# Patient Record
Sex: MALE | Race: WHITE | NOT HISPANIC OR LATINO | Employment: OTHER | URBAN - NONMETROPOLITAN AREA
[De-identification: names, ages, dates, MRNs, and addresses within clinical notes are randomized per-mention and may not be internally consistent; named-entity substitution may affect disease eponyms.]

---

## 2017-01-10 ENCOUNTER — DOCUMENTATION (OUTPATIENT)
Dept: CARDIOLOGY | Facility: CLINIC | Age: 77
End: 2017-01-10

## 2017-02-06 ENCOUNTER — ANTICOAGULATION VISIT (OUTPATIENT)
Dept: CARDIAC SURGERY | Facility: CLINIC | Age: 77
End: 2017-02-06

## 2017-02-06 ENCOUNTER — OFFICE VISIT (OUTPATIENT)
Dept: CARDIOLOGY | Facility: CLINIC | Age: 77
End: 2017-02-06

## 2017-02-06 VITALS
SYSTOLIC BLOOD PRESSURE: 110 MMHG | BODY MASS INDEX: 29.12 KG/M2 | WEIGHT: 208 LBS | HEART RATE: 70 BPM | HEIGHT: 71 IN | DIASTOLIC BLOOD PRESSURE: 70 MMHG

## 2017-02-06 VITALS — HEART RATE: 62 BPM | SYSTOLIC BLOOD PRESSURE: 90 MMHG | DIASTOLIC BLOOD PRESSURE: 64 MMHG

## 2017-02-06 DIAGNOSIS — Z79.01 LONG-TERM (CURRENT) USE OF ANTICOAGULANTS: ICD-10-CM

## 2017-02-06 DIAGNOSIS — E78.00 HYPERCHOLESTEROLEMIA: ICD-10-CM

## 2017-02-06 DIAGNOSIS — I48.3 TYPICAL ATRIAL FLUTTER (HCC): ICD-10-CM

## 2017-02-06 DIAGNOSIS — I48.0 PAROXYSMAL ATRIAL FIBRILLATION (HCC): Primary | ICD-10-CM

## 2017-02-06 DIAGNOSIS — I74.9 EMBOLISM AND THROMBOSIS OF ARTERY (HCC): ICD-10-CM

## 2017-02-06 DIAGNOSIS — I73.9 PERIPHERAL VASCULAR DISEASE (HCC): ICD-10-CM

## 2017-02-06 DIAGNOSIS — I48.91 ATRIAL FIBRILLATION, UNSPECIFIED TYPE (HCC): ICD-10-CM

## 2017-02-06 PROBLEM — I34.0 NON-RHEUMATIC MITRAL REGURGITATION: Status: ACTIVE | Noted: 2017-02-06

## 2017-02-06 LAB — INR PPP: 3.6 (ref 0.9–1.1)

## 2017-02-06 PROCEDURE — 36416 COLLJ CAPILLARY BLOOD SPEC: CPT | Performed by: NURSE PRACTITIONER

## 2017-02-06 PROCEDURE — 85610 PROTHROMBIN TIME: CPT | Performed by: NURSE PRACTITIONER

## 2017-02-06 PROCEDURE — 99211 OFF/OP EST MAY X REQ PHY/QHP: CPT | Performed by: NURSE PRACTITIONER

## 2017-02-06 PROCEDURE — 93000 ELECTROCARDIOGRAM COMPLETE: CPT | Performed by: INTERNAL MEDICINE

## 2017-02-06 PROCEDURE — 99213 OFFICE O/P EST LOW 20 MIN: CPT | Performed by: INTERNAL MEDICINE

## 2017-02-06 RX ORDER — WARFARIN SODIUM 5 MG/1
TABLET ORAL
Qty: 30 TABLET | Refills: 0 | Status: SHIPPED | OUTPATIENT
Start: 2017-02-06 | End: 2017-04-17 | Stop reason: SDUPTHER

## 2017-02-06 NOTE — PROGRESS NOTES
Massimo Bentley  76 y.o. male    02/06/2017  1. Paroxysmal atrial fibrillation    2. Peripheral vascular disease    3. Long-term (current) use of anticoagulants    4. Typical atrial flutter    5. Hypercholesterolemia    6. Embolism and thrombosis of artery        History of Present Illness: Routine follow-up with history of atrial flutter/atrial fibrillation and peripheral vascular disease under care of Dr. Pennington    75 years old gentleman who walk with the help of cane with the past medical history results of hypertension hypertensive heart disease, history of for atrial fibrillation/atrial flutter underwent EP study and isthmus ablation.  The patient noted to have of the by the reduction block left-sided atrial tachycardia no ablation was performed.  The patient was started on Betapace doing remarkably well.  The patient have peripheral embolism and peripheral was can disease status post stent placement by Dr. Pennington.  Also had AAA under care of Dr. Pennington.  The patient chronic oral anti-coagulation patient denied any orthopnea, PND, chest pain, palpitation, fever, chills, intermittent claudication, syncope or near syncopal episode.            SUBJECTIVE    Allergies   Allergen Reactions   • Lipitor [Atorvastatin]    • Lortab [Hydrocodone-Acetaminophen]    • Morphine And Related          Past Medical History   Diagnosis Date   • Abdominal bloating    • Anxiety    • Atherosclerosis of native arteries of extremity with intermittent claudication    • Atrial flutter    • Backache    • Benign prostatic hyperplasia      BX 2009, also has renal cyst      • Chronic bronchitis    • Chronic renal impairment    • Depressive disorder    • Drug abuse, episodic use    • Dyslipidemia    • Fatigue    • GERD (gastroesophageal reflux disease)    • Hypercholesterolemia 03/15/2016   • Injury of tendon of rotator cuff 10/22/2015   • Insomnia 06/24/2016   • Intervertebral disc disorder 10/27/2011   • Major depression    •  Neck pain 01/09/2012   • Osteoarthritis of multiple joints 06/21/2013   • Pain from vascular prosthetic devices, implants and grafts, sequela 12/03/2015   • Pain in left foot 10/27/2015   • Peripheral vascular disease 12/03/2015      LEFT fem-tib bypass, embolectomy 6/2014    10/27/2015    • Senile debility 10/22/2015   • 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     weakness   • Shoulder pain    • Simple renal cyst 01/01/2011   • Stroke       Echo: L atrial appendage thrombus      • Suicide      at risk for   • Tobacco dependence syndrome 12/03/2015     2014 to E cigarette            Past Surgical History   Procedure Laterality Date   • Femoral thrombectomy/embolectomy  06/29/2014     Left lower extremity arteriogram. Left popliteal artery endarterectomy. Redo left femoral to [id number] trunk bypass. Negative pressure wound therapy with placement of Prevena wound V.A.C., left groin, left lower leg   • Injection of medication  03/15/2016     B12   • Cardiac catheterization  01/30/1984     Mixed dominant coronary arterial anatomy. No coronary atherosclerosis. Normal left ventricular function   • Cholecystectomy  08/25/2014     Cholecystitis with gangrenous gallbladder   • Endoscopy and colonoscopy  10/17/2013     Diverticulum in sigmoid colon & descending colon. Internal & external hemorrhoids found   • Injection of medication  10/23/2015     Drain/Inject Major Joint    • Transesophageal echocardiogram ([facility])  10/06/2015     With color flow-Mild mitral regurg.Left atrium mild dilated.Ef of 55-60%.RV systolic pressure elevated.Trace tricuspid regurg   • Transesophageal echocardiogram ([facility])  02/14/2012     Without color flow-[facility] w/ early diast dysfun. AV trileaflet & structurally NRL w/ AR NRL. No regional wall motion abn Est Ef approx 50-55%. M & TR valves NRL w/ mild M & TR regurg Trace -mild pulmonic regurg. Anterior echo free space w/o hemodynamic signif. NRL RV   • Esophagoscopy / egd  10/17/2013     With  tube-Esophagitis seen. Biopsy taken. Gastritis in stomach. Biopsy taken. Stenosis in 2nd portion of duodenum. Dilatation performed.   • Endoscopy  03/22/1990     Duodenal ulcer   • Injection of medication  01/13/2016     Kenalog   • Back surgery     • Lumbar laminectomy     • Abdominal aortic aneurysm repair  07/18/2011     Endovascular   • Transesophageal echocardiogram ([facility])  03/25/2011     Normal LV systolic function with Ef of 50-55%.Left atrium ildly dilated.Moderate mitral regurg.Mitral valve regurg.Intact interatrial septum.No evidence of any cardiac thrombus or pericardial effusion.AV trileaflet         Family History   Problem Relation Age of Onset   • Osteoarthritis Mother      Lived to be 102   • Hypertension Mother    • ADD / ADHD Father    • Lung cancer Father    • ADD / ADHD Brother    • Rectal cancer Other          Social History     Social History   • Marital status:      Spouse name: N/A   • Number of children: N/A   • Years of education: N/A     Occupational History   • Not on file.     Social History Main Topics   • Smoking status: Current Some Day Smoker     Years: 50.00     Types: Cigars   • Smokeless tobacco: Not on file   • Alcohol use No   • Drug use: No   • Sexual activity: Not on file     Other Topics Concern   • Not on file     Social History Narrative         Current Outpatient Prescriptions   Medication Sig Dispense Refill   • acetaminophen (TYLENOL) 325 MG tablet Take 650 mg by mouth Every 6 (Six) Hours.     • albuterol (PROVENTIL HFA;VENTOLIN HFA) 108 (90 BASE) MCG/ACT inhaler Inhale 2 puffs Every 4 (Four) Hours As Needed for wheezing. 1 inhaler 11   • aspirin 81 MG EC tablet Take 81 mg by mouth daily.     • cilostazol (PLETAL) 50 MG tablet Take 1 tablet by mouth 2 (Two) Times a Day. 60 tablet 2   • levothyroxine (SYNTHROID, LEVOTHROID) 25 MCG tablet Take 25 mcg by mouth daily.     • magnesium oxide (MAGOX) 400 (241.3 MG) MG tablet tablet Take 400 mg by mouth every night.     •  "pravastatin (PRAVACHOL) 10 MG tablet Take 10 mg by mouth every other day.     • QUEtiapine (SEROquel) 100 MG tablet TAKE 2 TABLETS BY MOUTH AT BEDTIME  0   • sotalol (BETAPACE) 80 MG tablet Take 80 mg by mouth 2 (two) times a day.     • warfarin (COUMADIN) 5 MG tablet Take 1 tablet nightly except on Tuesday and Friday take 1/2 tablet or as directed by [facility] 30 tablet 0     Current Facility-Administered Medications   Medication Dose Route Frequency Provider Last Rate Last Dose   • albuterol (PROVENTIL HFA;VENTOLIN HFA) inhaler 1 puff  1 puff Inhalation Q6H PRN Belkys Hoffman MD             OBJECTIVE    Visit Vitals   • /70   • Pulse 70   • Ht 71\" (180.3 cm)   • Wt 208 lb (94.3 kg)   • BMI 29.01 kg/m2           Review of Systems     Constitutional:  Denies recent weight loss, weight gain, fever or chills, no change in exercise tolerance     HENT:  Denies any hearing loss, epistaxis, hoarseness, or difficulty speaking.     Eyes: no blurred    Respiratory:  Denies dyspnea with exertion,no cough, wheezing, or hemoptysis.     Cardiovascular: SEE HPI  Gastrointestinal:  Denies change in bowel habits, dyspepsia, ulcer disease, hematochezia, or melena.     Endocrine: Negative for cold intolerance, heat intolerance, polydipsia, polyphagia and polyuria. Denies any history of weight change, heat/cold intolerance, polydipsia, polyuria     Genitourinary: Negative.      Musculoskeletal:  history of arthritic symptoms or back problems     Skin:  Denies any change in hair or nails, rashes, or skin lesions.     Allergic/Immunologic: Negative.  Negative for environmental allergies, food allergies and immunocompromised state.     Neurological:  Denies any history of recurrent headaches, strokes, TIA, or seizure disorder.     Hematological: Denies any food allergies, seasonal allergies, bleeding disorders, or lymphadenopathy.     Psychiatric/Behavioral: Denies any history of depression, substance abuse, or " change in cognitive function.         Physical Exam     Constitutional: Cooperative, alert and oriented, well-developed, well-nourished, in no acute distress.     HENT:   Head: Normocephalic, normal hair patterns, no masses or tenderness.  Ears, Nose, and Throat: No gross abnormalities. No pallor or cyanosis. Dentition good.   Eyes: EOMS intact, PERRL, conjunctivae and lids unremarkable. Fundoscopic exam and visual fields not performed.   Neck: No palpable masses or adenopathy, no thyromegaly, no JVD, carotid pulses are full and equal bilaterally and without  Bruits.     Cardiovascular: Regular rhythm, S1 and S2 normal, no S3 or S4. Apical impulse not displaced. No murmurs, gallops, or rubs detected.     Pulmonary/Chest: Chest: normal symmetry, no tenderness to palpation, normal respiratory excursion, no intercostal retraction, no use of accessory muscles.            Pulmonary: Normal breath sounds. No rales or ronchi.    Abdominal: Abdomen soft, bowel sounds normoactive, no masses, no hepatosplenomegaly, non-tender, no bruits.     Musculoskeletal: No deformities, clubbing, cyanosis, erythema, or edema observed. There are no spinal abnormalities noted. Normal muscle strength and tone. Pulses full and equal in all extremities, no bruits auscultated.     Neurological: No gross motor or sensory deficits noted, affect appropriate, oriented to time, person, place.     Skin: Warm and dry to the touch, no apparent skin lesions or masses noted.     Psychiatric: He has a normal mood and affect. His behavior is normal. Judgment and thought content normal.           ECG 12 Lead  Date/Time: 2/6/2017 11:39 AM  Performed by: LUBNA BAUTISTA  Authorized by: LUBNA BAUTISTA   Rhythm: sinus bradycardia  Comments: Sinus bradycardia at 54 bpm without any ST-T wave changes suggesting of ischemia              Lab Results   Component Value Date    WBC 7.3 01/04/2017    HGB 13.7 01/04/2017    HCT 39.1 01/04/2017    MCV 83.0 01/04/2017      01/04/2017     Lab Results   Component Value Date    GLUCOSE 110 (H) 07/01/2016    BUN 25 (H) 07/01/2016    CREATININE 1.7 (H) 07/01/2016    CO2 24 07/01/2016    CALCIUM 9.0 07/01/2016    ALBUMIN 3.7 07/01/2016    AST 20 07/01/2016    ALT 24 07/01/2016     No results found for: CHOL  Lab Results   Component Value Date    TRIG 237 (H) 01/04/2017    TRIG 202 (H) 07/01/2016    TRIG 152 02/23/2016     Lab Results   Component Value Date    HDL 30 (L) 01/04/2017    HDL 32 (L) 07/01/2016    HDL 40 (L) 02/23/2016     Lab Results   Component Value Date    LDLCALC 83 01/04/2017    LDLCALC 94 07/01/2016    LDLCALC 106 02/23/2016     No results found for: LDL  No results found for: HDLLDLRATIO  No components found for: CHOLHDL  Lab Results   Component Value Date    HGBA1C 5.4 06/13/2014     Lab Results   Component Value Date    TSH 4.26 01/04/2017           ASSESSMENT AND PLAN  #1 atrial flutter/atrial fibrillation status post EP study  #2 hypertension with hypertensive heart disease and normal left and a systolic function.  Mild mitral regurgitation previous echocardiographic study.  #3  embolization with PVD status post stenting to care of Dr. Pennington  Clinically there is no sign of any acute cardiac decompensation based on the clinical history physical finding.  EKG done and finding discussed with the patient in normal QTc interval.  The patient abnormal stress test and good left ventricle systolic function.  Had mild mitral regurgitation without any hemodynamic significant.  Patient will continue Betapace 80 mg twice a day to keep him in sinus rhythm warfarin to decrease risk of embolization along with continuation of aspirin.  Hypothyroidism being managed with to Synthroid and Hytrin for lipidemia with the Pravachol.  No change was made in the medical management as his cardiac status seemed to well compensated.  We will see him back in 6 month R basement patient clinical conditions    Diagnoses and all orders for  this visit:    Paroxysmal atrial fibrillation  -     ECG 12 Lead    Peripheral vascular disease    Long-term (current) use of anticoagulants    Typical atrial flutter    Hypercholesterolemia    Embolism and thrombosis of artery        Emily Thacker MD  2/6/2017  11:33 AM

## 2017-02-06 NOTE — PROGRESS NOTES
Pt here today for overdue INR. Pt denies med changes or bleeding problems. Dose adjusted today only and pt instructed to have a serving of green veggies today; pt verbalized an understanding. Patient instructed regarding medication; results given and questions answered. Nutritional counseling given.  Dietary factors affecting therapy addressed.  Patient instructed to monitor for excessive bruising or bleeding. Will recheck in 2 weeks and if therapeutic will place out 1 month.  Electronically signed by JUANI Sheets

## 2017-02-08 DIAGNOSIS — I73.9 PERIPHERAL VASCULAR DISEASE, UNSPECIFIED (HCC): Primary | ICD-10-CM

## 2017-02-09 ENCOUNTER — OFFICE VISIT (OUTPATIENT)
Dept: CARDIAC SURGERY | Facility: CLINIC | Age: 77
End: 2017-02-09

## 2017-02-09 VITALS
OXYGEN SATURATION: 95 % | HEIGHT: 71 IN | SYSTOLIC BLOOD PRESSURE: 120 MMHG | WEIGHT: 218 LBS | DIASTOLIC BLOOD PRESSURE: 85 MMHG | TEMPERATURE: 97.3 F | HEART RATE: 59 BPM | BODY MASS INDEX: 30.52 KG/M2

## 2017-02-09 DIAGNOSIS — F17.200 TOBACCO DEPENDENCE SYNDROME: Primary | ICD-10-CM

## 2017-02-09 DIAGNOSIS — I73.9 PVD (PERIPHERAL VASCULAR DISEASE) (HCC): ICD-10-CM

## 2017-02-09 DIAGNOSIS — L98.9 SORE ON TOE: ICD-10-CM

## 2017-02-09 PROCEDURE — 99406 BEHAV CHNG SMOKING 3-10 MIN: CPT | Performed by: NURSE PRACTITIONER

## 2017-02-09 PROCEDURE — 99213 OFFICE O/P EST LOW 20 MIN: CPT | Performed by: NURSE PRACTITIONER

## 2017-02-09 NOTE — PROGRESS NOTES
Subjective   Patient ID: Massimo Bentley is a 76 y.o. male is here today for follow-up for PVD.  CC:  Denies any claudication or color changes of his feet.  Does have some balance issues, now using walker  Has pressure sore on R last toe.     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  He underwent Redo LEFT fem->tib peroneal graft.   He denies foot pain, denies any sensory, motor, or neuro deficit other than long standing LEFT shin numbness.  He denies any bleeding or unexplained bruising.  He returns today in scheduled FU.  He has been to rehab and no longer consumes alcohol,  He does continue to smoke cigars and electronic cigarettes.    He has noticed some wheezing.       PVD/Anticoagulation HX:  Chronic at fib for which he is on long term anticoagulation therapy.  He has undergone cardioversion and ablation (9/19/11) but continues with at fib.  He recently underwent  Aortagram with thrombolysis for clot of his SFA and popliteal artery (R).  TEX done at that time (2/17/12) demonstrated considerable amoutn of thrombus around and in LA appendage.  5/2/111. CT ABD:  Infrarenal abdominal aortic aneurysm, occurring just proximal to  the bifurcation, measuring 5.5 centimeters in craniocaudal dimension  5.3 cm in AP dimension, and 6.8 cm in transverse dimension  Left femoral to tibia bypas  7/18/2011 Endovascular AAA Repair   6/29/14  REDO LEFT femoral to tibial peroneal trunk bypass 6mm PTFE  with LFA embolectomy and LPA endarterectomy  11/3/2014 [facility]:  RIGHT 1.1 tri/biphasic.  LEFT .90 biphasic.  patent LEFT fem-tib bypass (maxPSV 40cm/s, triphasic)  3/9/15: [facility]: RIGHT 1.1 Triphasic. LEFT 0.86 Biphasic.  7/20/15: [facility]: RIGHT 1.1 Triphasic/Bi (DP). LEFT 0.82 Tri/Bi-Mon (PT/DP). PATENT LEFT Fem-tib max PSV 117cm/s (inflow) Triphasic.  10/27/15: [facility]: RIGHT 1.1 Triphasic. LEFT NO DP/PT  FLOW. OCCLUDED LEFT fem-tib graft  10/27/15  LLE arteriogram, TPA thrombolysis with EKOS catherter to LEFT fem-pop graft  10/28/15  Recheck lysis, 2 V run off to foot.   12/03/15  [facility]:  RIGHT 0.87  POP/DP/PT Triphasic  LEFT 0.88 POP Triphasic  DP/PT Biphasic   3/16/16  [facility]:  RIGHT 0.98  Triphasic  LEFT 0.75 POP Triphasic  DP/PT Biphasic   9/21/16   [facility]:  RIGHT 1.01  Triphasic  LEFT 0.87 POP Triphasic  PT Biphasic  DP Monophasic   PPGS:  Right pulsatile waveforms  LEFT reduced PPG waveforms.   02/08/17  [facility]:  RIGHT 1.22   Triphasic  LEFT 0.95 POP Triphasic  PT Biphasic  DP Biphasic    PPGS:  Right pulsatile waveforms  LEFT reduced PPG waveforms.     Aneurysm:  No back pain, No embolization  5/2/111. CT ABD:  Infrarenal abdominal aortic aneurysm, occurring just proximal to  the bifurcation, measuring 5.5 centimeters in craniocaudal dimension  5.3 cm in AP dimension, and 6.8 cm in transverse dimension  7/18/2011 Endovascular AAA Repair   3/9/15: Abd Duplex: Prx Aorta 3.6cm. No endovascular leak      The following portions of the patient's history were reviewed and updated as appropriate: allergies, current medications, past family history, past medical history, past social history, past surgical history and problem list.    Current Outpatient Prescriptions:   •  albuterol (PROVENTIL HFA;VENTOLIN HFA) 108 (90 BASE) MCG/ACT inhaler, Inhale 2 puffs Every 4 (Four) Hours As Needed for wheezing., Disp: 1 inhaler, Rfl: 11  •  aspirin 81 MG EC tablet, Take 81 mg by mouth daily., Disp: , Rfl:   •  cilostazol (PLETAL) 50 MG tablet, Take 1 tablet by mouth 2 (Two) Times a Day., Disp: 60 tablet, Rfl: 2  •  levothyroxine (SYNTHROID, LEVOTHROID) 25 MCG tablet, Take 25 mcg by mouth daily., Disp: , Rfl:   •  magnesium oxide (MAGOX) 400 (241.3 MG) MG tablet tablet, Take 400 mg by mouth every night., Disp: , Rfl:   •  pravastatin (PRAVACHOL) 10 MG tablet, Take 10 mg by mouth every other day., Disp: , Rfl:   •  QUEtiapine (SEROquel) 100  MG tablet, TAKE 2 TABLETS BY MOUTH AT BEDTIME, Disp: , Rfl: 0  •  sotalol (BETAPACE) 80 MG tablet, Take 80 mg by mouth 2 (two) times a day., Disp: , Rfl:   •  warfarin (COUMADIN) 5 MG tablet, Take 1 tablet nightly except on Tuesday and Friday take 1/2 tablet or as directed by [facility], Disp: 30 tablet, Rfl: 0  No current facility-administered medications for this visit.     Review of Systems   Constitution: Positive for weakness (less strong ). Negative for chills, fever and malaise/fatigue.   HENT: Negative for headaches, hoarse voice and nosebleeds.    Eyes: Negative for visual disturbance.   Cardiovascular: Negative for chest pain, claudication, cyanosis and dyspnea on exertion.   Respiratory: Positive for cough and wheezing (daytime). Negative for hemoptysis and shortness of breath.    Hematologic/Lymphatic: Does not bruise/bleed easily.   Skin: Positive for dry skin and poor wound healing (Developed wound on Last toe inner aspect R foot ). Negative for color change and rash.   Musculoskeletal: Negative for falls, joint swelling and muscle cramps.   Gastrointestinal: Negative for anorexia, change in bowel habit, hematemesis, melena and nausea.   Genitourinary: Negative for hematuria.   Neurological: Positive for disturbances in coordination (Now uses walker ), loss of balance (walker) and numbness (Left shin). Negative for brief paralysis, difficulty with concentration, dizziness, focal weakness, light-headedness, paresthesias and sensory change.   Psychiatric/Behavioral: Negative for altered mental status.        Objective   Physical Exam   Constitutional: He is oriented to person, place, and time. He appears well-nourished.   HENT:   Head: Normocephalic.   Eyes: Conjunctivae and EOM are normal. Pupils are equal, round, and reactive to light.   Neck: Neck supple. No JVD present. No tracheal deviation present.   Cardiovascular: Normal rate, normal heart sounds and intact distal pulses.  An irregular  rhythm present.   Pulses:       Carotid pulses are 2+ on the right side, and 2+ on the left side.       Radial pulses are 2+ on the right side, and 2+ on the left side.        Popliteal pulses are 2+ on the right side, and 2+ on the left side.        Dorsalis pedis pulses are 2+ on the right side, and 1+ on the left side.        Posterior tibial pulses are 2+ on the right side, and 1+ on the left side.   Lower extremities with varcosities and spider veins    Pulmonary/Chest: Effort normal. He has wheezes. He has no rales.   Musculoskeletal: He exhibits no edema or tenderness.       Neurological Sensory Findings -  Unaltered sharp/dull right ankle/foot discrimination and unaltered sharp/dull left ankle/foot discrimination.    Vascular Status -  His exam exhibits right foot vasculature normal. His exam exhibits no right foot edema. His exam exhibits left foot vasculature abnormal (decrease pulses   Distal 3 toes with discoloration bluish no pallor ) and no left foot edema.   Skin Integrity  -  His right foot skin is not intact (Last toe inner aspect with 0.4 cm circular ulcer with soft white top ).    Massimo 's left foot skin is intact. .  Neurological: He is alert and oriented to person, place, and time. No cranial nerve deficit.   Skin: Skin is warm and dry. No rash noted. No erythema. No pallor.   LEFT Posterior toes dusky W/D   Cap refill < 3 sec    + sens & mobility   Nursing note and vitals reviewed.      Assessment/Plan   Independent Review of Radiographic Studies:   02/08/17  [facility]:  RIGHT 1.22   Triphasic  LEFT 0.95 POP Triphasic  PT Biphasic  DP Biphasic    PPGS:  Right pulsatile waveforms  LEFT reduced PPG waveforms.         1. PVD (peripheral vascular disease)  Massimo Bentley is to continue beta blocker, antiplatelet, and statin therapy.  Continue Pletal at reduced dosage as dizziness resolved.  Continue anticoagulation with coumadin, currently he is therapeutic.  Advise to purchase mirror to view  posterior of toes.  Advise smoking cessation, recommend NRP nicotine gum.   Especially with wheezing.  He agrees to avoid cigars for 1 week and see if daytime wheezing improves.  Smoking cessation assistance options offered including behavioral counseling (Smoking Cessation Classes), Nicotine replacement therapy (patches or gum) .  Discussion and question answer period 2-3 minutes.  Vascular FU 6 mths unless symptoms occur  If signs and symptoms of ischemia should occur including but not limited to pale/blue discoloration of limb, increasing pain with ambulation or at rest, or a non-healing wound. Patient is to notify [facility] center for immediate evaluation.  Wheezing:  Avoid cigars and see if improved.     - Ankle Brachial Index    2.  Sore on R toe  Wound care:   Sore on R little toe.  CLean 2 x a day with baby shampoo  Rinse pat  Separate toes at night Light antibacterial ointment.   Refer to Dr Menendez Podiatry

## 2017-02-09 NOTE — PATIENT INSTRUCTIONS
VASCULAR TEST:  Stable  Normal blood flow to right foot and toe   Diminished blood flow to Left foot and toe  Sore on R little toe.  CLean 2 x a day with baby shampoo  Rinse pat  Separate toes at night Light antibacterial ointment.   Refer to Dr Menendez Podiatry  Vascular FU 6 mths unless symptoms occur  If signs and symptoms of ischemia should occur including but not limited to pale/blue discoloration of limb, increasing pain with ambulation or at rest, or a non-healing wound. Patient is to notify [facility] V[facility] center for immediate evaluation.  Wheezing:  Avoid cigars and see if improved.

## 2017-02-11 LAB
BH CV LOWER ARTERIAL LEFT ABI RATIO: 0.95
BH CV LOWER ARTERIAL LEFT DORSALIS PEDIS SYS MAX: 127 MMHG
BH CV LOWER ARTERIAL LEFT POST TIBIAL SYS MAX: 124 MMHG
BH CV LOWER ARTERIAL RIGHT ABI RATIO: 1.22
BH CV LOWER ARTERIAL RIGHT DORSALIS PEDIS SYS MAX: 162 MMHG
BH CV LOWER ARTERIAL RIGHT POST TIBIAL SYS MAX: 161 MMHG
UPPER ARTERIAL LEFT ARM BRACHIAL SYS MAX: 113 MMHG
UPPER ARTERIAL RIGHT ARM BRACHIAL SYS MAX: 133 MMHG

## 2017-02-20 ENCOUNTER — OFFICE VISIT (OUTPATIENT)
Dept: FAMILY MEDICINE CLINIC | Facility: CLINIC | Age: 77
End: 2017-02-20

## 2017-02-20 VITALS
WEIGHT: 215 LBS | BODY MASS INDEX: 30.1 KG/M2 | DIASTOLIC BLOOD PRESSURE: 76 MMHG | SYSTOLIC BLOOD PRESSURE: 122 MMHG | HEIGHT: 71 IN

## 2017-02-20 DIAGNOSIS — F33.41 RECURRENT MAJOR DEPRESSIVE DISORDER, IN PARTIAL REMISSION (HCC): ICD-10-CM

## 2017-02-20 DIAGNOSIS — R06.02 SHORTNESS OF BREATH: Primary | ICD-10-CM

## 2017-02-20 DIAGNOSIS — Z79.899 HIGH RISK MEDICATION USE: ICD-10-CM

## 2017-02-20 DIAGNOSIS — IMO0002 ULCER: ICD-10-CM

## 2017-02-20 DIAGNOSIS — E78.5 HYPERLIPIDEMIA, UNSPECIFIED HYPERLIPIDEMIA TYPE: ICD-10-CM

## 2017-02-20 PROCEDURE — 99214 OFFICE O/P EST MOD 30 MIN: CPT | Performed by: FAMILY MEDICINE

## 2017-02-20 RX ORDER — HYDROXYZINE PAMOATE 25 MG/1
25 CAPSULE ORAL 3 TIMES DAILY PRN
Qty: 90 CAPSULE | Refills: 1 | Status: SHIPPED | OUTPATIENT
Start: 2017-02-20 | End: 2017-05-16

## 2017-02-20 RX ORDER — GUAIFENESIN 600 MG/1
1200 TABLET, EXTENDED RELEASE ORAL 2 TIMES DAILY
COMMUNITY
End: 2018-01-01

## 2017-02-20 RX ORDER — MONTELUKAST SODIUM 10 MG/1
10 TABLET ORAL NIGHTLY
Qty: 90 TABLET | Refills: 2 | Status: SHIPPED | OUTPATIENT
Start: 2017-02-20 | End: 2017-08-02

## 2017-02-20 NOTE — PROGRESS NOTES
Subjective   Massimo Bentley is a 76 y.o. male.     History of Present Illness   Mr. Bentley is a 75yo male that presents today for follow-up on depression and hyperlipidemia.  He says that his depression has been fine and medication is working well without side effects.    He has been taking his cholesterol medication.  Had stopped taking his fish oil.  Cholesterol was overall better.  Has a sore on his small toe that he has appointment with podiatry.  He has had two rounds of antibiotics and has been there for 6 months now.  Not draining a lot.    He has had some issues with wheezing and SOA with exertion.  He has albuterol that he uses and that helps as needed.  He is a smoker and has been smoking for years.  Has really tried to cut back.      Current Outpatient Prescriptions:   •  albuterol (PROVENTIL HFA;VENTOLIN HFA) 108 (90 BASE) MCG/ACT inhaler, Inhale 2 puffs Every 4 (Four) Hours As Needed for wheezing., Disp: 1 inhaler, Rfl: 11  •  aspirin 81 MG EC tablet, Take 81 mg by mouth daily., Disp: , Rfl:   •  cilostazol (PLETAL) 50 MG tablet, Take 1 tablet by mouth 2 (Two) Times a Day., Disp: 60 tablet, Rfl: 2  •  guaiFENesin (MUCINEX) 600 MG 12 hr tablet, Take 1,200 mg by mouth 2 (Two) Times a Day., Disp: , Rfl:   •  levothyroxine (SYNTHROID, LEVOTHROID) 25 MCG tablet, Take 25 mcg by mouth daily., Disp: , Rfl:   •  magnesium oxide (MAGOX) 400 (241.3 MG) MG tablet tablet, Take 400 mg by mouth every night., Disp: , Rfl:   •  pravastatin (PRAVACHOL) 10 MG tablet, Take 10 mg by mouth every other day., Disp: , Rfl:   •  QUEtiapine (SEROquel) 100 MG tablet, TAKE 2 TABLETS BY MOUTH AT BEDTIME, Disp: , Rfl: 0  •  sotalol (BETAPACE) 80 MG tablet, Take 80 mg by mouth 2 (two) times a day., Disp: , Rfl:   •  warfarin (COUMADIN) 5 MG tablet, Take 1 tablet nightly except on Tuesday and Friday take 1/2 tablet or as directed by [facility], Disp: 30 tablet, Rfl: 0    The following portions of the patient's history were  "reviewed and updated as appropriate: allergies, current medications, past family history, past medical history, past social history, past surgical history and problem list.    Review of Systems   Constitutional: Positive for fatigue. Negative for activity change and appetite change.   Respiratory: Positive for cough, chest tightness, shortness of breath and wheezing.    Cardiovascular: Negative for chest pain, palpitations and leg swelling.   Gastrointestinal: Negative for abdominal distention, abdominal pain, constipation, diarrhea, nausea and vomiting.   Musculoskeletal: Positive for arthralgias and back pain. Negative for gait problem and joint swelling.   Skin: Positive for wound. Negative for pallor and rash.   Psychiatric/Behavioral: Negative for dysphoric mood and sleep disturbance. The patient is not nervous/anxious.        Objective    Vitals:    02/20/17 1447   BP: 122/76   Weight: 215 lb (97.5 kg)   Height: 71\" (180.3 cm)     Physical Exam   Constitutional: He is oriented to person, place, and time. He appears well-developed and well-nourished. No distress.   Walks with walker   Cardiovascular: Normal rate, regular rhythm and normal heart sounds.    No LE edema.  Erythma and ulcerative lesion on small toe on the right foot.  Toes feel warm to touch.  No drainage from lesion   Pulmonary/Chest: Effort normal. No respiratory distress. He has wheezes.   Abdominal: Soft. Bowel sounds are normal. He exhibits no distension. There is no tenderness.   Neurological: He is alert and oriented to person, place, and time.   Psychiatric: He has a normal mood and affect. His behavior is normal. Judgment and thought content normal.   Vitals reviewed.      Assessment/Plan   Problems Addressed this Visit        Cardiovascular and Mediastinum    Hyperlipidemia       Other    Major depression    Relevant Medications    hydrOXYzine (VISTARIL) 25 MG capsule      Other Visit Diagnoses     Shortness of breath    -  Primary    " Relevant Orders    Spirometry With Bronchodilator    Ulcer        Relevant Orders    Ambulatory Referral to Wound Clinic    High risk medication use        Relevant Orders    Basic metabolic panel          1.) SOA-  He has been a long term smoker.  May be COPD.  Will get PFT.  Also sounds like this is triggered by allergies.  Will try singulair and see if that helps.  Albuterol PRN.  2.) Depression-  Doing well on current medications.   3.) Ulcer on toe-  Follow-up with Podiatry as scheduled.  Will refer to wound care as well since it has been there for 6 months and he has a history of PAD.  4.) Hyperlipidemia-  Recommended that he restart his fish oil.  Continue statin.  RTC in 2-3 months or sooner PRN

## 2017-02-21 PROBLEM — E78.5 HYPERLIPIDEMIA: Status: ACTIVE | Noted: 2017-02-21

## 2017-02-23 ENCOUNTER — APPOINTMENT (OUTPATIENT)
Dept: WOUND CARE | Facility: HOSPITAL | Age: 77
End: 2017-02-23
Attending: PLASTIC SURGERY

## 2017-02-28 ENCOUNTER — APPOINTMENT (OUTPATIENT)
Dept: WOUND CARE | Facility: HOSPITAL | Age: 77
End: 2017-02-28
Attending: PLASTIC SURGERY

## 2017-03-02 ENCOUNTER — APPOINTMENT (OUTPATIENT)
Dept: LAB | Facility: HOSPITAL | Age: 77
End: 2017-03-02

## 2017-03-02 LAB
ANION GAP SERPL CALCULATED.3IONS-SCNC: 8 MMOL/L (ref 5–15)
BUN BLD-MCNC: 24 MG/DL (ref 7–21)
BUN/CREAT SERPL: 13.4 (ref 7–25)
CALCIUM SPEC-SCNC: 8.7 MG/DL (ref 8.4–10.2)
CHLORIDE SERPL-SCNC: 105 MMOL/L (ref 95–110)
CO2 SERPL-SCNC: 25 MMOL/L (ref 22–31)
CREAT BLD-MCNC: 1.79 MG/DL (ref 0.7–1.3)
GFR SERPL CREATININE-BSD FRML MDRD: 37 ML/MIN/1.73 (ref 60–98)
GLUCOSE BLD-MCNC: 94 MG/DL (ref 60–100)
POTASSIUM BLD-SCNC: 5.1 MMOL/L (ref 3.5–5.1)
SODIUM BLD-SCNC: 138 MMOL/L (ref 137–145)

## 2017-03-02 PROCEDURE — 80048 BASIC METABOLIC PNL TOTAL CA: CPT | Performed by: FAMILY MEDICINE

## 2017-03-02 PROCEDURE — 36415 COLL VENOUS BLD VENIPUNCTURE: CPT | Performed by: FAMILY MEDICINE

## 2017-03-03 RX ORDER — SOTALOL HYDROCHLORIDE 80 MG/1
120 TABLET ORAL 2 TIMES DAILY
Qty: 270 TABLET | Refills: 2 | Status: SHIPPED | OUTPATIENT
Start: 2017-03-03 | End: 2017-03-06 | Stop reason: SDUPTHER

## 2017-03-03 NOTE — TELEPHONE ENCOUNTER
Refill Request came in by fax from   [facility]/pharmacy #[id number] - [place], [place] - [address] [place] [address] - [phone]  - [phone] [address] [address]  Phone: [phone] Fax: [phone]   Refill sent to pharmacy via e-scribe.

## 2017-03-06 RX ORDER — SOTALOL HYDROCHLORIDE 80 MG/1
120 TABLET ORAL 2 TIMES DAILY
Qty: 270 TABLET | Refills: 2 | Status: SHIPPED | OUTPATIENT
Start: 2017-03-06 | End: 2017-11-13 | Stop reason: SDUPTHER

## 2017-03-06 NOTE — TELEPHONE ENCOUNTER
Refill Request came in by fax from   [facility]/pharmacy #[id number] - [place], [place] - [address] [place] [address] - [phone]  - [phone] [address] [address]  Phone: [phone] Fax: [phone]   Refill sent to pharmacy via e-scribe.

## 2017-03-14 ENCOUNTER — ANTICOAGULATION VISIT (OUTPATIENT)
Dept: CARDIAC SURGERY | Facility: CLINIC | Age: 77
End: 2017-03-14

## 2017-03-14 VITALS — HEART RATE: 71 BPM | SYSTOLIC BLOOD PRESSURE: 141 MMHG | DIASTOLIC BLOOD PRESSURE: 81 MMHG

## 2017-03-14 DIAGNOSIS — I48.0 PAROXYSMAL ATRIAL FIBRILLATION (HCC): ICD-10-CM

## 2017-03-14 DIAGNOSIS — Z79.01 LONG-TERM (CURRENT) USE OF ANTICOAGULANTS: ICD-10-CM

## 2017-03-14 DIAGNOSIS — I74.9 EMBOLISM AND THROMBOSIS OF ARTERY (HCC): ICD-10-CM

## 2017-03-14 LAB — INR PPP: 2.2 (ref 0.9–1.1)

## 2017-03-14 PROCEDURE — 85610 PROTHROMBIN TIME: CPT | Performed by: NURSE PRACTITIONER

## 2017-03-14 PROCEDURE — 99211 OFF/OP EST MAY X REQ PHY/QHP: CPT | Performed by: NURSE PRACTITIONER

## 2017-03-14 NOTE — PROGRESS NOTES
Pt overdue for INR today.  Pt states medications have not changed, no bleeding issues.  Pt is unsure if he missed a coumadin dose or not.  Adjusted coumadin dose for today only and instructed pt to limit green intake for three days.  Pt does not wish to return to CC prior to one month.  Patient instructed regarding medication; results given and questions answered. Nutritional counseling given.  Dietary factors affecting therapy addressed.  Patient instructed to monitor for excessive bruising or bleeding.          This document has been electronically signed by JUANI Fernandez on March 15, 2017 9:11 AM

## 2017-03-28 ENCOUNTER — OFFICE VISIT (OUTPATIENT)
Dept: PODIATRY | Facility: CLINIC | Age: 77
End: 2017-03-28

## 2017-03-28 VITALS — BODY MASS INDEX: 29.4 KG/M2 | OXYGEN SATURATION: 97 % | HEIGHT: 71 IN | WEIGHT: 210 LBS | HEART RATE: 60 BPM

## 2017-03-28 DIAGNOSIS — L97.512 TOE ULCER, RIGHT, WITH FAT LAYER EXPOSED (HCC): Primary | ICD-10-CM

## 2017-03-28 DIAGNOSIS — M20.41 HAMMER TOE OF RIGHT FOOT: ICD-10-CM

## 2017-03-28 DIAGNOSIS — I73.9 PAD (PERIPHERAL ARTERY DISEASE) (HCC): ICD-10-CM

## 2017-03-28 DIAGNOSIS — M79.674 PAIN OF TOE OF RIGHT FOOT: ICD-10-CM

## 2017-03-28 PROCEDURE — 11042 DBRDMT SUBQ TIS 1ST 20SQCM/<: CPT | Performed by: PODIATRIST

## 2017-03-28 PROCEDURE — 99203 OFFICE O/P NEW LOW 30 MIN: CPT | Performed by: PODIATRIST

## 2017-04-11 ENCOUNTER — OFFICE VISIT (OUTPATIENT)
Dept: PODIATRY | Facility: CLINIC | Age: 77
End: 2017-04-11

## 2017-04-11 ENCOUNTER — ANTICOAGULATION VISIT (OUTPATIENT)
Dept: CARDIAC SURGERY | Facility: CLINIC | Age: 77
End: 2017-04-11

## 2017-04-11 VITALS — HEART RATE: 60 BPM | DIASTOLIC BLOOD PRESSURE: 60 MMHG | SYSTOLIC BLOOD PRESSURE: 122 MMHG

## 2017-04-11 VITALS — BODY MASS INDEX: 29.4 KG/M2 | HEIGHT: 71 IN | WEIGHT: 210 LBS

## 2017-04-11 DIAGNOSIS — M20.41 HAMMER TOE OF RIGHT FOOT: ICD-10-CM

## 2017-04-11 DIAGNOSIS — I73.9 PAD (PERIPHERAL ARTERY DISEASE) (HCC): ICD-10-CM

## 2017-04-11 DIAGNOSIS — L97.512 TOE ULCER, RIGHT, WITH FAT LAYER EXPOSED (HCC): Primary | ICD-10-CM

## 2017-04-11 DIAGNOSIS — Z79.01 LONG-TERM (CURRENT) USE OF ANTICOAGULANTS: ICD-10-CM

## 2017-04-11 DIAGNOSIS — M79.674 PAIN OF TOE OF RIGHT FOOT: ICD-10-CM

## 2017-04-11 DIAGNOSIS — I48.0 PAROXYSMAL ATRIAL FIBRILLATION (HCC): ICD-10-CM

## 2017-04-11 DIAGNOSIS — I74.9 EMBOLISM AND THROMBOSIS OF ARTERY (HCC): ICD-10-CM

## 2017-04-11 LAB — INR PPP: 3 (ref 0.9–1.1)

## 2017-04-11 PROCEDURE — 11042 DBRDMT SUBQ TIS 1ST 20SQCM/<: CPT | Performed by: PODIATRIST

## 2017-04-11 PROCEDURE — 85610 PROTHROMBIN TIME: CPT | Performed by: NURSE PRACTITIONER

## 2017-04-11 PROCEDURE — 99213 OFFICE O/P EST LOW 20 MIN: CPT | Performed by: PODIATRIST

## 2017-04-11 NOTE — PROGRESS NOTES
Patient states no med changes or bleeding problems or unexplained bruising. Patient instructed to continue current dosing schedule. Verbalizes understanding. Will recheck 1 month.   Patient instructed regarding medication; results given and questions answered. Nutritional counseling given.  Dietary factors affecting therapy addressed.  Patient instructed to monitor for excessive bruising or bleeding.          This document has been electronically signed by JUANI Fernandez on April 12, 2017 8:37 AM

## 2017-04-11 NOTE — PROGRESS NOTES
Massimo Bentley  1940  77 y.o. male   Patient presents for foot ulcer on the 5th right toe.   Patient was referred by JUANI You.    04/11/2017    Chief Complaint   Patient presents with   • Right Foot - Follow-up           History of Present Illness    Mr Bentley is a 76 y/o male with history of PAD who presents for f/u evaluation of right 5th toe wound. He has been performing home wound care without issue. Does relate some pain to the wound site.      Past Medical History:   Diagnosis Date   • Abdominal bloating    • Anxiety    • Atherosclerosis of native arteries of extremity with intermittent claudication    • Atrial flutter    • Backache    • Benign prostatic hyperplasia     BX 2009, also has renal cyst      • Chronic bronchitis    • Chronic renal impairment    • Depressive disorder    • Drug abuse, episodic use    • Dyslipidemia    • Fatigue    • GERD (gastroesophageal reflux disease)    • Hypercholesterolemia 03/15/2016   • Injury of tendon of rotator cuff 10/22/2015   • Insomnia 06/24/2016   • Intervertebral disc disorder 10/27/2011   • Major depression    • Neck pain 01/09/2012   • Osteoarthritis of multiple joints 06/21/2013   • Pain from vascular prosthetic devices, implants and grafts, sequela 12/03/2015   • Pain in left foot 10/27/2015   • Peripheral vascular disease 12/03/2015     LEFT fem-tib bypass, embolectomy 6/2014    10/27/2015    • Senile debility 10/22/2015   • 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 No coronary atherosclerosis. Normal left  ventricular function   • CHOLECYSTECTOMY  08/25/2014    Cholecystitis with gangrenous gallbladder   • ENDOSCOPY  03/22/1990    Duodenal ulcer   • ENDOSCOPY AND COLONOSCOPY  10/17/2013    Diverticulum in sigmoid colon & descending colon. Internal & external hemorrhoids found   • ESOPHAGOSCOPY / EGD  10/17/2013    With tube-Esophagitis seen. Biopsy taken. Gastritis in stomach. Biopsy taken. Stenosis in 2nd portion of duodenum. Dilatation performed.   • FEMORAL THROMBECTOMY/EMBOLECTOMY  06/29/2014    Left lower extremity arteriogram. Left popliteal artery endarterectomy. Redo left femoral to [id number] trunk bypass. Negative pressure wound therapy with placement of Prevena wound V.A.C., left groin, left lower leg   • INJECTION OF MEDICATION  03/15/2016    B12   • INJECTION OF MEDICATION  10/23/2015    Drain/Inject Major Joint    • INJECTION OF MEDICATION  01/13/2016    Kenalog   • LUMBAR LAMINECTOMY     • TRANSESOPHAGEAL ECHOCARDIOGRAM (TEX)  10/06/2015    With color flow-Mild mitral regurg.Left atrium mild dilated.Ef of 55-60%.RV systolic pressure elevated.Trace tricuspid regurg   • TRANSESOPHAGEAL ECHOCARDIOGRAM (TEX)  02/14/2012    Without color flow-[facility] w/ early diast dysfun. AV trileaflet & structurally NRL w/ AR NRL. No regional wall motion abn Est Ef approx 50-55%. M & TR valves NRL w/ mild M & TR regurg Trace -mild pulmonic regurg. Anterior echo free space w/o hemodynamic signif. NRL RV   • TRANSESOPHAGEAL ECHOCARDIOGRAM (TEX)  03/25/2011    Normal LV systolic function with Ef of 50-55%.Left atrium ildly dilated.Moderate mitral regurg.Mitral valve regurg.Intact interatrial septum.No evidence of any cardiac thrombus or pericardial effusion.AV trileaflet         Family History   Problem Relation Age of Onset   • Osteoarthritis Mother      Lived to be 102   • Hypertension Mother    • ADD / ADHD Father    • Lung cancer Father    • ADD / ADHD Brother    • Rectal cancer Other          Social History     Social  "History   • Marital status:      Spouse name: N/A   • Number of children: N/A   • Years of education: N/A     Occupational History   • Not on file.     Social History Main Topics   • Smoking status: Current Some Day Smoker     Years: 50.00     Types: Electronic Cigarette   • Smokeless tobacco: Not on file      Comment: reduced;   • Alcohol use No   • Drug use: No   • Sexual activity: Not on file     Other Topics Concern   • Not on file     Social History Narrative         Current Outpatient Prescriptions   Medication Sig Dispense Refill   • albuterol (PROVENTIL HFA;VENTOLIN HFA) 108 (90 BASE) MCG/ACT inhaler Inhale 2 puffs Every 4 (Four) Hours As Needed for wheezing. 1 inhaler 11   • aspirin 81 MG EC tablet Take 81 mg by mouth daily.     • cilostazol (PLETAL) 50 MG tablet Take 1 tablet by mouth 2 (Two) Times a Day. 60 tablet 2   • guaiFENesin (MUCINEX) 600 MG 12 hr tablet Take 1,200 mg by mouth 2 (Two) Times a Day.     • hydrOXYzine (VISTARIL) 25 MG capsule Take 1 capsule by mouth 3 (Three) Times a Day As Needed for anxiety. 90 capsule 1   • levothyroxine (SYNTHROID, LEVOTHROID) 25 MCG tablet Take 25 mcg by mouth daily.     • magnesium oxide (MAGOX) 400 (241.3 MG) MG tablet tablet Take 400 mg by mouth every night.     • montelukast (SINGULAIR) 10 MG tablet Take 1 tablet by mouth Every Night. 90 tablet 2   • pravastatin (PRAVACHOL) 10 MG tablet Take 10 mg by mouth every other day.     • QUEtiapine (SEROquel) 100 MG tablet TAKE 2 TABLETS BY MOUTH AT BEDTIME  0   • sotalol (BETAPACE) 80 MG tablet Take 1.5 tablets by mouth 2 (Two) Times a Day. 270 tablet 2   • warfarin (COUMADIN) 5 MG tablet Take 1 tablet nightly except on Tuesday and Friday take 1/2 tablet or as directed by [facility] 30 tablet 0     No current facility-administered medications for this visit.          OBJECTIVE    Ht 71\" (180.3 cm)  Wt 210 lb (95.3 kg)  BMI 29.29 kg/m2      Review of Systems   Constitutional:  Denies recent weight loss, " weight gain, fever or chills, no change in exercise tolerance  Musculoskeletal: Toe pain.   Skin:  Toe ulcer  Neurological:  Denies paresthesias  Psychiatric/Behavioral: Denies depression      Physical Exam   Constitutional: he appears well-developed and well-nourished.   HEENT: Normocephalic. Atraumatic  CV: No tenderness. RRR  Resp: Non-labored respiration. No wheezes.   Psychiatric: he has a normal mood and affect. his   behavior is normal.      Lower Extremity Exam:  Vascular: DP/PT pulses palpable 1+.   Negative hair growth.   No edema  CFT wnl  Neuro: Protective sensation diminished to lesser toes, b/l.  DTRs intact  Integument: Right medial 5th digit 0.4 x 0.3 cm ulceration with surrounding HPK. No PTB. No SOI  Atrophic skin noted b/l with chronic venous stasis dermatitis changes  Web spaces c/d/i  Musculoskeletal: LE muscle strength 5/5.   Gait assisted with walker  Semi-rigid hammertoe deformity toes 2-5 b/l.  Nails 1-5 b/l thickened  Ankle ROM decreased without pain    Right foot wound debridement:  Risks and benefits discussed.  Right 5th toe ulcer debrided of surrounding hyperkeratosis and devitalized tissue with 15 blade to level of subq  Pressure hemostasis obtained  Abx ointment and dsd applied.        ASSESSMENT AND PLAN    Massimo was seen today for follow-up.    Diagnoses and all orders for this visit:    Toe ulcer, right, with fat layer exposed    Hammer toe of right foot    Pain of toe of right foot    PAD (peripheral artery disease)    -Comprehensive foot exam performed. Pt educated on importance of daily foot checks and proper extra depth shoe gear. Pt education materials dispensed.  -Wound debridement as above  -Cont abx dressing changes every other day  -Cont toe spacers  -Follow up 2 weeks          This document has been electronically signed by Devin Menendez DPM on April 16, 2017 6:05 PM     Devin Menendez DPM  4/16/2017  6:05 PM

## 2017-04-17 RX ORDER — WARFARIN SODIUM 5 MG/1
TABLET ORAL
Qty: 30 TABLET | Refills: 0 | Status: SHIPPED | OUTPATIENT
Start: 2017-04-17 | End: 2017-05-15 | Stop reason: SDUPTHER

## 2017-04-24 ENCOUNTER — TELEPHONE (OUTPATIENT)
Dept: FAMILY MEDICINE CLINIC | Facility: CLINIC | Age: 77
End: 2017-04-24

## 2017-04-24 RX ORDER — ALBUTEROL SULFATE 90 UG/1
2 AEROSOL, METERED RESPIRATORY (INHALATION) EVERY 4 HOURS PRN
Qty: 1 INHALER | Refills: 5 | Status: SHIPPED | OUTPATIENT
Start: 2017-04-24 | End: 2017-08-30

## 2017-04-24 NOTE — TELEPHONE ENCOUNTER
Requested Refills Sent    ----- Message from Roxie Nieto sent at 4/24/2017 11:21 AM CDT -----  Regarding: MED REFILL  Contact: [phone]  Massimo needs a refill on his inhaler to [facility] Dr diaz patient

## 2017-05-01 DIAGNOSIS — I95.0 IDIOPATHIC HYPOTENSION: ICD-10-CM

## 2017-05-01 RX ORDER — CILOSTAZOL 50 MG/1
50 TABLET ORAL 2 TIMES DAILY
Qty: 60 TABLET | Refills: 5 | Status: SHIPPED | OUTPATIENT
Start: 2017-05-01 | End: 2017-12-01 | Stop reason: SDUPTHER

## 2017-05-15 RX ORDER — WARFARIN SODIUM 5 MG/1
TABLET ORAL
Qty: 30 TABLET | Refills: 0 | Status: SHIPPED | OUTPATIENT
Start: 2017-05-15 | End: 2017-06-15 | Stop reason: SDUPTHER

## 2017-05-16 ENCOUNTER — OFFICE VISIT (OUTPATIENT)
Dept: FAMILY MEDICINE CLINIC | Facility: CLINIC | Age: 77
End: 2017-05-16

## 2017-05-16 ENCOUNTER — OFFICE VISIT (OUTPATIENT)
Dept: PODIATRY | Facility: CLINIC | Age: 77
End: 2017-05-16

## 2017-05-16 ENCOUNTER — ANTICOAGULATION VISIT (OUTPATIENT)
Dept: CARDIAC SURGERY | Facility: CLINIC | Age: 77
End: 2017-05-16

## 2017-05-16 VITALS
HEIGHT: 71 IN | WEIGHT: 221 LBS | DIASTOLIC BLOOD PRESSURE: 66 MMHG | HEART RATE: 65 BPM | BODY MASS INDEX: 30.94 KG/M2 | SYSTOLIC BLOOD PRESSURE: 112 MMHG | OXYGEN SATURATION: 97 %

## 2017-05-16 VITALS — HEART RATE: 70 BPM | DIASTOLIC BLOOD PRESSURE: 72 MMHG | SYSTOLIC BLOOD PRESSURE: 118 MMHG

## 2017-05-16 VITALS — WEIGHT: 210 LBS | HEIGHT: 71 IN | BODY MASS INDEX: 29.4 KG/M2

## 2017-05-16 DIAGNOSIS — Z79.01 LONG-TERM (CURRENT) USE OF ANTICOAGULANTS: ICD-10-CM

## 2017-05-16 DIAGNOSIS — I74.9 EMBOLISM AND THROMBOSIS OF ARTERY (HCC): ICD-10-CM

## 2017-05-16 DIAGNOSIS — F17.200 TOBACCO DEPENDENCE SYNDROME: ICD-10-CM

## 2017-05-16 DIAGNOSIS — L97.512 TOE ULCER, RIGHT, WITH FAT LAYER EXPOSED (HCC): Primary | ICD-10-CM

## 2017-05-16 DIAGNOSIS — I48.0 PAROXYSMAL ATRIAL FIBRILLATION (HCC): ICD-10-CM

## 2017-05-16 DIAGNOSIS — J44.9 CHRONIC OBSTRUCTIVE PULMONARY DISEASE, UNSPECIFIED COPD TYPE (HCC): Primary | ICD-10-CM

## 2017-05-16 DIAGNOSIS — I73.9 PAD (PERIPHERAL ARTERY DISEASE) (HCC): ICD-10-CM

## 2017-05-16 DIAGNOSIS — M20.41 HAMMER TOE OF RIGHT FOOT: ICD-10-CM

## 2017-05-16 LAB — INR PPP: 3.7 (ref 0.9–1.1)

## 2017-05-16 PROCEDURE — 36416 COLLJ CAPILLARY BLOOD SPEC: CPT | Performed by: NURSE PRACTITIONER

## 2017-05-16 PROCEDURE — 99211 OFF/OP EST MAY X REQ PHY/QHP: CPT | Performed by: NURSE PRACTITIONER

## 2017-05-16 PROCEDURE — 11042 DBRDMT SUBQ TIS 1ST 20SQCM/<: CPT | Performed by: PODIATRIST

## 2017-05-16 PROCEDURE — 99213 OFFICE O/P EST LOW 20 MIN: CPT | Performed by: PODIATRIST

## 2017-05-16 PROCEDURE — 99213 OFFICE O/P EST LOW 20 MIN: CPT | Performed by: FAMILY MEDICINE

## 2017-05-16 PROCEDURE — 85610 PROTHROMBIN TIME: CPT | Performed by: NURSE PRACTITIONER

## 2017-05-16 RX ORDER — FLUTICASONE PROPIONATE 110 UG/1
1 AEROSOL, METERED RESPIRATORY (INHALATION)
Qty: 12 G | Refills: 11 | Status: SHIPPED | OUTPATIENT
Start: 2017-05-16 | End: 2017-06-06 | Stop reason: SDUPTHER

## 2017-05-26 ENCOUNTER — TELEPHONE (OUTPATIENT)
Dept: FAMILY MEDICINE CLINIC | Facility: CLINIC | Age: 77
End: 2017-05-26

## 2017-05-30 ENCOUNTER — ANTICOAGULATION VISIT (OUTPATIENT)
Dept: CARDIAC SURGERY | Facility: CLINIC | Age: 77
End: 2017-05-30

## 2017-05-30 VITALS — SYSTOLIC BLOOD PRESSURE: 118 MMHG | HEART RATE: 75 BPM | DIASTOLIC BLOOD PRESSURE: 92 MMHG

## 2017-05-30 DIAGNOSIS — I48.0 PAROXYSMAL ATRIAL FIBRILLATION (HCC): ICD-10-CM

## 2017-05-30 DIAGNOSIS — Z79.01 LONG-TERM (CURRENT) USE OF ANTICOAGULANTS: ICD-10-CM

## 2017-05-30 DIAGNOSIS — I74.9 EMBOLISM AND THROMBOSIS OF ARTERY (HCC): ICD-10-CM

## 2017-05-30 LAB — INR PPP: 2.1 (ref 0.9–1.1)

## 2017-05-30 PROCEDURE — 99211 OFF/OP EST MAY X REQ PHY/QHP: CPT | Performed by: NURSE PRACTITIONER

## 2017-05-30 PROCEDURE — 85610 PROTHROMBIN TIME: CPT | Performed by: NURSE PRACTITIONER

## 2017-06-06 ENCOUNTER — OFFICE VISIT (OUTPATIENT)
Dept: PODIATRY | Facility: CLINIC | Age: 77
End: 2017-06-06

## 2017-06-06 ENCOUNTER — TELEPHONE (OUTPATIENT)
Dept: FAMILY MEDICINE CLINIC | Facility: CLINIC | Age: 77
End: 2017-06-06

## 2017-06-06 ENCOUNTER — DOCUMENTATION (OUTPATIENT)
Dept: CARDIAC SURGERY | Facility: CLINIC | Age: 77
End: 2017-06-06

## 2017-06-06 VITALS — WEIGHT: 221 LBS | BODY MASS INDEX: 30.94 KG/M2 | HEIGHT: 71 IN

## 2017-06-06 DIAGNOSIS — J44.9 CHRONIC OBSTRUCTIVE PULMONARY DISEASE, UNSPECIFIED COPD TYPE (HCC): ICD-10-CM

## 2017-06-06 DIAGNOSIS — I73.9 PAD (PERIPHERAL ARTERY DISEASE) (HCC): ICD-10-CM

## 2017-06-06 DIAGNOSIS — L97.512 TOE ULCER, RIGHT, WITH FAT LAYER EXPOSED (HCC): ICD-10-CM

## 2017-06-06 DIAGNOSIS — M79.674 PAIN OF TOE OF RIGHT FOOT: ICD-10-CM

## 2017-06-06 DIAGNOSIS — M20.41 HAMMER TOE OF RIGHT FOOT: Primary | ICD-10-CM

## 2017-06-06 PROCEDURE — 99212 OFFICE O/P EST SF 10 MIN: CPT | Performed by: PODIATRIST

## 2017-06-06 PROCEDURE — 11042 DBRDMT SUBQ TIS 1ST 20SQCM/<: CPT | Performed by: PODIATRIST

## 2017-06-06 RX ORDER — FLUTICASONE PROPIONATE 110 UG/1
1 AEROSOL, METERED RESPIRATORY (INHALATION)
Qty: 12 G | Refills: 11 | Status: SHIPPED | OUTPATIENT
Start: 2017-06-06 | End: 2017-06-07

## 2017-06-06 NOTE — PROGRESS NOTES
Massimo Bentley  1940  77 y.o. male   Patient presents for follow-up of foot ulcer on the 5th right toe.   Patient was referred by JUANI You.    06/06/2017    Chief Complaint   Patient presents with   • Right Foot - Follow-up       History of Present Illness    Mr Bentley is a 76 y/o male with history of PAD who presents for f/u evaluation of right 5th toe wound. He has been performing home wound care without issue. States pain is significantly improved.      Past Medical History:   Diagnosis Date   • Abdominal bloating    • Anxiety    • Atherosclerosis of native arteries of extremity with intermittent claudication    • Atrial flutter    • Backache    • Benign prostatic hyperplasia     BX 2009, also has renal cyst      • Chronic bronchitis    • Chronic renal impairment    • Depressive disorder    • Drug abuse, episodic use    • Dyslipidemia    • Fatigue    • GERD (gastroesophageal reflux disease)    • Hypercholesterolemia 03/15/2016   • Injury of tendon of rotator cuff 10/22/2015   • Insomnia 06/24/2016   • Intervertebral disc disorder 10/27/2011   • Major depression    • Neck pain 01/09/2012   • Osteoarthritis of multiple joints 06/21/2013   • Pain from vascular prosthetic devices, implants and grafts, sequela 12/03/2015   • Pain in left foot 10/27/2015   • Peripheral vascular disease 12/03/2015     LEFT fem-tib bypass, embolectomy 6/2014    10/27/2015    • Senile debility 10/22/2015   • 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 No coronary atherosclerosis. Normal left  ventricular function   • CHOLECYSTECTOMY  08/25/2014    Cholecystitis with gangrenous gallbladder   • ENDOSCOPY  03/22/1990    Duodenal ulcer   • ENDOSCOPY AND COLONOSCOPY  10/17/2013    Diverticulum in sigmoid colon & descending colon. Internal & external hemorrhoids found   • ESOPHAGOSCOPY / EGD  10/17/2013    With tube-Esophagitis seen. Biopsy taken. Gastritis in stomach. Biopsy taken. Stenosis in 2nd portion of duodenum. Dilatation performed.   • FEMORAL THROMBECTOMY/EMBOLECTOMY  06/29/2014    Left lower extremity arteriogram. Left popliteal artery endarterectomy. Redo left femoral to [id number] trunk bypass. Negative pressure wound therapy with placement of Prevena wound V.A.C., left groin, left lower leg   • INJECTION OF MEDICATION  03/15/2016    B12   • INJECTION OF MEDICATION  10/23/2015    Drain/Inject Major Joint    • INJECTION OF MEDICATION  01/13/2016    Kenalog   • LUMBAR LAMINECTOMY     • TRANSESOPHAGEAL ECHOCARDIOGRAM (TEX)  10/06/2015    With color flow-Mild mitral regurg.Left atrium mild dilated.Ef of 55-60%.RV systolic pressure elevated.Trace tricuspid regurg   • TRANSESOPHAGEAL ECHOCARDIOGRAM (TEX)  02/14/2012    Without color flow-[facility] w/ early diast dysfun. AV trileaflet & structurally NRL w/ [place] NRL. No regional wall motion abn Est Ef approx 50-55%. M & TR valves NRL w/ mild M & TR regurg Trace -mild pulmonic regurg. Anterior echo free space w/o hemodynamic signif. NRL RV   • TRANSESOPHAGEAL ECHOCARDIOGRAM (TEX)  03/25/2011    Normal LV systolic function with Ef of 50-55%.Left atrium ildly dilated.Moderate mitral regurg.Mitral valve regurg.Intact interatrial septum.No evidence of any cardiac thrombus or pericardial effusion.AV trileaflet         Family History   Problem Relation Age of Onset   • Osteoarthritis Mother      Lived to be 102   • Hypertension Mother    • ADD / ADHD Father    • Lung cancer Father    • ADD / ADHD Brother    • Rectal cancer Other          Social History     Social  "History   • Marital status:      Spouse name: N/A   • Number of children: N/A   • Years of education: N/A     Occupational History   • Not on file.     Social History Main Topics   • Smoking status: Current Some Day Smoker     Years: 50.00     Types: Electronic Cigarette   • Smokeless tobacco: Not on file      Comment: reduced;   • Alcohol use No   • Drug use: No   • Sexual activity: Not on file     Other Topics Concern   • Not on file     Social History Narrative         Current Outpatient Prescriptions   Medication Sig Dispense Refill   • albuterol (PROVENTIL HFA;VENTOLIN HFA) 108 (90 BASE) MCG/ACT inhaler Inhale 2 puffs Every 4 (Four) Hours As Needed for Wheezing. 1 inhaler 5   • aspirin 81 MG EC tablet Take 81 mg by mouth daily.     • cilostazol (PLETAL) 50 MG tablet Take 1 tablet by mouth 2 (Two) Times a Day. 60 tablet 5   • guaiFENesin (MUCINEX) 600 MG 12 hr tablet Take 1,200 mg by mouth 2 (Two) Times a Day.     • levothyroxine (SYNTHROID, LEVOTHROID) 25 MCG tablet Take 25 mcg by mouth daily.     • magnesium oxide (MAGOX) 400 (241.3 MG) MG tablet tablet Take 400 mg by mouth every night.     • montelukast (SINGULAIR) 10 MG tablet Take 1 tablet by mouth Every Night. 90 tablet 2   • QUEtiapine (SEROquel) 100 MG tablet TAKE 2 TABLETS BY MOUTH AT BEDTIME  0   • sotalol (BETAPACE) 80 MG tablet Take 1.5 tablets by mouth 2 (Two) Times a Day. 270 tablet 2   • warfarin (COUMADIN) 5 MG tablet Take 1 tablet nightly except on Tuesday and Friday take 1/2 tablet or as directed by [facility]mad[facility] Clinic 30 tablet 0     No current facility-administered medications for this visit.          OBJECTIVE    Ht 71\" (180.3 cm)  Wt 221 lb (100 kg)  BMI 30.82 kg/m2      Review of Systems   Constitutional:  Denies recent weight loss, weight gain, fever or chills, no change in exercise tolerance  Musculoskeletal: Toe pain.   Skin:  Toe ulcer  Neurological:  Denies paresthesias  Psychiatric/Behavioral: Denies depression      Physical " Exam   Constitutional: he appears well-developed and well-nourished.   HEENT: Normocephalic. Atraumatic  CV: No tenderness. RRR  Resp: Non-labored respiration. No wheezes.   Psychiatric: he has a normal mood and affect. his   behavior is normal.      Lower Extremity Exam:  Vascular: DP/PT pulses palpable 1+.   Negative hair growth.   No edema  CFT wnl  Neuro: Protective sensation diminished to lesser toes, b/l.  DTRs intact  Integument: Right medial 5th digit 0.2 x 0.2 cm ulceration with surrounding HPK. No PTB. No SOI  Atrophic skin noted b/l with chronic venous stasis dermatitis changes  Web spaces c/d/i  Musculoskeletal: LE muscle strength 5/5.   Gait assisted with walker  Semi-rigid hammertoe deformity toes 2-5 b/l.  Nails 1-5 b/l thickened  Ankle ROM decreased without pain    Right foot wound debridement:  Risks and benefits discussed.  Right 5th toe ulcer debrided of surrounding hyperkeratosis and devitalized tissue with 15 blade to level of dermis  Pressure hemostasis obtained  Abx ointment and dsd applied.        ASSESSMENT AND PLAN    Massimo was seen today for follow-up.    Diagnoses and all orders for this visit:    Toe ulcer, right, with fat layer exposed    Hammer toe of right foot    PAD (peripheral artery disease)    Pain of toe of right foot    -Comprehensive foot exam performed. Pt educated on importance of daily foot checks and proper extra depth shoe gear. Pt education materials dispensed.  -Wound debridement as above  -Cont abx dressing changes every other day.   -Cont toe spacers for offloading of hammertoes  -Follow up 1 month          This document has been electronically signed by Devin Menendez DPM on June 9, 2017 1:00 PM     Devin Menendez DPM  6/9/2017  1:00 PM

## 2017-06-07 ENCOUNTER — ANTICOAGULATION VISIT (OUTPATIENT)
Dept: CARDIAC SURGERY | Facility: CLINIC | Age: 77
End: 2017-06-07

## 2017-06-07 ENCOUNTER — OFFICE VISIT (OUTPATIENT)
Dept: CARDIAC SURGERY | Facility: CLINIC | Age: 77
End: 2017-06-07

## 2017-06-07 VITALS
HEIGHT: 71 IN | BODY MASS INDEX: 31.6 KG/M2 | WEIGHT: 225.7 LBS | OXYGEN SATURATION: 95 % | TEMPERATURE: 97.9 F | HEART RATE: 67 BPM | SYSTOLIC BLOOD PRESSURE: 115 MMHG | DIASTOLIC BLOOD PRESSURE: 70 MMHG

## 2017-06-07 DIAGNOSIS — I48.0 PAROXYSMAL ATRIAL FIBRILLATION (HCC): ICD-10-CM

## 2017-06-07 DIAGNOSIS — R60.0 LOCALIZED EDEMA: Primary | ICD-10-CM

## 2017-06-07 DIAGNOSIS — I74.9 EMBOLISM AND THROMBOSIS OF ARTERY (HCC): ICD-10-CM

## 2017-06-07 DIAGNOSIS — Z79.01 LONG-TERM (CURRENT) USE OF ANTICOAGULANTS: ICD-10-CM

## 2017-06-07 LAB — INR PPP: 6 (ref 0.9–1.1)

## 2017-06-07 PROCEDURE — 99213 OFFICE O/P EST LOW 20 MIN: CPT | Performed by: NURSE PRACTITIONER

## 2017-06-07 PROCEDURE — 99211 OFF/OP EST MAY X REQ PHY/QHP: CPT | Performed by: NURSE PRACTITIONER

## 2017-06-07 PROCEDURE — 29580 STRAPPING UNNA BOOT: CPT | Performed by: NURSE PRACTITIONER

## 2017-06-07 PROCEDURE — 85610 PROTHROMBIN TIME: CPT | Performed by: NURSE PRACTITIONER

## 2017-06-07 NOTE — PROGRESS NOTES
Pt here today seeing JUANI You for swelling to his left lower leg.  Pt states due to the swelling he self-dosed his coumadin these last few days; pt was discouraged from this.  I instructed pt to hold today's coumadin dose and to increase Vit K intake today with a two cup serving of spinach.  Instructed pt to watch for s/s bleeding and report to ER for blunt trauma; pt denies bleeding issues at this time.  Recheck INR tomorrow.  Pt verbalizes instructions.  Electronically signed by JUANI Sheets

## 2017-06-08 ENCOUNTER — ANTICOAGULATION VISIT (OUTPATIENT)
Dept: CARDIAC SURGERY | Facility: CLINIC | Age: 77
End: 2017-06-08

## 2017-06-08 VITALS — SYSTOLIC BLOOD PRESSURE: 131 MMHG | DIASTOLIC BLOOD PRESSURE: 81 MMHG | HEART RATE: 59 BPM

## 2017-06-08 DIAGNOSIS — I48.0 PAROXYSMAL ATRIAL FIBRILLATION (HCC): ICD-10-CM

## 2017-06-08 DIAGNOSIS — Z79.01 LONG-TERM (CURRENT) USE OF ANTICOAGULANTS: ICD-10-CM

## 2017-06-08 DIAGNOSIS — I74.9 EMBOLISM AND THROMBOSIS OF ARTERY (HCC): ICD-10-CM

## 2017-06-08 LAB — INR PPP: 4.3 (ref 0.9–1.1)

## 2017-06-08 PROCEDURE — 85610 PROTHROMBIN TIME: CPT | Performed by: NURSE PRACTITIONER

## 2017-06-08 PROCEDURE — 99211 OFF/OP EST MAY X REQ PHY/QHP: CPT | Performed by: NURSE PRACTITIONER

## 2017-06-08 NOTE — PROGRESS NOTES
Pt states he held Coumadin last night and ate 1/2 can of spinach. PT states he vomited up most of the spinach. PT denies any bleeding issues. PT denies any further s/s of blood clot. PT instructed to take 2.5mg of Coumadin tonight due to drop in level and will be seen tomorrow when unaboot is changed per [place]. Patient instructed regarding medication; results given and questions answered. Nutritional counseling given.  Dietary factors affecting therapy addressed.  Patient instructed to monitor for excessive bruising or bleeding. PT verbalizes understanding.   Electronically signed by JUANI Sheets

## 2017-06-09 ENCOUNTER — ANTICOAGULATION VISIT (OUTPATIENT)
Dept: CARDIAC SURGERY | Facility: CLINIC | Age: 77
End: 2017-06-09

## 2017-06-09 ENCOUNTER — OFFICE VISIT (OUTPATIENT)
Dept: CARDIAC SURGERY | Facility: CLINIC | Age: 77
End: 2017-06-09

## 2017-06-09 VITALS
HEART RATE: 86 BPM | WEIGHT: 225 LBS | SYSTOLIC BLOOD PRESSURE: 120 MMHG | OXYGEN SATURATION: 98 % | TEMPERATURE: 96.7 F | DIASTOLIC BLOOD PRESSURE: 65 MMHG | BODY MASS INDEX: 31.5 KG/M2 | HEIGHT: 71 IN

## 2017-06-09 DIAGNOSIS — I48.0 PAROXYSMAL ATRIAL FIBRILLATION (HCC): ICD-10-CM

## 2017-06-09 DIAGNOSIS — Z79.01 LONG-TERM (CURRENT) USE OF ANTICOAGULANTS: ICD-10-CM

## 2017-06-09 DIAGNOSIS — I74.9 EMBOLISM AND THROMBOSIS OF ARTERY (HCC): ICD-10-CM

## 2017-06-09 DIAGNOSIS — I73.9 PVD (PERIPHERAL VASCULAR DISEASE) (HCC): Primary | ICD-10-CM

## 2017-06-09 LAB — INR PPP: 3.3 (ref 0.9–1.1)

## 2017-06-09 PROCEDURE — 99213 OFFICE O/P EST LOW 20 MIN: CPT | Performed by: NURSE PRACTITIONER

## 2017-06-09 PROCEDURE — 85610 PROTHROMBIN TIME: CPT | Performed by: NURSE PRACTITIONER

## 2017-06-09 NOTE — PROGRESS NOTES
Pt denies med changes or bleeding problems. Pt instructed to continue usual dose over weekend as noted on calendar above; pt verbalized. Patient instructed regarding medication; results given and questions answered. Nutritional counseling given.  Dietary factors affecting therapy addressed.  Patient instructed to monitor for excessive bruising or bleeding. Will recheck on Monday.  Electronically signed by JUANI Sheets

## 2017-06-13 ENCOUNTER — ANTICOAGULATION VISIT (OUTPATIENT)
Dept: CARDIAC SURGERY | Facility: CLINIC | Age: 77
End: 2017-06-13

## 2017-06-13 VITALS — HEART RATE: 80 BPM | DIASTOLIC BLOOD PRESSURE: 101 MMHG | SYSTOLIC BLOOD PRESSURE: 160 MMHG

## 2017-06-13 DIAGNOSIS — Z79.01 LONG-TERM (CURRENT) USE OF ANTICOAGULANTS: ICD-10-CM

## 2017-06-13 DIAGNOSIS — I48.0 PAROXYSMAL ATRIAL FIBRILLATION (HCC): ICD-10-CM

## 2017-06-13 DIAGNOSIS — I74.9 EMBOLISM AND THROMBOSIS OF ARTERY (HCC): ICD-10-CM

## 2017-06-13 LAB — INR PPP: 2.2 (ref 0.9–1.1)

## 2017-06-13 PROCEDURE — 85610 PROTHROMBIN TIME: CPT | Performed by: NURSE PRACTITIONER

## 2017-06-13 NOTE — PROGRESS NOTES
Pt denies med changes or bleeding problems. Pt placed back on usual dose and appt made for 2 weeks. Patient instructed regarding medication; results given and questions answered. Nutritional counseling given.  Dietary factors affecting therapy addressed.  Patient instructed to monitor for excessive bruising or bleeding.   Electronically signed by JUANI Sheets

## 2017-06-15 RX ORDER — WARFARIN SODIUM 5 MG/1
TABLET ORAL
Qty: 30 TABLET | Refills: 0 | Status: SHIPPED | OUTPATIENT
Start: 2017-06-15 | End: 2017-07-17 | Stop reason: SDUPTHER

## 2017-06-16 PROBLEM — R60.0 LOCALIZED EDEMA: Status: ACTIVE | Noted: 2017-06-16

## 2017-06-16 NOTE — PROGRESS NOTES
Subjective   Patient ID: Massimo Bentley is a 77 y.o. male is here today in follow up for LLE edema and discomfort. .  CC:  Denies any claudication or color changes of his feet.  Does have some balance issues, now using walker  Has slit opening on two toes from nail care. Edema and discomfort improved.     Leg Swelling   Associated symptoms include coughing, numbness (Left shin) and weakness (less strong ). Pertinent negatives include no anorexia, change in bowel habit, chest pain, chills, fever, headaches, joint swelling, nausea or rash.      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  He underwent Redo LEFT fem->tib peroneal graft.   He denies foot pain, denies any sensory, motor, or neuro deficit other than long standing LEFT shin numbness.  He denies any bleeding or unexplained bruising.  He returns today in scheduled FU.  He has been to rehab and no longer consumes alcohol,  He does continue to smoke cigars and electronic cigarettes.  Concerned about leg edema/discomfort, improved post ramírez boot applicatoin.       PVD/Anticoagulation HX:  Chronic at fib for which he is on long term anticoagulation therapy.  He has undergone cardioversion and ablation (9/19/11) but continues with at fib.  He recently underwent  Aortagram with thrombolysis for clot of his SFA and popliteal artery (R).  TEX done at that time (2/17/12) demonstrated considerable amoutn of thrombus around and in LA appendage.  5/2/111. CT ABD:  Infrarenal abdominal aortic aneurysm, occurring just proximal to  the bifurcation, measuring 5.5 centimeters in craniocaudal dimension  5.3 cm in AP dimension, and 6.8 cm in transverse dimension  Left femoral to tibia bypas  7/18/2011 Endovascular AAA Repair   6/29/14  REDO LEFT femoral to tibial peroneal trunk bypass 6mm PTFE  with LFA embolectomy and LPA endarterectomy  11/3/2014 [facility]:  RIGHT 1.1  tri/biphasic.  LEFT .90 biphasic.  patent LEFT fem-tib bypass (maxPSV 40cm/s, triphasic)  3/9/15: [facility]: RIGHT 1.1 Triphasic. LEFT 0.86 Biphasic.  7/20/15: [facility]: RIGHT 1.1 Triphasic/Bi (DP). LEFT 0.82 Tri/Bi-Mon (PT/DP). PATENT LEFT Fem-tib max PSV 117cm/s (inflow) Triphasic.  10/27/15: [facility]: RIGHT 1.1 Triphasic. LEFT NO DP/PT FLOW. OCCLUDED LEFT fem-tib graft  10/27/15  LLE arteriogram, TPA thrombolysis with EKOS catherter to LEFT fem-pop graft  10/28/15  Recheck lysis, 2 V run off to foot.   12/03/15  [facility]:  RIGHT 0.87  POP/DP/PT Triphasic  LEFT 0.88 POP Triphasic  DP/PT Biphasic   3/16/16  [facility]:  RIGHT 0.98  Triphasic  LEFT 0.75 POP Triphasic  DP/PT Biphasic   9/21/16   [facility]:  RIGHT 1.01  Triphasic  LEFT 0.87 POP Triphasic  PT Biphasic  DP Monophasic   PPGS:  Right pulsatile waveforms  LEFT reduced PPG waveforms.   02/08/17  [facility]:  RIGHT 1.22   Triphasic  LEFT 0.95 POP Triphasic  PT Biphasic  DP Biphasic    PPGS:  Right pulsatile waveforms  LEFT reduced PPG waveforms.     Aneurysm:  No back pain, No embolization  5/2/111. CT ABD:  Infrarenal abdominal aortic aneurysm, occurring just proximal to  the bifurcation, measuring 5.5 centimeters in craniocaudal dimension  5.3 cm in AP dimension, and 6.8 cm in transverse dimension  7/18/2011 Endovascular AAA Repair   3/9/15: Abd Duplex: Prx Aorta 3.6cm. No endovascular leak      The following portions of the patient's history were reviewed and updated as appropriate: allergies, current medications, past family history, past medical history, past social history, past surgical history and problem list.    Current Outpatient Prescriptions:   •  albuterol (PROVENTIL HFA;VENTOLIN HFA) 108 (90 BASE) MCG/ACT inhaler, Inhale 2 puffs Every 4 (Four) Hours As Needed for Wheezing., Disp: 1 inhaler, Rfl: 5  •  aspirin 81 MG EC tablet, Take 81 mg by mouth daily., Disp: , Rfl:   •  cilostazol (PLETAL) 50 MG tablet, Take 1 tablet by mouth 2 (Two) Times a Day., Disp: 60 tablet, Rfl: 5  •   guaiFENesin (MUCINEX) 600 MG 12 hr tablet, Take 1,200 mg by mouth 2 (Two) Times a Day., Disp: , Rfl:   •  levothyroxine (SYNTHROID, LEVOTHROID) 25 MCG tablet, Take 25 mcg by mouth daily., Disp: , Rfl:   •  magnesium oxide (MAGOX) 400 (241.3 MG) MG tablet tablet, Take 400 mg by mouth every night., Disp: , Rfl:   •  montelukast (SINGULAIR) 10 MG tablet, Take 1 tablet by mouth Every Night., Disp: 90 tablet, Rfl: 2  •  QUEtiapine (SEROquel) 100 MG tablet, TAKE 2 TABLETS BY MOUTH AT BEDTIME, Disp: , Rfl: 0  •  sotalol (BETAPACE) 80 MG tablet, Take 1.5 tablets by mouth 2 (Two) Times a Day., Disp: 270 tablet, Rfl: 2  •  warfarin (COUMADIN) 5 MG tablet, Take 1 tablet nightly except on Wednesday and Saturday take 1/2 tablet or as directed by Coumadin Clinic, Disp: 30 tablet, Rfl: 0    Review of Systems   Constitution: Positive for weakness (less strong ). Negative for chills, fever and malaise/fatigue.   HENT: Negative for headaches, hoarse voice and nosebleeds.    Eyes: Negative for visual disturbance.   Cardiovascular: Positive for leg swelling (LLE). Negative for chest pain, claudication, cyanosis and dyspnea on exertion.   Respiratory: Positive for cough and wheezing (daytime). Negative for hemoptysis and shortness of breath.    Hematologic/Lymphatic: Negative for bleeding problem. Does not bruise/bleed easily.   Skin: Positive for color change (LE), dry skin and poor wound healing (Pelican toes with nail trim issues.   no erythema no drainage. ). Negative for rash.   Musculoskeletal: Negative for falls, joint swelling and muscle cramps.   Gastrointestinal: Negative for anorexia, change in bowel habit, hematemesis, melena and nausea.   Genitourinary: Negative for hematuria.   Neurological: Positive for disturbances in coordination (Now uses walker ), loss of balance (walker) and numbness (Left shin). Negative for brief paralysis, difficulty with concentration, dizziness, focal weakness, light-headedness, paresthesias and  sensory change.   Psychiatric/Behavioral: Negative for altered mental status.        Objective   Physical Exam   Constitutional: He is oriented to person, place, and time. He appears well-nourished.   HENT:   Head: Normocephalic.   Mouth/Throat: Oropharynx is clear and moist.   Eyes: Conjunctivae and EOM are normal. Pupils are equal, round, and reactive to light.   Neck: Neck supple. No JVD present. Tracheal deviation present.   Cardiovascular: Normal rate, normal heart sounds and intact distal pulses.  An irregular rhythm present.   Pulses:       Carotid pulses are 2+ on the right side, and 2+ on the left side.       Radial pulses are 2+ on the right side, and 2+ on the left side.        Dorsalis pedis pulses are 2+ on the right side, and 1+ on the left side.        Posterior tibial pulses are 2+ on the right side, and 1+ on the left side.   Lower extremities with varcosities and spider veins    Pulmonary/Chest: Effort normal. No stridor. He has wheezes. He has no rales.   Abdominal: Soft. Bowel sounds are normal.   Musculoskeletal: He exhibits edema (< 1+ LLE ). He exhibits no tenderness (none of LLE with physical exam ).       Neurological Sensory Findings -  Unaltered sharp/dull right ankle/foot discrimination and unaltered sharp/dull left ankle/foot discrimination.    Vascular Status -  His exam exhibits right foot vasculature normal. His exam exhibits no right foot edema. His exam exhibits left foot edema. His exam exhibits left foot vasculature abnormal (decrease pulses   Distal 3 toes with discoloration bluish no pallor ).   Skin Integrity  -  His right foot skin is not intact (Last toe inner aspect with 0.4 cm circular ulcer with soft white top ).    Massimo 's left foot skin is intact (2 toes with sm slit open areas ). .  Neurological: He is alert and oriented to person, place, and time. No cranial nerve deficit.   Skin: Skin is warm and dry. No rash noted. No erythema. No pallor.   LEFT Posterior toes dusky  W/D   Cap refill < 3 sec    + sens & mobility   LE venous staining.    Psychiatric: Judgment normal.   Nursing note and vitals reviewed.    POC INR 6.0    Assessment/Plan   Independent Review of Radiographic Studies:   02/08/17  [facility]:  RIGHT 1.22   Triphasic  LEFT 0.95 POP Triphasic  PT Biphasic  DP Biphasic    PPGS:  Right pulsatile waveforms  LEFT reduced PPG waveforms.         1. PVD (peripheral vascular disease)  Massimo Bentley is to continue beta blocker, antiplatelet, and statin therapy.  Continue Pletal at reduced dosage as dizziness resolved.  Continue anticoagulation with coumadin, currently he is supra therapeutic, as he increased his dose.  See anticoagulation clinic note for dosing schedule change.  Eat spinach.  Avoid all etoh.   Lotion to dry skin.   Vascular FU 2 mths unless symptoms occur  As previously scheduled.   If signs and symptoms of ischemia should occur including but not limited to pale/blue discoloration of limb, increasing pain with ambulation or at rest, or a non-healing wound. Patient is to notify [facility] center for immediate evaluation.  Wheezing:  Avoid cigars and see if improved.     - Ankle Brachial Index    2.  Sore on toes   Wound care:   Sore on 2 toes.  CLean 2 x a day with baby shampoo  Rinse pat  Separate toes at night Light antibacterial ointment.      3.  Venous congestion with dependent edema.  Much improved post ALONSO boot application.    Elevate legs 4 x a day 10 minutes at a time Ankle bone higher than hip bone.  Will follow with anticoagulation FU.

## 2017-06-19 RX ORDER — WARFARIN SODIUM 5 MG/1
TABLET ORAL
Qty: 30 TABLET | Refills: 0 | OUTPATIENT
Start: 2017-06-19

## 2017-06-27 ENCOUNTER — ANTICOAGULATION VISIT (OUTPATIENT)
Dept: CARDIAC SURGERY | Facility: CLINIC | Age: 77
End: 2017-06-27

## 2017-06-27 VITALS — HEART RATE: 68 BPM | SYSTOLIC BLOOD PRESSURE: 138 MMHG | DIASTOLIC BLOOD PRESSURE: 64 MMHG

## 2017-06-27 DIAGNOSIS — I48.0 PAROXYSMAL ATRIAL FIBRILLATION (HCC): ICD-10-CM

## 2017-06-27 DIAGNOSIS — Z79.01 LONG-TERM (CURRENT) USE OF ANTICOAGULANTS: ICD-10-CM

## 2017-06-27 DIAGNOSIS — I74.9 EMBOLISM AND THROMBOSIS OF ARTERY (HCC): ICD-10-CM

## 2017-06-27 LAB — INR PPP: 1.8 (ref 0.9–1.1)

## 2017-06-27 PROCEDURE — 99211 OFF/OP EST MAY X REQ PHY/QHP: CPT | Performed by: NURSE PRACTITIONER

## 2017-06-27 PROCEDURE — 85610 PROTHROMBIN TIME: CPT | Performed by: NURSE PRACTITIONER

## 2017-06-27 NOTE — PROGRESS NOTES
Pt denies missed coumadin doses or consuming too much green.  Pt states meds are the same.  Adjusted coumadin dose and instructed pt to limit green intake for three days.  Recheck INR next wk.  Provided pt with med planner today for meds.  Pt verbalizes.  Patient instructed regarding medication; results given and questions answered. Nutritional counseling given.  Dietary factors affecting therapy addressed.  Patient instructed to monitor for excessive bruising or bleeding.          This document has been electronically signed by JUANI Fernandez on June 27, 2017 4:01 PM

## 2017-07-06 ENCOUNTER — OFFICE VISIT (OUTPATIENT)
Dept: PODIATRY | Facility: CLINIC | Age: 77
End: 2017-07-06

## 2017-07-06 ENCOUNTER — ANTICOAGULATION VISIT (OUTPATIENT)
Dept: CARDIAC SURGERY | Facility: CLINIC | Age: 77
End: 2017-07-06

## 2017-07-06 VITALS — WEIGHT: 225 LBS | HEIGHT: 71 IN | BODY MASS INDEX: 31.5 KG/M2

## 2017-07-06 DIAGNOSIS — M20.41 HAMMER TOE OF RIGHT FOOT: ICD-10-CM

## 2017-07-06 DIAGNOSIS — I73.9 PAD (PERIPHERAL ARTERY DISEASE) (HCC): ICD-10-CM

## 2017-07-06 DIAGNOSIS — I48.0 PAROXYSMAL ATRIAL FIBRILLATION (HCC): ICD-10-CM

## 2017-07-06 DIAGNOSIS — L97.512 TOE ULCER, RIGHT, WITH FAT LAYER EXPOSED (HCC): Primary | ICD-10-CM

## 2017-07-06 DIAGNOSIS — Z79.01 LONG-TERM (CURRENT) USE OF ANTICOAGULANTS: ICD-10-CM

## 2017-07-06 DIAGNOSIS — I74.9 EMBOLISM AND THROMBOSIS OF ARTERY (HCC): ICD-10-CM

## 2017-07-06 LAB — INR PPP: 2.4 (ref 0.9–1.1)

## 2017-07-06 PROCEDURE — 85610 PROTHROMBIN TIME: CPT | Performed by: NURSE PRACTITIONER

## 2017-07-06 PROCEDURE — 99212 OFFICE O/P EST SF 10 MIN: CPT | Performed by: PODIATRIST

## 2017-07-06 PROCEDURE — 99211 OFF/OP EST MAY X REQ PHY/QHP: CPT | Performed by: NURSE PRACTITIONER

## 2017-07-06 NOTE — PROGRESS NOTES
Massimo Bentley  1940  77 y.o. male     07/06/2017    Chief Complaint   Patient presents with   • Right Foot - Follow-up       History of Present Illness    Mr Bentley is a 76 y/o male with history of PAD who presents for f/u evaluation of right 5th toe wound. He has been performing home wound care without issue. States pain Has resolved.      Past Medical History:   Diagnosis Date   • Abdominal bloating    • Anxiety    • Atherosclerosis of native arteries of extremity with intermittent claudication    • Atrial flutter    • Backache    • Benign prostatic hyperplasia     BX 2009, also has renal cyst      • Chronic bronchitis    • Chronic renal impairment    • Depressive disorder    • Drug abuse, episodic use    • Dyslipidemia    • Fatigue    • GERD (gastroesophageal reflux disease)    • Hypercholesterolemia 03/15/2016   • Injury of tendon of rotator cuff 10/22/2015   • Insomnia 06/24/2016   • Intervertebral disc disorder 10/27/2011   • Major depression    • Neck pain 01/09/2012   • Osteoarthritis of multiple joints 06/21/2013   • Pain from vascular prosthetic devices, implants and grafts, sequela 12/03/2015   • Pain in left foot 10/27/2015   • Peripheral vascular disease 12/03/2015     LEFT fem-tib bypass, embolectomy 6/2014    10/27/2015    • Senile debility 10/22/2015   • 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 No coronary atherosclerosis. Normal left ventricular function   • CHOLECYSTECTOMY  08/25/2014    Cholecystitis with gangrenous gallbladder   • ENDOSCOPY  03/22/1990     Duodenal ulcer   • ENDOSCOPY AND COLONOSCOPY  10/17/2013    Diverticulum in sigmoid colon & descending colon. Internal & external hemorrhoids found   • ESOPHAGOSCOPY / EGD  10/17/2013    With tube-Esophagitis seen. Biopsy taken. Gastritis in stomach. Biopsy taken. Stenosis in 2nd portion of duodenum. Dilatation performed.   • FEMORAL THROMBECTOMY/EMBOLECTOMY  06/29/2014    Left lower extremity arteriogram. Left popliteal artery endarterectomy. Redo left femoral to [id number] trunk bypass. Negative pressure wound therapy with placement of Prevena wound V.A.C., left groin, left lower leg   • INJECTION OF MEDICATION  03/15/2016    B12   • INJECTION OF MEDICATION  10/23/2015    Drain/Inject Major Joint    • INJECTION OF MEDICATION  01/13/2016    Kenalog   • LUMBAR LAMINECTOMY     • TRANSESOPHAGEAL ECHOCARDIOGRAM (TEX)  10/06/2015    With color flow-Mild mitral regurg.Left atrium mild dilated.Ef of 55-60%.RV systolic pressure elevated.Trace tricuspid regurg   • TRANSESOPHAGEAL ECHOCARDIOGRAM (TEX)  02/14/2012    Without color flow-[facility] w/ early diast dysfun. AV trileaflet & structurally NRL w/ AR NRL. No regional wall motion abn Est Ef approx 50-55%. M & TR valves NRL w/ mild M & TR regurg Trace -mild pulmonic regurg. Anterior echo free space w/o hemodynamic signif. NRL RV   • TRANSESOPHAGEAL ECHOCARDIOGRAM (TEX)  03/25/2011    Normal LV systolic function with Ef of 50-55%.Left atrium ildly dilated.Moderate mitral regurg.Mitral valve regurg.Intact interatrial septum.No evidence of any cardiac thrombus or pericardial effusion.AV trileaflet         Family History   Problem Relation Age of Onset   • Osteoarthritis Mother      Lived to be 102   • Hypertension Mother    • ADD / ADHD Father    • Lung cancer Father    • ADD / ADHD Brother    • Rectal cancer Other          Social History     Social History   • Marital status:      Spouse name: N/A   • Number of children: N/A   • Years of education: N/A  "    Occupational History   • Not on file.     Social History Main Topics   • Smoking status: Current Some Day Smoker     Years: 50.00     Types: Electronic Cigarette   • Smokeless tobacco: Never Used      Comment: reduced;   • Alcohol use No      Comment: Former use    • Drug use: No   • Sexual activity: Not on file     Other Topics Concern   • Not on file     Social History Narrative         Current Outpatient Prescriptions   Medication Sig Dispense Refill   • albuterol (PROVENTIL HFA;VENTOLIN HFA) 108 (90 BASE) MCG/ACT inhaler Inhale 2 puffs Every 4 (Four) Hours As Needed for Wheezing. 1 inhaler 5   • aspirin 81 MG EC tablet Take 81 mg by mouth daily.     • cilostazol (PLETAL) 50 MG tablet Take 1 tablet by mouth 2 (Two) Times a Day. 60 tablet 5   • guaiFENesin (MUCINEX) 600 MG 12 hr tablet Take 1,200 mg by mouth 2 (Two) Times a Day.     • levothyroxine (SYNTHROID, LEVOTHROID) 25 MCG tablet Take 25 mcg by mouth daily.     • magnesium oxide (MAGOX) 400 (241.3 MG) MG tablet tablet Take 400 mg by mouth every night.     • montelukast (SINGULAIR) 10 MG tablet Take 1 tablet by mouth Every Night. 90 tablet 2   • QUEtiapine (SEROquel) 100 MG tablet TAKE 2 TABLETS BY MOUTH AT BEDTIME  0   • sotalol (BETAPACE) 80 MG tablet Take 1.5 tablets by mouth 2 (Two) Times a Day. 270 tablet 2   • warfarin (COUMADIN) 5 MG tablet Take 1 tablet nightly except on Wednesday and Saturday take 1/2 tablet or as directed by [facility]madin Clinic 30 tablet 0     No current facility-administered medications for this visit.          OBJECTIVE    Ht 71\" (180.3 cm)  Wt 225 lb (102 kg)  BMI 31.38 kg/m2      Review of Systems   Constitutional:  Denies recent weight loss, weight gain, fever or chills, no change in exercise tolerance  Musculoskeletal: Toe pain.   Skin:  Toe ulcer  Neurological:  Denies paresthesias  Psychiatric/Behavioral: Denies depression      Physical Exam   Constitutional: he appears well-developed and well-nourished.   HEENT: " Normocephalic. Atraumatic  CV: No tenderness. RRR  Resp: Non-labored respiration. No wheezes.   Psychiatric: he has a normal mood and affect. his   behavior is normal.      Lower Extremity Exam:  Vascular: DP/PT pulses palpable 1+.   Negative hair growth.   No edema  CFT wnl  Neuro: Protective sensation diminished to lesser toes, b/l.  DTRs intact  Integument: Right medial 5th digit ulceration now healed, mild hyperkeratosis noted.  Minimal tenderness on palpation.  Atrophic skin noted b/l with chronic venous stasis dermatitis changes  Web spaces c/d/i  Musculoskeletal: LE muscle strength 5/5.   Gait assisted with walker  Semi-rigid hammertoe deformity toes 2-5 b/l.  Nails 1-5 b/l thickened  Ankle ROM decreased without pain      ASSESSMENT AND PLAN    Massimo was seen today for follow-up.    Diagnoses and all orders for this visit:    Toe ulcer, right, with fat layer exposed    Hammer toe of right foot    PAD (peripheral artery disease)    -Comprehensive foot exam performed. Pt educated on importance of daily foot checks and proper extra depth shoe gear. Pt education materials dispensed.  -Digital callus debrided with 15 blade to epidermis.  -Cont toe spacers for offloading of hammertoes.  Additional toe sleeves dispensed.  -Follow up as needed          This document has been electronically signed by Devin Menendez DPM on July 12, 2017 1:29 PM     Devin Menendez DPM  7/12/2017  1:29 PM

## 2017-07-06 NOTE — PROGRESS NOTES
Pt denies med changes or bleeding problems. Dose adjusted today only and pt instructed to avoid green veggies for 2 days; pt verbalized. Patient instructed regarding medication; results given and questions answered. Nutritional counseling given.  Dietary factors affecting therapy addressed.  Patient instructed to monitor for excessive bruising or bleeding. Will recheck in 12 days.  Electronically signed by JUANI Sheets

## 2017-07-17 RX ORDER — WARFARIN SODIUM 5 MG/1
TABLET ORAL
Qty: 30 TABLET | Refills: 1 | Status: SHIPPED | OUTPATIENT
Start: 2017-07-17 | End: 2017-07-28 | Stop reason: SDUPTHER

## 2017-07-18 ENCOUNTER — DOCUMENTATION (OUTPATIENT)
Dept: CARDIAC SURGERY | Facility: CLINIC | Age: 77
End: 2017-07-18

## 2017-07-18 NOTE — PROGRESS NOTES
Pt called to say [facility] did not receive refill yesterday.  Called in coumadin 5mg, #30, no additional refills.

## 2017-07-24 ENCOUNTER — APPOINTMENT (OUTPATIENT)
Dept: LAB | Facility: HOSPITAL | Age: 77
End: 2017-07-24

## 2017-07-24 ENCOUNTER — ANTICOAGULATION VISIT (OUTPATIENT)
Dept: CARDIAC SURGERY | Facility: CLINIC | Age: 77
End: 2017-07-24

## 2017-07-24 VITALS — SYSTOLIC BLOOD PRESSURE: 135 MMHG | HEART RATE: 81 BPM | DIASTOLIC BLOOD PRESSURE: 91 MMHG

## 2017-07-24 DIAGNOSIS — I48.0 PAROXYSMAL ATRIAL FIBRILLATION (HCC): ICD-10-CM

## 2017-07-24 DIAGNOSIS — Z79.01 LONG-TERM (CURRENT) USE OF ANTICOAGULANTS: ICD-10-CM

## 2017-07-24 DIAGNOSIS — I74.9 EMBOLISM AND THROMBOSIS OF ARTERY (HCC): ICD-10-CM

## 2017-07-24 LAB
INR PPP: 5.91 (ref 0.8–1.2)
INR PPP: 5.91 (ref 0.9–1.1)
PROTHROMBIN TIME: 54.3 SECONDS (ref 11.1–15.3)

## 2017-07-24 PROCEDURE — 85610 PROTHROMBIN TIME: CPT | Performed by: NURSE PRACTITIONER

## 2017-07-24 PROCEDURE — 36415 COLL VENOUS BLD VENIPUNCTURE: CPT

## 2017-07-24 PROCEDURE — 99211 OFF/OP EST MAY X REQ PHY/QHP: CPT | Performed by: NURSE PRACTITIONER

## 2017-07-24 NOTE — PROGRESS NOTES
PT's INR per [facility] was >8. PT was sent to lab where it was determined he was 5.91. PT states it is possible he has been doubling up on meds. Denies any med changes, bleeding issues, alcohol, pain meds, AB/steroids. Instructed pt to hold Coumadin tonight and increase vit k with 1.5 cups of spinach. PT instructed to notify provider if you experience excessive bleeding from the nose, cuts, gums, rectum, urinary tract. Reddish or brown urine or stool. Vomiting of blood or hemorrhoidal bleeding. If major injury occurs present to the Emergency Department.   Pt will be seen tomorrow.           This document has been electronically signed by JUANI Fernandez on July 24, 2017 3:48 PM

## 2017-07-26 ENCOUNTER — ANTICOAGULATION VISIT (OUTPATIENT)
Dept: CARDIAC SURGERY | Facility: CLINIC | Age: 77
End: 2017-07-26

## 2017-07-26 VITALS — HEART RATE: 78 BPM | DIASTOLIC BLOOD PRESSURE: 83 MMHG | SYSTOLIC BLOOD PRESSURE: 129 MMHG

## 2017-07-26 DIAGNOSIS — I48.0 PAROXYSMAL ATRIAL FIBRILLATION (HCC): ICD-10-CM

## 2017-07-26 DIAGNOSIS — I74.9 EMBOLISM AND THROMBOSIS OF ARTERY (HCC): ICD-10-CM

## 2017-07-26 DIAGNOSIS — Z79.01 LONG-TERM (CURRENT) USE OF ANTICOAGULANTS: ICD-10-CM

## 2017-07-26 LAB — INR PPP: 4.9 (ref 0.9–1.1)

## 2017-07-26 PROCEDURE — 85610 PROTHROMBIN TIME: CPT | Performed by: NURSE PRACTITIONER

## 2017-07-26 PROCEDURE — 99211 OFF/OP EST MAY X REQ PHY/QHP: CPT | Performed by: NURSE PRACTITIONER

## 2017-07-26 NOTE — PROGRESS NOTES
Pt did not keep yesterday's CC appt.  Pt states he did not take coumadin last night either.  Pt does not wish to return to lab for INR verification.  Pt denies bleeding issues.  Instructed pt to hold today's coumadin dose and to increase Vit K intake today with a large mustard green serving.  Pt states he cannot return to [facility] until this Friday. Instructed pt to continue to watch for s/s bleeding and report to ER for any blunt trauma.  Pt verbalizes.  Patient instructed regarding medication; results given and questions answered. Nutritional counseling given.  Dietary factors affecting therapy addressed.  Patient instructed to monitor for excessive bruising or bleeding.        This document has been electronically signed by JUANI Fernandez on July 27, 2017 8:26 AM

## 2017-07-28 ENCOUNTER — ANTICOAGULATION VISIT (OUTPATIENT)
Dept: CARDIAC SURGERY | Facility: CLINIC | Age: 77
End: 2017-07-28

## 2017-07-28 DIAGNOSIS — I48.0 PAROXYSMAL ATRIAL FIBRILLATION (HCC): ICD-10-CM

## 2017-07-28 DIAGNOSIS — Z79.01 LONG-TERM (CURRENT) USE OF ANTICOAGULANTS: ICD-10-CM

## 2017-07-28 DIAGNOSIS — I74.9 EMBOLISM AND THROMBOSIS OF ARTERY (HCC): ICD-10-CM

## 2017-07-28 LAB — INR PPP: 1.6 (ref 0.9–1.1)

## 2017-07-28 PROCEDURE — 85610 PROTHROMBIN TIME: CPT | Performed by: NURSE PRACTITIONER

## 2017-07-28 RX ORDER — WARFARIN SODIUM 5 MG/1
TABLET ORAL
Qty: 30 TABLET | Refills: 0 | Status: SHIPPED | OUTPATIENT
Start: 2017-07-28 | End: 2017-09-07 | Stop reason: SDUPTHER

## 2017-08-01 NOTE — PROGRESS NOTES
This document has been electronically signed by JUANI Fernandez on August 1, 2017 1:58 PM

## 2017-08-02 ENCOUNTER — ANTICOAGULATION VISIT (OUTPATIENT)
Dept: CARDIAC SURGERY | Facility: CLINIC | Age: 77
End: 2017-08-02

## 2017-08-02 ENCOUNTER — OFFICE VISIT (OUTPATIENT)
Dept: FAMILY MEDICINE CLINIC | Facility: CLINIC | Age: 77
End: 2017-08-02

## 2017-08-02 VITALS
OXYGEN SATURATION: 98 % | DIASTOLIC BLOOD PRESSURE: 86 MMHG | WEIGHT: 223 LBS | BODY MASS INDEX: 31.22 KG/M2 | HEIGHT: 71 IN | SYSTOLIC BLOOD PRESSURE: 136 MMHG | HEART RATE: 64 BPM

## 2017-08-02 VITALS — SYSTOLIC BLOOD PRESSURE: 130 MMHG | HEART RATE: 64 BPM | DIASTOLIC BLOOD PRESSURE: 74 MMHG

## 2017-08-02 DIAGNOSIS — I74.9 EMBOLISM AND THROMBOSIS OF ARTERY (HCC): ICD-10-CM

## 2017-08-02 DIAGNOSIS — I48.0 PAROXYSMAL ATRIAL FIBRILLATION (HCC): ICD-10-CM

## 2017-08-02 DIAGNOSIS — Z00.00 INITIAL MEDICARE ANNUAL WELLNESS VISIT: Primary | ICD-10-CM

## 2017-08-02 DIAGNOSIS — J44.9 CHRONIC OBSTRUCTIVE PULMONARY DISEASE, UNSPECIFIED COPD TYPE (HCC): ICD-10-CM

## 2017-08-02 DIAGNOSIS — Z79.01 LONG-TERM (CURRENT) USE OF ANTICOAGULANTS: ICD-10-CM

## 2017-08-02 DIAGNOSIS — F33.41 RECURRENT MAJOR DEPRESSIVE DISORDER, IN PARTIAL REMISSION (HCC): ICD-10-CM

## 2017-08-02 DIAGNOSIS — L03.032 CELLULITIS OF TOE OF LEFT FOOT: ICD-10-CM

## 2017-08-02 LAB — INR PPP: 2.4 (ref 0.9–1.1)

## 2017-08-02 PROCEDURE — 99213 OFFICE O/P EST LOW 20 MIN: CPT | Performed by: FAMILY MEDICINE

## 2017-08-02 PROCEDURE — 85610 PROTHROMBIN TIME: CPT | Performed by: NURSE PRACTITIONER

## 2017-08-02 PROCEDURE — 99211 OFF/OP EST MAY X REQ PHY/QHP: CPT | Performed by: NURSE PRACTITIONER

## 2017-08-02 PROCEDURE — G0438 PPPS, INITIAL VISIT: HCPCS | Performed by: FAMILY MEDICINE

## 2017-08-02 RX ORDER — PRAMIPEXOLE DIHYDROCHLORIDE 0.25 MG/1
TABLET ORAL
Refills: 5 | COMMUNITY
Start: 2017-07-23 | End: 2018-01-17 | Stop reason: SDUPTHER

## 2017-08-02 RX ORDER — CETIRIZINE HYDROCHLORIDE 10 MG/1
10 TABLET ORAL DAILY
Qty: 30 TABLET | Refills: 2 | Status: SHIPPED | OUTPATIENT
Start: 2017-08-02 | End: 2017-08-30

## 2017-08-02 RX ORDER — CEPHALEXIN 500 MG/1
500 CAPSULE ORAL 2 TIMES DAILY
Qty: 14 CAPSULE | Refills: 0 | Status: SHIPPED | OUTPATIENT
Start: 2017-08-02 | End: 2017-08-09

## 2017-08-02 NOTE — PROGRESS NOTES
QUICK REFERENCE INFORMATION:  The ABCs of the Annual Wellness Visit    Initial Medicare Wellness Visit    HEALTH RISK ASSESSMENT    1940    Recent Hospitalizations:  No hospitalization(s) within the last year..      Current Medical Providers:  Patient Care Team:  Belkys Hoffman MD as PCP - General  Belkys Hoffman MD as PCP - Claims Attributed  Sr. Aditi- Vascular Surgeon  Dr. Thacker- Cardiology  Dr. Menendez-  Podiatry  Dr. Eugene- Psychiatry      Smoking Status:  History   Smoking Status   • Current Some Day Smoker   • Years: 50.00   • Types: Electronic Cigarette   Smokeless Tobacco   • Never Used     Comment: reduced;       Alcohol Consumption:  History   Alcohol Use No     Comment: Former use        Depression Screen:   PHQ-9 Depression Screening 8/2/2017   Little interest or pleasure in doing things 1   Feeling down, depressed, or hopeless 1   Trouble falling or staying asleep, or sleeping too much 3   Feeling tired or having little energy 2   Poor appetite or overeating 1   Feeling bad about yourself - or that you are a failure or have let yourself or your family down 0   Trouble concentrating on things, such as reading the newspaper or watching television 0   Moving or speaking so slowly that other people could have noticed. Or the opposite - being so fidgety or restless that you have been moving around a lot more than usual 2   Thoughts that you would be better off dead, or of hurting yourself in some way 0   PHQ-9 Total Score 10   If you checked off any problems, how difficult have these problems made it for you to do your work, take care of things at home, or get along with other people? Somewhat difficult       Health Habits and Functional and Cognitive Screening:  Functional & Cognitive Status 8/2/2017   Do you have difficulty preparing food and eating? No   Do you have difficulty bathing yourself? No   Do you have difficulty getting dressed? No   Do you have difficulty using the  toilet? No   Do you have difficulty moving around from place to place? No   In the past year have you fallen or experienced a near fall? Yes   Do you need help using the phone?  No   Are you deaf or do you have serious difficulty hearing?  No   Do you need help with transportation? No   Do you need help shopping? No   Do you need help preparing meals?  No   Do you need help with housework?  Yes   Do you need help with laundry? Yes   Do you need help taking your medications? No   Do you need help managing money? No   Do you have difficulty concentrating, remembering or making decisions? No       Health Habits  Current Diet: Well Balanced Diet  Dental Exam: Up to date (05-)  Eye Exam: Not up to date (10 years ago)  Exercise (times per week): 3 times per week  Current Exercise Activities Include: Stationary Bicycling/Spin Class          Does the patient have evidence of cognitive impairment? No    Asiprin use counseling: Taking ASA appropriately as indicated      Recent Lab Results:    Visual Acuity:  No exam data present    Age-appropriate Screening Schedule:  Refer to the list below for future screening recommendations based on patient's age, sex and/or medical conditions. Orders for these recommended tests are listed in the plan section. The patient has been provided with a written plan.    Health Maintenance   Topic Date Due   • TDAP/TD VACCINES (1 - Tdap) 03/17/1960   • ZOSTER VACCINE  11/28/2016   • INFLUENZA VACCINE  09/01/2017   • LIPID PANEL  01/04/2018   • COLONOSCOPY  10/17/2023   • PNEUMOCOCCAL VACCINES (65+ LOW/MEDIUM RISK)  Addressed        Subjective   History of Present Illness    Massimo Bentley is a 77 y.o. male who presents for an Annual Wellness Visit.    The following portions of the patient's history were reviewed and updated as appropriate: allergies, current medications, past family history, past medical history, past social history, past surgical history and problem  list.    Outpatient Medications Prior to Visit   Medication Sig Dispense Refill   • albuterol (PROVENTIL HFA;VENTOLIN HFA) 108 (90 BASE) MCG/ACT inhaler Inhale 2 puffs Every 4 (Four) Hours As Needed for Wheezing. 1 inhaler 5   • aspirin 81 MG EC tablet Take 81 mg by mouth daily.     • cilostazol (PLETAL) 50 MG tablet Take 1 tablet by mouth 2 (Two) Times a Day. 60 tablet 5   • guaiFENesin (MUCINEX) 600 MG 12 hr tablet Take 1,200 mg by mouth 2 (Two) Times a Day.     • levothyroxine (SYNTHROID, LEVOTHROID) 25 MCG tablet Take 25 mcg by mouth daily.     • magnesium oxide (MAGOX) 400 (241.3 MG) MG tablet tablet Take 400 mg by mouth every night.     • montelukast (SINGULAIR) 10 MG tablet Take 1 tablet by mouth Every Night. 90 tablet 2   • QUEtiapine (SEROquel) 100 MG tablet TAKE 2 TABLETS BY MOUTH AT BEDTIME  0   • sotalol (BETAPACE) 80 MG tablet Take 1.5 tablets by mouth 2 (Two) Times a Day. 270 tablet 2   • warfarin (COUMADIN) 5 MG tablet Take 1 tablet nightly or as directed by [facility] 30 tablet 0     No facility-administered medications prior to visit.        Patient Active Problem List   Diagnosis   • Long-term (current) use of anticoagulants   • Atrial fibrillation [I48.91]   • Embolism and thrombosis of artery   • PVD (peripheral vascular disease)   • Tobacco dependence syndrome   • Stroke   • Suicide   • Peripheral vascular disease   • Osteoarthritis of multiple joints   • Hypercholesterolemia   • GERD (gastroesophageal reflux disease)   • Major depression   • Benign prostatic hyperplasia   • Typical atrial flutter   • Non-rheumatic mitral regurgitation   • Hyperlipidemia   • Localized edema       Advance Care Planning:  has an advance directive - a copy has been provided and is in file    Identification of Risk Factors:  Risk factors include: weight , unhealthy diet, cardiovascular risk, tobacco use, increased fall risk, caretaker stress, depression and polypharmacy.    Review of Systems   Constitutional:  "Positive for fatigue. Negative for activity change, appetite change, chills, fever and unexpected weight change.   HENT: Negative for congestion, postnasal drip and rhinorrhea.    Eyes: Negative for visual disturbance.   Respiratory: Positive for cough, shortness of breath and wheezing.    Cardiovascular: Positive for leg swelling. Negative for chest pain and palpitations.   Gastrointestinal: Negative for abdominal distention, abdominal pain, constipation, diarrhea, nausea and vomiting.   Musculoskeletal: Negative for arthralgias, back pain, gait problem and joint swelling.   Skin: Positive for color change, rash and wound. Negative for pallor.   Neurological: Negative for headaches.   Psychiatric/Behavioral: Negative for dysphoric mood and sleep disturbance. The patient is not nervous/anxious.        Compared to one year ago, the patient feels his physical health is the same.  Compared to one year ago, the patient feels his mental health is better.    Objective     Physical Exam   Constitutional: He is oriented to person, place, and time. He appears well-developed and well-nourished. No distress.   Cardiovascular: Normal rate, regular rhythm and normal heart sounds.    No murmur heard.  +1 LE edema.  LLE erythematous.  Big toe on the left foot erythematous, swollen, and warm to touch.  3cm ulcerative, dry lesion on the inferior aspect of big toe.     Pulmonary/Chest: Effort normal. No respiratory distress. He has wheezes.   Abdominal: Soft. Bowel sounds are normal. He exhibits no distension. There is no tenderness.   Neurological: He is alert and oriented to person, place, and time.   Psychiatric: He has a normal mood and affect. His behavior is normal. Judgment and thought content normal.   Nursing note and vitals reviewed.      Vitals:    08/02/17 1347   BP: 136/86   Pulse: 64   SpO2: 98%   Weight: 223 lb (101 kg)   Height: 71\" (180.3 cm)       Body mass index is 31.1 kg/(m^2).  Discussed the patient's BMI with " him. The BMI is above average; BMI management plan is completed.    Assessment/Plan   Patient Self-Management and Personalized Health Advice  The patient has been provided with information about: diet, exercise, weight management, prevention of cardiac or vascular disease, fall prevention and designing advance directives and preventive services including:   · Advance directive, Exercise counseling provided, Fall Risk assessment done, Fall Risk plan of care done.    Visit Diagnoses:    ICD-10-CM ICD-9-CM   1. Initial Medicare annual wellness visit Z00.00 V70.0   2. Cellulitis of toe of left foot L03.032 681.10   3. Recurrent major depressive disorder, in partial remission F33.41 296.35   4. Chronic obstructive pulmonary disease, unspecified COPD type J44.9 496       Orders Placed This Encounter   Procedures   • Ambulatory Referral to Wound Clinic     Referral Priority:   Routine     Referral Type:   Consultation     Referral Reason:   Specialty Services Required     Requested Specialty:   Wound Care     Number of Visits Requested:   1       Outpatient Encounter Prescriptions as of 8/2/2017   Medication Sig Dispense Refill   • albuterol (PROVENTIL HFA;VENTOLIN HFA) 108 (90 BASE) MCG/ACT inhaler Inhale 2 puffs Every 4 (Four) Hours As Needed for Wheezing. 1 inhaler 5   • aspirin 81 MG EC tablet Take 81 mg by mouth daily.     • cilostazol (PLETAL) 50 MG tablet Take 1 tablet by mouth 2 (Two) Times a Day. 60 tablet 5   • guaiFENesin (MUCINEX) 600 MG 12 hr tablet Take 1,200 mg by mouth 2 (Two) Times a Day.     • levothyroxine (SYNTHROID, LEVOTHROID) 25 MCG tablet Take 25 mcg by mouth daily.     • magnesium oxide (MAGOX) 400 (241.3 MG) MG tablet tablet Take 400 mg by mouth every night.     • montelukast (SINGULAIR) 10 MG tablet Take 1 tablet by mouth Every Night. 90 tablet 2   • pramipexole (MIRAPEX) 0.25 MG tablet TAKE 2 TABLETS BY MOUTH EVERY MORNING AND 1 TABLET AT BEDTIME  5   • QUEtiapine (SEROquel) 100 MG tablet TAKE 2  TABLETS BY MOUTH AT BEDTIME  0   • sotalol (BETAPACE) 80 MG tablet Take 1.5 tablets by mouth 2 (Two) Times a Day. 270 tablet 2   • warfarin (COUMADIN) 5 MG tablet Take 1 tablet nightly or as directed by [facility] 30 tablet 0     No facility-administered encounter medications on file as of 8/2/2017.        Reviewed use of high risk medication in the elderly: yes  Reviewed for potential of harmful drug interactions in the elderly: yes    Follow Up:  Return in about 3 months (around 11/2/2017) for Recheck.     An After Visit Summary and PPPS with all of these plans were given to the patient.

## 2017-08-02 NOTE — PROGRESS NOTES
Subjective   Massimo Bentley is a 77 y.o. male.     History of Present Illness   Mr. Bentley is a 76yo male that presents today for follow-up on depression and also for issues with left toes and swelling in his lower legs that is getting worse.    He has PAD and has had increase in swelling in his legs that has been getting worse. Says that he has been off his feet more often, but only elevating on the ottoman.  Has redness, pain and lesion on big toe and tops of his 4th and 5th toes.  He has been soaking in epson salt.  Hasn't been draining and lot but isn't healing.  He says that he hit the top of his toes on his walker and that ripped them open. Have been slow to heal.  Says that overall his depression has been well controlled. Has had a lot of issues with taking care of his wife.  He also sees psychiatry and he thinks that he has been doing well.  He has continued to have issues with SOA and coughing. Albuterol does help when he uses it. He has been cutting back on smoking a lot.  Has helped with that a little.  Still persistant. He has been taking medication for allergies and no congestion or postnasal drip seems to be bothering him anymore.        Current Outpatient Prescriptions:   •  albuterol (PROVENTIL HFA;VENTOLIN HFA) 108 (90 BASE) MCG/ACT inhaler, Inhale 2 puffs Every 4 (Four) Hours As Needed for Wheezing., Disp: 1 inhaler, Rfl: 5  •  aspirin 81 MG EC tablet, Take 81 mg by mouth daily., Disp: , Rfl:   •  cilostazol (PLETAL) 50 MG tablet, Take 1 tablet by mouth 2 (Two) Times a Day., Disp: 60 tablet, Rfl: 5  •  guaiFENesin (MUCINEX) 600 MG 12 hr tablet, Take 1,200 mg by mouth 2 (Two) Times a Day., Disp: , Rfl:   •  levothyroxine (SYNTHROID, LEVOTHROID) 25 MCG tablet, Take 25 mcg by mouth daily., Disp: , Rfl:   •  magnesium oxide (MAGOX) 400 (241.3 MG) MG tablet tablet, Take 400 mg by mouth every night., Disp: , Rfl:   •  montelukast (SINGULAIR) 10 MG tablet, Take 1 tablet by mouth Every Night., Disp:  "90 tablet, Rfl: 2  •  pramipexole (MIRAPEX) 0.25 MG tablet, TAKE 2 TABLETS BY MOUTH EVERY MORNING AND 1 TABLET AT BEDTIME, Disp: , Rfl: 5  •  QUEtiapine (SEROquel) 100 MG tablet, TAKE 2 TABLETS BY MOUTH AT BEDTIME, Disp: , Rfl: 0  •  sotalol (BETAPACE) 80 MG tablet, Take 1.5 tablets by mouth 2 (Two) Times a Day., Disp: 270 tablet, Rfl: 2  •  warfarin (COUMADIN) 5 MG tablet, Take 1 tablet nightly or as directed by [facility], Disp: 30 tablet, Rfl: 0  •  cephalexin (KEFLEX) 500 MG capsule, Take 1 capsule by mouth 2 (Two) Times a Day for 7 days., Disp: 14 capsule, Rfl: 0    The following portions of the patient's history were reviewed and updated as appropriate: allergies, current medications, past family history, past medical history, past social history, past surgical history and problem list.    Review of Systems   Constitutional: Positive for fatigue. Negative for activity change, appetite change and unexpected weight change.   Eyes: Negative for visual disturbance.   Respiratory: Positive for cough, shortness of breath and wheezing.    Cardiovascular: Positive for leg swelling. Negative for chest pain and palpitations.   Gastrointestinal: Negative for abdominal distention, abdominal pain, constipation, diarrhea, nausea and vomiting.   Musculoskeletal: Negative for arthralgias, back pain, gait problem and joint swelling.   Skin: Positive for color change and wound. Negative for pallor and rash.   Neurological: Negative for headaches.   Psychiatric/Behavioral: Negative for dysphoric mood and sleep disturbance. The patient is not nervous/anxious.        Objective    Vitals:    08/02/17 1347   BP: 136/86   Pulse: 64   SpO2: 98%   Weight: 223 lb (101 kg)   Height: 71\" (180.3 cm)       Physical Exam  Constitutional: He is oriented to person, place, and time. He appears well-developed and well-nourished. No distress.   Cardiovascular: Normal rate, regular rhythm and normal heart sounds.    No murmur heard.  +1 LE " edema.  LLE erythematous.  Big toe on the left foot erythematous, swollen, and warm to touch.  3cm ulcerative, dry lesion on the inferior aspect of big toe.     Pulmonary/Chest: Effort normal. No respiratory distress. He has wheezes.   Abdominal: Soft. Bowel sounds are normal. He exhibits no distension. There is no tenderness.   Neurological: He is alert and oriented to person, place, and time.   Psychiatric: He has a normal mood and affect. His behavior is normal. Judgment and thought content normal.   Nursing note and vitals reviewed.    Assessment/Plan   Problems Addressed this Visit        Other    Major depression      Other Visit Diagnoses     Initial Medicare annual wellness visit    -  Primary    Cellulitis of toe of left foot        Relevant Medications    cephalexin (KEFLEX) 500 MG capsule    Other Relevant Orders    Ambulatory Referral to Wound Clinic    Chronic obstructive pulmonary disease, unspecified COPD type        Relevant Medications    cetirizine (zyrTEC) 10 MG tablet    Fluticasone Furoate-Vilanterol (BREO ELLIPTA) 100-25 MCG/INH aerosol powder         1.) Medicare Wellness-  See additional note.    2.) Cellulitis-  Will treat with keflex.  Needs Tdap.  Referred to wound care. Recommended that he elevate his feet as much as he can to help with the swelling.  3.) Depression-  Seems to be dealing with everything well.  Continue current medications. Continue counseling also.    4.) COPD-  Will try Breo daily and see if that helps.  Continue albuterol PRN also. Explained to him that one is a controller and other is rescue inhaler.  Will continue to cut back on smoking. He has been using nicorette and that has helped a lot.  RTC in 3 months or sooner PRN

## 2017-08-02 NOTE — PROGRESS NOTES
Pt states he is starting keflex today for an infected toe on his left foot; pt states he has an appt with [facility] next Wednesday for this as well.  Will recheck INR next wk.  Pt cannot return to [facility] prior to next Wed due to issues with his wife who has a sitter for limited times and days of the week.  Instructed pt to have a half cup serving of green vegetables every third day beginning Friday.  Pt verbalizes.  Patient instructed regarding medication; results given and questions answered. Nutritional counseling given.  Dietary factors affecting therapy addressed.  Patient instructed to monitor for excessive bruising or bleeding.  Electronically signed by JUANI Sheets

## 2017-08-07 ENCOUNTER — APPOINTMENT (OUTPATIENT)
Dept: WOUND CARE | Facility: HOSPITAL | Age: 77
End: 2017-08-07
Attending: PLASTIC SURGERY

## 2017-08-09 ENCOUNTER — ANTICOAGULATION VISIT (OUTPATIENT)
Dept: CARDIAC SURGERY | Facility: CLINIC | Age: 77
End: 2017-08-09

## 2017-08-09 ENCOUNTER — APPOINTMENT (OUTPATIENT)
Dept: WOUND CARE | Facility: HOSPITAL | Age: 77
End: 2017-08-09
Attending: PLASTIC SURGERY

## 2017-08-09 VITALS — DIASTOLIC BLOOD PRESSURE: 81 MMHG | HEART RATE: 78 BPM | SYSTOLIC BLOOD PRESSURE: 155 MMHG

## 2017-08-09 DIAGNOSIS — Z79.01 LONG-TERM (CURRENT) USE OF ANTICOAGULANTS: ICD-10-CM

## 2017-08-09 DIAGNOSIS — I48.0 PAROXYSMAL ATRIAL FIBRILLATION (HCC): ICD-10-CM

## 2017-08-09 DIAGNOSIS — I74.9 EMBOLISM AND THROMBOSIS OF ARTERY (HCC): ICD-10-CM

## 2017-08-09 LAB — INR PPP: 2.2 (ref 0.9–1.1)

## 2017-08-09 PROCEDURE — 85610 PROTHROMBIN TIME: CPT | Performed by: NURSE PRACTITIONER

## 2017-08-09 PROCEDURE — 99211 OFF/OP EST MAY X REQ PHY/QHP: CPT | Performed by: NURSE PRACTITIONER

## 2017-08-09 NOTE — PROGRESS NOTES
Pt states he is finishing Keflex today. Pt denies bleeding problems. Dose adjusted and pt instructed to hold green veggies for 3 days; pt verbalized. Patient instructed regarding medication; results given and questions answered. Nutritional counseling given.  Dietary factors affecting therapy addressed.  Patient instructed to monitor for excessive bruising or bleeding. Will recheck next week.        This document has been electronically signed by JUANI Fernandez on August 10, 2017 4:18 PM

## 2017-08-16 ENCOUNTER — ANTICOAGULATION VISIT (OUTPATIENT)
Dept: CARDIAC SURGERY | Facility: CLINIC | Age: 77
End: 2017-08-16

## 2017-08-16 VITALS — HEART RATE: 88 BPM | DIASTOLIC BLOOD PRESSURE: 70 MMHG | SYSTOLIC BLOOD PRESSURE: 128 MMHG

## 2017-08-16 DIAGNOSIS — I74.9 EMBOLISM AND THROMBOSIS OF ARTERY (HCC): ICD-10-CM

## 2017-08-16 DIAGNOSIS — Z79.01 LONG-TERM (CURRENT) USE OF ANTICOAGULANTS: ICD-10-CM

## 2017-08-16 DIAGNOSIS — I48.0 PAROXYSMAL ATRIAL FIBRILLATION (HCC): ICD-10-CM

## 2017-08-16 LAB — INR PPP: 2.3 (ref 0.9–1.1)

## 2017-08-16 PROCEDURE — 85610 PROTHROMBIN TIME: CPT | Performed by: NURSE PRACTITIONER

## 2017-08-16 PROCEDURE — 99211 OFF/OP EST MAY X REQ PHY/QHP: CPT | Performed by: NURSE PRACTITIONER

## 2017-08-16 NOTE — PROGRESS NOTES
"Pt denies missed coumadin doses.  Pt completed keflex but is going to start clindamycin for \"infected toe\".  Pt states he was supposed to go to the wound center for his toe but he cancelled it.  I instructed pt to call the wound center and make another appt.  Pt states Dr. Hoffman prescribed the clindamycin.  I did instruct pt he really should reschedule and keep the wound center appt.  Adjusted coumadin dose and instructed pt to limit green intake for three days.  Recheck INR next wk.  Pt verbalizes.  Patient instructed regarding medication; results given and questions answered. Nutritional counseling given.  Dietary factors affecting therapy addressed.  Patient instructed to monitor for excessive bruising or bleeding.  Electronically signed by JUANI Sheets      "

## 2017-08-29 ENCOUNTER — LAB REQUISITION (OUTPATIENT)
Dept: LAB | Facility: HOSPITAL | Age: 77
End: 2017-08-29

## 2017-08-29 ENCOUNTER — ANTICOAGULATION VISIT (OUTPATIENT)
Dept: CARDIAC SURGERY | Facility: CLINIC | Age: 77
End: 2017-08-29

## 2017-08-29 ENCOUNTER — APPOINTMENT (OUTPATIENT)
Dept: WOUND CARE | Facility: HOSPITAL | Age: 77
End: 2017-08-29
Attending: PLASTIC SURGERY

## 2017-08-29 ENCOUNTER — HOSPITAL ENCOUNTER (OUTPATIENT)
Dept: GENERAL RADIOLOGY | Facility: HOSPITAL | Age: 77
Discharge: HOME | End: 2017-08-29
Admitting: PLASTIC SURGERY

## 2017-08-29 VITALS — DIASTOLIC BLOOD PRESSURE: 93 MMHG | HEART RATE: 68 BPM | SYSTOLIC BLOOD PRESSURE: 164 MMHG

## 2017-08-29 DIAGNOSIS — I70.202 ATHEROSCLEROSIS OF ARTERY OF LEFT LOWER EXTREMITY (HCC): ICD-10-CM

## 2017-08-29 DIAGNOSIS — S91.105A: ICD-10-CM

## 2017-08-29 DIAGNOSIS — S93.105A: ICD-10-CM

## 2017-08-29 DIAGNOSIS — I48.0 PAROXYSMAL ATRIAL FIBRILLATION (HCC): ICD-10-CM

## 2017-08-29 DIAGNOSIS — S91.102A: ICD-10-CM

## 2017-08-29 DIAGNOSIS — I74.9 EMBOLISM AND THROMBOSIS OF ARTERY (HCC): ICD-10-CM

## 2017-08-29 DIAGNOSIS — Z79.01 LONG-TERM (CURRENT) USE OF ANTICOAGULANTS: ICD-10-CM

## 2017-08-29 LAB
ALBUMIN SERPL-MCNC: 3.8 G/DL (ref 3.4–4.8)
ALBUMIN/GLOB SERPL: 1.1 G/DL (ref 1.1–1.8)
ALP SERPL-CCNC: 80 U/L (ref 38–126)
ALT SERPL W P-5'-P-CCNC: 28 U/L (ref 21–72)
ANION GAP SERPL CALCULATED.3IONS-SCNC: 9 MMOL/L (ref 5–15)
AST SERPL-CCNC: 17 U/L (ref 17–59)
BASOPHILS # BLD AUTO: 0.02 10*3/MM3 (ref 0–0.2)
BASOPHILS NFR BLD AUTO: 0.2 % (ref 0–2)
BILIRUB SERPL-MCNC: 0.5 MG/DL (ref 0.2–1.3)
BUN BLD-MCNC: 31 MG/DL (ref 7–21)
BUN/CREAT SERPL: 17 (ref 7–25)
CALCIUM SPEC-SCNC: 9 MG/DL (ref 8.4–10.2)
CHLORIDE SERPL-SCNC: 103 MMOL/L (ref 95–110)
CO2 SERPL-SCNC: 23 MMOL/L (ref 22–31)
CREAT BLD-MCNC: 1.82 MG/DL (ref 0.7–1.3)
DEPRECATED RDW RBC AUTO: 46.4 FL (ref 35.1–43.9)
EOSINOPHIL # BLD AUTO: 0.41 10*3/MM3 (ref 0–0.7)
EOSINOPHIL NFR BLD AUTO: 5 % (ref 0–7)
ERYTHROCYTE [DISTWIDTH] IN BLOOD BY AUTOMATED COUNT: 14.9 % (ref 11.5–14.5)
ERYTHROCYTE [SEDIMENTATION RATE] IN BLOOD: 21 MM/HR (ref 0–15)
GFR SERPL CREATININE-BSD FRML MDRD: 36 ML/MIN/1.73 (ref 42–98)
GLOBULIN UR ELPH-MCNC: 3.4 GM/DL (ref 2.3–3.5)
GLUCOSE BLD-MCNC: 101 MG/DL (ref 60–100)
HBA1C MFR BLD: 6 % (ref 4–5.6)
HCT VFR BLD AUTO: 38.8 % (ref 39–49)
HGB BLD-MCNC: 13.6 G/DL (ref 13.7–17.3)
IMM GRANULOCYTES # BLD: 0.05 10*3/MM3 (ref 0–0.02)
IMM GRANULOCYTES NFR BLD: 0.6 % (ref 0–0.5)
INR PPP: 3.6 (ref 0.9–1.1)
LYMPHOCYTES # BLD AUTO: 1.08 10*3/MM3 (ref 0.6–4.2)
LYMPHOCYTES NFR BLD AUTO: 13.3 % (ref 10–50)
MCH RBC QN AUTO: 29.8 PG (ref 26.5–34)
MCHC RBC AUTO-ENTMCNC: 35.1 G/DL (ref 31.5–36.3)
MCV RBC AUTO: 85.1 FL (ref 80–98)
MONOCYTES # BLD AUTO: 0.54 10*3/MM3 (ref 0–0.9)
MONOCYTES NFR BLD AUTO: 6.6 % (ref 0–12)
NEUTROPHILS # BLD AUTO: 6.04 10*3/MM3 (ref 2–8.6)
NEUTROPHILS NFR BLD AUTO: 74.3 % (ref 37–80)
PLATELET # BLD AUTO: 211 10*3/MM3 (ref 150–450)
PMV BLD AUTO: 9.8 FL (ref 8–12)
POTASSIUM BLD-SCNC: 5 MMOL/L (ref 3.5–5.1)
PROT SERPL-MCNC: 7.2 G/DL (ref 6.3–8.6)
RBC # BLD AUTO: 4.56 10*6/MM3 (ref 4.37–5.74)
SODIUM BLD-SCNC: 135 MMOL/L (ref 137–145)
WBC NRBC COR # BLD: 8.14 10*3/MM3 (ref 3.2–9.8)

## 2017-08-29 PROCEDURE — 80053 COMPREHEN METABOLIC PANEL: CPT | Performed by: PLASTIC SURGERY

## 2017-08-29 PROCEDURE — 85610 PROTHROMBIN TIME: CPT | Performed by: NURSE PRACTITIONER

## 2017-08-29 PROCEDURE — 99211 OFF/OP EST MAY X REQ PHY/QHP: CPT | Performed by: NURSE PRACTITIONER

## 2017-08-29 PROCEDURE — G0463 HOSPITAL OUTPT CLINIC VISIT: HCPCS

## 2017-08-29 PROCEDURE — 85651 RBC SED RATE NONAUTOMATED: CPT | Performed by: PLASTIC SURGERY

## 2017-08-29 PROCEDURE — 83036 HEMOGLOBIN GLYCOSYLATED A1C: CPT | Performed by: PLASTIC SURGERY

## 2017-08-29 PROCEDURE — 85025 COMPLETE CBC W/AUTO DIFF WBC: CPT | Performed by: PLASTIC SURGERY

## 2017-08-29 PROCEDURE — 73630 X-RAY EXAM OF FOOT: CPT

## 2017-08-29 NOTE — PROGRESS NOTES
Pt states no changes to meds or diet.  No bleeding issues.  Adjusted coumadin dose slightly and will recheck INR next wk.  Pt states he has wound center today for his right toe today.  Instructions verbalized.  Patient instructed regarding medication; results given and questions answered. Nutritional counseling given.  Dietary factors affecting therapy addressed.  Patient instructed to monitor for excessive bruising or bleeding.          This document has been electronically signed by JUANI Fernandez on August 29, 2017 3:47 PM

## 2017-08-30 ENCOUNTER — OFFICE VISIT (OUTPATIENT)
Dept: CARDIOLOGY | Facility: CLINIC | Age: 77
End: 2017-08-30

## 2017-08-30 VITALS
BODY MASS INDEX: 30.94 KG/M2 | HEART RATE: 66 BPM | WEIGHT: 221 LBS | SYSTOLIC BLOOD PRESSURE: 120 MMHG | HEIGHT: 71 IN | DIASTOLIC BLOOD PRESSURE: 62 MMHG

## 2017-08-30 DIAGNOSIS — Z79.01 LONG-TERM (CURRENT) USE OF ANTICOAGULANTS: ICD-10-CM

## 2017-08-30 DIAGNOSIS — E78.5 HYPERLIPIDEMIA, UNSPECIFIED HYPERLIPIDEMIA TYPE: ICD-10-CM

## 2017-08-30 DIAGNOSIS — I48.3 TYPICAL ATRIAL FLUTTER (HCC): ICD-10-CM

## 2017-08-30 DIAGNOSIS — I48.0 PAROXYSMAL ATRIAL FIBRILLATION (HCC): Primary | ICD-10-CM

## 2017-08-30 DIAGNOSIS — I34.0 NON-RHEUMATIC MITRAL REGURGITATION: ICD-10-CM

## 2017-08-30 DIAGNOSIS — I73.9 PVD (PERIPHERAL VASCULAR DISEASE) (HCC): ICD-10-CM

## 2017-08-30 PROCEDURE — 93000 ELECTROCARDIOGRAM COMPLETE: CPT | Performed by: INTERNAL MEDICINE

## 2017-08-30 PROCEDURE — 99213 OFFICE O/P EST LOW 20 MIN: CPT | Performed by: INTERNAL MEDICINE

## 2017-08-30 RX ORDER — CLINDAMYCIN HYDROCHLORIDE 300 MG/1
CAPSULE ORAL
Refills: 1 | COMMUNITY
Start: 2017-08-24 | End: 2017-09-08

## 2017-08-30 NOTE — PROGRESS NOTES
Massimo Bentley  77 y.o. male    08/30/2017  1. Paroxysmal atrial fibrillation    2. Long-term (current) use of anticoagulants    3. Hyperlipidemia, unspecified hyperlipidemia type    4. Non-rheumatic mitral regurgitation    5. Typical atrial flutter    6. PVD (peripheral vascular disease)        History of Present Illness:Routine follow-up    77 years old gentleman who walk with the help of cane with the past medical history results of hypertension hypertensive heart disease, history of for atrial fibrillation/atrial flutter underwent EP study and isthmus ablation.  The patient noted to have  left-sided atrial tachycardia no ablation performed in left atrum.  The patient  started on Betapace doing remarkably well.  The patient have peripheral embolism and peripheral  Vascular disease status post stent placement and vascular surgery by Dr. Pennington. AAA under care of Dr. Pennington.  Tchronic oral anti-coagulation patient denied any orthopnea, PND, chest pain, palpitation, fever, chills, intermittent claudication, syncope or near syncopal episode.  EKG done and finding discussed the patient.  Sinus rhythm with heart rate cyst 66 bpm.        SUBJECTIVE    Allergies   Allergen Reactions   • Lipitor [Atorvastatin]    • Lortab [Hydrocodone-Acetaminophen]    • Morphine And Related          Past Medical History:   Diagnosis Date   • Abdominal bloating    • Anxiety    • Atherosclerosis of native arteries of extremity with intermittent claudication    • Atrial flutter    • Backache    • Benign prostatic hyperplasia     BX 2009, also has renal cyst      • Chronic bronchitis    • Chronic renal impairment    • Depressive disorder    • Drug abuse, episodic use    • Dyslipidemia    • Fatigue    • GERD (gastroesophageal reflux disease)    • Hypercholesterolemia 03/15/2016   • Injury of tendon of rotator cuff 10/22/2015   • Insomnia 06/24/2016   • Intervertebral disc disorder 10/27/2011   • Major depression    • Neck pain  01/09/2012   • Osteoarthritis of multiple joints 06/21/2013   • Pain from vascular prosthetic devices, implants and grafts, sequela 12/03/2015   • Pain in left foot 10/27/2015   • Peripheral vascular disease 12/03/2015     LEFT fem-tib bypass, embolectomy 6/2014    10/27/2015    • Senile debility 10/22/2015   • 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 No coronary atherosclerosis. Normal left ventricular function   • CHOLECYSTECTOMY  08/25/2014    Cholecystitis with gangrenous gallbladder   • ENDOSCOPY  03/22/1990    Duodenal ulcer   • ENDOSCOPY AND COLONOSCOPY  10/17/2013    Diverticulum in sigmoid colon & descending colon. Internal & external hemorrhoids found   • ESOPHAGOSCOPY / EGD  10/17/2013    With tube-Esophagitis seen. Biopsy taken. Gastritis in stomach. Biopsy taken. Stenosis in 2nd portion of duodenum. Dilatation performed.   • FEMORAL THROMBECTOMY/EMBOLECTOMY  06/29/2014    Left lower extremity arteriogram. Left popliteal artery endarterectomy. Redo left femoral to [id number] trunk bypass. Negative pressure wound therapy with placement of Prevena wound V.A.C., left groin, left lower leg   • INJECTION OF MEDICATION  03/15/2016    B12   • INJECTION OF MEDICATION  10/23/2015    Drain/Inject Major Joint    • INJECTION OF MEDICATION  01/13/2016    Kenalog   • LUMBAR LAMINECTOMY     • TRANSESOPHAGEAL ECHOCARDIOGRAM (TEX)  10/06/2015    With color flow-Mild mitral regurg.Left atrium mild dilated.Ef of 55-60%.RV systolic pressure elevated.Trace tricuspid regurg   • TRANSESOPHAGEAL  ECHOCARDIOGRAM (TEX)  02/14/2012    Without color flow-[facility] w/ early diast dysfun. AV trileaflet & structurally NRL w/ AR NRL. No regional wall motion abn Est Ef approx 50-55%. M & TR valves NRL w/ mild M & TR regurg Trace -mild pulmonic regurg. Anterior echo free space w/o hemodynamic signif. NRL RV   • TRANSESOPHAGEAL ECHOCARDIOGRAM (TEX)  03/25/2011    Normal LV systolic function with Ef of 50-55%.Left atrium ildly dilated.Moderate mitral regurg.Mitral valve regurg.Intact interatrial septum.No evidence of any cardiac thrombus or pericardial effusion.AV trileaflet         Family History   Problem Relation Age of Onset   • Osteoarthritis Mother      Lived to be 89   • Hypertension Mother    • ADD / ADHD Father    • Lung cancer Father    • ADD / ADHD Brother    • Rectal cancer Other          Social History     Social History   • Marital status:      Spouse name: N/A   • Number of children: N/A   • Years of education: N/A     Occupational History   • Not on file.     Social History Main Topics   • Smoking status: Current Some Day Smoker     Years: 50.00     Types: Electronic Cigarette   • Smokeless tobacco: Never Used      Comment: reduced;   • Alcohol use No      Comment: Former use    • Drug use: No   • Sexual activity: Defer     Other Topics Concern   • Not on file     Social History Narrative         Current Outpatient Prescriptions   Medication Sig Dispense Refill   • aspirin 81 MG EC tablet Take 81 mg by mouth daily.     • cilostazol (PLETAL) 50 MG tablet Take 1 tablet by mouth 2 (Two) Times a Day. 60 tablet 5   • clindamycin (CLEOCIN) 300 MG capsule TAKE ONE CAPSULE BY MOUTH 3 TIMES A DAY FOR 10 DAYS  1   • guaiFENesin (MUCINEX) 600 MG 12 hr tablet Take 1,200 mg by mouth 2 (Two) Times a Day.     • levothyroxine (SYNTHROID, LEVOTHROID) 25 MCG tablet Take 25 mcg by mouth daily.     • magnesium oxide (MAGOX) 400 (241.3 MG) MG tablet tablet Take 400 mg by mouth every night.     • pramipexole (MIRAPEX) 0.25  "MG tablet TAKE 2 TABLETS BY MOUTH EVERY MORNING AND 1 TABLET AT BEDTIME  5   • QUEtiapine (SEROquel) 100 MG tablet TAKE 2 TABLETS BY MOUTH AT BEDTIME  0   • sotalol (BETAPACE) 80 MG tablet Take 1.5 tablets by mouth 2 (Two) Times a Day. 270 tablet 2   • warfarin (COUMADIN) 5 MG tablet Take 1 tablet nightly or as directed by [facility] 30 tablet 0     No current facility-administered medications for this visit.          OBJECTIVE    /62  Pulse 66  Ht 71\" (180.3 cm)  Wt 221 lb (100 kg)  BMI 30.82 kg/m2        Review of Systems     Constitutional:  Denies recent weight loss, weight gain, fever or chills, no change in exercise tolerance     HENT:  Denies any hearing loss, epistaxis, hoarseness, or difficulty speaking.     Eyes: Wears eyeglasses or contact lenses     Respiratory:  Denies dyspnea with exertion,no cough, wheezing, or hemoptysis.     Cardiovascular: See H&P    Gastrointestinal:  Denies change in bowel habits, dyspepsia, ulcer disease, hematochezia, or melena.     Endocrine: Negative for cold intolerance, heat intolerance, polydipsia, polyphagia and polyuria. Denies any history of weight change, heat/cold intolerance, polydipsia, polyuria     Genitourinary: Negative.      Musculoskeletal: History of arthritis    Skin:  Denies any change in hair or nails, rashes, or skin lesions.     Allergic/Immunologic: Negative.  Negative for environmental allergies, food allergies and immunocompromised state.     Neurological:  Denies any history of recurrent headaches, strokes, TIA, or seizure disorder.     Hematological: Denies any food allergies, seasonal allergies, bleeding disorders, or lymphadenopathy.     Psychiatric/Behavioral: Denies any history of depression, substance abuse, or change in cognitive function.         Physical Exam     Constitutional: Cooperative, alert and oriented, well-developed, well-nourished, in no acute distress.     HENT:   Head: Normocephalic, normal hair patterns, no masses " or tenderness.  Ears, Nose, and Throat: No gross abnormalities. No pallor or cyanosis. Dentition good.   Eyes: EOMS intact, PERRL, conjunctivae and lids unremarkable. Fundoscopic exam and visual fields not performed.   Neck: No palpable masses or adenopathy, no thyromegaly, no JVD, carotid pulses are full and equal bilaterally and without  Bruits.     Cardiovascular: Regular rhythm, S1 and S2 normal, no S3 or S4. Apical impulse not displaced. No murmurs, gallops, or rubs detected.     Pulmonary/Chest: Chest: normal symmetry, no tenderness to palpation, normal respiratory excursion, no intercostal retraction, no use of accessory muscles.            Pulmonary: Normal breath sounds. No rales or ronchi.    Abdominal: Abdomen soft, bowel sounds normoactive, no masses, no hepatosplenomegaly, non-tender, no bruits.     Musculoskeletal: No deformities, clubbing, cyanosis, erythema, or edema observed. There are no spinal abnormalities noted. Normal muscle strength and tone. Pulses full and equal in all extremities, no bruits auscultated.     Neurological: No gross motor or sensory deficits noted, affect appropriate, oriented to time, person, place.     Skin: Warm and dry to the touch, no apparent skin lesions or masses noted.     Psychiatric: He has a normal mood and affect. His behavior is normal. Judgment and thought content normal.           ECG 12 Lead  Date/Time: 8/30/2017 3:49 PM  Performed by: LUBNA BAUTISTA  Authorized by: LUBNA BAUTISTA   Comparison: not compared with previous ECG   Rhythm: sinus rhythm              Lab Results   Component Value Date    WBC 8.14 08/29/2017    HGB 13.6 (L) 08/29/2017    HCT 38.8 (L) 08/29/2017    MCV 85.1 08/29/2017     08/29/2017     Lab Results   Component Value Date    GLUCOSE 101 (H) 08/29/2017    BUN 31 (H) 08/29/2017    CREATININE 1.82 (H) 08/29/2017    EGFRIFNONA 36 (L) 08/29/2017    BCR 17.0 08/29/2017    CO2 23.0 08/29/2017    CALCIUM 9.0 08/29/2017    ALBUMIN 3.80  08/29/2017    LABIL2 1.1 08/29/2017    AST 17 08/29/2017    ALT 28 08/29/2017     No results found for: CHOL  Lab Results   Component Value Date    TRIG 237 (H) 01/04/2017    TRIG 202 (H) 07/01/2016    TRIG 152 02/23/2016     Lab Results   Component Value Date    HDL 30 (L) 01/04/2017    HDL 32 (L) 07/01/2016    HDL 40 (L) 02/23/2016     Lab Results   Component Value Date    LDLCALC 83 01/04/2017    LDLCALC 94 07/01/2016    LDLCALC 106 02/23/2016     No results found for: LDL  No results found for: HDLLDLRATIO  No components found for: CHOLHDL  Lab Results   Component Value Date    HGBA1C 6.0 (H) 08/29/2017     Lab Results   Component Value Date    TSH 4.26 01/04/2017           ASSESSMENT AND PLAN  1 atrial flutter/atrial fibrillation status post EP study  #2 hypertension with hypertensive heart disease and normal left and a systolic function.  Mild mitral regurgitation previous echocardiographic study.  #3  embolization with PVD status post stenting to care of Dr. Pennington  Clinically there is no sign of any acute cardiac decompensation based on the clinical history physical finding.  EKG done and finding discussed with the patient in normal QTc interval.  normal stress test and good left ventricle systolic function.  Had mild mitral regurgitation without any hemodynamic significant.  Patient will continue Betapace 80 mg twice a day to keep him in sinus rhythm warfarin to decrease risk of embolization along with continuation of aspirin.  Hypothyroidism being managed with to Synthroid and Hytrin for lipidemia with the Pravachol.  No change was made in the medical management as his cardiac status seemed to well compensated.  We will see him back in 6 month     Diagnoses and all orders for this visit:    Paroxysmal atrial fibrillation    Long-term (current) use of anticoagulants    Hyperlipidemia, unspecified hyperlipidemia type    Non-rheumatic mitral regurgitation    Typical atrial flutter    PVD (peripheral  vascular disease)        Emily Thacker MD  8/30/2017  3:44 PM

## 2017-09-06 ENCOUNTER — TRANSCRIBE ORDERS (OUTPATIENT)
Dept: ADMINISTRATIVE | Facility: HOSPITAL | Age: 77
End: 2017-09-06

## 2017-09-06 ENCOUNTER — ANTICOAGULATION VISIT (OUTPATIENT)
Dept: CARDIAC SURGERY | Facility: CLINIC | Age: 77
End: 2017-09-06

## 2017-09-06 ENCOUNTER — LAB REQUISITION (OUTPATIENT)
Dept: LAB | Facility: HOSPITAL | Age: 77
End: 2017-09-06

## 2017-09-06 ENCOUNTER — APPOINTMENT (OUTPATIENT)
Dept: WOUND CARE | Facility: HOSPITAL | Age: 77
End: 2017-09-06
Attending: PLASTIC SURGERY

## 2017-09-06 VITALS — SYSTOLIC BLOOD PRESSURE: 127 MMHG | DIASTOLIC BLOOD PRESSURE: 97 MMHG | HEART RATE: 103 BPM

## 2017-09-06 DIAGNOSIS — Z79.01 LONG-TERM (CURRENT) USE OF ANTICOAGULANTS: ICD-10-CM

## 2017-09-06 DIAGNOSIS — I48.0 PAROXYSMAL ATRIAL FIBRILLATION (HCC): ICD-10-CM

## 2017-09-06 DIAGNOSIS — I74.9 EMBOLISM AND THROMBOSIS OF ARTERY (HCC): ICD-10-CM

## 2017-09-06 DIAGNOSIS — S91.102A: ICD-10-CM

## 2017-09-06 DIAGNOSIS — I79.8 OTHER DISORDERS OF ARTERIES, ARTERIOLES AND CAPILLARIES IN DISEASES CLASSIFIED ELSEWHERE (HCC): Primary | ICD-10-CM

## 2017-09-06 LAB — INR PPP: 2.7 (ref 0.9–1.1)

## 2017-09-06 PROCEDURE — 87205 SMEAR GRAM STAIN: CPT | Performed by: PLASTIC SURGERY

## 2017-09-06 PROCEDURE — 85610 PROTHROMBIN TIME: CPT | Performed by: NURSE PRACTITIONER

## 2017-09-06 PROCEDURE — G0463 HOSPITAL OUTPT CLINIC VISIT: HCPCS

## 2017-09-06 PROCEDURE — 87070 CULTURE OTHR SPECIMN AEROBIC: CPT | Performed by: PLASTIC SURGERY

## 2017-09-06 NOTE — PROGRESS NOTES
Pt denies med changes or bleeding problems. Patient instructed regarding medication; results given and questions answered. Nutritional counseling given.  Dietary factors affecting therapy addressed.  Patient instructed to monitor for excessive bruising or bleeding. Will recheck in 2 weeks on same dose.

## 2017-09-07 RX ORDER — WARFARIN SODIUM 5 MG/1
TABLET ORAL
Qty: 30 TABLET | Refills: 0 | Status: SHIPPED | OUTPATIENT
Start: 2017-09-07 | End: 2017-10-04 | Stop reason: SDUPTHER

## 2017-09-08 ENCOUNTER — OFFICE VISIT (OUTPATIENT)
Dept: CARDIAC SURGERY | Facility: CLINIC | Age: 77
End: 2017-09-08

## 2017-09-08 VITALS
HEIGHT: 70 IN | DIASTOLIC BLOOD PRESSURE: 65 MMHG | BODY MASS INDEX: 31.21 KG/M2 | OXYGEN SATURATION: 98 % | WEIGHT: 218 LBS | SYSTOLIC BLOOD PRESSURE: 98 MMHG | TEMPERATURE: 97.7 F | HEART RATE: 65 BPM

## 2017-09-08 DIAGNOSIS — Z79.01 LONG-TERM (CURRENT) USE OF ANTICOAGULANTS: ICD-10-CM

## 2017-09-08 DIAGNOSIS — I70.244 ATHEROSCLEROSIS OF NATIVE ARTERY OF LEFT LOWER EXTREMITY WITH ULCERATION OF HEEL (HCC): Primary | ICD-10-CM

## 2017-09-08 DIAGNOSIS — F17.200 TOBACCO DEPENDENCE SYNDROME: ICD-10-CM

## 2017-09-08 PROCEDURE — 99406 BEHAV CHNG SMOKING 3-10 MIN: CPT | Performed by: NURSE PRACTITIONER

## 2017-09-08 PROCEDURE — 99213 OFFICE O/P EST LOW 20 MIN: CPT | Performed by: NURSE PRACTITIONER

## 2017-09-10 LAB
BACTERIA SPEC AEROBE CULT: NORMAL
GRAM STN SPEC: NORMAL
GRAM STN SPEC: NORMAL

## 2017-09-11 DIAGNOSIS — I70.245 ATHEROSCLEROSIS OF NATIVE ARTERY OF LEFT LOWER EXTREMITY WITH ULCERATION OF OTHER PART OF FOOT (HCC): Primary | ICD-10-CM

## 2017-09-11 PROBLEM — I70.244 ATHEROSCLEROSIS OF NATIVE ARTERY OF LEFT LOWER EXTREMITY WITH ULCERATION OF HEEL (HCC): Status: ACTIVE | Noted: 2017-09-11

## 2017-09-11 RX ORDER — SODIUM CHLORIDE 0.9 % (FLUSH) 0.9 %
1-10 SYRINGE (ML) INJECTION AS NEEDED
Status: CANCELLED | OUTPATIENT
Start: 2017-09-19

## 2017-09-11 RX ORDER — SODIUM CHLORIDE 9 MG/ML
125 INJECTION, SOLUTION INTRAVENOUS CONTINUOUS
Status: CANCELLED | OUTPATIENT
Start: 2017-09-19

## 2017-09-11 RX ORDER — ACETYLCYSTEINE
600 POWDER (GRAM) MISCELLANEOUS 2 TIMES DAILY
Qty: 6 BOTTLE | Refills: 0 | Status: SHIPPED | OUTPATIENT
Start: 2017-09-11 | End: 2017-09-14

## 2017-09-11 NOTE — PROGRESS NOTES
Subjective   Patient ID: Massimo Bentley is a 77 y.o. male is here today for follow-up PVD.    History of Present Illness  The following portions of the patient's history were reviewed and updated as appropriate: allergies, current medications, past family history, past medical history, past social history, past surgical history and problem list.    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  He underwent Redo LEFT fem->tib peroneal graft.   He denies foot pain, denies any sensory, motor, or neuro deficit other than long standing LEFT shin numbness.  He denies any bleeding or unexplained bruising.  He returns today in scheduled FU.  He has been to rehab and no longer consumes alcohol,  He does continue to smoke cigars and electronic cigarettes.  Concerned about leg edema/discomfort, improved post ramírez boot applicatoin. Sent from Wound center for nonhealing wounds left foot.    PVD/Anticoagulation HX:  Chronic at fib for which he is on long term anticoagulation therapy.  He has undergone cardioversion and ablation (9/19/11) but continues with at fib.  He recently underwent  Aortagram with thrombolysis for clot of his SFA and popliteal artery (R).  TEX done at that time (2/17/12) demonstrated considerable amoutn of thrombus around and in LA appendage.  5/2/111. CT ABD:  Infrarenal abdominal aortic aneurysm, occurring just proximal to  the bifurcation, measuring 5.5 centimeters in craniocaudal dimension  5.3 cm in AP dimension, and 6.8 cm in transverse dimension  Left femoral to tibia bypas  7/18/2011 Endovascular AAA Repair   6/29/14  REDO LEFT femoral to tibial peroneal trunk bypass 6mm PTFE  with LFA embolectomy and LPA endarterectomy  11/3/2014 [facility]:  RIGHT 1.1 tri/biphasic.  LEFT .90 biphasic.  patent LEFT fem-tib bypass (maxPSV 40cm/s, triphasic)  3/9/15: [facility]: RIGHT 1.1 Triphasic. LEFT 0.86 Biphasic.  7/20/15:  [facility]: RIGHT 1.1 Triphasic/Bi (DP). LEFT 0.82 Tri/Bi-Mon (PT/DP). PATENT LEFT Fem-tib max PSV 117cm/s (inflow) Triphasic.  10/27/15: [facility]: RIGHT 1.1 Triphasic. LEFT NO DP/PT FLOW. OCCLUDED LEFT fem-tib graft  10/27/15  LLE arteriogram, TPA thrombolysis with EKOS catherter to LEFT fem-pop graft  10/28/15  Recheck lysis, 2 V run off to foot.   12/03/15  [facility]:  RIGHT 0.87  POP/DP/PT Triphasic  LEFT 0.88 POP Triphasic  DP/PT Biphasic   3/16/16  [facility]:  RIGHT 0.98  Triphasic  LEFT 0.75 POP Triphasic  DP/PT Biphasic   9/21/16   [facility]:  RIGHT 1.01  Triphasic  LEFT 0.87 POP Triphasic  PT Biphasic  DP Monophasic   PPGS:  Right pulsatile waveforms  LEFT reduced PPG waveforms.   02/08/17  [facility]:  RIGHT 1.22   Triphasic  LEFT 0.95 POP Triphasic  PT Biphasic  DP Biphasic    PPGS:  Right pulsatile waveforms  LEFT reduced PPG waveforms.   9/8/17: [facility]: RIGHT 1.0 Triphasic. LEFT 0.39 Monophasic (CFA-Peroneal)    Aneurysm:  No back pain, No embolization  5/2/111. CT ABD:  Infrarenal abdominal aortic aneurysm, occurring just proximal to  the bifurcation, measuring 5.5 centimeters in craniocaudal dimension  5.3 cm in AP dimension, and 6.8 cm in transverse dimension  7/18/2011 Endovascular AAA Repair   3/9/15: Abd Duplex: Prx Aorta 3.6cm. No endovascular leak      Past Medical History:   Diagnosis Date   • Abdominal bloating    • Anxiety    • Atherosclerosis of native arteries of extremity with intermittent claudication    • Atrial flutter    • Backache    • Benign prostatic hyperplasia     BX 2009, also has renal cyst      • Chronic bronchitis    • Chronic renal impairment    • Depressive disorder    • Drug abuse, episodic use    • Dyslipidemia    • Fatigue    • GERD (gastroesophageal reflux disease)    • Hypercholesterolemia 03/15/2016   • Injury of tendon of rotator cuff 10/22/2015   • Insomnia 06/24/2016   • Intervertebral disc disorder 10/27/2011   • Major depression    • Neck pain 01/09/2012   • Osteoarthritis of multiple joints 06/21/2013    • Pain from vascular prosthetic devices, implants and grafts, sequela 12/03/2015   • Pain in left foot 10/27/2015   • Peripheral vascular disease 12/03/2015     LEFT fem-tib bypass, embolectomy 6/2014    10/27/2015    • Senile debility 10/22/2015   • 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 No coronary atherosclerosis. Normal left ventricular function   • CHOLECYSTECTOMY  08/25/2014    Cholecystitis with gangrenous gallbladder   • ENDOSCOPY  03/22/1990    Duodenal ulcer   • ENDOSCOPY AND COLONOSCOPY  10/17/2013    Diverticulum in sigmoid colon & descending colon. Internal & external hemorrhoids found   • ESOPHAGOSCOPY / EGD  10/17/2013    With tube-Esophagitis seen. Biopsy taken. Gastritis in stomach. Biopsy taken. Stenosis in 2nd portion of duodenum. Dilatation performed.   • FEMORAL THROMBECTOMY/EMBOLECTOMY  06/29/2014    Left lower extremity arteriogram. Left popliteal artery endarterectomy. Redo left femoral to [id number] trunk bypass. Negative pressure wound therapy with placement of Prevena wound V.A.C., left groin, left lower leg   • INJECTION OF MEDICATION  03/15/2016    B12   • INJECTION OF MEDICATION  10/23/2015    Drain/Inject Major Joint    • INJECTION OF MEDICATION  01/13/2016    Kenalog   • LUMBAR LAMINECTOMY     • TRANSESOPHAGEAL ECHOCARDIOGRAM (TEX)  10/06/2015    With color flow-Mild mitral regurg.Left atrium mild dilated.Ef of 55-60%.RV systolic pressure elevated.Trace tricuspid regurg   • TRANSESOPHAGEAL ECHOCARDIOGRAM (TEX)  02/14/2012    Without color flow-[facility] w/  early diast dysfun. AV trileaflet & structurally NRL w/ AR NRL. No regional wall motion abn Est Ef approx 50-55%. M & TR valves NRL w/ mild M & TR regurg Trace -mild pulmonic regurg. Anterior echo free space w/o hemodynamic signif. NRL RV   • TRANSESOPHAGEAL ECHOCARDIOGRAM (TEX)  03/25/2011    Normal LV systolic function with Ef of 50-55%.Left atrium ildly dilated.Moderate mitral regurg.Mitral valve regurg.Intact interatrial septum.No evidence of any cardiac thrombus or pericardial effusion.AV trileaflet         Current Outpatient Prescriptions:   •  aspirin 81 MG EC tablet, Take 81 mg by mouth daily., Disp: , Rfl:   •  cilostazol (PLETAL) 50 MG tablet, Take 1 tablet by mouth 2 (Two) Times a Day., Disp: 60 tablet, Rfl: 5  •  guaiFENesin (MUCINEX) 600 MG 12 hr tablet, Take 1,200 mg by mouth 2 (Two) Times a Day., Disp: , Rfl:   •  levothyroxine (SYNTHROID, LEVOTHROID) 25 MCG tablet, Take 25 mcg by mouth daily., Disp: , Rfl:   •  magnesium oxide (MAGOX) 400 (241.3 MG) MG tablet tablet, Take 400 mg by mouth every night., Disp: , Rfl:   •  pramipexole (MIRAPEX) 0.25 MG tablet, TAKE 2 TABLETS BY MOUTH EVERY MORNING AND 1 TABLET AT BEDTIME, Disp: , Rfl: 5  •  QUEtiapine (SEROquel) 100 MG tablet, TAKE 2 TABLETS BY MOUTH AT BEDTIME, Disp: , Rfl: 0  •  sotalol (BETAPACE) 80 MG tablet, Take 1.5 tablets by mouth 2 (Two) Times a Day., Disp: 270 tablet, Rfl: 2  •  warfarin (COUMADIN) 5 MG tablet, Take 1 tablet nightly except Tuesday and Saturday take 1/2 tablet or as directed, Disp: 30 tablet, Rfl: 0  •  Acetylcysteine powder, Take 600 mg by mouth 2 (Two) Times a Day for 6 doses. Begin 2 days prior to scheduled procedure, Disp: 6 bottle, Rfl: 0    Review of Systems   Constitution: Negative for weakness.   Eyes: Negative for visual disturbance.   Cardiovascular: Positive for leg swelling. Negative for claudication and cyanosis.   Respiratory: Negative for shortness of breath.    Skin: Positive for color change and poor wound  healing (left foot heel-toes). Negative for nail changes.   Musculoskeletal: Negative for muscle weakness.   Gastrointestinal: Negative for dysphagia.   Neurological: Positive for loss of balance (using walker). Negative for focal weakness, light-headedness, numbness and paresthesias.   Psychiatric/Behavioral: Negative for altered mental status.        Objective      Physical Exam   Constitutional: He is oriented to person, place, and time. He appears well-developed.   Neck: Neck supple.   Cardiovascular: Normal rate.    Pulmonary/Chest: Effort normal and breath sounds normal.   Abdominal: Soft.   Musculoskeletal: He exhibits edema (LLE 1+).   Neurological: He is alert and oriented to person, place, and time.   Skin: Skin is warm.   LEFT Foot eugenio appearance. Laceration on heel-nonhealing-dry. Eschar on tips of toes.   Psychiatric: He has a normal mood and affect. Thought content normal.   Vitals reviewed.    Lab Results   Component Value Date    GLUCOSE 101 (H) 08/29/2017    BUN 31 (H) 08/29/2017    CREATININE 1.82 (H) 08/29/2017    EGFRIFNONA 36 (L) 08/29/2017    BCR 17.0 08/29/2017    K 5.0 08/29/2017    CO2 23.0 08/29/2017    CALCIUM 9.0 08/29/2017    ALBUMIN 3.80 08/29/2017    LABIL2 1.1 08/29/2017    AST 17 08/29/2017    ALT 28 08/29/2017       Lab Results   Component Value Date    WBC 8.14 08/29/2017    HGB 13.6 (L) 08/29/2017    HCT 38.8 (L) 08/29/2017    MCV 85.1 08/29/2017     08/29/2017             Assessment/Plan   Independent Review of Radiographic Studies:    Detailed discussion regarding risks, benefits, and treatment plan.  Patient understands, agrees, and wishes to proceed with plan.     1. Atherosclerosis of native artery of left lower extremity with ulceration of heel  Probable clotted LEFT Fem-Tib graft. Significant decrease in arterial flow from previous.   Reports no leg pain. Nonhealing wounds LEFT foot.  Proceed with CTA Bilateral lower extremity runoff. Dr. Pennington to view. Will  call patient with further plan for revascularization if applicable.  Medical Management: ASA,PLETAL,COUMADIN    2. Tobacco dependence syndrome  Smoking cessation assistance options offered including behavioral counseling (Smoking Cessation Classes), Nicotine replacement therapy (patches or gum), pharmacologic therapy (Chantix, Wellbutrin). Understands tobacco increases risk of expanding AAA, MI, CVA, PAD, carcinoma. Discussion and question answer period 5-7 minutes.     3. Long-term (current) use of anticoagulants  Has had episodic subtherapeutic INR's below 2.0 since May 2017. INR today 3.3.   Maintain INR 2-3  Notify provider if you experience excessive bleeding from the nose, cuts, gums, rectum, urinary tract, or vagina. Reddish or brown urine or stool. Vomiting of blood or hemorrhoidal bleeding. If major injury occurs present to the Emergency Department.             This document has been electronically signed by JUANI Fernandez on September 11, 2017 10:08 AM

## 2017-09-19 ENCOUNTER — HOSPITAL ENCOUNTER (OUTPATIENT)
Dept: CT IMAGING | Facility: HOSPITAL | Age: 77
Discharge: HOME | End: 2017-09-19
Admitting: NURSE PRACTITIONER

## 2017-09-19 VITALS
WEIGHT: 215 LBS | HEART RATE: 56 BPM | BODY MASS INDEX: 30.1 KG/M2 | TEMPERATURE: 97 F | OXYGEN SATURATION: 95 % | RESPIRATION RATE: 18 BRPM | DIASTOLIC BLOOD PRESSURE: 72 MMHG | SYSTOLIC BLOOD PRESSURE: 123 MMHG | HEIGHT: 71 IN

## 2017-09-19 DIAGNOSIS — I70.245 ATHEROSCLEROSIS OF NATIVE ARTERY OF LEFT LOWER EXTREMITY WITH ULCERATION OF OTHER PART OF FOOT (HCC): ICD-10-CM

## 2017-09-19 PROCEDURE — 0 IOPAMIDOL PER 1 ML: Performed by: NURSE PRACTITIONER

## 2017-09-19 PROCEDURE — 75635 CT ANGIO ABDOMINAL ARTERIES: CPT

## 2017-09-19 RX ORDER — SODIUM CHLORIDE 0.9 % (FLUSH) 0.9 %
1-10 SYRINGE (ML) INJECTION AS NEEDED
Status: DISCONTINUED | OUTPATIENT
Start: 2017-09-19 | End: 2017-09-20 | Stop reason: HOSPADM

## 2017-09-19 RX ORDER — SODIUM CHLORIDE 9 MG/ML
125 INJECTION, SOLUTION INTRAVENOUS CONTINUOUS
Status: DISCONTINUED | OUTPATIENT
Start: 2017-09-19 | End: 2017-09-20 | Stop reason: HOSPADM

## 2017-09-19 RX ADMIN — IOPAMIDOL 95 ML: 755 INJECTION, SOLUTION INTRAVENOUS at 14:00

## 2017-09-19 RX ADMIN — SODIUM CHLORIDE 125 ML/HR: 9 INJECTION, SOLUTION INTRAVENOUS at 12:18

## 2017-09-19 NOTE — PLAN OF CARE
Problem: Patient Care Overview (Adult)  Goal: Plan of Care Review  Outcome: Outcome(s) achieved Date Met:  09/19/17  Goal: Adult Individualization and Mutuality  Outcome: Outcome(s) achieved Date Met:  09/19/17  Goal: Discharge Needs Assessment  Outcome: Outcome(s) achieved Date Met:  09/19/17

## 2017-09-20 ENCOUNTER — ANTICOAGULATION VISIT (OUTPATIENT)
Dept: CARDIAC SURGERY | Facility: CLINIC | Age: 77
End: 2017-09-20

## 2017-09-20 VITALS — DIASTOLIC BLOOD PRESSURE: 73 MMHG | SYSTOLIC BLOOD PRESSURE: 138 MMHG | HEART RATE: 63 BPM

## 2017-09-20 DIAGNOSIS — I74.9 EMBOLISM AND THROMBOSIS OF ARTERY (HCC): ICD-10-CM

## 2017-09-20 DIAGNOSIS — Z79.01 LONG-TERM (CURRENT) USE OF ANTICOAGULANTS: ICD-10-CM

## 2017-09-20 DIAGNOSIS — I48.0 PAROXYSMAL ATRIAL FIBRILLATION (HCC): ICD-10-CM

## 2017-09-20 LAB — INR PPP: 3.6 (ref 0.9–1.1)

## 2017-09-20 PROCEDURE — 99211 OFF/OP EST MAY X REQ PHY/QHP: CPT | Performed by: NURSE PRACTITIONER

## 2017-09-20 PROCEDURE — 85610 PROTHROMBIN TIME: CPT | Performed by: NURSE PRACTITIONER

## 2017-09-20 NOTE — PROGRESS NOTES
Pt denies med changes or bleeding problems. Pt states he has not eaten any green veggies lately. Dose adjusted today only and pt instructed to have a serving of broccoli today and then 1/2 cup serving of green veggies twice weekly; pt verbalized. Patient instructed regarding medication; results given and questions answered. Nutritional counseling given.  Dietary factors affecting therapy addressed.  Patient instructed to monitor for excessive bruising or bleeding.   Electronically signed by JUANI Sheets

## 2017-09-29 ENCOUNTER — APPOINTMENT (OUTPATIENT)
Dept: WOUND CARE | Facility: HOSPITAL | Age: 77
End: 2017-09-29
Attending: THORACIC SURGERY (CARDIOTHORACIC VASCULAR SURGERY)

## 2017-09-29 PROCEDURE — G0463 HOSPITAL OUTPT CLINIC VISIT: HCPCS

## 2017-10-02 ENCOUNTER — ANTICOAGULATION VISIT (OUTPATIENT)
Dept: CARDIAC SURGERY | Facility: CLINIC | Age: 77
End: 2017-10-02

## 2017-10-02 ENCOUNTER — OFFICE VISIT (OUTPATIENT)
Dept: FAMILY MEDICINE CLINIC | Facility: CLINIC | Age: 77
End: 2017-10-02

## 2017-10-02 VITALS — SYSTOLIC BLOOD PRESSURE: 139 MMHG | DIASTOLIC BLOOD PRESSURE: 91 MMHG | HEART RATE: 62 BPM

## 2017-10-02 VITALS
WEIGHT: 215 LBS | BODY MASS INDEX: 30.1 KG/M2 | DIASTOLIC BLOOD PRESSURE: 82 MMHG | SYSTOLIC BLOOD PRESSURE: 124 MMHG | HEIGHT: 71 IN

## 2017-10-02 DIAGNOSIS — Z79.01 LONG-TERM (CURRENT) USE OF ANTICOAGULANTS: ICD-10-CM

## 2017-10-02 DIAGNOSIS — J18.9 PNEUMONIA OF LOWER LOBE DUE TO INFECTIOUS ORGANISM, UNSPECIFIED LATERALITY: Primary | ICD-10-CM

## 2017-10-02 DIAGNOSIS — I74.9 EMBOLISM AND THROMBOSIS OF ARTERY (HCC): ICD-10-CM

## 2017-10-02 DIAGNOSIS — I48.0 PAROXYSMAL ATRIAL FIBRILLATION (HCC): ICD-10-CM

## 2017-10-02 DIAGNOSIS — I73.9 PAD (PERIPHERAL ARTERY DISEASE) (HCC): ICD-10-CM

## 2017-10-02 LAB — INR PPP: 5.5 (ref 0.9–1.1)

## 2017-10-02 PROCEDURE — 90471 IMMUNIZATION ADMIN: CPT | Performed by: FAMILY MEDICINE

## 2017-10-02 PROCEDURE — 99213 OFFICE O/P EST LOW 20 MIN: CPT | Performed by: FAMILY MEDICINE

## 2017-10-02 PROCEDURE — 90715 TDAP VACCINE 7 YRS/> IM: CPT | Performed by: FAMILY MEDICINE

## 2017-10-02 PROCEDURE — 85610 PROTHROMBIN TIME: CPT | Performed by: NURSE PRACTITIONER

## 2017-10-02 PROCEDURE — 99211 OFF/OP EST MAY X REQ PHY/QHP: CPT | Performed by: NURSE PRACTITIONER

## 2017-10-02 RX ORDER — DEXAMETHASONE 4 MG/1
TABLET ORAL
Refills: 11 | COMMUNITY
Start: 2017-09-08 | End: 2017-10-02

## 2017-10-02 RX ORDER — BUDESONIDE AND FORMOTEROL FUMARATE DIHYDRATE 160; 4.5 UG/1; UG/1
2 AEROSOL RESPIRATORY (INHALATION)
Qty: 1 INHALER | Refills: 12 | Status: SHIPPED | OUTPATIENT
Start: 2017-10-02 | End: 2017-11-20

## 2017-10-02 RX ORDER — CETIRIZINE HYDROCHLORIDE 10 MG/1
TABLET ORAL
Refills: 2 | COMMUNITY
Start: 2017-09-21 | End: 2017-11-07 | Stop reason: SDUPTHER

## 2017-10-02 RX ORDER — ALBUTEROL SULFATE 90 UG/1
2 AEROSOL, METERED RESPIRATORY (INHALATION) EVERY 4 HOURS PRN
Qty: 1 INHALER | Refills: 6 | Status: SHIPPED | OUTPATIENT
Start: 2017-10-02 | End: 2018-02-05

## 2017-10-02 RX ORDER — AMOXICILLIN 500 MG/1
1000 CAPSULE ORAL 2 TIMES DAILY
Qty: 20 CAPSULE | Refills: 0 | Status: SHIPPED | OUTPATIENT
Start: 2017-10-02 | End: 2017-10-07

## 2017-10-02 NOTE — PROGRESS NOTES
Subjective   Massimo Bentley is a 77 y.o. male.     Shortness of Breath   This is a recurrent problem. The current episode started 1 to 4 weeks ago. The problem occurs daily. Associated symptoms include leg swelling, sputum production and wheezing. Pertinent negatives include no abdominal pain, chest pain, claudication, coryza, ear pain, fever, headaches, hemoptysis, leg pain, neck pain, orthopnea, PND, rash, rhinorrhea, sore throat, swollen glands, syncope or vomiting. The symptoms are aggravated by exercise. He has tried beta agonist inhalers for the symptoms. The treatment provided mild relief. His past medical history is significant for COPD.   Lower Extremity Issue   This is a chronic problem. The current episode started more than 1 year ago. The problem occurs daily. The problem has been gradually worsening. Associated symptoms include arthralgias, chills, coughing, numbness and weakness. Pertinent negatives include no abdominal pain, anorexia, change in bowel habit, chest pain, congestion, diaphoresis, fatigue, fever, headaches, joint swelling, myalgias, nausea, neck pain, rash, sore throat, swollen glands, urinary symptoms, vertigo, visual change or vomiting. The symptoms are aggravated by walking and standing. Treatments tried: He has PAD.He has been seeing the wound care clinic.  He has had stents placed in her aorta and lower extremeties in the past. The treatment provided no relief.          Current Outpatient Prescriptions:   •  aspirin 81 MG EC tablet, Take 81 mg by mouth daily., Disp: , Rfl:   •  cetirizine (zyrTEC) 10 MG tablet, TAKE 1 TABLET BY MOUTH DAILY., Disp: , Rfl: 2  •  cilostazol (PLETAL) 50 MG tablet, Take 1 tablet by mouth 2 (Two) Times a Day., Disp: 60 tablet, Rfl: 5  •  FLOVENT  MCG/ACT inhaler, INHALE 1 PUFF 2 TIMES A DAY., Disp: , Rfl: 11  •  guaiFENesin (MUCINEX) 600 MG 12 hr tablet, Take 1,200 mg by mouth 2 (Two) Times a Day., Disp: , Rfl:   •  levothyroxine (SYNTHROID,  LEVOTHROID) 25 MCG tablet, Take 25 mcg by mouth daily., Disp: , Rfl:   •  magnesium oxide (MAGOX) 400 (241.3 MG) MG tablet tablet, Take 400 mg by mouth every night., Disp: , Rfl:   •  pramipexole (MIRAPEX) 0.25 MG tablet, TAKE 2 TABLETS BY MOUTH EVERY MORNING AND 1 TABLET AT BEDTIME, Disp: , Rfl: 5  •  QUEtiapine (SEROquel) 100 MG tablet, take 3 tablets at bedtime, Disp: , Rfl: 0  •  sotalol (BETAPACE) 80 MG tablet, Take 1.5 tablets by mouth 2 (Two) Times a Day., Disp: 270 tablet, Rfl: 2  •  warfarin (COUMADIN) 5 MG tablet, Take 1 tablet nightly except Tuesday and Saturday take 1/2 tablet or as directed, Disp: 30 tablet, Rfl: 0    The following portions of the patient's history were reviewed and updated as appropriate: allergies, current medications, past family history, past medical history, past social history, past surgical history and problem list.    Review of Systems   Constitutional: Positive for activity change, appetite change and chills. Negative for diaphoresis, fatigue, fever and unexpected weight change.   HENT: Negative for congestion, ear pain, rhinorrhea and sore throat.    Respiratory: Positive for cough, sputum production, chest tightness, shortness of breath and wheezing. Negative for hemoptysis.    Cardiovascular: Positive for leg swelling. Negative for chest pain, palpitations, orthopnea, claudication, syncope and PND.   Gastrointestinal: Negative for abdominal distention, abdominal pain, anorexia, change in bowel habit, constipation, diarrhea, nausea and vomiting.   Musculoskeletal: Positive for arthralgias and gait problem. Negative for joint swelling, myalgias and neck pain.   Skin: Negative for rash.   Neurological: Positive for weakness and numbness. Negative for vertigo and headaches.   Psychiatric/Behavioral: Negative for dysphoric mood and sleep disturbance. The patient is not nervous/anxious.        Objective    Vitals:    10/02/17 1304   BP: 124/82   Weight: 215 lb (97.5 kg)   Height:  "71\" (180.3 cm)       Physical Exam   Constitutional: He appears well-developed and well-nourished. No distress.   In wheelchair today   Cardiovascular: Normal rate, regular rhythm and normal heart sounds.    +1 LE edema.  Has several small ulcerative lesions on his toes.   Pulmonary/Chest: Effort normal. He has wheezes.   Course breath sounds on the LLL   Abdominal: Soft. Bowel sounds are normal. He exhibits no distension. There is no tenderness.   Psychiatric: He has a normal mood and affect. His behavior is normal. Judgment and thought content normal.   Nursing note and vitals reviewed.      Assessment/Plan   Problems Addressed this Visit     None      Visit Diagnoses     Pneumonia of lower lobe due to infectious organism, unspecified laterality    -  Primary    Relevant Medications    cetirizine (zyrTEC) 10 MG tablet    budesonide-formoterol (SYMBICORT) 160-4.5 MCG/ACT inhaler    albuterol (PROVENTIL HFA;VENTOLIN HFA) 108 (90 Base) MCG/ACT inhaler    amoxicillin (AMOXIL) 500 MG capsule    PAD (peripheral artery disease)        Relevant Orders    Ambulatory Referral to Vascular Surgery      1.) PNA-  Will treat based on recent CT and exam. Also will start on controller inhaler. After issues with his legs have gotten resolved will get him in for PFT to see if he has COPD.  Albuterol PRN.  2.) PAD- Reviewed scan with him today in the office.  He has seen a vascular surgeon in [facility] and that is whom he would like to see.  Referral given today.  3.) Tdap given today. Was ordered before when he injured his foot on his walker.   Will give flu vaccine when he comes back in next month for follow-up.           "

## 2017-10-02 NOTE — PROGRESS NOTES
"Pt states he is starting amoxicillin today for 5 days for \"possible  pneumonia\".  Pt states he has been taking Excedrin and Aleve lately. Pt hs a  with him today and does not wish to have INR verified by lab.  Instructed pt to hold today's coumadin dose and to increase Vit K intake today with a serving of spinach.  Pt states he will have two cups of turnip greens.  Pt states he cannot return to [facility] prior to this Thursday. Instructed pt to have another cup serving of green on Wednesday.  Instructed pt to watch for s/s bleeding and report to ER for blunt trauma; pt denies bleeding issues at this time.  Pt states he has a surgical consult for Dr. Sousa in [place] for poor circulation in his left lower leg and will notify CC with any new treatment instructions.  Patient instructed regarding medication; results given and questions answered. Nutritional counseling given.  Dietary factors affecting therapy addressed.  Patient instructed to monitor for excessive bruising or bleeding.  Electronically signed by JUANI Sheets      "

## 2017-10-04 RX ORDER — WARFARIN SODIUM 5 MG/1
TABLET ORAL
Qty: 30 TABLET | Refills: 0 | Status: SHIPPED | OUTPATIENT
Start: 2017-10-04 | End: 2018-01-16 | Stop reason: SDUPTHER

## 2017-10-05 ENCOUNTER — ANTICOAGULATION VISIT (OUTPATIENT)
Dept: CARDIAC SURGERY | Facility: CLINIC | Age: 77
End: 2017-10-05

## 2017-10-05 VITALS — DIASTOLIC BLOOD PRESSURE: 79 MMHG | HEART RATE: 101 BPM | SYSTOLIC BLOOD PRESSURE: 121 MMHG

## 2017-10-05 DIAGNOSIS — I74.9 EMBOLISM AND THROMBOSIS OF ARTERY (HCC): ICD-10-CM

## 2017-10-05 DIAGNOSIS — I48.0 PAROXYSMAL ATRIAL FIBRILLATION (HCC): ICD-10-CM

## 2017-10-05 DIAGNOSIS — Z79.01 LONG-TERM (CURRENT) USE OF ANTICOAGULANTS: ICD-10-CM

## 2017-10-05 LAB — INR PPP: 3.1 (ref 0.9–1.1)

## 2017-10-05 PROCEDURE — 99211 OFF/OP EST MAY X REQ PHY/QHP: CPT | Performed by: NURSE PRACTITIONER

## 2017-10-05 PROCEDURE — 85610 PROTHROMBIN TIME: CPT | Performed by: NURSE PRACTITIONER

## 2017-10-05 RX ORDER — WARFARIN SODIUM 5 MG/1
TABLET ORAL
Qty: 30 TABLET | Refills: 0 | OUTPATIENT
Start: 2017-10-05

## 2017-10-05 NOTE — PROGRESS NOTES
PT has 2 days of Amoxicillin left. PT denies any other medications or bleeding issues. PT denies any s/s of blood clot. PT is seeing Dr Sousa on Monday and can not return to [facility] until Wednesday due to wife's care. Adjusted pt's dose and instructed on diet. PT will be seen on Wednesday. Patient instructed regarding medication; results given and questions answered. Nutritional counseling given.  Dietary factors affecting therapy addressed.  Patient instructed to monitor for excessive bruising or bleeding. PT verbalizes understanding.   Electronically signed by JUANI Sheets

## 2017-10-09 ENCOUNTER — OFFICE VISIT (OUTPATIENT)
Dept: VASCULAR SURGERY | Age: 77
End: 2017-10-09
Payer: MEDICARE

## 2017-10-09 VITALS
SYSTOLIC BLOOD PRESSURE: 148 MMHG | TEMPERATURE: 97.4 F | RESPIRATION RATE: 18 BRPM | HEART RATE: 55 BPM | DIASTOLIC BLOOD PRESSURE: 85 MMHG

## 2017-10-09 DIAGNOSIS — I70.25 ATHEROSCLEROSIS OF NATIVE ARTERIES OF THE EXTREMITIES WITH ULCERATION (HCC): Primary | ICD-10-CM

## 2017-10-09 PROCEDURE — 99203 OFFICE O/P NEW LOW 30 MIN: CPT | Performed by: PHYSICIAN ASSISTANT

## 2017-10-09 RX ORDER — SOTALOL HYDROCHLORIDE 80 MG/1
160 TABLET ORAL 2 TIMES DAILY
COMMUNITY
Start: 2017-03-06

## 2017-10-09 RX ORDER — PRAMIPEXOLE DIHYDROCHLORIDE 0.25 MG/1
0.25 TABLET ORAL 2 TIMES DAILY
COMMUNITY

## 2017-10-09 RX ORDER — CETIRIZINE HYDROCHLORIDE 10 MG/1
10 TABLET ORAL DAILY
COMMUNITY
Start: 2017-09-21

## 2017-10-09 RX ORDER — QUETIAPINE FUMARATE 100 MG/1
100 TABLET, FILM COATED ORAL NIGHTLY
COMMUNITY
Start: 2016-11-15

## 2017-10-09 RX ORDER — WARFARIN SODIUM 5 MG/1
5 TABLET ORAL DAILY
COMMUNITY
Start: 2017-10-04 | End: 2018-04-26 | Stop reason: SDUPTHER

## 2017-10-09 RX ORDER — CILOSTAZOL 50 MG/1
100 TABLET ORAL 2 TIMES DAILY
COMMUNITY
Start: 2017-05-01

## 2017-10-09 RX ORDER — ALBUTEROL SULFATE 90 UG/1
2 AEROSOL, METERED RESPIRATORY (INHALATION) EVERY 4 HOURS PRN
COMMUNITY
Start: 2017-10-02 | End: 2017-11-01

## 2017-10-09 RX ORDER — MONTELUKAST SODIUM 10 MG/1
10 TABLET ORAL NIGHTLY
COMMUNITY

## 2017-10-09 NOTE — PROGRESS NOTES
odor noted. No cellulitis noted. Left 3rd toe and he has a fissure left heel. He has a small skin tear left pretibial area. No cyanosis, clubbing, or significant edema. No signs atheroembolic event. Pulmonary - effort appears normal.  No respiratory distress. Lungs - Breath sounds normal. No wheezes or rales. GI - Abdomen - soft, non tender, bowel sounds X 4 quadrants. No guarding or rebound tenderness. No distension or palpable mass. Genitourinary - deferred. Musculoskeletal - ROM appears normal.  No significant edema. Neurologic - alert and oriented X 3. Physiologic. Skin - warm, dry, and intact. No rash, erythema, or pallor. Psychiatric - mood, affect, and behavior appear normal.  Judgment and thought processes appear normal.    Risk factors for atherosclerosis of all vascular beds have been reviewed with the patient including:  Family history, tobacco abuse in all forms, elevated cholesterol, hyperlipidemia, and diabetes. CTA aorta with runoff -  9/19/17 (revieswed by Dr. Magda Cristina)   Endovascular stent graft is seen involving the abdominal  aorta and bilateral common iliac arteries. The stent graft  appears widely patent. There is very mild kinking of the mid  stent which does not appear to be hemodynamically significant. No  evidence of endovascular leak or extravasation of contrast.  2.  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 There is proximal reconstitution of the inferior  mesenteric artery from SMA collateral vascularity. 3.  Right proximal internal iliac artery soft atherosclerotic  plaque which is causing 80-90% diameter stenosis. Proximal to mid  right internal iliac artery complete occlusion secondary to  thrombus and/or plaque. More distally there appears to be some  reconstitution. 4.  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  This Assessment    1. Atherosclerosis of native arteries of the extremities with ulceration ([address]          Plan    Recommend continue Coumadin  Recommend he continue Pletal - risks factors associated with drug discussed including black box warning for possible sudden cardiac death  Strongly encourage initiation of statin therapy  Good skin care/checks daily  Wash wound daily with soap and water and dry thoroughly, apply betadine daily. Do not soak feet. I will have Dr. Jonny Kat (who is covering for Dr. Earl Montenegro while he is OOT) review the images of the CTA aorta and call the patient with further recommendation    Addendum:  Dr. Earl Montenegro reviewed the CTA with runoff and ARYA's. He recommends  we proceed with a diagnostic A-gram to plan for distal revascularization. We will obtain bilateral LE vein mapping. I spoke with the patient via telephone regarding Dr. Earl Montenegro recommendation including risks and benefits. Patient is agreeable to proceed.     Proceed with diagnostic aortogram with runoff

## 2017-10-11 ENCOUNTER — ANTICOAGULATION VISIT (OUTPATIENT)
Dept: CARDIAC SURGERY | Facility: CLINIC | Age: 77
End: 2017-10-11

## 2017-10-11 VITALS — HEART RATE: 54 BPM | SYSTOLIC BLOOD PRESSURE: 151 MMHG | DIASTOLIC BLOOD PRESSURE: 84 MMHG

## 2017-10-11 DIAGNOSIS — I74.9 EMBOLISM AND THROMBOSIS OF ARTERY (HCC): ICD-10-CM

## 2017-10-11 DIAGNOSIS — I48.0 PAROXYSMAL ATRIAL FIBRILLATION (HCC): ICD-10-CM

## 2017-10-11 DIAGNOSIS — Z79.01 LONG-TERM (CURRENT) USE OF ANTICOAGULANTS: ICD-10-CM

## 2017-10-11 LAB — INR PPP: 1.9 (ref 0.9–1.1)

## 2017-10-11 PROCEDURE — 85610 PROTHROMBIN TIME: CPT | Performed by: NURSE PRACTITIONER

## 2017-10-11 PROCEDURE — 99211 OFF/OP EST MAY X REQ PHY/QHP: CPT | Performed by: NURSE PRACTITIONER

## 2017-10-11 NOTE — PROGRESS NOTES
Pt denies med changes or bleeding problems. Dose adjusted and pt instructed to hold green veggies for 3 days; pt verbalized. Patient instructed regarding medication; results given and questions answered. Nutritional counseling given.  Dietary factors affecting therapy addressed.  Patient instructed to monitor for excessive bruising or bleeding. Will recheck next week.  Electronically signed by JUANI Sheets

## 2017-10-12 ENCOUNTER — TELEPHONE (OUTPATIENT)
Dept: VASCULAR SURGERY | Age: 77
End: 2017-10-12

## 2017-10-12 NOTE — TELEPHONE ENCOUNTER
Patient notified that Dr. Price Rodriguez reviewed the CT and ARYA's. Since the patient has no IRP or wounds at this time, he feels that can wait until Dr. Meghna Osullivan is back in town and can review the images and make further recommendations. Patient verbalized understanding and is agreeable with the plan. I will call the patient after I have discussed with Dr. Meghna Osullivan.

## 2017-10-18 ENCOUNTER — ANTICOAGULATION VISIT (OUTPATIENT)
Dept: CARDIAC SURGERY | Facility: CLINIC | Age: 77
End: 2017-10-18

## 2017-10-18 VITALS — HEART RATE: 66 BPM | SYSTOLIC BLOOD PRESSURE: 187 MMHG | DIASTOLIC BLOOD PRESSURE: 64 MMHG

## 2017-10-18 DIAGNOSIS — I48.0 PAROXYSMAL ATRIAL FIBRILLATION (HCC): ICD-10-CM

## 2017-10-18 DIAGNOSIS — Z79.01 LONG-TERM (CURRENT) USE OF ANTICOAGULANTS: ICD-10-CM

## 2017-10-18 DIAGNOSIS — I74.9 EMBOLISM AND THROMBOSIS OF ARTERY (HCC): ICD-10-CM

## 2017-10-18 LAB — INR PPP: 5.2 (ref 0.9–1.1)

## 2017-10-18 PROCEDURE — 99211 OFF/OP EST MAY X REQ PHY/QHP: CPT | Performed by: NURSE PRACTITIONER

## 2017-10-18 PROCEDURE — 85610 PROTHROMBIN TIME: CPT | Performed by: NURSE PRACTITIONER

## 2017-10-18 RX ORDER — CETIRIZINE HYDROCHLORIDE 10 MG/1
TABLET ORAL
Qty: 30 TABLET | Refills: 2 | Status: SHIPPED | OUTPATIENT
Start: 2017-10-18 | End: 2017-11-07 | Stop reason: SDUPTHER

## 2017-10-18 NOTE — PROGRESS NOTES
"Pt states he may have \"messed up\" and taken a \"double\" dose of coumadin last night.  Pt has been counseled multiple times over the importance of coumadin compliance and adhering to coumadin clinic specific instructions he is given with each [facility] visit.  Pt does not with to have INR verified by venipuncture.  Instructed pt to hold today's coumadin dose and to increase Vit K intake today with two cups of spinach.  Instructed pt to watch for s/s bleeding and report to ER for any blunt trauma; pt denies bleeding at this time.  Recheck INR tomorrow.  Pt verbalizes.   Electronically signed by JUANI Sheets      "

## 2017-10-19 ENCOUNTER — ANTICOAGULATION VISIT (OUTPATIENT)
Dept: CARDIAC SURGERY | Facility: CLINIC | Age: 77
End: 2017-10-19

## 2017-10-19 VITALS — DIASTOLIC BLOOD PRESSURE: 82 MMHG | SYSTOLIC BLOOD PRESSURE: 126 MMHG | HEART RATE: 55 BPM

## 2017-10-19 DIAGNOSIS — Z79.01 LONG-TERM (CURRENT) USE OF ANTICOAGULANTS: ICD-10-CM

## 2017-10-19 DIAGNOSIS — I74.9 EMBOLISM AND THROMBOSIS OF ARTERY (HCC): ICD-10-CM

## 2017-10-19 DIAGNOSIS — I48.0 PAROXYSMAL ATRIAL FIBRILLATION (HCC): ICD-10-CM

## 2017-10-19 LAB — INR PPP: 5 (ref 0.9–1.1)

## 2017-10-19 PROCEDURE — 99211 OFF/OP EST MAY X REQ PHY/QHP: CPT | Performed by: NURSE PRACTITIONER

## 2017-10-19 PROCEDURE — 85610 PROTHROMBIN TIME: CPT | Performed by: NURSE PRACTITIONER

## 2017-10-19 NOTE — PROGRESS NOTES
Pt denies med changes or bleeding problems. Pt states he held coumadin and ate a can of spinach last night. Pt was instructed to hold coumadin tonight and eat a can of mixed greens; pt verbalized. Pt states he cannot return tomorrow. Appt made for Monday when pt can return. Notify provider if you experience excessive bleeding from the nose, cuts, gums, rectum, or urinary tract. Reddish or brown urine or stool. Vomiting of blood or hemorrhoidal bleeding. If major injury occurs present to the Emergency Department. Patient instructed regarding medication; results given and questions answered. Nutritional counseling given.  Dietary factors affecting therapy addressed.  Patient instructed to monitor for excessive bruising or bleeding.   Electronically signed by JUANI Sheets

## 2017-10-20 ENCOUNTER — TELEPHONE (OUTPATIENT)
Dept: VASCULAR SURGERY | Age: 77
End: 2017-10-20

## 2017-10-20 NOTE — TELEPHONE ENCOUNTER
Spoke with patient, details/instructions and medications were addressed (see letter) for angiogram at [facility] on 11/1/17 with Dr. Kristal Munson. He will need to register at the [address] [place] [address] at 8:00am for bilateral lower extremity vein mapping at 8:30am and angiogram at 12:30pm. He will need a  to take him home. He will need to take Mucomyst 600mg twice a day (one tablet in the morning and one tablet in the evening) starting the day before procedure, the day of procedure, and the day after procedure. The Mucomyst has been called to the [address] [place] [address] in [facility] (Pharmacist Regine fischer at [place] states that they can compound the Mucomyst 600mg). Informed patient that the Mucomyst will be available at the pharmacy on [address]. 10/26/17). Patient verbalizes understanding of details and instructions. Written instructions were mailed to the patient.

## 2017-10-23 ENCOUNTER — ANTICOAGULATION VISIT (OUTPATIENT)
Dept: CARDIAC SURGERY | Facility: CLINIC | Age: 77
End: 2017-10-23

## 2017-10-23 VITALS — SYSTOLIC BLOOD PRESSURE: 140 MMHG | DIASTOLIC BLOOD PRESSURE: 87 MMHG | HEART RATE: 62 BPM

## 2017-10-23 DIAGNOSIS — Z79.01 LONG-TERM (CURRENT) USE OF ANTICOAGULANTS: ICD-10-CM

## 2017-10-23 DIAGNOSIS — I74.9 EMBOLISM AND THROMBOSIS OF ARTERY (HCC): ICD-10-CM

## 2017-10-23 DIAGNOSIS — I48.0 PAROXYSMAL ATRIAL FIBRILLATION (HCC): ICD-10-CM

## 2017-10-23 LAB — INR PPP: 1.3 (ref 0.9–1.1)

## 2017-10-23 PROCEDURE — 99211 OFF/OP EST MAY X REQ PHY/QHP: CPT | Performed by: NURSE PRACTITIONER

## 2017-10-23 PROCEDURE — 85610 PROTHROMBIN TIME: CPT | Performed by: NURSE PRACTITIONER

## 2017-10-23 NOTE — PROGRESS NOTES
Pt here today for recheck of elevated INR.  Pt continues to be noncompliant with tx. Pt states he missed Saturday's coumadin dose as well.  Pt states he is having an arteriogram on Monday the 30th per Dr. Sousa in [place] and will begin to hold coumadin on Friday.  Since INR is subtherapeutic today, will bridge pt with lovenox today.  Consulted JUANI Santana for bridging dosing; CMP from August indicates renal dosing, lovenox 40mg twice daily.  Pt will take coumadin this week due to clotting hx and return to [facility] this Friday for final instructions to begin holding coumadin this Friday and to continue lovenox through the weekend for Monday's procedure.  Lovenox teaching provided today with detailed written instructions.  Lovenox 40mg, #20, no refills called to [facility].  Instructed pt to take one injection tonight then twice daily, 12 hours apart beginning in the a.m. Recheck INR this Friday for further detailed instructions prior to procedure.  Pt verbalizes.          This document has been electronically signed by JUANI Fernandez on October 23, 2017 4:07 PM

## 2017-10-24 ENCOUNTER — DOCUMENTATION (OUTPATIENT)
Dept: CARDIAC SURGERY | Facility: CLINIC | Age: 77
End: 2017-10-24

## 2017-10-24 NOTE — PROGRESS NOTES
Spoke to JUANI You this am regarding pt's latest CMP results and pt needing lovenox coverage.  Natividad states pt should take lovenox 40mg daily, not twice daily.  I did call pt and instructed him to have am injections only and reminded pt to return to CC this Friday for further instructions prior to arteriorgram on Monday of next wk per Dr. Sousa.

## 2017-10-25 ENCOUNTER — PREP FOR PROCEDURE (OUTPATIENT)
Dept: VASCULAR SURGERY | Age: 77
End: 2017-10-25

## 2017-10-25 DIAGNOSIS — Z01.818 PRE-OP TESTING: ICD-10-CM

## 2017-10-25 DIAGNOSIS — I73.9 PVD (PERIPHERAL VASCULAR DISEASE) (HCC): Primary | ICD-10-CM

## 2017-10-25 RX ORDER — SODIUM CHLORIDE 0.9 % (FLUSH) 0.9 %
10 SYRINGE (ML) INJECTION PRN
Status: CANCELLED | OUTPATIENT
Start: 2017-10-25

## 2017-10-25 RX ORDER — MONTELUKAST SODIUM 10 MG/1
TABLET ORAL
Qty: 90 TABLET | Refills: 2 | Status: SHIPPED | OUTPATIENT
Start: 2017-10-25 | End: 2018-01-17 | Stop reason: SDUPTHER

## 2017-10-25 RX ORDER — SODIUM CHLORIDE 9 MG/ML
INJECTION, SOLUTION INTRAVENOUS CONTINUOUS
Status: CANCELLED | OUTPATIENT
Start: 2017-10-25

## 2017-10-25 NOTE — H&P
No care team member to display      History and Physical    Mr. Anna Bocanegra is a 68 yo male with a has prior history of peripheral vascular disease of the lower extremities. He underwent a EVAR in 7/2011 by Dr. Lizbet Hughes. He is currently on Pletal 100 mg BID and Coumadin daily. He reports his last INR was 3. He does not walk much due to weakness in his legs. He reports pain in his left heel when walking but denies any IRP. He reports non healing wounds for the last 5-6 months. He has been soaking his feet in epsom salt and using betadine on the wound. He has been on antibiotics intermittently for questionable pneumonia. He is currently not on any antibiotics. He denies any fever/chills. Raimundo Zavala is a 69 y.o. male with the following history reviewed and recorded in [facility]:  Patient Active Problem List    Diagnosis Date Noted    PVD (peripheral vascular disease) ([facility] 08/16/2012    COPD (chronic obstructive pulmonary disease) ([facility]     Hypertension     AAA (abdominal aortic aneurysm) without rupture (HCC)      Current Outpatient Prescriptions   Medication Sig Dispense Refill    cilostazol (PLETAL) 50 MG tablet Take 50 mg by mouth 2 times daily      sotalol (BETAPACE) 80 MG tablet Take 160 mg by mouth 2 times daily      cetirizine (ZYRTEC) 10 MG tablet Take 10 mg by mouth daily      warfarin (COUMADIN) 5 MG tablet Take 5 mg by mouth daily      QUEtiapine (SEROQUEL) 100 MG tablet Take 100 mg by mouth daily      albuterol sulfate  (90 Base) MCG/ACT inhaler Inhale 2 puffs into the lungs every 4 hours as needed      montelukast (SINGULAIR) 10 MG tablet Take 10 mg by mouth nightly      pramipexole (MIRAPEX) 0.25 MG tablet Take 0.25 mg by mouth 3 times daily      TESTOSTERONE by Does not apply route.  sotalol (BETAPACE) 80 MG tablet Take 80 mg by mouth 2 times daily.  omeprazole (PRILOSEC) 40 MG capsule Take 40 mg by mouth as needed.         cilostazol (PLETAL) 100 MG tablet Take or epistaxis. No tinnitus or significant hearing loss. Eyes - no sudden vision change or amaurosis. Respiratory - no significant shortness of breath, wheezing, or stridor. No apnea, cough, or chest tightness associated with shortness of breath. Cardiovascular - no chest pain, syncope, or significant dizziness. No palpitations or significant leg swelling. Patient reports  claudication. Gastrointestinal - no abdominal swelling or pain. No blood in stool. No severe constipation, diarrhea, nausea, or vomiting. Genitourinary - No difficulty urinating, dysuria, frequency, or urgency. No flank pain or hematuria. Musculoskeletal - no back pain, gait disturbance, or myalgia. Skin - no color change, rash, pallor. Non healing wounds feet. Neurologic - no dizziness, facial asymmetry, or light headedness. No seizures. No speech difficulty or lateralizing weakness. Hematologic - no easy bruising or excessive bleeding. Psychiatric - no severe anxiety or nervousness. No confusion. All other review of systems are negative. Physical Exam    BP (!) 148/85 (Site: Right Arm, Position: Sitting, Cuff Size: Medium Adult)   Pulse 55   Temp 97.4 °F (36.3 °C) (Temporal)   Resp 18     Constitutional - well developed, well nourished. No diaphoresis or acute distress. HENT - head normocephalic. Right external ear canal appears normal.  Left external ear canal appears normal.  Septum appears midline. Eyes - conjunctiva normal.  EOMS normal.  No exudate. No icterus. Neck- ROM appears normal, no tracheal deviation. Cardiovascular - Regular rate and rhythm. Heart sounds are normal.  No murmur, rub, or gallop. Carotid pulses are 2+ to palpation bilaterally without bruit. Extremities - Radial and brachial pulses are 2+ to palpation bilaterally. DP and PT pulses are not palpable. Dependant rubor noted bilateral feet, left > than the right. He has wounds noted on the distal end of great toes.  No drainage or foul

## 2017-10-27 ENCOUNTER — ANTICOAGULATION VISIT (OUTPATIENT)
Dept: CARDIAC SURGERY | Facility: CLINIC | Age: 77
End: 2017-10-27

## 2017-10-27 VITALS — SYSTOLIC BLOOD PRESSURE: 128 MMHG | HEART RATE: 64 BPM | DIASTOLIC BLOOD PRESSURE: 76 MMHG

## 2017-10-27 DIAGNOSIS — I48.0 PAROXYSMAL ATRIAL FIBRILLATION (HCC): ICD-10-CM

## 2017-10-27 DIAGNOSIS — Z79.01 LONG-TERM (CURRENT) USE OF ANTICOAGULANTS: ICD-10-CM

## 2017-10-27 DIAGNOSIS — I74.9 EMBOLISM AND THROMBOSIS OF ARTERY (HCC): ICD-10-CM

## 2017-10-27 LAB — INR PPP: 1.2 (ref 0.9–1.1)

## 2017-10-27 PROCEDURE — 85610 PROTHROMBIN TIME: CPT | Performed by: NURSE PRACTITIONER

## 2017-10-27 PROCEDURE — 99211 OFF/OP EST MAY X REQ PHY/QHP: CPT | Performed by: NURSE PRACTITIONER

## 2017-10-27 NOTE — PROGRESS NOTES
Pt denies med changes or bleeding problems. Pt states he is suppose to hold Coumadin starting Sunday for arteriogram with Dr Sousa in [place] on Wednesday. Pt is currently taking Lovenox 40mg daily (renal dosing) for recent subtherapeutic INR. I discussed the pt situation with JUANI Santana who advised pt begin holding coumadin tonight and continue Lovenox daily. As per previous instructions due to kidney function pt will hold Lovenox after Monday nights injection and resume Lovenox and Coumadin Wednesday night. Pt was given instructions as noted above and verbalized an understanding. Patient instructed regarding medication; results given and questions answered. Nutritional counseling given.  Dietary factors affecting therapy addressed.  Patient instructed to monitor for excessive bruising or bleeding. Will recheck next Friday.           This document has been electronically signed by JUANI Fernandez on October 28, 2017 9:37 AM

## 2017-11-01 ENCOUNTER — HOSPITAL ENCOUNTER (OUTPATIENT)
Dept: INTERVENTIONAL RADIOLOGY/VASCULAR | Age: 77
Discharge: HOME | End: 2017-11-01
Payer: MEDICARE

## 2017-11-01 ENCOUNTER — HOSPITAL ENCOUNTER (OUTPATIENT)
Dept: VASCULAR LAB | Age: 77
Discharge: HOME | End: 2017-11-01
Payer: MEDICARE

## 2017-11-01 VITALS
HEART RATE: 63 BPM | TEMPERATURE: 97.3 F | DIASTOLIC BLOOD PRESSURE: 80 MMHG | SYSTOLIC BLOOD PRESSURE: 143 MMHG | RESPIRATION RATE: 17 BRPM | OXYGEN SATURATION: 90 %

## 2017-11-01 DIAGNOSIS — I73.9 PVD (PERIPHERAL VASCULAR DISEASE) (HCC): ICD-10-CM

## 2017-11-01 DIAGNOSIS — Z01.818 PRE-OP TESTING: ICD-10-CM

## 2017-11-01 DIAGNOSIS — N18.30 CKD (CHRONIC KIDNEY DISEASE) STAGE 3, GFR 30-59 ML/MIN (HCC): ICD-10-CM

## 2017-11-01 DIAGNOSIS — I70.244 ATHEROSCLEROSIS OF NATIVE ARTERIES OF LEFT LEG WITH ULCERATION OF HEEL AND MIDFOOT (HCC): ICD-10-CM

## 2017-11-01 LAB
ANION GAP SERPL CALCULATED.3IONS-SCNC: 14 MMOL/L (ref 7–19)
BUN BLDV-MCNC: 26 MG/DL (ref 8–23)
CALCIUM SERPL-MCNC: 8.8 MG/DL (ref 8.8–10.2)
CHLORIDE BLD-SCNC: 102 MMOL/L (ref 98–111)
CO2: 21 MMOL/L (ref 22–29)
CREAT SERPL-MCNC: 1.4 MG/DL (ref 0.5–1.2)
GFR NON-AFRICAN AMERICAN: 49
GLUCOSE BLD-MCNC: 113 MG/DL (ref 74–109)
HCT VFR BLD CALC: 38.2 % (ref 42–52)
HEMOGLOBIN: 13 G/DL (ref 14–18)
INR BLD: 1.23 (ref 0.88–1.18)
MCH RBC QN AUTO: 30.3 PG (ref 27–31)
MCHC RBC AUTO-ENTMCNC: 34 G/DL (ref 33–37)
MCV RBC AUTO: 89 FL (ref 80–94)
PDW BLD-RTO: 15.9 % (ref 11.5–14.5)
PLATELET # BLD: 175 K/UL (ref 130–400)
PMV BLD AUTO: 9.7 FL (ref 9.4–12.4)
POTASSIUM SERPL-SCNC: 4.8 MMOL/L (ref 3.5–5)
PROTHROMBIN TIME: 15.5 SEC (ref 12–14.6)
RBC # BLD: 4.29 M/UL (ref 4.7–6.1)
SODIUM BLD-SCNC: 137 MMOL/L (ref 136–145)
WBC # BLD: 9.6 K/UL (ref 4.8–10.8)

## 2017-11-01 PROCEDURE — 85027 COMPLETE CBC AUTOMATED: CPT

## 2017-11-01 PROCEDURE — 2580000003 HC RX 258: Performed by: SURGERY

## 2017-11-01 PROCEDURE — 75716 ARTERY X-RAYS ARMS/LEGS: CPT | Performed by: SURGERY

## 2017-11-01 PROCEDURE — 75625 CONTRAST EXAM ABDOMINL AORTA: CPT | Performed by: SURGERY

## 2017-11-01 PROCEDURE — 2580000003 HC RX 258: Performed by: PHYSICIAN ASSISTANT

## 2017-11-01 PROCEDURE — 85610 PROTHROMBIN TIME: CPT

## 2017-11-01 PROCEDURE — 93970 EXTREMITY STUDY: CPT

## 2017-11-01 PROCEDURE — 6360000002 HC RX W HCPCS: Performed by: SURGERY

## 2017-11-01 PROCEDURE — C1769 GUIDE WIRE: HCPCS

## 2017-11-01 PROCEDURE — G0269 OCCLUSIVE DEVICE IN VEIN ART: HCPCS | Performed by: SURGERY

## 2017-11-01 PROCEDURE — 36415 COLL VENOUS BLD VENIPUNCTURE: CPT

## 2017-11-01 PROCEDURE — 6360000004 HC RX CONTRAST MEDICATION: Performed by: SURGERY

## 2017-11-01 PROCEDURE — 80048 BASIC METABOLIC PNL TOTAL CA: CPT

## 2017-11-01 PROCEDURE — 2500000003 HC RX 250 WO HCPCS: Performed by: SURGERY

## 2017-11-01 PROCEDURE — 36200 PLACE CATHETER IN AORTA: CPT | Performed by: SURGERY

## 2017-11-01 RX ORDER — IODIXANOL 320 MG/ML
INJECTION, SOLUTION INTRAVASCULAR
Status: COMPLETED | OUTPATIENT
Start: 2017-11-01 | End: 2017-11-01

## 2017-11-01 RX ORDER — MIDAZOLAM HYDROCHLORIDE 1 MG/ML
INJECTION INTRAMUSCULAR; INTRAVENOUS
Status: COMPLETED | OUTPATIENT
Start: 2017-11-01 | End: 2017-11-01

## 2017-11-01 RX ORDER — TRAMADOL HYDROCHLORIDE 50 MG/1
100 TABLET ORAL EVERY 6 HOURS PRN
Status: DISCONTINUED | OUTPATIENT
Start: 2017-11-01 | End: 2017-11-01

## 2017-11-01 RX ORDER — SODIUM CHLORIDE 0.9 % (FLUSH) 0.9 %
10 SYRINGE (ML) INJECTION PRN
Status: DISCONTINUED | OUTPATIENT
Start: 2017-11-01 | End: 2017-11-03 | Stop reason: HOSPADM

## 2017-11-01 RX ORDER — ACETAMINOPHEN 325 MG/1
650 TABLET ORAL EVERY 4 HOURS PRN
Status: DISCONTINUED | OUTPATIENT
Start: 2017-11-01 | End: 2017-11-03 | Stop reason: HOSPADM

## 2017-11-01 RX ORDER — LIDOCAINE HYDROCHLORIDE 20 MG/ML
INJECTION, SOLUTION INFILTRATION; PERINEURAL
Status: COMPLETED | OUTPATIENT
Start: 2017-11-01 | End: 2017-11-01

## 2017-11-01 RX ORDER — TRAMADOL HYDROCHLORIDE 50 MG/1
50 TABLET ORAL EVERY 6 HOURS PRN
Status: DISCONTINUED | OUTPATIENT
Start: 2017-11-01 | End: 2017-11-01

## 2017-11-01 RX ORDER — ONDANSETRON 2 MG/ML
4 INJECTION INTRAMUSCULAR; INTRAVENOUS EVERY 8 HOURS PRN
Status: DISCONTINUED | OUTPATIENT
Start: 2017-11-01 | End: 2017-11-03 | Stop reason: HOSPADM

## 2017-11-01 RX ORDER — HEPARIN SODIUM 5000 [USP'U]/ML
INJECTION, SOLUTION INTRAVENOUS; SUBCUTANEOUS
Status: COMPLETED | OUTPATIENT
Start: 2017-11-01 | End: 2017-11-01

## 2017-11-01 RX ORDER — FENTANYL CITRATE 50 UG/ML
INJECTION, SOLUTION INTRAMUSCULAR; INTRAVENOUS
Status: COMPLETED | OUTPATIENT
Start: 2017-11-01 | End: 2017-11-01

## 2017-11-01 RX ORDER — SODIUM CHLORIDE 9 MG/ML
INJECTION, SOLUTION INTRAVENOUS CONTINUOUS
Status: DISCONTINUED | OUTPATIENT
Start: 2017-11-01 | End: 2017-11-03 | Stop reason: HOSPADM

## 2017-11-01 RX ORDER — MAGNESIUM OXIDE 400 MG/1
400 TABLET ORAL NIGHTLY
COMMUNITY

## 2017-11-01 RX ORDER — LEVOTHYROXINE SODIUM 0.03 MG/1
25 TABLET ORAL DAILY
COMMUNITY
End: 2018-04-26 | Stop reason: ALTCHOICE

## 2017-11-01 RX ADMIN — HEPARIN SODIUM 5000 UNITS: 5000 INJECTION, SOLUTION INTRAVENOUS; SUBCUTANEOUS at 10:04

## 2017-11-01 RX ADMIN — LIDOCAINE HYDROCHLORIDE 10 ML: 20 INJECTION, SOLUTION INFILTRATION; PERINEURAL at 10:07

## 2017-11-01 RX ADMIN — SODIUM CHLORIDE: 9 INJECTION, SOLUTION INTRAVENOUS at 09:00

## 2017-11-01 RX ADMIN — CEFAZOLIN SODIUM 1 G: 1 INJECTION, SOLUTION INTRAVENOUS at 09:44

## 2017-11-01 RX ADMIN — SODIUM BICARBONATE: 84 INJECTION INTRAVENOUS at 11:42

## 2017-11-01 RX ADMIN — IODIXANOL 135 ML: 320 INJECTION, SOLUTION INTRAVASCULAR at 10:24

## 2017-11-01 RX ADMIN — FENTANYL CITRATE 25 MCG: 50 INJECTION, SOLUTION INTRAMUSCULAR; INTRAVENOUS at 10:05

## 2017-11-01 RX ADMIN — MIDAZOLAM HYDROCHLORIDE 1 MG: 1 INJECTION, SOLUTION INTRAMUSCULAR; INTRAVENOUS at 10:04

## 2017-11-01 NOTE — H&P (VIEW-ONLY)
100 mg by mouth 2 times daily.  warfarin (COUMADIN) 5 MG tablet Take 5 mg by mouth Daily. Regulated by [facility]       hydrochlorothiazide (HYDRODIURIL) 25 MG tablet Take 25 mg by mouth daily.  quetiapine (SEROQUEL) 50 MG tablet Take 50 mg by mouth 2 times daily.  tramadol (ULTRAM) 50 MG tablet Take 50 mg by mouth every 6 hours as needed.  aspirin 650 MG tablet Take 650 mg by mouth 2 times daily.  venlafaxine (EFFEXOR XR) 150 MG XR capsule Take 150 mg by mouth daily. No current facility-administered medications for this visit. Allergies: Hydrocodone and Morphine  Past Medical History:   Diagnosis Date    AAA (abdominal aortic aneurysm) without rupture (HCC)     Chronic back pain     COPD (chronic obstructive pulmonary disease) [facility]     Hypertension     Obesity      Past Surgical History:   Procedure Laterality Date    ABDOMINAL AORTIC ANEURYSM REPAIR  7/18/11 [facility]    R IIA embolization with 10mm Amplatzer plug. Endoluminal graft exclusion of infrarenal abdominal aortic anerysm with Endologix Powerfit 25mm x 100mm main body. Placement of Endologix 34mm x 120mm aortic extension (suprarenal fixation device/uncovered portion.) Coda balloon a'plasty of the aorta and Carlos iliac arteries. Palmaz stent placement in the infrarenal abd aortic neck with Palmaz [id number] stent.  LUMBAR FUSION      TOTAL HIP ARTHROPLASTY      left    TUMOR REMOVAL      on left side of face     Family History   Problem Relation Age of Onset    High Blood Pressure Mother     Cancer Father      lung     Social History   Substance Use Topics    Smoking status: Current Every Day Smoker     Packs/day: 0.25     Years: 55.00    Smokeless tobacco: Never Used    Alcohol use No         Review of Systems    Constitutional  no significant activity change, appetite change, or unexpected weight change. No fever or chills. No diaphoresis or significant fatigue.   HENT  no significant rhinorrhea Assessment    1. Atherosclerosis of native arteries of the extremities with ulceration ([address]          Plan    Recommend continue Coumadin  Recommend he continue Pletal - risks factors associated with drug discussed including black box warning for possible sudden cardiac death  Strongly encourage initiation of statin therapy  Good skin care/checks daily  Wash wound daily with soap and water and dry thoroughly, apply betadine daily. Do not soak feet. I will have Dr. Aida Pollock (who is covering for Dr. Randa Louise while he is OOT) review the images of the CTA aorta and call the patient with further recommendation    Addendum:  Dr. Randa Louise reviewed the CTA with runoff and ARYA's. He recommends  we proceed with a diagnostic A-gram to plan for distal revascularization. We will obtain bilateral LE vein mapping. I spoke with the patient via telephone regarding Dr. Randa Louise recommendation including risks and benefits. Patient is agreeable to proceed.     Proceed with diagnostic aortogram with runoff

## 2017-11-03 ENCOUNTER — ANTICOAGULATION VISIT (OUTPATIENT)
Dept: CARDIAC SURGERY | Facility: CLINIC | Age: 77
End: 2017-11-03

## 2017-11-03 VITALS — HEART RATE: 60 BPM | SYSTOLIC BLOOD PRESSURE: 114 MMHG | DIASTOLIC BLOOD PRESSURE: 98 MMHG

## 2017-11-03 DIAGNOSIS — I48.0 PAROXYSMAL ATRIAL FIBRILLATION (HCC): ICD-10-CM

## 2017-11-03 DIAGNOSIS — Z79.01 LONG-TERM (CURRENT) USE OF ANTICOAGULANTS: ICD-10-CM

## 2017-11-03 DIAGNOSIS — I74.9 EMBOLISM AND THROMBOSIS OF ARTERY (HCC): ICD-10-CM

## 2017-11-03 LAB — INR PPP: 1.2 (ref 0.9–1.1)

## 2017-11-03 PROCEDURE — 85610 PROTHROMBIN TIME: CPT | Performed by: NURSE PRACTITIONER

## 2017-11-03 PROCEDURE — 99211 OFF/OP EST MAY X REQ PHY/QHP: CPT | Performed by: NURSE PRACTITIONER

## 2017-11-03 NOTE — PROGRESS NOTES
Pt denies med changes or bleeding problems. Pt states he restarted coumadin Wednesday night but was instructed by Dr Sousa to hold Lovenox until seen by us. Pt was instructed to resume Lovenox daily starting today and avoid green veggies; pt verbalized. Patient instructed regarding medication; results given and questions answered. Nutritional counseling given.  Dietary factors affecting therapy addressed.  Patient instructed to monitor for excessive bruising or bleeding. Will recheck in 3 days.  Electronically signed by JUANI Sheets

## 2017-11-06 ENCOUNTER — TELEPHONE (OUTPATIENT)
Dept: VASCULAR SURGERY | Age: 77
End: 2017-11-06

## 2017-11-06 ENCOUNTER — OFFICE VISIT (OUTPATIENT)
Dept: VASCULAR SURGERY | Age: 77
End: 2017-11-06
Payer: MEDICARE

## 2017-11-06 ENCOUNTER — ANTICOAGULATION VISIT (OUTPATIENT)
Dept: CARDIAC SURGERY | Facility: CLINIC | Age: 77
End: 2017-11-06

## 2017-11-06 VITALS
TEMPERATURE: 96.6 F | HEART RATE: 57 BPM | SYSTOLIC BLOOD PRESSURE: 110 MMHG | RESPIRATION RATE: 18 BRPM | DIASTOLIC BLOOD PRESSURE: 64 MMHG

## 2017-11-06 VITALS — DIASTOLIC BLOOD PRESSURE: 78 MMHG | SYSTOLIC BLOOD PRESSURE: 118 MMHG | HEART RATE: 56 BPM

## 2017-11-06 DIAGNOSIS — Z79.01 LONG-TERM (CURRENT) USE OF ANTICOAGULANTS: ICD-10-CM

## 2017-11-06 DIAGNOSIS — I70.244 ATHEROSCLEROSIS OF NATIVE ARTERIES OF LEFT LEG WITH ULCERATION OF HEEL AND MIDFOOT (HCC): Primary | ICD-10-CM

## 2017-11-06 DIAGNOSIS — Z01.818 PRE-OP TESTING: Primary | ICD-10-CM

## 2017-11-06 DIAGNOSIS — Z01.818 PRE-OPERATIVE CLEARANCE: Primary | ICD-10-CM

## 2017-11-06 DIAGNOSIS — I48.0 PAROXYSMAL ATRIAL FIBRILLATION (HCC): ICD-10-CM

## 2017-11-06 DIAGNOSIS — I74.9 EMBOLISM AND THROMBOSIS OF ARTERY (HCC): ICD-10-CM

## 2017-11-06 LAB — INR PPP: 1.8 (ref 0.9–1.1)

## 2017-11-06 PROCEDURE — 85610 PROTHROMBIN TIME: CPT | Performed by: NURSE PRACTITIONER

## 2017-11-06 PROCEDURE — 99211 OFF/OP EST MAY X REQ PHY/QHP: CPT | Performed by: NURSE PRACTITIONER

## 2017-11-06 PROCEDURE — 99213 OFFICE O/P EST LOW 20 MIN: CPT | Performed by: PHYSICIAN ASSISTANT

## 2017-11-06 NOTE — PROGRESS NOTES
High Blood Pressure Mother     Cancer Father      lung     Social History   Substance Use Topics    Smoking status: Current Every Day Smoker     Packs/day: 0.25     Years: 55.00    Smokeless tobacco: Never Used    Alcohol use No         Review of Systems    Constitutional  no significant activity change, appetite change, or unexpected weight change. No fever or chills. No diaphoresis or significant fatigue. HENT  no significant rhinorrhea or epistaxis. No tinnitus or significant hearing loss. Eyes  no sudden vision change or amaurosis. Respiratory  no significant shortness of breath, wheezing, or stridor. No apnea, cough, or chest tightness associated with shortness of breath. Cardiovascular  no chest pain, syncope, or significant dizziness. No palpitations or significant leg swelling. Patient reports  claudication. Gastrointestinal  no abdominal swelling or pain. No blood in stool. No severe constipation, diarrhea, nausea, or vomiting. Genitourinary  No difficulty urinating, dysuria, frequency, or urgency. No flank pain or hematuria. Musculoskeletal  no back pain, gait disturbance, or myalgia. Skin  no color change, rash, pallor. Non healing wounds feet. Neurologic  no dizziness, facial asymmetry, or light headedness. No seizures. No speech difficulty or lateralizing weakness. Hematologic  no easy bruising or excessive bleeding. Psychiatric  no severe anxiety or nervousness. No confusion. All other review of systems are negative. Physical Exam    /64 (Site: Right Arm, Position: Sitting, Cuff Size: Large Adult) Comment: right  Pulse 57   Temp 96.6 °F (35.9 °C)   Resp 18     Constitutional  well developed, well nourished. No diaphoresis or acute distress. HENT  head normocephalic. Right external ear canal appears normal.  Left external ear canal appears normal.  Septum appears midline. Eyes  conjunctiva normal.  EOMS normal.  No exudate.   No soft atherosclerotic  plaque which is causing 80-90% diameter stenosis. Proximal to mid  right internal iliac artery complete occlusion secondary to  thrombus and/or plaque. More distally there appears to be some  reconstitution. 4.  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 This region of aneurysm  measures 3.5 x 3.3 cm. 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 Absence of contrast below the pseudoaneurysm suggesting occlusion  above the level of the trifurcation arterial vessels with none of  the right calf trifurcation vessels demonstrating contrast  enhancement. 5.  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 Native proximal left femoral  artery complete occlusion is seen along its entire length. The  left popliteal artery is completely occluded. At the level of the  left calf trifurcation arterial vessels, very limited  reconstitution from the deep profunda collateral vascularity is  seen supplying minimal blood flow to the left anterior tibial  artery. Distal collateral blood flow from the deep profunda  system on the left is supplying the left peroneal artery. Complete occlusion of the left posterior tibial artery. 6.  Groundglass opacities involving bilateral upper lung fields  may represent changes from edema and/or pneumonia. Recommend  clinical correlation. 7.  The gallbladder is not identified and may be contracted  surgically or congenitally absent.   8.  Bilateral kidney simple renal cortical cyst.      Doppler ARYA index - 9/8/17  RIGHT DORSALIS PEDIS SYS  mmHg    RIGHT POST TIBIAL SYS  mmHg    LEFT DORSALIS PEDIS SYS MAX 54 mmHg    LEFT POST TIBIAL SYS MAX 0 mmHg    LEFT PERONEAL SYS MAX 54

## 2017-11-06 NOTE — PROGRESS NOTES
Pt states no changes to medications.  Pt denies bleeding issues.  Adjusted coumadin dose and instructed pt to continue Lovenox 40mg every evening and to limit green intake until INR rechecked this Thursday.  Pt verbalizes. Patient instructed regarding medication; results given and questions answered. Nutritional counseling given.  Dietary factors affecting therapy addressed.  Patient instructed to monitor for excessive bruising or bleeding.   I spoke to [facility] with Dr. Sousa office at ([phone]). Per [facility], pt is having  fem-pop bypass for left leg on Dec 8th.  Pt will hold coumadin 3 days prior and will be bridged per our office.    Electronically signed by JUANI Sheets

## 2017-11-06 NOTE — TELEPHONE ENCOUNTER
Spoke with Jamison [place], RN with the coumadin clinic ([phone]), informed her that we have patient scheduled for surgery (Left femoral-peroneal bypass with vein) at [facility] with Dr. Mikey Crowell on 12/8/17 and we need to hold his coumadin 3 days before surgery and he will need to be bridged with Lovenox. Jamison American states that they will bridge him with Lovenox and that he has an appointment with them today and they will make arrangements for the Lovenox. I spoke with patient, informed him that I had contacted his coumadin clinic. He will need to hold his coumadin 3 days prior to surgery and that they will bridge him with Lovenox while off of coumadin. They will make arrangements for the Lovenox. Patient verbalizes understanding.

## 2017-11-06 NOTE — LETTER
Chayo Maradiaga   1940      Surgery Directions    1. Report to the [facility] (go in the front door and to the left) on Fri. 17, at 7:00am for left leg vein marking at 7:30am. They will send you to Same Day Surgery after your vein marking. 2. Nothing to eat or drink after midnight the night before the procedure. 3. Please take all heart and blood pressure medicines with a sip of water. 4. Do not take Lasix, insulin, or any diabetic medicine the morning of the procedure. If you take insulin, you may only take 1/2 of any scheduled nightly dose, and none the morning of the procedure. You may take all regularly scheduled heart, cholesterol, and blood pressure medicines with a sip of water. 5. If you take Glucophage, Metformin, Actos Plus Met, or Glucovance,please tell our nurse. it may interfere with some of the medications given during surgery. 6. We will contact your coumadin clinic in regards to holding your coumadin and bridging with Lovenox prior to surgery. You will receive a call from either our office or your coumadin clinic in regards to holding coumadin and bridging with Lovenox. 7. If you have sleep apnea and require C-PAP, please bring this with you to the hospital.  8. Bring a list of all of your allergies and medications with you to the hospital.  9. Please let our nurse know if you have had an allergy to iodine, shellfish, or x-ray dye. 10. Let the nurse know if you take any of the followin. Over the counter herbal supplements  2. Diclofenec, indomethacin, ketoprofen, Caridopa/levadopa, naproxen, sulindac, piroxicam, glucosamine, Chondrotin, cocchine, or methotrexate. 11. Plan to stay at the hospital for 5 to 7 days. You will need a  to take you home on hospital discharge. 12. Medications instructed to hold: See above.   13. Please stop at your local [facility] or pharmacy and buy a bottle of Hibiclens. Wash thoroughly with this the night before and the morning of the procedure, paying special attention to the area that will be operated on. Make sure you rinse very well. The Hibiclens should only be used prior to surgery. 14. Take your pramipexole (Mirapex) as normally scheduled to take, do not stop this medications unless otherwise instructed by your physician. 15. You will need to register at the [facility] outpatient registration on Wed. 11/29/17 at 1:30pm for pre-op. 16. We are contacting your Cardiologist Dr. Yael Morelos regarding cardiac clearance prior to surgery. 17. Other Directions: If you have any questions please call the office at [phone]. PLEASE NOTE:  If the patient is unable to sign his/her own paperwork, the appointed caregiver (POA, child, sibling, etc) must be present at the time of registration for all testing and procedures. Transportation to and from all testing and procedure appointments is the sole responsibility of the patient, caregiver, and/or nursing facility in which they reside. Please remember you will not be able to drive after you are discharged. Please call the office at [phone] with any questions or concerns. Please allow 48-72 hours notice for cancellations or rescheduling. We will attempt to accommodate your needs to the best of our capabilities, however, strict policies with procedure room availability does not allow much flexibility.

## 2017-11-07 RX ORDER — CETIRIZINE HYDROCHLORIDE 10 MG/1
10 TABLET ORAL DAILY
Qty: 90 TABLET | Refills: 2 | Status: SHIPPED | OUTPATIENT
Start: 2017-11-07 | End: 2018-03-07

## 2017-11-09 ENCOUNTER — ANTICOAGULATION VISIT (OUTPATIENT)
Dept: CARDIAC SURGERY | Facility: CLINIC | Age: 77
End: 2017-11-09

## 2017-11-09 VITALS — SYSTOLIC BLOOD PRESSURE: 122 MMHG | OXYGEN SATURATION: 98 % | DIASTOLIC BLOOD PRESSURE: 84 MMHG | HEART RATE: 60 BPM

## 2017-11-09 DIAGNOSIS — I74.9 EMBOLISM AND THROMBOSIS OF ARTERY (HCC): ICD-10-CM

## 2017-11-09 DIAGNOSIS — I48.0 PAROXYSMAL ATRIAL FIBRILLATION (HCC): ICD-10-CM

## 2017-11-09 DIAGNOSIS — Z79.01 LONG-TERM (CURRENT) USE OF ANTICOAGULANTS: ICD-10-CM

## 2017-11-09 LAB — INR PPP: 3.8 (ref 0.9–1.1)

## 2017-11-09 PROCEDURE — 99211 OFF/OP EST MAY X REQ PHY/QHP: CPT | Performed by: NURSE PRACTITIONER

## 2017-11-09 PROCEDURE — 85610 PROTHROMBIN TIME: CPT | Performed by: NURSE PRACTITIONER

## 2017-11-09 NOTE — PROGRESS NOTES
PT states he took Coumadin and Lovenox as directed. PT denies any med changes, bleeding issues or other s/s of blood clot. Adjusted pt's dose, instructed to stop Lovenox, and increase vit k today. PT states he can not return until Wed. Patient instructed regarding medication; results given and questions answered. Nutritional counseling given.  Dietary factors affecting therapy addressed.  Patient instructed to monitor for excessive bruising or bleeding. PT verbalizes understanding.   Electronically signed by JUANI Sheets

## 2017-11-13 RX ORDER — SOTALOL HYDROCHLORIDE 80 MG/1
120 TABLET ORAL 2 TIMES DAILY
Qty: 270 TABLET | Refills: 2 | Status: SHIPPED | OUTPATIENT
Start: 2017-11-13 | End: 2018-07-13 | Stop reason: HOSPADM

## 2017-11-15 ENCOUNTER — ANTICOAGULATION VISIT (OUTPATIENT)
Dept: CARDIAC SURGERY | Facility: CLINIC | Age: 77
End: 2017-11-15

## 2017-11-15 VITALS — SYSTOLIC BLOOD PRESSURE: 139 MMHG | DIASTOLIC BLOOD PRESSURE: 89 MMHG | HEART RATE: 58 BPM

## 2017-11-15 DIAGNOSIS — I74.9 EMBOLISM AND THROMBOSIS OF ARTERY (HCC): ICD-10-CM

## 2017-11-15 DIAGNOSIS — I48.0 PAROXYSMAL ATRIAL FIBRILLATION (HCC): ICD-10-CM

## 2017-11-15 DIAGNOSIS — Z79.01 LONG-TERM (CURRENT) USE OF ANTICOAGULANTS: ICD-10-CM

## 2017-11-15 LAB — INR PPP: 1.9 (ref 0.9–1.1)

## 2017-11-15 PROCEDURE — 85610 PROTHROMBIN TIME: CPT | Performed by: NURSE PRACTITIONER

## 2017-11-15 PROCEDURE — 99211 OFF/OP EST MAY X REQ PHY/QHP: CPT | Performed by: NURSE PRACTITIONER

## 2017-11-15 NOTE — PROGRESS NOTES
Pt denies missed coumadin doses or consuming too much green.  Pt states meds are the same.  Adjusted coumadin dose and instructed pt to limit green intake for three days.  Recheck INR next Monday.  Pt verbalizes.  Pt states he is having suergery per Dr. Sousa in [place] on Dec the 8th; pt will need bridging.  Will go over this when time is closer to procedure. Patient instructed regarding medication; results given and questions answered. Nutritional counseling given.  Dietary factors affecting therapy addressed.  Patient instructed to monitor for excessive bruising or bleeding.          This document has been electronically signed by JUANI Fernandez on November 15, 2017 1:09 PM

## 2017-11-20 ENCOUNTER — OFFICE VISIT (OUTPATIENT)
Dept: FAMILY MEDICINE CLINIC | Facility: CLINIC | Age: 77
End: 2017-11-20

## 2017-11-20 VITALS
HEART RATE: 59 BPM | WEIGHT: 215 LBS | HEIGHT: 71 IN | BODY MASS INDEX: 30.1 KG/M2 | DIASTOLIC BLOOD PRESSURE: 82 MMHG | SYSTOLIC BLOOD PRESSURE: 128 MMHG | OXYGEN SATURATION: 96 %

## 2017-11-20 DIAGNOSIS — J44.9 CHRONIC OBSTRUCTIVE PULMONARY DISEASE, UNSPECIFIED COPD TYPE (HCC): Primary | ICD-10-CM

## 2017-11-20 DIAGNOSIS — R10.9 ACUTE RIGHT FLANK PAIN: ICD-10-CM

## 2017-11-20 DIAGNOSIS — I73.9 PVD (PERIPHERAL VASCULAR DISEASE) (HCC): ICD-10-CM

## 2017-11-20 DIAGNOSIS — Z23 NEED FOR IMMUNIZATION AGAINST INFLUENZA: ICD-10-CM

## 2017-11-20 PROBLEM — I71.40 AAA (ABDOMINAL AORTIC ANEURYSM) WITHOUT RUPTURE (HCC): Status: ACTIVE | Noted: 2017-11-20

## 2017-11-20 PROBLEM — N18.30 CKD (CHRONIC KIDNEY DISEASE) STAGE 3, GFR 30-59 ML/MIN (HCC): Status: ACTIVE | Noted: 2017-11-01

## 2017-11-20 PROBLEM — R73.03 PREDIABETES: Status: ACTIVE | Noted: 2017-11-20

## 2017-11-20 PROBLEM — I10 HYPERTENSION: Status: ACTIVE | Noted: 2017-11-20

## 2017-11-20 LAB
BILIRUB BLD-MCNC: ABNORMAL MG/DL
CLARITY, POC: CLEAR
COLOR UR: YELLOW
GLUCOSE UR STRIP-MCNC: NEGATIVE MG/DL
KETONES UR QL: NEGATIVE
LEUKOCYTE EST, POC: NEGATIVE
NITRITE UR-MCNC: NEGATIVE MG/ML
PH UR: 5 [PH] (ref 5–8)
PROT UR STRIP-MCNC: ABNORMAL MG/DL
RBC # UR STRIP: NEGATIVE /UL
SP GR UR: 1.01 (ref 1–1.03)
UROBILINOGEN UR QL: NORMAL

## 2017-11-20 PROCEDURE — 81002 URINALYSIS NONAUTO W/O SCOPE: CPT | Performed by: FAMILY MEDICINE

## 2017-11-20 PROCEDURE — 90662 IIV NO PRSV INCREASED AG IM: CPT | Performed by: FAMILY MEDICINE

## 2017-11-20 PROCEDURE — 99213 OFFICE O/P EST LOW 20 MIN: CPT | Performed by: FAMILY MEDICINE

## 2017-11-20 PROCEDURE — G0008 ADMIN INFLUENZA VIRUS VAC: HCPCS | Performed by: FAMILY MEDICINE

## 2017-11-20 RX ORDER — BUDESONIDE AND FORMOTEROL FUMARATE DIHYDRATE 160; 4.5 UG/1; UG/1
2 AEROSOL RESPIRATORY (INHALATION)
Qty: 1 INHALER | Refills: 12 | Status: SHIPPED | OUTPATIENT
Start: 2017-11-20 | End: 2018-01-01

## 2017-11-20 NOTE — PROGRESS NOTES
Subjective   Massimo Bentley is a 77 y.o. male.     History of Present Illness   Mr. Bentley is a 78yo male that presents today for follow-up on COPD and PAD. He has seen his vascular surgeon and has surgery on December 8th.    He is going to have a vein bypass on the left side.  He has had surgery in the past.  He hasn't had issues with anesthesia in the past.  He is seeing Dr. Thacker, his cardiologist, for surgical clearance.   He has atrial fibrillation and is on long-term AC. He will need to have it held before that procedure.  He says that he hasn't had any issues with rate.  No chest pain.  NO issues with his breathing.  He hasn't had any issues with his breathing this season like he typically does.  He hasn't had to use his inhaler that much.  He hasn't been using Symbicort because it wasn't helping.  He has been having flank pain on the right side. He says that he had a renal cyst there in the past. Had seen Dr. Gan, but hadn't bothered him in years.  He hasn't had any issues with urination. No hematuria.  No fevers.  No change in urination or difficulty with stream flow.      Current Outpatient Prescriptions:   •  albuterol (PROVENTIL HFA;VENTOLIN HFA) 108 (90 Base) MCG/ACT inhaler, Inhale 2 puffs Every 4 (Four) Hours As Needed for Wheezing., Disp: 1 inhaler, Rfl: 6  •  aspirin 81 MG EC tablet, Take 81 mg by mouth daily., Disp: , Rfl:   •  budesonide-formoterol (SYMBICORT) 160-4.5 MCG/ACT inhaler, Inhale 2 puffs 2 (Two) Times a Day., Disp: 1 inhaler, Rfl: 12  •  cetirizine (zyrTEC) 10 MG tablet, Take 1 tablet by mouth Daily., Disp: 90 tablet, Rfl: 2  •  cilostazol (PLETAL) 50 MG tablet, Take 1 tablet by mouth 2 (Two) Times a Day., Disp: 60 tablet, Rfl: 5  •  guaiFENesin (MUCINEX) 600 MG 12 hr tablet, Take 1,200 mg by mouth 2 (Two) Times a Day., Disp: , Rfl:   •  levothyroxine (SYNTHROID, LEVOTHROID) 25 MCG tablet, Take 25 mcg by mouth daily., Disp: , Rfl:   •  magnesium oxide (MAGOX) 400 (241.3 MG)  "MG tablet tablet, Take 400 mg by mouth every night., Disp: , Rfl:   •  montelukast (SINGULAIR) 10 MG tablet, TAKE 1 TABLET BY MOUTH EVERY NIGHT., Disp: 90 tablet, Rfl: 2  •  pramipexole (MIRAPEX) 0.25 MG tablet, TAKE 2 TABLETS BY MOUTH EVERY MORNING AND 1 TABLET AT BEDTIME, Disp: , Rfl: 5  •  QUEtiapine (SEROquel) 100 MG tablet, take 3 tablets at bedtime, Disp: , Rfl: 0  •  sotalol (BETAPACE) 80 MG tablet, Take 1.5 tablets by mouth 2 (Two) Times a Day., Disp: 270 tablet, Rfl: 2  •  warfarin (COUMADIN) 5 MG tablet, Take 1 tablet nightly except Tuesday and Saturday take 1/2 tablet or as directed, Disp: 30 tablet, Rfl: 0    The following portions of the patient's history were reviewed and updated as appropriate: allergies, current medications, past family history, past medical history, past social history, past surgical history and problem list.    Review of Systems   Constitutional: Negative for activity change, appetite change, fatigue and unexpected weight change.   Eyes: Negative for visual disturbance.   Respiratory: Positive for chest tightness. Negative for cough, shortness of breath and wheezing.    Cardiovascular: Negative for chest pain, palpitations and leg swelling.   Gastrointestinal: Negative for abdominal distention, abdominal pain, constipation, diarrhea, nausea and vomiting.   Genitourinary: Positive for flank pain. Negative for difficulty urinating, dysuria and hematuria.   Musculoskeletal: Negative for arthralgias, back pain, gait problem and joint swelling.   Skin: Positive for color change and wound. Negative for pallor and rash.   Neurological: Negative for headaches.   Psychiatric/Behavioral: Negative for dysphoric mood and sleep disturbance. The patient is not nervous/anxious.        Objective    Vitals:    11/20/17 1251   BP: 128/82   Pulse: 59   SpO2: 96%   Weight: 215 lb (97.5 kg)   Height: 71\" (180.3 cm)       Physical Exam   Constitutional: He is oriented to person, place, and time. He " appears well-developed and well-nourished. No distress.   Cardiovascular: Normal rate and regular rhythm.    Murmur heard.  Pulmonary/Chest: Effort normal. No respiratory distress. He has wheezes.   Abdominal: Soft. Bowel sounds are normal. He exhibits no distension. There is no tenderness.   Neurological: He is alert and oriented to person, place, and time.   Psychiatric: He has a normal mood and affect. His behavior is normal. Judgment and thought content normal.       Assessment/Plan   Problems Addressed this Visit        Cardiovascular and Mediastinum    PVD (peripheral vascular disease)       Respiratory    COPD (chronic obstructive pulmonary disease) - Primary    Relevant Medications    budesonide-formoterol (SYMBICORT) 160-4.5 MCG/ACT inhaler      Other Visit Diagnoses     Acute right flank pain        Relevant Orders    US Renal Bilateral    Ambulatory Referral to Urology    POCT urinalysis dipstick, manual (Completed)    Need for immunization against influenza        Relevant Orders    Flu Vaccine High Dose PF 65YR+ (Completed)        1.) COPD- He hasn't been smoking much anymore. Smokes an occasional time or two. He hasn't been using controller inhaler. Explained that it will keep airway open and help with chest tightness. Continue albuterol PRN. He didn't like the breo and so will call in Symbicort for him to use.  2.) PVD-  Follow-up with cardiology for clearance.  Surgery likely only way that leg will heal.  3.) Flank Pain- UA negative today. Will get renal US and refer back to Dr. Gan.  4.) Flu vaccine given today in the office.  RTC in 2-3 months or sooner PRN

## 2017-11-22 ENCOUNTER — ANTICOAGULATION VISIT (OUTPATIENT)
Dept: CARDIAC SURGERY | Facility: CLINIC | Age: 77
End: 2017-11-22

## 2017-11-22 ENCOUNTER — OFFICE VISIT (OUTPATIENT)
Dept: CARDIOLOGY | Facility: CLINIC | Age: 77
End: 2017-11-22

## 2017-11-22 VITALS — SYSTOLIC BLOOD PRESSURE: 152 MMHG | DIASTOLIC BLOOD PRESSURE: 99 MMHG | HEART RATE: 74 BPM

## 2017-11-22 VITALS
SYSTOLIC BLOOD PRESSURE: 124 MMHG | DIASTOLIC BLOOD PRESSURE: 80 MMHG | HEIGHT: 71 IN | BODY MASS INDEX: 30.1 KG/M2 | HEART RATE: 59 BPM | WEIGHT: 215 LBS

## 2017-11-22 DIAGNOSIS — R73.03 PREDIABETES: ICD-10-CM

## 2017-11-22 DIAGNOSIS — I73.9 PVD (PERIPHERAL VASCULAR DISEASE) (HCC): ICD-10-CM

## 2017-11-22 DIAGNOSIS — I48.0 PAROXYSMAL ATRIAL FIBRILLATION (HCC): ICD-10-CM

## 2017-11-22 DIAGNOSIS — E78.5 HYPERLIPIDEMIA, UNSPECIFIED HYPERLIPIDEMIA TYPE: ICD-10-CM

## 2017-11-22 DIAGNOSIS — I48.3 TYPICAL ATRIAL FLUTTER (HCC): ICD-10-CM

## 2017-11-22 DIAGNOSIS — Z79.01 LONG-TERM (CURRENT) USE OF ANTICOAGULANTS: ICD-10-CM

## 2017-11-22 DIAGNOSIS — I74.9 EMBOLISM AND THROMBOSIS OF ARTERY (HCC): ICD-10-CM

## 2017-11-22 DIAGNOSIS — E78.00 HYPERCHOLESTEROLEMIA: ICD-10-CM

## 2017-11-22 DIAGNOSIS — I10 ESSENTIAL HYPERTENSION: ICD-10-CM

## 2017-11-22 DIAGNOSIS — I34.0 NON-RHEUMATIC MITRAL REGURGITATION: ICD-10-CM

## 2017-11-22 DIAGNOSIS — Z79.01 LONG-TERM (CURRENT) USE OF ANTICOAGULANTS: Primary | ICD-10-CM

## 2017-11-22 LAB — INR PPP: 4.3 (ref 0.9–1.1)

## 2017-11-22 PROCEDURE — 99211 OFF/OP EST MAY X REQ PHY/QHP: CPT | Performed by: NURSE PRACTITIONER

## 2017-11-22 PROCEDURE — 99214 OFFICE O/P EST MOD 30 MIN: CPT | Performed by: INTERNAL MEDICINE

## 2017-11-22 PROCEDURE — 85610 PROTHROMBIN TIME: CPT | Performed by: NURSE PRACTITIONER

## 2017-11-22 PROCEDURE — 93000 ELECTROCARDIOGRAM COMPLETE: CPT | Performed by: INTERNAL MEDICINE

## 2017-11-22 NOTE — PROGRESS NOTES
PT was instructed to take 5mg on Monday and Tuesday when he couldn't keep his Monday appt. PT states he accidentally took double his dose (10mg) last night. Denies any bleeding issues. PT denies any s/s of blood clot. Due to pt doubling dose last night and INR already elevated, dose was held today and pt instructed to increase vit k. PT instructed to watch for any s/s of bleeding and report bleeding or blunt trauma to ER. Pt will be seen on Monday due to holiday. Patient instructed regarding medication; results given and questions answered. Nutritional counseling given.  Dietary factors affecting therapy addressed.  Patient instructed to monitor for excessive bruising or bleeding. PT verbalizes understanding.

## 2017-11-22 NOTE — PROGRESS NOTES
Massimo Bentley  77 y.o. male    11/22/2017  1. Long-term (current) use of anticoagulants    2. Paroxysmal atrial fibrillation    3. Hypercholesterolemia    4. PVD (peripheral vascular disease)    5. Typical atrial flutter    6. Essential hypertension    7. Prediabetes    8. Non-rheumatic mitral regurgitation    9. Hyperlipidemia, unspecified hyperlipidemia type    10   Pre-op evaluation waiting for lower extremity Vascular surgery    History of Present Illness    77 years old gentleman who walk with the help of cane with the past medical history  of hypertension hypertensive heart disease, history of for atrial fibrillation/atrial flutter underwent EP study and isthmus ablation.  The patient noted to have  left-sided atrial tachycardia no ablation performed in left atrum.  The patient  started on Betapace doing remarkably well.  The patient have peripheral embolism and peripheral  Vascular disease status post stent placement by Dr. Pennington. AAA under care of Dr. Pennington.  on oral anti-coagulation, patient denied any orthopnea, PND, chest pain, palpitation, fever, chills, intermittent claudication, syncope or near syncopal episode.  EKG done and finding discussed the patient.  Sinus rhythm with heart rate 60 bpm. is waiting for lower extremity vascular surgery per Dr. Pennington.    AMADEO 9/8/17  · Left Conclusion: The left AMADEO is critically reduced. Waveforms are consistent with aorto-iliac disease. Severe digital ischemia.  · Right Conclusion: The right AMADEO is normal. Normal digital pressures    SUBJECTIVE    Allergies   Allergen Reactions   • Lipitor [Atorvastatin]    • Lortab [Hydrocodone-Acetaminophen]    • Morphine And Related          Past Medical History:   Diagnosis Date   • Abdominal bloating    • Anxiety    • Atherosclerosis of native arteries of extremity with intermittent claudication    • Atrial flutter    • Backache    • Benign prostatic hyperplasia     BX 2009, also has renal cyst      • Chronic  bronchitis    • Chronic renal impairment    • Depressive disorder    • Drug abuse, episodic use    • Dyslipidemia    • Fatigue    • GERD (gastroesophageal reflux disease)    • Hypercholesterolemia 03/15/2016   • Injury of tendon of rotator cuff 10/22/2015   • Insomnia 06/24/2016   • Intervertebral disc disorder 10/27/2011   • Major depression    • Neck pain 01/09/2012   • Osteoarthritis of multiple joints 06/21/2013   • Pain from vascular prosthetic devices, implants and grafts, sequela 12/03/2015   • Pain in left foot 10/27/2015   • Peripheral vascular disease 12/03/2015     LEFT fem-tib bypass, embolectomy 6/2014    10/27/2015    • Senile debility 10/22/2015   • 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 No coronary atherosclerosis. Normal left ventricular function   • CHOLECYSTECTOMY  08/25/2014    Cholecystitis with gangrenous gallbladder   • ENDOSCOPY  03/22/1990    Duodenal ulcer   • ENDOSCOPY AND COLONOSCOPY  10/17/2013    Diverticulum in sigmoid colon & descending colon. Internal & external hemorrhoids found   • ESOPHAGOSCOPY / EGD  10/17/2013    With tube-Esophagitis seen. Biopsy taken. Gastritis in stomach. Biopsy taken. Stenosis in 2nd portion of duodenum. Dilatation performed.   • FEMORAL THROMBECTOMY/EMBOLECTOMY  06/29/2014    Left lower extremity arteriogram. Left popliteal artery endarterectomy. Redo left femoral to [id number] trunk bypass. Negative pressure wound therapy with placement of Prevena wound V.A.C., left groin, left lower leg   • INJECTION OF MEDICATION   03/15/2016    B12   • INJECTION OF MEDICATION  10/23/2015    Drain/Inject Major Joint    • INJECTION OF MEDICATION  01/13/2016    Kenalog   • LUMBAR LAMINECTOMY     • TRANSESOPHAGEAL ECHOCARDIOGRAM (TEX)  10/06/2015    With color flow-Mild mitral regurg.Left atrium mild dilated.Ef of 55-60%.RV systolic pressure elevated.Trace tricuspid regurg   • TRANSESOPHAGEAL ECHOCARDIOGRAM (TEX)  02/14/2012    Without color flow-[facility] w/ early diast dysfun. AV trileaflet & structurally NRL w/ AR NRL. No regional wall motion abn Est Ef approx 50-55%. M & TR valves NRL w/ mild M & TR regurg Trace -mild pulmonic regurg. Anterior echo free space w/o hemodynamic signif. NRL RV   • TRANSESOPHAGEAL ECHOCARDIOGRAM (TEX)  03/25/2011    Normal LV systolic function with Ef of 50-55%.Left atrium ildly dilated.Moderate mitral regurg.Mitral valve regurg.Intact interatrial septum.No evidence of any cardiac thrombus or pericardial effusion.AV trileaflet         Family History   Problem Relation Age of Onset   • Osteoarthritis Mother      Lived to be 89   • Hypertension Mother    • ADD / ADHD Father    • Lung cancer Father    • ADD / ADHD Brother    • Rectal cancer Other          Social History     Social History   • Marital status:      Spouse name: N/A   • Number of children: N/A   • Years of education: N/A     Occupational History   • Not on file.     Social History Main Topics   • Smoking status: Current Some Day Smoker     Years: 50.00     Types: Electronic Cigarette   • Smokeless tobacco: Never Used      Comment: reduced;   • Alcohol use No      Comment: Former use    • Drug use: No   • Sexual activity: Defer     Other Topics Concern   • Not on file     Social History Narrative         Current Outpatient Prescriptions   Medication Sig Dispense Refill   • albuterol (PROVENTIL HFA;VENTOLIN HFA) 108 (90 Base) MCG/ACT inhaler Inhale 2 puffs Every 4 (Four) Hours As Needed for Wheezing. 1 inhaler 6   • aspirin 81 MG EC tablet Take 81 mg  "by mouth daily.     • budesonide-formoterol (SYMBICORT) 160-4.5 MCG/ACT inhaler Inhale 2 puffs 2 (Two) Times a Day. 1 inhaler 12   • cetirizine (zyrTEC) 10 MG tablet Take 1 tablet by mouth Daily. 90 tablet 2   • cilostazol (PLETAL) 50 MG tablet Take 1 tablet by mouth 2 (Two) Times a Day. 60 tablet 5   • guaiFENesin (MUCINEX) 600 MG 12 hr tablet Take 1,200 mg by mouth 2 (Two) Times a Day.     • levothyroxine (SYNTHROID, LEVOTHROID) 25 MCG tablet Take 25 mcg by mouth daily.     • magnesium oxide (MAGOX) 400 (241.3 MG) MG tablet tablet Take 400 mg by mouth every night.     • montelukast (SINGULAIR) 10 MG tablet TAKE 1 TABLET BY MOUTH EVERY NIGHT. 90 tablet 2   • pramipexole (MIRAPEX) 0.25 MG tablet TAKE 2 TABLETS BY MOUTH EVERY MORNING AND 1 TABLET AT BEDTIME  5   • QUEtiapine (SEROquel) 100 MG tablet take 3 tablets at bedtime  0   • sotalol (BETAPACE) 80 MG tablet Take 1.5 tablets by mouth 2 (Two) Times a Day. 270 tablet 2   • warfarin (COUMADIN) 5 MG tablet Take 1 tablet nightly except Tuesday and Saturday take 1/2 tablet or as directed 30 tablet 0     No current facility-administered medications for this visit.          OBJECTIVE    /80  Pulse 59  Ht 71\" (180.3 cm)  Wt 215 lb (97.5 kg)  BMI 29.99 kg/m2        Review of Systems     Constitutional:  Denies recent weight loss, weight gain, fever or chills, no change in exercise tolerance     HENT:  Denies any hearing loss, epistaxis, hoarseness, or difficulty speaking.     Eyes: Wears eyeglasses or contact lenses     Respiratory:  Denies dyspnea with exertion,no cough, wheezing, or hemoptysis.     Cardiovascular: Negative for palpations, chest pain, orthopnea, PND, peripheral edema, syncope, or claudication.     Gastrointestinal:  Denies change in bowel habits, dyspepsia, ulcer disease, hematochezia, or melena.     Endocrine: Negative for cold intolerance, heat intolerance, polydipsia, polyphagia and polyuria. Denies any history of weight change, heat/cold " intolerance, polydipsia, polyuria     Genitourinary: Negative.      Musculoskeletal: Denies any history of arthritic symptoms or back problems     Skin:  Denies any change in hair or nails, rashes, or skin lesions.     Allergic/Immunologic: Negative.  Negative for environmental allergies, food allergies and immunocompromised state.     Neurological:  Denies any history of recurrent headaches, strokes, TIA, or seizure disorder.     Hematological: Denies any food allergies, seasonal allergies, bleeding disorders, or lymphadenopathy.     Psychiatric/Behavioral: Denies any history of depression, substance abuse, or change in cognitive function.         Physical Exam     Constitutional: Cooperative, alert and oriented, well-developed, well-nourished, in no acute distress.     HENT:   Head: Normocephalic, normal hair patterns, no masses or tenderness.  Ears, Nose, and Throat: No gross abnormalities. No pallor or cyanosis. Dentition good.   Eyes: EOMS intact, PERRL, conjunctivae and lids unremarkable. Fundoscopic exam and visual fields not performed.   Neck: No palpable masses or adenopathy, no thyromegaly, no JVD, carotid pulses are full and equal bilaterally and without  Bruits.     Cardiovascular: Regular rhythm, S1 and S2 normal, no S3 or S4. Apical impulse not displaced. No murmurs, gallops, or rubs detected.     Pulmonary/Chest: Chest: normal symmetry, no tenderness to palpation, normal respiratory excursion, no intercostal retraction, no use of accessory muscles.            Pulmonary: Normal breath sounds. No rales or ronchi.    Abdominal: Abdomen soft, bowel sounds normoactive, no masses, no hepatosplenomegaly, non-tender, no bruits.     Musculoskeletal: No deformities, clubbing, cyanosis, erythema, or edema observed. There are no spinal abnormalities noted. Normal muscle strength and tone. Pulses full and equal in all extremities, no bruits auscultated.     Neurological: No gross motor or sensory deficits noted,  affect appropriate, oriented to time, person, place.     Skin: Warm and dry to the touch, no apparent skin lesions or masses noted.     Psychiatric: He has a normal mood and affect. His behavior is normal. Judgment and thought content normal.           ECG 12 Lead  Date/Time: 11/22/2017 10:58 AM  Performed by: LUBNA BAUTISTA  Authorized by: LUBNA BAUTISTA   Comparison: not compared with previous ECG   Rhythm: sinus rhythm              Lab Results   Component Value Date    WBC 8.14 08/29/2017    HGB 13.6 (L) 08/29/2017    HCT 38.8 (L) 08/29/2017    MCV 85.1 08/29/2017     08/29/2017     Lab Results   Component Value Date    GLUCOSE 101 (H) 08/29/2017    BUN 31 (H) 08/29/2017    CREATININE 1.82 (H) 08/29/2017    EGFRIFNONA 36 (L) 08/29/2017    BCR 17.0 08/29/2017    CO2 23.0 08/29/2017    CALCIUM 9.0 08/29/2017    ALBUMIN 3.80 08/29/2017    LABIL2 1.1 08/29/2017    AST 17 08/29/2017    ALT 28 08/29/2017     No results found for: CHOL  Lab Results   Component Value Date    TRIG 237 (H) 01/04/2017    TRIG 202 (H) 07/01/2016    TRIG 152 02/23/2016     Lab Results   Component Value Date    HDL 30 (L) 01/04/2017    HDL 32 (L) 07/01/2016    HDL 40 (L) 02/23/2016     Lab Results   Component Value Date    LDLCALC 83 01/04/2017    LDLCALC 94 07/01/2016    LDLCALC 106 02/23/2016     No results found for: LDL  No results found for: HDLLDLRATIO  No components found for: CHOLHDL  Lab Results   Component Value Date    HGBA1C 6.0 (H) 08/29/2017     Lab Results   Component Value Date    TSH 4.26 01/04/2017           ASSESSMENT AND PLAN  1 atrial flutter/atrial fibrillation status post EP study  #2 hypertension with hypertensive heart disease and normal left and a systolic function.  Mild mitral regurgitation previous echocardiographic study.  #3  embolization with PVD status post stenting to care of Dr. Pennington #4 preop evaluations  Clinically there is no sign of any acute cardiac decompensation based on the clinical  history physical finding.  EKG done and finding discussed with the patient in normal QTc interval.  normal stress test and good left ventricle systolic function.  Had mild mitral regurgitation without any hemodynamic significant.  Patient will continue Betapace 80 mg twice a day to keep him in sinus rhythm warfarin to decrease risk of embolization along with continuation of aspirin.  Hypothyroidism being managed with to Synthroid and Hytrin for lipidemia with the Pravachol.  No change was made in the medical management as his cardiac status  well compensated.  Had normal stress test 2015 with normal left ventricle systolic function and no significant valvular abnormality.  Given the asymptomatic clinical condition no further workup needed prior to the lower extremity vascular surgery.  Patient is moderate risk from cardiac point of view.  We will see him back in 6 month     Diagnoses and all orders for this visit:    Long-term (current) use of anticoagulants    Paroxysmal atrial fibrillation    Hypercholesterolemia    PVD (peripheral vascular disease)    Typical atrial flutter    Essential hypertension    Prediabetes    Non-rheumatic mitral regurgitation    Hyperlipidemia, unspecified hyperlipidemia type        Emily Thacker MD  11/22/2017  10:45 AM

## 2017-11-27 ENCOUNTER — HOSPITAL ENCOUNTER (OUTPATIENT)
Dept: ULTRASOUND IMAGING | Facility: HOSPITAL | Age: 77
Discharge: HOME | End: 2017-11-27
Admitting: FAMILY MEDICINE

## 2017-11-27 ENCOUNTER — ANTICOAGULATION VISIT (OUTPATIENT)
Dept: CARDIAC SURGERY | Facility: CLINIC | Age: 77
End: 2017-11-27

## 2017-11-27 VITALS — HEART RATE: 66 BPM | DIASTOLIC BLOOD PRESSURE: 71 MMHG | SYSTOLIC BLOOD PRESSURE: 118 MMHG | OXYGEN SATURATION: 97 %

## 2017-11-27 DIAGNOSIS — I48.0 PAROXYSMAL ATRIAL FIBRILLATION (HCC): ICD-10-CM

## 2017-11-27 DIAGNOSIS — Z79.01 LONG-TERM (CURRENT) USE OF ANTICOAGULANTS: ICD-10-CM

## 2017-11-27 DIAGNOSIS — I74.9 EMBOLISM AND THROMBOSIS OF ARTERY (HCC): ICD-10-CM

## 2017-11-27 DIAGNOSIS — R10.9 ACUTE RIGHT FLANK PAIN: ICD-10-CM

## 2017-11-27 LAB — INR PPP: 2.1 (ref 0.9–1.1)

## 2017-11-27 PROCEDURE — 85610 PROTHROMBIN TIME: CPT | Performed by: NURSE PRACTITIONER

## 2017-11-27 PROCEDURE — 76775 US EXAM ABDO BACK WALL LIM: CPT

## 2017-11-27 PROCEDURE — 99211 OFF/OP EST MAY X REQ PHY/QHP: CPT | Performed by: NURSE PRACTITIONER

## 2017-11-27 NOTE — PROGRESS NOTES
"PT states he was on Amoxicillin last week but did not tell RN. PT states he took Coumadin as directed. Denies any bleeding issues or s/s of blood clot. PT states he \"take Aleve occasionally\" but was advised against due to interaction. Adjusted pt's dose and instructed to hold green x2 days. PT will be seen on Monday. PT is having procedure done with Dr Sousa on 12/8. PT will inform CC after preop appt on Wed. Patient instructed regarding medication; results given and questions answered. Nutritional counseling given.  Dietary factors affecting therapy addressed.  Patient instructed to monitor for excessive bruising or bleeding. PT verbalizes.           This document has been electronically signed by JUANI Fernandez on November 27, 2017 4:05 PM      "

## 2017-11-27 NOTE — PROGRESS NOTES
This document has been electronically signed by JUANI Fernandez on November 26, 2017 6:51 PM

## 2017-11-28 ENCOUNTER — TELEPHONE (OUTPATIENT)
Dept: FAMILY MEDICINE CLINIC | Facility: CLINIC | Age: 77
End: 2017-11-28

## 2017-11-28 NOTE — TELEPHONE ENCOUNTER
Pr Dr. Hoffman, Mr. Bentley  has been called with his recent Renal US results & recommendations.  Continue his current medications and follow-up as planned or sooner if any problems.      ----- Message from Belkys Hoffman MD sent at 11/27/2017  4:48 PM CST -----  Please let him know that his US showed bilateral cysts.  Larger one on the right side.  He should have an appointment in the works with Dr. Gan for follow-up

## 2017-11-29 ENCOUNTER — HOSPITAL ENCOUNTER (OUTPATIENT)
Dept: PREADMISSION TESTING | Age: 77
Discharge: HOME | End: 2017-11-29
Payer: MEDICARE

## 2017-11-29 ENCOUNTER — HOSPITAL ENCOUNTER (OUTPATIENT)
Dept: GENERAL RADIOLOGY | Age: 77
Discharge: HOME | End: 2017-11-29
Payer: MEDICARE

## 2017-11-29 VITALS — WEIGHT: 224 LBS | BODY MASS INDEX: 31.36 KG/M2 | HEIGHT: 71 IN

## 2017-11-29 LAB
ANION GAP SERPL CALCULATED.3IONS-SCNC: 13 MMOL/L (ref 7–19)
APTT: 41.3 SEC (ref 26–36.2)
BASOPHILS ABSOLUTE: 0 K/UL (ref 0–0.2)
BASOPHILS RELATIVE PERCENT: 0.3 % (ref 0–1)
BUN BLDV-MCNC: 33 MG/DL (ref 8–23)
CALCIUM SERPL-MCNC: 9.2 MG/DL (ref 8.8–10.2)
CHLORIDE BLD-SCNC: 103 MMOL/L (ref 98–111)
CO2: 21 MMOL/L (ref 22–29)
CREAT SERPL-MCNC: 1.4 MG/DL (ref 0.5–1.2)
EOSINOPHILS ABSOLUTE: 0.4 K/UL (ref 0–0.6)
EOSINOPHILS RELATIVE PERCENT: 4.7 % (ref 0–5)
GFR NON-AFRICAN AMERICAN: 49
GLUCOSE BLD-MCNC: 75 MG/DL (ref 74–109)
HCT VFR BLD CALC: 41.7 % (ref 42–52)
HEMOGLOBIN: 13.6 G/DL (ref 14–18)
INR BLD: 2.01 (ref 0.88–1.18)
LYMPHOCYTES ABSOLUTE: 1.1 K/UL (ref 1.1–4.5)
LYMPHOCYTES RELATIVE PERCENT: 12.1 % (ref 20–40)
MCH RBC QN AUTO: 29.7 PG (ref 27–31)
MCHC RBC AUTO-ENTMCNC: 32.6 G/DL (ref 33–37)
MCV RBC AUTO: 91 FL (ref 80–94)
MONOCYTES ABSOLUTE: 0.8 K/UL (ref 0–0.9)
MONOCYTES RELATIVE PERCENT: 8.4 % (ref 0–10)
NEUTROPHILS ABSOLUTE: 6.8 K/UL (ref 1.5–7.5)
NEUTROPHILS RELATIVE PERCENT: 73.7 % (ref 50–65)
PDW BLD-RTO: 15.4 % (ref 11.5–14.5)
PLATELET # BLD: 234 K/UL (ref 130–400)
PMV BLD AUTO: 10 FL (ref 9.4–12.4)
POTASSIUM SERPL-SCNC: 5.4 MMOL/L (ref 3.5–5)
PROTHROMBIN TIME: 22.9 SEC (ref 12–14.6)
RBC # BLD: 4.58 M/UL (ref 4.7–6.1)
SODIUM BLD-SCNC: 137 MMOL/L (ref 136–145)
WBC # BLD: 9.3 K/UL (ref 4.8–10.8)

## 2017-11-29 PROCEDURE — 85610 PROTHROMBIN TIME: CPT

## 2017-11-29 PROCEDURE — 87070 CULTURE OTHR SPECIMN AEROBIC: CPT

## 2017-11-29 PROCEDURE — 85730 THROMBOPLASTIN TIME PARTIAL: CPT

## 2017-11-29 PROCEDURE — 93005 ELECTROCARDIOGRAM TRACING: CPT

## 2017-11-29 PROCEDURE — 85025 COMPLETE CBC W/AUTO DIFF WBC: CPT

## 2017-11-29 PROCEDURE — 71020 XR CHEST STANDARD TWO VW: CPT

## 2017-11-29 PROCEDURE — 80048 BASIC METABOLIC PNL TOTAL CA: CPT

## 2017-11-30 LAB
EKG P AXIS: 58 DEGREES
EKG P-R INTERVAL: 178 MS
EKG Q-T INTERVAL: 466 MS
EKG QRS DURATION: 94 MS
EKG QTC CALCULATION (BAZETT): 461 MS
EKG T AXIS: 5 DEGREES

## 2017-12-01 DIAGNOSIS — I95.0 IDIOPATHIC HYPOTENSION: ICD-10-CM

## 2017-12-01 LAB
MRSA CULTURE ONLY: ABNORMAL
MRSA CULTURE ONLY: ABNORMAL
ORGANISM: ABNORMAL

## 2017-12-01 RX ORDER — CILOSTAZOL 50 MG/1
50 TABLET ORAL 2 TIMES DAILY
Qty: 60 TABLET | Refills: 5 | Status: SHIPPED | OUTPATIENT
Start: 2017-12-01 | End: 2017-12-04 | Stop reason: SDUPTHER

## 2017-12-04 ENCOUNTER — TELEPHONE (OUTPATIENT)
Dept: VASCULAR SURGERY | Age: 77
End: 2017-12-04

## 2017-12-04 ENCOUNTER — ANTICOAGULATION VISIT (OUTPATIENT)
Dept: CARDIAC SURGERY | Facility: CLINIC | Age: 77
End: 2017-12-04

## 2017-12-04 VITALS — HEART RATE: 60 BPM | DIASTOLIC BLOOD PRESSURE: 86 MMHG | SYSTOLIC BLOOD PRESSURE: 136 MMHG

## 2017-12-04 DIAGNOSIS — I74.9 EMBOLISM AND THROMBOSIS OF ARTERY (HCC): ICD-10-CM

## 2017-12-04 DIAGNOSIS — I95.0 IDIOPATHIC HYPOTENSION: ICD-10-CM

## 2017-12-04 DIAGNOSIS — I48.0 PAROXYSMAL ATRIAL FIBRILLATION (HCC): ICD-10-CM

## 2017-12-04 DIAGNOSIS — Z79.01 LONG-TERM (CURRENT) USE OF ANTICOAGULANTS: ICD-10-CM

## 2017-12-04 LAB — INR PPP: 3.3 (ref 0.9–1.1)

## 2017-12-04 PROCEDURE — 85610 PROTHROMBIN TIME: CPT | Performed by: NURSE PRACTITIONER

## 2017-12-04 RX ORDER — ACETYLCYSTEINE 100 MG/ML
SOLUTION ORAL; RESPIRATORY (INHALATION)
Qty: 1 ML | Refills: 0 | OUTPATIENT
Start: 2017-12-04 | End: 2018-02-06

## 2017-12-04 RX ORDER — CILOSTAZOL 50 MG/1
50 TABLET ORAL 2 TIMES DAILY
Qty: 60 TABLET | Refills: 5 | Status: SHIPPED | OUTPATIENT
Start: 2017-12-04 | End: 2017-12-06 | Stop reason: SDUPTHER

## 2017-12-04 NOTE — TELEPHONE ENCOUNTER
Spoke with patient to inform him that his MRSA culture was positive and that we need to treat him with Bactroban ointment, apply to each nare twice a day x 5 days. He request that we send the Bactroban to [address] [place] [address] in [place], [place]. Also informed patient that due to a decrease in his kidney function we need to give him Mucomyst 600mg twice a day starting the day before procedure, the day of procedure, and the day after procedure to help protect his kidney function. Patient states that he had the Mucomyst prior to his angiogram and wants us to call this to [facility] which will compound this medication.

## 2017-12-06 ENCOUNTER — PREP FOR PROCEDURE (OUTPATIENT)
Dept: VASCULAR SURGERY | Age: 77
End: 2017-12-06

## 2017-12-06 ENCOUNTER — ANTICOAGULATION VISIT (OUTPATIENT)
Dept: CARDIAC SURGERY | Facility: CLINIC | Age: 77
End: 2017-12-06

## 2017-12-06 DIAGNOSIS — I48.0 PAROXYSMAL ATRIAL FIBRILLATION (HCC): ICD-10-CM

## 2017-12-06 DIAGNOSIS — Z79.01 LONG-TERM (CURRENT) USE OF ANTICOAGULANTS: ICD-10-CM

## 2017-12-06 DIAGNOSIS — I74.9 EMBOLISM AND THROMBOSIS OF ARTERY (HCC): ICD-10-CM

## 2017-12-06 DIAGNOSIS — I95.0 IDIOPATHIC HYPOTENSION: ICD-10-CM

## 2017-12-06 LAB — INR PPP: 3.5 (ref 0.9–1.1)

## 2017-12-06 PROCEDURE — 85610 PROTHROMBIN TIME: CPT | Performed by: NURSE PRACTITIONER

## 2017-12-06 PROCEDURE — 99211 OFF/OP EST MAY X REQ PHY/QHP: CPT | Performed by: NURSE PRACTITIONER

## 2017-12-06 RX ORDER — SODIUM CHLORIDE 0.9 % (FLUSH) 0.9 %
10 SYRINGE (ML) INJECTION PRN
Status: CANCELLED | OUTPATIENT
Start: 2017-12-06

## 2017-12-06 RX ORDER — SODIUM CHLORIDE 0.9 % (FLUSH) 0.9 %
10 SYRINGE (ML) INJECTION EVERY 12 HOURS SCHEDULED
Status: CANCELLED | OUTPATIENT
Start: 2017-12-06

## 2017-12-06 RX ORDER — CILOSTAZOL 50 MG/1
50 TABLET ORAL 2 TIMES DAILY
Qty: 60 TABLET | Refills: 5 | Status: SHIPPED | OUTPATIENT
Start: 2017-12-06

## 2017-12-06 RX ORDER — ASPIRIN 81 MG/1
81 TABLET ORAL ONCE
Status: CANCELLED | OUTPATIENT
Start: 2017-12-06 | End: 2017-12-06

## 2017-12-06 NOTE — PROGRESS NOTES
Pt here today for Bridging with Lovenox for Fem-pop bypass left leg this Friday per Dr. Sousa in [place].  Pt did not follow instructions and took his coumadin last night.  I instructed pt to hold his coumadin today and tomorrow and to increase Vit K intake today and tomorrow with broccoli servings.  I spoke to Trudy, Dr. Sousa assistant, while pt was here in office to notify that pt did take coumadin last night.  Trudy states pt's INR will be rechecked prior to procedure on Friday. Trudy also states pt will most likely be in the hospital through the weekend.  I instructed pt to call CC on Monday for f/u appt. I instructed pt if he were discharged home on Sunday, to take coumadin 2.5mg and one 40mg lovenox injection that evening and will see pt on Monday.  Per Trudy, pt's BUN on the Nov 29th was 33, Creat was 1.4 and GFR was 49.  Will provide renal dosing as with previous procedure.  Pt has 10, 40mg lovenox injections at home. Lovenox teaching highlighted with pt who has done the injections multiple times in the past.  Pt verbalizes instructions. Patient instructed regarding medication; results given and questions answered. Nutritional counseling given.  Dietary factors affecting therapy addressed.  Patient instructed to monitor for excessive bruising or bleeding.  Electronically signed by JUANI Sheets

## 2017-12-07 ENCOUNTER — ANESTHESIA EVENT (OUTPATIENT)
Dept: OPERATING ROOM | Age: 77
DRG: 253 | End: 2017-12-07
Payer: MEDICARE

## 2017-12-08 ENCOUNTER — ANESTHESIA (OUTPATIENT)
Dept: OPERATING ROOM | Age: 77
DRG: 253 | End: 2017-12-08
Payer: MEDICARE

## 2017-12-08 ENCOUNTER — HOSPITAL ENCOUNTER (INPATIENT)
Age: 77
LOS: 3 days | Discharge: HOME | DRG: 253 | End: 2017-12-11
Attending: SURGERY | Admitting: SURGERY
Payer: MEDICARE

## 2017-12-08 ENCOUNTER — APPOINTMENT (OUTPATIENT)
Dept: INTERVENTIONAL RADIOLOGY/VASCULAR | Age: 77
DRG: 253 | End: 2017-12-08
Attending: SURGERY
Payer: MEDICARE

## 2017-12-08 ENCOUNTER — HOSPITAL ENCOUNTER (OUTPATIENT)
Dept: VASCULAR LAB | Age: 77
Discharge: HOME | DRG: 253 | End: 2017-12-08
Payer: MEDICARE

## 2017-12-08 VITALS
OXYGEN SATURATION: 100 % | TEMPERATURE: 98.3 F | RESPIRATION RATE: 1 BRPM | SYSTOLIC BLOOD PRESSURE: 102 MMHG | DIASTOLIC BLOOD PRESSURE: 63 MMHG

## 2017-12-08 PROBLEM — I70.269 ATHEROSCLEROSIS OF ARTERY OF EXTREMITY WITH GANGRENE (HCC): Status: ACTIVE | Noted: 2017-12-08

## 2017-12-08 LAB
ABO/RH: NORMAL
ANTIBODY SCREEN: NORMAL
APTT: 35.3 SEC (ref 26–36.2)
GLUCOSE BLD-MCNC: 113 MG/DL (ref 70–99)
INR BLD: 1.61 (ref 0.88–1.18)
PERFORMED ON: ABNORMAL
POC ACT LR: 257 SEC
POC ACT LR: 259 SEC
POC ACT LR: 284 SEC
POTASSIUM SERPL-SCNC: 4.8 MMOL/L (ref 3.5–4.9)
PROTHROMBIN TIME: 19.2 SEC (ref 12–14.6)

## 2017-12-08 PROCEDURE — 82948 REAGENT STRIP/BLOOD GLUCOSE: CPT

## 2017-12-08 PROCEDURE — 94664 DEMO&/EVAL PT USE INHALER: CPT

## 2017-12-08 PROCEDURE — 3600000005 HC SURGERY LEVEL 5 BASE: Performed by: SURGERY

## 2017-12-08 PROCEDURE — 04PY0JZ REMOVAL OF SYNTHETIC SUBSTITUTE FROM LOWER ARTERY, OPEN APPROACH: ICD-10-PCS | Performed by: SURGERY

## 2017-12-08 PROCEDURE — 6360000002 HC RX W HCPCS: Performed by: SURGERY

## 2017-12-08 PROCEDURE — 2700000000 HC OXYGEN THERAPY PER DAY

## 2017-12-08 PROCEDURE — 6360000002 HC RX W HCPCS: Performed by: ANESTHESIOLOGY

## 2017-12-08 PROCEDURE — 7100000000 HC PACU RECOVERY - FIRST 15 MIN: Performed by: SURGERY

## 2017-12-08 PROCEDURE — 36415 COLL VENOUS BLD VENIPUNCTURE: CPT

## 2017-12-08 PROCEDURE — 2580000003 HC RX 258: Performed by: PHYSICIAN ASSISTANT

## 2017-12-08 PROCEDURE — 7100000001 HC PACU RECOVERY - ADDTL 15 MIN: Performed by: SURGERY

## 2017-12-08 PROCEDURE — 2580000003 HC RX 258: Performed by: SURGERY

## 2017-12-08 PROCEDURE — 2720000001 HC MISC SURG SUPPLY STERILE $51-500: Performed by: SURGERY

## 2017-12-08 PROCEDURE — 86901 BLOOD TYPING SEROLOGIC RH(D): CPT

## 2017-12-08 PROCEDURE — 2720000010 HC SURG SUPPLY STERILE: Performed by: SURGERY

## 2017-12-08 PROCEDURE — 041U0ZS BYPASS LEFT PERONEAL ARTERY TO LOWER EXTREMITY VEIN, OPEN APPROACH: ICD-10-PCS | Performed by: SURGERY

## 2017-12-08 PROCEDURE — 6370000000 HC RX 637 (ALT 250 FOR IP): Performed by: SURGERY

## 2017-12-08 PROCEDURE — 86900 BLOOD TYPING SEROLOGIC ABO: CPT

## 2017-12-08 PROCEDURE — 6370000000 HC RX 637 (ALT 250 FOR IP): Performed by: PHYSICIAN ASSISTANT

## 2017-12-08 PROCEDURE — 3700000000 HC ANESTHESIA ATTENDED CARE: Performed by: SURGERY

## 2017-12-08 PROCEDURE — 85730 THROMBOPLASTIN TIME PARTIAL: CPT

## 2017-12-08 PROCEDURE — C1769 GUIDE WIRE: HCPCS | Performed by: SURGERY

## 2017-12-08 PROCEDURE — 6370000000 HC RX 637 (ALT 250 FOR IP)

## 2017-12-08 PROCEDURE — 94640 AIRWAY INHALATION TREATMENT: CPT

## 2017-12-08 PROCEDURE — 2500000003 HC RX 250 WO HCPCS: Performed by: NURSE ANESTHETIST, CERTIFIED REGISTERED

## 2017-12-08 PROCEDURE — B41G1ZZ FLUOROSCOPY OF LEFT LOWER EXTREMITY ARTERIES USING LOW OSMOLAR CONTRAST: ICD-10-PCS | Performed by: SURGERY

## 2017-12-08 PROCEDURE — 2720000000 HC MISC SURG SUPPLY STERILE $0-50: Performed by: SURGERY

## 2017-12-08 PROCEDURE — 85347 COAGULATION TIME ACTIVATED: CPT

## 2017-12-08 PROCEDURE — 6360000002 HC RX W HCPCS: Performed by: NURSE ANESTHETIST, CERTIFIED REGISTERED

## 2017-12-08 PROCEDURE — C1894 INTRO/SHEATH, NON-LASER: HCPCS | Performed by: SURGERY

## 2017-12-08 PROCEDURE — 35585 VEIN BYP FEM-TIBIAL PERONEAL: CPT | Performed by: SURGERY

## 2017-12-08 PROCEDURE — 2580000003 HC RX 258: Performed by: NURSE ANESTHETIST, CERTIFIED REGISTERED

## 2017-12-08 PROCEDURE — 6360000002 HC RX W HCPCS: Performed by: PHYSICIAN ASSISTANT

## 2017-12-08 PROCEDURE — 1210000000 HC MED SURG R&B

## 2017-12-08 PROCEDURE — C1887 CATHETER, GUIDING: HCPCS | Performed by: SURGERY

## 2017-12-08 PROCEDURE — C1757 CATH, THROMBECTOMY/EMBOLECT: HCPCS | Performed by: SURGERY

## 2017-12-08 PROCEDURE — 041L0ZS BYPASS LEFT FEMORAL ARTERY TO LOWER EXTREMITY VEIN, OPEN APPROACH: ICD-10-PCS | Performed by: SURGERY

## 2017-12-08 PROCEDURE — 84132 ASSAY OF SERUM POTASSIUM: CPT

## 2017-12-08 PROCEDURE — 85610 PROTHROMBIN TIME: CPT

## 2017-12-08 PROCEDURE — 3700000001 HC ADD 15 MINUTES (ANESTHESIA): Performed by: SURGERY

## 2017-12-08 PROCEDURE — 86850 RBC ANTIBODY SCREEN: CPT

## 2017-12-08 PROCEDURE — 3600000015 HC SURGERY LEVEL 5 ADDTL 15MIN: Performed by: SURGERY

## 2017-12-08 RX ORDER — SODIUM CHLORIDE 0.9 % (FLUSH) 0.9 %
10 SYRINGE (ML) INJECTION PRN
Status: DISCONTINUED | OUTPATIENT
Start: 2017-12-08 | End: 2017-12-08 | Stop reason: HOSPADM

## 2017-12-08 RX ORDER — CILOSTAZOL 100 MG/1
100 TABLET ORAL 2 TIMES DAILY
Status: DISCONTINUED | OUTPATIENT
Start: 2017-12-08 | End: 2017-12-11 | Stop reason: HOSPADM

## 2017-12-08 RX ORDER — FENTANYL CITRATE 50 UG/ML
50 INJECTION, SOLUTION INTRAMUSCULAR; INTRAVENOUS
Status: DISCONTINUED | OUTPATIENT
Start: 2017-12-08 | End: 2017-12-11 | Stop reason: HOSPADM

## 2017-12-08 RX ORDER — DEXAMETHASONE SODIUM PHOSPHATE 10 MG/ML
INJECTION INTRAMUSCULAR; INTRAVENOUS PRN
Status: DISCONTINUED | OUTPATIENT
Start: 2017-12-08 | End: 2017-12-08 | Stop reason: SDUPTHER

## 2017-12-08 RX ORDER — ONDANSETRON 2 MG/ML
4 INJECTION INTRAMUSCULAR; INTRAVENOUS EVERY 6 HOURS PRN
Status: DISCONTINUED | OUTPATIENT
Start: 2017-12-08 | End: 2017-12-11 | Stop reason: HOSPADM

## 2017-12-08 RX ORDER — IPRATROPIUM BROMIDE AND ALBUTEROL SULFATE 2.5; .5 MG/3ML; MG/3ML
SOLUTION RESPIRATORY (INHALATION)
Status: COMPLETED
Start: 2017-12-08 | End: 2017-12-08

## 2017-12-08 RX ORDER — ASPIRIN 81 MG/1
81 TABLET ORAL ONCE
Status: COMPLETED | OUTPATIENT
Start: 2017-12-08 | End: 2017-12-08

## 2017-12-08 RX ORDER — LEVOTHYROXINE SODIUM 0.05 MG/1
25 TABLET ORAL DAILY
Status: DISCONTINUED | OUTPATIENT
Start: 2017-12-09 | End: 2017-12-11 | Stop reason: HOSPADM

## 2017-12-08 RX ORDER — HEPARIN SODIUM 1000 [USP'U]/ML
INJECTION, SOLUTION INTRAVENOUS; SUBCUTANEOUS PRN
Status: DISCONTINUED | OUTPATIENT
Start: 2017-12-08 | End: 2017-12-08 | Stop reason: SDUPTHER

## 2017-12-08 RX ORDER — CETIRIZINE HYDROCHLORIDE 10 MG/1
10 TABLET ORAL DAILY
Status: DISCONTINUED | OUTPATIENT
Start: 2017-12-08 | End: 2017-12-11 | Stop reason: HOSPADM

## 2017-12-08 RX ORDER — QUETIAPINE FUMARATE 100 MG/1
100 TABLET, FILM COATED ORAL NIGHTLY
Status: DISCONTINUED | OUTPATIENT
Start: 2017-12-08 | End: 2017-12-11 | Stop reason: HOSPADM

## 2017-12-08 RX ORDER — FENTANYL CITRATE 50 UG/ML
25 INJECTION, SOLUTION INTRAMUSCULAR; INTRAVENOUS
Status: DISCONTINUED | OUTPATIENT
Start: 2017-12-08 | End: 2017-12-08 | Stop reason: HOSPADM

## 2017-12-08 RX ORDER — WARFARIN SODIUM 5 MG/1
5 TABLET ORAL DAILY
Status: DISCONTINUED | OUTPATIENT
Start: 2017-12-08 | End: 2017-12-11 | Stop reason: HOSPADM

## 2017-12-08 RX ORDER — IPRATROPIUM BROMIDE AND ALBUTEROL SULFATE 2.5; .5 MG/3ML; MG/3ML
1 SOLUTION RESPIRATORY (INHALATION) ONCE
Status: COMPLETED | OUTPATIENT
Start: 2017-12-08 | End: 2017-12-08

## 2017-12-08 RX ORDER — DIPHENHYDRAMINE HYDROCHLORIDE 50 MG/ML
12.5 INJECTION INTRAMUSCULAR; INTRAVENOUS
Status: DISCONTINUED | OUTPATIENT
Start: 2017-12-08 | End: 2017-12-08 | Stop reason: HOSPADM

## 2017-12-08 RX ORDER — SODIUM CHLORIDE, SODIUM LACTATE, POTASSIUM CHLORIDE, CALCIUM CHLORIDE 600; 310; 30; 20 MG/100ML; MG/100ML; MG/100ML; MG/100ML
INJECTION, SOLUTION INTRAVENOUS CONTINUOUS
Status: DISCONTINUED | OUTPATIENT
Start: 2017-12-08 | End: 2017-12-08

## 2017-12-08 RX ORDER — LIDOCAINE HYDROCHLORIDE 10 MG/ML
1 INJECTION, SOLUTION EPIDURAL; INFILTRATION; INTRACAUDAL; PERINEURAL
Status: DISCONTINUED | OUTPATIENT
Start: 2017-12-08 | End: 2017-12-08 | Stop reason: HOSPADM

## 2017-12-08 RX ORDER — MIDAZOLAM HYDROCHLORIDE 1 MG/ML
2 INJECTION INTRAMUSCULAR; INTRAVENOUS
Status: COMPLETED | OUTPATIENT
Start: 2017-12-08 | End: 2017-12-08

## 2017-12-08 RX ORDER — ROCURONIUM BROMIDE 10 MG/ML
INJECTION, SOLUTION INTRAVENOUS PRN
Status: DISCONTINUED | OUTPATIENT
Start: 2017-12-08 | End: 2017-12-08 | Stop reason: SDUPTHER

## 2017-12-08 RX ORDER — MEPERIDINE HYDROCHLORIDE 50 MG/ML
12.5 INJECTION INTRAMUSCULAR; INTRAVENOUS; SUBCUTANEOUS EVERY 5 MIN PRN
Status: DISCONTINUED | OUTPATIENT
Start: 2017-12-08 | End: 2017-12-08 | Stop reason: HOSPADM

## 2017-12-08 RX ORDER — SODIUM CHLORIDE 0.9 % (FLUSH) 0.9 %
10 SYRINGE (ML) INJECTION EVERY 12 HOURS SCHEDULED
Status: DISCONTINUED | OUTPATIENT
Start: 2017-12-08 | End: 2017-12-08 | Stop reason: HOSPADM

## 2017-12-08 RX ORDER — FENTANYL CITRATE 50 UG/ML
50 INJECTION, SOLUTION INTRAMUSCULAR; INTRAVENOUS
Status: DISCONTINUED | OUTPATIENT
Start: 2017-12-08 | End: 2017-12-08 | Stop reason: HOSPADM

## 2017-12-08 RX ORDER — TRAMADOL HYDROCHLORIDE 50 MG/1
100 TABLET ORAL EVERY 6 HOURS PRN
Status: DISCONTINUED | OUTPATIENT
Start: 2017-12-08 | End: 2017-12-11 | Stop reason: HOSPADM

## 2017-12-08 RX ORDER — FENTANYL CITRATE 50 UG/ML
INJECTION, SOLUTION INTRAMUSCULAR; INTRAVENOUS PRN
Status: DISCONTINUED | OUTPATIENT
Start: 2017-12-08 | End: 2017-12-08 | Stop reason: SDUPTHER

## 2017-12-08 RX ORDER — ACETAMINOPHEN 325 MG/1
650 TABLET ORAL EVERY 4 HOURS PRN
Status: DISCONTINUED | OUTPATIENT
Start: 2017-12-08 | End: 2017-12-11 | Stop reason: HOSPADM

## 2017-12-08 RX ORDER — SOTALOL HYDROCHLORIDE 80 MG/1
160 TABLET ORAL 2 TIMES DAILY
Status: DISCONTINUED | OUTPATIENT
Start: 2017-12-08 | End: 2017-12-11 | Stop reason: HOSPADM

## 2017-12-08 RX ORDER — ENALAPRILAT 2.5 MG/2ML
1.25 INJECTION INTRAVENOUS
Status: DISCONTINUED | OUTPATIENT
Start: 2017-12-08 | End: 2017-12-08 | Stop reason: HOSPADM

## 2017-12-08 RX ORDER — EPHEDRINE SULFATE 50 MG/ML
INJECTION, SOLUTION INTRAVENOUS PRN
Status: DISCONTINUED | OUTPATIENT
Start: 2017-12-08 | End: 2017-12-08 | Stop reason: SDUPTHER

## 2017-12-08 RX ORDER — CLONIDINE HYDROCHLORIDE 0.1 MG/1
0.1 TABLET ORAL
Status: DISCONTINUED | OUTPATIENT
Start: 2017-12-08 | End: 2017-12-11 | Stop reason: HOSPADM

## 2017-12-08 RX ORDER — TRAMADOL HYDROCHLORIDE 50 MG/1
50 TABLET ORAL EVERY 6 HOURS PRN
Status: DISCONTINUED | OUTPATIENT
Start: 2017-12-08 | End: 2017-12-11 | Stop reason: HOSPADM

## 2017-12-08 RX ORDER — MONTELUKAST SODIUM 10 MG/1
10 TABLET ORAL NIGHTLY
Status: DISCONTINUED | OUTPATIENT
Start: 2017-12-08 | End: 2017-12-11 | Stop reason: HOSPADM

## 2017-12-08 RX ORDER — FENTANYL CITRATE 50 UG/ML
25 INJECTION, SOLUTION INTRAMUSCULAR; INTRAVENOUS
Status: DISCONTINUED | OUTPATIENT
Start: 2017-12-08 | End: 2017-12-11 | Stop reason: HOSPADM

## 2017-12-08 RX ORDER — ONDANSETRON 2 MG/ML
INJECTION INTRAMUSCULAR; INTRAVENOUS PRN
Status: DISCONTINUED | OUTPATIENT
Start: 2017-12-08 | End: 2017-12-08 | Stop reason: SDUPTHER

## 2017-12-08 RX ORDER — SODIUM CHLORIDE 0.9 % (FLUSH) 0.9 %
10 SYRINGE (ML) INJECTION PRN
Status: DISCONTINUED | OUTPATIENT
Start: 2017-12-08 | End: 2017-12-11 | Stop reason: HOSPADM

## 2017-12-08 RX ORDER — PROPOFOL 10 MG/ML
INJECTION, EMULSION INTRAVENOUS PRN
Status: DISCONTINUED | OUTPATIENT
Start: 2017-12-08 | End: 2017-12-08 | Stop reason: SDUPTHER

## 2017-12-08 RX ORDER — ACETYLCYSTEINE 200 MG/ML
600 SOLUTION ORAL; RESPIRATORY (INHALATION) 2 TIMES DAILY
Status: DISPENSED | OUTPATIENT
Start: 2017-12-08 | End: 2017-12-10

## 2017-12-08 RX ORDER — LABETALOL HYDROCHLORIDE 5 MG/ML
5 INJECTION, SOLUTION INTRAVENOUS EVERY 10 MIN PRN
Status: DISCONTINUED | OUTPATIENT
Start: 2017-12-08 | End: 2017-12-08 | Stop reason: HOSPADM

## 2017-12-08 RX ORDER — SODIUM CHLORIDE 0.9 % (FLUSH) 0.9 %
10 SYRINGE (ML) INJECTION EVERY 12 HOURS SCHEDULED
Status: DISCONTINUED | OUTPATIENT
Start: 2017-12-08 | End: 2017-12-11 | Stop reason: HOSPADM

## 2017-12-08 RX ORDER — ASPIRIN 81 MG/1
81 TABLET ORAL DAILY
Status: DISCONTINUED | OUTPATIENT
Start: 2017-12-08 | End: 2017-12-11 | Stop reason: HOSPADM

## 2017-12-08 RX ORDER — METOCLOPRAMIDE HYDROCHLORIDE 5 MG/ML
10 INJECTION INTRAMUSCULAR; INTRAVENOUS
Status: DISCONTINUED | OUTPATIENT
Start: 2017-12-08 | End: 2017-12-08 | Stop reason: HOSPADM

## 2017-12-08 RX ORDER — SODIUM CHLORIDE 9 MG/ML
INJECTION, SOLUTION INTRAVENOUS CONTINUOUS
Status: DISCONTINUED | OUTPATIENT
Start: 2017-12-08 | End: 2017-12-11 | Stop reason: ALTCHOICE

## 2017-12-08 RX ORDER — HYDRALAZINE HYDROCHLORIDE 20 MG/ML
5 INJECTION INTRAMUSCULAR; INTRAVENOUS EVERY 10 MIN PRN
Status: DISCONTINUED | OUTPATIENT
Start: 2017-12-08 | End: 2017-12-08 | Stop reason: HOSPADM

## 2017-12-08 RX ORDER — PROMETHAZINE HYDROCHLORIDE 25 MG/ML
6.25 INJECTION, SOLUTION INTRAMUSCULAR; INTRAVENOUS
Status: DISCONTINUED | OUTPATIENT
Start: 2017-12-08 | End: 2017-12-08 | Stop reason: HOSPADM

## 2017-12-08 RX ORDER — PRAMIPEXOLE DIHYDROCHLORIDE 0.25 MG/1
0.25 TABLET ORAL 2 TIMES DAILY
Status: DISCONTINUED | OUTPATIENT
Start: 2017-12-08 | End: 2017-12-11 | Stop reason: HOSPADM

## 2017-12-08 RX ORDER — LIDOCAINE HYDROCHLORIDE 10 MG/ML
INJECTION, SOLUTION INFILTRATION; PERINEURAL PRN
Status: DISCONTINUED | OUTPATIENT
Start: 2017-12-08 | End: 2017-12-08 | Stop reason: SDUPTHER

## 2017-12-08 RX ADMIN — FENTANYL CITRATE 50 MCG: 50 INJECTION, SOLUTION INTRAMUSCULAR; INTRAVENOUS at 10:42

## 2017-12-08 RX ADMIN — PHENYLEPHRINE HYDROCHLORIDE 25 MCG/MIN: 10 INJECTION INTRAVENOUS at 10:09

## 2017-12-08 RX ADMIN — FENTANYL CITRATE 25 MCG: 50 INJECTION, SOLUTION INTRAMUSCULAR; INTRAVENOUS at 15:30

## 2017-12-08 RX ADMIN — FENTANYL CITRATE 25 MCG: 50 INJECTION, SOLUTION INTRAMUSCULAR; INTRAVENOUS at 15:02

## 2017-12-08 RX ADMIN — ROCURONIUM BROMIDE 20 MG: 10 INJECTION INTRAVENOUS at 11:00

## 2017-12-08 RX ADMIN — CETIRIZINE HYDROCHLORIDE 10 MG: 10 TABLET, FILM COATED ORAL at 22:44

## 2017-12-08 RX ADMIN — IPRATROPIUM BROMIDE AND ALBUTEROL SULFATE 3 ML: 2.5; .5 SOLUTION RESPIRATORY (INHALATION) at 16:52

## 2017-12-08 RX ADMIN — ASPIRIN 81 MG: 81 TABLET, COATED ORAL at 22:44

## 2017-12-08 RX ADMIN — SODIUM CHLORIDE, SODIUM LACTATE, POTASSIUM CHLORIDE, AND CALCIUM CHLORIDE: 600; 310; 30; 20 INJECTION, SOLUTION INTRAVENOUS at 15:24

## 2017-12-08 RX ADMIN — PRAMIPEXOLE DIHYDROCHLORIDE 0.25 MG: 0.25 TABLET ORAL at 21:14

## 2017-12-08 RX ADMIN — HEPARIN SODIUM 3000 UNITS: 1000 INJECTION, SOLUTION INTRAVENOUS; SUBCUTANEOUS at 11:22

## 2017-12-08 RX ADMIN — SOTALOL HYDROCHLORIDE 160 MG: 80 TABLET ORAL at 21:14

## 2017-12-08 RX ADMIN — SUGAMMADEX 200 MG: 100 INJECTION, SOLUTION INTRAVENOUS at 15:55

## 2017-12-08 RX ADMIN — Medication 10 ML: at 21:16

## 2017-12-08 RX ADMIN — PROPOFOL 200 MG: 10 INJECTION, EMULSION INTRAVENOUS at 09:51

## 2017-12-08 RX ADMIN — SODIUM CHLORIDE, SODIUM LACTATE, POTASSIUM CHLORIDE, AND CALCIUM CHLORIDE: 600; 310; 30; 20 INJECTION, SOLUTION INTRAVENOUS at 08:56

## 2017-12-08 RX ADMIN — FENTANYL CITRATE 25 MCG: 50 INJECTION, SOLUTION INTRAMUSCULAR; INTRAVENOUS at 14:05

## 2017-12-08 RX ADMIN — FENTANYL CITRATE 25 MCG: 50 INJECTION, SOLUTION INTRAMUSCULAR; INTRAVENOUS at 11:40

## 2017-12-08 RX ADMIN — MIDAZOLAM 1 MG: 1 INJECTION INTRAMUSCULAR; INTRAVENOUS at 09:20

## 2017-12-08 RX ADMIN — ROCURONIUM BROMIDE 10 MG: 10 INJECTION INTRAVENOUS at 13:31

## 2017-12-08 RX ADMIN — SODIUM CHLORIDE: 9 INJECTION, SOLUTION INTRAVENOUS at 18:23

## 2017-12-08 RX ADMIN — ROCURONIUM BROMIDE 50 MG: 10 INJECTION INTRAVENOUS at 09:51

## 2017-12-08 RX ADMIN — IPRATROPIUM BROMIDE AND ALBUTEROL SULFATE 3 ML: .5; 3 SOLUTION RESPIRATORY (INHALATION) at 16:52

## 2017-12-08 RX ADMIN — SODIUM CHLORIDE, SODIUM LACTATE, POTASSIUM CHLORIDE, AND CALCIUM CHLORIDE: 600; 310; 30; 20 INJECTION, SOLUTION INTRAVENOUS at 12:18

## 2017-12-08 RX ADMIN — WARFARIN SODIUM 5 MG: 5 TABLET ORAL at 21:14

## 2017-12-08 RX ADMIN — VANCOMYCIN HYDROCHLORIDE 1500 MG: 1 INJECTION, POWDER, LYOPHILIZED, FOR SOLUTION INTRAVENOUS at 09:01

## 2017-12-08 RX ADMIN — FENTANYL CITRATE 25 MCG: 50 INJECTION, SOLUTION INTRAMUSCULAR; INTRAVENOUS at 12:18

## 2017-12-08 RX ADMIN — ROCURONIUM BROMIDE 10 MG: 10 INJECTION INTRAVENOUS at 12:18

## 2017-12-08 RX ADMIN — EPHEDRINE SULFATE 5 MG: 50 INJECTION, SOLUTION INTRAMUSCULAR; INTRAVENOUS; SUBCUTANEOUS at 14:43

## 2017-12-08 RX ADMIN — FENTANYL CITRATE 50 MCG: 50 INJECTION, SOLUTION INTRAMUSCULAR; INTRAVENOUS at 09:51

## 2017-12-08 RX ADMIN — ONDANSETRON HYDROCHLORIDE 4 MG: 2 SOLUTION INTRAMUSCULAR; INTRAVENOUS at 14:58

## 2017-12-08 RX ADMIN — HEPARIN SODIUM 2000 UNITS: 1000 INJECTION, SOLUTION INTRAVENOUS; SUBCUTANEOUS at 13:39

## 2017-12-08 RX ADMIN — DEXAMETHASONE SODIUM PHOSPHATE 10 MG: 10 INJECTION INTRAMUSCULAR; INTRAVENOUS at 09:59

## 2017-12-08 RX ADMIN — HEPARIN SODIUM 3000 UNITS: 1000 INJECTION, SOLUTION INTRAVENOUS; SUBCUTANEOUS at 12:35

## 2017-12-08 RX ADMIN — VANCOMYCIN HYDROCHLORIDE 1500 MG: 1 INJECTION, POWDER, LYOPHILIZED, FOR SOLUTION INTRAVENOUS at 21:17

## 2017-12-08 RX ADMIN — ASPIRIN 81 MG: 81 TABLET, COATED ORAL at 09:01

## 2017-12-08 RX ADMIN — FENTANYL CITRATE 50 MCG: 50 INJECTION, SOLUTION INTRAMUSCULAR; INTRAVENOUS at 21:16

## 2017-12-08 RX ADMIN — LIDOCAINE HYDROCHLORIDE 5 ML: 10 INJECTION, SOLUTION INFILTRATION; PERINEURAL at 09:51

## 2017-12-08 RX ADMIN — EPHEDRINE SULFATE 5 MG: 50 INJECTION, SOLUTION INTRAMUSCULAR; INTRAVENOUS; SUBCUTANEOUS at 15:08

## 2017-12-08 RX ADMIN — MONTELUKAST SODIUM 10 MG: 10 TABLET, FILM COATED ORAL at 21:14

## 2017-12-08 RX ADMIN — HEPARIN SODIUM 5000 UNITS: 1000 INJECTION, SOLUTION INTRAVENOUS; SUBCUTANEOUS at 11:53

## 2017-12-08 RX ADMIN — FENTANYL CITRATE 25 MCG: 50 INJECTION, SOLUTION INTRAMUSCULAR; INTRAVENOUS at 12:42

## 2017-12-08 RX ADMIN — Medication 400 MG: at 21:15

## 2017-12-08 RX ADMIN — CILOSTAZOL 100 MG: 100 TABLET ORAL at 21:14

## 2017-12-08 RX ADMIN — QUETIAPINE FUMARATE 100 MG: 100 TABLET ORAL at 21:15

## 2017-12-08 ASSESSMENT — LIFESTYLE VARIABLES: SMOKING_STATUS: 1

## 2017-12-08 ASSESSMENT — PAIN SCALES - GENERAL: PAINLEVEL_OUTOF10: 8

## 2017-12-08 NOTE — ANESTHESIA POSTPROCEDURE EVALUATION
Department of Anesthesiology  Postprocedure Note    Patient: Mily Otero  MRN: [id number]  YOB: 1940  Date of evaluation: 12/8/2017  Time:  4:13 PM     Procedure Summary     Date:  12/08/17 Room / Location:  [facility] OR  / [facility] OR    Anesthesia Start:  0946 Anesthesia Stop:      Procedure:  LEFT COMMON FEMORAL ARTERY TO LEFT POSTERIOR TIBIAL ARTERY BYPASS WITH INSITU GREATER SAPHENOUS VEIN GRAFT WITH INTRAOPERATIVE AND COMPLETION ANGIOGRAPHY (Left ) Diagnosis:  (G12.785)    Surgeon:  Yifan Chamorro MD Responsible Provider:  Klaus Barrera CRNA    Anesthesia Type:  general ASA Status:  3          Anesthesia Type: general    Sanjuanita Phase I:      Sanjuanita Phase II:      Last vitals: Reviewed and per EMR flowsheets. Anesthesia Post Evaluation    Patient location during evaluation: PACU  Patient participation: waiting for patient participation  Level of consciousness: lethargic and responsive to verbal stimuli  Pain score: 0  Airway patency: patent  Nausea & Vomiting: no nausea and no vomiting  Complications: no  Cardiovascular status: blood pressure returned to baseline  Respiratory status: oral airway  Hydration status: euvolemic  Comments: Pt transported with oxygen.   Report to RN

## 2017-12-08 NOTE — ANESTHESIA PRE PROCEDURE
5 mg by mouth Daily. Regulated by Krista Oseguera MD       Current medications:    Current Facility-Administered Medications   Medication Dose Route Frequency Provider Last Rate Last Dose    sodium chloride flush 0.9 % injection 10 mL  10 mL Intravenous 2 [facility] day [facility]        sodium chloride flush 0.9 % injection 10 mL  10 mL Intravenous PRN Babs Dutta PA-C        vancomycin (VANCOCIN) 1,500 mg in dextrose 5 % 500 mL IVPB  1,500 mg Intravenous On Call to OR [facility]        aspirin EC tablet 81 mg  81 mg Oral Once Thompson GODFREY Arriaga        lactated ringers infusion   Intravenous Continuous Georgette Cruz MD           Allergies: Allergies   Allergen Reactions    Hydrocodone     Morphine        Problem List:    Patient Active Problem List   Diagnosis Code    COPD (chronic obstructive pulmonary disease) ([facility] J44.9    Hypertension I10    AAA (abdominal aortic aneurysm) without rupture (HCC) I71.4    PVD (peripheral vascular disease) ([facility]) I73.9    Atherosclerosis of native arteries of left leg with ulceration of heel and midfoot ([facility]) I70.244    CKD (chronic kidney disease) stage 3, GFR 30-59 ml/min N18.3       Past Medical History:        Diagnosis Date    AAA (abdominal aortic aneurysm) without rupture (HCC)     Chronic back pain     Chronic bronchitis (HCC)     COPD (chronic obstructive pulmonary disease) ([facility]     Hypertension     Obesity        Past Surgical History:        Procedure Laterality Date    ABDOMINAL AORTIC ANEURYSM REPAIR  7/18/11 [facility]    R IIA embolization with 10mm Amplatzer plug. Endoluminal graft exclusion of infrarenal abdominal aortic anerysm with Endologix Powerfit 25mm x 100mm main body. Placement of Endologix 34mm x 120mm aortic extension (suprarenal fixation device/uncovered portion.) Coda balloon a'plasty of the aorta and Carlos iliac arteries.  Palmaz stent placement in the infrarenal abd aortic neck 11/29/2017     02/15/2012    K 5.4 11/29/2017    K 3.8 02/15/2012     11/29/2017     02/15/2012    CO2 21 11/29/2017    BUN 33 11/29/2017    CREATININE 1.4 11/29/2017    CREATININE 1.2 02/15/2012    LABGLOM 49 11/29/2017    GLUCOSE 75 11/29/2017    PROT 6.0 02/15/2012    CALCIUM 9.2 11/29/2017    ALKPHOS 55 02/15/2012    AST 39 02/15/2012    ALT 15 02/15/2012       POC Tests: No results for input(s): POCGLU, POCNA, POCK, POCCL, POCBUN, POCHEMO, POCHCT in the last 72 hours. Coags:   Lab Results   Component Value Date    PROTIME 22.9 11/29/2017    PROTIME 20.04 02/20/2012    INR 2.01 11/29/2017    APTT 41.3 11/29/2017       HCG (If Applicable): No results found for: PREGTESTUR, PREGSERUM, HCG, HCGQUANT     ABGs: No results found for: PHART, PO2ART, ANB2EIW, HPT6SPC, BEART, Z5ANJLSH     Type & Screen (If Applicable):  No results found for: [facility]    Anesthesia Evaluation  Patient summary reviewed and Nursing notes reviewed no history of anesthetic complications:   Airway: Mallampati: II  TM distance: >3 FB   Neck ROM: full   Dental:          Pulmonary:   (+) current smoker          Patient smoked on day of surgery. Cardiovascular:    (+) hypertension:, dysrhythmias:,     (-) pacemaker, past MI and CAD    ECG reviewed               Beta Blocker:  Dose within 24 Hrs         Neuro/Psych:   Negative Neuro/Psych ROS              GI/Hepatic/Renal:             Endo/Other:    (+) blood dyscrasia: bridge therapy:., .    (-) hypothyroidism, hyperthyroidism, no Type II DM, no Type I DM               Abdominal:           Vascular:                                        Anesthesia Plan      general     ASA 3     (Decadron/Zofran Intraop)  Induction: intravenous. arterial line  MIPS: Postoperative opioids intended and Prophylactic antiemetics administered. Anesthetic plan and risks discussed with patient.                       Charito Locke MD   12/8/2017

## 2017-12-08 NOTE — H&P (VIEW-ONLY)
Progress Notes  Encounter Date: 11/6/2017  Carlos Dutta PA-C   Vascular Surgery   Expand All Collapse All    []Hide copied text  Patient Care Team:  Flores Cuello MD as PCP - General (Family Medicine)        History and Physical     Mr. Anna Bocanegra is a 68 yo male with a has prior history of peripheral vascular disease of the lower extremities. He underwent a EVAR in 7/2011 by Dr. Lizbet Hughes. He is currently on Pletal 100 mg BID and Coumadin daily. He does not walk much due to weakness in his legs. He reports pain in his left heel when walking but denies any IRP. He reports non healing wounds for the last 5-6 months. He has been soaking his feet in epsom salt and using betadine on the wound. He denies any fever/chills. He reports soreness in his left heel with walking. He underwent a A-gram and comes in today to discuss results and possible options with Dr. Lizbet Hughes. He reports a of Bronchitis for which he uses a inhaler for. He has a history of A-fib and has had an ablation on the past. He reports that he saw his Cardiologist about 5-6 weeks ago.  He denies any chest pain or severe SOB.      Raimundo Zaavla is a 69 y.o. 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 ([facility] 11/01/2017    CKD (chronic kidney disease) stage 3, GFR 30-59 ml/min 11/01/2017    PVD (peripheral vascular disease) ([facility] 08/16/2012    COPD (chronic obstructive pulmonary disease) (HCC)      Hypertension      AAA (abdominal aortic aneurysm) without rupture (HCC)        Current Facility-Administered Medications          Current Outpatient Prescriptions   Medication Sig Dispense Refill    magnesium oxide (MAG-OX) 400 MG tablet Take 400 mg by mouth nightly        levothyroxine (SYNTHROID) 25 MCG tablet Take 25 mcg by mouth Daily        Acetylcysteine 600 MG CAPS Take 600 mg by mouth 2 times daily Take twice a day for three days        enoxaparin (LOVENOX) 40 MG/0.4ML injection Inject 40 mg into the skin daily States took Lovenox once Coumadin was stopped        cilostazol (PLETAL) 50 MG tablet Take 50 mg by mouth 2 times daily        sotalol (BETAPACE) 80 MG tablet Take 160 mg by mouth 2 times daily        cetirizine (ZYRTEC) 10 MG tablet Take 10 mg by mouth daily        warfarin (COUMADIN) 5 MG tablet Take 5 mg by mouth daily        QUEtiapine (SEROQUEL) 100 MG tablet Take 100 mg by mouth nightly Takes three tablets at bedtime        montelukast (SINGULAIR) 10 MG tablet Take 10 mg by mouth nightly        pramipexole (MIRAPEX) 0.25 MG tablet Take 0.25 mg by mouth 2 times daily Takes two tablets every morning and one tablet at bedtime        cilostazol (PLETAL) 100 MG tablet Take 100 mg by mouth 2 times daily.          warfarin (COUMADIN) 5 MG tablet Take 5 mg by mouth Daily. Regulated by [facility]         tramadol (ULTRAM) 50 MG tablet Take 50 mg by mouth every 6 hours as needed.            No current facility-administered medications for this visit.          Allergies: Hydrocodone and Morphine  Past Medical History        Past Medical History:   Diagnosis Date    AAA (abdominal aortic aneurysm) without rupture (HCC)      Chronic back pain      COPD (chronic obstructive pulmonary disease) (HCC)      Hypertension      Obesity           Past Surgical History         Past Surgical History:   Procedure Laterality Date    ABDOMINAL AORTIC ANEURYSM REPAIR   7/18/11 [facility]     R IIA embolization with 10mm Amplatzer plug. Endoluminal graft exclusion of infrarenal abdominal aortic anerysm with Endologix Powerfit 25mm x 100mm main body. Placement of Endologix 34mm x 120mm aortic extension (suprarenal fixation device/uncovered portion.) Coda balloon a'plasty of the aorta and Carlos iliac arteries.  Palmaz stent placement in the infrarenal abd aortic neck with Palmaz [id number] stent.    LUMBAR FUSION        TOTAL HIP ARTHROPLASTY         left    TUMOR REMOVAL         on left side of face    VASCULAR SURGERY   11/01/2017     [facility]. Right CFA 5f sheath, Aortagram with bilateral lower extremity arteriograms, Mynx right CFA         Family History          Family History   Problem Relation Age of Onset    High Blood Pressure Mother      Cancer Father         lung               Social History   Substance Use Topics    Smoking status: Current Every Day Smoker       Packs/day: 0.25       Years: 55.00    Smokeless tobacco: Never Used    Alcohol use No            Review of Systems     Constitutional  no significant activity change, appetite change, or unexpected weight change. No fever or chills. No diaphoresis or significant fatigue. HENT  no significant rhinorrhea or epistaxis. No tinnitus or significant hearing loss. Eyes  no sudden vision change or amaurosis. Respiratory  no significant shortness of breath, wheezing, or stridor. No apnea, cough, or chest tightness associated with shortness of breath. Cardiovascular  no chest pain, syncope, or significant dizziness. No palpitations or significant leg swelling. Patient reports  claudication. Gastrointestinal  no abdominal swelling or pain. No blood in stool. No severe constipation, diarrhea, nausea, or vomiting. Genitourinary  No difficulty urinating, dysuria, frequency, or urgency. No flank pain or hematuria. Musculoskeletal  no back pain, gait disturbance, or myalgia. Skin  no color change, rash, pallor. Non healing wounds feet. Neurologic  no dizziness, facial asymmetry, or light headedness. No seizures. No speech difficulty or lateralizing weakness. Hematologic  no easy bruising or excessive bleeding. Psychiatric  no severe anxiety or nervousness. No confusion.   All other review of systems are negative.        Physical Exam     /64 (Site: Right Arm, Position: Sitting, Cuff Size: Large Adult) Comment: right  Pulse 57   Temp 96.6 °F (35.9 °C)   Resp 18      Constitutional  well developed, well nourished. No diaphoresis or acute distress. HENT  head normocephalic. Right external ear canal appears normal.  Left external ear canal appears normal.  Septum appears midline. Eyes  conjunctiva normal.  EOMS normal.  No exudate. No icterus. Neck- ROM appears normal, no tracheal deviation. Cardiovascular  Regular rate and rhythm. Heart sounds are normal.  No murmur, rub, or gallop. Carotid pulses are 2+ to palpation bilaterally without bruit. Extremities - Radial and brachial pulses are 2+ to palpation bilaterally. DP and PT pulses are not palpable. Dependant rubor noted bilateral feet, left > than the right. He has wounds noted on the distal end of great toes, dorsal aspect of the left 2nd, 4th and 5th toes. No drainage or foul odor noted. No cyanosis, clubbing, or significant edema. No signs atheroembolic event. Pulmonary  effort appears normal.  No respiratory distress. Lungs - No dyspnea. Expiratory wheezes on today's exam.   GI - Abdomen  soft, non tender, bowel sounds X 4 quadrants. No guarding or rebound tenderness. No distension or palpable mass. Genitourinary  deferred. Musculoskeletal  ROM appears normal.  No significant edema. Neurologic  alert and oriented X 3. Physiologic. Skin  warm, dry, and intact. No rash, erythema, or pallor. Psychiatric  mood, affect, and behavior appear normal.  Judgment and thought processes appear normal.     Risk factors for atherosclerosis of all vascular beds have been reviewed with the patient including:  Family history, tobacco abuse in all forms, elevated cholesterol, hyperlipidemia, and diabetes.     CTA aorta with runoff -  9/19/17 (revieswed by Dr. Pretty Stanton)   Endovascular stent graft is seen involving the abdominal  aorta and bilateral common iliac arteries. The stent graft  appears widely patent.  There is very mild kinking of gallbladder is not identified and may be contracted  surgically or congenitally absent. 8.  Bilateral kidney simple renal cortical cyst.        Doppler ARYA index - 9/8/17  RIGHT DORSALIS PEDIS SYS  mmHg    RIGHT POST TIBIAL SYS  mmHg    LEFT DORSALIS PEDIS SYS MAX 54 mmHg    LEFT POST TIBIAL SYS MAX 0 mmHg    LEFT PERONEAL SYS MAX 54 mmHg    Upper arterial right arm brachial sys max 138 mmHg    Upper arterial left arm brachial sys max 127 mmHg    RIGHT ARYA RATIO 1.04     LEFT ARYA RATIO 0.39     Result Narrative   · Left Conclusion: The left ARYA is critically reduced. Waveforms are   consistent with aorto-iliac disease. Severe digital ischemia. · Right Conclusion: The right ARYA is normal. Normal digital pressures.         A-gram - 11/1/17  Findings: Left SFA, Pop, BARBARA, PTA occlusions        Options have been discussed with the patient including continued medical management vs. proceeding with a left femoral to peroneal bypass with vein. Patient has opted to proceed with left femoral to peroneal bypass with vein. .  Risks have been discussed with the patient including but not limited to MI, death, CVA, bleed, nerve injury, and infection.          Assessment        1. 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 Do not soak feet.    We will ask his Coumadin clinic to bridge him with Lovenox for his upcoming surgery  We will notify Dr. Rocio Yanez office for Cardiac clearance prior to surgery  Proceed with a left femoral - peroneal bypass with vein after he has been cleared from a cardiac standpoint     .                   Office Visit on 11/6/2017      Detailed Report           Note shared with patient   Progress Notes Info     Author Note Status Last Update User   Abraham Dutta PA-C Signed Abraham Dutta PA-C   Last Update Date/Time: 11/6/2017  1:05 PM   Chart Review Routing History     Routing history could not be found for this note. This is because the note has never been routed or because communication record creation was suppressed.

## 2017-12-09 LAB
ANION GAP SERPL CALCULATED.3IONS-SCNC: 14 MMOL/L (ref 7–19)
BUN BLDV-MCNC: 27 MG/DL (ref 8–23)
CALCIUM SERPL-MCNC: 8.1 MG/DL (ref 8.8–10.2)
CHLORIDE BLD-SCNC: 102 MMOL/L (ref 98–111)
CO2: 21 MMOL/L (ref 22–29)
CREAT SERPL-MCNC: 1.4 MG/DL (ref 0.5–1.2)
GFR NON-AFRICAN AMERICAN: 49
GLUCOSE BLD-MCNC: 108 MG/DL (ref 74–109)
GLUCOSE BLD-MCNC: 114 MG/DL (ref 70–99)
GLUCOSE BLD-MCNC: 115 MG/DL (ref 70–99)
GLUCOSE BLD-MCNC: 120 MG/DL (ref 70–99)
GLUCOSE BLD-MCNC: 140 MG/DL (ref 70–99)
HCT VFR BLD CALC: 32.5 % (ref 42–52)
HEMOGLOBIN: 10.8 G/DL (ref 14–18)
INR BLD: 1.29 (ref 0.88–1.18)
MCH RBC QN AUTO: 30.2 PG (ref 27–31)
MCHC RBC AUTO-ENTMCNC: 33.2 G/DL (ref 33–37)
MCV RBC AUTO: 90.8 FL (ref 80–94)
PDW BLD-RTO: 15 % (ref 11.5–14.5)
PERFORMED ON: ABNORMAL
PLATELET # BLD: 215 K/UL (ref 130–400)
PMV BLD AUTO: 10 FL (ref 9.4–12.4)
POTASSIUM SERPL-SCNC: 5.3 MMOL/L (ref 3.5–5)
PROTHROMBIN TIME: 16.1 SEC (ref 12–14.6)
RBC # BLD: 3.58 M/UL (ref 4.7–6.1)
SODIUM BLD-SCNC: 137 MMOL/L (ref 136–145)
WBC # BLD: 12.2 K/UL (ref 4.8–10.8)

## 2017-12-09 PROCEDURE — 99024 POSTOP FOLLOW-UP VISIT: CPT | Performed by: SURGERY

## 2017-12-09 PROCEDURE — 6370000000 HC RX 637 (ALT 250 FOR IP): Performed by: SURGERY

## 2017-12-09 PROCEDURE — 80048 BASIC METABOLIC PNL TOTAL CA: CPT

## 2017-12-09 PROCEDURE — 82948 REAGENT STRIP/BLOOD GLUCOSE: CPT

## 2017-12-09 PROCEDURE — 1210000000 HC MED SURG R&B

## 2017-12-09 PROCEDURE — 36415 COLL VENOUS BLD VENIPUNCTURE: CPT

## 2017-12-09 PROCEDURE — 85027 COMPLETE CBC AUTOMATED: CPT

## 2017-12-09 PROCEDURE — 6360000002 HC RX W HCPCS: Performed by: SURGERY

## 2017-12-09 PROCEDURE — 2580000003 HC RX 258: Performed by: SURGERY

## 2017-12-09 PROCEDURE — 2700000000 HC OXYGEN THERAPY PER DAY

## 2017-12-09 PROCEDURE — 85610 PROTHROMBIN TIME: CPT

## 2017-12-09 RX ORDER — BUDESONIDE AND FORMOTEROL FUMARATE DIHYDRATE 160; 4.5 UG/1; UG/1
2 AEROSOL RESPIRATORY (INHALATION) 2 TIMES DAILY
Status: DISCONTINUED | OUTPATIENT
Start: 2017-12-09 | End: 2017-12-09 | Stop reason: CLARIF

## 2017-12-09 RX ADMIN — QUETIAPINE FUMARATE 100 MG: 100 TABLET ORAL at 20:58

## 2017-12-09 RX ADMIN — CILOSTAZOL 100 MG: 100 TABLET ORAL at 20:57

## 2017-12-09 RX ADMIN — ENOXAPARIN SODIUM 40 MG: 40 INJECTION SUBCUTANEOUS at 00:19

## 2017-12-09 RX ADMIN — Medication 400 MG: at 20:57

## 2017-12-09 RX ADMIN — FENTANYL CITRATE 50 MCG: 50 INJECTION, SOLUTION INTRAMUSCULAR; INTRAVENOUS at 04:52

## 2017-12-09 RX ADMIN — ENOXAPARIN SODIUM 40 MG: 40 INJECTION SUBCUTANEOUS at 08:30

## 2017-12-09 RX ADMIN — LEVOTHYROXINE SODIUM 25 MCG: 50 TABLET ORAL at 06:17

## 2017-12-09 RX ADMIN — WARFARIN SODIUM 5 MG: 5 TABLET ORAL at 17:42

## 2017-12-09 RX ADMIN — PRAMIPEXOLE DIHYDROCHLORIDE 0.25 MG: 0.25 TABLET ORAL at 20:57

## 2017-12-09 RX ADMIN — CILOSTAZOL 100 MG: 100 TABLET ORAL at 08:17

## 2017-12-09 RX ADMIN — TRAMADOL HYDROCHLORIDE 100 MG: 50 TABLET, COATED ORAL at 23:17

## 2017-12-09 RX ADMIN — FENTANYL CITRATE 50 MCG: 50 INJECTION, SOLUTION INTRAMUSCULAR; INTRAVENOUS at 00:30

## 2017-12-09 RX ADMIN — FENTANYL CITRATE 50 MCG: 50 INJECTION, SOLUTION INTRAMUSCULAR; INTRAVENOUS at 20:59

## 2017-12-09 RX ADMIN — MOMETASONE FUROATE AND FORMOTEROL FUMARATE DIHYDRATE 2 PUFF: 200; 5 AEROSOL RESPIRATORY (INHALATION) at 20:58

## 2017-12-09 RX ADMIN — SOTALOL HYDROCHLORIDE 160 MG: 80 TABLET ORAL at 20:58

## 2017-12-09 RX ADMIN — Medication 10 ML: at 08:18

## 2017-12-09 RX ADMIN — SOTALOL HYDROCHLORIDE 160 MG: 80 TABLET ORAL at 08:17

## 2017-12-09 RX ADMIN — Medication 10 ML: at 20:58

## 2017-12-09 RX ADMIN — ACETYLCYSTEINE 600 MG: 200 SOLUTION ORAL; RESPIRATORY (INHALATION) at 08:16

## 2017-12-09 RX ADMIN — ASPIRIN 81 MG: 81 TABLET, COATED ORAL at 08:17

## 2017-12-09 RX ADMIN — TRAMADOL HYDROCHLORIDE 100 MG: 50 TABLET, COATED ORAL at 16:00

## 2017-12-09 RX ADMIN — CETIRIZINE HYDROCHLORIDE 10 MG: 10 TABLET, FILM COATED ORAL at 08:17

## 2017-12-09 RX ADMIN — MONTELUKAST SODIUM 10 MG: 10 TABLET, FILM COATED ORAL at 20:58

## 2017-12-09 RX ADMIN — PRAMIPEXOLE DIHYDROCHLORIDE 0.25 MG: 0.25 TABLET ORAL at 08:17

## 2017-12-09 RX ADMIN — ACETYLCYSTEINE 600 MG: 200 SOLUTION ORAL; RESPIRATORY (INHALATION) at 20:59

## 2017-12-09 ASSESSMENT — PAIN SCALES - GENERAL
PAINLEVEL_OUTOF10: 7
PAINLEVEL_OUTOF10: 8
PAINLEVEL_OUTOF10: 8
PAINLEVEL_OUTOF10: 7
PAINLEVEL_OUTOF10: 7

## 2017-12-09 NOTE — PROGRESS NOTES
Linda Salinas M.D. Daily Progress Note    Pt Name: [address] Record Number: [id number]  Date of Birth 1940   Today's Date: 12/9/2017    Chief Complaint:  No chief complaint on file. SUBJECTIVE:     Patient was seen and examined.   Pain is  controlled  has not had nausea/vomiting  No complaints     OBJECTIVE:     Patient Vitals for the past 24 hrs:   BP Temp Temp src Pulse Resp SpO2 Height Weight   12/09/17 0752 113/67 96.8 °F (36 °C) Temporal 77 20 99 % - -   12/09/17 0454 115/63 96.9 °F (36.1 °C) Temporal 67 14 93 % - -   12/09/17 0243 112/68 97.9 °F (36.6 °C) Temporal 93 14 97 % - -   12/09/17 0048 113/66 98.1 °F (36.7 °C) Temporal 74 14 94 % - -   12/08/17 2327 133/82 98.3 °F (36.8 °C) Temporal 77 16 96 % - -   12/08/17 2245 - - - 73 - - - -   12/08/17 2231 105/60 97.8 °F (36.6 °C) Temporal 73 18 94 % - -   12/08/17 2130 126/79 97.8 °F (36.6 °C) Temporal 81 18 92 % - -   12/08/17 1951 121/72 97.8 °F (36.6 °C) Temporal 75 18 98 % - -   12/08/17 1831 126/78 97.2 °F (36.2 °C) Temporal 69 24 96 % - -   12/08/17 1800 - - - 79 - - - -   12/08/17 1740 - - - 80 19 94 % - -   12/08/17 1735 110/61 - - 67 17 98 % - -   12/08/17 1730 - - - 67 16 100 % - -   12/08/17 1725 (!) 108/58 - - 71 19 98 % - -   12/08/17 1720 - - - 68 17 95 % - -   12/08/17 1715 110/63 - - 69 17 93 % - -   12/08/17 1710 109/61 - - 70 13 95 % - -   12/08/17 1705 108/64 - - 71 20 93 % - -   12/08/17 1700 112/68 - - 69 17 98 % - -   12/08/17 1655 125/70 - - 68 19 100 % - -   12/08/17 1650 103/67 - - 69 18 100 % - -   12/08/17 1645 117/74 - - 70 20 96 % - -   12/08/17 1640 112/62 - - 70 18 94 % - -   12/08/17 1625 (!) 107/56 - - 67 15 96 % - -   12/08/17 1620 125/64 - - 69 16 96 % - -   12/08/17 1615 130/65 98.6 °F (37 °C) Temporal 73 17 93 % - -   12/08/17 1614 - 98.6 °F (37 °C) - - - - - -   12/08/17 0840 127/77 98 °F (36.7 °C) Temporal 55 18 96 % 5' 11\" (1.803 m) 224 lb (101.6 kg)         Intake/Output Summary (Last 24 hours) at 12/09/17 0827  Last data filed at 12/09/17 0454   Gross per 24 hour   Intake             2500 ml   Output             2650 ml   Net             -150 ml       In: 1500 [P.O.:300; I.V.:1200]  Out: 1620 [Urine:1520]    I/O last 3 completed shifts: In: 2500 [P.O.:300; I.V.:2200]  Out: 2650 [Urine:2300; Blood:350]       Date 12/09/17 0000 - 12/09/17 2359   Shift 0000-0759 1501-7569 2718-5777 24 Hour Total   I  N  T  A  K  E   P.O.  (mL/kg/hr) 300  (0.4)   300    Shift Total  (mL/kg) 300  (3)   300  (3)   O  U  T  P  U  T   Urine  (mL/kg/hr) 1500  (1.8)   1500    Shift Total  (mL/kg) 1500  (14.8)   1500  (14.8)   Weight (kg) 101.6 101.6 101.6 101.6       Wt Readings from Last 3 Encounters:   12/08/17 224 lb (101.6 kg)   11/29/17 224 lb (101.6 kg)   08/25/11 198 lb (89.8 kg)        Body mass index is 31.24 kg/m². Diet:          MEDS:     Scheduled Meds:   warfarin (COUMADIN) daily dosing (placeholder)   Other RX Placeholder    acetylcysteine  600 mg Oral BID    cetirizine  10 mg Oral Daily    cilostazol  100 mg Oral BID    enoxaparin  40 mg Subcutaneous Daily    levothyroxine  25 mcg Oral Daily    magnesium oxide  400 mg Oral Nightly    montelukast  10 mg Oral Nightly    pramipexole  0.25 mg Oral BID    QUEtiapine  100 mg Oral Nightly    sotalol  160 mg Oral BID    warfarin  5 mg Oral Daily    sodium chloride flush  10 mL Intravenous 2 times per day    aspirin  81 mg Oral Daily     Continuous Infusions:   sodium chloride 75 mL/hr at 12/08/17 1823     PRN Meds:  sodium chloride flush 10 mL PRN   acetaminophen 650 mg Q4H PRN   ondansetron 4 mg Q6H PRN   metoprolol tartrate 25 mg Q12H PRN   cloNIDine 0.1 mg Q2H PRN   traMADol 50 mg Q6H PRN   Or     traMADol 100 mg Q6H PRN   fentanNYL 25 mcg Q3H PRN   Or     fentanNYL 50 mcg Q3H PRN         PHYSICAL EXAM:     CONSTITUTIONAL: Alert and oriented times 3, no acute distress and cooperative to examination.   WOUND/INCISION:  Some ooze from calf wound, no hematomas  EXTREMITY: no cyanosis, clubbing or edema and foot warm, good doppler in DPA and over bypass    LABS:       CBC: Recent Labs      12/09/17 0425   WBC  12.2*   RBC  3.58*   HGB  10.8*   HCT  32.5*   MCV  90.8   MCH  30.2   MCHC  33.2   RDW  15.0*   PLT  215   MPV  10.0      Last 3 CMP:   Recent Labs      12/08/17 0834  12/09/17 0425   NA   --   137   K  4.8  5.3*   CL   --   102   CO2   --   21*   BUN   --   27*   CREATININE   --   1.4*   GLUCOSE   --   108   CALCIUM   --   8.1*      Troponin: No results for input(s): TROPONINI in the last 72 hours. Calcium:   Lab Results   Component Value Date    CALCIUM 8.1 12/09/2017    CALCIUM 9.2 11/29/2017    CALCIUM 8.8 11/01/2017      Ionized Calcium: No results found for: IONCA   Lipids: No results for input(s): CHOL, HDL in the last 72 hours. Invalid input(s): LDLCALCU  INR:   Recent Labs      12/08/17 0834   INR  1.61*     Lactic Acid: No results found for: LACTA                 DVT prophylaxis: [] Lovenox                                 [] SCDs                                 [] SQ Heparin                                 [] Encourage ambulation, low risk for DVT, no chemical or                                      mechanical prophylaxis necessary              [] Already on Anticoagulation      RADIOLOGY:                ASSESSMENT:     1. POD # 1  2. 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 Hyperkalemia, question hemolyzed. 4. CKD with Cr 1.4            PLAN:     1. Continue IVF (ns)  2. Check bmp in am   3. Increase activity  4.  D/c

## 2017-12-10 LAB
ANION GAP SERPL CALCULATED.3IONS-SCNC: 9 MMOL/L (ref 7–19)
BUN BLDV-MCNC: 28 MG/DL (ref 8–23)
CALCIUM SERPL-MCNC: 7.9 MG/DL (ref 8.8–10.2)
CHLORIDE BLD-SCNC: 105 MMOL/L (ref 98–111)
CO2: 24 MMOL/L (ref 22–29)
CREAT SERPL-MCNC: 1.6 MG/DL (ref 0.5–1.2)
GFR NON-AFRICAN AMERICAN: 42
GLUCOSE BLD-MCNC: 101 MG/DL (ref 74–109)
GLUCOSE BLD-MCNC: 120 MG/DL (ref 70–99)
GLUCOSE BLD-MCNC: 124 MG/DL (ref 70–99)
GLUCOSE BLD-MCNC: 132 MG/DL (ref 70–99)
GLUCOSE BLD-MCNC: 96 MG/DL (ref 70–99)
INR BLD: 1.42 (ref 0.88–1.18)
PERFORMED ON: ABNORMAL
PERFORMED ON: NORMAL
POTASSIUM SERPL-SCNC: 4.6 MMOL/L (ref 3.5–5)
PROTHROMBIN TIME: 17.3 SEC (ref 12–14.6)
SODIUM BLD-SCNC: 138 MMOL/L (ref 136–145)

## 2017-12-10 PROCEDURE — 0HDNXZZ EXTRACTION OF LEFT FOOT SKIN, EXTERNAL APPROACH: ICD-10-PCS | Performed by: SURGERY

## 2017-12-10 PROCEDURE — 80048 BASIC METABOLIC PNL TOTAL CA: CPT

## 2017-12-10 PROCEDURE — 2580000003 HC RX 258: Performed by: SURGERY

## 2017-12-10 PROCEDURE — 99024 POSTOP FOLLOW-UP VISIT: CPT | Performed by: SURGERY

## 2017-12-10 PROCEDURE — 36415 COLL VENOUS BLD VENIPUNCTURE: CPT

## 2017-12-10 PROCEDURE — 6360000002 HC RX W HCPCS: Performed by: SURGERY

## 2017-12-10 PROCEDURE — 1210000000 HC MED SURG R&B

## 2017-12-10 PROCEDURE — 2700000000 HC OXYGEN THERAPY PER DAY

## 2017-12-10 PROCEDURE — 85610 PROTHROMBIN TIME: CPT

## 2017-12-10 PROCEDURE — 82948 REAGENT STRIP/BLOOD GLUCOSE: CPT

## 2017-12-10 PROCEDURE — 6370000000 HC RX 637 (ALT 250 FOR IP): Performed by: SURGERY

## 2017-12-10 RX ADMIN — ACETYLCYSTEINE 600 MG: 200 SOLUTION ORAL; RESPIRATORY (INHALATION) at 08:12

## 2017-12-10 RX ADMIN — CETIRIZINE HYDROCHLORIDE 10 MG: 10 TABLET, FILM COATED ORAL at 08:12

## 2017-12-10 RX ADMIN — TRAMADOL HYDROCHLORIDE 100 MG: 50 TABLET, COATED ORAL at 15:18

## 2017-12-10 RX ADMIN — MOMETASONE FUROATE AND FORMOTEROL FUMARATE DIHYDRATE 2 PUFF: 200; 5 AEROSOL RESPIRATORY (INHALATION) at 20:52

## 2017-12-10 RX ADMIN — Medication 10 ML: at 20:54

## 2017-12-10 RX ADMIN — FENTANYL CITRATE 50 MCG: 50 INJECTION, SOLUTION INTRAMUSCULAR; INTRAVENOUS at 23:46

## 2017-12-10 RX ADMIN — SOTALOL HYDROCHLORIDE 160 MG: 80 TABLET ORAL at 20:52

## 2017-12-10 RX ADMIN — Medication 10 ML: at 08:14

## 2017-12-10 RX ADMIN — WARFARIN SODIUM 5 MG: 5 TABLET ORAL at 17:30

## 2017-12-10 RX ADMIN — Medication 400 MG: at 20:53

## 2017-12-10 RX ADMIN — ENOXAPARIN SODIUM 40 MG: 40 INJECTION SUBCUTANEOUS at 08:30

## 2017-12-10 RX ADMIN — CILOSTAZOL 100 MG: 100 TABLET ORAL at 08:12

## 2017-12-10 RX ADMIN — MOMETASONE FUROATE AND FORMOTEROL FUMARATE DIHYDRATE 2 PUFF: 200; 5 AEROSOL RESPIRATORY (INHALATION) at 08:12

## 2017-12-10 RX ADMIN — LEVOTHYROXINE SODIUM 25 MCG: 50 TABLET ORAL at 06:35

## 2017-12-10 RX ADMIN — Medication 2 EACH: at 10:17

## 2017-12-10 RX ADMIN — PRAMIPEXOLE DIHYDROCHLORIDE 0.25 MG: 0.25 TABLET ORAL at 08:12

## 2017-12-10 RX ADMIN — QUETIAPINE FUMARATE 100 MG: 100 TABLET ORAL at 20:53

## 2017-12-10 RX ADMIN — ASPIRIN 81 MG: 81 TABLET, COATED ORAL at 08:13

## 2017-12-10 RX ADMIN — TRAMADOL HYDROCHLORIDE 100 MG: 50 TABLET, COATED ORAL at 09:19

## 2017-12-10 RX ADMIN — CILOSTAZOL 100 MG: 100 TABLET ORAL at 20:52

## 2017-12-10 RX ADMIN — PRAMIPEXOLE DIHYDROCHLORIDE 0.25 MG: 0.25 TABLET ORAL at 20:52

## 2017-12-10 RX ADMIN — ACETAMINOPHEN 650 MG: 325 TABLET, FILM COATED ORAL at 20:56

## 2017-12-10 RX ADMIN — FENTANYL CITRATE 50 MCG: 50 INJECTION, SOLUTION INTRAMUSCULAR; INTRAVENOUS at 02:42

## 2017-12-10 RX ADMIN — MONTELUKAST SODIUM 10 MG: 10 TABLET, FILM COATED ORAL at 20:53

## 2017-12-10 RX ADMIN — SOTALOL HYDROCHLORIDE 160 MG: 80 TABLET ORAL at 08:12

## 2017-12-10 ASSESSMENT — PAIN SCALES - GENERAL
PAINLEVEL_OUTOF10: 8
PAINLEVEL_OUTOF10: 7
PAINLEVEL_OUTOF10: 7
PAINLEVEL_OUTOF10: 5
PAINLEVEL_OUTOF10: 7

## 2017-12-10 NOTE — PROGRESS NOTES
Pop Chandra M.D. Daily Progress Note    Pt Name: [address] Record Number: [id number]  Date of Birth 1940   Today's Date: 12/10/2017    Chief Complaint:  No chief complaint on file. SUBJECTIVE:     Patient was seen and examined. Pain is  controlled  No complaints     OBJECTIVE:     Patient Vitals for the past 24 hrs:   BP Temp Temp src Pulse Resp SpO2   12/10/17 1153 (!) 90/59 97.2 °F (36.2 °C) Temporal 60 20 96 %   12/10/17 0701 (!) 88/54 97.4 °F (36.3 °C) Temporal 65 20 94 %   12/10/17 0047 110/64 96.5 °F (35.8 °C) Temporal 61 16 97 %   12/09/17 1850 110/64 97.2 °F (36.2 °C) Temporal 76 16 97 %   12/09/17 1318 (!) 98/50 97.4 °F (36.3 °C) Temporal 69 18 94 %         Intake/Output Summary (Last 24 hours) at 12/10/17 1251  Last data filed at 12/10/17 1154   Gross per 24 hour   Intake             1603 ml   Output              700 ml   Net              903 ml       In: 600 [P.O.:600]  Out: 500 [Urine:500]    I/O last 3 completed shifts: In: 1243 [P.O.:840; I.V.:403]  Out: 400 [Urine:400]       Date 12/10/17 0000 - 12/10/17 2359   Shift 6756-0149 6986-3843 0256-5825 24 Hour Total   I  N  T  A  K  E   P.O.  (mL/kg/hr) 240  (0.3) 360  600    Shift Total  (mL/kg) 240  (2.4) 360  (3.5)  600  (5.9)   O  U  T  P  U  T   Urine  (mL/kg/hr) 200  (0.2) 300  500    Shift Total  (mL/kg) 200  (2) 300  (3)  500  (4.9)   Weight (kg) 101.6 101.6 101.6 101.6       Wt Readings from Last 3 Encounters:   12/08/17 224 lb (101.6 kg)   11/29/17 224 lb (101.6 kg)   08/25/11 198 lb (89.8 kg)        Body mass index is 31.24 kg/m².      Diet: DIET CARB CONTROL;        MEDS:     Scheduled Meds:   warfarin (COUMADIN) daily dosing (placeholder)   Other RX Placeholder    mometasone-formoterol  2 puff Inhalation BID    acetylcysteine  600 mg Oral BID    cetirizine  10 mg Oral Daily    cilostazol  100 mg Oral BID    enoxaparin  40 mg Subcutaneous Daily    levothyroxine  25 mcg Oral Daily    results found for: IONCA   Lipids: No results for input(s): CHOL, HDL in the last 72 hours. Invalid input(s): LDLCALCU  INR:   Recent Labs      12/08/17   0834  12/09/17   1657  12/10/17   0337   INR  1.61*  1.29*  1.42*     Lactic Acid: No results found for: LACTA                 DVT prophylaxis: [] Lovenox                                 [] SCDs                                 [] SQ Heparin                                 [] Encourage ambulation, low risk for DVT, no chemical or                                      mechanical prophylaxis necessary              [] Already on Anticoagulation      RADIOLOGY:                ASSESSMENT:     1. POD # 2  2. 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 CKD stage III. Increased CR from 1.4 to 1.6 today. PLAN:     1. Stop IVF because he is getting some shortness of breath  2. Check BMP tomorrow. If Cr increases, will get nephrology to see patient in consult  3. If Cr stable or decreases, he can go home tomorrow. 4.         Radha Coffman M.D.    Electronically signed 12/10/2017 at 12:51 PM

## 2017-12-11 VITALS
HEART RATE: 61 BPM | SYSTOLIC BLOOD PRESSURE: 100 MMHG | TEMPERATURE: 97.6 F | DIASTOLIC BLOOD PRESSURE: 56 MMHG | HEIGHT: 71 IN | RESPIRATION RATE: 16 BRPM | OXYGEN SATURATION: 97 % | WEIGHT: 224 LBS | BODY MASS INDEX: 31.36 KG/M2

## 2017-12-11 LAB
ANION GAP SERPL CALCULATED.3IONS-SCNC: 11 MMOL/L (ref 7–19)
BUN BLDV-MCNC: 24 MG/DL (ref 8–23)
CALCIUM SERPL-MCNC: 8.1 MG/DL (ref 8.8–10.2)
CHLORIDE BLD-SCNC: 102 MMOL/L (ref 98–111)
CO2: 24 MMOL/L (ref 22–29)
CREAT SERPL-MCNC: 1.2 MG/DL (ref 0.5–1.2)
GFR NON-AFRICAN AMERICAN: 59
GLUCOSE BLD-MCNC: 106 MG/DL (ref 70–99)
GLUCOSE BLD-MCNC: 92 MG/DL (ref 70–99)
GLUCOSE BLD-MCNC: 92 MG/DL (ref 74–109)
INR BLD: 1.48 (ref 0.88–1.18)
PERFORMED ON: ABNORMAL
PERFORMED ON: NORMAL
POTASSIUM SERPL-SCNC: 4.5 MMOL/L (ref 3.5–5)
PROTHROMBIN TIME: 17.9 SEC (ref 12–14.6)
SODIUM BLD-SCNC: 137 MMOL/L (ref 136–145)

## 2017-12-11 PROCEDURE — 82948 REAGENT STRIP/BLOOD GLUCOSE: CPT

## 2017-12-11 PROCEDURE — 2700000000 HC OXYGEN THERAPY PER DAY

## 2017-12-11 PROCEDURE — 85610 PROTHROMBIN TIME: CPT

## 2017-12-11 PROCEDURE — 2580000003 HC RX 258: Performed by: SURGERY

## 2017-12-11 PROCEDURE — 36415 COLL VENOUS BLD VENIPUNCTURE: CPT

## 2017-12-11 PROCEDURE — 6370000000 HC RX 637 (ALT 250 FOR IP): Performed by: SURGERY

## 2017-12-11 PROCEDURE — 6360000002 HC RX W HCPCS: Performed by: SURGERY

## 2017-12-11 PROCEDURE — 80048 BASIC METABOLIC PNL TOTAL CA: CPT

## 2017-12-11 PROCEDURE — 99024 POSTOP FOLLOW-UP VISIT: CPT | Performed by: SURGERY

## 2017-12-11 RX ADMIN — MOMETASONE FUROATE AND FORMOTEROL FUMARATE DIHYDRATE 2 PUFF: 200; 5 AEROSOL RESPIRATORY (INHALATION) at 08:34

## 2017-12-11 RX ADMIN — SOTALOL HYDROCHLORIDE 160 MG: 80 TABLET ORAL at 08:35

## 2017-12-11 RX ADMIN — CILOSTAZOL 100 MG: 100 TABLET ORAL at 08:34

## 2017-12-11 RX ADMIN — ACETAMINOPHEN 650 MG: 325 TABLET, FILM COATED ORAL at 11:52

## 2017-12-11 RX ADMIN — ENOXAPARIN SODIUM 40 MG: 40 INJECTION SUBCUTANEOUS at 08:34

## 2017-12-11 RX ADMIN — ASPIRIN 81 MG: 81 TABLET, COATED ORAL at 08:34

## 2017-12-11 RX ADMIN — CETIRIZINE HYDROCHLORIDE 10 MG: 10 TABLET, FILM COATED ORAL at 08:34

## 2017-12-11 RX ADMIN — TRAMADOL HYDROCHLORIDE 100 MG: 50 TABLET, COATED ORAL at 06:06

## 2017-12-11 RX ADMIN — PRAMIPEXOLE DIHYDROCHLORIDE 0.25 MG: 0.25 TABLET ORAL at 08:35

## 2017-12-11 RX ADMIN — LEVOTHYROXINE SODIUM 25 MCG: 50 TABLET ORAL at 06:07

## 2017-12-11 RX ADMIN — Medication 10 ML: at 08:35

## 2017-12-11 ASSESSMENT — PAIN SCALES - GENERAL
PAINLEVEL_OUTOF10: 8
PAINLEVEL_OUTOF10: 7

## 2017-12-11 NOTE — PROGRESS NOTES
CM on floor to set up [address] with patient. Pt/INR needed this coming Wednesday with results sent to patient's primary for coumadin dosing.

## 2017-12-11 NOTE — PROGRESS NOTES
Patient has been friendly and cooperative with care. Peripheral pulses positive. Scant drainage noted to dressing to LLE. Patient has been voiding without diff.

## 2017-12-11 NOTE — PROGRESS NOTES
Dressing change completed to left leg. Incision cleaned with dial soap/water with mepilex replaced to upper portion of incision. Staples clean/dry/intack. Patient denies any discomfort at present with incision. Does have a headache 8/10. Tylenol given.

## 2017-12-11 NOTE — PROGRESS NOTES
Vascular Surgery Fiona Lopez M.D. Daily Progress Note    Pt Name: [address] Record Number: [id number]  Date of Birth 1940   Today's Date: 12/11/2017    Chief Complaint:  No chief complaint on file. SUBJECTIVE:     Patient was seen and examined. Pain is  controlled  No complaints     OBJECTIVE:     Patient Vitals for the past 24 hrs:   BP Temp Temp src Pulse Resp SpO2   12/11/17 0653 95/63 96.6 °F (35.9 °C) Temporal 58 16 99 %   12/11/17 0029 119/65 96.7 °F (35.9 °C) Temporal 61 16 94 %   12/10/17 1832 126/77 97.1 °F (36.2 °C) Temporal 64 18 97 %   12/10/17 1153 (!) 90/59 97.2 °F (36.2 °C) Temporal 60 20 96 %         Intake/Output Summary (Last 24 hours) at 12/11/17 1047  Last data filed at 12/11/17 0839   Gross per 24 hour   Intake              940 ml   Output             1175 ml   Net             -235 ml       In: 760 [P.O.:760]  Out: 1025 [Urine:1025]    I/O last 3 completed shifts: In: 1300 [P.O.:1300]  Out: 1200 [Urine:1200]       Date 12/11/17 0000 - 12/11/17 2359   Shift 3614-6171 5891-3995 4081-0222 24 Hour Total   I  N  T  A  K  E   P.O.  (mL/kg/hr) 520  (0.6)   520    Shift Total  (mL/kg) 520  (5.1)   520  (5.1)   O  U  T  P  U  T   Urine  (mL/kg/hr) 600  (0.7) 125  725    Shift Total  (mL/kg) 600  (5.9) 125  (1.2)  725  (7.1)   Weight (kg) 101.6 101.6 101.6 101.6       Wt Readings from Last 3 Encounters:   12/08/17 224 lb (101.6 kg)   11/29/17 224 lb (101.6 kg)   08/25/11 198 lb (89.8 kg)        Body mass index is 31.24 kg/m².      Diet: DIET CARB CONTROL;        MEDS:     Scheduled Meds:   collagenase   Topical Daily    warfarin (COUMADIN) daily dosing (placeholder)   Other RX Placeholder    mometasone-formoterol  2 puff Inhalation BID    cetirizine  10 mg Oral Daily    cilostazol  100 mg Oral BID    enoxaparin  40 mg Subcutaneous Daily    levothyroxine  25 mcg Oral Daily    magnesium oxide  400 mg Oral Nightly    montelukast  10 mg Oral Nightly    pramipexole

## 2017-12-13 ENCOUNTER — ANTICOAGULATION VISIT (OUTPATIENT)
Dept: CARDIAC SURGERY | Facility: CLINIC | Age: 77
End: 2017-12-13

## 2017-12-13 ENCOUNTER — TELEPHONE (OUTPATIENT)
Dept: VASCULAR SURGERY | Age: 77
End: 2017-12-13

## 2017-12-13 DIAGNOSIS — I74.9 EMBOLISM AND THROMBOSIS OF ARTERY (HCC): ICD-10-CM

## 2017-12-13 DIAGNOSIS — Z79.01 LONG-TERM (CURRENT) USE OF ANTICOAGULANTS: ICD-10-CM

## 2017-12-13 DIAGNOSIS — I48.0 PAROXYSMAL ATRIAL FIBRILLATION (HCC): ICD-10-CM

## 2017-12-13 LAB — INR PPP: 2.1

## 2017-12-13 NOTE — PROGRESS NOTES
Spoke to Dorita RN with [facility]  who was still in the home with pt.  Pt was discharged home from [facility] in [place] post fem-pop per Dr. Sousa left leg.  Per Dorita, pt was not sent home on lovenox coverage.  However, Dorita did speak to Maribel from Dr. Sousa' office today who stated for pt to follow [facility] instructions.  I instructed Dorita on pt's coumadin dosing schedule and for pt to resume Lovenox 40mg daily (renal dosing), no green intake for now.   Pt has an appt in town this Friday the 15th with Dr. Gan; appt made for pt in the [facility] for Friday.  Will call [facility]  on Friday with instructions for next INR.  Instructions verbalized.

## 2017-12-13 NOTE — TELEPHONE ENCOUNTER
Jabari Regan, nurse with [facility], [facility], called stating that the patient was being non compliant with his wound care and dressing changes. She states that the patient is unable to afford the santyl, so he has been using betadine on his left toes. She states that she came back to see the patient today and he had the dressing off and stated that he did not want it back on. I spoke with Rebecca Jarrell about this. Rebecca Jarrell stated that if the patient is non compliant that the nurse will just need to document the issues. She needs to inform the patient our recommendation is that he use the Santyl and keep the dressing on his left foot. If the patient does not want to follow these instructions, then he can keep using the betadine and follow up in our office on 12/21/17. The nurse, Jabari Regan, voiced understanding.

## 2017-12-15 ENCOUNTER — LAB REQUISITION (OUTPATIENT)
Dept: LAB | Facility: HOSPITAL | Age: 77
End: 2017-12-15

## 2017-12-15 ENCOUNTER — ANTICOAGULATION VISIT (OUTPATIENT)
Dept: CARDIAC SURGERY | Facility: CLINIC | Age: 77
End: 2017-12-15

## 2017-12-15 ENCOUNTER — TELEPHONE (OUTPATIENT)
Dept: VASCULAR SURGERY | Age: 77
End: 2017-12-15

## 2017-12-15 DIAGNOSIS — Z79.01 LONG-TERM (CURRENT) USE OF ANTICOAGULANTS: ICD-10-CM

## 2017-12-15 DIAGNOSIS — I48.0 PAROXYSMAL ATRIAL FIBRILLATION (HCC): ICD-10-CM

## 2017-12-15 DIAGNOSIS — Z48.812 ENCOUNTER FOR SURGICAL AFTERCARE FOLLOWING SURGERY OF CIRCULATORY SYSTEM: ICD-10-CM

## 2017-12-15 DIAGNOSIS — I74.9 EMBOLISM AND THROMBOSIS OF ARTERY (HCC): ICD-10-CM

## 2017-12-15 LAB — INR PPP: 3.2

## 2017-12-15 PROCEDURE — 87070 CULTURE OTHR SPECIMN AEROBIC: CPT | Performed by: SURGERY

## 2017-12-15 PROCEDURE — 87147 CULTURE TYPE IMMUNOLOGIC: CPT | Performed by: SURGERY

## 2017-12-15 PROCEDURE — 85610 PROTHROMBIN TIME: CPT | Performed by: THORACIC SURGERY (CARDIOTHORACIC VASCULAR SURGERY)

## 2017-12-15 PROCEDURE — 87205 SMEAR GRAM STAIN: CPT | Performed by: SURGERY

## 2017-12-15 RX ORDER — CEPHALEXIN 500 MG/1
500 CAPSULE ORAL 3 TIMES DAILY
Qty: 21 CAPSULE | Refills: 0 | Status: SHIPPED | OUTPATIENT
Start: 2017-12-15 | End: 2017-12-22

## 2017-12-15 NOTE — PROGRESS NOTES
I spoke to Hanh RN with [facility]  by phone after she had left the patient's home. Hanh was given instructions for Coumadin dosing, to have pt stop Lovenox, and to have pt eat a serving of green veggies today; she repeated back correctly. Pt has an appt with PCP on Monday in [facility], therefore appt was made to come to  and we will call results to ; Hanh repeated back correctly. Hanh stated she would call pt with all instructions.

## 2017-12-15 NOTE — TELEPHONE ENCOUNTER
The [facility] nurse, Glory # [phone], called with concerns about the pt's incision site having redness and drainage. We asked that she do a culture of the incision site and send a picture of the incision to the provider to evaluate. The [facility] nurse sent the picture through a secure number to [facility] issued phone. Lanette Brandon instructed me to send in Keflex 500 TID X7 days until we can get the pt's culture on the incision back. The patient is also scheduled to follow up in our office next week. I relayed all this information to the patient as well as the [address], [address] [place] [address], . They both voiced understanding.

## 2017-12-18 ENCOUNTER — ANTICOAGULATION VISIT (OUTPATIENT)
Dept: CARDIAC SURGERY | Facility: CLINIC | Age: 77
End: 2017-12-18

## 2017-12-18 DIAGNOSIS — I48.0 PAROXYSMAL ATRIAL FIBRILLATION (HCC): ICD-10-CM

## 2017-12-18 DIAGNOSIS — I74.9 EMBOLISM AND THROMBOSIS OF ARTERY (HCC): ICD-10-CM

## 2017-12-18 DIAGNOSIS — Z79.01 LONG-TERM (CURRENT) USE OF ANTICOAGULANTS: ICD-10-CM

## 2017-12-18 LAB
BACTERIA SPEC AEROBE CULT: NORMAL
GRAM STN SPEC: NORMAL
GRAM STN SPEC: NORMAL
INR PPP: 2.6

## 2017-12-18 PROCEDURE — 85610 PROTHROMBIN TIME: CPT | Performed by: THORACIC SURGERY (CARDIOTHORACIC VASCULAR SURGERY)

## 2017-12-18 NOTE — PROGRESS NOTES
Received INR from DANITA Kevin who had already left pt's home.  I called pt at home and gave instructions for coumadin dosing schedule.  Pt repeated dose back correctly.  Instructed pt, [facility] will recheck his INR on Tuesday, Dec 26th.  Pt verbalizes.  I then spoke to DANITA Ricks with [facility] and gave same instructions.

## 2017-12-19 ENCOUNTER — TELEPHONE (OUTPATIENT)
Dept: FAMILY MEDICINE CLINIC | Facility: CLINIC | Age: 77
End: 2017-12-19

## 2017-12-19 ENCOUNTER — DOCUMENTATION (OUTPATIENT)
Dept: CARDIAC SURGERY | Facility: CLINIC | Age: 77
End: 2017-12-19

## 2017-12-19 NOTE — PROGRESS NOTES
Dorita RN with [employer]  called to say pt started Keflex this past Saturday for 10 days.  I instructed for pt to increase green intake tomorrow and Dorita with recheck INR this Friday.  Left coumadin dose the same since pt is due 2.5mg today and Thursday.  Instructions verbalized.

## 2017-12-20 ENCOUNTER — DOCUMENTATION (OUTPATIENT)
Dept: CARDIAC SURGERY | Facility: CLINIC | Age: 77
End: 2017-12-20

## 2017-12-21 ENCOUNTER — OFFICE VISIT (OUTPATIENT)
Dept: VASCULAR SURGERY | Age: 77
End: 2017-12-21

## 2017-12-21 VITALS
RESPIRATION RATE: 18 BRPM | SYSTOLIC BLOOD PRESSURE: 120 MMHG | TEMPERATURE: 97.7 F | HEART RATE: 60 BPM | OXYGEN SATURATION: 91 % | DIASTOLIC BLOOD PRESSURE: 74 MMHG

## 2017-12-21 DIAGNOSIS — Z09 FOLLOW UP: Primary | ICD-10-CM

## 2017-12-21 PROCEDURE — 99024 POSTOP FOLLOW-UP VISIT: CPT | Performed by: PHYSICIAN ASSISTANT

## 2017-12-21 RX ORDER — BUDESONIDE AND FORMOTEROL FUMARATE DIHYDRATE 160; 4.5 UG/1; UG/1
2 AEROSOL RESPIRATORY (INHALATION) 2 TIMES DAILY
Refills: 12 | COMMUNITY
Start: 2017-11-20

## 2017-12-21 RX ORDER — TRAMADOL HYDROCHLORIDE 50 MG/1
2 TABLET ORAL EVERY 6 HOURS PRN
Refills: 0 | COMMUNITY
Start: 2017-12-11 | End: 2017-12-23 | Stop reason: SDUPTHER

## 2017-12-21 NOTE — PROGRESS NOTES
Patient Care Team:  Efe Tanner MD as PCP - General (Family Medicine)    Mr. Sumeet Pena is a 69 y.o. male who presents for post op evaluation. Patient had a left fem - distal bypass with insitu on 12/8/17. This was performed by Dr. Cristina Anaya. Post op symptoms reported  increasing swelling. Post op pain is minimal.  He denies any fever/chills. On evaluation today, incision is clean, dry, intact. Today we removed every other  staples. PT 1+, DP, peroneal and graft pulses are 2+ with doppler signals . Today we have recommended he wash incision daily with soap and water and cover with dry gauze until healed. We have recommended continued warfarin, Pletal 100 mg BID daily. We will follow up with him in 1 week for remaining staple removal or sooner if He develops fever/chills or wound deterioration. This should bring you up to date on [facility]  As always we want to thank you for allowing us to participate in the care of your patients.     Sincerely,    Penny Osullivan PA-C

## 2017-12-21 NOTE — OP NOTE
SATNA[facility] OF [facility] BRENDA [address] 79, [address]                                 OPERATIVE REPORT    PATIENT NAME: Bong Sheikh                   :        1940  MED REC NO:   [id number]                              ROOM:       [facility]  ACCOUNT NO:   [de-identified]                           ADMIT DATE: 2017  PROVIDER:     Wen Kan MD          DATE OF SURGERY:  2017    PREOPERATIVE DIAGNOSES:  1. Atherosclerosis of natives arteries, bilateral lower extremities, with  gangrenous left toes. 2.  Occluded left femoral to below-knee popliteal artery bypass graft with  PTFE (performed at an outside institution). 3.  History of endovascular repair of abdominal aortic aneurysm with aortic  endograft. POSTOPERATIVE DIAGNOSES:  1. Atherosclerosis of natives arteries, bilateral lower extremities, with  gangrenous left toes. 2.  Occluded left femoral to below-knee popliteal artery bypass graft with  PTFE (performed at an outside institution). 3.  History of endovascular repair of abdominal aortic aneurysm with aortic  endograft. OPERATIVE PROCEDURE:  1.  Redo exposure left groin and left below-knee incisions to expose the  femoral arteries and tibial arteries. 2.  Left lower extremity arteriograms. 3.  Left common femoral to peroneal artery bypass graft with in situ left  greater saphenous vein sewed into a peroneal artery vein patch. SURGEON:  Wen Kan MD    FIRST ASSISTANT:  Chaparro Blanca MD    ANESTHESIA:  General endotracheal.    ESTIMATED BLOOD LOSS:  350 mL. FINDINGS:  1. The patient has a good distal target with minimal plaque in the  peroneal artery. 2.  The vein is of good caliber around 3 to 4 mm in diameter. 3.  The left common femoral artery is very large secondary to the PTFE  givens. This was completely excised.   4.  Left lower extremity arteriograms after bypass reveal a widely patent  bypass graft and

## 2017-12-22 ENCOUNTER — ANTICOAGULATION VISIT (OUTPATIENT)
Dept: CARDIAC SURGERY | Facility: CLINIC | Age: 77
End: 2017-12-22

## 2017-12-22 DIAGNOSIS — I74.9 EMBOLISM AND THROMBOSIS OF ARTERY (HCC): ICD-10-CM

## 2017-12-22 DIAGNOSIS — Z79.01 LONG-TERM (CURRENT) USE OF ANTICOAGULANTS: ICD-10-CM

## 2017-12-22 DIAGNOSIS — I48.0 PAROXYSMAL ATRIAL FIBRILLATION (HCC): ICD-10-CM

## 2017-12-22 LAB — INR PPP: 2.1

## 2017-12-22 NOTE — PROGRESS NOTES
I received INR from DANITA Schafer with [facility] by phone while she was still in the patient's home. Pt states he finished Keflex. Pt denies other med changes or bleeding problems. Hanh was given instructions for Coumadin dosing, for pt to hold green veggies for 3 days, and to recheck INR on December 27; she repeated back correctly.

## 2017-12-26 ENCOUNTER — ANTICOAGULATION VISIT (OUTPATIENT)
Dept: CARDIAC SURGERY | Facility: CLINIC | Age: 77
End: 2017-12-26

## 2017-12-26 DIAGNOSIS — I48.0 PAROXYSMAL ATRIAL FIBRILLATION (HCC): ICD-10-CM

## 2017-12-26 DIAGNOSIS — Z79.01 LONG-TERM (CURRENT) USE OF ANTICOAGULANTS: ICD-10-CM

## 2017-12-26 DIAGNOSIS — I74.9 EMBOLISM AND THROMBOSIS OF ARTERY (HCC): ICD-10-CM

## 2017-12-26 LAB — INR PPP: 2.2

## 2017-12-26 NOTE — PROGRESS NOTES
I received INR from DANITA Shelton with [facility]  by phone while she was still in the patient's home. Pt states he started Keflex again on Sunday for 7 days. Dose slightly adjusted due to pt being on Keflex last week and still being subtherapeutic. Pt states he has an appt in [place] and cannot be checked on Friday. Cat was given dosing instructions and follow up appt date for January 2; she repeated back correctly.

## 2017-12-29 ENCOUNTER — OFFICE VISIT (OUTPATIENT)
Dept: VASCULAR SURGERY | Age: 77
End: 2017-12-29

## 2017-12-29 ENCOUNTER — TELEPHONE (OUTPATIENT)
Dept: VASCULAR SURGERY | Age: 77
End: 2017-12-29

## 2017-12-29 VITALS
TEMPERATURE: 97.2 F | HEART RATE: 59 BPM | DIASTOLIC BLOOD PRESSURE: 80 MMHG | OXYGEN SATURATION: 93 % | SYSTOLIC BLOOD PRESSURE: 114 MMHG | RESPIRATION RATE: 18 BRPM

## 2017-12-29 DIAGNOSIS — I70.269 ATHEROSCLEROSIS OF ARTERY OF EXTREMITY WITH GANGRENE (HCC): Primary | ICD-10-CM

## 2017-12-29 DIAGNOSIS — I73.9 PVD (PERIPHERAL VASCULAR DISEASE) (HCC): ICD-10-CM

## 2017-12-29 PROCEDURE — 99024 POSTOP FOLLOW-UP VISIT: CPT | Performed by: PHYSICIAN ASSISTANT

## 2017-12-29 RX ORDER — CEPHALEXIN 500 MG/1
500 CAPSULE ORAL 3 TIMES DAILY
Qty: 21 CAPSULE | Refills: 0 | Status: SHIPPED | OUTPATIENT
Start: 2017-12-29 | End: 2018-01-02 | Stop reason: SDUPTHER

## 2017-12-29 RX ORDER — CEPHALEXIN 500 MG/1
500 CAPSULE ORAL 3 TIMES DAILY
Refills: 0 | COMMUNITY
Start: 2017-12-24 | End: 2017-12-29 | Stop reason: SDUPTHER

## 2017-12-29 NOTE — TELEPHONE ENCOUNTER
Ramonita Rey asked that I call [facility] and give wound care order. I spoke with the nurse, Sedrick Torres. I gave her the orders to wash, dry, and pat the incision dry. Cover with dry gauze, Kerlex, and ACE bandage. Sedrick Torres voiced understanding.

## 2018-01-01 ENCOUNTER — APPOINTMENT (OUTPATIENT)
Dept: GENERAL RADIOLOGY | Facility: HOSPITAL | Age: 78
End: 2018-01-01

## 2018-01-01 ENCOUNTER — TELEPHONE (OUTPATIENT)
Dept: FAMILY MEDICINE CLINIC | Facility: CLINIC | Age: 78
End: 2018-01-01

## 2018-01-01 ENCOUNTER — ANTICOAGULATION VISIT (OUTPATIENT)
Dept: CARDIAC SURGERY | Facility: CLINIC | Age: 78
End: 2018-01-01

## 2018-01-01 ENCOUNTER — TELEPHONE (OUTPATIENT)
Dept: VASCULAR SURGERY | Age: 78
End: 2018-01-01

## 2018-01-01 ENCOUNTER — HOSPITAL ENCOUNTER (OUTPATIENT)
Dept: CT IMAGING | Facility: HOSPITAL | Age: 78
Discharge: HOME | End: 2018-09-10

## 2018-01-01 ENCOUNTER — OFFICE VISIT (OUTPATIENT)
Dept: VASCULAR SURGERY | Age: 78
End: 2018-01-01
Payer: MEDICARE

## 2018-01-01 ENCOUNTER — APPOINTMENT (OUTPATIENT)
Dept: CT IMAGING | Facility: HOSPITAL | Age: 78
End: 2018-01-01

## 2018-01-01 ENCOUNTER — OFFICE VISIT (OUTPATIENT)
Dept: FAMILY MEDICINE CLINIC | Facility: CLINIC | Age: 78
End: 2018-01-01

## 2018-01-01 ENCOUNTER — OUTSIDE FACILITY SERVICE (OUTPATIENT)
Dept: FAMILY MEDICINE CLINIC | Facility: CLINIC | Age: 78
End: 2018-01-01

## 2018-01-01 ENCOUNTER — DOCUMENTATION (OUTPATIENT)
Dept: CARDIOLOGY | Facility: CLINIC | Age: 78
End: 2018-01-01

## 2018-01-01 ENCOUNTER — APPOINTMENT (OUTPATIENT)
Dept: NUCLEAR MEDICINE | Facility: HOSPITAL | Age: 78
End: 2018-01-01

## 2018-01-01 ENCOUNTER — LAB REQUISITION (OUTPATIENT)
Dept: LAB | Facility: HOSPITAL | Age: 78
End: 2018-01-01

## 2018-01-01 ENCOUNTER — DOCUMENTATION (OUTPATIENT)
Dept: CARDIAC SURGERY | Facility: CLINIC | Age: 78
End: 2018-01-01

## 2018-01-01 ENCOUNTER — HOSPITAL ENCOUNTER (OUTPATIENT)
Dept: VASCULAR LAB | Age: 78
Discharge: HOME | End: 2018-09-10
Payer: MEDICARE

## 2018-01-01 ENCOUNTER — APPOINTMENT (OUTPATIENT)
Dept: PHYSICAL THERAPY | Facility: HOSPITAL | Age: 78
End: 2018-01-01

## 2018-01-01 ENCOUNTER — HOSPITAL ENCOUNTER (OUTPATIENT)
Facility: HOSPITAL | Age: 78
Setting detail: OBSERVATION
Discharge: HOME HEALTH | End: 2018-07-31
Attending: EMERGENCY MEDICINE | Admitting: EMERGENCY MEDICINE

## 2018-01-01 ENCOUNTER — APPOINTMENT (OUTPATIENT)
Dept: LAB | Facility: HOSPITAL | Age: 78
End: 2018-01-01

## 2018-01-01 ENCOUNTER — OFFICE VISIT (OUTPATIENT)
Dept: CARDIOLOGY | Facility: CLINIC | Age: 78
End: 2018-01-01

## 2018-01-01 ENCOUNTER — APPOINTMENT (OUTPATIENT)
Dept: CARDIOLOGY | Facility: HOSPITAL | Age: 78
End: 2018-01-01

## 2018-01-01 ENCOUNTER — TELEPHONE (OUTPATIENT)
Dept: PULMONOLOGY | Facility: HOSPITAL | Age: 78
End: 2018-01-01

## 2018-01-01 ENCOUNTER — TRANSCRIBE ORDERS (OUTPATIENT)
Dept: PHYSICAL THERAPY | Facility: HOSPITAL | Age: 78
End: 2018-01-01

## 2018-01-01 ENCOUNTER — OFFICE VISIT (OUTPATIENT)
Dept: PULMONOLOGY | Facility: CLINIC | Age: 78
End: 2018-01-01

## 2018-01-01 ENCOUNTER — PRIOR AUTHORIZATION (OUTPATIENT)
Dept: FAMILY MEDICINE CLINIC | Facility: CLINIC | Age: 78
End: 2018-01-01

## 2018-01-01 ENCOUNTER — HOSPITAL ENCOUNTER (OUTPATIENT)
Dept: CT IMAGING | Facility: HOSPITAL | Age: 78
Discharge: HOME | End: 2018-09-13
Admitting: INTERNAL MEDICINE

## 2018-01-01 ENCOUNTER — LAB (OUTPATIENT)
Dept: LAB | Facility: HOSPITAL | Age: 78
End: 2018-01-01

## 2018-01-01 ENCOUNTER — HOSPITAL ENCOUNTER (EMERGENCY)
Facility: HOSPITAL | Age: 78
Discharge: NURSING, REHAB OR LONG-TERM CARE | End: 2018-10-07
Attending: EMERGENCY MEDICINE | Admitting: EMERGENCY MEDICINE

## 2018-01-01 VITALS
HEIGHT: 71 IN | OXYGEN SATURATION: 98 % | WEIGHT: 220 LBS | SYSTOLIC BLOOD PRESSURE: 124 MMHG | BODY MASS INDEX: 30.8 KG/M2 | DIASTOLIC BLOOD PRESSURE: 74 MMHG | HEART RATE: 65 BPM

## 2018-01-01 VITALS
SYSTOLIC BLOOD PRESSURE: 98 MMHG | OXYGEN SATURATION: 98 % | HEIGHT: 71 IN | DIASTOLIC BLOOD PRESSURE: 60 MMHG | WEIGHT: 221.3 LBS | BODY MASS INDEX: 30.98 KG/M2 | HEART RATE: 78 BPM

## 2018-01-01 VITALS
RESPIRATION RATE: 18 BRPM | HEART RATE: 69 BPM | OXYGEN SATURATION: 84 % | SYSTOLIC BLOOD PRESSURE: 108 MMHG | DIASTOLIC BLOOD PRESSURE: 70 MMHG

## 2018-01-01 VITALS
WEIGHT: 220.02 LBS | SYSTOLIC BLOOD PRESSURE: 152 MMHG | OXYGEN SATURATION: 96 % | HEIGHT: 72 IN | TEMPERATURE: 97.9 F | HEART RATE: 78 BPM | DIASTOLIC BLOOD PRESSURE: 78 MMHG | RESPIRATION RATE: 16 BRPM | BODY MASS INDEX: 29.8 KG/M2

## 2018-01-01 VITALS
SYSTOLIC BLOOD PRESSURE: 132 MMHG | BODY MASS INDEX: 29.8 KG/M2 | HEART RATE: 70 BPM | DIASTOLIC BLOOD PRESSURE: 80 MMHG | OXYGEN SATURATION: 91 % | HEIGHT: 72 IN | WEIGHT: 220 LBS

## 2018-01-01 VITALS
HEIGHT: 71 IN | DIASTOLIC BLOOD PRESSURE: 71 MMHG | SYSTOLIC BLOOD PRESSURE: 133 MMHG | WEIGHT: 214.4 LBS | RESPIRATION RATE: 22 BRPM | BODY MASS INDEX: 30.02 KG/M2 | HEART RATE: 84 BPM | TEMPERATURE: 97 F | OXYGEN SATURATION: 93 %

## 2018-01-01 VITALS
HEIGHT: 71 IN | BODY MASS INDEX: 30.24 KG/M2 | DIASTOLIC BLOOD PRESSURE: 80 MMHG | SYSTOLIC BLOOD PRESSURE: 120 MMHG | WEIGHT: 216 LBS

## 2018-01-01 VITALS
OXYGEN SATURATION: 90 % | DIASTOLIC BLOOD PRESSURE: 60 MMHG | SYSTOLIC BLOOD PRESSURE: 102 MMHG | TEMPERATURE: 97.6 F | HEART RATE: 54 BPM

## 2018-01-01 VITALS
OXYGEN SATURATION: 87 % | DIASTOLIC BLOOD PRESSURE: 72 MMHG | HEART RATE: 64 BPM | SYSTOLIC BLOOD PRESSURE: 136 MMHG | BODY MASS INDEX: 31.21 KG/M2 | HEIGHT: 71 IN | WEIGHT: 222.9 LBS

## 2018-01-01 VITALS — SYSTOLIC BLOOD PRESSURE: 112 MMHG | DIASTOLIC BLOOD PRESSURE: 72 MMHG | HEART RATE: 72 BPM | OXYGEN SATURATION: 93 %

## 2018-01-01 VITALS
WEIGHT: 220 LBS | DIASTOLIC BLOOD PRESSURE: 59 MMHG | BODY MASS INDEX: 30.8 KG/M2 | SYSTOLIC BLOOD PRESSURE: 100 MMHG | HEART RATE: 60 BPM | HEIGHT: 71 IN

## 2018-01-01 VITALS
WEIGHT: 220 LBS | DIASTOLIC BLOOD PRESSURE: 54 MMHG | HEIGHT: 72 IN | SYSTOLIC BLOOD PRESSURE: 94 MMHG | BODY MASS INDEX: 29.8 KG/M2

## 2018-01-01 VITALS — HEART RATE: 60 BPM | SYSTOLIC BLOOD PRESSURE: 94 MMHG | DIASTOLIC BLOOD PRESSURE: 58 MMHG

## 2018-01-01 VITALS — DIASTOLIC BLOOD PRESSURE: 74 MMHG | OXYGEN SATURATION: 95 % | SYSTOLIC BLOOD PRESSURE: 116 MMHG | HEART RATE: 67 BPM

## 2018-01-01 VITALS — HEART RATE: 61 BPM | SYSTOLIC BLOOD PRESSURE: 94 MMHG | DIASTOLIC BLOOD PRESSURE: 62 MMHG

## 2018-01-01 DIAGNOSIS — L81.9 DISCOLORATION OF SKIN OF TOE: ICD-10-CM

## 2018-01-01 DIAGNOSIS — I51.7 LVH (LEFT VENTRICULAR HYPERTROPHY): Primary | Chronic | ICD-10-CM

## 2018-01-01 DIAGNOSIS — R06.09 DYSPNEA ON EXERTION: Primary | ICD-10-CM

## 2018-01-01 DIAGNOSIS — Z79.01 LONG-TERM (CURRENT) USE OF ANTICOAGULANTS: ICD-10-CM

## 2018-01-01 DIAGNOSIS — I48.0 PAROXYSMAL ATRIAL FIBRILLATION (HCC): ICD-10-CM

## 2018-01-01 DIAGNOSIS — I50.9 HEART FAILURE (HCC): ICD-10-CM

## 2018-01-01 DIAGNOSIS — I70.269 ATHEROSCLEROSIS OF ARTERY OF EXTREMITY WITH GANGRENE (HCC): ICD-10-CM

## 2018-01-01 DIAGNOSIS — D64.9 ANEMIA: ICD-10-CM

## 2018-01-01 DIAGNOSIS — M79.89 LEG SWELLING: ICD-10-CM

## 2018-01-01 DIAGNOSIS — I51.89 DIASTOLIC DYSFUNCTION: Chronic | ICD-10-CM

## 2018-01-01 DIAGNOSIS — I10 ESSENTIAL HYPERTENSION: Chronic | ICD-10-CM

## 2018-01-01 DIAGNOSIS — N18.9 CHRONIC KIDNEY DISEASE: ICD-10-CM

## 2018-01-01 DIAGNOSIS — I48.0 PAROXYSMAL ATRIAL FIBRILLATION (HCC): Primary | Chronic | ICD-10-CM

## 2018-01-01 DIAGNOSIS — Z74.09 IMPAIRED FUNCTIONAL MOBILITY, BALANCE, GAIT, AND ENDURANCE: ICD-10-CM

## 2018-01-01 DIAGNOSIS — I74.9 EMBOLISM AND THROMBOSIS OF ARTERY (HCC): ICD-10-CM

## 2018-01-01 DIAGNOSIS — J44.1 COPD WITH EXACERBATION (HCC): ICD-10-CM

## 2018-01-01 DIAGNOSIS — M54.41 CHRONIC MIDLINE LOW BACK PAIN WITH RIGHT-SIDED SCIATICA: Primary | ICD-10-CM

## 2018-01-01 DIAGNOSIS — I48.0 PAROXYSMAL ATRIAL FIBRILLATION (HCC): Chronic | ICD-10-CM

## 2018-01-01 DIAGNOSIS — I70.244 ATHEROSCLEROSIS OF NATIVE ARTERIES OF LEFT LEG WITH ULCERATION OF HEEL AND MIDFOOT (HCC): Primary | ICD-10-CM

## 2018-01-01 DIAGNOSIS — J96.01 ACUTE RESPIRATORY FAILURE WITH HYPOXIA (HCC): ICD-10-CM

## 2018-01-01 DIAGNOSIS — J84.9 ILD (INTERSTITIAL LUNG DISEASE) (HCC): ICD-10-CM

## 2018-01-01 DIAGNOSIS — F19.10 DRUG ABUSE, EPISODIC USE (HCC): ICD-10-CM

## 2018-01-01 DIAGNOSIS — I73.9 PVD (PERIPHERAL VASCULAR DISEASE) (HCC): Primary | ICD-10-CM

## 2018-01-01 DIAGNOSIS — J44.1 CHRONIC OBSTRUCTIVE PULMONARY DISEASE WITH ACUTE EXACERBATION (HCC): Primary | ICD-10-CM

## 2018-01-01 DIAGNOSIS — E03.9 HYPOTHYROIDISM: ICD-10-CM

## 2018-01-01 DIAGNOSIS — F33.41 RECURRENT MAJOR DEPRESSIVE DISORDER, IN PARTIAL REMISSION (HCC): ICD-10-CM

## 2018-01-01 DIAGNOSIS — I73.9 PVD (PERIPHERAL VASCULAR DISEASE) (HCC): Chronic | ICD-10-CM

## 2018-01-01 DIAGNOSIS — I34.0 NON-RHEUMATIC MITRAL REGURGITATION: ICD-10-CM

## 2018-01-01 DIAGNOSIS — T50.905A MEDICATION REACTION, INITIAL ENCOUNTER: Primary | ICD-10-CM

## 2018-01-01 DIAGNOSIS — I50.20 SYSTOLIC CONGESTIVE HEART FAILURE, UNSPECIFIED CONGESTIVE HEART FAILURE CHRONICITY: Primary | ICD-10-CM

## 2018-01-01 DIAGNOSIS — F17.210 CIGARETTE NICOTINE DEPENDENCE, UNCOMPLICATED: ICD-10-CM

## 2018-01-01 DIAGNOSIS — I10 ESSENTIAL HYPERTENSION: ICD-10-CM

## 2018-01-01 DIAGNOSIS — Z79.899 ON LONG TERM DRUG THERAPY: Primary | ICD-10-CM

## 2018-01-01 DIAGNOSIS — I48.3 TYPICAL ATRIAL FLUTTER (HCC): ICD-10-CM

## 2018-01-01 DIAGNOSIS — Z78.9 IMPAIRED MOBILITY AND ADLS: ICD-10-CM

## 2018-01-01 DIAGNOSIS — J44.9 CHRONIC OBSTRUCTIVE PULMONARY DISEASE, UNSPECIFIED COPD TYPE (HCC): ICD-10-CM

## 2018-01-01 DIAGNOSIS — J96.11 CHRONIC RESPIRATORY FAILURE WITH HYPOXIA (HCC): ICD-10-CM

## 2018-01-01 DIAGNOSIS — R26.9 ABNORMALITY OF GAIT: Primary | ICD-10-CM

## 2018-01-01 DIAGNOSIS — Z79.899 ON LONG TERM DRUG THERAPY: ICD-10-CM

## 2018-01-01 DIAGNOSIS — J44.1 COPD WITH EXACERBATION (HCC): Chronic | ICD-10-CM

## 2018-01-01 DIAGNOSIS — R53.81 PHYSICAL DECONDITIONING: ICD-10-CM

## 2018-01-01 DIAGNOSIS — Z79.01 LONG TERM CURRENT USE OF ANTICOAGULANT: ICD-10-CM

## 2018-01-01 DIAGNOSIS — Z00.00 MEDICARE ANNUAL WELLNESS VISIT, SUBSEQUENT: ICD-10-CM

## 2018-01-01 DIAGNOSIS — N18.30 CKD (CHRONIC KIDNEY DISEASE) STAGE 3, GFR 30-59 ML/MIN (HCC): ICD-10-CM

## 2018-01-01 DIAGNOSIS — Z74.09 IMPAIRED MOBILITY AND ADLS: ICD-10-CM

## 2018-01-01 DIAGNOSIS — I73.9 PVD (PERIPHERAL VASCULAR DISEASE) (HCC): ICD-10-CM

## 2018-01-01 DIAGNOSIS — R07.9 CHEST PAIN, UNSPECIFIED TYPE: Primary | ICD-10-CM

## 2018-01-01 DIAGNOSIS — G89.29 CHRONIC MIDLINE LOW BACK PAIN WITH RIGHT-SIDED SCIATICA: Primary | ICD-10-CM

## 2018-01-01 DIAGNOSIS — N18.30 CKD (CHRONIC KIDNEY DISEASE) STAGE 3, GFR 30-59 ML/MIN (HCC): Chronic | ICD-10-CM

## 2018-01-01 DIAGNOSIS — E78.5 HYPERLIPIDEMIA, UNSPECIFIED HYPERLIPIDEMIA TYPE: ICD-10-CM

## 2018-01-01 DIAGNOSIS — E78.00 HYPERCHOLESTEROLEMIA: ICD-10-CM

## 2018-01-01 DIAGNOSIS — R73.03 PREDIABETES: ICD-10-CM

## 2018-01-01 DIAGNOSIS — E55.9 VITAMIN D DEFICIENCY: ICD-10-CM

## 2018-01-01 DIAGNOSIS — Z79.01 LONG TERM CURRENT USE OF ANTICOAGULANT THERAPY: ICD-10-CM

## 2018-01-01 DIAGNOSIS — I50.20 SYSTOLIC CONGESTIVE HEART FAILURE, UNSPECIFIED CONGESTIVE HEART FAILURE CHRONICITY: ICD-10-CM

## 2018-01-01 DIAGNOSIS — I71.40 AAA (ABDOMINAL AORTIC ANEURYSM) WITHOUT RUPTURE (HCC): ICD-10-CM

## 2018-01-01 LAB
25(OH)D3 SERPL-MCNC: 35.7 NG/ML (ref 30–100)
ALBUMIN SERPL-MCNC: 3.4 G/DL (ref 3.4–4.8)
ALBUMIN SERPL-MCNC: 3.5 G/DL (ref 3.4–4.8)
ALBUMIN SERPL-MCNC: 3.5 G/DL (ref 3.4–4.8)
ALBUMIN SERPL-MCNC: 3.6 G/DL (ref 3.4–4.8)
ALBUMIN SERPL-MCNC: 3.7 G/DL (ref 3.4–4.8)
ALBUMIN SERPL-MCNC: 3.9 G/DL (ref 3.4–4.8)
ALBUMIN/GLOB SERPL: 0.8 G/DL (ref 1.1–1.8)
ALBUMIN/GLOB SERPL: 0.8 G/DL (ref 1.1–1.8)
ALBUMIN/GLOB SERPL: 0.9 G/DL (ref 1.1–1.8)
ALBUMIN/GLOB SERPL: 1 G/DL (ref 1.1–1.8)
ALP SERPL-CCNC: 111 U/L (ref 38–126)
ALP SERPL-CCNC: 63 U/L (ref 38–126)
ALP SERPL-CCNC: 74 U/L (ref 38–126)
ALP SERPL-CCNC: 98 U/L (ref 38–126)
ALT SERPL W P-5'-P-CCNC: 17 U/L (ref 21–72)
ALT SERPL W P-5'-P-CCNC: 22 U/L (ref 21–72)
ALT SERPL W P-5'-P-CCNC: 40 U/L (ref 21–72)
ALT SERPL W P-5'-P-CCNC: 51 U/L (ref 21–72)
AMPHET+METHAMPHET UR QL: NEGATIVE
ANION GAP SERPL CALCULATED.3IONS-SCNC: 10 MMOL/L (ref 5–15)
ANION GAP SERPL CALCULATED.3IONS-SCNC: 10 MMOL/L (ref 5–15)
ANION GAP SERPL CALCULATED.3IONS-SCNC: 11 MMOL/L (ref 5–15)
ANION GAP SERPL CALCULATED.3IONS-SCNC: 5 MMOL/L (ref 5–15)
ANION GAP SERPL CALCULATED.3IONS-SCNC: 8 MMOL/L (ref 5–15)
ANION GAP SERPL CALCULATED.3IONS-SCNC: 9 MMOL/L (ref 5–15)
APAP SERPL-MCNC: <10 MCG/ML (ref 10–30)
ARTERIAL PATENCY WRIST A: ABNORMAL
ARTICHOKE IGE QN: 43 MG/DL (ref 1–129)
AST SERPL-CCNC: 18 U/L (ref 17–59)
AST SERPL-CCNC: 18 U/L (ref 17–59)
AST SERPL-CCNC: 32 U/L (ref 17–59)
AST SERPL-CCNC: 39 U/L (ref 17–59)
ATMOSPHERIC PRESS: 750 MMHG
BACTERIA UR QL AUTO: ABNORMAL /HPF
BARBITURATES UR QL SCN: NEGATIVE
BASE EXCESS BLDA CALC-SCNC: -0.4 MMOL/L (ref 0–2)
BASOPHILS # BLD AUTO: 0.01 10*3/MM3 (ref 0–0.2)
BASOPHILS # BLD AUTO: 0.01 10*3/MM3 (ref 0–0.2)
BASOPHILS # BLD AUTO: 0.02 10*3/MM3 (ref 0–0.2)
BASOPHILS # BLD AUTO: 0.02 10*3/MM3 (ref 0–0.2)
BASOPHILS # BLD AUTO: 0.03 10*3/MM3 (ref 0–0.2)
BASOPHILS NFR BLD AUTO: 0.1 % (ref 0–2)
BASOPHILS NFR BLD AUTO: 0.1 % (ref 0–2)
BASOPHILS NFR BLD AUTO: 0.2 % (ref 0–2)
BASOPHILS NFR BLD AUTO: 0.3 % (ref 0–2)
BASOPHILS NFR BLD AUTO: 0.4 % (ref 0–2)
BDY SITE: ABNORMAL
BENZODIAZ UR QL SCN: NEGATIVE
BH CV NUCLEAR PRIOR STUDY: 3
BH CV STRESS COMMENTS STAGE 1: NORMAL
BH CV STRESS DOSE REGADENOSON STAGE 1: 0.4
BH CV STRESS DURATION MIN STAGE 1: 0
BH CV STRESS DURATION SEC STAGE 1: 10
BH CV STRESS PROTOCOL 1: NORMAL
BH CV STRESS RECOVERY BP: NORMAL MMHG
BH CV STRESS RECOVERY HR: 110 BPM
BH CV STRESS RECOVERY O2: 96 %
BH CV STRESS STAGE 1: 1
BILIRUB SERPL-MCNC: 0.2 MG/DL (ref 0.2–1.3)
BILIRUB SERPL-MCNC: 0.2 MG/DL (ref 0.2–1.3)
BILIRUB SERPL-MCNC: 0.4 MG/DL (ref 0.2–1.3)
BILIRUB SERPL-MCNC: 0.5 MG/DL (ref 0.2–1.3)
BILIRUB UR QL STRIP: NEGATIVE
BUN BLD-MCNC: 26 MG/DL (ref 7–21)
BUN BLD-MCNC: 27 MG/DL (ref 7–21)
BUN BLD-MCNC: 34 MG/DL (ref 7–21)
BUN BLD-MCNC: 34 MG/DL (ref 7–21)
BUN BLD-MCNC: 40 MG/DL (ref 7–21)
BUN BLD-MCNC: 47 MG/DL (ref 7–21)
BUN BLD-MCNC: 48 MG/DL (ref 7–21)
BUN BLD-MCNC: 53 MG/DL (ref 7–21)
BUN/CREAT SERPL: 16.8 (ref 7–25)
BUN/CREAT SERPL: 17.5 (ref 7–25)
BUN/CREAT SERPL: 17.8 (ref 7–25)
BUN/CREAT SERPL: 18.2 (ref 7–25)
BUN/CREAT SERPL: 18.8 (ref 7–25)
BUN/CREAT SERPL: 19.7 (ref 7–25)
BUN/CREAT SERPL: 24 (ref 7–25)
BUN/CREAT SERPL: 24.7 (ref 7–25)
CALCIUM SPEC-SCNC: 8.3 MG/DL (ref 8.4–10.2)
CALCIUM SPEC-SCNC: 8.6 MG/DL (ref 8.4–10.2)
CALCIUM SPEC-SCNC: 8.7 MG/DL (ref 8.4–10.2)
CALCIUM SPEC-SCNC: 8.8 MG/DL (ref 8.4–10.2)
CANNABINOIDS SERPL QL: NEGATIVE
CHLORIDE SERPL-SCNC: 101 MMOL/L (ref 95–110)
CHLORIDE SERPL-SCNC: 104 MMOL/L (ref 95–110)
CHLORIDE SERPL-SCNC: 104 MMOL/L (ref 95–110)
CHLORIDE SERPL-SCNC: 106 MMOL/L (ref 95–110)
CHLORIDE SERPL-SCNC: 108 MMOL/L (ref 95–110)
CHLORIDE SERPL-SCNC: 108 MMOL/L (ref 95–110)
CHLORIDE SERPL-SCNC: 110 MMOL/L (ref 95–110)
CHLORIDE SERPL-SCNC: 110 MMOL/L (ref 95–110)
CHOLEST SERPL-MCNC: 112 MG/DL (ref 0–199)
CK MB SERPL-CCNC: 2.1 NG/ML (ref 0–5)
CK MB SERPL-CCNC: 2.19 NG/ML (ref 0–5)
CK SERPL-CCNC: 46 U/L (ref 55–170)
CK SERPL-CCNC: 58 U/L (ref 55–170)
CK SERPL-CCNC: 64 U/L (ref 55–170)
CLARITY UR: CLEAR
CO2 SERPL-SCNC: 23 MMOL/L (ref 22–31)
CO2 SERPL-SCNC: 24 MMOL/L (ref 22–31)
CO2 SERPL-SCNC: 24 MMOL/L (ref 22–31)
CO2 SERPL-SCNC: 25 MMOL/L (ref 22–31)
CO2 SERPL-SCNC: 25 MMOL/L (ref 22–31)
CO2 SERPL-SCNC: 26 MMOL/L (ref 22–31)
CO2 SERPL-SCNC: 26 MMOL/L (ref 22–31)
CO2 SERPL-SCNC: 28 MMOL/L (ref 22–31)
COCAINE UR QL: NEGATIVE
COLOR UR: YELLOW
CREAT BLD-MCNC: 1.48 MG/DL (ref 0.7–1.3)
CREAT BLD-MCNC: 1.55 MG/DL (ref 0.7–1.3)
CREAT BLD-MCNC: 1.73 MG/DL (ref 0.7–1.3)
CREAT BLD-MCNC: 1.91 MG/DL (ref 0.7–1.3)
CREAT BLD-MCNC: 1.94 MG/DL (ref 0.7–1.3)
CREAT BLD-MCNC: 2.21 MG/DL (ref 0.7–1.3)
CREAT BLD-MCNC: 2.29 MG/DL (ref 0.7–1.3)
CREAT BLD-MCNC: 2.5 MG/DL (ref 0.7–1.3)
D-LACTATE SERPL-SCNC: 1.2 MMOL/L (ref 0.5–2)
DEPRECATED RDW RBC AUTO: 54.7 FL (ref 35.1–43.9)
DEPRECATED RDW RBC AUTO: 57.2 FL (ref 35.1–43.9)
DEPRECATED RDW RBC AUTO: 60.2 FL (ref 35.1–43.9)
DEPRECATED RDW RBC AUTO: 60.2 FL (ref 35.1–43.9)
DEPRECATED RDW RBC AUTO: 61 FL (ref 35.1–43.9)
EOSINOPHIL # BLD AUTO: 0.27 10*3/MM3 (ref 0–0.7)
EOSINOPHIL # BLD AUTO: 0.28 10*3/MM3 (ref 0–0.7)
EOSINOPHIL # BLD AUTO: 0.32 10*3/MM3 (ref 0–0.7)
EOSINOPHIL # BLD AUTO: 0.36 10*3/MM3 (ref 0–0.7)
EOSINOPHIL # BLD AUTO: 0.38 10*3/MM3 (ref 0–0.7)
EOSINOPHIL NFR BLD AUTO: 2.6 % (ref 0–7)
EOSINOPHIL NFR BLD AUTO: 3.5 % (ref 0–7)
EOSINOPHIL NFR BLD AUTO: 4.1 % (ref 0–7)
EOSINOPHIL NFR BLD AUTO: 4.5 % (ref 0–7)
EOSINOPHIL NFR BLD AUTO: 4.6 % (ref 0–7)
ERYTHROCYTE [DISTWIDTH] IN BLOOD BY AUTOMATED COUNT: 17.3 % (ref 11.5–14.5)
ERYTHROCYTE [DISTWIDTH] IN BLOOD BY AUTOMATED COUNT: 17.7 % (ref 11.5–14.5)
ERYTHROCYTE [DISTWIDTH] IN BLOOD BY AUTOMATED COUNT: 19.3 % (ref 11.5–14.5)
ERYTHROCYTE [DISTWIDTH] IN BLOOD BY AUTOMATED COUNT: 19.5 % (ref 11.5–14.5)
ERYTHROCYTE [DISTWIDTH] IN BLOOD BY AUTOMATED COUNT: 19.5 % (ref 11.5–14.5)
ETHANOL BLD-MCNC: <10 MG/DL (ref 0–10)
ETHANOL UR QL: <0.01 %
FINE GRAN CASTS URNS QL MICRO: ABNORMAL /LPF
GAS FLOW AIRWAY: 3 LPM
GFR SERPL CREATININE-BSD FRML MDRD: 25 ML/MIN/1.73 (ref 42–98)
GFR SERPL CREATININE-BSD FRML MDRD: 28 ML/MIN/1.73 (ref 42–98)
GFR SERPL CREATININE-BSD FRML MDRD: 29 ML/MIN/1.73 (ref 42–98)
GFR SERPL CREATININE-BSD FRML MDRD: 34 ML/MIN/1.73 (ref 42–98)
GFR SERPL CREATININE-BSD FRML MDRD: 34 ML/MIN/1.73 (ref 42–98)
GFR SERPL CREATININE-BSD FRML MDRD: 38 ML/MIN/1.73 (ref 42–98)
GFR SERPL CREATININE-BSD FRML MDRD: 44 ML/MIN/1.73 (ref 42–98)
GFR SERPL CREATININE-BSD FRML MDRD: 46 ML/MIN/1.73 (ref 42–98)
GLOBULIN UR ELPH-MCNC: 3.8 GM/DL (ref 2.3–3.5)
GLOBULIN UR ELPH-MCNC: 4 GM/DL (ref 2.3–3.5)
GLOBULIN UR ELPH-MCNC: 4.4 GM/DL (ref 2.3–3.5)
GLOBULIN UR ELPH-MCNC: 4.4 GM/DL (ref 2.3–3.5)
GLUCOSE BLD-MCNC: 106 MG/DL (ref 60–100)
GLUCOSE BLD-MCNC: 110 MG/DL (ref 60–100)
GLUCOSE BLD-MCNC: 112 MG/DL (ref 60–100)
GLUCOSE BLD-MCNC: 117 MG/DL (ref 60–100)
GLUCOSE BLD-MCNC: 121 MG/DL (ref 60–100)
GLUCOSE BLD-MCNC: 95 MG/DL (ref 60–100)
GLUCOSE BLD-MCNC: 96 MG/DL (ref 60–100)
GLUCOSE BLD-MCNC: 97 MG/DL (ref 60–100)
GLUCOSE UR STRIP-MCNC: NEGATIVE MG/DL
HCO3 BLDA-SCNC: 24 MMOL/L (ref 20–26)
HCT VFR BLD AUTO: 32.4 % (ref 39–49)
HCT VFR BLD AUTO: 33.5 % (ref 39–49)
HCT VFR BLD AUTO: 34.3 % (ref 39–49)
HCT VFR BLD AUTO: 40.4 % (ref 39–49)
HCT VFR BLD AUTO: 41.7 % (ref 39–49)
HDLC SERPL-MCNC: 25 MG/DL (ref 60–200)
HGB BLD-MCNC: 10.6 G/DL (ref 13.7–17.3)
HGB BLD-MCNC: 11 G/DL (ref 13.7–17.3)
HGB BLD-MCNC: 11.3 G/DL (ref 13.7–17.3)
HGB BLD-MCNC: 13.2 G/DL (ref 13.7–17.3)
HGB BLD-MCNC: 13.9 G/DL (ref 13.7–17.3)
HGB UR QL STRIP.AUTO: ABNORMAL
HOLD SPECIMEN: NORMAL
HYALINE CASTS UR QL AUTO: ABNORMAL /LPF
IMM GRANULOCYTES # BLD: 0.02 10*3/MM3 (ref 0–0.02)
IMM GRANULOCYTES # BLD: 0.05 10*3/MM3 (ref 0–0.02)
IMM GRANULOCYTES # BLD: 0.07 10*3/MM3 (ref 0–0.02)
IMM GRANULOCYTES NFR BLD: 0.2 % (ref 0–0.5)
IMM GRANULOCYTES NFR BLD: 0.6 % (ref 0–0.5)
IMM GRANULOCYTES NFR BLD: 0.7 % (ref 0–0.5)
INR PPP: 1.4
INR PPP: 1.47 (ref 0.8–1.2)
INR PPP: 1.58 (ref 0.8–1.2)
INR PPP: 1.67 (ref 0.8–1.2)
INR PPP: 1.75 (ref 0.8–1.2)
INR PPP: 1.99 (ref 0.8–1.2)
INR PPP: 2
INR PPP: 2 (ref 0.9–1.1)
INR PPP: 2.06 (ref 0.8–1.2)
INR PPP: 2.2 (ref 0.8–1.2)
INR PPP: 2.2 (ref 0.9–1.1)
INR PPP: 2.4
INR PPP: 2.6 (ref 0.9–1.1)
INR PPP: 2.7
INR PPP: 2.8
INR PPP: 3
INR PPP: 3.2
INR PPP: 3.2 (ref 0.8–1.2)
INR PPP: 3.4
INR PPP: 3.4 (ref 0.8–1.2)
INR PPP: 3.7 (ref 0.9–1.1)
INR PPP: 4.1
INR PPP: 4.5
INR PPP: 6 (ref 0.9–1.1)
KETONES UR QL STRIP: NEGATIVE
LDLC/HDLC SERPL: 2.38 {RATIO} (ref 0–3.55)
LEUKOCYTE ESTERASE UR QL STRIP.AUTO: NEGATIVE
LV EF NUC BP: 74 %
LYMPHOCYTES # BLD AUTO: 1.04 10*3/MM3 (ref 0.6–4.2)
LYMPHOCYTES # BLD AUTO: 1.09 10*3/MM3 (ref 0.6–4.2)
LYMPHOCYTES # BLD AUTO: 1.12 10*3/MM3 (ref 0.6–4.2)
LYMPHOCYTES # BLD AUTO: 1.13 10*3/MM3 (ref 0.6–4.2)
LYMPHOCYTES # BLD AUTO: 1.32 10*3/MM3 (ref 0.6–4.2)
LYMPHOCYTES NFR BLD AUTO: 10.7 % (ref 10–50)
LYMPHOCYTES NFR BLD AUTO: 13 % (ref 10–50)
LYMPHOCYTES NFR BLD AUTO: 13.3 % (ref 10–50)
LYMPHOCYTES NFR BLD AUTO: 14.2 % (ref 10–50)
LYMPHOCYTES NFR BLD AUTO: 16.8 % (ref 10–50)
Lab: ABNORMAL
MAGNESIUM SERPL-MCNC: 2.1 MG/DL (ref 1.6–2.3)
MAXIMAL PREDICTED HEART RATE: 143 BPM
MCH RBC QN AUTO: 28.2 PG (ref 26.5–34)
MCH RBC QN AUTO: 28.3 PG (ref 26.5–34)
MCH RBC QN AUTO: 28.3 PG (ref 26.5–34)
MCH RBC QN AUTO: 28.4 PG (ref 26.5–34)
MCH RBC QN AUTO: 28.7 PG (ref 26.5–34)
MCHC RBC AUTO-ENTMCNC: 32.7 G/DL (ref 31.5–36.3)
MCHC RBC AUTO-ENTMCNC: 32.7 G/DL (ref 31.5–36.3)
MCHC RBC AUTO-ENTMCNC: 32.8 G/DL (ref 31.5–36.3)
MCHC RBC AUTO-ENTMCNC: 32.9 G/DL (ref 31.5–36.3)
MCHC RBC AUTO-ENTMCNC: 33.3 G/DL (ref 31.5–36.3)
MCV RBC AUTO: 86 FL (ref 80–98)
MCV RBC AUTO: 86.1 FL (ref 80–98)
MCV RBC AUTO: 86.2 FL (ref 80–98)
MCV RBC AUTO: 86.2 FL (ref 80–98)
MCV RBC AUTO: 87.1 FL (ref 80–98)
METHADONE UR QL SCN: NEGATIVE
MODALITY: ABNORMAL
MONOCYTES # BLD AUTO: 0.49 10*3/MM3 (ref 0–0.9)
MONOCYTES # BLD AUTO: 0.53 10*3/MM3 (ref 0–0.9)
MONOCYTES # BLD AUTO: 0.54 10*3/MM3 (ref 0–0.9)
MONOCYTES # BLD AUTO: 0.55 10*3/MM3 (ref 0–0.9)
MONOCYTES # BLD AUTO: 0.66 10*3/MM3 (ref 0–0.9)
MONOCYTES NFR BLD AUTO: 6 % (ref 0–12)
MONOCYTES NFR BLD AUTO: 6.3 % (ref 0–12)
MONOCYTES NFR BLD AUTO: 6.7 % (ref 0–12)
MONOCYTES NFR BLD AUTO: 6.9 % (ref 0–12)
MONOCYTES NFR BLD AUTO: 6.9 % (ref 0–12)
MUCOUS THREADS URNS QL MICRO: ABNORMAL /HPF
NEUTROPHILS # BLD AUTO: 5.62 10*3/MM3 (ref 2–8.6)
NEUTROPHILS # BLD AUTO: 5.85 10*3/MM3 (ref 2–8.6)
NEUTROPHILS # BLD AUTO: 6.12 10*3/MM3 (ref 2–8.6)
NEUTROPHILS # BLD AUTO: 6.15 10*3/MM3 (ref 2–8.6)
NEUTROPHILS # BLD AUTO: 8.37 10*3/MM3 (ref 2–8.6)
NEUTROPHILS NFR BLD AUTO: 71.2 % (ref 37–80)
NEUTROPHILS NFR BLD AUTO: 73.7 % (ref 37–80)
NEUTROPHILS NFR BLD AUTO: 75.3 % (ref 37–80)
NEUTROPHILS NFR BLD AUTO: 76.3 % (ref 37–80)
NEUTROPHILS NFR BLD AUTO: 79.6 % (ref 37–80)
NITRITE UR QL STRIP: NEGATIVE
NT-PROBNP SERPL-MCNC: 1780 PG/ML (ref 0–1800)
NT-PROBNP SERPL-MCNC: 1930 PG/ML (ref 0–1800)
NT-PROBNP SERPL-MCNC: 2020 PG/ML (ref 0–1800)
NT-PROBNP SERPL-MCNC: 2490 PG/ML (ref 0–1800)
OPIATES UR QL: NEGATIVE
OXYCODONE UR QL SCN: NEGATIVE
PCO2 BLDA: 38 MM HG (ref 35–45)
PERCENT MAX PREDICTED HR: 86.01 %
PH BLDA: 7.41 PH UNITS (ref 7.35–7.45)
PH UR STRIP.AUTO: 5.5 [PH] (ref 5–9)
PHOSPHATE SERPL-MCNC: 3.6 MG/DL (ref 2.4–4.4)
PHOSPHATE SERPL-MCNC: 4.5 MG/DL (ref 2.4–4.4)
PLATELET # BLD AUTO: 233 10*3/MM3 (ref 150–450)
PLATELET # BLD AUTO: 235 10*3/MM3 (ref 150–450)
PLATELET # BLD AUTO: 238 10*3/MM3 (ref 150–450)
PLATELET # BLD AUTO: 239 10*3/MM3 (ref 150–450)
PLATELET # BLD AUTO: 257 10*3/MM3 (ref 150–450)
PMV BLD AUTO: 9.1 FL (ref 8–12)
PMV BLD AUTO: 9.3 FL (ref 8–12)
PMV BLD AUTO: 9.3 FL (ref 8–12)
PMV BLD AUTO: 9.4 FL (ref 8–12)
PMV BLD AUTO: 9.9 FL (ref 8–12)
PO2 BLDA: 65.3 MM HG (ref 83–108)
POTASSIUM BLD-SCNC: 3.7 MMOL/L (ref 3.5–5.1)
POTASSIUM BLD-SCNC: 3.9 MMOL/L (ref 3.5–5.1)
POTASSIUM BLD-SCNC: 4.1 MMOL/L (ref 3.5–5.1)
POTASSIUM BLD-SCNC: 4.2 MMOL/L (ref 3.5–5.1)
POTASSIUM BLD-SCNC: 4.3 MMOL/L (ref 3.5–5.1)
POTASSIUM BLD-SCNC: 4.3 MMOL/L (ref 3.5–5.1)
POTASSIUM BLD-SCNC: 4.6 MMOL/L (ref 3.5–5.1)
POTASSIUM BLD-SCNC: 4.7 MMOL/L (ref 3.5–5.1)
PROT SERPL-MCNC: 7.3 G/DL (ref 6.3–8.6)
PROT SERPL-MCNC: 7.8 G/DL (ref 6.3–8.6)
PROT SERPL-MCNC: 7.9 G/DL (ref 6.3–8.6)
PROT SERPL-MCNC: 8.1 G/DL (ref 6.3–8.6)
PROT UR QL STRIP: NEGATIVE
PROTHROMBIN TIME: 17.4 SECONDS (ref 11.1–15.3)
PROTHROMBIN TIME: 18.3 SECONDS (ref 11.1–15.3)
PROTHROMBIN TIME: 19.1 SECONDS (ref 11.1–15.3)
PROTHROMBIN TIME: 19.8 SECONDS (ref 11.1–15.3)
PROTHROMBIN TIME: 21.8 SECONDS (ref 11.1–15.3)
PROTHROMBIN TIME: 22.4 SECONDS (ref 11.1–15.3)
PROTHROMBIN TIME: 23.5 SECONDS (ref 11.1–15.3)
PROTHROMBIN TIME: 38.7 SECONDS (ref 11–15)
PROTHROMBIN TIME: 40.6 SECONDS (ref 11–15)
RBC # BLD AUTO: 3.76 10*6/MM3 (ref 4.37–5.74)
RBC # BLD AUTO: 3.89 10*6/MM3 (ref 4.37–5.74)
RBC # BLD AUTO: 3.99 10*6/MM3 (ref 4.37–5.74)
RBC # BLD AUTO: 4.64 10*6/MM3 (ref 4.37–5.74)
RBC # BLD AUTO: 4.84 10*6/MM3 (ref 4.37–5.74)
RBC # UR: ABNORMAL /HPF
REF LAB TEST METHOD: ABNORMAL
SALICYLATES SERPL-MCNC: <1 MG/DL (ref 10–20)
SAO2 % BLDCOA: 93.3 % (ref 94–99)
SODIUM BLD-SCNC: 137 MMOL/L (ref 137–145)
SODIUM BLD-SCNC: 138 MMOL/L (ref 137–145)
SODIUM BLD-SCNC: 140 MMOL/L (ref 137–145)
SODIUM BLD-SCNC: 142 MMOL/L (ref 137–145)
SP GR UR STRIP: 1.01 (ref 1–1.03)
SQUAMOUS #/AREA URNS HPF: ABNORMAL /HPF
STRESS BASELINE BP: NORMAL MMHG
STRESS BASELINE HR: 110 BPM
STRESS O2 SAT REST: 96 %
STRESS PERCENT HR: 101 %
STRESS POST O2 SAT PEAK: 97 %
STRESS POST PEAK BP: NORMAL MMHG
STRESS POST PEAK HR: 123 BPM
STRESS TARGET HR: 122 BPM
T4 FREE SERPL-MCNC: 0.99 NG/DL (ref 0.78–2.19)
TRIGL SERPL-MCNC: 137 MG/DL (ref 20–199)
TROPONIN I SERPL-MCNC: <0.012 NG/ML
TSH SERPL DL<=0.05 MIU/L-ACNC: 3.19 MIU/ML (ref 0.46–4.68)
TSH SERPL DL<=0.05 MIU/L-ACNC: 4.31 MIU/ML (ref 0.46–4.68)
UROBILINOGEN UR QL STRIP: ABNORMAL
VENTILATOR MODE: ABNORMAL
WBC NRBC COR # BLD: 10.5 10*3/MM3 (ref 3.2–9.8)
WBC NRBC COR # BLD: 7.88 10*3/MM3 (ref 3.2–9.8)
WBC NRBC COR # BLD: 7.94 10*3/MM3 (ref 3.2–9.8)
WBC NRBC COR # BLD: 8.02 10*3/MM3 (ref 3.2–9.8)
WBC NRBC COR # BLD: 8.18 10*3/MM3 (ref 3.2–9.8)
WBC UR QL AUTO: ABNORMAL /HPF
WHOLE BLOOD HOLD SPECIMEN: NORMAL

## 2018-01-01 PROCEDURE — 80053 COMPREHEN METABOLIC PANEL: CPT | Performed by: EMERGENCY MEDICINE

## 2018-01-01 PROCEDURE — 96360 HYDRATION IV INFUSION INIT: CPT

## 2018-01-01 PROCEDURE — 85610 PROTHROMBIN TIME: CPT | Performed by: NURSE PRACTITIONER

## 2018-01-01 PROCEDURE — 78452 HT MUSCLE IMAGE SPECT MULT: CPT

## 2018-01-01 PROCEDURE — 97116 GAIT TRAINING THERAPY: CPT

## 2018-01-01 PROCEDURE — 93010 ELECTROCARDIOGRAM REPORT: CPT | Performed by: INTERNAL MEDICINE

## 2018-01-01 PROCEDURE — 84443 ASSAY THYROID STIM HORMONE: CPT | Performed by: FAMILY MEDICINE

## 2018-01-01 PROCEDURE — G0378 HOSPITAL OBSERVATION PER HR: HCPCS

## 2018-01-01 PROCEDURE — 99213 OFFICE O/P EST LOW 20 MIN: CPT | Performed by: PHYSICIAN ASSISTANT

## 2018-01-01 PROCEDURE — 96372 THER/PROPH/DIAG INJ SC/IM: CPT | Performed by: FAMILY MEDICINE

## 2018-01-01 PROCEDURE — 93005 ELECTROCARDIOGRAM TRACING: CPT | Performed by: EMERGENCY MEDICINE

## 2018-01-01 PROCEDURE — 82306 VITAMIN D 25 HYDROXY: CPT | Performed by: FAMILY MEDICINE

## 2018-01-01 PROCEDURE — 99213 OFFICE O/P EST LOW 20 MIN: CPT | Performed by: FAMILY MEDICINE

## 2018-01-01 PROCEDURE — 99214 OFFICE O/P EST MOD 30 MIN: CPT | Performed by: NURSE PRACTITIONER

## 2018-01-01 PROCEDURE — 99211 OFF/OP EST MAY X REQ PHY/QHP: CPT | Performed by: NURSE PRACTITIONER

## 2018-01-01 PROCEDURE — 94799 UNLISTED PULMONARY SVC/PX: CPT

## 2018-01-01 PROCEDURE — 99214 OFFICE O/P EST MOD 30 MIN: CPT | Performed by: INTERNAL MEDICINE

## 2018-01-01 PROCEDURE — G0180 MD CERTIFICATION HHA PATIENT: HCPCS | Performed by: FAMILY MEDICINE

## 2018-01-01 PROCEDURE — 85025 COMPLETE CBC W/AUTO DIFF WBC: CPT | Performed by: EMERGENCY MEDICINE

## 2018-01-01 PROCEDURE — 82803 BLOOD GASES ANY COMBINATION: CPT

## 2018-01-01 PROCEDURE — 99285 EMERGENCY DEPT VISIT HI MDM: CPT

## 2018-01-01 PROCEDURE — 71250 CT THORAX DX C-: CPT

## 2018-01-01 PROCEDURE — 25010000002 REGADENOSON 0.4 MG/5ML SOLUTION: Performed by: INTERNAL MEDICINE

## 2018-01-01 PROCEDURE — 80307 DRUG TEST PRSMV CHEM ANLYZR: CPT | Performed by: EMERGENCY MEDICINE

## 2018-01-01 PROCEDURE — 36415 COLL VENOUS BLD VENIPUNCTURE: CPT | Performed by: FAMILY MEDICINE

## 2018-01-01 PROCEDURE — 80048 BASIC METABOLIC PNL TOTAL CA: CPT | Performed by: PHYSICIAN ASSISTANT

## 2018-01-01 PROCEDURE — 93016 CV STRESS TEST SUPVJ ONLY: CPT | Performed by: INTERNAL MEDICINE

## 2018-01-01 PROCEDURE — 83880 ASSAY OF NATRIURETIC PEPTIDE: CPT | Performed by: FAMILY MEDICINE

## 2018-01-01 PROCEDURE — 78452 HT MUSCLE IMAGE SPECT MULT: CPT | Performed by: INTERNAL MEDICINE

## 2018-01-01 PROCEDURE — 85610 PROTHROMBIN TIME: CPT | Performed by: PHYSICIAN ASSISTANT

## 2018-01-01 PROCEDURE — 94760 N-INVAS EAR/PLS OXIMETRY 1: CPT

## 2018-01-01 PROCEDURE — G0439 PPPS, SUBSEQ VISIT: HCPCS | Performed by: FAMILY MEDICINE

## 2018-01-01 PROCEDURE — G8978 MOBILITY CURRENT STATUS: HCPCS

## 2018-01-01 PROCEDURE — 93000 ELECTROCARDIOGRAM COMPLETE: CPT | Performed by: INTERNAL MEDICINE

## 2018-01-01 PROCEDURE — 85025 COMPLETE CBC W/AUTO DIFF WBC: CPT | Performed by: FAMILY MEDICINE

## 2018-01-01 PROCEDURE — 93925 LOWER EXTREMITY STUDY: CPT

## 2018-01-01 PROCEDURE — 71045 X-RAY EXAM CHEST 1 VIEW: CPT

## 2018-01-01 PROCEDURE — 93005 ELECTROCARDIOGRAM TRACING: CPT

## 2018-01-01 PROCEDURE — 80053 COMPREHEN METABOLIC PANEL: CPT | Performed by: FAMILY MEDICINE

## 2018-01-01 PROCEDURE — G8987 SELF CARE CURRENT STATUS: HCPCS

## 2018-01-01 PROCEDURE — 99406 BEHAV CHNG SMOKING 3-10 MIN: CPT | Performed by: INTERNAL MEDICINE

## 2018-01-01 PROCEDURE — 84484 ASSAY OF TROPONIN QUANT: CPT | Performed by: EMERGENCY MEDICINE

## 2018-01-01 PROCEDURE — 97162 PT EVAL MOD COMPLEX 30 MIN: CPT

## 2018-01-01 PROCEDURE — 82550 ASSAY OF CK (CPK): CPT | Performed by: EMERGENCY MEDICINE

## 2018-01-01 PROCEDURE — 82553 CREATINE MB FRACTION: CPT | Performed by: EMERGENCY MEDICINE

## 2018-01-01 PROCEDURE — 99204 OFFICE O/P NEW MOD 45 MIN: CPT | Performed by: INTERNAL MEDICINE

## 2018-01-01 PROCEDURE — 93017 CV STRESS TEST TRACING ONLY: CPT

## 2018-01-01 PROCEDURE — 96374 THER/PROPH/DIAG INJ IV PUSH: CPT

## 2018-01-01 PROCEDURE — 83735 ASSAY OF MAGNESIUM: CPT | Performed by: EMERGENCY MEDICINE

## 2018-01-01 PROCEDURE — 82550 ASSAY OF CK (CPK): CPT | Performed by: PHYSICIAN ASSISTANT

## 2018-01-01 PROCEDURE — 99214 OFFICE O/P EST MOD 30 MIN: CPT | Performed by: FAMILY MEDICINE

## 2018-01-01 PROCEDURE — 85025 COMPLETE CBC W/AUTO DIFF WBC: CPT | Performed by: PHYSICIAN ASSISTANT

## 2018-01-01 PROCEDURE — 80053 COMPREHEN METABOLIC PANEL: CPT | Performed by: PHYSICIAN ASSISTANT

## 2018-01-01 PROCEDURE — 80069 RENAL FUNCTION PANEL: CPT | Performed by: FAMILY MEDICINE

## 2018-01-01 PROCEDURE — 80061 LIPID PANEL: CPT | Performed by: PHYSICIAN ASSISTANT

## 2018-01-01 PROCEDURE — 84439 ASSAY OF FREE THYROXINE: CPT | Performed by: FAMILY MEDICINE

## 2018-01-01 PROCEDURE — 83880 ASSAY OF NATRIURETIC PEPTIDE: CPT | Performed by: EMERGENCY MEDICINE

## 2018-01-01 PROCEDURE — 97166 OT EVAL MOD COMPLEX 45 MIN: CPT

## 2018-01-01 PROCEDURE — 71046 X-RAY EXAM CHEST 2 VIEWS: CPT

## 2018-01-01 PROCEDURE — 97530 THERAPEUTIC ACTIVITIES: CPT

## 2018-01-01 PROCEDURE — 85610 PROTHROMBIN TIME: CPT | Performed by: EMERGENCY MEDICINE

## 2018-01-01 PROCEDURE — 36415 COLL VENOUS BLD VENIPUNCTURE: CPT

## 2018-01-01 PROCEDURE — 94640 AIRWAY INHALATION TREATMENT: CPT

## 2018-01-01 PROCEDURE — 85610 PROTHROMBIN TIME: CPT | Performed by: THORACIC SURGERY (CARDIOTHORACIC VASCULAR SURGERY)

## 2018-01-01 PROCEDURE — 84484 ASSAY OF TROPONIN QUANT: CPT | Performed by: PHYSICIAN ASSISTANT

## 2018-01-01 PROCEDURE — 0 TECHNETIUM SESTAMIBI: Performed by: INTERNAL MEDICINE

## 2018-01-01 PROCEDURE — 93005 ELECTROCARDIOGRAM TRACING: CPT | Performed by: PHYSICIAN ASSISTANT

## 2018-01-01 PROCEDURE — 93922 UPR/L XTREMITY ART 2 LEVELS: CPT

## 2018-01-01 PROCEDURE — 36600 WITHDRAWAL OF ARTERIAL BLOOD: CPT

## 2018-01-01 PROCEDURE — 84443 ASSAY THYROID STIM HORMONE: CPT | Performed by: PHYSICIAN ASSISTANT

## 2018-01-01 PROCEDURE — 25010000002 FUROSEMIDE PER 20 MG: Performed by: PHYSICIAN ASSISTANT

## 2018-01-01 PROCEDURE — G8979 MOBILITY GOAL STATUS: HCPCS

## 2018-01-01 PROCEDURE — 70450 CT HEAD/BRAIN W/O DYE: CPT

## 2018-01-01 PROCEDURE — A9500 TC99M SESTAMIBI: HCPCS | Performed by: INTERNAL MEDICINE

## 2018-01-01 PROCEDURE — 83880 ASSAY OF NATRIURETIC PEPTIDE: CPT | Performed by: PHYSICIAN ASSISTANT

## 2018-01-01 PROCEDURE — 93018 CV STRESS TEST I&R ONLY: CPT | Performed by: INTERNAL MEDICINE

## 2018-01-01 PROCEDURE — 80048 BASIC METABOLIC PNL TOTAL CA: CPT

## 2018-01-01 PROCEDURE — G8988 SELF CARE GOAL STATUS: HCPCS

## 2018-01-01 PROCEDURE — 81001 URINALYSIS AUTO W/SCOPE: CPT | Performed by: EMERGENCY MEDICINE

## 2018-01-01 PROCEDURE — 83605 ASSAY OF LACTIC ACID: CPT | Performed by: EMERGENCY MEDICINE

## 2018-01-01 PROCEDURE — 82553 CREATINE MB FRACTION: CPT | Performed by: PHYSICIAN ASSISTANT

## 2018-01-01 RX ORDER — WARFARIN SODIUM 5 MG/1
5 TABLET ORAL
Status: DISCONTINUED | OUTPATIENT
Start: 2018-01-01 | End: 2018-01-01 | Stop reason: DRUGHIGH

## 2018-01-01 RX ORDER — AMMONIA INHALANTS 0.04 G/.3ML
1 INHALANT RESPIRATORY (INHALATION) ONCE
Status: DISCONTINUED | OUTPATIENT
Start: 2018-01-01 | End: 2018-01-01 | Stop reason: HOSPADM

## 2018-01-01 RX ORDER — QUETIAPINE FUMARATE 300 MG/1
300 TABLET, FILM COATED ORAL NIGHTLY
Status: DISCONTINUED | OUTPATIENT
Start: 2018-01-01 | End: 2018-01-01 | Stop reason: HOSPADM

## 2018-01-01 RX ORDER — (SALINE) 19; 7 G/133ML; G/133ML
ENEMA RECTAL
COMMUNITY

## 2018-01-01 RX ORDER — SODIUM CHLORIDE 0.9 % (FLUSH) 0.9 %
10 SYRINGE (ML) INJECTION AS NEEDED
Status: DISCONTINUED | OUTPATIENT
Start: 2018-01-01 | End: 2018-01-01 | Stop reason: HOSPADM

## 2018-01-01 RX ORDER — ALBUTEROL SULFATE 1.25 MG/3ML
1 SOLUTION RESPIRATORY (INHALATION) EVERY 6 HOURS PRN
COMMUNITY

## 2018-01-01 RX ORDER — WARFARIN SODIUM 5 MG/1
5 TABLET ORAL
Status: DISCONTINUED | OUTPATIENT
Start: 2018-01-01 | End: 2018-01-01 | Stop reason: HOSPADM

## 2018-01-01 RX ORDER — CILOSTAZOL 100 MG/1
100 TABLET ORAL
Status: DISCONTINUED | OUTPATIENT
Start: 2018-01-01 | End: 2018-01-01 | Stop reason: HOSPADM

## 2018-01-01 RX ORDER — WARFARIN SODIUM 5 MG/1
TABLET ORAL
Qty: 30 TABLET | Refills: 0 | Status: SHIPPED | OUTPATIENT
Start: 2018-01-01 | End: 2018-01-01

## 2018-01-01 RX ORDER — DILTIAZEM HYDROCHLORIDE 120 MG/1
120 CAPSULE, EXTENDED RELEASE ORAL DAILY
COMMUNITY

## 2018-01-01 RX ORDER — TRIAMCINOLONE ACETONIDE 40 MG/ML
80 INJECTION, SUSPENSION INTRA-ARTICULAR; INTRAMUSCULAR ONCE
Status: COMPLETED | OUTPATIENT
Start: 2018-01-01 | End: 2018-01-01

## 2018-01-01 RX ORDER — WARFARIN SODIUM 5 MG/1
TABLET ORAL
Qty: 30 TABLET | Refills: 0 | OUTPATIENT
Start: 2018-01-01

## 2018-01-01 RX ORDER — MONTELUKAST SODIUM 10 MG/1
10 TABLET ORAL NIGHTLY
Status: DISCONTINUED | OUTPATIENT
Start: 2018-01-01 | End: 2018-01-01 | Stop reason: HOSPADM

## 2018-01-01 RX ORDER — TRAMADOL HYDROCHLORIDE 50 MG/1
50 TABLET ORAL EVERY 6 HOURS PRN
COMMUNITY

## 2018-01-01 RX ORDER — PRAMIPEXOLE DIHYDROCHLORIDE 0.5 MG/1
0.5 TABLET ORAL DAILY
Status: DISCONTINUED | OUTPATIENT
Start: 2018-01-01 | End: 2018-01-01 | Stop reason: HOSPADM

## 2018-01-01 RX ORDER — BISACODYL 10 MG
10 SUPPOSITORY, RECTAL RECTAL DAILY PRN
COMMUNITY

## 2018-01-01 RX ORDER — BISACODYL 10 MG
10 SUPPOSITORY, RECTAL RECTAL DAILY PRN
Status: DISCONTINUED | OUTPATIENT
Start: 2018-01-01 | End: 2018-01-01 | Stop reason: HOSPADM

## 2018-01-01 RX ORDER — FUROSEMIDE 10 MG/ML
40 INJECTION INTRAMUSCULAR; INTRAVENOUS ONCE
Status: COMPLETED | OUTPATIENT
Start: 2018-01-01 | End: 2018-01-01

## 2018-01-01 RX ORDER — FUROSEMIDE 40 MG/1
40 TABLET ORAL DAILY
Status: DISCONTINUED | OUTPATIENT
Start: 2018-01-01 | End: 2018-01-01 | Stop reason: HOSPADM

## 2018-01-01 RX ORDER — WARFARIN SODIUM 5 MG/1
5 TABLET ORAL
Status: COMPLETED | OUTPATIENT
Start: 2018-01-01 | End: 2018-01-01

## 2018-01-01 RX ORDER — IPRATROPIUM BROMIDE AND ALBUTEROL SULFATE 2.5; .5 MG/3ML; MG/3ML
3 SOLUTION RESPIRATORY (INHALATION) EVERY 4 HOURS PRN
Status: DISCONTINUED | OUTPATIENT
Start: 2018-01-01 | End: 2018-01-01 | Stop reason: HOSPADM

## 2018-01-01 RX ORDER — FUROSEMIDE 40 MG/1
40 TABLET ORAL EVERY OTHER DAY
Status: DISCONTINUED | OUTPATIENT
Start: 2018-01-01 | End: 2018-01-01

## 2018-01-01 RX ORDER — ASPIRIN 81 MG/1
81 TABLET ORAL DAILY
Status: DISCONTINUED | OUTPATIENT
Start: 2018-01-01 | End: 2018-01-01 | Stop reason: HOSPADM

## 2018-01-01 RX ORDER — IPRATROPIUM BROMIDE AND ALBUTEROL SULFATE 2.5; .5 MG/3ML; MG/3ML
3 SOLUTION RESPIRATORY (INHALATION) EVERY 4 HOURS PRN
Qty: 360 ML | Refills: 12 | Status: SHIPPED | OUTPATIENT
Start: 2018-01-01 | End: 2018-01-01

## 2018-01-01 RX ORDER — ACETAMINOPHEN 325 MG/1
650 TABLET ORAL EVERY 4 HOURS PRN
COMMUNITY

## 2018-01-01 RX ORDER — PRAMIPEXOLE DIHYDROCHLORIDE 0.25 MG/1
0.25 TABLET ORAL 2 TIMES DAILY
COMMUNITY

## 2018-01-01 RX ORDER — SOTALOL HYDROCHLORIDE 80 MG/1
80 TABLET ORAL EVERY 12 HOURS SCHEDULED
Status: DISCONTINUED | OUTPATIENT
Start: 2018-01-01 | End: 2018-01-01

## 2018-01-01 RX ORDER — QUETIAPINE FUMARATE 100 MG/1
200 TABLET, FILM COATED ORAL NIGHTLY
COMMUNITY

## 2018-01-01 RX ORDER — BETAMETHASONE SODIUM PHOSPHATE AND BETAMETHASONE ACETATE 3; 3 MG/ML; MG/ML
12 INJECTION, SUSPENSION INTRA-ARTICULAR; INTRALESIONAL; INTRAMUSCULAR; SOFT TISSUE ONCE
Status: COMPLETED | OUTPATIENT
Start: 2018-01-01 | End: 2018-01-01

## 2018-01-01 RX ORDER — FUROSEMIDE 40 MG/1
80 TABLET ORAL DAILY
Qty: 60 TABLET | Refills: 6 | Status: SHIPPED | OUTPATIENT
Start: 2018-01-01

## 2018-01-01 RX ORDER — SODIUM CHLORIDE 0.9 % (FLUSH) 0.9 %
1-10 SYRINGE (ML) INJECTION AS NEEDED
Status: DISCONTINUED | OUTPATIENT
Start: 2018-01-01 | End: 2018-01-01 | Stop reason: HOSPADM

## 2018-01-01 RX ORDER — SULFAMETHOXAZOLE AND TRIMETHOPRIM 800; 160 MG/1; MG/1
1 TABLET ORAL 2 TIMES DAILY
Qty: 14 TABLET | Refills: 0 | Status: SHIPPED | OUTPATIENT
Start: 2018-01-01 | End: 2018-01-01

## 2018-01-01 RX ORDER — WARFARIN SODIUM 4 MG/1
4 TABLET ORAL
Status: DISCONTINUED | OUTPATIENT
Start: 2018-01-01 | End: 2018-01-01 | Stop reason: DRUGHIGH

## 2018-01-01 RX ORDER — POLYETHYLENE GLYCOL 3350 17 G/17G
17 POWDER, FOR SOLUTION ORAL DAILY PRN
COMMUNITY

## 2018-01-01 RX ORDER — AMIODARONE HYDROCHLORIDE 200 MG/1
200 TABLET ORAL DAILY
Qty: 30 TABLET | Refills: 5 | Status: SHIPPED | OUTPATIENT
Start: 2018-01-01 | End: 2018-01-01 | Stop reason: ALTCHOICE

## 2018-01-01 RX ORDER — 0.9 % SODIUM CHLORIDE 0.9 %
10 VIAL (ML) INJECTION AS NEEDED
Status: DISCONTINUED | OUTPATIENT
Start: 2018-01-01 | End: 2018-01-01 | Stop reason: HOSPADM

## 2018-01-01 RX ORDER — LEVALBUTEROL TARTRATE 45 UG/1
2 AEROSOL, METERED ORAL 4 TIMES DAILY PRN
Qty: 15 G | Refills: 11 | Status: SHIPPED | OUTPATIENT
Start: 2018-01-01

## 2018-01-01 RX ORDER — ACETAMINOPHEN 325 MG/1
650 TABLET ORAL EVERY 4 HOURS PRN
Status: DISCONTINUED | OUTPATIENT
Start: 2018-01-01 | End: 2018-01-01 | Stop reason: HOSPADM

## 2018-01-01 RX ORDER — BUDESONIDE AND FORMOTEROL FUMARATE DIHYDRATE 160; 4.5 UG/1; UG/1
2 AEROSOL RESPIRATORY (INHALATION)
Status: DISCONTINUED | OUTPATIENT
Start: 2018-01-01 | End: 2018-01-01 | Stop reason: HOSPADM

## 2018-01-01 RX ADMIN — CILOSTAZOL 100 MG: 100 TABLET ORAL at 08:47

## 2018-01-01 RX ADMIN — COLLAGENASE SANTYL: 250 OINTMENT TOPICAL at 08:47

## 2018-01-01 RX ADMIN — TRIAMCINOLONE ACETONIDE 80 MG: 40 INJECTION, SUSPENSION INTRA-ARTICULAR; INTRAMUSCULAR at 15:38

## 2018-01-01 RX ADMIN — WARFARIN SODIUM 5 MG: 5 TABLET ORAL at 17:49

## 2018-01-01 RX ADMIN — BUDESONIDE AND FORMOTEROL FUMARATE DIHYDRATE 2 PUFF: 160; 4.5 AEROSOL RESPIRATORY (INHALATION) at 20:08

## 2018-01-01 RX ADMIN — WARFARIN SODIUM 4 MG: 4 TABLET ORAL at 18:03

## 2018-01-01 RX ADMIN — ASPIRIN 81 MG: 81 TABLET, COATED ORAL at 08:47

## 2018-01-01 RX ADMIN — COLLAGENASE SANTYL: 250 OINTMENT TOPICAL at 12:42

## 2018-01-01 RX ADMIN — DILTIAZEM HYDROCHLORIDE 30 MG: 30 TABLET, FILM COATED ORAL at 17:49

## 2018-01-01 RX ADMIN — REGADENOSON 0.4 MG: 0.08 INJECTION, SOLUTION INTRAVENOUS at 09:17

## 2018-01-01 RX ADMIN — BUDESONIDE AND FORMOTEROL FUMARATE DIHYDRATE 2 PUFF: 160; 4.5 AEROSOL RESPIRATORY (INHALATION) at 20:12

## 2018-01-01 RX ADMIN — PRAMIPEXOLE DIHYDROCHLORIDE 0.5 MG: 0.5 TABLET ORAL at 08:47

## 2018-01-01 RX ADMIN — ASPIRIN 81 MG: 81 TABLET, COATED ORAL at 09:08

## 2018-01-01 RX ADMIN — ACETAMINOPHEN 650 MG: 325 TABLET ORAL at 08:08

## 2018-01-01 RX ADMIN — QUETIAPINE FUMARATE 300 MG: 300 TABLET ORAL at 21:07

## 2018-01-01 RX ADMIN — QUETIAPINE FUMARATE 300 MG: 300 TABLET ORAL at 20:39

## 2018-01-01 RX ADMIN — CILOSTAZOL 100 MG: 100 TABLET ORAL at 17:10

## 2018-01-01 RX ADMIN — SODIUM CHLORIDE, PRESERVATIVE FREE 10 ML: 5 INJECTION INTRAVENOUS at 09:17

## 2018-01-01 RX ADMIN — CILOSTAZOL 100 MG: 100 TABLET ORAL at 08:02

## 2018-01-01 RX ADMIN — PRAMIPEXOLE DIHYDROCHLORIDE 0.5 MG: 0.5 TABLET ORAL at 09:08

## 2018-01-01 RX ADMIN — ACETAMINOPHEN 650 MG: 325 TABLET ORAL at 10:20

## 2018-01-01 RX ADMIN — BUDESONIDE AND FORMOTEROL FUMARATE DIHYDRATE 2 PUFF: 160; 4.5 AEROSOL RESPIRATORY (INHALATION) at 08:53

## 2018-01-01 RX ADMIN — DILTIAZEM HYDROCHLORIDE 30 MG: 30 TABLET, FILM COATED ORAL at 11:15

## 2018-01-01 RX ADMIN — FUROSEMIDE 40 MG: 40 TABLET ORAL at 08:47

## 2018-01-01 RX ADMIN — DILTIAZEM HYDROCHLORIDE 30 MG: 30 TABLET, FILM COATED ORAL at 00:39

## 2018-01-01 RX ADMIN — ACETAMINOPHEN 650 MG: 325 TABLET ORAL at 06:45

## 2018-01-01 RX ADMIN — MONTELUKAST SODIUM 10 MG: 10 TABLET, FILM COATED ORAL at 20:39

## 2018-01-01 RX ADMIN — ACETAMINOPHEN 650 MG: 325 TABLET ORAL at 02:53

## 2018-01-01 RX ADMIN — TRIAMCINOLONE ACETONIDE 80 MG: 40 INJECTION, SUSPENSION INTRA-ARTICULAR; INTRAMUSCULAR at 14:03

## 2018-01-01 RX ADMIN — BUDESONIDE AND FORMOTEROL FUMARATE DIHYDRATE 2 PUFF: 160; 4.5 AEROSOL RESPIRATORY (INHALATION) at 08:07

## 2018-01-01 RX ADMIN — DILTIAZEM HYDROCHLORIDE 30 MG: 30 TABLET, FILM COATED ORAL at 17:10

## 2018-01-01 RX ADMIN — BUDESONIDE AND FORMOTEROL FUMARATE DIHYDRATE 2 PUFF: 160; 4.5 AEROSOL RESPIRATORY (INHALATION) at 19:50

## 2018-01-01 RX ADMIN — MONTELUKAST SODIUM 10 MG: 10 TABLET, FILM COATED ORAL at 20:26

## 2018-01-01 RX ADMIN — QUETIAPINE FUMARATE 300 MG: 300 TABLET ORAL at 20:26

## 2018-01-01 RX ADMIN — ACETAMINOPHEN 650 MG: 325 TABLET ORAL at 06:16

## 2018-01-01 RX ADMIN — MONTELUKAST SODIUM 10 MG: 10 TABLET, FILM COATED ORAL at 21:07

## 2018-01-01 RX ADMIN — FUROSEMIDE 40 MG: 10 INJECTION, SOLUTION INTRAMUSCULAR; INTRAVENOUS at 10:59

## 2018-01-01 RX ADMIN — QUETIAPINE FUMARATE 300 MG: 300 TABLET ORAL at 20:53

## 2018-01-01 RX ADMIN — COLLAGENASE SANTYL: 250 OINTMENT TOPICAL at 08:08

## 2018-01-01 RX ADMIN — PRAMIPEXOLE DIHYDROCHLORIDE 0.5 MG: 0.5 TABLET ORAL at 10:10

## 2018-01-01 RX ADMIN — CILOSTAZOL 100 MG: 100 TABLET ORAL at 17:15

## 2018-01-01 RX ADMIN — IPRATROPIUM BROMIDE AND ALBUTEROL SULFATE 3 ML: 2.5; .5 SOLUTION RESPIRATORY (INHALATION) at 11:33

## 2018-01-01 RX ADMIN — WARFARIN SODIUM 5 MG: 5 TABLET ORAL at 17:19

## 2018-01-01 RX ADMIN — FUROSEMIDE 40 MG: 40 TABLET ORAL at 13:41

## 2018-01-01 RX ADMIN — TECHNETIUM TC 99M SESTAMIBI 1 DOSE: 1 INJECTION INTRAVENOUS at 09:17

## 2018-01-01 RX ADMIN — CILOSTAZOL 100 MG: 100 TABLET ORAL at 17:49

## 2018-01-01 RX ADMIN — CILOSTAZOL 100 MG: 100 TABLET ORAL at 10:10

## 2018-01-01 RX ADMIN — DILTIAZEM HYDROCHLORIDE 30 MG: 30 TABLET, FILM COATED ORAL at 05:50

## 2018-01-01 RX ADMIN — BUDESONIDE AND FORMOTEROL FUMARATE DIHYDRATE 2 PUFF: 160; 4.5 AEROSOL RESPIRATORY (INHALATION) at 11:40

## 2018-01-01 RX ADMIN — Medication 3.75 MG: at 21:07

## 2018-01-01 RX ADMIN — DILTIAZEM HYDROCHLORIDE 30 MG: 30 TABLET, FILM COATED ORAL at 06:12

## 2018-01-01 RX ADMIN — BETAMETHASONE SODIUM PHOSPHATE AND BETAMETHASONE ACETATE 12 MG: 3; 3 INJECTION, SUSPENSION INTRA-ARTICULAR; INTRALESIONAL; INTRAMUSCULAR; SOFT TISSUE at 15:01

## 2018-01-01 RX ADMIN — SODIUM CHLORIDE 1000 ML: 9 INJECTION, SOLUTION INTRAVENOUS at 19:39

## 2018-01-01 RX ADMIN — ASPIRIN 81 MG: 81 TABLET, COATED ORAL at 10:13

## 2018-01-01 RX ADMIN — BUDESONIDE AND FORMOTEROL FUMARATE DIHYDRATE 2 PUFF: 160; 4.5 AEROSOL RESPIRATORY (INHALATION) at 06:40

## 2018-01-01 RX ADMIN — COLLAGENASE SANTYL: 250 OINTMENT TOPICAL at 18:12

## 2018-01-01 RX ADMIN — COLLAGENASE SANTYL: 250 OINTMENT TOPICAL at 10:10

## 2018-01-01 RX ADMIN — MONTELUKAST SODIUM 10 MG: 10 TABLET, FILM COATED ORAL at 20:53

## 2018-01-01 RX ADMIN — DILTIAZEM HYDROCHLORIDE 30 MG: 30 TABLET, FILM COATED ORAL at 12:16

## 2018-01-01 RX ADMIN — CILOSTAZOL 100 MG: 100 TABLET ORAL at 18:12

## 2018-01-01 RX ADMIN — ASPIRIN 81 MG: 81 TABLET, COATED ORAL at 08:02

## 2018-01-01 RX ADMIN — CILOSTAZOL 100 MG: 100 TABLET ORAL at 09:08

## 2018-01-01 RX ADMIN — DILTIAZEM HYDROCHLORIDE 30 MG: 30 TABLET, FILM COATED ORAL at 05:59

## 2018-01-01 RX ADMIN — DILTIAZEM HYDROCHLORIDE 30 MG: 30 TABLET, FILM COATED ORAL at 00:04

## 2018-01-01 RX ADMIN — DILTIAZEM HYDROCHLORIDE 30 MG: 30 TABLET, FILM COATED ORAL at 17:15

## 2018-01-01 RX ADMIN — PRAMIPEXOLE DIHYDROCHLORIDE 0.5 MG: 0.5 TABLET ORAL at 08:02

## 2018-01-01 RX ADMIN — TECHNETIUM TC 99M SESTAMIBI 1 DOSE: 1 INJECTION INTRAVENOUS at 08:04

## 2018-01-02 ENCOUNTER — ANTICOAGULATION VISIT (OUTPATIENT)
Dept: CARDIAC SURGERY | Facility: CLINIC | Age: 78
End: 2018-01-02

## 2018-01-02 ENCOUNTER — OFFICE VISIT (OUTPATIENT)
Dept: VASCULAR SURGERY | Age: 78
End: 2018-01-02

## 2018-01-02 ENCOUNTER — TELEPHONE (OUTPATIENT)
Dept: VASCULAR SURGERY | Age: 78
End: 2018-01-02

## 2018-01-02 VITALS
HEART RATE: 78 BPM | OXYGEN SATURATION: 89 % | DIASTOLIC BLOOD PRESSURE: 62 MMHG | SYSTOLIC BLOOD PRESSURE: 118 MMHG | TEMPERATURE: 98.2 F

## 2018-01-02 DIAGNOSIS — T81.30XA WOUND DEHISCENCE: ICD-10-CM

## 2018-01-02 DIAGNOSIS — I74.9 EMBOLISM AND THROMBOSIS OF ARTERY (HCC): ICD-10-CM

## 2018-01-02 DIAGNOSIS — Z79.01 LONG-TERM (CURRENT) USE OF ANTICOAGULANTS: ICD-10-CM

## 2018-01-02 DIAGNOSIS — T81.30XA WOUND DEHISCENCE: Primary | ICD-10-CM

## 2018-01-02 DIAGNOSIS — I48.0 PAROXYSMAL ATRIAL FIBRILLATION (HCC): ICD-10-CM

## 2018-01-02 DIAGNOSIS — I73.9 PVD (PERIPHERAL VASCULAR DISEASE) (HCC): Primary | ICD-10-CM

## 2018-01-02 LAB
INR PPP: 2
INR PPP: 3

## 2018-01-02 PROCEDURE — 85610 PROTHROMBIN TIME: CPT | Performed by: THORACIC SURGERY (CARDIOTHORACIC VASCULAR SURGERY)

## 2018-01-02 PROCEDURE — 99024 POSTOP FOLLOW-UP VISIT: CPT | Performed by: NURSE PRACTITIONER

## 2018-01-02 RX ORDER — CEPHALEXIN 500 MG/1
500 CAPSULE ORAL 3 TIMES DAILY
Qty: 21 CAPSULE | Refills: 0 | Status: SHIPPED | OUTPATIENT
Start: 2018-01-02 | End: 2018-01-02 | Stop reason: CLARIF

## 2018-01-02 RX ORDER — CLINDAMYCIN HYDROCHLORIDE 300 MG/1
300 CAPSULE ORAL 3 TIMES DAILY
Qty: 30 CAPSULE | Refills: 0 | Status: SHIPPED | OUTPATIENT
Start: 2018-01-02 | End: 2018-01-04 | Stop reason: CLARIF

## 2018-01-02 NOTE — PROGRESS NOTES
Sonja Crane is a 69 y.o. male who presents for post op evaluation. Patient had a left fem distal bypass 2 weeks ago. This was performed by Dr. Jesenia Cartwright. Post op symptoms reported wound drainage. Post op pain is minimal.      On evaluation today, incision has 2 pinholes in the thigh and cultured the wounds. Today we wicked those wounds with nugauze. femoral pulses are present 2+ . Today we have recommended he wick the wounds twice daily with saline moistened nugauze and switch clindamycin. We have recommended continued warfarin daily. We will follow up with him in 1 weeks. This should bring you up to date on Sonja Crane  As always we want to thank you for allowing us to participate in the care of your patients.     Sincerely,    HAILEY Schulz

## 2018-01-02 NOTE — PROGRESS NOTES
I received INR from DANITA Schafer with [facility]  after she had left the pt home. Pt started another round of Keflex that he will finish Friday. Pt denied bleeding problems. Hanh was given instructions to have pt continue current dose, eat a serving of green veggies every 3-4 days, and to recheck INR on January 9; she repeated back correctly and stated she would notify pt.

## 2018-01-04 ENCOUNTER — LAB REQUISITION (OUTPATIENT)
Dept: LAB | Facility: HOSPITAL | Age: 78
End: 2018-01-04

## 2018-01-04 ENCOUNTER — TELEPHONE (OUTPATIENT)
Dept: VASCULAR SURGERY | Age: 78
End: 2018-01-04

## 2018-01-04 DIAGNOSIS — Z79.01 LONG TERM CURRENT USE OF ANTICOAGULANT: ICD-10-CM

## 2018-01-04 LAB
GRAM STAIN RESULT: ABNORMAL
INR PPP: 2.6 (ref 0.8–1.2)
INR PPP: 3 (ref 0.8–1.2)
INR PPP: 3.2 (ref 0.8–1.2)
ORGANISM: ABNORMAL
ORGANISM: ABNORMAL
PROTHROMBIN TIME: 31.8 SECONDS (ref 11–15)
PROTHROMBIN TIME: 36 SECONDS (ref 11–15)
PROTHROMBIN TIME: 37.4 SECONDS (ref 11–15)
WOUND/ABSCESS: ABNORMAL

## 2018-01-04 RX ORDER — LEVOFLOXACIN 500 MG/1
500 TABLET, FILM COATED ORAL DAILY
Qty: 10 TABLET | Refills: 0 | Status: SHIPPED | OUTPATIENT
Start: 2018-01-04 | End: 2018-01-14

## 2018-01-04 RX ORDER — AMOXICILLIN AND CLAVULANATE POTASSIUM 875; 125 MG/1; MG/1
1 TABLET, FILM COATED ORAL 2 TIMES DAILY
Qty: 20 TABLET | Refills: 0 | Status: SHIPPED | OUTPATIENT
Start: 2018-01-04 | End: 2018-02-06 | Stop reason: SDUPTHER

## 2018-01-05 ENCOUNTER — DOCUMENTATION (OUTPATIENT)
Dept: CARDIAC SURGERY | Facility: CLINIC | Age: 78
End: 2018-01-05

## 2018-01-05 NOTE — PROGRESS NOTES
Hanh RN with [facility] called and stated pt started Amoxicillin and Levaquin last night due to wound culture findings and will be on both for 10 days. Hanh was instructed to have pt decrease Saturday nights dose of coumadin from 5mg to 2.5mg and keep his scheduled HH visit for INR on January 9; she repeated back correctly.

## 2018-01-08 ENCOUNTER — OFFICE VISIT (OUTPATIENT)
Dept: VASCULAR SURGERY | Age: 78
End: 2018-01-08

## 2018-01-08 ENCOUNTER — TELEPHONE (OUTPATIENT)
Dept: VASCULAR SURGERY | Age: 78
End: 2018-01-08

## 2018-01-08 VITALS
TEMPERATURE: 96 F | SYSTOLIC BLOOD PRESSURE: 129 MMHG | DIASTOLIC BLOOD PRESSURE: 87 MMHG | HEART RATE: 64 BPM | RESPIRATION RATE: 18 BRPM

## 2018-01-08 DIAGNOSIS — Z09 FOLLOW UP: Primary | ICD-10-CM

## 2018-01-08 PROCEDURE — 99024 POSTOP FOLLOW-UP VISIT: CPT | Performed by: PHYSICIAN ASSISTANT

## 2018-01-09 ENCOUNTER — ANTICOAGULATION VISIT (OUTPATIENT)
Dept: CARDIAC SURGERY | Facility: CLINIC | Age: 78
End: 2018-01-09

## 2018-01-09 DIAGNOSIS — Z79.01 LONG-TERM (CURRENT) USE OF ANTICOAGULANTS: ICD-10-CM

## 2018-01-09 DIAGNOSIS — I48.0 PAROXYSMAL ATRIAL FIBRILLATION (HCC): ICD-10-CM

## 2018-01-09 DIAGNOSIS — I74.9 EMBOLISM AND THROMBOSIS OF ARTERY (HCC): ICD-10-CM

## 2018-01-09 NOTE — PROGRESS NOTES
Spoke to Dorita RN with [facility]  who was still in the home with pt.  Pt denies missed coumadin doses or consuming too much green.  Pt continues Amoxicillin and Levaquin.  Adjusted coumadin dose and instructed pt to limit green intake for two days.  Dorita states she will be back out to see pt this Thursday the 11th; INR will be rechecked then. Instructions verbalized.

## 2018-01-11 ENCOUNTER — ANTICOAGULATION VISIT (OUTPATIENT)
Dept: CARDIAC SURGERY | Facility: CLINIC | Age: 78
End: 2018-01-11

## 2018-01-11 DIAGNOSIS — I74.9 EMBOLISM AND THROMBOSIS OF ARTERY (HCC): ICD-10-CM

## 2018-01-11 DIAGNOSIS — Z79.01 LONG-TERM (CURRENT) USE OF ANTICOAGULANTS: ICD-10-CM

## 2018-01-11 DIAGNOSIS — I48.0 PAROXYSMAL ATRIAL FIBRILLATION (HCC): ICD-10-CM

## 2018-01-11 LAB — INR PPP: 3.7

## 2018-01-11 NOTE — PROGRESS NOTES
Spoke to Cat RN with [employer]  who was still in the home with pt.  Pt states he has completed both ABs.  Pt denies bleeding issues at this time.  Adjusted coumadin dose and instructed for pt to increase Vit K intake today with a cup serving of broccoli or salad.  Instructed Cat to recheck INR on Monday, Jan 15th.  Instructions verbalized.

## 2018-01-15 ENCOUNTER — DOCUMENTATION (OUTPATIENT)
Dept: CARDIAC SURGERY | Facility: CLINIC | Age: 78
End: 2018-01-15

## 2018-01-15 NOTE — PROGRESS NOTES
Spoke to DNAITA Kevin with [facility] .  Pt was due for INR today but due to poor road conditions, pt cannot be reached.  I instructed for pt to take the 5mg dose he is due tonight and attempt to recheck INR tomorrow, 01/16.  Instructions verbalized.  I then spoke to pt over the phone who verbalized instructions.

## 2018-01-17 ENCOUNTER — ANTICOAGULATION VISIT (OUTPATIENT)
Dept: CARDIAC SURGERY | Facility: CLINIC | Age: 78
End: 2018-01-17

## 2018-01-17 ENCOUNTER — TELEPHONE (OUTPATIENT)
Dept: FAMILY MEDICINE CLINIC | Facility: CLINIC | Age: 78
End: 2018-01-17

## 2018-01-17 DIAGNOSIS — I48.0 PAROXYSMAL ATRIAL FIBRILLATION (HCC): ICD-10-CM

## 2018-01-17 DIAGNOSIS — Z79.01 LONG-TERM (CURRENT) USE OF ANTICOAGULANTS: ICD-10-CM

## 2018-01-17 DIAGNOSIS — I74.9 EMBOLISM AND THROMBOSIS OF ARTERY (HCC): ICD-10-CM

## 2018-01-17 LAB — INR PPP: 3.8

## 2018-01-17 RX ORDER — MONTELUKAST SODIUM 10 MG/1
10 TABLET ORAL NIGHTLY
Qty: 90 TABLET | Refills: 2 | Status: SHIPPED | OUTPATIENT
Start: 2018-01-17

## 2018-01-17 RX ORDER — PRAMIPEXOLE DIHYDROCHLORIDE 0.25 MG/1
0.25 TABLET ORAL 3 TIMES DAILY
Qty: 90 TABLET | Refills: 5 | Status: SHIPPED | OUTPATIENT
Start: 2018-01-17 | End: 2018-01-01

## 2018-01-17 RX ORDER — WARFARIN SODIUM 5 MG/1
TABLET ORAL
Qty: 30 TABLET | Refills: 0 | Status: SHIPPED | OUTPATIENT
Start: 2018-01-17 | End: 2018-02-16 | Stop reason: SDUPTHER

## 2018-01-17 NOTE — PROGRESS NOTES
Received INR from DANITA Kevin with [facility] who had already left pt's home.  I spoke to pt over the phone who states he finished his ABs yesterday.  Pt states he took 5mg of coumadin last night.  Adjusted coumadin dose and instructed pt to increase Vit K intake today with a small salad.  [facility]  to recheck INR on Tuesday, Jan 23rd. ( Pt has a f/u appt with Dr. Sousa on Monday)  Pt verbalizes.  I spoke to DANITA Ricks with [facility] and gave same instructions.

## 2018-01-17 NOTE — TELEPHONE ENCOUNTER
PATIENT NEEDS REFILL ON HER pramipexole (MIRAPEX) 0.25 MG tablet. [facility]

## 2018-01-22 ENCOUNTER — OFFICE VISIT (OUTPATIENT)
Dept: VASCULAR SURGERY | Age: 78
End: 2018-01-22

## 2018-01-22 ENCOUNTER — TELEPHONE (OUTPATIENT)
Dept: VASCULAR SURGERY | Age: 78
End: 2018-01-22

## 2018-01-22 VITALS
SYSTOLIC BLOOD PRESSURE: 120 MMHG | RESPIRATION RATE: 18 BRPM | DIASTOLIC BLOOD PRESSURE: 82 MMHG | HEART RATE: 68 BPM | TEMPERATURE: 97.6 F | OXYGEN SATURATION: 91 %

## 2018-01-22 DIAGNOSIS — Z09 FOLLOW UP: Primary | ICD-10-CM

## 2018-01-22 PROCEDURE — 99024 POSTOP FOLLOW-UP VISIT: CPT | Performed by: PHYSICIAN ASSISTANT

## 2018-01-22 NOTE — PROGRESS NOTES
Ernestina Fang is a 69 y.o. male who presents for post op evaluation. Patient had a left fem distal bypass approximately 5  weeks ago. This was performed by Dr. Leann Burnham. HH is still packing the wounds daily. He reports that the swelling in his left leg is improved. He denies any fever/chills. On evaluation today, incision with a wound in the thigh with slough noted there is slight tunneling noted proximally approx 1 cm. No vessels noted. The left leg is still swollen but improved. 2+ doppler signals over graft, DP and PT. Wounds on the distal great and 4th and 5th toes with dry eschar. (the wounds appear improved). Foot warm. Today we have recommended he pack  the wounds daily with saline wet to dry dressing. We will have him use Silvadene on his toe wounds. He is to continue leg elevation. We have recommended continued warfarin daily. We will follow up with him in 2 weeks. This should bring you up to date on Ernestina Fang  As always we want to thank you for allowing us to participate in the care of your patients.     Sincerely,    Mariely Payan PA-C

## 2018-01-23 ENCOUNTER — ANTICOAGULATION VISIT (OUTPATIENT)
Dept: CARDIAC SURGERY | Facility: CLINIC | Age: 78
End: 2018-01-23

## 2018-01-23 DIAGNOSIS — I48.0 PAROXYSMAL ATRIAL FIBRILLATION (HCC): ICD-10-CM

## 2018-01-23 DIAGNOSIS — Z79.01 LONG-TERM (CURRENT) USE OF ANTICOAGULANTS: ICD-10-CM

## 2018-01-23 DIAGNOSIS — I74.9 EMBOLISM AND THROMBOSIS OF ARTERY (HCC): ICD-10-CM

## 2018-01-23 LAB — INR PPP: 1.4

## 2018-01-23 NOTE — PROGRESS NOTES
Received INR from DANITA Marte with [facility].  Spoke to pt over the phone who states he may have missed a coumadin dose.  Adjusted coumadin dose and instructed pt to limit green intake until INR rechecked this Friday, Jan 26th.  Spoke to DANITA Ricks with [facility] and gave same instructions.  Instructions verbalized.  Pt will take today's coumadin dose now instead of waiting until this evening.

## 2018-01-26 ENCOUNTER — ANTICOAGULATION VISIT (OUTPATIENT)
Dept: CARDIAC SURGERY | Facility: CLINIC | Age: 78
End: 2018-01-26

## 2018-01-26 ENCOUNTER — TELEPHONE (OUTPATIENT)
Dept: FAMILY MEDICINE CLINIC | Facility: CLINIC | Age: 78
End: 2018-01-26

## 2018-01-26 DIAGNOSIS — I48.0 PAROXYSMAL ATRIAL FIBRILLATION (HCC): ICD-10-CM

## 2018-01-26 DIAGNOSIS — Z79.01 LONG-TERM (CURRENT) USE OF ANTICOAGULANTS: ICD-10-CM

## 2018-01-26 DIAGNOSIS — I74.9 EMBOLISM AND THROMBOSIS OF ARTERY (HCC): ICD-10-CM

## 2018-01-26 LAB — INR PPP: 2.2

## 2018-01-26 NOTE — PROGRESS NOTES
I received INR from DANITA Escoto with [employer]  by phone while she was still in the patient's home. Pt is now taking guanfacine which does not interfere. Pt denies bleeding problems. Due to rapid rise in INR dose was not adjusted. [facility] was given instructions for dosing and next INR on Monday January 29 due to fluctuating INR; she repeated back correctly. Pt states he is going to discuss switching to Eliquis with PCP.

## 2018-01-26 NOTE — DISCHARGE SUMMARY
and there is collateral flow into the foot from the peroneal  artery.     PROCEDURE IN DETAIL:  After the patient was consented and given intravenous  antibiotics, he was brought to the operating room, placed on the operating  room table in supine position. General endotracheal anesthesia was  achieved. Harkins catheter was placed by nursing, and the patient's left  groin, leg and foot were prepped and draped in usual sterile procedure.     Dr. Jean Diego performed the left groin exposure and exposed the common  femoral, profunda femoris and superficial femoral artery as well as the  occluded bypass graft. He also exposed the greater saphenous vein proximally including the saphenfemoral junction. He encircled all these with vessel loops for future  vascular control.       While he was performing this, I was exposing the tibial  arteries through a calf incision. The tibioperoneal trunk, posterior  tibial and peroneal arteries were all exposed. The posterior tibial artery  is very hard and the lumen appears to be completely obliterated. The  peroneal artery is fairly soft and there is a Doppler signal here. The greater saphenous vein was then dissected in the calf wound. All branches were  transected between hemostat and ligated with 4-0 Vicryl sutures and/or  clips.     A 4-Maldivian sheath was placed in the left common femoral artery and left  lower extremity arteriograms were performed to verify that our distal  target is the peroneal artery. It is indeed. The posterior tibial artery  is completely occluded all the way down to the ankle. The 4-Maldivian sheath  was removed and pressured applied for hemostasis.     After the patient was given heparin and the ACT was greater than  250, the femoral vessels were controlled and the PTFE graft was completely  excised from the common femoral artery as well as for several centimeters  distal to this to get it out of the groin.   The saphenofemoral junction was  then clamped left  lower extremity arteriograms were performed with the above findings. The  4-[place] sheath was removed and puncture site closed with 6-0 Prolene  suture.     The groin wound was closed in layers with 2-0 Vicryl figure-of eight deep  sutures, 2-0 Vicryl figure-of eight subcutaneous sutures, 3-0 Vicryl  running suture and skin staples. The below-knee incision was closed with  3-0 Vicryl subcutaneous sutures and skin staples. The small incisions to  remove branches were closed with 3-0 Vicryl subcutaneous sutures and skin  staples. The patient tolerated the procedure well. He was extubated,  brought to recovery room in good condition. Other Procedures:    none    History and Physical:    See History and Physical on admission without additions nor deletions. Hospital Course:    He was admitted after the above stated procedure (please see operative note for details of procedure). On POD#1, His diet and activity were increased. On the date of discharge, His vitals are noted on Epic. His abdomen is soft, non-tender, nondistended, and leg wounds are C/D/I without hematomas. His feet are warm without any signs of distal embolization. Unchanged toes with dry gangrenous ulcers. Discharge Instructions:    Discharge instructions were discussed with the patient prior to discharge. The patient was also given written discharge instructions. Discharge Medications:    See Epic for discharge medication list, including any new prescriptions. Follow-Up:    He will follow-up in the office/wound care center in 1-2 weeks. He can call with any questions or concerns.      Signed:  Shirleen Blizzard  1/25/2018  8:39 PM

## 2018-01-29 ENCOUNTER — ANTICOAGULATION VISIT (OUTPATIENT)
Dept: CARDIAC SURGERY | Facility: CLINIC | Age: 78
End: 2018-01-29

## 2018-01-29 DIAGNOSIS — I48.0 PAROXYSMAL ATRIAL FIBRILLATION (HCC): ICD-10-CM

## 2018-01-29 DIAGNOSIS — Z79.01 LONG-TERM (CURRENT) USE OF ANTICOAGULANTS: ICD-10-CM

## 2018-01-29 DIAGNOSIS — I74.9 EMBOLISM AND THROMBOSIS OF ARTERY (HCC): ICD-10-CM

## 2018-01-29 LAB — INR PPP: 2.1

## 2018-01-29 PROCEDURE — 85610 PROTHROMBIN TIME: CPT | Performed by: THORACIC SURGERY (CARDIOTHORACIC VASCULAR SURGERY)

## 2018-02-02 ENCOUNTER — TELEPHONE (OUTPATIENT)
Dept: FAMILY MEDICINE CLINIC | Facility: CLINIC | Age: 78
End: 2018-02-02

## 2018-02-02 ENCOUNTER — ANTICOAGULATION VISIT (OUTPATIENT)
Dept: CARDIAC SURGERY | Facility: CLINIC | Age: 78
End: 2018-02-02

## 2018-02-02 DIAGNOSIS — I48.0 PAROXYSMAL ATRIAL FIBRILLATION (HCC): ICD-10-CM

## 2018-02-02 DIAGNOSIS — I74.9 EMBOLISM AND THROMBOSIS OF ARTERY (HCC): ICD-10-CM

## 2018-02-02 DIAGNOSIS — Z79.01 LONG-TERM (CURRENT) USE OF ANTICOAGULANTS: ICD-10-CM

## 2018-02-02 LAB — INR PPP: 1.8

## 2018-02-02 PROCEDURE — 85610 PROTHROMBIN TIME: CPT | Performed by: THORACIC SURGERY (CARDIOTHORACIC VASCULAR SURGERY)

## 2018-02-02 NOTE — PROGRESS NOTES
I received INR from DANITA Escoto with [facility]  by phone while she was still in the patients home. Pt denied med changes, bleeding problems, missed doses, or excessive vit K intake. [facility] was given instructions for dosing, to have pt eat a serving of green veggies Tuesday or Wednesday, and recheck INR on February 8 due to other doctor appointments; she repeated back correctly.

## 2018-02-05 ENCOUNTER — APPOINTMENT (OUTPATIENT)
Dept: LAB | Facility: HOSPITAL | Age: 78
End: 2018-02-05

## 2018-02-05 ENCOUNTER — OFFICE VISIT (OUTPATIENT)
Dept: FAMILY MEDICINE CLINIC | Facility: CLINIC | Age: 78
End: 2018-02-05

## 2018-02-05 VITALS
DIASTOLIC BLOOD PRESSURE: 88 MMHG | SYSTOLIC BLOOD PRESSURE: 126 MMHG | HEIGHT: 71 IN | OXYGEN SATURATION: 97 % | WEIGHT: 219 LBS | BODY MASS INDEX: 30.66 KG/M2 | HEART RATE: 63 BPM

## 2018-02-05 DIAGNOSIS — E78.2 MIXED HYPERLIPIDEMIA: Primary | ICD-10-CM

## 2018-02-05 DIAGNOSIS — R73.03 PREDIABETES: ICD-10-CM

## 2018-02-05 DIAGNOSIS — Z79.899 HIGH RISK MEDICATION USE: ICD-10-CM

## 2018-02-05 DIAGNOSIS — J44.9 CHRONIC OBSTRUCTIVE PULMONARY DISEASE, UNSPECIFIED COPD TYPE (HCC): ICD-10-CM

## 2018-02-05 PROBLEM — I70.269 ATHEROSCLEROSIS OF ARTERY OF EXTREMITY WITH GANGRENE (HCC): Status: ACTIVE | Noted: 2017-09-11

## 2018-02-05 LAB
ALBUMIN SERPL-MCNC: 3.9 G/DL (ref 3.4–4.8)
ALBUMIN/GLOB SERPL: 1 G/DL (ref 1.1–1.8)
ALP SERPL-CCNC: 82 U/L (ref 38–126)
ALT SERPL W P-5'-P-CCNC: 23 U/L (ref 21–72)
ANION GAP SERPL CALCULATED.3IONS-SCNC: 11 MMOL/L (ref 5–15)
ARTICHOKE IGE QN: 68 MG/DL (ref 1–129)
AST SERPL-CCNC: 24 U/L (ref 17–59)
BILIRUB SERPL-MCNC: 0.2 MG/DL (ref 0.2–1.3)
BUN BLD-MCNC: 27 MG/DL (ref 7–21)
BUN/CREAT SERPL: 17.2 (ref 7–25)
CALCIUM SPEC-SCNC: 9.3 MG/DL (ref 8.4–10.2)
CHLORIDE SERPL-SCNC: 104 MMOL/L (ref 95–110)
CHOLEST SERPL-MCNC: 134 MG/DL (ref 0–199)
CO2 SERPL-SCNC: 26 MMOL/L (ref 22–31)
CREAT BLD-MCNC: 1.57 MG/DL (ref 0.7–1.3)
GFR SERPL CREATININE-BSD FRML MDRD: 43 ML/MIN/1.73 (ref 60–98)
GLOBULIN UR ELPH-MCNC: 4 GM/DL (ref 2.3–3.5)
GLUCOSE BLD-MCNC: 96 MG/DL (ref 60–100)
HBA1C MFR BLD: 5.6 % (ref 4–5.6)
HDLC SERPL-MCNC: 28 MG/DL (ref 60–200)
LDLC/HDLC SERPL: 2.61 {RATIO} (ref 0–3.55)
POTASSIUM BLD-SCNC: 5.2 MMOL/L (ref 3.5–5.1)
PROT SERPL-MCNC: 7.9 G/DL (ref 6.3–8.6)
SODIUM BLD-SCNC: 141 MMOL/L (ref 137–145)
TRIGL SERPL-MCNC: 165 MG/DL (ref 20–199)
TSH SERPL DL<=0.05 MIU/L-ACNC: 0.96 MIU/ML (ref 0.46–4.68)

## 2018-02-05 PROCEDURE — 99214 OFFICE O/P EST MOD 30 MIN: CPT | Performed by: FAMILY MEDICINE

## 2018-02-05 PROCEDURE — 80053 COMPREHEN METABOLIC PANEL: CPT | Performed by: FAMILY MEDICINE

## 2018-02-05 PROCEDURE — 84443 ASSAY THYROID STIM HORMONE: CPT | Performed by: FAMILY MEDICINE

## 2018-02-05 PROCEDURE — 80061 LIPID PANEL: CPT | Performed by: FAMILY MEDICINE

## 2018-02-05 PROCEDURE — 83036 HEMOGLOBIN GLYCOSYLATED A1C: CPT | Performed by: FAMILY MEDICINE

## 2018-02-05 PROCEDURE — 36415 COLL VENOUS BLD VENIPUNCTURE: CPT | Performed by: FAMILY MEDICINE

## 2018-02-05 RX ORDER — ALBUTEROL SULFATE 90 UG/1
2 AEROSOL, METERED RESPIRATORY (INHALATION) EVERY 4 HOURS PRN
Qty: 1 INHALER | Refills: 6 | Status: SHIPPED | OUTPATIENT
Start: 2018-02-05 | End: 2018-01-01

## 2018-02-05 NOTE — PROGRESS NOTES
Subjective   Massimo Bentley is a 77 y.o. male.     COPD   This is a chronic problem. The current episode started more than 1 year ago. The problem occurs intermittently. The problem has been gradually improving. Associated symptoms include coughing. Pertinent negatives include no abdominal pain, anorexia, arthralgias, change in bowel habit, chest pain, chills, congestion, diaphoresis, fatigue, fever, headaches, joint swelling, myalgias, nausea, neck pain, numbness, rash, sore throat, swollen glands, urinary symptoms, vertigo, visual change, vomiting or weakness. Nothing aggravates the symptoms. Treatments tried: He has been doing well on Symbicort. The treatment provided moderate relief.   Hyperlipidemia   This is a chronic problem. The current episode started more than 1 year ago. The problem is uncontrolled. Recent lipid tests were reviewed and are high. Exacerbating diseases include obesity. Pertinent negatives include no chest pain, myalgias or shortness of breath. Current antihyperlipidemic treatment includes statins. The current treatment provides mild improvement of lipids. Compliance problems include medication side effects (Didn't tolerate statins in the past. ).    He has been found to be prediabetic. He had been trying to watch his diet. Has had limited exercise because of his recent surgery on his legs. Hasn't been monitoring sugars and hasn't been on medications for sugars in the past.         Current Outpatient Prescriptions:   •  aspirin 81 MG EC tablet, Take 81 mg by mouth daily., Disp: , Rfl:   •  budesonide-formoterol (SYMBICORT) 160-4.5 MCG/ACT inhaler, Inhale 2 puffs 2 (Two) Times a Day., Disp: 1 inhaler, Rfl: 12  •  cetirizine (zyrTEC) 10 MG tablet, Take 1 tablet by mouth Daily., Disp: 90 tablet, Rfl: 2  •  cilostazol (PLETAL) 50 MG tablet, Take 1 tablet by mouth 2 (Two) Times a Day., Disp: 60 tablet, Rfl: 5  •  guaiFENesin (MUCINEX) 600 MG 12 hr tablet, Take 1,200 mg by mouth 2 (Two) Times  a Day., Disp: , Rfl:   •  levothyroxine (SYNTHROID, LEVOTHROID) 25 MCG tablet, Take 25 mcg by mouth daily., Disp: , Rfl:   •  magnesium oxide (MAGOX) 400 (241.3 MG) MG tablet tablet, Take 800 mg by mouth Every Night., Disp: , Rfl:   •  montelukast (SINGULAIR) 10 MG tablet, Take 1 tablet by mouth Every Night., Disp: 90 tablet, Rfl: 2  •  pramipexole (MIRAPEX) 0.25 MG tablet, Take 1 tablet by mouth 3 (Three) Times a Day., Disp: 90 tablet, Rfl: 5  •  QUEtiapine (SEROquel) 100 MG tablet, take 3 tablets at bedtime, Disp: , Rfl: 0  •  sotalol (BETAPACE) 80 MG tablet, Take 1.5 tablets by mouth 2 (Two) Times a Day., Disp: 270 tablet, Rfl: 2  •  warfarin (COUMADIN) 5 MG tablet, TAKE 1 TABLET BY MOUTH EVERY NIGHT OR AS DIRECTED BY COUMADIN CLINIC, Disp: 30 tablet, Rfl: 0    The following portions of the patient's history were reviewed and updated as appropriate: allergies, current medications, past family history, past medical history, past social history, past surgical history and problem list.    Review of Systems   Constitutional: Positive for activity change and unexpected weight change. Negative for appetite change, chills, diaphoresis, fatigue and fever.   HENT: Negative for congestion and sore throat.    Respiratory: Positive for cough and wheezing. Negative for shortness of breath.    Cardiovascular: Positive for leg swelling. Negative for chest pain and palpitations.   Gastrointestinal: Negative for abdominal distention, abdominal pain, anorexia, change in bowel habit, constipation, diarrhea, nausea and vomiting.   Musculoskeletal: Negative for arthralgias, joint swelling, myalgias and neck pain.   Skin: Positive for color change and wound. Negative for pallor and rash.   Neurological: Negative for vertigo, weakness, numbness and headaches.   Psychiatric/Behavioral: Negative for dysphoric mood and sleep disturbance. The patient is not nervous/anxious.        Objective    Vitals:    02/05/18 1245   BP: 126/88   Pulse:  "63   SpO2: 97%   Weight: 99.3 kg (219 lb)   Height: 180.3 cm (71\")       Physical Exam   Constitutional: He is oriented to person, place, and time. He appears well-developed and well-nourished. No distress.   In wheelchair   Cardiovascular: Normal rate and regular rhythm.    Murmur heard.  +1 swelling in both legs. Wearing compression dressing on his left lower ext.   Pulmonary/Chest: Effort normal. No respiratory distress. He has wheezes.   Abdominal: Soft. Bowel sounds are normal. He exhibits no distension. There is no tenderness.   Neurological: He is alert and oriented to person, place, and time.   Psychiatric: He has a normal mood and affect. His behavior is normal. Judgment and thought content normal.       Assessment/Plan   Problems Addressed this Visit        Cardiovascular and Mediastinum    Hyperlipidemia - Primary    Relevant Orders    Lipid Panel (Completed)    Comprehensive Metabolic Panel (Completed)       Respiratory    COPD (chronic obstructive pulmonary disease)    Relevant Medications    albuterol (PROVENTIL HFA;VENTOLIN HFA) 108 (90 Base) MCG/ACT inhaler       Other    Prediabetes    Relevant Orders    Hemoglobin A1c      Other Visit Diagnoses     High risk medication use        Relevant Orders    TSH        1.) Hyperlipidemia- He has been intolerant to statins in the past. He has had significant PAD.  Would benefit from cholesterol medication.  Will recheck lipids and if HCL elevated will try to get Praluant covered for him.    2.) Prediabetes- Will recheck A1C and start on medication if needed. MOnitor every 6 months if stable.  3.) COPD- Has been doing better with the Symbicort. He hadn't been using albuterol PRN, and has recently gotten over bronchitis. Didn't have any steroids with that flair. Will try the albuterol PRN and if it gets worse or SOA worsening I will call in prednisone burst for him.  RTC in 3 months or sooner PRN           "

## 2018-02-06 ENCOUNTER — OFFICE VISIT (OUTPATIENT)
Dept: VASCULAR SURGERY | Age: 78
End: 2018-02-06

## 2018-02-06 ENCOUNTER — TELEPHONE (OUTPATIENT)
Dept: VASCULAR SURGERY | Age: 78
End: 2018-02-06

## 2018-02-06 ENCOUNTER — LAB REQUISITION (OUTPATIENT)
Dept: LAB | Facility: HOSPITAL | Age: 78
End: 2018-02-06

## 2018-02-06 VITALS
SYSTOLIC BLOOD PRESSURE: 135 MMHG | HEART RATE: 63 BPM | RESPIRATION RATE: 18 BRPM | DIASTOLIC BLOOD PRESSURE: 87 MMHG | TEMPERATURE: 96 F

## 2018-02-06 DIAGNOSIS — T81.30XA WOUND DEHISCENCE: ICD-10-CM

## 2018-02-06 DIAGNOSIS — I70.244 ATHEROSCLEROSIS OF NATIVE ARTERIES OF LEFT LEG WITH ULCERATION OF HEEL AND MIDFOOT (HCC): Primary | ICD-10-CM

## 2018-02-06 DIAGNOSIS — T81.30XA WOUND DEHISCENCE: Primary | ICD-10-CM

## 2018-02-06 DIAGNOSIS — Z79.01 LONG TERM CURRENT USE OF ANTICOAGULANT: ICD-10-CM

## 2018-02-06 LAB — INR PPP: 2.1 (ref 0.8–1.2)

## 2018-02-06 PROCEDURE — 99024 POSTOP FOLLOW-UP VISIT: CPT | Performed by: NURSE PRACTITIONER

## 2018-02-06 RX ORDER — SULFAMETHOXAZOLE AND TRIMETHOPRIM 800; 160 MG/1; MG/1
1 TABLET ORAL 2 TIMES DAILY
Qty: 10 TABLET | Refills: 0 | Status: SHIPPED | OUTPATIENT
Start: 2018-02-06 | End: 2018-02-16

## 2018-02-06 RX ORDER — AMOXICILLIN AND CLAVULANATE POTASSIUM 875; 125 MG/1; MG/1
1 TABLET, FILM COATED ORAL 2 TIMES DAILY
Qty: 20 TABLET | Refills: 0 | Status: SHIPPED | OUTPATIENT
Start: 2018-02-06 | End: 2018-02-16

## 2018-02-06 NOTE — PROGRESS NOTES
Orly Fishman is a 69 y.o. male who presents for post op evaluation. Patient had a left fem distal bypass approximately 5  weeks ago. This was performed by Dr. Anne-Marie Nieves.  is still packing the wounds daily. He reports that the swelling in his left leg is improved. He denies any fever/chills. On evaluation today, incision at femoral site has 3 cm wound he did not know about with pus and odor. this is mostly superficial.  Thigh wound is about 3 mm by 2 mm depth. No signs of infection here. Dry ulcerations of toes that are unchanged with dry eschar. (the wounds appear improved). Foot warm. Today we have recommended he pack  the wounds daily with saline wet to dry dressing. We will have him use Silvadene on his toe wounds. He is to continue leg elevation. We have recommended continued warfarin daily. We will follow up with him in 2 weeks. This should bring you up to date on Orly Fishman  As always we want to thank you for allowing us to participate in the care of your patients.     Sincerely,    HAILEY Ruiz

## 2018-02-07 ENCOUNTER — LAB REQUISITION (OUTPATIENT)
Dept: LAB | Facility: HOSPITAL | Age: 78
End: 2018-02-07

## 2018-02-07 DIAGNOSIS — Z79.01 LONG TERM CURRENT USE OF ANTICOAGULANT: ICD-10-CM

## 2018-02-07 LAB
INR PPP: 1.8 (ref 0.8–1.2)
PROTHROMBIN TIME: 22 SECONDS (ref 11–15)

## 2018-02-08 ENCOUNTER — TELEPHONE (OUTPATIENT)
Dept: FAMILY MEDICINE CLINIC | Facility: CLINIC | Age: 78
End: 2018-02-08

## 2018-02-08 ENCOUNTER — ANTICOAGULATION VISIT (OUTPATIENT)
Dept: CARDIAC SURGERY | Facility: CLINIC | Age: 78
End: 2018-02-08

## 2018-02-08 DIAGNOSIS — Z79.01 LONG-TERM (CURRENT) USE OF ANTICOAGULANTS: ICD-10-CM

## 2018-02-08 DIAGNOSIS — I74.9 EMBOLISM AND THROMBOSIS OF ARTERY (HCC): ICD-10-CM

## 2018-02-08 DIAGNOSIS — I48.0 PAROXYSMAL ATRIAL FIBRILLATION (HCC): ICD-10-CM

## 2018-02-08 LAB — INR PPP: 2.1

## 2018-02-08 PROCEDURE — 85610 PROTHROMBIN TIME: CPT | Performed by: THORACIC SURGERY (CARDIOTHORACIC VASCULAR SURGERY)

## 2018-02-08 NOTE — PROGRESS NOTES
Received above INR from Hanh SAMAYOA [facility] over the phone who is still in pt's home. Per Hanh, pt started Bactrim and Amoxicillin on Tuesday for 10 days. Denies any bleeding issues. Due to rise in INR and two antibiotics, dose was left the same. Recheck INR with next visit on 2/13. Instructed for pt to consume 1/2 cup green on Sunday. Patient instructed regarding medication; results given and questions answered. Nutritional counseling given.  Dietary factors affecting therapy addressed.  Patient instructed to monitor for excessive bruising or bleeding. Hanh repeated back correctly and will inform pt.

## 2018-02-09 ENCOUNTER — LAB REQUISITION (OUTPATIENT)
Dept: LAB | Facility: HOSPITAL | Age: 78
End: 2018-02-09

## 2018-02-09 ENCOUNTER — TELEPHONE (OUTPATIENT)
Dept: VASCULAR SURGERY | Age: 78
End: 2018-02-09

## 2018-02-09 DIAGNOSIS — N28.9 RENAL IMPAIRMENT: Primary | ICD-10-CM

## 2018-02-09 DIAGNOSIS — Z48.812 ENCOUNTER FOR SURGICAL AFTERCARE FOLLOWING SURGERY OF CIRCULATORY SYSTEM: ICD-10-CM

## 2018-02-09 LAB
ANION GAP SERPL CALCULATED.3IONS-SCNC: 13 MMOL/L (ref 5–15)
BUN BLD-MCNC: 26 MG/DL (ref 7–21)
BUN/CREAT SERPL: 15.5 (ref 7–25)
CALCIUM SPEC-SCNC: 9.4 MG/DL (ref 8.4–10.2)
CHLORIDE SERPL-SCNC: 106 MMOL/L (ref 95–110)
CO2 SERPL-SCNC: 21 MMOL/L (ref 22–31)
CREAT BLD-MCNC: 1.68 MG/DL (ref 0.7–1.3)
GFR SERPL CREATININE-BSD FRML MDRD: 40 ML/MIN/1.73 (ref 42–98)
GLUCOSE BLD-MCNC: 82 MG/DL (ref 60–100)
POTASSIUM BLD-SCNC: 5.6 MMOL/L (ref 3.5–5.1)
SODIUM BLD-SCNC: 140 MMOL/L (ref 137–145)

## 2018-02-09 PROCEDURE — 80048 BASIC METABOLIC PNL TOTAL CA: CPT | Performed by: SURGERY

## 2018-02-09 NOTE — TELEPHONE ENCOUNTER
[facility] called to report the pt's INR at 2. 1. This report was also given to the patient's Coumadin clinic that regulates his INR. The patient's BMP in scanned in to media. Note forwarded to [facility] for review.

## 2018-02-11 LAB
ANAEROBIC CULTURE: ABNORMAL
GRAM STAIN RESULT: ABNORMAL
ORGANISM: ABNORMAL
WOUND/ABSCESS: ABNORMAL

## 2018-02-12 ENCOUNTER — TELEPHONE (OUTPATIENT)
Dept: VASCULAR SURGERY | Age: 78
End: 2018-02-12

## 2018-02-12 ENCOUNTER — ANTICOAGULATION VISIT (OUTPATIENT)
Dept: CARDIAC SURGERY | Facility: CLINIC | Age: 78
End: 2018-02-12

## 2018-02-12 ENCOUNTER — LAB REQUISITION (OUTPATIENT)
Dept: LAB | Facility: HOSPITAL | Age: 78
End: 2018-02-12

## 2018-02-12 DIAGNOSIS — I48.0 PAROXYSMAL ATRIAL FIBRILLATION (HCC): ICD-10-CM

## 2018-02-12 DIAGNOSIS — Z79.01 LONG-TERM (CURRENT) USE OF ANTICOAGULANTS: ICD-10-CM

## 2018-02-12 DIAGNOSIS — I12.9 HYPERTENSIVE CHRONIC KIDNEY DISEASE WITH STAGE 1 THROUGH STAGE 4 CHRONIC KIDNEY DISEASE, OR UNSPECIFIED CHRONIC KIDNEY DISEASE: ICD-10-CM

## 2018-02-12 DIAGNOSIS — I74.9 EMBOLISM AND THROMBOSIS OF ARTERY (HCC): ICD-10-CM

## 2018-02-12 LAB
ANION GAP SERPL CALCULATED.3IONS-SCNC: 11 MMOL/L (ref 5–15)
BUN BLD-MCNC: 28 MG/DL (ref 7–21)
BUN/CREAT SERPL: 15.1 (ref 7–25)
CALCIUM SPEC-SCNC: 9 MG/DL (ref 8.4–10.2)
CHLORIDE SERPL-SCNC: 105 MMOL/L (ref 95–110)
CO2 SERPL-SCNC: 19 MMOL/L (ref 22–31)
CREAT BLD-MCNC: 1.86 MG/DL (ref 0.7–1.3)
GFR SERPL CREATININE-BSD FRML MDRD: 35 ML/MIN/1.73 (ref 42–98)
GLUCOSE BLD-MCNC: 85 MG/DL (ref 60–100)
INR PPP: 3.2
POTASSIUM BLD-SCNC: 5.4 MMOL/L (ref 3.5–5.1)
SODIUM BLD-SCNC: 135 MMOL/L (ref 137–145)

## 2018-02-12 PROCEDURE — 80048 BASIC METABOLIC PNL TOTAL CA: CPT | Performed by: SURGERY

## 2018-02-12 NOTE — TELEPHONE ENCOUNTER
Spoke with  [facility], gave verbal order to have a BMP drawn today and results called to our office. [facility] voiced understanding.  OB

## 2018-02-12 NOTE — PROGRESS NOTES
INR was obtained today per Laura [facility] because she was in the home obtaining BMP for another MD. Per Laura, pt continues Bactrim and Amoxicillin. PT denies any other med changes or bleeding issues. Due to sudden rise in INR and AB, dose was cut back slightly and pt will be rechecked on 2/15. Laura repeated back correctly and will inform pt/family. Patient instructed regarding medication; results given and questions answered. Nutritional counseling given.  Dietary factors affecting therapy addressed.  Patient instructed to monitor for excessive bruising or bleeding.

## 2018-02-15 ENCOUNTER — ANTICOAGULATION VISIT (OUTPATIENT)
Dept: CARDIAC SURGERY | Facility: CLINIC | Age: 78
End: 2018-02-15

## 2018-02-15 ENCOUNTER — LAB REQUISITION (OUTPATIENT)
Dept: LAB | Facility: HOSPITAL | Age: 78
End: 2018-02-15

## 2018-02-15 DIAGNOSIS — Z79.01 LONG TERM CURRENT USE OF ANTICOAGULANT: ICD-10-CM

## 2018-02-15 DIAGNOSIS — I48.0 PAROXYSMAL ATRIAL FIBRILLATION (HCC): ICD-10-CM

## 2018-02-15 DIAGNOSIS — Z79.01 LONG-TERM (CURRENT) USE OF ANTICOAGULANTS: ICD-10-CM

## 2018-02-15 DIAGNOSIS — N18.30 CHRONIC KIDNEY DISEASE, STAGE III (MODERATE) (HCC): ICD-10-CM

## 2018-02-15 DIAGNOSIS — I74.9 EMBOLISM AND THROMBOSIS OF ARTERY (HCC): ICD-10-CM

## 2018-02-15 LAB
ANION GAP SERPL CALCULATED.3IONS-SCNC: 13 MMOL/L (ref 5–15)
BUN BLD-MCNC: 22 MG/DL (ref 7–21)
BUN/CREAT SERPL: 13.7 (ref 7–25)
CALCIUM SPEC-SCNC: 8.9 MG/DL (ref 8.4–10.2)
CHLORIDE SERPL-SCNC: 103 MMOL/L (ref 95–110)
CO2 SERPL-SCNC: 22 MMOL/L (ref 22–31)
CREAT BLD-MCNC: 1.61 MG/DL (ref 0.7–1.3)
GFR SERPL CREATININE-BSD FRML MDRD: 42 ML/MIN/1.73 (ref 42–98)
GLUCOSE BLD-MCNC: 92 MG/DL (ref 60–100)
INR PPP: 2.1 (ref 0.8–1.2)
INR PPP: 2.3
POTASSIUM BLD-SCNC: 5.1 MMOL/L (ref 3.5–5.1)
PROTHROMBIN TIME: 25.5 SECONDS (ref 11–15)
SODIUM BLD-SCNC: 138 MMOL/L (ref 137–145)

## 2018-02-15 PROCEDURE — 80048 BASIC METABOLIC PNL TOTAL CA: CPT | Performed by: SURGERY

## 2018-02-15 NOTE — PROGRESS NOTES
Received above INR from Tigist [facility] over the phone who is no longer in pt's home. Per Tigist, pt states he took Coumadin and ate 1/2 cup of broccoli 2 days ago. PT denies alcohol intake but Home Care nurses recently found beer can in pt's room. PT has 3 days left of AB. Denies any bleeding issues or other medication changes. Adjusted pt's dose and will recheck on 2/22 when Home Care can return due to other appts. Patient instructed regarding medication; results given and questions answered. Nutritional counseling given.  Dietary factors affecting therapy addressed.  Patient instructed to monitor for excessive bruising or bleeding. Tigist repeated back correctly and will call pt will dosing.

## 2018-02-16 RX ORDER — WARFARIN SODIUM 5 MG/1
TABLET ORAL
Qty: 30 TABLET | Refills: 0 | Status: SHIPPED | OUTPATIENT
Start: 2018-02-16 | End: 2018-05-17 | Stop reason: SDUPTHER

## 2018-02-20 ENCOUNTER — OFFICE VISIT (OUTPATIENT)
Dept: VASCULAR SURGERY | Age: 78
End: 2018-02-20

## 2018-02-20 ENCOUNTER — TELEPHONE (OUTPATIENT)
Dept: VASCULAR SURGERY | Age: 78
End: 2018-02-20

## 2018-02-20 VITALS
HEART RATE: 62 BPM | DIASTOLIC BLOOD PRESSURE: 60 MMHG | TEMPERATURE: 96.6 F | SYSTOLIC BLOOD PRESSURE: 126 MMHG | RESPIRATION RATE: 18 BRPM

## 2018-02-20 DIAGNOSIS — Z09 FOLLOW UP: Primary | ICD-10-CM

## 2018-02-20 PROCEDURE — 99024 POSTOP FOLLOW-UP VISIT: CPT | Performed by: PHYSICIAN ASSISTANT

## 2018-02-22 ENCOUNTER — TELEPHONE (OUTPATIENT)
Dept: VASCULAR SURGERY | Age: 78
End: 2018-02-22

## 2018-02-22 ENCOUNTER — ANTICOAGULATION VISIT (OUTPATIENT)
Dept: CARDIAC SURGERY | Facility: CLINIC | Age: 78
End: 2018-02-22

## 2018-02-22 ENCOUNTER — LAB REQUISITION (OUTPATIENT)
Dept: LAB | Facility: HOSPITAL | Age: 78
End: 2018-02-22

## 2018-02-22 DIAGNOSIS — Z79.01 LONG-TERM (CURRENT) USE OF ANTICOAGULANTS: ICD-10-CM

## 2018-02-22 DIAGNOSIS — I74.9 EMBOLISM AND THROMBOSIS OF ARTERY (HCC): ICD-10-CM

## 2018-02-22 DIAGNOSIS — I48.0 PAROXYSMAL ATRIAL FIBRILLATION (HCC): ICD-10-CM

## 2018-02-22 DIAGNOSIS — Z79.01 LONG TERM CURRENT USE OF ANTICOAGULANT: ICD-10-CM

## 2018-02-22 LAB
INR PPP: 4.07 (ref 0.8–1.2)
INR PPP: 5.2
PROTHROMBIN TIME: 40.3 SECONDS (ref 11.1–15.3)

## 2018-02-22 PROCEDURE — 85610 PROTHROMBIN TIME: CPT | Performed by: THORACIC SURGERY (CARDIOTHORACIC VASCULAR SURGERY)

## 2018-02-22 NOTE — PROGRESS NOTES
Received above INR from DANITA Escoto [facility] over the phone who is still in pt's home. Per Tigist, pt will be venipunctured and specimen will be brought to the lab. PT denies any new medications or bleeding issues. PT states he did take 2 Excedrin this morning. PT denies alcohol, grapefruit/cranberry or vomiting/diarrhea. PT can sometimes be very difficult to reach once Home Care leaves pt's home. Instructed [facility] to have pt hold Coumadin tonight and increase vit k with 1 cup of green. Recheck INR tomorrow. Instructed to watch for any s/s of bleeding and report bleeding or blunt trauma to ER. Tigist repeated back instruction and will inform pt. Patient instructed regarding medication; results given and questions answered. Nutritional counseling given.  Dietary factors affecting therapy addressed.  Patient instructed to monitor for excessive bruising or bleeding.

## 2018-02-23 ENCOUNTER — ANTICOAGULATION VISIT (OUTPATIENT)
Dept: CARDIAC SURGERY | Facility: CLINIC | Age: 78
End: 2018-02-23

## 2018-02-23 DIAGNOSIS — Z79.01 LONG-TERM (CURRENT) USE OF ANTICOAGULANTS: ICD-10-CM

## 2018-02-23 DIAGNOSIS — I48.0 PAROXYSMAL ATRIAL FIBRILLATION (HCC): ICD-10-CM

## 2018-02-23 DIAGNOSIS — I74.9 EMBOLISM AND THROMBOSIS OF ARTERY (HCC): ICD-10-CM

## 2018-02-23 LAB — INR PPP: 4

## 2018-02-23 NOTE — PROGRESS NOTES
I received INR from DANITA Schafer with [facility] by phone while she was still in the patient's home. Pt denies med changes or bleeding problems. Hanh was given instructions for doing, to have pt eat 1 cup serving of broccoli tonight, and to recheck Monday; she repeated back correctly.

## 2018-02-26 ENCOUNTER — ANTICOAGULATION VISIT (OUTPATIENT)
Dept: CARDIAC SURGERY | Facility: CLINIC | Age: 78
End: 2018-02-26

## 2018-02-26 DIAGNOSIS — I74.9 EMBOLISM AND THROMBOSIS OF ARTERY (HCC): ICD-10-CM

## 2018-02-26 DIAGNOSIS — I48.0 PAROXYSMAL ATRIAL FIBRILLATION (HCC): ICD-10-CM

## 2018-02-26 DIAGNOSIS — Z79.01 LONG-TERM (CURRENT) USE OF ANTICOAGULANTS: ICD-10-CM

## 2018-02-26 LAB — INR PPP: 2.2

## 2018-02-26 PROCEDURE — 85610 PROTHROMBIN TIME: CPT | Performed by: THORACIC SURGERY (CARDIOTHORACIC VASCULAR SURGERY)

## 2018-03-01 ENCOUNTER — ANTICOAGULATION VISIT (OUTPATIENT)
Dept: CARDIAC SURGERY | Facility: CLINIC | Age: 78
End: 2018-03-01

## 2018-03-01 DIAGNOSIS — I74.9 EMBOLISM AND THROMBOSIS OF ARTERY (HCC): ICD-10-CM

## 2018-03-01 DIAGNOSIS — I48.0 PAROXYSMAL ATRIAL FIBRILLATION (HCC): ICD-10-CM

## 2018-03-01 DIAGNOSIS — Z79.01 LONG-TERM (CURRENT) USE OF ANTICOAGULANTS: ICD-10-CM

## 2018-03-01 LAB — INR PPP: 3.8

## 2018-03-01 NOTE — PROGRESS NOTES
Received above INR from Hanh [facility] over the phone who is still in pt's home. Per Hanh, pt denies any new medications or bleeding issues. Adjusted pt's dose and instructed to increase vit k today. Recheck INR on 3/5. Patient instructed regarding medication; results given and questions answered. Nutritional counseling given.  Dietary factors affecting therapy addressed.  Patient instructed to monitor for excessive bruising or bleeding.  Hanh repeated back correctly and will inform pt.

## 2018-03-05 ENCOUNTER — ANTICOAGULATION VISIT (OUTPATIENT)
Dept: CARDIAC SURGERY | Facility: CLINIC | Age: 78
End: 2018-03-05

## 2018-03-05 DIAGNOSIS — I48.0 PAROXYSMAL ATRIAL FIBRILLATION (HCC): ICD-10-CM

## 2018-03-05 DIAGNOSIS — I74.9 EMBOLISM AND THROMBOSIS OF ARTERY (HCC): ICD-10-CM

## 2018-03-05 DIAGNOSIS — Z79.01 LONG-TERM (CURRENT) USE OF ANTICOAGULANTS: ICD-10-CM

## 2018-03-05 LAB — INR PPP: 4

## 2018-03-05 NOTE — PROGRESS NOTES
Spoke to Dorita RN with [facility]  who was still in the home with pt.  Pt self-doses his coumadin on Saturday and took 5mg instead of 2.5mg.  Ricardo did discourage pt from self-dosing.  Adjusted coumadin dose and instructed for pt to increase Vit K intake today with a large broccoli serving.  Dorita will return to pt's home on Wed, March 7th and recheck INR then.  Pt denies bleeding issues.  Patient instructed regarding medication; results given and questions answered. Nutritional counseling given.  Dietary factors affecting therapy addressed.  Patient instructed to monitor for excessive bruising or bleeding.

## 2018-03-07 ENCOUNTER — TELEPHONE (OUTPATIENT)
Dept: VASCULAR SURGERY | Age: 78
End: 2018-03-07

## 2018-03-07 ENCOUNTER — ANTICOAGULATION VISIT (OUTPATIENT)
Dept: CARDIAC SURGERY | Facility: CLINIC | Age: 78
End: 2018-03-07

## 2018-03-07 DIAGNOSIS — Z79.01 LONG-TERM (CURRENT) USE OF ANTICOAGULANTS: ICD-10-CM

## 2018-03-07 DIAGNOSIS — I74.9 EMBOLISM AND THROMBOSIS OF ARTERY (HCC): ICD-10-CM

## 2018-03-07 DIAGNOSIS — I48.0 PAROXYSMAL ATRIAL FIBRILLATION (HCC): ICD-10-CM

## 2018-03-07 LAB — INR PPP: 2.2

## 2018-03-07 NOTE — PROGRESS NOTES
Spoke to DANITA Kevin with [facility]  who was still in the home with pt.  Pt medications have not changed.  No bleeding issues.  Pt is being discharged from  services.  Adjusted coumadin dose and f/u jesse[t made with [facility] for Monday, March 12th.  Patient instructed regarding medication; results given and questions answered. Nutritional counseling given.  Dietary factors affecting therapy addressed.  Patient instructed to monitor for excessive bruising or bleeding.

## 2018-03-09 ENCOUNTER — TRANSCRIBE ORDERS (OUTPATIENT)
Dept: PULMONOLOGY | Facility: CLINIC | Age: 78
End: 2018-03-09

## 2018-03-09 ENCOUNTER — OFFICE VISIT (OUTPATIENT)
Dept: CARDIOLOGY | Facility: CLINIC | Age: 78
End: 2018-03-09

## 2018-03-09 VITALS
DIASTOLIC BLOOD PRESSURE: 70 MMHG | HEIGHT: 71 IN | SYSTOLIC BLOOD PRESSURE: 104 MMHG | HEART RATE: 63 BPM | WEIGHT: 227 LBS | BODY MASS INDEX: 31.78 KG/M2

## 2018-03-09 DIAGNOSIS — I50.20 SYSTOLIC CONGESTIVE HEART FAILURE, UNSPECIFIED CONGESTIVE HEART FAILURE CHRONICITY: ICD-10-CM

## 2018-03-09 DIAGNOSIS — I48.3 TYPICAL ATRIAL FLUTTER (HCC): ICD-10-CM

## 2018-03-09 DIAGNOSIS — I10 ESSENTIAL HYPERTENSION: ICD-10-CM

## 2018-03-09 DIAGNOSIS — I73.9 PVD (PERIPHERAL VASCULAR DISEASE) (HCC): ICD-10-CM

## 2018-03-09 DIAGNOSIS — I70.269 ATHEROSCLEROSIS OF ARTERY OF EXTREMITY WITH GANGRENE (HCC): ICD-10-CM

## 2018-03-09 DIAGNOSIS — I48.0 PAROXYSMAL ATRIAL FIBRILLATION (HCC): Primary | ICD-10-CM

## 2018-03-09 DIAGNOSIS — I48.0 PAROXYSMAL A-FIB (HCC): Primary | ICD-10-CM

## 2018-03-09 DIAGNOSIS — I71.40 AAA (ABDOMINAL AORTIC ANEURYSM) WITHOUT RUPTURE (HCC): ICD-10-CM

## 2018-03-09 DIAGNOSIS — I34.0 NON-RHEUMATIC MITRAL REGURGITATION: ICD-10-CM

## 2018-03-09 DIAGNOSIS — Z79.01 LONG-TERM (CURRENT) USE OF ANTICOAGULANTS: ICD-10-CM

## 2018-03-09 PROCEDURE — 99214 OFFICE O/P EST MOD 30 MIN: CPT | Performed by: INTERNAL MEDICINE

## 2018-03-09 PROCEDURE — 93000 ELECTROCARDIOGRAM COMPLETE: CPT | Performed by: INTERNAL MEDICINE

## 2018-03-09 RX ORDER — FUROSEMIDE 40 MG/1
40 TABLET ORAL DAILY
Qty: 30 TABLET | Refills: 6 | Status: SHIPPED | OUTPATIENT
Start: 2018-03-09 | End: 2018-03-30 | Stop reason: SDUPTHER

## 2018-03-09 RX ORDER — AMOXICILLIN AND CLAVULANATE POTASSIUM 875; 125 MG/1; MG/1
TABLET, FILM COATED ORAL
Refills: 0 | COMMUNITY
Start: 2018-02-06 | End: 2018-03-20

## 2018-03-09 NOTE — PROGRESS NOTES
Massimo Bentley  77 y.o. male    03/09/2018  1. Paroxysmal atrial fibrillation    2. Long-term (current) use of anticoagulants    3. PVD (peripheral vascular disease)    4. Typical atrial flutter    5. Non-rheumatic mitral regurgitation    6. 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 AAA (abdominal aortic aneurysm) without rupture    8. Essential hypertension    9/ congestive heart failure due to diastolic dysfunction    History of Present Illness    77 years old gentleman Recently evaluated multi-care for increasing shortness breath and recovering from vascular surgery done proximate 2 month ago.  Treated with the Lasix some improvement and presented to the office today.  I'm going to increase the Lasix to 60 mg twice a day and then depends on a maintenance dose.  Lack to get an echocardiogram study to reassess the left ventricle systolic function and will see him back within a week.  Told the patient and her family if he doesn't get better need to go to a hospital.  who walk with the help of cane with the past medical history  of hypertension hypertensive heart disease, history of for atrial fibrillation/atrial flutter underwent EP study and isthmus ablation.  The patient noted to have  left-sided atrial tachycardia no ablation performed in left atrum.  The patient  started on Betapace doing remarkably well.  The patient have peripheral embolism and peripheral  Vascular disease status post stent placement and subsequent Peripheral vascular surgery by Dr. Pennington. AAA under care of Dr. Pennington.  on oral anti-coagulation, patient denied any orthopnea, PND, chest pain, palpitation, fever, chills, intermittent claudication, syncope or near syncopal episode.  EKG done and finding discussed the patient.  Sinus rhythm with heart rate 60 bpm.      AMADEO 9/8/17  · Left Conclusion: The left AMADEO is critically reduced. Waveforms are consistent with aorto-iliac disease. Severe digital ischemia.  · Right  Conclusion: The right AMADEO is normal. Normal digital pressures      SUBJECTIVE    Allergies   Allergen Reactions   • Lipitor [Atorvastatin]    • Lortab [Hydrocodone-Acetaminophen]    • Morphine And Related          Past Medical History:   Diagnosis Date   • Abdominal bloating    • Anxiety    • Atherosclerosis of native arteries of extremity with intermittent claudication    • Atrial flutter    • Backache    • Benign prostatic hyperplasia     BX 2009, also has renal cyst      • Chronic bronchitis    • Chronic renal impairment    • Depressive disorder    • Drug abuse, episodic use    • Dyslipidemia    • Fatigue    • GERD (gastroesophageal reflux disease)    • Hypercholesterolemia 03/15/2016   • Injury of tendon of rotator cuff 10/22/2015   • Insomnia 06/24/2016   • Intervertebral disc disorder 10/27/2011   • Major depression    • Neck pain 01/09/2012   • Osteoarthritis of multiple joints 06/21/2013   • Pain from vascular prosthetic devices, implants and grafts, sequela 12/03/2015   • Pain in left foot 10/27/2015   • Peripheral vascular disease 12/03/2015     LEFT fem-tib bypass, embolectomy 6/2014    10/27/2015    • Senile debility 10/22/2015   • 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 No coronary atherosclerosis. Normal left ventricular function   • CHOLECYSTECTOMY  08/25/2014    Cholecystitis with gangrenous gallbladder   • ENDOSCOPY  03/22/1990    Duodenal ulcer   • ENDOSCOPY AND COLONOSCOPY  10/17/2013    Diverticulum in sigmoid colon & descending colon. Internal & external hemorrhoids  found   • ESOPHAGOSCOPY / EGD  10/17/2013    With tube-Esophagitis seen. Biopsy taken. Gastritis in stomach. Biopsy taken. Stenosis in 2nd portion of duodenum. Dilatation performed.   • FEMORAL THROMBECTOMY/EMBOLECTOMY  06/29/2014    Left lower extremity arteriogram. Left popliteal artery endarterectomy. Redo left femoral to [id number] trunk bypass. Negative pressure wound therapy with placement of Prevena wound V.A.C., left groin, left lower leg   • INJECTION OF MEDICATION  03/15/2016    B12   • INJECTION OF MEDICATION  10/23/2015    Drain/Inject Major Joint    • INJECTION OF MEDICATION  01/13/2016    Kenalog   • LUMBAR LAMINECTOMY     • TRANSESOPHAGEAL ECHOCARDIOGRAM (TEX)  10/06/2015    With color flow-Mild mitral regurg.Left atrium mild dilated.Ef of 55-60%.RV systolic pressure elevated.Trace tricuspid regurg   • TRANSESOPHAGEAL ECHOCARDIOGRAM (TEX)  02/14/2012    Without color flow-[facility] w/ early diast dysfun. AV trileaflet & structurally NRL w/ AR NRL. No regional wall motion abn Est Ef approx 50-55%. M & TR valves NRL w/ mild M & TR regurg Trace -mild pulmonic regurg. Anterior echo free space w/o hemodynamic signif. NRL RV   • TRANSESOPHAGEAL ECHOCARDIOGRAM (TEX)  03/25/2011    Normal LV systolic function with Ef of 50-55%.Left atrium ildly dilated.Moderate mitral regurg.Mitral valve regurg.Intact interatrial septum.No evidence of any cardiac thrombus or pericardial effusion.AV trileaflet         Family History   Problem Relation Age of Onset   • Osteoarthritis Mother      Lived to be 102   • Hypertension Mother    • ADD / ADHD Father    • Lung cancer Father    • ADD / ADHD Brother    • Rectal cancer Other          Social History     Social History   • Marital status:      Spouse name: N/A   • Number of children: N/A   • Years of education: N/A     Occupational History   • Not on file.     Social History Main Topics   • Smoking status: Current Some Day Smoker     Years: 50.00     Types: Electronic  "Cigarette   • Smokeless tobacco: Never Used      Comment: reduced;   • Alcohol use No      Comment: Former use    • Drug use: No   • Sexual activity: Defer     Other Topics Concern   • Not on file     Social History Narrative         Current Outpatient Prescriptions   Medication Sig Dispense Refill   • albuterol (PROVENTIL HFA;VENTOLIN HFA) 108 (90 Base) MCG/ACT inhaler Inhale 2 puffs Every 4 (Four) Hours As Needed for Wheezing. 1 inhaler 6   • amoxicillin-clavulanate (AUGMENTIN) 875-125 MG per tablet TAKE 1 TABLET BY MOUTH 2 TIMES DAILY FOR 10 DAYS  0   • aspirin 81 MG EC tablet Take 81 mg by mouth daily.     • budesonide-formoterol (SYMBICORT) 160-4.5 MCG/ACT inhaler Inhale 2 puffs 2 (Two) Times a Day. 1 inhaler 12   • cilostazol (PLETAL) 50 MG tablet Take 1 tablet by mouth 2 (Two) Times a Day. 60 tablet 5   • furosemide (LASIX) 40 MG tablet Take 1 tablet by mouth 2 (Two) Times a Day. 4 tablet 0   • guaiFENesin (MUCINEX) 600 MG 12 hr tablet Take 1,200 mg by mouth 2 (Two) Times a Day.     • levothyroxine (SYNTHROID, LEVOTHROID) 25 MCG tablet Take 25 mcg by mouth daily.     • magnesium oxide (MAGOX) 400 (241.3 MG) MG tablet tablet Take 800 mg by mouth Every Night.     • montelukast (SINGULAIR) 10 MG tablet Take 1 tablet by mouth Every Night. 90 tablet 2   • pramipexole (MIRAPEX) 0.25 MG tablet Take 1 tablet by mouth 3 (Three) Times a Day. 90 tablet 5   • QUEtiapine (SEROquel) 100 MG tablet take 3 tablets at bedtime  0   • sotalol (BETAPACE) 80 MG tablet Take 1.5 tablets by mouth 2 (Two) Times a Day. 270 tablet 2   • warfarin (COUMADIN) 5 MG tablet TAKE 1 TABLET BY MOUTH EVERY NIGHT OR AS DIRECTED BY COUMADIN CLINIC 30 tablet 0     No current facility-administered medications for this visit.          OBJECTIVE    /70  Pulse 63  Ht 180.3 cm (71\")  Wt 103 kg (227 lb)  BMI 31.66 kg/m2        Review of Systems     Constitutional:   change in exercise tolerance     HENT:  Denies any hearing loss, epistaxis, " hoarseness, or difficulty speaking.     Eyes: Wears eyeglasses or contact lenses     Respiratory: Dyspnea     Cardiovascular: The H&P     Gastrointestinal:  Denies change in bowel habits, dyspepsia, ulcer disease, hematochezia, or melena.     Endocrine: Negative for cold intolerance, heat intolerance, polydipsia, polyphagia and polyuria. Denies any history of weight change, heat/cold intolerance, polydipsia, polyuria     Genitourinary: Negative.      Musculoskeletal: Denies any history of arthritic symptoms or back problems     Skin:  Denies any change in hair or nails, rashes, or skin lesions.     Allergic/Immunologic: Negative.  Negative for environmental allergies, food allergies and immunocompromised state.     Neurological:  Denies any history of recurrent headaches, strokes, TIA, or seizure disorder.     Hematological: Denies any food allergies, seasonal allergies, bleeding disorders, or lymphadenopathy.     Psychiatric/Behavioral: Denies any history of depression, substance abuse, or change in cognitive function.         Physical Exam     Constitutional: Cooperative, alert and oriented, well-developed, well-nourished, in no acute distress.     HENT:   Head: Normocephalic, normal hair patterns, no masses or tenderness.  Ears, Nose, and Throat: No gross abnormalities. No pallor or cyanosis. Dentition good.   Eyes: EOMS intact, PERRL, conjunctivae and lids unremarkable. Fundoscopic exam and visual fields not performed.   Neck: No palpable masses or adenopathy, no thyromegaly, no JVD, carotid pulses are full and equal bilaterally and without  Bruits.     Cardiovascular: Regular rhythm, S1 and S2 normal, no S3 or S4. Apical impulse not displaced. No murmurs, gallops, or rubs detected.     Pulmonary/Chest: Chest: normal symmetry, no tenderness to palpation, normal respiratory excursion, no intercostal retraction, no use of accessory muscles.            Pulmonary: Normal breath sounds. No rales or  keny.    Abdominal: Abdomen soft, bowel sounds normoactive, no masses, no hepatosplenomegaly, non-tender, no bruits.     Musculoskeletal: No deformities, clubbing, cyanosis, erythema, or edema observed. There are no spinal abnormalities noted. Normal muscle strength and tone. Pulses full and equal in all extremities, no bruits auscultated.     Neurological: No gross motor or sensory deficits noted, affect appropriate, oriented to time, person, place.     Skin: Warm and dry to the touch, no apparent skin lesions or masses noted.     Psychiatric: He has a normal mood and affect. His behavior is normal. Judgment and thought content normal.         Procedures      Lab Results   Component Value Date    WBC 8.14 08/29/2017    HGB 13.6 (L) 08/29/2017    HCT 38.8 (L) 08/29/2017    MCV 85.1 08/29/2017     08/29/2017     Lab Results   Component Value Date    GLUCOSE 92 02/15/2018    BUN 22 (H) 02/15/2018    CREATININE 1.61 (H) 02/15/2018    EGFRIFNONA 42 02/15/2018    BCR 13.7 02/15/2018    CO2 22.0 02/15/2018    CALCIUM 8.9 02/15/2018    ALBUMIN 3.90 02/05/2018    LABIL2 1.0 (L) 02/05/2018    AST 24 02/05/2018    ALT 23 02/05/2018     Lab Results   Component Value Date    CHOL 134 02/05/2018     Lab Results   Component Value Date    TRIG 165 02/05/2018    TRIG 237 (H) 01/04/2017    TRIG 202 (H) 07/01/2016     Lab Results   Component Value Date    HDL 28 (L) 02/05/2018    HDL 30 (L) 01/04/2017    HDL 32 (L) 07/01/2016     No components found for: LDLCALC  Lab Results   Component Value Date    LDL 68 02/05/2018    LDL 83 01/04/2017    LDL 94 07/01/2016     No results found for: HDLLDLRATIO  No components found for: CHOLHDL  Lab Results   Component Value Date    HGBA1C 5.6 02/05/2018     Lab Results   Component Value Date    TSH 0.960 02/05/2018           ASSESSMENT AND PLAN  1 atrial flutter/atrial fibrillation status post EP study  #2 hypertension with hypertensive heart disease and normal left and a systolic  function.  Mild mitral regurgitation previous echocardiographic study.  #3  embolization with PVD status post stenting to care of Dr. Pennington #4 Congestive heart failure on the basis of diastolic dysfunction  Clinically, patient is a mild congestive heart failure  EKG done and finding discussed with the patient in normal QTc interval.  normal stress test and good left ventricle systolic function.  Had mild mitral regurgitation without any hemodynamic significant.  Patient will continue Betapace 80 mg twice a day to keep him in sinus rhythm warfarin to decrease risk of embolization along with continuation of aspirin.  Hypothyroidism being managed with to Synthroid and Hytrin for lipidemia with the Pravachol.  No change was made in the medical management as his cardiac status  well compensated.  Had normal stress test 2015 with normal left ventricle systolic function to evaluated her muscle Chest x-ray performed noted to have congestive changes given Lasix 40 mg twice a day for only 2 days.  He had improvement in shortness of breath is still mild short-winded with bilateral decreased air entry.  I offered the patient to go to the ER but patient wanted to be managed as an outpatient.  Prescription refill of Lasix and to the pharmacy.  He will take 60 mg daily and 60 mg next day and then depends on 40 mg maintenance.    Massimo was seen today for follow-up.    Diagnoses and all orders for this visit:    Paroxysmal atrial fibrillation    Long-term (current) use of anticoagulants    PVD (peripheral vascular disease)    Typical atrial flutter    Non-rheumatic mitral regurgitation    Atherosclerosis of artery of extremity with gangrene    AAA (abdominal aortic aneurysm) without rupture    Essential hypertension        Emily Thacker MD  3/9/2018  8:47 AM

## 2018-03-13 ENCOUNTER — OFFICE VISIT (OUTPATIENT)
Dept: VASCULAR SURGERY | Age: 78
End: 2018-03-13

## 2018-03-13 VITALS
HEART RATE: 55 BPM | OXYGEN SATURATION: 98 % | RESPIRATION RATE: 18 BRPM | DIASTOLIC BLOOD PRESSURE: 80 MMHG | SYSTOLIC BLOOD PRESSURE: 122 MMHG

## 2018-03-13 DIAGNOSIS — Z09 FOLLOW UP: Primary | ICD-10-CM

## 2018-03-13 PROCEDURE — 99024 POSTOP FOLLOW-UP VISIT: CPT | Performed by: PHYSICIAN ASSISTANT

## 2018-03-13 RX ORDER — FUROSEMIDE 40 MG/1
40 TABLET ORAL DAILY
COMMUNITY

## 2018-03-13 NOTE — PROGRESS NOTES
Sonja Crane is a 69 y.o. male who presents for post op evaluation. Patient had a left fem distal bypass on 12/8/18 . This was performed by Dr. Jesenia Cartwright. He thinks the left leg wounds are healing. He reports a sore spot on his right inner buttock that he has been treating with butt paste, using a donut hole to offload pressure. He  reports that the swelling in his left leg is improved. He denies any fever/chills. He denies any left leg pain. On evaluation today, incision at femoral site has 3 cm superficial wound. This is clean and has no foul odor or drainage. Thigh wound is crusted over. There is no signs of infection. Dry ulcerations of toes that are unchanged with dry eschar. (the wounds appear improved). Foot warm 2+ pulses over graft, DP and PT. Today we have recommended he wash all wound daily woth soap and water. We will have him use Silvadene on his toe wounds. He is to continue leg elevation. We have him continue to offload the pressure on his buttocks. We will have HH evaluate and treat pressure sore on buttocks. We have recommended continued warfarin daily. We will follow up with him in 3 weeks with a A'scan with [facility]. This should bring you up to date on Sonja Crane  As always we want to thank you for allowing us to participate in the care of your patients.     Sincerely,    Rachael English PA-C

## 2018-03-15 ENCOUNTER — ANTICOAGULATION VISIT (OUTPATIENT)
Dept: CARDIAC SURGERY | Facility: CLINIC | Age: 78
End: 2018-03-15

## 2018-03-15 VITALS — HEART RATE: 66 BPM | SYSTOLIC BLOOD PRESSURE: 107 MMHG | DIASTOLIC BLOOD PRESSURE: 68 MMHG

## 2018-03-15 DIAGNOSIS — Z79.01 LONG-TERM (CURRENT) USE OF ANTICOAGULANTS: ICD-10-CM

## 2018-03-15 DIAGNOSIS — I74.9 EMBOLISM AND THROMBOSIS OF ARTERY (HCC): ICD-10-CM

## 2018-03-15 DIAGNOSIS — I48.0 PAROXYSMAL ATRIAL FIBRILLATION (HCC): ICD-10-CM

## 2018-03-15 LAB — INR PPP: 2.3 (ref 0.9–1.1)

## 2018-03-15 PROCEDURE — 99211 OFF/OP EST MAY X REQ PHY/QHP: CPT | Performed by: NURSE PRACTITIONER

## 2018-03-15 PROCEDURE — 85610 PROTHROMBIN TIME: CPT | Performed by: NURSE PRACTITIONER

## 2018-03-15 NOTE — PROGRESS NOTES
PT self dosed last 3 days when rescheduling appt. PT has been on Amoxicillin for 10 days which he finished yesterday but [facility]in Clinic was unaware. PT denies any s/s of blood clot. Unwilling to increase Coumadin dose at this time due to pt elevating at lesser dose recently. Instructed pt on dosing and to hold green vegs for 2 days. PT will be seen on Wednesday when he can return. Patient instructed regarding medication; results given and questions answered. Nutritional counseling given.  Dietary factors affecting therapy addressed.  Patient instructed to monitor for excessive bruising or bleeding. PT verbalizes understanding.         This document has been electronically signed by JUANI Fernandez on March 16, 2018 2:39 PM

## 2018-03-19 ENCOUNTER — LAB REQUISITION (OUTPATIENT)
Dept: LAB | Facility: HOSPITAL | Age: 78
End: 2018-03-19

## 2018-03-19 DIAGNOSIS — Z79.01 LONG TERM CURRENT USE OF ANTICOAGULANT: ICD-10-CM

## 2018-03-19 LAB
INR PPP: 2.2 (ref 0.8–1.2)
PROTHROMBIN TIME: 26 SECONDS (ref 11–15)

## 2018-03-20 ENCOUNTER — ANTICOAGULATION VISIT (OUTPATIENT)
Dept: CARDIAC SURGERY | Facility: CLINIC | Age: 78
End: 2018-03-20

## 2018-03-20 ENCOUNTER — OFFICE VISIT (OUTPATIENT)
Dept: CARDIOLOGY | Facility: CLINIC | Age: 78
End: 2018-03-20

## 2018-03-20 VITALS — HEART RATE: 60 BPM | HEIGHT: 71 IN | DIASTOLIC BLOOD PRESSURE: 64 MMHG | SYSTOLIC BLOOD PRESSURE: 106 MMHG

## 2018-03-20 VITALS — HEART RATE: 60 BPM | DIASTOLIC BLOOD PRESSURE: 64 MMHG | SYSTOLIC BLOOD PRESSURE: 106 MMHG

## 2018-03-20 DIAGNOSIS — E78.5 HYPERLIPIDEMIA, UNSPECIFIED HYPERLIPIDEMIA TYPE: ICD-10-CM

## 2018-03-20 DIAGNOSIS — I71.40 AAA (ABDOMINAL AORTIC ANEURYSM) WITHOUT RUPTURE (HCC): ICD-10-CM

## 2018-03-20 DIAGNOSIS — I34.0 NON-RHEUMATIC MITRAL REGURGITATION: ICD-10-CM

## 2018-03-20 DIAGNOSIS — I48.3 TYPICAL ATRIAL FLUTTER (HCC): ICD-10-CM

## 2018-03-20 DIAGNOSIS — I73.9 PERIPHERAL VASCULAR DISEASE (HCC): ICD-10-CM

## 2018-03-20 DIAGNOSIS — Z79.01 LONG-TERM (CURRENT) USE OF ANTICOAGULANTS: ICD-10-CM

## 2018-03-20 DIAGNOSIS — I73.9 PVD (PERIPHERAL VASCULAR DISEASE) (HCC): ICD-10-CM

## 2018-03-20 DIAGNOSIS — I74.9 EMBOLISM AND THROMBOSIS OF ARTERY (HCC): ICD-10-CM

## 2018-03-20 DIAGNOSIS — I48.0 PAROXYSMAL ATRIAL FIBRILLATION (HCC): Primary | ICD-10-CM

## 2018-03-20 DIAGNOSIS — I48.0 PAROXYSMAL ATRIAL FIBRILLATION (HCC): ICD-10-CM

## 2018-03-20 DIAGNOSIS — I10 ESSENTIAL HYPERTENSION: ICD-10-CM

## 2018-03-20 LAB — INR PPP: 2.6 (ref 0.9–1.1)

## 2018-03-20 PROCEDURE — 93000 ELECTROCARDIOGRAM COMPLETE: CPT | Performed by: INTERNAL MEDICINE

## 2018-03-20 PROCEDURE — 85610 PROTHROMBIN TIME: CPT | Performed by: NURSE PRACTITIONER

## 2018-03-20 PROCEDURE — 99213 OFFICE O/P EST LOW 20 MIN: CPT | Performed by: INTERNAL MEDICINE

## 2018-03-20 NOTE — PROGRESS NOTES
Pt denies med changes or bleeding problems. Pt was instructed to continue current dose and have a 1/2 cup serving of green veggies every 3-4 days; pt verbalized. Patient instructed regarding medication; results given and questions answered. Nutritional counseling given.  Dietary factors affecting therapy addressed.  Patient instructed to monitor for excessive bruising or bleeding. Will recheck next week.  Electronically signed by JUANI Sheets

## 2018-03-20 NOTE — PROGRESS NOTES
Massimo Bentley  78 y.o. male    03/20/2018  1. Paroxysmal atrial fibrillation    2. PVD (peripheral vascular disease)    3. Peripheral vascular disease    4. Typical atrial flutter    5. Non-rheumatic mitral regurgitation    6. Hyperlipidemia, unspecified hyperlipidemia type    7. Essential hypertension    8. AAA (abdominal aortic aneurysm) without rupture        History of Present Illness    77 years old gentleman Recently evaluated multi-care for increasing shortness breath and recovering from vascular surgery done proximate 2 month ago.  Treated with the Lasix some improvement and presented to the office today about  week ago increasing shortness breath and bilateral lower extremity edema..   patient Lasix adjusted with a significant improvement in shortness of breath and bilateral lower extremity edema and is almost back to the baseline.   who walk with the help of cane with the past medical history  of hypertension hypertensive heart disease, history of for atrial fibrillation/atrial flutter underwent EP study and isthmus ablation.  The patient noted to have  left-sided atrial tachycardia no ablation performed in left atrum.  The patient  started on Betapace doing remarkably well.  The patient have peripheral embolism and peripheral  Vascular disease status post stent placement and subsequent Peripheral vascular surgery by Dr. Pennington. AAA under care of Dr. Pennington.  on oral anti-coagulation, patient denied any orthopnea, PND, chest pain, palpitation, fever, chills, intermittent claudication, syncope or near syncopal episode.  EKG done and finding discussed the patient.  Sinus rhythm with heart rate 60 bpm.      AMADEO 9/8/17  · Left Conclusion: The left AMADEO is critically reduced. Waveforms are consistent with aorto-iliac disease. Severe digital ischemia.  · Right Conclusion: The right AMADEO is normal. Normal digital pressures        SUBJECTIVE    Allergies   Allergen Reactions   • Lipitor [Atorvastatin]     • Lortab [Hydrocodone-Acetaminophen]    • Morphine And Related          Past Medical History:   Diagnosis Date   • Abdominal bloating    • Anxiety    • Atherosclerosis of native arteries of extremity with intermittent claudication    • Atrial flutter    • Backache    • Benign prostatic hyperplasia     BX 2009, also has renal cyst      • Chronic bronchitis    • Chronic renal impairment    • Depressive disorder    • Drug abuse, episodic use    • Dyslipidemia    • Fatigue    • GERD (gastroesophageal reflux disease)    • Hypercholesterolemia 03/15/2016   • Injury of tendon of rotator cuff 10/22/2015   • Insomnia 06/24/2016   • Intervertebral disc disorder 10/27/2011   • Major depression    • Neck pain 01/09/2012   • Osteoarthritis of multiple joints 06/21/2013   • Pain from vascular prosthetic devices, implants and grafts, sequela 12/03/2015   • Pain in left foot 10/27/2015   • Peripheral vascular disease 12/03/2015     LEFT fem-tib bypass, embolectomy 6/2014    10/27/2015    • Senile debility 10/22/2015   • 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 No coronary atherosclerosis. Normal left ventricular function   • CHOLECYSTECTOMY  08/25/2014    Cholecystitis with gangrenous gallbladder   • ENDOSCOPY  03/22/1990    Duodenal ulcer   • ENDOSCOPY AND COLONOSCOPY  10/17/2013    Diverticulum in sigmoid colon & descending colon. Internal & external hemorrhoids found   • ESOPHAGOSCOPY / EGD  10/17/2013    With tube-Esophagitis seen. Biopsy taken. Gastritis in stomach. Biopsy taken. Stenosis in 2nd  portion of duodenum. Dilatation performed.   • FEMORAL THROMBECTOMY/EMBOLECTOMY  06/29/2014    Left lower extremity arteriogram. Left popliteal artery endarterectomy. Redo left femoral to [id number] trunk bypass. Negative pressure wound therapy with placement of Prevena wound V.A.C., left groin, left lower leg   • INJECTION OF MEDICATION  03/15/2016    B12   • INJECTION OF MEDICATION  10/23/2015    Drain/Inject Major Joint    • INJECTION OF MEDICATION  01/13/2016    Kenalog   • LUMBAR LAMINECTOMY     • TRANSESOPHAGEAL ECHOCARDIOGRAM (TEX)  10/06/2015    With color flow-Mild mitral regurg.Left atrium mild dilated.Ef of 55-60%.RV systolic pressure elevated.Trace tricuspid regurg   • TRANSESOPHAGEAL ECHOCARDIOGRAM (TEX)  02/14/2012    Without color flow-[facility] w/ early diast dysfun. AV trileaflet & structurally NRL w/ AR NRL. No regional wall motion abn Est Ef approx 50-55%. M & TR valves NRL w/ mild M & TR regurg Trace -mild pulmonic regurg. Anterior echo free space w/o hemodynamic signif. NRL RV   • TRANSESOPHAGEAL ECHOCARDIOGRAM (TEX)  03/25/2011    Normal LV systolic function with Ef of 50-55%.Left atrium ildly dilated.Moderate mitral regurg.Mitral valve regurg.Intact interatrial septum.No evidence of any cardiac thrombus or pericardial effusion.AV trileaflet         Family History   Problem Relation Age of Onset   • Osteoarthritis Mother      Lived to be 102   • Hypertension Mother    • ADD / ADHD Father    • Lung cancer Father    • ADD / ADHD Brother    • Rectal cancer Other          Social History     Social History   • Marital status:      Spouse name: N/A   • Number of children: N/A   • Years of education: N/A     Occupational History   • Not on file.     Social History Main Topics   • Smoking status: Current Some Day Smoker     Years: 50.00     Types: Electronic Cigarette   • Smokeless tobacco: Never Used      Comment: reduced;   • Alcohol use No      Comment: Former use    • Drug use: No   • Sexual  "activity: Defer     Other Topics Concern   • Not on file     Social History Narrative   • No narrative on file         Current Outpatient Prescriptions   Medication Sig Dispense Refill   • albuterol (PROVENTIL HFA;VENTOLIN HFA) 108 (90 Base) MCG/ACT inhaler Inhale 2 puffs Every 4 (Four) Hours As Needed for Wheezing. 1 inhaler 6   • aspirin 81 MG EC tablet Take 81 mg by mouth daily.     • budesonide-formoterol (SYMBICORT) 160-4.5 MCG/ACT inhaler Inhale 2 puffs 2 (Two) Times a Day. 1 inhaler 12   • cilostazol (PLETAL) 50 MG tablet Take 1 tablet by mouth 2 (Two) Times a Day. 60 tablet 5   • furosemide (LASIX) 40 MG tablet Take 1 tablet by mouth Daily. 30 tablet 6   • guaiFENesin (MUCINEX) 600 MG 12 hr tablet Take 1,200 mg by mouth 2 (Two) Times a Day.     • levothyroxine (SYNTHROID, LEVOTHROID) 25 MCG tablet Take 25 mcg by mouth daily.     • magnesium oxide (MAGOX) 400 (241.3 MG) MG tablet tablet Take 800 mg by mouth Every Night.     • montelukast (SINGULAIR) 10 MG tablet Take 1 tablet by mouth Every Night. 90 tablet 2   • pramipexole (MIRAPEX) 0.25 MG tablet Take 1 tablet by mouth 3 (Three) Times a Day. 90 tablet 5   • QUEtiapine (SEROquel) 100 MG tablet take 3 tablets at bedtime  0   • sotalol (BETAPACE) 80 MG tablet Take 1.5 tablets by mouth 2 (Two) Times a Day. 270 tablet 2   • warfarin (COUMADIN) 5 MG tablet TAKE 1 TABLET BY MOUTH EVERY NIGHT OR AS DIRECTED BY COUMADIN CLINIC 30 tablet 0     No current facility-administered medications for this visit.          OBJECTIVE    /64   Pulse 60   Ht 180.3 cm (70.98\")         Review of Systems     Constitutional:  Denies recent weight loss, weight gain, fever or chills, no change in exercise tolerance     HENT:  Denies any hearing loss, epistaxis, hoarseness, or difficulty speaking.     Eyes: Wears eyeglasses or contact lenses     Respiratory:  Denies dyspnea with exertion,no cough, wheezing, or hemoptysis.     Cardiovascular: See H&P Gastrointestinal:  Denies " change in bowel habits, dyspepsia, ulcer disease, hematochezia, or melena.     Endocrine: Negative for cold intolerance, heat intolerance, polydipsia, polyphagia and polyuria. Denies any history of weight change, heat/cold intolerance, polydipsia, polyuria     Genitourinary: Negative.      Musculoskeletal: Denies any history of arthritic symptoms or back problems     Skin:  Denies any change in hair or nails, rashes, or skin lesions.     Allergic/Immunologic: Negative.  Negative for environmental allergies, food allergies and immunocompromised state.     Neurological:  Denies any history of recurrent headaches, strokes, TIA, or seizure disorder.     Hematological: Denies any food allergies, seasonal allergies, bleeding disorders, or lymphadenopathy.     Psychiatric/Behavioral: Denies any history of depression, substance abuse, or change in cognitive function.         Physical Exam     Constitutional: Cooperative, alert and oriented, well-developed, well-nourished, in no acute distress.     HENT:   Head: Normocephalic, normal hair patterns, no masses or tenderness.  Ears, Nose, and Throat: No gross abnormalities. No pallor or cyanosis. Dentition good.   Eyes: EOMS intact, PERRL, conjunctivae and lids unremarkable. Fundoscopic exam and visual fields not performed.   Neck: No palpable masses or adenopathy, no thyromegaly, no JVD, carotid pulses are full and equal bilaterally and without  Bruits.     Cardiovascular: Regular rhythm, S1 and S2 normal, no S3 or S4. Apical impulse not displaced. No murmurs, gallops, or rubs detected.     Pulmonary/Chest: Chest: normal symmetry, no tenderness to palpation, normal respiratory excursion, no intercostal retraction, no use of accessory muscles.            Pulmonary: Normal breath sounds. No rales or ronchi.    Abdominal: Abdomen soft, bowel sounds normoactive, no masses, no hepatosplenomegaly, non-tender, no bruits.     Musculoskeletal: No deformities, clubbing, cyanosis,  erythema, or edema observed. There are no spinal abnormalities noted. Normal muscle strength and tone. Pulses full and equal in all extremities, no bruits auscultated.     Neurological: No gross motor or sensory deficits noted, affect appropriate, oriented to time, person, place.     Skin: Warm and dry to the touch, no apparent skin lesions or masses noted.     Psychiatric: He has a normal mood and affect. His behavior is normal. Judgment and thought content normal.         Procedures      Lab Results   Component Value Date    WBC 8.14 08/29/2017    HGB 13.6 (L) 08/29/2017    HCT 38.8 (L) 08/29/2017    MCV 85.1 08/29/2017     08/29/2017     Lab Results   Component Value Date    GLUCOSE 92 02/15/2018    BUN 22 (H) 02/15/2018    CREATININE 1.61 (H) 02/15/2018    EGFRIFNONA 42 02/15/2018    BCR 13.7 02/15/2018    CO2 22.0 02/15/2018    CALCIUM 8.9 02/15/2018    ALBUMIN 3.90 02/05/2018    LABIL2 1.0 (L) 02/05/2018    AST 24 02/05/2018    ALT 23 02/05/2018     Lab Results   Component Value Date    CHOL 134 02/05/2018     Lab Results   Component Value Date    TRIG 165 02/05/2018    TRIG 237 (H) 01/04/2017    TRIG 202 (H) 07/01/2016     Lab Results   Component Value Date    HDL 28 (L) 02/05/2018    HDL 30 (L) 01/04/2017    HDL 32 (L) 07/01/2016     No components found for: LDLCALC  Lab Results   Component Value Date    LDL 68 02/05/2018    LDL 83 01/04/2017    LDL 94 07/01/2016     No results found for: HDLLDLRATIO  No components found for: CHOLHDL  Lab Results   Component Value Date    HGBA1C 5.6 02/05/2018     Lab Results   Component Value Date    TSH 0.960 02/05/2018           ASSESSMENT AND PLAN  atrial flutter/atrial fibrillation status post EP study  #2 hypertension with hypertensive heart disease and normal left and a systolic function.  Mild mitral regurgitation previous echocardiographic study.  #3  embolization with PVD status post stenting to care of Dr. Pennington #4 Congestive heart failure on the basis  of diastolic dysfunction    Clinically, no sign of acute cardiac decompensation based on the clinical history physical finding.  The patient responded well to adjusting dose of diuretic.  EKG done and finding discussed the patient.  Sinus rhythm at 55..  Patient will continue Lasix 40 mg a day, Synthroid for management of hypothyroidism, sotalol for treatment to keep him in sinus rhythm and warfarin to decrease risk of cardiac embolic phenomena.  Risk factor lifestyle modification discussed.  We'll see him back in 6 month R depends on patient clinical conditions..  Massimo was seen today for follow-up.    Diagnoses and all orders for this visit:    Paroxysmal atrial fibrillation    PVD (peripheral vascular disease)    Peripheral vascular disease    Typical atrial flutter    Non-rheumatic mitral regurgitation    Hyperlipidemia, unspecified hyperlipidemia type    Essential hypertension    AAA (abdominal aortic aneurysm) without rupture        Emily Thacker MD  3/20/2018  2:33 PM

## 2018-03-28 ENCOUNTER — ANTICOAGULATION VISIT (OUTPATIENT)
Dept: CARDIAC SURGERY | Facility: CLINIC | Age: 78
End: 2018-03-28

## 2018-03-28 VITALS — HEART RATE: 55 BPM | OXYGEN SATURATION: 99 %

## 2018-03-28 DIAGNOSIS — I74.9 EMBOLISM AND THROMBOSIS OF ARTERY (HCC): ICD-10-CM

## 2018-03-28 DIAGNOSIS — I48.0 PAROXYSMAL ATRIAL FIBRILLATION (HCC): ICD-10-CM

## 2018-03-28 DIAGNOSIS — Z79.01 LONG-TERM (CURRENT) USE OF ANTICOAGULANTS: ICD-10-CM

## 2018-03-28 LAB — INR PPP: 4.5 (ref 0.9–1.1)

## 2018-03-28 PROCEDURE — 99211 OFF/OP EST MAY X REQ PHY/QHP: CPT | Performed by: NURSE PRACTITIONER

## 2018-03-28 PROCEDURE — 85610 PROTHROMBIN TIME: CPT | Performed by: NURSE PRACTITIONER

## 2018-03-28 NOTE — PROGRESS NOTES
Pt denies med changes or bleeding problems. Pt states he drank grapefruit juice Monday. Pt educated regarding dietary restrictions and coumadin; pt verbalized. Patient instructed regarding medication; results given and questions answered. Nutritional counseling given.  Dietary factors affecting therapy addressed.  Patient instructed to monitor for excessive bruising or bleeding. Pt instructed to hold coumadin and eat 1/2 can of spinach today; pt verbalized. Notify provider if you experience excessive bleeding from the nose, cuts, gums, rectum, or urinary tract. Reddish or brown urine or stool. Vomiting of blood or hemorrhoidal bleeding. If major injury occurs present to the Emergency Department. Will recheck tomorrow.          This document has been electronically signed by JUANI Fernandez on March 28, 2018 3:26 PM

## 2018-03-29 ENCOUNTER — ANTICOAGULATION VISIT (OUTPATIENT)
Dept: CARDIAC SURGERY | Facility: CLINIC | Age: 78
End: 2018-03-29

## 2018-03-29 VITALS — OXYGEN SATURATION: 99 % | HEART RATE: 67 BPM

## 2018-03-29 DIAGNOSIS — Z79.01 LONG-TERM (CURRENT) USE OF ANTICOAGULANTS: ICD-10-CM

## 2018-03-29 DIAGNOSIS — I74.9 EMBOLISM AND THROMBOSIS OF ARTERY (HCC): ICD-10-CM

## 2018-03-29 DIAGNOSIS — I48.0 PAROXYSMAL ATRIAL FIBRILLATION (HCC): ICD-10-CM

## 2018-03-29 LAB — INR PPP: 4.3 (ref 0.9–1.1)

## 2018-03-29 PROCEDURE — 99211 OFF/OP EST MAY X REQ PHY/QHP: CPT | Performed by: NURSE PRACTITIONER

## 2018-03-29 PROCEDURE — 85610 PROTHROMBIN TIME: CPT | Performed by: NURSE PRACTITIONER

## 2018-03-29 NOTE — PROGRESS NOTES
Pt states he held coumadin and ate 1/3 can of spinach last night. Pt denies med changes or bleeding problems. Dose adjusted and pt instructed to have 1/2 can of spinach today; pt verbalized. Patient instructed regarding medication; results given and questions answered. Nutritional counseling given.  Dietary factors affecting therapy addressed.  Patient instructed to monitor for excessive bruising or bleeding. Will recheck in 4 days.           This document has been electronically signed by JUANI Fernandez on March 29, 2018 1:11 PM

## 2018-03-30 DIAGNOSIS — I50.20 SYSTOLIC CONGESTIVE HEART FAILURE, UNSPECIFIED CONGESTIVE HEART FAILURE CHRONICITY: ICD-10-CM

## 2018-03-30 RX ORDER — FUROSEMIDE 40 MG/1
40 TABLET ORAL DAILY
Qty: 30 TABLET | Refills: 6 | Status: SHIPPED | OUTPATIENT
Start: 2018-03-30 | End: 2018-01-01 | Stop reason: SDUPTHER

## 2018-04-02 ENCOUNTER — ANTICOAGULATION VISIT (OUTPATIENT)
Dept: CARDIAC SURGERY | Facility: CLINIC | Age: 78
End: 2018-04-02

## 2018-04-02 VITALS — SYSTOLIC BLOOD PRESSURE: 143 MMHG | HEART RATE: 60 BPM | DIASTOLIC BLOOD PRESSURE: 75 MMHG

## 2018-04-02 DIAGNOSIS — Z79.01 LONG-TERM (CURRENT) USE OF ANTICOAGULANTS: ICD-10-CM

## 2018-04-02 DIAGNOSIS — I74.9 EMBOLISM AND THROMBOSIS OF ARTERY (HCC): ICD-10-CM

## 2018-04-02 DIAGNOSIS — I48.0 PAROXYSMAL ATRIAL FIBRILLATION (HCC): ICD-10-CM

## 2018-04-02 LAB — INR PPP: 2.3 (ref 0.9–1.1)

## 2018-04-02 PROCEDURE — 85610 PROTHROMBIN TIME: CPT | Performed by: NURSE PRACTITIONER

## 2018-04-02 NOTE — PROGRESS NOTES
Pt states he was started on Amoxicillin today for 10 days. Pt denies bleeding problems. No adjustment made to dose due to INR being low. Patient instructed regarding medication; results given and questions answered. Nutritional counseling given.  Dietary factors affecting therapy addressed.  Patient instructed to monitor for excessive bruising or bleeding. Will recheck in 3 days due to antibiotic.  Electronically signed by JUANI Sheets

## 2018-04-05 ENCOUNTER — DOCUMENTATION (OUTPATIENT)
Dept: CARDIAC SURGERY | Facility: CLINIC | Age: 78
End: 2018-04-05

## 2018-04-05 ENCOUNTER — OFFICE VISIT (OUTPATIENT)
Dept: VASCULAR SURGERY | Age: 78
End: 2018-04-05
Payer: MEDICARE

## 2018-04-05 ENCOUNTER — HOSPITAL ENCOUNTER (OUTPATIENT)
Dept: VASCULAR LAB | Age: 78
Discharge: HOME | End: 2018-04-05
Payer: MEDICARE

## 2018-04-05 VITALS
DIASTOLIC BLOOD PRESSURE: 75 MMHG | TEMPERATURE: 97.1 F | HEART RATE: 67 BPM | RESPIRATION RATE: 18 BRPM | SYSTOLIC BLOOD PRESSURE: 132 MMHG

## 2018-04-05 DIAGNOSIS — I73.9 PVD (PERIPHERAL VASCULAR DISEASE) (HCC): ICD-10-CM

## 2018-04-05 DIAGNOSIS — I73.9 PVD (PERIPHERAL VASCULAR DISEASE) (HCC): Primary | ICD-10-CM

## 2018-04-05 DIAGNOSIS — I70.269 ATHEROSCLEROSIS OF ARTERY OF EXTREMITY WITH GANGRENE (HCC): ICD-10-CM

## 2018-04-05 PROCEDURE — 93926 LOWER EXTREMITY STUDY: CPT

## 2018-04-05 PROCEDURE — 99213 OFFICE O/P EST LOW 20 MIN: CPT | Performed by: PHYSICIAN ASSISTANT

## 2018-04-05 PROCEDURE — 93922 UPR/L XTREMITY ART 2 LEVELS: CPT

## 2018-04-09 ENCOUNTER — TELEPHONE (OUTPATIENT)
Dept: CARDIOLOGY | Facility: CLINIC | Age: 78
End: 2018-04-09

## 2018-04-09 ENCOUNTER — ANTICOAGULATION VISIT (OUTPATIENT)
Dept: CARDIAC SURGERY | Facility: CLINIC | Age: 78
End: 2018-04-09

## 2018-04-09 VITALS — HEART RATE: 60 BPM | SYSTOLIC BLOOD PRESSURE: 140 MMHG | DIASTOLIC BLOOD PRESSURE: 74 MMHG

## 2018-04-09 DIAGNOSIS — Z79.01 LONG-TERM (CURRENT) USE OF ANTICOAGULANTS: ICD-10-CM

## 2018-04-09 DIAGNOSIS — I48.0 PAROXYSMAL ATRIAL FIBRILLATION (HCC): ICD-10-CM

## 2018-04-09 DIAGNOSIS — I74.9 EMBOLISM AND THROMBOSIS OF ARTERY (HCC): ICD-10-CM

## 2018-04-09 LAB — INR PPP: 1.4 (ref 0.9–1.1)

## 2018-04-09 PROCEDURE — 85610 PROTHROMBIN TIME: CPT | Performed by: NURSE PRACTITIONER

## 2018-04-09 PROCEDURE — 99211 OFF/OP EST MAY X REQ PHY/QHP: CPT | Performed by: NURSE PRACTITIONER

## 2018-04-09 NOTE — PROGRESS NOTES
"Pt states he began holding his coumadin for three days on Saturday for dental work tomorrow.  Pt needs bridging with Lovenox.  Will use renal dosing with pt as before which is lovenox 40mg daily.  Pt states \" I have plenty injections at home from the last surgery\".  Pt has done the lovenox multiple times in the past.  Instructed pt to take one injection nightly at 5pm.  Pt will hold coumadin again tonight.  Instructed pt to resume coumadin tomorrow evening and to continue nightly lovenox injections very 24hours.  Recheck INR this Friday.  Pt verbalizes instructions.  Patient instructed regarding medication; results given and questions answered. Nutritional counseling given.  Dietary factors affecting therapy addressed.  Patient instructed to monitor for excessive bruising or bleeding.          This document has been electronically signed by JUANI Fernandez on April 10, 2018 9:31 AM        "

## 2018-04-11 PROCEDURE — 99285 EMERGENCY DEPT VISIT HI MDM: CPT

## 2018-04-12 ENCOUNTER — APPOINTMENT (OUTPATIENT)
Dept: ULTRASOUND IMAGING | Facility: HOSPITAL | Age: 78
End: 2018-04-12

## 2018-04-12 ENCOUNTER — APPOINTMENT (OUTPATIENT)
Dept: GENERAL RADIOLOGY | Facility: HOSPITAL | Age: 78
End: 2018-04-12

## 2018-04-12 ENCOUNTER — HOSPITAL ENCOUNTER (INPATIENT)
Facility: HOSPITAL | Age: 78
LOS: 6 days | Discharge: HOME | End: 2018-04-18
Attending: EMERGENCY MEDICINE | Admitting: FAMILY MEDICINE

## 2018-04-12 ENCOUNTER — APPOINTMENT (OUTPATIENT)
Dept: CT IMAGING | Facility: HOSPITAL | Age: 78
End: 2018-04-12

## 2018-04-12 DIAGNOSIS — I48.0 PAROXYSMAL ATRIAL FIBRILLATION (HCC): ICD-10-CM

## 2018-04-12 DIAGNOSIS — N17.9 AKI (ACUTE KIDNEY INJURY) (HCC): ICD-10-CM

## 2018-04-12 DIAGNOSIS — I70.209 ARTERIAL OCCLUSION, LOWER EXTREMITY (HCC): Primary | ICD-10-CM

## 2018-04-12 DIAGNOSIS — N18.30 CKD (CHRONIC KIDNEY DISEASE) STAGE 3, GFR 30-59 ML/MIN (HCC): ICD-10-CM

## 2018-04-12 LAB
ALBUMIN SERPL-MCNC: 3.1 G/DL (ref 3.4–4.8)
ALBUMIN SERPL-MCNC: 3.3 G/DL (ref 3.4–4.8)
ALBUMIN/GLOB SERPL: 0.9 G/DL (ref 1.1–1.8)
ALBUMIN/GLOB SERPL: 0.9 G/DL (ref 1.1–1.8)
ALP SERPL-CCNC: 65 U/L (ref 38–126)
ALP SERPL-CCNC: 66 U/L (ref 38–126)
ALT SERPL W P-5'-P-CCNC: 16 U/L (ref 21–72)
ALT SERPL W P-5'-P-CCNC: 21 U/L (ref 21–72)
ANION GAP SERPL CALCULATED.3IONS-SCNC: 12 MMOL/L (ref 5–15)
ANION GAP SERPL CALCULATED.3IONS-SCNC: 14 MMOL/L (ref 5–15)
APTT PPP: 222.4 SECONDS (ref 20–40.3)
APTT PPP: 49.6 SECONDS (ref 20–40.3)
APTT PPP: >240 SECONDS (ref 20–40.3)
ARTERIAL PATENCY WRIST A: ABNORMAL
AST SERPL-CCNC: 16 U/L (ref 17–59)
AST SERPL-CCNC: 32 U/L (ref 17–59)
ATMOSPHERIC PRESS: ABNORMAL MMHG
B PERT DNA SPEC QL NAA+PROBE: NOT DETECTED
BASE EXCESS BLDA CALC-SCNC: -5.8 MMOL/L (ref -2.4–2.4)
BASOPHILS # BLD AUTO: 0.01 10*3/MM3 (ref 0–0.2)
BASOPHILS # BLD AUTO: 0.02 10*3/MM3 (ref 0–0.2)
BASOPHILS NFR BLD AUTO: 0.1 % (ref 0–2)
BASOPHILS NFR BLD AUTO: 0.3 % (ref 0–2)
BDY SITE: ABNORMAL
BILIRUB SERPL-MCNC: 0.4 MG/DL (ref 0.2–1.3)
BILIRUB SERPL-MCNC: 0.6 MG/DL (ref 0.2–1.3)
BUN BLD-MCNC: 61 MG/DL (ref 7–21)
BUN BLD-MCNC: 62 MG/DL (ref 7–21)
BUN/CREAT SERPL: 21.2 (ref 7–25)
BUN/CREAT SERPL: 23 (ref 7–25)
C PNEUM DNA NPH QL NAA+NON-PROBE: NOT DETECTED
CA-I BLD-MCNC: 4.6 MG/DL (ref 4.5–4.9)
CALCIUM SPEC-SCNC: 8.4 MG/DL (ref 8.4–10.2)
CALCIUM SPEC-SCNC: 8.7 MG/DL (ref 8.4–10.2)
CHLORIDE SERPL-SCNC: 104 MMOL/L (ref 95–110)
CHLORIDE SERPL-SCNC: 106 MMOL/L (ref 95–110)
CK MB SERPL-CCNC: 3.28 NG/ML (ref 0–5)
CK SERPL-CCNC: 93 U/L (ref 55–170)
CO2 BLDA-SCNC: 22.4 MMOL/L (ref 23–27)
CO2 SERPL-SCNC: 20 MMOL/L (ref 22–31)
CO2 SERPL-SCNC: 25 MMOL/L (ref 22–31)
CREAT BLD-MCNC: 2.65 MG/DL (ref 0.7–1.3)
CREAT BLD-MCNC: 2.93 MG/DL (ref 0.7–1.3)
D-LACTATE SERPL-SCNC: 0.6 MMOL/L (ref 0.5–2)
DEPRECATED RDW RBC AUTO: 50.3 FL (ref 35.1–43.9)
DEPRECATED RDW RBC AUTO: 50.5 FL (ref 35.1–43.9)
EOSINOPHIL # BLD AUTO: 0.39 10*3/MM3 (ref 0–0.7)
EOSINOPHIL # BLD AUTO: 0.47 10*3/MM3 (ref 0–0.7)
EOSINOPHIL NFR BLD AUTO: 5.1 % (ref 0–7)
EOSINOPHIL NFR BLD AUTO: 6.3 % (ref 0–7)
ERYTHROCYTE [DISTWIDTH] IN BLOOD BY AUTOMATED COUNT: 16.5 % (ref 11.5–14.5)
ERYTHROCYTE [DISTWIDTH] IN BLOOD BY AUTOMATED COUNT: 16.7 % (ref 11.5–14.5)
FLUAV H1 2009 PAND RNA NPH QL NAA+PROBE: NOT DETECTED
FLUAV H1 HA GENE NPH QL NAA+PROBE: NOT DETECTED
FLUAV H3 RNA NPH QL NAA+PROBE: NOT DETECTED
FLUAV SUBTYP SPEC NAA+PROBE: NOT DETECTED
FLUBV RNA ISLT QL NAA+PROBE: NOT DETECTED
GFR SERPL CREATININE-BSD FRML MDRD: 21 ML/MIN/1.73 (ref 60–98)
GFR SERPL CREATININE-BSD FRML MDRD: 24 ML/MIN/1.73 (ref 60–98)
GLOBULIN UR ELPH-MCNC: 3.5 GM/DL (ref 2.3–3.5)
GLOBULIN UR ELPH-MCNC: 3.8 GM/DL (ref 2.3–3.5)
GLUCOSE BLD-MCNC: 90 MG/DL (ref 60–100)
GLUCOSE BLD-MCNC: 97 MG/DL (ref 60–100)
GLUCOSE BLDA-MCNC: 101 MMOL/L
HADV DNA SPEC NAA+PROBE: NOT DETECTED
HCO3 BLDA-SCNC: 21 MMOL/L (ref 22–26)
HCOV 229E RNA SPEC QL NAA+PROBE: NOT DETECTED
HCOV HKU1 RNA SPEC QL NAA+PROBE: NOT DETECTED
HCOV NL63 RNA SPEC QL NAA+PROBE: NOT DETECTED
HCOV OC43 RNA SPEC QL NAA+PROBE: NOT DETECTED
HCT VFR BLD AUTO: 30.6 % (ref 39–49)
HCT VFR BLD AUTO: 33.8 % (ref 39–49)
HCT VFR BLD CALC: 33 % (ref 40–54)
HGB BLD-MCNC: 10.5 G/DL (ref 13.7–17.3)
HGB BLD-MCNC: 11.4 G/DL (ref 13.7–17.3)
HGB BLDA-MCNC: 11.1 G/DL (ref 14–18)
HMPV RNA NPH QL NAA+NON-PROBE: NOT DETECTED
HPIV1 RNA SPEC QL NAA+PROBE: NOT DETECTED
HPIV2 RNA SPEC QL NAA+PROBE: NOT DETECTED
HPIV3 RNA NPH QL NAA+PROBE: NOT DETECTED
HPIV4 P GENE NPH QL NAA+PROBE: NOT DETECTED
IMM GRANULOCYTES # BLD: 0.02 10*3/MM3 (ref 0–0.02)
IMM GRANULOCYTES # BLD: 0.03 10*3/MM3 (ref 0–0.02)
IMM GRANULOCYTES NFR BLD: 0.3 % (ref 0–0.5)
IMM GRANULOCYTES NFR BLD: 0.4 % (ref 0–0.5)
INR PPP: 1.48 (ref 0.8–1.2)
L PNEUMO1 AG UR QL IA: NEGATIVE
LYMPHOCYTES # BLD AUTO: 1.26 10*3/MM3 (ref 0.6–4.2)
LYMPHOCYTES # BLD AUTO: 1.27 10*3/MM3 (ref 0.6–4.2)
LYMPHOCYTES NFR BLD AUTO: 16.5 % (ref 10–50)
LYMPHOCYTES NFR BLD AUTO: 16.9 % (ref 10–50)
M PNEUMO IGG SER IA-ACNC: NOT DETECTED
MCH RBC QN AUTO: 28.1 PG (ref 26.5–34)
MCH RBC QN AUTO: 28.2 PG (ref 26.5–34)
MCHC RBC AUTO-ENTMCNC: 33.7 G/DL (ref 31.5–36.3)
MCHC RBC AUTO-ENTMCNC: 34.3 G/DL (ref 31.5–36.3)
MCV RBC AUTO: 82.3 FL (ref 80–98)
MCV RBC AUTO: 83.3 FL (ref 80–98)
MODALITY: ABNORMAL
MONOCYTES # BLD AUTO: 0.62 10*3/MM3 (ref 0–0.9)
MONOCYTES # BLD AUTO: 0.72 10*3/MM3 (ref 0–0.9)
MONOCYTES NFR BLD AUTO: 8 % (ref 0–12)
MONOCYTES NFR BLD AUTO: 9.7 % (ref 0–12)
NEUTROPHILS # BLD AUTO: 4.94 10*3/MM3 (ref 2–8.6)
NEUTROPHILS # BLD AUTO: 5.41 10*3/MM3 (ref 2–8.6)
NEUTROPHILS NFR BLD AUTO: 66.4 % (ref 37–80)
NEUTROPHILS NFR BLD AUTO: 70 % (ref 37–80)
PCO2 BLDA: 46.9 MM HG (ref 35–45)
PH BLDA: 7.27 PH UNITS (ref 7.35–7.45)
PLATELET # BLD AUTO: 180 10*3/MM3 (ref 150–450)
PLATELET # BLD AUTO: 184 10*3/MM3 (ref 150–450)
PMV BLD AUTO: 10.2 FL (ref 8–12)
PMV BLD AUTO: 9.7 FL (ref 8–12)
PO2 BLDA: 94.9 MM HG (ref 80–105)
POTASSIUM BLD-SCNC: 4.1 MMOL/L (ref 3.5–5.1)
POTASSIUM BLD-SCNC: 4.5 MMOL/L (ref 3.5–5.1)
POTASSIUM BLDA-SCNC: 4.27 MMOL/L (ref 3.6–4.9)
PROT SERPL-MCNC: 6.6 G/DL (ref 6.3–8.6)
PROT SERPL-MCNC: 7.1 G/DL (ref 6.3–8.6)
PROTHROMBIN TIME: 17.5 SECONDS (ref 11.1–15.3)
RBC # BLD AUTO: 3.72 10*6/MM3 (ref 4.37–5.74)
RBC # BLD AUTO: 4.06 10*6/MM3 (ref 4.37–5.74)
RHINOVIRUS RNA SPEC NAA+PROBE: NOT DETECTED
RSV RNA NPH QL NAA+NON-PROBE: NOT DETECTED
S PNEUM AG SPEC QL LA: NEGATIVE
SAO2 % BLDCOA: 96.3 %
SODIUM BLD-SCNC: 140 MMOL/L (ref 137–145)
SODIUM BLD-SCNC: 141 MMOL/L (ref 137–145)
SODIUM BLDA-SCNC: 137.5 MMOL/L (ref 138–146)
SODIUM UR-SCNC: 63 MMOL/L (ref 30–90)
TROPONIN I SERPL-MCNC: <0.012 NG/ML
WBC NRBC COR # BLD: 7.44 10*3/MM3 (ref 3.2–9.8)
WBC NRBC COR # BLD: 7.72 10*3/MM3 (ref 3.2–9.8)
WHOLE BLOOD HOLD SPECIMEN: NORMAL

## 2018-04-12 PROCEDURE — 83605 ASSAY OF LACTIC ACID: CPT | Performed by: EMERGENCY MEDICINE

## 2018-04-12 PROCEDURE — 85730 THROMBOPLASTIN TIME PARTIAL: CPT | Performed by: FAMILY MEDICINE

## 2018-04-12 PROCEDURE — 87581 M.PNEUMON DNA AMP PROBE: CPT | Performed by: FAMILY MEDICINE

## 2018-04-12 PROCEDURE — 25010000002 HEPARIN (PORCINE) PER 1000 UNITS: Performed by: EMERGENCY MEDICINE

## 2018-04-12 PROCEDURE — 84484 ASSAY OF TROPONIN QUANT: CPT | Performed by: EMERGENCY MEDICINE

## 2018-04-12 PROCEDURE — 85025 COMPLETE CBC W/AUTO DIFF WBC: CPT | Performed by: FAMILY MEDICINE

## 2018-04-12 PROCEDURE — 82550 ASSAY OF CK (CPK): CPT | Performed by: EMERGENCY MEDICINE

## 2018-04-12 PROCEDURE — 82803 BLOOD GASES ANY COMBINATION: CPT | Performed by: EMERGENCY MEDICINE

## 2018-04-12 PROCEDURE — 94799 UNLISTED PULMONARY SVC/PX: CPT

## 2018-04-12 PROCEDURE — 87040 BLOOD CULTURE FOR BACTERIA: CPT | Performed by: FAMILY MEDICINE

## 2018-04-12 PROCEDURE — 87798 DETECT AGENT NOS DNA AMP: CPT | Performed by: FAMILY MEDICINE

## 2018-04-12 PROCEDURE — 36600 WITHDRAWAL OF ARTERIAL BLOOD: CPT

## 2018-04-12 PROCEDURE — 94760 N-INVAS EAR/PLS OXIMETRY 1: CPT

## 2018-04-12 PROCEDURE — 85025 COMPLETE CBC W/AUTO DIFF WBC: CPT | Performed by: EMERGENCY MEDICINE

## 2018-04-12 PROCEDURE — 87899 AGENT NOS ASSAY W/OPTIC: CPT | Performed by: FAMILY MEDICINE

## 2018-04-12 PROCEDURE — 25010000002 CEFTRIAXONE PER 250 MG: Performed by: FAMILY MEDICINE

## 2018-04-12 PROCEDURE — 87486 CHLMYD PNEUM DNA AMP PROBE: CPT | Performed by: FAMILY MEDICINE

## 2018-04-12 PROCEDURE — 85610 PROTHROMBIN TIME: CPT | Performed by: EMERGENCY MEDICINE

## 2018-04-12 PROCEDURE — 84300 ASSAY OF URINE SODIUM: CPT | Performed by: INTERNAL MEDICINE

## 2018-04-12 PROCEDURE — 85730 THROMBOPLASTIN TIME PARTIAL: CPT | Performed by: EMERGENCY MEDICINE

## 2018-04-12 PROCEDURE — 80053 COMPREHEN METABOLIC PANEL: CPT | Performed by: EMERGENCY MEDICINE

## 2018-04-12 PROCEDURE — 94640 AIRWAY INHALATION TREATMENT: CPT

## 2018-04-12 PROCEDURE — 80053 COMPREHEN METABOLIC PANEL: CPT | Performed by: FAMILY MEDICINE

## 2018-04-12 PROCEDURE — 93005 ELECTROCARDIOGRAM TRACING: CPT | Performed by: EMERGENCY MEDICINE

## 2018-04-12 PROCEDURE — 25010000002 HYDROMORPHONE PER 4 MG: Performed by: FAMILY MEDICINE

## 2018-04-12 PROCEDURE — 87205 SMEAR GRAM STAIN: CPT | Performed by: INTERNAL MEDICINE

## 2018-04-12 PROCEDURE — 84484 ASSAY OF TROPONIN QUANT: CPT | Performed by: FAMILY MEDICINE

## 2018-04-12 PROCEDURE — 87633 RESP VIRUS 12-25 TARGETS: CPT | Performed by: FAMILY MEDICINE

## 2018-04-12 PROCEDURE — 93010 ELECTROCARDIOGRAM REPORT: CPT | Performed by: INTERNAL MEDICINE

## 2018-04-12 PROCEDURE — 82553 CREATINE MB FRACTION: CPT | Performed by: EMERGENCY MEDICINE

## 2018-04-12 PROCEDURE — 25010000002 LEVOFLOXACIN PER 250 MG

## 2018-04-12 PROCEDURE — 71045 X-RAY EXAM CHEST 1 VIEW: CPT

## 2018-04-12 PROCEDURE — 93926 LOWER EXTREMITY STUDY: CPT

## 2018-04-12 PROCEDURE — 93971 EXTREMITY STUDY: CPT

## 2018-04-12 RX ORDER — ALBUTEROL SULFATE 90 UG/1
2 AEROSOL, METERED RESPIRATORY (INHALATION) EVERY 4 HOURS PRN
Status: DISCONTINUED | OUTPATIENT
Start: 2018-04-12 | End: 2018-04-12 | Stop reason: CLARIF

## 2018-04-12 RX ORDER — SODIUM CHLORIDE 0.9 % (FLUSH) 0.9 %
1-10 SYRINGE (ML) INJECTION AS NEEDED
Status: DISCONTINUED | OUTPATIENT
Start: 2018-04-12 | End: 2018-04-18 | Stop reason: HOSPADM

## 2018-04-12 RX ORDER — ASPIRIN 81 MG/1
81 TABLET ORAL DAILY
Status: DISCONTINUED | OUTPATIENT
Start: 2018-04-12 | End: 2018-04-18 | Stop reason: HOSPADM

## 2018-04-12 RX ORDER — SODIUM CHLORIDE 0.9 % (FLUSH) 0.9 %
10 SYRINGE (ML) INJECTION AS NEEDED
Status: DISCONTINUED | OUTPATIENT
Start: 2018-04-12 | End: 2018-04-18 | Stop reason: HOSPADM

## 2018-04-12 RX ORDER — SOTALOL HYDROCHLORIDE 120 MG/1
120 TABLET ORAL EVERY 12 HOURS SCHEDULED
Status: DISCONTINUED | OUTPATIENT
Start: 2018-04-12 | End: 2018-04-12

## 2018-04-12 RX ORDER — CILOSTAZOL 100 MG/1
50 TABLET ORAL
Status: DISCONTINUED | OUTPATIENT
Start: 2018-04-12 | End: 2018-04-18 | Stop reason: HOSPADM

## 2018-04-12 RX ORDER — SOTALOL HYDROCHLORIDE 120 MG/1
120 TABLET ORAL
Status: DISCONTINUED | OUTPATIENT
Start: 2018-04-13 | End: 2018-04-14

## 2018-04-12 RX ORDER — NALOXONE HCL 0.4 MG/ML
0.4 VIAL (ML) INJECTION
Status: DISCONTINUED | OUTPATIENT
Start: 2018-04-12 | End: 2018-04-13

## 2018-04-12 RX ORDER — LEVOFLOXACIN 5 MG/ML
750 INJECTION, SOLUTION INTRAVENOUS EVERY 24 HOURS
Status: DISCONTINUED | OUTPATIENT
Start: 2018-04-12 | End: 2018-04-12 | Stop reason: CLARIF

## 2018-04-12 RX ORDER — HEPARIN SODIUM 5000 [USP'U]/ML
80 INJECTION, SOLUTION INTRAVENOUS; SUBCUTANEOUS ONCE
Status: COMPLETED | OUTPATIENT
Start: 2018-04-12 | End: 2018-04-12

## 2018-04-12 RX ORDER — HEPARIN SODIUM 5000 [USP'U]/ML
80 INJECTION, SOLUTION INTRAVENOUS; SUBCUTANEOUS AS NEEDED
Status: DISCONTINUED | OUTPATIENT
Start: 2018-04-12 | End: 2018-04-17

## 2018-04-12 RX ORDER — ALBUTEROL SULFATE 2.5 MG/3ML
2.5 SOLUTION RESPIRATORY (INHALATION) EVERY 4 HOURS PRN
Status: DISCONTINUED | OUTPATIENT
Start: 2018-04-12 | End: 2018-04-18 | Stop reason: HOSPADM

## 2018-04-12 RX ORDER — HEPARIN SODIUM 5000 [USP'U]/ML
40 INJECTION, SOLUTION INTRAVENOUS; SUBCUTANEOUS AS NEEDED
Status: DISCONTINUED | OUTPATIENT
Start: 2018-04-12 | End: 2018-04-17

## 2018-04-12 RX ORDER — LEVOTHYROXINE SODIUM 0.03 MG/1
25 TABLET ORAL DAILY
Status: DISCONTINUED | OUTPATIENT
Start: 2018-04-12 | End: 2018-04-18 | Stop reason: HOSPADM

## 2018-04-12 RX ORDER — IPRATROPIUM BROMIDE AND ALBUTEROL SULFATE 2.5; .5 MG/3ML; MG/3ML
3 SOLUTION RESPIRATORY (INHALATION)
Status: DISCONTINUED | OUTPATIENT
Start: 2018-04-12 | End: 2018-04-18 | Stop reason: HOSPADM

## 2018-04-12 RX ORDER — LEVOFLOXACIN 5 MG/ML
INJECTION, SOLUTION INTRAVENOUS
Status: COMPLETED
Start: 2018-04-12 | End: 2018-04-12

## 2018-04-12 RX ORDER — GUAIFENESIN 600 MG/1
1200 TABLET, EXTENDED RELEASE ORAL EVERY 12 HOURS SCHEDULED
Status: DISCONTINUED | OUTPATIENT
Start: 2018-04-12 | End: 2018-04-18 | Stop reason: HOSPADM

## 2018-04-12 RX ORDER — SODIUM CHLORIDE 9 MG/ML
70 INJECTION, SOLUTION INTRAVENOUS CONTINUOUS
Status: DISCONTINUED | OUTPATIENT
Start: 2018-04-12 | End: 2018-04-14

## 2018-04-12 RX ORDER — LEVOFLOXACIN 5 MG/ML
750 INJECTION, SOLUTION INTRAVENOUS EVERY 24 HOURS
Status: DISCONTINUED | OUTPATIENT
Start: 2018-04-13 | End: 2018-04-12

## 2018-04-12 RX ORDER — MONTELUKAST SODIUM 10 MG/1
10 TABLET ORAL NIGHTLY
Status: DISCONTINUED | OUTPATIENT
Start: 2018-04-12 | End: 2018-04-18 | Stop reason: HOSPADM

## 2018-04-12 RX ORDER — LEVOFLOXACIN 5 MG/ML
INJECTION, SOLUTION INTRAVENOUS
Status: DISCONTINUED
Start: 2018-04-12 | End: 2018-04-12 | Stop reason: WASHOUT

## 2018-04-12 RX ORDER — BUDESONIDE AND FORMOTEROL FUMARATE DIHYDRATE 160; 4.5 UG/1; UG/1
2 AEROSOL RESPIRATORY (INHALATION)
Status: DISCONTINUED | OUTPATIENT
Start: 2018-04-12 | End: 2018-04-18 | Stop reason: HOSPADM

## 2018-04-12 RX ORDER — LEVOFLOXACIN 5 MG/ML
750 INJECTION, SOLUTION INTRAVENOUS
Status: DISCONTINUED | OUTPATIENT
Start: 2018-04-14 | End: 2018-04-13

## 2018-04-12 RX ORDER — PRAMIPEXOLE DIHYDROCHLORIDE 0.25 MG/1
0.25 TABLET ORAL 3 TIMES DAILY
Status: DISCONTINUED | OUTPATIENT
Start: 2018-04-12 | End: 2018-04-18 | Stop reason: HOSPADM

## 2018-04-12 RX ORDER — LEVOFLOXACIN 5 MG/ML
500 INJECTION, SOLUTION INTRAVENOUS ONCE
Status: COMPLETED | OUTPATIENT
Start: 2018-04-12 | End: 2018-04-12

## 2018-04-12 RX ORDER — QUETIAPINE FUMARATE 300 MG/1
300 TABLET, FILM COATED ORAL NIGHTLY
Status: DISCONTINUED | OUTPATIENT
Start: 2018-04-12 | End: 2018-04-18 | Stop reason: HOSPADM

## 2018-04-12 RX ORDER — SODIUM CHLORIDE 9 MG/ML
125 INJECTION, SOLUTION INTRAVENOUS CONTINUOUS
Status: DISCONTINUED | OUTPATIENT
Start: 2018-04-12 | End: 2018-04-12 | Stop reason: SDUPTHER

## 2018-04-12 RX ADMIN — HYDROMORPHONE HYDROCHLORIDE 0.5 MG: 1 INJECTION, SOLUTION INTRAMUSCULAR; INTRAVENOUS; SUBCUTANEOUS at 13:50

## 2018-04-12 RX ADMIN — QUETIAPINE FUMARATE 300 MG: 300 TABLET ORAL at 20:47

## 2018-04-12 RX ADMIN — MONTELUKAST SODIUM 10 MG: 10 TABLET, FILM COATED ORAL at 20:47

## 2018-04-12 RX ADMIN — BUDESONIDE AND FORMOTEROL FUMARATE DIHYDRATE 2 PUFF: 160; 4.5 AEROSOL RESPIRATORY (INHALATION) at 18:13

## 2018-04-12 RX ADMIN — PRAMIPEXOLE DIHYDROCHLORIDE 0.25 MG: 0.25 TABLET ORAL at 18:18

## 2018-04-12 RX ADMIN — GUAIFENESIN 1200 MG: 600 TABLET, EXTENDED RELEASE ORAL at 09:57

## 2018-04-12 RX ADMIN — LEVOTHYROXINE SODIUM 25 MCG: 25 TABLET ORAL at 09:58

## 2018-04-12 RX ADMIN — PRAMIPEXOLE DIHYDROCHLORIDE 0.25 MG: 0.25 TABLET ORAL at 09:57

## 2018-04-12 RX ADMIN — CILOSTAZOL 50 MG: 100 TABLET ORAL at 18:18

## 2018-04-12 RX ADMIN — HEPARIN SODIUM 14.7 UNITS/KG/HR: 10000 INJECTION, SOLUTION INTRAVENOUS at 03:14

## 2018-04-12 RX ADMIN — LEVOFLOXACIN 500 MG: 5 INJECTION, SOLUTION INTRAVENOUS at 04:02

## 2018-04-12 RX ADMIN — BUDESONIDE AND FORMOTEROL FUMARATE DIHYDRATE 2 PUFF: 160; 4.5 AEROSOL RESPIRATORY (INHALATION) at 07:34

## 2018-04-12 RX ADMIN — HYDROMORPHONE HYDROCHLORIDE 0.5 MG: 1 INJECTION, SOLUTION INTRAMUSCULAR; INTRAVENOUS; SUBCUTANEOUS at 05:26

## 2018-04-12 RX ADMIN — IPRATROPIUM BROMIDE AND ALBUTEROL SULFATE 3 ML: 2.5; .5 SOLUTION RESPIRATORY (INHALATION) at 02:53

## 2018-04-12 RX ADMIN — Medication 10 ML: at 05:26

## 2018-04-12 RX ADMIN — SOTALOL HYDROCHLORIDE 120 MG: 120 TABLET ORAL at 09:57

## 2018-04-12 RX ADMIN — CILOSTAZOL 50 MG: 100 TABLET ORAL at 09:58

## 2018-04-12 RX ADMIN — PRAMIPEXOLE DIHYDROCHLORIDE 0.25 MG: 0.25 TABLET ORAL at 20:47

## 2018-04-12 RX ADMIN — GUAIFENESIN 1200 MG: 600 TABLET, EXTENDED RELEASE ORAL at 20:47

## 2018-04-12 RX ADMIN — IPRATROPIUM BROMIDE AND ALBUTEROL SULFATE 3 ML: 2.5; .5 SOLUTION RESPIRATORY (INHALATION) at 18:06

## 2018-04-12 RX ADMIN — SODIUM CHLORIDE 100 ML/HR: 9 INJECTION, SOLUTION INTRAVENOUS at 05:58

## 2018-04-12 RX ADMIN — NITROGLYCERIN 1 INCH: 20 OINTMENT TOPICAL at 16:49

## 2018-04-12 RX ADMIN — ASPIRIN 81 MG: 81 TABLET, COATED ORAL at 09:58

## 2018-04-12 RX ADMIN — HYDROMORPHONE HYDROCHLORIDE 0.5 MG: 1 INJECTION, SOLUTION INTRAMUSCULAR; INTRAVENOUS; SUBCUTANEOUS at 21:00

## 2018-04-12 RX ADMIN — HEPARIN SODIUM 8200 UNITS: 5000 INJECTION, SOLUTION INTRAVENOUS; SUBCUTANEOUS at 03:10

## 2018-04-12 RX ADMIN — SODIUM CHLORIDE 500 ML: 9 INJECTION, SOLUTION INTRAVENOUS at 03:35

## 2018-04-12 RX ADMIN — CEFTRIAXONE 1 G: 1 INJECTION, POWDER, FOR SOLUTION INTRAMUSCULAR; INTRAVENOUS at 05:54

## 2018-04-12 RX ADMIN — HYDROMORPHONE HYDROCHLORIDE 0.5 MG: 1 INJECTION, SOLUTION INTRAMUSCULAR; INTRAVENOUS; SUBCUTANEOUS at 10:06

## 2018-04-12 RX ADMIN — MAGNESIUM OXIDE TAB 400 MG (241.3 MG ELEMENTAL MG) 800 MG: 400 (241.3 MG) TAB at 20:47

## 2018-04-12 RX ADMIN — IPRATROPIUM BROMIDE AND ALBUTEROL SULFATE 3 ML: 2.5; .5 SOLUTION RESPIRATORY (INHALATION) at 07:31

## 2018-04-12 RX ADMIN — NITROGLYCERIN 1 INCH: 20 OINTMENT TOPICAL at 21:00

## 2018-04-12 NOTE — PLAN OF CARE
Problem: Patient Care Overview  Goal: Plan of Care Review  Outcome: Ongoing (interventions implemented as appropriate)   04/12/18 0800   Coping/Psychosocial   Plan of Care Reviewed With patient;daughter   Plan of Care Review   Progress no change       Problem: Fall Risk (Adult)  Goal: Identify Related Risk Factors and Signs and Symptoms  Outcome: Outcome(s) achieved Date Met: 04/12/18    Goal: Absence of Fall  Outcome: Ongoing (interventions implemented as appropriate)      Problem: VTE, DVT and PE (Adult)  Goal: Signs and Symptoms of Listed Potential Problems Will be Absent, Minimized or Managed (VTE, DVT and PE)  Outcome: Ongoing (interventions implemented as appropriate)

## 2018-04-12 NOTE — ED PROVIDER NOTES
Subjective   79yo male pmh significant atrial flutter/pad/ckd/copd/aaa/htn/hyperlipidemia presents ED c/o 2d hx RLE 'heaviness' discomfort, becoming worse earlier today with increased pain/numbness RLE.  Pt notably vague historian.  ROS (+) wheezing/nonproductive cough.        Illness   Severity:  Moderate  Onset quality:  Gradual  Duration:  2 days  Chronicity:  New  Associated symptoms: cough, fatigue and wheezing        Review of Systems   Constitutional: Positive for fatigue.   HENT: Negative.    Respiratory: Positive for cough and wheezing.    Cardiovascular: Negative.    Gastrointestinal: Negative.    Musculoskeletal: Negative.    Skin: Positive for color change.   Neurological: Positive for weakness.   All other systems reviewed and are negative.      Past Medical History:   Diagnosis Date   • Abdominal bloating    • Anxiety    • Atherosclerosis of native arteries of extremity with intermittent claudication    • Atrial flutter    • Backache    • Benign prostatic hyperplasia     BX 2009, also has renal cyst      • Chronic bronchitis    • Chronic renal impairment    • Depressive disorder    • Drug abuse, episodic use    • Dyslipidemia    • Fatigue    • GERD (gastroesophageal reflux disease)    • Hypercholesterolemia 03/15/2016   • Injury of tendon of rotator cuff 10/22/2015   • Insomnia 06/24/2016   • Intervertebral disc disorder 10/27/2011   • Major depression    • Neck pain 01/09/2012   • Osteoarthritis of multiple joints 06/21/2013   • Pain from vascular prosthetic devices, implants and grafts, sequela 12/03/2015   • Pain in left foot 10/27/2015   • Peripheral vascular disease 12/03/2015     LEFT fem-tib bypass, embolectomy 6/2014    10/27/2015    • Senile debility 10/22/2015   • 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    weakness   • Shoulder pain    • Simple renal cyst 01/01/2011   • Stroke      Echo: L atrial appendage thrombus      • Suicide     at risk for   • Tobacco dependence syndrome 12/03/2015 2014  to E cigarette          Allergies   Allergen Reactions   • Lipitor [Atorvastatin]    • Lortab [Hydrocodone-Acetaminophen]    • Morphine And Related        Past Surgical History:   Procedure Laterality Date   • ABDOMINAL AORTIC ANEURYSM REPAIR  07/18/2011    Endovascular   • BACK SURGERY     • CARDIAC CATHETERIZATION  01/30/1984    Mixed dominant coronary arterial anatomy. No coronary atherosclerosis. Normal left ventricular function   • CHOLECYSTECTOMY  08/25/2014    Cholecystitis with gangrenous gallbladder   • ENDOSCOPY  03/22/1990    Duodenal ulcer   • ENDOSCOPY AND COLONOSCOPY  10/17/2013    Diverticulum in sigmoid colon & descending colon. Internal & external hemorrhoids found   • ESOPHAGOSCOPY / EGD  10/17/2013    With tube-Esophagitis seen. Biopsy taken. Gastritis in stomach. Biopsy taken. Stenosis in 2nd portion of duodenum. Dilatation performed.   • FEMORAL THROMBECTOMY/EMBOLECTOMY  06/29/2014    Left lower extremity arteriogram. Left popliteal artery endarterectomy. Redo left femoral to [id number] trunk bypass. Negative pressure wound therapy with placement of Prevena wound V.A.C., left groin, left lower leg   • INJECTION OF MEDICATION  03/15/2016    B12   • INJECTION OF MEDICATION  10/23/2015    Drain/Inject Major Joint    • INJECTION OF MEDICATION  01/13/2016    Kenalog   • LUMBAR LAMINECTOMY     • TRANSESOPHAGEAL ECHOCARDIOGRAM (TEX)  10/06/2015    With color flow-Mild mitral regurg.Left atrium mild dilated.Ef of 55-60%.RV systolic pressure elevated.Trace tricuspid regurg   • TRANSESOPHAGEAL ECHOCARDIOGRAM (TEX)  02/14/2012    Without color flow-[facility] w/ early diast dysfun. AV trileaflet & structurally NRL w/ AR NRL. No regional wall motion abn Est Ef approx 50-55%. M & TR valves NRL w/ mild M & TR regurg Trace -mild pulmonic regurg. Anterior echo free space w/o hemodynamic signif. NRL RV   • TRANSESOPHAGEAL ECHOCARDIOGRAM (TEX)  03/25/2011    Normal LV systolic function with Ef of 50-55%.Left atrium  ildly dilated.Moderate mitral regurg.Mitral valve regurg.Intact interatrial septum.No evidence of any cardiac thrombus or pericardial effusion.AV trileaflet       Family History   Problem Relation Age of Onset   • Osteoarthritis Mother      Lived to be 102   • Hypertension Mother    • ADD / ADHD Father    • Lung cancer Father    • ADD / ADHD Brother    • Rectal cancer Other        Social History     Social History   • Marital status:      Social History Main Topics   • Smoking status: Current Some Day Smoker     Packs/day: 0.50     Years: 50.00     Types: Cigarettes   • Smokeless tobacco: Never Used      Comment: reduced;   • Alcohol use No      Comment: Former use    • Drug use: No   • Sexual activity: Defer     Other Topics Concern   • Not on file           Objective   Physical Exam   Constitutional: He appears well-developed and well-nourished.   HENT:   Head: Normocephalic and atraumatic.   Mouth/Throat: Oropharynx is clear and moist.   Eyes: Pupils are equal, round, and reactive to light.   Neck: Neck supple.   Cardiovascular: Normal rate, regular rhythm, normal heart sounds and intact distal pulses.  Exam reveals no gallop and no friction rub.    No murmur heard.  Pulmonary/Chest: No respiratory distress. He has decreased breath sounds in the right lower field and the left lower field. He has wheezes. He has no rhonchi. He has no rales.   Abdominal: Soft. He exhibits no mass. There is no tenderness. There is no rebound and no guarding.   Musculoskeletal:        Legs:  Neurological: GCS eye subscore is 3. GCS verbal subscore is 5. GCS motor subscore is 6.   Nursing note and vitals reviewed.      Procedures         ED Course  ED Course   Comment By Time   Dr. Reed consulted. Agrees with heparin infusion. No CTA at this time secondary to CHANDRIKA. Will follow inpatient. Shankar Ardon MD 04/12 0302      Labs Reviewed   COMPREHENSIVE METABOLIC PANEL - Abnormal; Notable for the following:        Result Value     BUN 62 (*)     Creatinine 2.93 (*)     Albumin 3.30 (*)     ALT (SGPT) 16 (*)     eGFR Non African Amer 21 (*)     Globulin 3.8 (*)     A/G Ratio 0.9 (*)     All other components within normal limits    Narrative:     The MDRD GFR formula is only valid for adults with stable renal function between ages 18 and 70.   BLOOD GAS, ARTERIAL - Abnormal; Notable for the following:     pH, Arterial 7.269 (*)     pCO2, Arterial 46.9 (*)     HCO3, Arterial 21.0 (*)     Base Excess, Arterial -5.8 (*)     Hemoglobin, Blood Gas 11.1 (*)     Hematocrit, Blood Gas 33.0 (*)     CO2 Content 22.4 (*)     Sodium, Arterial 137.5 (*)     All other components within normal limits   CBC WITH AUTO DIFFERENTIAL - Abnormal; Notable for the following:     RBC 4.06 (*)     Hemoglobin 11.4 (*)     Hematocrit 33.8 (*)     RDW 16.5 (*)     RDW-SD 50.3 (*)     All other components within normal limits   PROTIME-INR - Abnormal; Notable for the following:     Protime 17.5 (*)     INR 1.48 (*)     All other components within normal limits    Narrative:     Therapeutic range for most indications is 2.0-3.0 INR,  or 2.5-3.5 for mechanical heart valves.   APTT - Abnormal; Notable for the following:     PTT 49.6 (*)     All other components within normal limits    Narrative:     The recommended Heparin therapeutic range is 68-97 seconds.   TROPONIN (IN-HOUSE) - Normal   CK - Normal   CK MB - Normal   PROTIME-INR   APTT   TROPONIN (IN-HOUSE)   LACTIC ACID, PLASMA   CBC AND DIFFERENTIAL    Narrative:     The following orders were created for panel order CBC & Differential.  Procedure                               Abnormality         Status                     ---------                               -----------         ------                     CBC Auto Differential[[id number]]        Abnormal            Final result                 Please view results for these tests on the individual orders.     Xr Chest 1 View    Result Date: 4/12/2018  Narrative: Chest  single view on  4/12/2018 CLINICAL INDICATION: Shortness of breath COMPARISON: 3/7/2018 FINDINGS: There is mild elevation of the right hemidiaphragm. There has been improvement in but there are persistent bilateral reticular interstitial changes. This may represent residual edema or atypical pneumonia versus a chronic underlying interstitial lung disease. Vascular calcification is noted in the aorta. Heart is within normal limits for size.     Impression: Continued bilateral interstitial opacities that may all be chronic in nature but cannot exclude continued component of edema or pneumonia. Electronically signed by:  Nicholas Lawton  4/12/2018 3:08 AM CDT Workstation: [id number]    Us Arterial Doppler Lower Extremity Right    Result Date: 4/12/2018  Narrative: Ultrasound right lower extremity arterial duplex exam on 4/12/2018 CLINICAL INDICATION: Right leg pain, cold foot COMPARISON: CT angiogram from 9/19/2017 FINDINGS: Multiple sonographic images are obtained throughout the right lower extremity arterial system. Color flow, gray scale and spectral analysis are all performed. Decreased flow and monophasic waveform is noted throughout the right superficial femoral artery. There is a large amount of echogenic clot filling the known dilated popliteal artery aneurysm that appears essentially occluded. There is probable minimal flow noted in the right peroneal artery and posterior tibial artery.     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 Dr. Ardon was made aware of this finding by myself by phone on 4/12/2018 at 2:48 AM. Electronically signed by:  Nicholas Lawton  4/12/2018 2:54 AM CDT Workstation: [id number]    Us Venous Doppler Lower Extremity Right (duplex)    Addendum Date: 4/12/2018 Addendum:    ADDENDUM ADDENDUM #1 Addendum: After reviewing the  patient's arterial examination on the same date and correlating with the patient's prior CT angiogram from 9/19/2017 the following is noted: What was thought to be clot within a dilated right popliteal vein is now felt to be clot within a dilated known popliteal artery aneurysm. The vein on the prior CT angiogram is a very small and not very distinguishable from the large popliteal artery aneurysm and is likely not visualized on this examination. No definite evidence of deep venous thrombus is noted therefore. Dr. Ardon was made aware of this finding by myself by phone on 4/12/2018 at 2:48 AM. Electronically signed by:  Nicholas Lawton  4/12/2018 2:51 AM CDT Workstation: [id number]     Result Date: 4/12/2018  Narrative: Ultrasound right lower extremity venous duplex exam on 4/12/2018 CLINICAL INDICATION: Right leg pain COMPARISON: None FINDINGS: Multiple sonographic images are obtained from the right groin through the right calf vessels. Color flow, compression and spectral analysis are all performed. There is echogenic material filling a dilated right popliteal vein that is noncompressible consistent with acute deep venous thrombus. The remainder of the deep veins of the lower extremity show complete compressibility and good color flow.     Impression: Positive deep venous thrombus in the right popliteal vein. Dr. Ardon in the emergency Department was made aware of this finding by myself by phone on 4/12/2018 at 2:44 AM. Electronically signed by:  Nicholas Lawton  4/12/2018 2:45 AM CDT Workstation: [id number]                MDM    Final diagnoses:   Arterial occlusion, lower extremity   CHANDRIKA (acute kidney injury)            Shankar Ardon MD  04/12/18 0338

## 2018-04-12 NOTE — PROGRESS NOTES
[facility] Medicine Services  INPATIENT PROGRESS NOTE    Length of Stay: 0  Date of Admission: 4/12/2018  Primary Care Physician: Belkys Hoffman MD    Subjective   Chief Complaint: Right leg pain and confusion  HPI:  Decreased pain in right lower extremity attributed to narcotic pain administration. Denies any exacerbating or alleviating factors. NO new c/o. Denies epistaxis, hematuria, chest pain.  Relates dry mouth and difficulty speaking.    Review of Systems   Denies epistaxis, hematuria, melena, bright red blood per rectum, chest pain, dyspnea.  All pertinent negatives and positives are as above. All other systems have been reviewed and are negative unless otherwise stated.     Objective    Temp:  [96.7 °F (35.9 °C)-98.1 °F (36.7 °C)] 96.7 °F (35.9 °C)  Heart Rate:  [53-73] 55  Resp:  [14-18] 18  BP: ()/(54-87) 119/87    Physical Exam  Gen.: No apparent distress; modest confusion attributed to recent administration of pain medication; oriented to person and place;   HEENT: NCAT; oral and nasal mucosa without erythema, swelling, exudate; negative dysarthria  Neck: No lymphadenopathy or bruits.  Cardiovascular: Regular rate and rhythm, no S3, no murmur.  Lung: Clear to auscultation bilaterally, symmetric rise and fall of the chest  Abdomen: Soft, nontender, nondistended; normal bowel sounds.  Extremities: No clubbing cyanosis.  Temperature and color of both distal lower extremities equivocal.  Left lower extremity edema noted.  I am unable to palpate a dorsalis pedis pulse on the right  MSK: No active synovitis, deformity.  Normal muscle bulk and tone  Skin: No rash or ecchymoses or skin breakdown  Neuro: PERRLA; EOMI; symmetric facial motor      Results Review:  I have reviewed the labs, radiology results, and diagnostic studies.    Laboratory Data:     Results from last 7 days  Lab Units 04/12/18  0530 04/12/18  0247 04/12/18  0246   SODIUM mmol/L 140  --   141   SODIUM, ARTERIAL mmol/L  --  137.5*  --    POTASSIUM mmol/L 4.1  --  4.5   CHLORIDE mmol/L 106  --  104   CO2 mmol/L 20.0*  --  25.0   BUN mg/dL 61*  --  62*   CREATININE mg/dL 2.65*  --  2.93*   GLUCOSE mg/dL 90  --  97   GLUCOSE, ARTERIAL mmol/L  --  101  --    CALCIUM mg/dL 8.4  --  8.7   BILIRUBIN mg/dL 0.6  --  0.4   ALK PHOS U/L 65  --  66   ALT (SGPT) U/L 21  --  16*   AST (SGOT) U/L 16*  --  32   ANION GAP mmol/L 14.0  --  12.0     Estimated Creatinine Clearance: 28.4 mL/min (by C-G formula based on SCr of 2.65 mg/dL (H)).            Results from last 7 days  Lab Units 04/12/18  0530 04/12/18  0246   WBC 10*3/mm3 7.44 7.72   HEMOGLOBIN g/dL 10.5* 11.4*   HEMATOCRIT % 30.6* 33.8*   PLATELETS 10*3/mm3 180 184       Results from last 7 days  Lab Units 04/12/18  0246 04/09/18   INR  1.48* 1.40*       Culture Data:   No results found for: BLOODCX  No results found for: URINECX  No results found for: RESPCX  No results found for: WOUNDCX  No results found for: STOOLCX  No components found for: BODYFLD    Radiology Data:   Imaging Results (last 24 hours)     Procedure Component Value Units Date/Time    XR Chest 1 View [[id number]] Collected:  04/12/18 0245     Updated:  04/12/18 0310    Narrative:         Chest single view on  4/12/2018     CLINICAL INDICATION: Shortness of breath    COMPARISON: 3/7/2018    FINDINGS: There is mild elevation of the right hemidiaphragm.  There has been improvement in but there are persistent bilateral  reticular interstitial changes. This may represent residual edema  or atypical pneumonia versus a chronic underlying interstitial  lung disease. Vascular calcification is noted in the aorta. Heart  is within normal limits for size.      Impression:       Continued bilateral interstitial opacities that may  all be chronic in nature but cannot exclude continued component  of edema or pneumonia.    Electronically signed by:  Nicholas Lawton  4/12/2018 3:08 AM  CDT Workstation:  [id number]     Arterial Doppler Lower Extremity Right [[id number]] Collected:  04/12/18 0155     Updated:  04/12/18 0255    Narrative:       Ultrasound right lower extremity arterial duplex exam on  4/12/2018    CLINICAL INDICATION: Right leg pain, cold foot    COMPARISON: CT angiogram from 9/19/2017    FINDINGS: Multiple sonographic images are obtained throughout the  right lower extremity arterial system. Color flow, gray scale and  spectral analysis are all performed. Decreased flow and  monophasic waveform is noted throughout the right superficial  femoral artery. There is a large amount of echogenic clot filling  the known dilated popliteal artery aneurysm that appears  essentially occluded. There is probable minimal flow noted in the  right peroneal artery and posterior tibial artery.      Impression:       Likely occlusion or near complete occlusion of the  popliteal artery aneurysm with decreased flow in the right  superficial femoral artery and right calf vessels likely related  to this. Dr. Ardon was made aware of this finding by myself by  phone on 4/12/2018 at 2:48 AM.    Electronically signed by:  Nicholas Lawton  4/12/2018 2:54 AM  CDT Workstation: [id number]     Venous Doppler Lower Extremity Right (duplex) [[id number]] Collected:  04/12/18 0155     Updated:  04/12/18 0252    Addenda:         ADDENDUM   ADDENDUM #1       Addendum: After reviewing the patient's arterial examination on  the same date and correlating with the patient's prior CT  angiogram from 9/19/2017 the following is noted: What was thought  to be clot within a dilated right popliteal vein is now felt to  be clot within a dilated known popliteal artery aneurysm. The  vein on the prior CT angiogram is a very small and not very  distinguishable from the large popliteal artery aneurysm and is  likely not visualized on this examination.    No definite evidence of deep venous thrombus is noted therefore.  Dr. Ardon was made  aware of this finding by myself by phone on  4/12/2018 at 2:48 AM.    Electronically signed by:  Nicholas Lawton  4/12/2018 2:51 AM  CDT Workstation: [id number]    Signed:  04/12/18 0251 by Osiel Lawton MD    Narrative:       Ultrasound right lower extremity venous duplex exam on 4/12/2018    CLINICAL INDICATION: Right leg pain    COMPARISON: None    FINDINGS: Multiple sonographic images are obtained from the right  groin through the right calf vessels. Color flow, compression and  spectral analysis are all performed. There is echogenic material  filling a dilated right popliteal vein that is noncompressible  consistent with acute deep venous thrombus. The remainder of the  deep veins of the lower extremity show complete compressibility  and good color flow.      Impression:       Positive deep venous thrombus in the right popliteal  vein. Dr. Ardon in the emergency Department was made aware of  this finding by myself by phone on 4/12/2018 at 2:44 AM.    Electronically signed by:  Nicholas Lawton  4/12/2018 2:45 AM  picoChipT Workstation: [id number]          I have reviewed the patient current medications.     Assessment/Plan     Hospital Problem List     * (Principal)Arterial occlusion, lower extremity    Atrial fibrillation [I48.91]    PVD (peripheral vascular disease)    Tobacco dependence syndrome    Overview Signed 11/28/2016 10:39 AM by Belkys Hoffman MD     2014 to E cigarette            Stroke    Overview Signed 11/28/2016 10:39 AM by Belkys Hoffman MD      Echo: L atrial appendage thrombus            Hypercholesterolemia    GERD (gastroesophageal reflux disease)    Hyperlipidemia    Atherosclerosis of artery of extremity with gangrene    AAA (abdominal aortic aneurysm) without rupture    CKD (chronic kidney disease) stage 3, GFR 30-59 ml/min    COPD (chronic obstructive pulmonary disease)    Hypertension          Plan: 78 y.o. male presents with PMH of AAA without rupture,  atrial fibrillation, currently in sinus rhythm, CK D stage III, COPD, GERD, hyperlipidemia, hypertension, peripheral vascular disease, stroke, tobacco dependence, severe peripheral vascular disease, Acute on chronic arterial occlusion at popliteal artery.    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  As described before, there is unclear chronicity of this occlusion and his exam suggests no eminent limb infarction.  Moreover, his renal function is acutely decompensated.  Will nurture renal function and repeat CT angiogram to determine course of action.  Continue antibiotics for bilateral pneumonia.  Some concern for mild sepsis given some hypotension at presentation and the presence of bilateral airspace disease.  Little sign of toxicity at this time.              Discharge Planning: I expect patient to be discharged to home in 3 days.    Rex Mckeon MD  04/12/18  4:20 PM

## 2018-04-12 NOTE — CONSULTS
[facility]  [address] [place] [address]. [id number]   - [phone]  F  [phone]     Consultation         PATIENT  DEMOGRAPHICS   PATIENT NAME: Massimo Bentley                      PHYSICIAN: Emelia Ho MD  : 1940  MRN: [id number]    Subjective   SUBJECTIVE   Referring Provider: Dr Saunders  Reason for Consultation: mata  History of present illness:     Mr. Bentley is a pleasant 78 year gentleman with a past medical history of chronic kidney disease stage III with baseline creatinine of 1.6-1.7.  He also has a history of AAA requiring surgery atrial fibrillation COPD peripheral vascular disease CVA who came in with the right leg Numbness weakness and feeling cold.  He also noticed pain and pallor.  This is going on for quite some time but last night was extremely severe and therefore he came to the ER.  On further workup he was found to have a occlusion of the popliteal artery with decreased flow in the right superficial femoral artery.  He also has DVT in the right popliteal vein Patient is currently on heparin drip.    We have been asked to evaluate for worsening creatinine which was up to 2.9 with overnight fluid it's down to 2.65.  He denies any history of kidney stones.  He is recently been on antibiotic amoxicillin after tooth extraction.  Also On antibiotic after the left femoral popliteal bypass surgery recently.    Past Medical History:   Diagnosis Date   • Abdominal bloating    • Anxiety    • Atherosclerosis of native arteries of extremity with intermittent claudication    • Atrial flutter    • Backache    • Benign prostatic hyperplasia     BX , also has renal cyst      • Chronic bronchitis    • Chronic renal impairment    • Depressive disorder    • Drug abuse, episodic use    • Dyslipidemia    • Fatigue    • GERD (gastroesophageal reflux disease)    • Hypercholesterolemia 03/15/2016   • Injury of tendon of rotator cuff 10/22/2015   • Insomnia 2016    • Intervertebral disc disorder 10/27/2011   • Major depression    • Neck pain 01/09/2012   • Osteoarthritis of multiple joints 06/21/2013   • Pain from vascular prosthetic devices, implants and grafts, sequela 12/03/2015   • Pain in left foot 10/27/2015   • Peripheral vascular disease 12/03/2015     LEFT fem-tib bypass, embolectomy 6/2014    10/27/2015    • Senile debility 10/22/2015   • 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 No coronary atherosclerosis. Normal left ventricular function   • CHOLECYSTECTOMY  08/25/2014    Cholecystitis with gangrenous gallbladder   • ENDOSCOPY  03/22/1990    Duodenal ulcer   • ENDOSCOPY AND COLONOSCOPY  10/17/2013    Diverticulum in sigmoid colon & descending colon. Internal & external hemorrhoids found   • ESOPHAGOSCOPY / EGD  10/17/2013    With tube-Esophagitis seen. Biopsy taken. Gastritis in stomach. Biopsy taken. Stenosis in 2nd portion of duodenum. Dilatation performed.   • FEMORAL THROMBECTOMY/EMBOLECTOMY  06/29/2014    Left lower extremity arteriogram. Left popliteal artery endarterectomy. Redo left femoral to [id number] trunk bypass. Negative pressure wound therapy with placement of Prevena wound V.A.C., left groin, left lower leg   • INJECTION OF MEDICATION  03/15/2016    B12   • INJECTION OF MEDICATION  10/23/2015    Drain/Inject Major Joint    • INJECTION OF MEDICATION  01/13/2016    Kenalog   • LUMBAR LAMINECTOMY     • TRANSESOPHAGEAL ECHOCARDIOGRAM (TEX)  10/06/2015    With color flow-Mild mitral regurg.Left atrium mild dilated.Ef of  55-60%.RV systolic pressure elevated.Trace tricuspid regurg   • TRANSESOPHAGEAL ECHOCARDIOGRAM (TEX)  02/14/2012    Without color flow-[facility] w/ early diast dysfun. AV trileaflet & structurally NRL w/ AR NRL. No regional wall motion abn Est Ef approx 50-55%. M & TR valves NRL w/ mild M & TR regurg Trace -mild pulmonic regurg. Anterior echo free space w/o hemodynamic signif. NRL RV   • TRANSESOPHAGEAL ECHOCARDIOGRAM (TEX)  03/25/2011    Normal LV systolic function with Ef of 50-55%.Left atrium ildly dilated.Moderate mitral regurg.Mitral valve regurg.Intact interatrial septum.No evidence of any cardiac thrombus or pericardial effusion.AV trileaflet     Family History   Problem Relation Age of Onset   • Osteoarthritis Mother      Lived to be 102   • Hypertension Mother    • ADD / ADHD Father    • Lung cancer Father    • ADD / ADHD Brother    • Rectal cancer Other      Social History   Substance Use Topics   • Smoking status: Current Some Day Smoker     Packs/day: 0.50     Years: 50.00     Types: Cigarettes   • Smokeless tobacco: Never Used      Comment: reduced;   • Alcohol use No      Comment: Former use      Allergies:  Morphine and related; Lipitor [atorvastatin]; and Lortab [hydrocodone-acetaminophen]     REVIEW OF SYSTEMS    Review of Systems   Constitutional: Negative for chills and fever.   Respiratory: Negative for chest tightness and shortness of breath.    Cardiovascular: Negative for chest pain and leg swelling.   Gastrointestinal: Negative for abdominal pain, diarrhea and nausea.   Genitourinary: Negative for dysuria, flank pain and hematuria.   Skin: Positive for color change and pallor.   Neurological: Positive for weakness and numbness. Negative for dizziness and syncope.       Objective   OBJECTIVE   Vital Signs  Temp:  [96.7 °F (35.9 °C)-98.1 °F (36.7 °C)] 97.3 °F (36.3 °C)  Heart Rate:  [53-73] 59  Resp:  [14-18] 18  BP: ()/(54-87) 124/67    Flowsheet Rows    Flowsheet Row First Filed Value  "  Admission Height 180.3 cm (71\") Documented at 04/11/2018 2319   Admission Weight 102 kg (225 lb) Documented at 04/11/2018 2319           I/O last 3 completed shifts:  In: 100 [IV Piggyback:100]  Out: -     PHYSICAL EXAM    Physical Exam   Constitutional: He is oriented to person, place, and time. He appears well-developed.   HENT:   Head: Normocephalic.   Eyes: Pupils are equal, round, and reactive to light.   Cardiovascular: Normal rate, regular rhythm and normal heart sounds.    Pulmonary/Chest: Effort normal and breath sounds normal.   Abdominal: Soft. Bowel sounds are normal.   Musculoskeletal: He exhibits edema.   Neurological: He is alert and oriented to person, place, and time.       RESULTS   Results Review:      Results from last 7 days  Lab Units 04/12/18  0530 04/12/18  0247 04/12/18  0246   SODIUM mmol/L 140  --  141   SODIUM, ARTERIAL mmol/L  --  137.5*  --    POTASSIUM mmol/L 4.1  --  4.5   CHLORIDE mmol/L 106  --  104   CO2 mmol/L 20.0*  --  25.0   BUN mg/dL 61*  --  62*   CREATININE mg/dL 2.65*  --  2.93*   CALCIUM mg/dL 8.4  --  8.7   BILIRUBIN mg/dL 0.6  --  0.4   ALK PHOS U/L 65  --  66   ALT (SGPT) U/L 21  --  16*   AST (SGOT) U/L 16*  --  32   GLUCOSE mg/dL 90  --  97   GLUCOSE, ARTERIAL mmol/L  --  101  --        Estimated Creatinine Clearance: 28.4 mL/min (by C-G formula based on SCr of 2.65 mg/dL (H)).                  Results from last 7 days  Lab Units 04/12/18  0530 04/12/18  0246   WBC 10*3/mm3 7.44 7.72   HEMOGLOBIN g/dL 10.5* 11.4*   PLATELETS 10*3/mm3 180 184         Results from last 7 days  Lab Units 04/12/18  0246 04/09/18   INR  1.48* 1.40*        MEDICATIONS      aspirin 81 mg Oral Daily   budesonide-formoterol 2 puff Inhalation BID - RT   ceftriaxone 1 g Intravenous Q24H   cilostazol 50 mg Oral BID AC   guaiFENesin 1,200 mg Oral Q12H   ipratropium-albuterol 3 mL Nebulization 4x Daily - RT   [START ON 4/14/2018] levoFLOXacin 750 mg Intravenous Q48H   levothyroxine 25 mcg Oral " Daily   magnesium oxide 800 mg Oral Nightly   montelukast 10 mg Oral Nightly   nitroglycerin 1 inch Topical Q8H   pramipexole 0.25 mg Oral TID   QUEtiapine 300 mg Oral Nightly   sodium chloride 500 mL Intravenous Once   [START ON 4/13/2018] sotalol 120 mg Oral Q24H       heparin (porcine) 14.7 Units/kg/hr Last Rate: 11.7 Units/kg/hr (04/12/18 1338)   sodium chloride 100 mL/hr Last Rate: 100 mL/hr (04/12/18 0557)     Prescriptions Prior to Admission   Medication Sig Dispense Refill Last Dose   • aspirin 81 MG EC tablet Take 81 mg by mouth daily.   4/11/2018 at Unknown time   • budesonide-formoterol (SYMBICORT) 160-4.5 MCG/ACT inhaler Inhale 2 puffs 2 (Two) Times a Day. 1 inhaler 12 4/11/2018 at Unknown time   • cilostazol (PLETAL) 50 MG tablet Take 1 tablet by mouth 2 (Two) Times a Day. 60 tablet 5 4/11/2018 at Unknown time   • furosemide (LASIX) 40 MG tablet Take 1 tablet by mouth Daily. 30 tablet 6 4/11/2018 at Unknown time   • guaiFENesin (MUCINEX) 600 MG 12 hr tablet Take 1,200 mg by mouth 2 (Two) Times a Day.   4/11/2018 at Unknown time   • levothyroxine (SYNTHROID, LEVOTHROID) 25 MCG tablet Take 25 mcg by mouth daily.   4/11/2018 at Unknown time   • magnesium oxide (MAGOX) 400 (241.3 MG) MG tablet tablet Take 800 mg by mouth Every Night.   4/11/2018 at Unknown time   • montelukast (SINGULAIR) 10 MG tablet Take 1 tablet by mouth Every Night. 90 tablet 2 4/11/2018 at Unknown time   • pramipexole (MIRAPEX) 0.25 MG tablet Take 1 tablet by mouth 3 (Three) Times a Day. 90 tablet 5 4/11/2018 at Unknown time   • QUEtiapine (SEROquel) 100 MG tablet take 3 tablets at bedtime  0 4/11/2018 at Unknown time   • sotalol (BETAPACE) 80 MG tablet Take 1.5 tablets by mouth 2 (Two) Times a Day. 270 tablet 2 4/11/2018 at Unknown time   • albuterol (PROVENTIL HFA;VENTOLIN HFA) 108 (90 Base) MCG/ACT inhaler Inhale 2 puffs Every 4 (Four) Hours As Needed for Wheezing. 1 inhaler 6 Taking   • warfarin (COUMADIN) 5 MG tablet TAKE 1 TABLET  BY MOUTH EVERY NIGHT OR AS DIRECTED BY COUMADIN CLINIC 30 tablet 0 Unknown at Unknown time     Assessment/Plan   ASSESSMENT / PLAN    Principal Problem:    Arterial occlusion, lower extremity  Active Problems:    Atrial fibrillation [I48.91]    PVD (peripheral vascular disease)    Tobacco dependence syndrome    Stroke    Hypercholesterolemia    GERD (gastroesophageal reflux disease)    Hyperlipidemia    Atherosclerosis of artery of extremity with gangrene    AAA (abdominal aortic aneurysm) without rupture    CKD (chronic kidney disease) stage 3, GFR 30-59 ml/min    COPD (chronic obstructive pulmonary disease)    Hypertension    1.chronic kidney disease stage III baseline creatinine close to 1.7 now acute kidney injury with creatinine up to 2.9.  This could be volume depletion and this has improved with IV fluid and I will continue the drip for now.  Patient ultrasound was reviewed from late last year and there is no hydronephrosis, he does have a simple cyst.  I will lower the Levaquin to every 48 hours.  Also sotalol is contraindicated with GFR below 40 for now I will lower it down to once a day.  Patient's Lasix is on hold at present.    2.history of atrial fibrillation    3.right popliteal artery occlusion with the numbness and cold extremity.  Patient is currently on heparin drip.  He also has a right popliteal vein DVT.  Vascular surgery is on board    4.history of tobacco use with severe peripheral vascular disease     5.possible bilateral pneumonia patient is currently on Rocephin and Levaquin.  I'll observe his respiratory status closely.    Thank you for the referral will continue follow the patient during the hospital stay         I discussed the patients findings and my recommendations with patient and family         This document has been electronically signed by Emelia Ho MD on April 12, 2018 5:38 PM

## 2018-04-12 NOTE — PROGRESS NOTES
Discharge Planning Assessment  [facility]     Patient Name: Massimo Bentley  MRN: [id number]  Today's Date: 4/12/2018    Admit Date: 4/12/2018          Discharge Needs Assessment     Row Name 04/12/18 0859       Living Environment    Lives With spouse    Current Living Arrangements home/apartment/condo    Primary Care Provided by self    Provides Primary Care For no one    Family Caregiver if Needed child(charlene), adult    Quality of Family Relationships helpful;supportive    Able to Return to Prior Arrangements yes    Living Arrangement Comments Pt resides at home with spouse. According to daughter her parents have caregivers that assist with needs 4 days a week including 2 nights.       Resource/Environmental Concerns    Resource/Environmental Concerns none       Transition Planning    Patient/Family Anticipates Transition to home with family    Patient/Family Anticipated Services at Transition home health care    Transportation Anticipated family or friend will provide       Discharge Needs Assessment    Concerns to be Addressed adjustment to diagnosis/illness    Equipment Currently Used at Home rollator;grab bar;shower chair;ramp    Discharge Facility/Level of Care Needs home with home health    Offered/Gave Vendor List yes    [facility]'s [facility] of Community Agency([facility]) [facility]    Current Discharge Risk chronically ill            Discharge Plan     Row Name 04/12/18 0902       Plan    Plan Comments LSW assesment complete. pt resides at home with spouse. pt has good support system. According to daughter parents have caregivers that assist with needs. pt used [facility] in March, no services currently. Goal is for pt to return home at d/c. pt may benefit from home health follow up. LSW awaiting additional recomendations from MD and therapy. LSW/case mgt will follow up as consulted and complete arrangements as ordered.        Destination     No service coordination in this encounter.      Durable Medical  Equipment     No service coordination in this encounter.      Dialysis/Infusion     No service coordination in this encounter.      Home Medical Care     No service coordination in this encounter.      Social Care     No service coordination in this encounter.                Demographic Summary     Row Name 04/12/18 0857       General Information    Admission Type inpatient    Referral Source high risk screening    Reason for Consult discharge planning    Preferred Language English     Used During This Interaction no       Contact Information    Contact Information Obtained for             Functional Status     Row Name 04/12/18 0858       Functional Status    Usual Activity Tolerance fair    Current Activity Tolerance poor       Functional Status, IADL    Medications independent   [facility]    Meal Preparation independent    Housekeeping assistive person    Laundry assistive person    Shopping assistive person       Mental Status Summary    Recent Changes in Mental Status/Cognitive Functioning no changes            Psychosocial    No documentation.           Abuse/Neglect    No documentation.           Legal    No documentation.           Substance Abuse    No documentation.           Patient Forms    No documentation.         WALTER Cam[place]

## 2018-04-12 NOTE — H&P
[address] [address]  T - [id number]     H&P         SUBJECTIVE:   Patient Care Team:  Belkys Hoffman MD as PCP - General  Gucci William Brownlee MD as PCP - Claims Attributed  Brian Pennington MD as Surgeon (Cardiothoracic Surgery)  Emily Thacker MD as Consulting Physician (Cardiology)  Devin Menendez DPM as Consulting Physician (Podiatry)    Chief Complaint:     Chief Complaint   Patient presents with   • Leg Pain   • Numbness       Patient is 78 y.o. male presents with PMH of AAA without rupture, atrial fibrillation, currently in sinus rhythm, CK D stage III, COPD, GERD, hyperlipidemia, hypertension, peripheral vascular disease, stroke, tobacco dependence, severe peripheral vascular disease, arterial occlusion, who presented to the ED with a complaint of right leg numbness, weakness, cold.  Patient state that this has been ongoing for past 2 days.  He is complaining of pain.  Patient was seen and evaluated in the ED, Dr. Reed was contacted from ED regarding patient's current condition.  He recommended to anticoagulate patient as this has been more than 4 hours.  Patient will be evaluated by vascular surgery.  Patient also complaining of having cough and congestion.  He appeared to be hypotensive in the ED.  He was found to have worsening of his creatinine function at 2.93.  His baseline is 1.6-1.7.       HPI     ROS/HISTORY/ CURRENT MEDICATIONS/OBJECTIVE/VS/PE:   Review of Systems:   Review of Systems   Constitutional: Positive for activity change, appetite change, chills and fatigue. Negative for fever.   HENT: Positive for congestion. Negative for rhinorrhea.    Respiratory: Negative for apnea, cough, chest tightness, shortness of breath and wheezing.    Cardiovascular: Positive for leg swelling. Negative for chest pain and palpitations.   Gastrointestinal: Negative for abdominal pain, anal bleeding, blood in stool, constipation, nausea  and vomiting.   Endocrine: Negative for cold intolerance and heat intolerance.   Genitourinary: Negative for dysuria.   Musculoskeletal: Negative for back pain, gait problem and joint swelling.   Skin: Positive for pallor.   Neurological: Negative for dizziness and light-headedness.   Psychiatric/Behavioral: Negative for agitation, behavioral problems and confusion.       History:     Past Medical History:   Diagnosis Date   • Abdominal bloating    • Anxiety    • Atherosclerosis of native arteries of extremity with intermittent claudication    • Atrial flutter    • Backache    • Benign prostatic hyperplasia     BX 2009, also has renal cyst      • Chronic bronchitis    • Chronic renal impairment    • Depressive disorder    • Drug abuse, episodic use    • Dyslipidemia    • Fatigue    • GERD (gastroesophageal reflux disease)    • Hypercholesterolemia 03/15/2016   • Injury of tendon of rotator cuff 10/22/2015   • Insomnia 06/24/2016   • Intervertebral disc disorder 10/27/2011   • Major depression    • Neck pain 01/09/2012   • Osteoarthritis of multiple joints 06/21/2013   • Pain from vascular prosthetic devices, implants and grafts, sequela 12/03/2015   • Pain in left foot 10/27/2015   • Peripheral vascular disease 12/03/2015     LEFT fem-tib bypass, embolectomy 6/2014    10/27/2015    • Senile debility 10/22/2015   • 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 No coronary atherosclerosis. Normal left ventricular function   • CHOLECYSTECTOMY  08/25/2014    Cholecystitis with gangrenous gallbladder    • ENDOSCOPY  03/22/1990    Duodenal ulcer   • ENDOSCOPY AND COLONOSCOPY  10/17/2013    Diverticulum in sigmoid colon & descending colon. Internal & external hemorrhoids found   • ESOPHAGOSCOPY / EGD  10/17/2013    With tube-Esophagitis seen. Biopsy taken. Gastritis in stomach. Biopsy taken. Stenosis in 2nd portion of duodenum. Dilatation performed.   • FEMORAL THROMBECTOMY/EMBOLECTOMY  06/29/2014    Left lower extremity arteriogram. Left popliteal artery endarterectomy. Redo left femoral to [id number] trunk bypass. Negative pressure wound therapy with placement of Prevena wound V.A.C., left groin, left lower leg   • INJECTION OF MEDICATION  03/15/2016    B12   • INJECTION OF MEDICATION  10/23/2015    Drain/Inject Major Joint    • INJECTION OF MEDICATION  01/13/2016    Kenalog   • LUMBAR LAMINECTOMY     • TRANSESOPHAGEAL ECHOCARDIOGRAM (TEX)  10/06/2015    With color flow-Mild mitral regurg.Left atrium mild dilated.Ef of 55-60%.RV systolic pressure elevated.Trace tricuspid regurg   • TRANSESOPHAGEAL ECHOCARDIOGRAM (TEX)  02/14/2012    Without color flow-[facility] w/ early diast dysfun. AV trileaflet & structurally NRL w/ AR NRL. No regional wall motion abn Est Ef approx 50-55%. M & TR valves NRL w/ mild M & TR regurg Trace -mild pulmonic regurg. Anterior echo free space w/o hemodynamic signif. NRL RV   • TRANSESOPHAGEAL ECHOCARDIOGRAM (TEX)  03/25/2011    Normal LV systolic function with Ef of 50-55%.Left atrium ildly dilated.Moderate mitral regurg.Mitral valve regurg.Intact interatrial septum.No evidence of any cardiac thrombus or pericardial effusion.AV trileaflet     Family History   Problem Relation Age of Onset   • Osteoarthritis Mother      Lived to be 102   • Hypertension Mother    • ADD / ADHD Father    • Lung cancer Father    • ADD / ADHD Brother    • Rectal cancer Other      Social History   Substance Use Topics   • Smoking status: Current Some Day Smoker     Packs/day: 0.50     Years: 50.00     Types:  Cigarettes   • Smokeless tobacco: Never Used      Comment: reduced;   • Alcohol use No      Comment: Former use      Prescriptions Prior to Admission   Medication Sig Dispense Refill Last Dose   • aspirin 81 MG EC tablet Take 81 mg by mouth daily.   4/11/2018 at Unknown time   • budesonide-formoterol (SYMBICORT) 160-4.5 MCG/ACT inhaler Inhale 2 puffs 2 (Two) Times a Day. 1 inhaler 12 4/11/2018 at Unknown time   • cilostazol (PLETAL) 50 MG tablet Take 1 tablet by mouth 2 (Two) Times a Day. 60 tablet 5 4/11/2018 at Unknown time   • furosemide (LASIX) 40 MG tablet Take 1 tablet by mouth Daily. 30 tablet 6 4/11/2018 at Unknown time   • guaiFENesin (MUCINEX) 600 MG 12 hr tablet Take 1,200 mg by mouth 2 (Two) Times a Day.   4/11/2018 at Unknown time   • levothyroxine (SYNTHROID, LEVOTHROID) 25 MCG tablet Take 25 mcg by mouth daily.   4/11/2018 at Unknown time   • magnesium oxide (MAGOX) 400 (241.3 MG) MG tablet tablet Take 800 mg by mouth Every Night.   4/11/2018 at Unknown time   • montelukast (SINGULAIR) 10 MG tablet Take 1 tablet by mouth Every Night. 90 tablet 2 4/11/2018 at Unknown time   • pramipexole (MIRAPEX) 0.25 MG tablet Take 1 tablet by mouth 3 (Three) Times a Day. 90 tablet 5 4/11/2018 at Unknown time   • QUEtiapine (SEROquel) 100 MG tablet take 3 tablets at bedtime  0 4/11/2018 at Unknown time   • sotalol (BETAPACE) 80 MG tablet Take 1.5 tablets by mouth 2 (Two) Times a Day. 270 tablet 2 4/11/2018 at Unknown time   • albuterol (PROVENTIL HFA;VENTOLIN HFA) 108 (90 Base) MCG/ACT inhaler Inhale 2 puffs Every 4 (Four) Hours As Needed for Wheezing. 1 inhaler 6 Taking   • warfarin (COUMADIN) 5 MG tablet TAKE 1 TABLET BY MOUTH EVERY NIGHT OR AS DIRECTED BY COUMADIN CLINIC 30 tablet 0 Unknown at Unknown time     Allergies:  Morphine and related; Lipitor [atorvastatin]; and Lortab [hydrocodone-acetaminophen]    Current Medications:     Current Facility-Administered Medications   Medication Dose Route Frequency  Provider Last Rate Last Dose   • albuterol (PROVENTIL) nebulizer solution 0.083% 2.5 mg/3mL  2.5 mg Nebulization Q4H PRN Fredy Espinoza MD       • aspirin EC tablet 81 mg  81 mg Oral Daily Fredy Espinoza MD       • budesonide-formoterol (SYMBICORT) 160-4.5 MCG/ACT inhaler 2 puff  2 puff Inhalation BID - RT Fredy Espinoza MD       • cefTRIAXone (ROCEPHIN) 1 g/100 mL 0.9% NS (MBP)  1 g Intravenous Q24H Fredy Espinoza MD       • cilostazol (PLETAL) tablet 50 mg  50 mg Oral BID AC Fredy Espinoza MD       • guaiFENesin (MUCINEX) 12 hr tablet 1,200 mg  1,200 mg Oral Q12H Fredy Espinoza MD       • heparin 17484 units/250 mL (100 units/mL) in 0.45 % NaCl infusion  14.7 Units/kg/hr Intravenous Titrated Shankar Ardon MD 14.99 mL/hr at 04/12/18 0314 14.7 Units/kg/hr at 04/12/18 0314    And   • heparin (porcine) 5000 UNIT/ML injection 8,200 Units  80 Units/kg Intravenous PRN Shankar Ardon MD        And   • heparin (porcine) 5000 UNIT/ML injection 4,100 Units  40 Units/kg Intravenous PRN Shankar Ardon MD       • HYDROmorphone (DILAUDID) injection 0.5 mg  0.5 mg Intravenous Q2H PRN Fredy Espinoza MD   0.5 mg at 04/12/18 0526    And   • naloxone (NARCAN) injection 0.4 mg  0.4 mg Intravenous Q5 Min PRN Fredy Espinoza MD       • ipratropium-albuterol (DUO-NEB) nebulizer solution 3 mL  3 mL Nebulization 4x Daily - RT Shankar Ardon MD   3 mL at 04/12/18 0253   • [START ON 4/13/2018] levoFLOXacin (LEVAQUIN) 750 mg/150 mL D5W (premix) (LEVAQUIN) 750 mg  750 mg Intravenous Q24H Fredy Espinoza MD       • levothyroxine (SYNTHROID, LEVOTHROID) tablet 25 mcg  25 mcg Oral Daily Fredy Espinoza MD       • magnesium oxide (MAGOX) tablet 800 mg  800 mg Oral Nightly Fredy Espinoza MD       • montelukast (SINGULAIR) tablet 10 mg  10 mg Oral Nightly Fredy Espinoza MD       • pramipexole (MIRAPEX) tablet 0.25 mg  0.25 mg Oral TID Fredy Espinoza MD       • QUEtiapine (SEROquel) tablet 300 mg  300 mg Oral Nightly Fredy Espinoza MD       • sodium chloride 0.9 %  bolus 500 mL  500 mL Intravenous Once Shankar Ardon  mL/hr at 04/12/18 0522 500 mL at 04/12/18 0522   • sodium chloride 0.9 % flush 1-10 mL  1-10 mL Intravenous PRN Fredy Espinoza MD   10 mL at 04/12/18 0526   • sodium chloride 0.9 % flush 10 mL  10 mL Intravenous PRN Shankar Ardon MD       • sodium chloride 0.9 % infusion  100 mL/hr Intravenous Continuous Fredy Espinoza MD       • sotalol (BETAPACE) tablet 120 mg  120 mg Oral Q12H Fredy Espinoza MD           Physical Exam:     Vital Sign Min/Max for last 24 hours  Temp  Min: 97.6 °F (36.4 °C)  Max: 98.1 °F (36.7 °C)   BP  Min: 93/54  Max: 120/69   Pulse  Min: 56  Max: 73   Resp  Min: 14  Max: 18   SpO2  Min: 86 %  Max: 100 %   Flow (L/min)  Min: 2  Max: 3   Weight  Min: 102 kg (224 lb 6.9 oz)  Max: 102 kg (225 lb)       Physical Exam:    Physical Exam   Constitutional: He is oriented to person, place, and time. He appears well-developed and well-nourished. He appears lethargic. He appears toxic. He has a sickly appearance. He appears ill.   HENT:   Head: Normocephalic.   Eyes: EOM are normal. Pupils are equal, round, and reactive to light.   Neck: Normal range of motion.   Cardiovascular: Normal rate, regular rhythm and normal heart sounds.    Pulses:       Dorsalis pedis pulses are 0 on the right side, and 2+ on the left side.        Posterior tibial pulses are 0 on the right side, and 2+ on the left side.   Pulmonary/Chest: Effort normal. He has decreased breath sounds. He has rhonchi. He has rales in the right lower field and the left lower field.   Abdominal: Soft. Bowel sounds are normal. He exhibits no distension. There is no tenderness. There is no guarding.   Musculoskeletal: Normal range of motion. He exhibits edema (2 + edema LLE).        Left ankle: He exhibits swelling.   Neurological: He is oriented to person, place, and time. He appears lethargic.   Vitals reviewed.       Results Review:   Lab Results (last 24 hours)     Procedure Component Value  Units Date/Time    Lactic Acid, Plasma [[id number]]  (Normal) Collected:  04/12/18 0355    Specimen:  Blood from Arm, Left Updated:  04/12/18 0431     Lactate 0.6 mmol/L     Protime-INR [[id number]]  (Abnormal) Collected:  04/12/18 0246    Specimen:  Blood Updated:  04/12/18 0311     Protime 17.5 (H) Seconds      INR 1.48 (H)    Narrative:       Therapeutic range for most indications is 2.0-3.0 INR,  or 2.5-3.5 for mechanical heart valves.    aPTT [[id number]]  (Abnormal) Collected:  04/12/18 0246    Specimen:  Blood Updated:  04/12/18 0311     PTT 49.6 (H) seconds     Narrative:       The recommended Heparin therapeutic range is 68-97 seconds.    Troponin [[id number]]  (Normal) Collected:  04/12/18 0246    Specimen:  Blood Updated:  04/12/18 0310     Troponin I <0.012 ng/mL     CK-MB [[id number]]  (Normal) Collected:  04/12/18 0246    Specimen:  Blood Updated:  04/12/18 0310     CKMB 3.28 ng/mL     Blood Gas, Arterial [[id number]]  (Abnormal) Collected:  04/12/18 0247    Specimen:  Arterial Blood Updated:  04/12/18 0308     Site --     Comment: Not performed at this site.        Naveen's Test --     Comment: Not performed at this site.        pH, Arterial 7.269 (L) pH units      pCO2, Arterial 46.9 (H) mm Hg      pO2, Arterial 94.9 mm Hg      HCO3, Arterial 21.0 (L) mmol/L      Base Excess, Arterial -5.8 (L) mmol/L      O2 Saturation, Arterial 96.3 %      Hemoglobin, Blood Gas 11.1 (L) g/dL      Hematocrit, Blood Gas 33.0 (L) %      CO2 Content 22.4 (L)     Sodium, Arterial 137.5 (L) mmol/L      Potassium, Arterial 4.27 mmol/L      Glucose, Arterial 101 mmol/L      Barometric Pressure for Blood Gas -- mmHg      Comment: Not performed at this site.        Modality --     Comment: Not performed at this site.        Ionized Calcium 4.60 mg/dL     Comprehensive Metabolic Panel [[id number]]  (Abnormal) Collected:  04/12/18 0246    Specimen:  Blood Updated:  04/12/18 0258     Glucose 97 mg/dL      BUN 62 (H) mg/dL       Creatinine 2.93 (H) mg/dL      Sodium 141 mmol/L      Potassium 4.5 mmol/L      Chloride 104 mmol/L      CO2 25.0 mmol/L      Calcium 8.7 mg/dL      Total Protein 7.1 g/dL      Albumin 3.30 (L) g/dL      ALT (SGPT) 16 (L) U/L      AST (SGOT) 32 U/L      Alkaline Phosphatase 66 U/L      Total Bilirubin 0.4 mg/dL      eGFR Non African Amer 21 (L) mL/min/1.73      Globulin 3.8 (H) gm/dL      A/G Ratio 0.9 (L) g/dL      BUN/Creatinine Ratio 21.2     Anion Gap 12.0 mmol/L     Narrative:       The MDRD GFR formula is only valid for adults with stable renal function between ages 18 and 70.    CK [[id number]]  (Normal) Collected:  04/12/18 0246    Specimen:  Blood Updated:  04/12/18 0258     Creatine Kinase 93 U/L     CBC & Differential [[id number]] Collected:  04/12/18 0246    Specimen:  Blood Updated:  04/12/18 0249    Narrative:       The following orders were created for panel order CBC & Differential.  Procedure                               Abnormality         Status                     ---------                               -----------         ------                     CBC Auto Differential[[id number]]        Abnormal            Final result                 Please view results for these tests on the individual orders.    CBC Auto Differential [[id number]]  (Abnormal) Collected:  04/12/18 0246    Specimen:  Blood Updated:  04/12/18 0249     WBC 7.72 10*3/mm3      RBC 4.06 (L) 10*6/mm3      Hemoglobin 11.4 (L) g/dL      Hematocrit 33.8 (L) %      MCV 83.3 fL      MCH 28.1 pg      MCHC 33.7 g/dL      RDW 16.5 (H) %      RDW-SD 50.3 (H) fl      MPV 9.7 fL      Platelets 184 10*3/mm3      Neutrophil % 70.0 %      Lymphocyte % 16.5 %      Monocyte % 8.0 %      Eosinophil % 5.1 %      Basophil % 0.1 %      Immature Grans % 0.3 %      Neutrophils, Absolute 5.41 10*3/mm3      Lymphocytes, Absolute 1.27 10*3/mm3      Monocytes, Absolute 0.62 10*3/mm3      Eosinophils, Absolute 0.39 10*3/mm3      Basophils, Absolute 0.01 10*3/mm3       Immature Grans, Absolute 0.02 10*3/mm3               Imaging Results (last 24 hours)     Procedure Component Value Units Date/Time    XR Chest 1 View [[id number]] Collected:  04/12/18 0245     Updated:  04/12/18 0310    Narrative:         Chest single view on  4/12/2018     CLINICAL INDICATION: Shortness of breath    COMPARISON: 3/7/2018    FINDINGS: There is mild elevation of the right hemidiaphragm.  There has been improvement in but there are persistent bilateral  reticular interstitial changes. This may represent residual edema  or atypical pneumonia versus a chronic underlying interstitial  lung disease. Vascular calcification is noted in the aorta. Heart  is within normal limits for size.      Impression:       Continued bilateral interstitial opacities that may  all be chronic in nature but cannot exclude continued component  of edema or pneumonia.    Electronically signed by:  Nicholas Lawton  4/12/2018 3:08 AM  CDT Workstation: [id number]    US Arterial Doppler Lower Extremity Right [[id number]] Collected:  04/12/18 0155     Updated:  04/12/18 0255    Narrative:       Ultrasound right lower extremity arterial duplex exam on  4/12/2018    CLINICAL INDICATION: Right leg pain, cold foot    COMPARISON: CT angiogram from 9/19/2017    FINDINGS: Multiple sonographic images are obtained throughout the  right lower extremity arterial system. Color flow, gray scale and  spectral analysis are all performed. Decreased flow and  monophasic waveform is noted throughout the right superficial  femoral artery. There is a large amount of echogenic clot filling  the known dilated popliteal artery aneurysm that appears  essentially occluded. There is probable minimal flow noted in the  right peroneal artery and posterior tibial artery.      Impression:       Likely occlusion or near complete occlusion of the  popliteal artery aneurysm with decreased flow in the right  superficial femoral artery and right calf vessels  likely related  to this. Dr. Ardon was made aware of this finding by myself by  phone on 4/12/2018 at 2:48 AM.    Electronically signed by:  Nicholas Lawton  4/12/2018 2:54 AM  CDT Workstation: [id number]    US Venous Doppler Lower Extremity Right (duplex) [[id number]] Collected:  04/12/18 0155     Updated:  04/12/18 0252    Addenda:         ADDENDUM   ADDENDUM #1       Addendum: After reviewing the patient's arterial examination on  the same date and correlating with the patient's prior CT  angiogram from 9/19/2017 the following is noted: What was thought  to be clot within a dilated right popliteal vein is now felt to  be clot within a dilated known popliteal artery aneurysm. The  vein on the prior CT angiogram is a very small and not very  distinguishable from the large popliteal artery aneurysm and is  likely not visualized on this examination.    No definite evidence of deep venous thrombus is noted therefore.  Dr. Ardon was made aware of this finding by myself by phone on  4/12/2018 at 2:48 AM.    Electronically signed by:  Nicholas Lawton  4/12/2018 2:51 AM  [employer] Workstation: [id number]    Signed:  04/12/18 0251 by Osiel Lawton MD    Narrative:       Ultrasound right lower extremity venous duplex exam on 4/12/2018    CLINICAL INDICATION: Right leg pain    COMPARISON: None    FINDINGS: Multiple sonographic images are obtained from the right  groin through the right calf vessels. Color flow, compression and  spectral analysis are all performed. There is echogenic material  filling a dilated right popliteal vein that is noncompressible  consistent with acute deep venous thrombus. The remainder of the  deep veins of the lower extremity show complete compressibility  and good color flow.      Impression:       Positive deep venous thrombus in the right popliteal  vein. Dr. Ardon in the emergency Department was made aware of  this finding by myself by phone on 4/12/2018 at 2:44  AM.    Electronically signed by:  Nicholas Lawton  4/12/2018 2:45 AM  CDT Workstation: [id number]           I reviewed the patient's new clinical results.  I reviewed the patient's new imaging results and agree with the interpretation.     ASSESSMENT/PLAN:   Assessment/Plan   Active Hospital Problems (** Indicates Principal Problem)    Diagnosis Date Noted   • **Arterial occlusion, 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] 04/12/2018   • AAA (abdominal aortic aneurysm) without rupture [I71.4] 11/20/2017   • COPD (chronic obstructive pulmonary disease) [J44.9] 11/20/2017   • Hypertension [I10] 11/20/2017   • CKD (chronic kidney disease) stage 3, GFR 30-59 ml/min [N18.3] 11/01/2017   • Atherosclerosis of artery of extremity with gangrene [I70.269] 09/11/2017   • Hyperlipidemia [E78.5] 02/21/2017   • GERD (gastroesophageal reflux disease) [K21.9]    • Stroke [I63.9]       Echo: L atrial appendage thrombus        • PVD (peripheral vascular disease) [I73.9] 09/21/2016   • Atrial fibrillation [I48.91] [I48.91] 08/09/2016   • Hypercholesterolemia [E78.00] 03/15/2016   • Tobacco dependence syndrome [F17.200] 12/03/2015     2014 to E cigarette           Resolved Hospital Problems    Diagnosis Date Noted Date Resolved   No resolved problems to display.     1.Occlusion of right popliteal artery: Case was discussed with Dr. Reed while patient was in the ER regarding his current condition.  He recommended for patient to be anticoagulated.  Unable to obtain any study with contrast given the worsening of creatinine function.  Dr. Reed is aware of patient admission agree to see the patient.  2.  Acute on chronic kidney injury: Baseline creatinine is 1.7 has increased to 2.93.  We'll consult Dr. Ho for further recommendation.  We'll start patient on IV fluid.  3.  Bilateral pneumonia: We'll start patient on Rocephin and Levaquin.  Will continue monitor his response to the antibiotic.  4.  Atrial fibrillation: Currently in normal sinus  rhythm.  Coumadin is on hold patient is on heparin.  We'll continue monitor.  5.  Severe peripheral vascular disease: Status post stent placement in the past.  We'll consult vascular surgery for further recommendation.  6.  Tobacco abuse: We'll provide patient with nicotine patch.  7.  Hyperlipidemia: Statin.    dvt ppx: heparin     I discussed the patients findings and my recommendations with patient, family, nursing staff and consulting provider.      Time: total time spent coordinating pt care 40 mins.         This document has been electronically signed by Fredy Espinoza MD on April 12, 2018 5:53 AM

## 2018-04-12 NOTE — PAYOR COMM NOTE
"Denys Parrish (78 y.o. Male)     Date of Birth Social Security Number Address Home Phone MRN    1940  [id number]  [facility] [address] [phone] [id number]    Gnosticism Marital Status          Jain        Admission Date Admission Type Admitting Provider Attending Provider Department, Room/Bed    4/12/18 Emergency Fredy Espinoza MD Ahmed, Abdul Quyyum, MD [facility] CRITICAL CARE, 12/A    Discharge Date Discharge Disposition Discharge Destination                       Attending Provider:  Chico Espinoza MD    Allergies:  Morphine And Related, Lipitor [Atorvastatin], Lortab [Hydrocodone-acetaminophen]    Isolation:  None   Infection:  None   Code Status:  FULL    Ht:  180.3 cm (71\")   Wt:  102 kg (224 lb 6.9 oz)    Admission Cmt:  None   Principal Problem:  Arterial occlusion, 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]                 Active Insurance as of 4/12/2018     Primary Coverage     Payor Plan Insurance Group Employer/Plan Group    ANTH MEDICARE REPLACEMENT ANTH MEDICARE ADVANTAGE [id number]     Payor Plan Address Payor Plan Phone Number Effective From Effective To    [address] [phone] 1/1/2017     [place] [address]       Subscriber Name Subscriber Birth Date Member ID       DENYS PARRISH 1940 [id number]                 Emergency Contacts      (Rel.) Home Phone Work Phone Mobile Phone    Jennifer Goncalves (Daughter) [phone] -- [phone]               History & Physical      Fredy Espinoza MD at 4/12/2018  4:12 AM           [address] [address]  T - [phone]     H&P         SUBJECTIVE:   Patient Care Team:  Belkys Hoffman MD as PCP - General  Gucci Brownlee MD as PCP - Claims Attributed  Brian Pennington MD as Surgeon (Cardiothoracic Surgery)  Emily Thacker MD as Consulting Physician (Cardiology)  Devin Menendez DPM as " Consulting Physician (Podiatry)    Chief Complaint:     Chief Complaint   Patient presents with   • Leg Pain   • Numbness       Patient is 78 y.o. male presents with PMH of AAA without rupture, atrial fibrillation, currently in sinus rhythm, CK D stage III, COPD, GERD, hyperlipidemia, hypertension, peripheral vascular disease, stroke, tobacco dependence, severe peripheral vascular disease, arterial occlusion, who presented to the ED with a complaint of right leg numbness, weakness, cold.  Patient state that this has been ongoing for past 2 days.  He is complaining of pain.  Patient was seen and evaluated in the ED, Dr. Reed was contacted from ED regarding patient's current condition.  He recommended to anticoagulate patient as this has been more than 4 hours.  Patient will be evaluated by vascular surgery.  Patient also complaining of having cough and congestion.  He appeared to be hypotensive in the ED.  He was found to have worsening of his creatinine function at 2.93.  His baseline is 1.6-1.7.       HPI     ROS/HISTORY/ CURRENT MEDICATIONS/OBJECTIVE/VS/PE:   Review of Systems:   Review of Systems   Constitutional: Positive for activity change, appetite change, chills and fatigue. Negative for fever.   HENT: Positive for congestion. Negative for rhinorrhea.    Respiratory: Negative for apnea, cough, chest tightness, shortness of breath and wheezing.    Cardiovascular: Positive for leg swelling. Negative for chest pain and palpitations.   Gastrointestinal: Negative for abdominal pain, anal bleeding, blood in stool, constipation, nausea and vomiting.   Endocrine: Negative for cold intolerance and heat intolerance.   Genitourinary: Negative for dysuria.   Musculoskeletal: Negative for back pain, gait problem and joint swelling.   Skin: Positive for pallor.   Neurological: Negative for dizziness and light-headedness.   Psychiatric/Behavioral: Negative for agitation, behavioral problems and confusion.       History:      Past Medical History:   Diagnosis Date   • Abdominal bloating    • Anxiety    • Atherosclerosis of native arteries of extremity with intermittent claudication    • Atrial flutter    • Backache    • Benign prostatic hyperplasia     BX 2009, also has renal cyst      • Chronic bronchitis    • Chronic renal impairment    • Depressive disorder    • Drug abuse, episodic use    • Dyslipidemia    • Fatigue    • GERD (gastroesophageal reflux disease)    • Hypercholesterolemia 03/15/2016   • Injury of tendon of rotator cuff 10/22/2015   • Insomnia 06/24/2016   • Intervertebral disc disorder 10/27/2011   • Major depression    • Neck pain 01/09/2012   • Osteoarthritis of multiple joints 06/21/2013   • Pain from vascular prosthetic devices, implants and grafts, sequela 12/03/2015   • Pain in left foot 10/27/2015   • Peripheral vascular disease 12/03/2015     LEFT fem-tib bypass, embolectomy 6/2014    10/27/2015    • Senile debility 10/22/2015   • 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 No coronary atherosclerosis. Normal left ventricular function   • CHOLECYSTECTOMY  08/25/2014    Cholecystitis with gangrenous gallbladder   • ENDOSCOPY  03/22/1990    Duodenal ulcer   • ENDOSCOPY AND COLONOSCOPY  10/17/2013    Diverticulum in sigmoid colon & descending colon. Internal & external hemorrhoids found   • ESOPHAGOSCOPY / EGD  10/17/2013    With tube-Esophagitis seen. Biopsy taken. Gastritis in stomach. Biopsy taken. Stenosis in 2nd portion of duodenum. Dilatation performed.   • FEMORAL  THROMBECTOMY/EMBOLECTOMY  06/29/2014    Left lower extremity arteriogram. Left popliteal artery endarterectomy. Redo left femoral to [id number] trunk bypass. Negative pressure wound therapy with placement of Prevena wound V.A.C., left groin, left lower leg   • INJECTION OF MEDICATION  03/15/2016    B12   • INJECTION OF MEDICATION  10/23/2015    Drain/Inject Major Joint    • INJECTION OF MEDICATION  01/13/2016    Kenalog   • LUMBAR LAMINECTOMY     • TRANSESOPHAGEAL ECHOCARDIOGRAM (TEX)  10/06/2015    With color flow-Mild mitral regurg.Left atrium mild dilated.Ef of 55-60%.RV systolic pressure elevated.Trace tricuspid regurg   • TRANSESOPHAGEAL ECHOCARDIOGRAM (TEX)  02/14/2012    Without color flow-[facility] w/ early diast dysfun. AV trileaflet & structurally NRL w/ AR NRL. No regional wall motion abn Est Ef approx 50-55%. M & TR valves NRL w/ mild M & TR regurg Trace -mild pulmonic regurg. Anterior echo free space w/o hemodynamic signif. NRL RV   • TRANSESOPHAGEAL ECHOCARDIOGRAM (TEX)  03/25/2011    Normal LV systolic function with Ef of 50-55%.Left atrium ildly dilated.Moderate mitral regurg.Mitral valve regurg.Intact interatrial septum.No evidence of any cardiac thrombus or pericardial effusion.AV trileaflet     Family History   Problem Relation Age of Onset   • Osteoarthritis Mother      Lived to be 89   • Hypertension Mother    • ADD / ADHD Father    • Lung cancer Father    • ADD / ADHD Brother    • Rectal cancer Other      Social History   Substance Use Topics   • Smoking status: Current Some Day Smoker     Packs/day: 0.50     Years: 50.00     Types: Cigarettes   • Smokeless tobacco: Never Used      Comment: reduced;   • Alcohol use No      Comment: Former use      Prescriptions Prior to Admission   Medication Sig Dispense Refill Last Dose   • aspirin 81 MG EC tablet Take 81 mg by mouth daily.   4/11/2018 at Unknown time   • budesonide-formoterol (SYMBICORT) 160-4.5 MCG/ACT inhaler Inhale 2 puffs 2 (Two) Times a  Day. 1 inhaler 12 4/11/2018 at Unknown time   • cilostazol (PLETAL) 50 MG tablet Take 1 tablet by mouth 2 (Two) Times a Day. 60 tablet 5 4/11/2018 at Unknown time   • furosemide (LASIX) 40 MG tablet Take 1 tablet by mouth Daily. 30 tablet 6 4/11/2018 at Unknown time   • guaiFENesin (MUCINEX) 600 MG 12 hr tablet Take 1,200 mg by mouth 2 (Two) Times a Day.   4/11/2018 at Unknown time   • levothyroxine (SYNTHROID, LEVOTHROID) 25 MCG tablet Take 25 mcg by mouth daily.   4/11/2018 at Unknown time   • magnesium oxide (MAGOX) 400 (241.3 MG) MG tablet tablet Take 800 mg by mouth Every Night.   4/11/2018 at Unknown time   • montelukast (SINGULAIR) 10 MG tablet Take 1 tablet by mouth Every Night. 90 tablet 2 4/11/2018 at Unknown time   • pramipexole (MIRAPEX) 0.25 MG tablet Take 1 tablet by mouth 3 (Three) Times a Day. 90 tablet 5 4/11/2018 at Unknown time   • QUEtiapine (SEROquel) 100 MG tablet take 3 tablets at bedtime  0 4/11/2018 at Unknown time   • sotalol (BETAPACE) 80 MG tablet Take 1.5 tablets by mouth 2 (Two) Times a Day. 270 tablet 2 4/11/2018 at Unknown time   • albuterol (PROVENTIL HFA;VENTOLIN HFA) 108 (90 Base) MCG/ACT inhaler Inhale 2 puffs Every 4 (Four) Hours As Needed for Wheezing. 1 inhaler 6 Taking   • warfarin (COUMADIN) 5 MG tablet TAKE 1 TABLET BY MOUTH EVERY NIGHT OR AS DIRECTED BY COUMADIN CLINIC 30 tablet 0 Unknown at Unknown time     Allergies:  Morphine and related; Lipitor [atorvastatin]; and Lortab [hydrocodone-acetaminophen]    Current Medications:     Current Facility-Administered Medications   Medication Dose Route Frequency Provider Last Rate Last Dose   • albuterol (PROVENTIL) nebulizer solution 0.083% 2.5 mg/3mL  2.5 mg Nebulization Q4H PRN Fredy Espinoza MD       • aspirin EC tablet 81 mg  81 mg Oral Daily Fredy Espinoza MD       • budesonide-formoterol (SYMBICORT) 160-4.5 MCG/ACT inhaler 2 puff  2 puff Inhalation BID - RT Fredy Espinoza MD       • cefTRIAXone (ROCEPHIN) 1 g/100 mL 0.9% NS  (MBP)  1 g Intravenous Q24H Fredy Espinoza MD       • cilostazol (PLETAL) tablet 50 mg  50 mg Oral BID AC Fredy Espinoza MD       • guaiFENesin (MUCINEX) 12 hr tablet 1,200 mg  1,200 mg Oral Q12H Fredy Espinoza MD       • heparin 06278 units/250 mL (100 units/mL) in 0.45 % NaCl infusion  14.7 Units/kg/hr Intravenous Titrated Shankar Ardon MD 14.99 mL/hr at 04/12/18 0314 14.7 Units/kg/hr at 04/12/18 0314    And   • heparin (porcine) 5000 UNIT/ML injection 8,200 Units  80 Units/kg Intravenous PRN Shankar Ardon MD        And   • heparin (porcine) 5000 UNIT/ML injection 4,100 Units  40 Units/kg Intravenous PRN Shankar Ardon MD       • HYDROmorphone (DILAUDID) injection 0.5 mg  0.5 mg Intravenous Q2H PRN Fredy Espinoza MD   0.5 mg at 04/12/18 0526    And   • naloxone (NARCAN) injection 0.4 mg  0.4 mg Intravenous Q5 Min PRN Fredy Espinoza MD       • ipratropium-albuterol (DUO-NEB) nebulizer solution 3 mL  3 mL Nebulization 4x Daily - RT Shankar Ardon MD   3 mL at 04/12/18 0253   • [START ON 4/13/2018] levoFLOXacin (LEVAQUIN) 750 mg/150 mL D5W (premix) (LEVAQUIN) 750 mg  750 mg Intravenous Q24H Fredy Espinoza MD       • levothyroxine (SYNTHROID, LEVOTHROID) tablet 25 mcg  25 mcg Oral Daily Fredy Espinoza MD       • magnesium oxide (MAGOX) tablet 800 mg  800 mg Oral Nightly Fredy Espinoza MD       • montelukast (SINGULAIR) tablet 10 mg  10 mg Oral Nightly Fredy Espinoza MD       • pramipexole (MIRAPEX) tablet 0.25 mg  0.25 mg Oral TID Fredy Espinoza MD       • QUEtiapine (SEROquel) tablet 300 mg  300 mg Oral Nightly Fredy Espinoza MD       • sodium chloride 0.9 % bolus 500 mL  500 mL Intravenous Once Shankar Ardon  mL/hr at 04/12/18 0522 500 mL at 04/12/18 0522   • sodium chloride 0.9 % flush 1-10 mL  1-10 mL Intravenous PRN Fredy Espinoza MD   10 mL at 04/12/18 0526   • sodium chloride 0.9 % flush 10 mL  10 mL Intravenous PRN Shankar Ardon MD       • sodium chloride 0.9 % infusion  100 mL/hr Intravenous Continuous Fredy  MD Alexis       • sotalol (BETAPACE) tablet 120 mg  120 mg Oral Q12H Fredy Espinoza MD           Physical Exam:     Vital Sign Min/Max for last 24 hours  Temp  Min: 97.6 °F (36.4 °C)  Max: 98.1 °F (36.7 °C)   BP  Min: 93/54  Max: 120/69   Pulse  Min: 56  Max: 73   Resp  Min: 14  Max: 18   SpO2  Min: 86 %  Max: 100 %   Flow (L/min)  Min: 2  Max: 3   Weight  Min: 102 kg (224 lb 6.9 oz)  Max: 102 kg (225 lb)       Physical Exam:    Physical Exam   Constitutional: He is oriented to person, place, and time. He appears well-developed and well-nourished. He appears lethargic. He appears toxic. He has a sickly appearance. He appears ill.   HENT:   Head: Normocephalic.   Eyes: EOM are normal. Pupils are equal, round, and reactive to light.   Neck: Normal range of motion.   Cardiovascular: Normal rate, regular rhythm and normal heart sounds.    Pulses:       Dorsalis pedis pulses are 0 on the right side, and 2+ on the left side.        Posterior tibial pulses are 0 on the right side, and 2+ on the left side.   Pulmonary/Chest: Effort normal. He has decreased breath sounds. He has rhonchi. He has rales in the right lower field and the left lower field.   Abdominal: Soft. Bowel sounds are normal. He exhibits no distension. There is no tenderness. There is no guarding.   Musculoskeletal: Normal range of motion. He exhibits edema (2 + edema LLE).        Left ankle: He exhibits swelling.   Neurological: He is oriented to person, place, and time. He appears lethargic.   Vitals reviewed.       Results Review:   Lab Results (last 24 hours)     Procedure Component Value Units Date/Time    Lactic Acid, Plasma [[id number]]  (Normal) Collected:  04/12/18 0355    Specimen:  Blood from Arm, Left Updated:  04/12/18 0431     Lactate 0.6 mmol/L     Protime-INR [[id number]]  (Abnormal) Collected:  04/12/18 0246    Specimen:  Blood Updated:  04/12/18 0311     Protime 17.5 (H) Seconds      INR 1.48 (H)    Narrative:       Therapeutic range for most  indications is 2.0-3.0 INR,  or 2.5-3.5 for mechanical heart valves.    aPTT [[id number]]  (Abnormal) Collected:  04/12/18 0246    Specimen:  Blood Updated:  04/12/18 0311     PTT 49.6 (H) seconds     Narrative:       The recommended Heparin therapeutic range is 68-97 seconds.    Troponin [[id number]]  (Normal) Collected:  04/12/18 0246    Specimen:  Blood Updated:  04/12/18 0310     Troponin I <0.012 ng/mL     CK-MB [[id number]]  (Normal) Collected:  04/12/18 0246    Specimen:  Blood Updated:  04/12/18 0310     CKMB 3.28 ng/mL     Blood Gas, Arterial [[id number]]  (Abnormal) Collected:  04/12/18 0247    Specimen:  Arterial Blood Updated:  04/12/18 0308     Site --     Comment: Not performed at this site.        Naveen's Test --     Comment: Not performed at this site.        pH, Arterial 7.269 (L) pH units      pCO2, Arterial 46.9 (H) mm Hg      pO2, Arterial 94.9 mm Hg      HCO3, Arterial 21.0 (L) mmol/L      Base Excess, Arterial -5.8 (L) mmol/L      O2 Saturation, Arterial 96.3 %      Hemoglobin, Blood Gas 11.1 (L) g/dL      Hematocrit, Blood Gas 33.0 (L) %      CO2 Content 22.4 (L)     Sodium, Arterial 137.5 (L) mmol/L      Potassium, Arterial 4.27 mmol/L      Glucose, Arterial 101 mmol/L      Barometric Pressure for Blood Gas -- mmHg      Comment: Not performed at this site.        Modality --     Comment: Not performed at this site.        Ionized Calcium 4.60 mg/dL     Comprehensive Metabolic Panel [[id number]]  (Abnormal) Collected:  04/12/18 0246    Specimen:  Blood Updated:  04/12/18 0258     Glucose 97 mg/dL      BUN 62 (H) mg/dL      Creatinine 2.93 (H) mg/dL      Sodium 141 mmol/L      Potassium 4.5 mmol/L      Chloride 104 mmol/L      CO2 25.0 mmol/L      Calcium 8.7 mg/dL      Total Protein 7.1 g/dL      Albumin 3.30 (L) g/dL      ALT (SGPT) 16 (L) U/L      AST (SGOT) 32 U/L      Alkaline Phosphatase 66 U/L      Total Bilirubin 0.4 mg/dL      eGFR Non African Amer 21 (L) mL/min/1.73      Globulin 3.8 (H)  gm/dL      A/G Ratio 0.9 (L) g/dL      BUN/Creatinine Ratio 21.2     Anion Gap 12.0 mmol/L     Narrative:       The MDRD GFR formula is only valid for adults with stable renal function between ages 18 and 70.    CK [[id number]]  (Normal) Collected:  04/12/18 0246    Specimen:  Blood Updated:  04/12/18 0258     Creatine Kinase 93 U/L     CBC & Differential [[id number]] Collected:  04/12/18 0246    Specimen:  Blood Updated:  04/12/18 0249    Narrative:       The following orders were created for panel order CBC & Differential.  Procedure                               Abnormality         Status                     ---------                               -----------         ------                     CBC Auto Differential[[id number]]        Abnormal            Final result                 Please view results for these tests on the individual orders.    CBC Auto Differential [[id number]]  (Abnormal) Collected:  04/12/18 0246    Specimen:  Blood Updated:  04/12/18 0249     WBC 7.72 10*3/mm3      RBC 4.06 (L) 10*6/mm3      Hemoglobin 11.4 (L) g/dL      Hematocrit 33.8 (L) %      MCV 83.3 fL      MCH 28.1 pg      MCHC 33.7 g/dL      RDW 16.5 (H) %      RDW-SD 50.3 (H) fl      MPV 9.7 fL      Platelets 184 10*3/mm3      Neutrophil % 70.0 %      Lymphocyte % 16.5 %      Monocyte % 8.0 %      Eosinophil % 5.1 %      Basophil % 0.1 %      Immature Grans % 0.3 %      Neutrophils, Absolute 5.41 10*3/mm3      Lymphocytes, Absolute 1.27 10*3/mm3      Monocytes, Absolute 0.62 10*3/mm3      Eosinophils, Absolute 0.39 10*3/mm3      Basophils, Absolute 0.01 10*3/mm3      Immature Grans, Absolute 0.02 10*3/mm3               Imaging Results (last 24 hours)     Procedure Component Value Units Date/Time    XR Chest 1 View [[id number]] Collected:  04/12/18 0245     Updated:  04/12/18 0310    Narrative:         Chest single view on  4/12/2018     CLINICAL INDICATION: Shortness of breath    COMPARISON: 3/7/2018    FINDINGS: There is mild elevation  of the right hemidiaphragm.  There has been improvement in but there are persistent bilateral  reticular interstitial changes. This may represent residual edema  or atypical pneumonia versus a chronic underlying interstitial  lung disease. Vascular calcification is noted in the aorta. Heart  is within normal limits for size.      Impression:       Continued bilateral interstitial opacities that may  all be chronic in nature but cannot exclude continued component  of edema or pneumonia.    Electronically signed by:  Nicholas Lawton  4/12/2018 3:08 AM  CDT Workstation: [id number]     Arterial Doppler Lower Extremity Right [[id number]] Collected:  04/12/18 0155     Updated:  04/12/18 0255    Narrative:       Ultrasound right lower extremity arterial duplex exam on  4/12/2018    CLINICAL INDICATION: Right leg pain, cold foot    COMPARISON: CT angiogram from 9/19/2017    FINDINGS: Multiple sonographic images are obtained throughout the  right lower extremity arterial system. Color flow, gray scale and  spectral analysis are all performed. Decreased flow and  monophasic waveform is noted throughout the right superficial  femoral artery. There is a large amount of echogenic clot filling  the known dilated popliteal artery aneurysm that appears  essentially occluded. There is probable minimal flow noted in the  right peroneal artery and posterior tibial artery.      Impression:       Likely occlusion or near complete occlusion of the  popliteal artery aneurysm with decreased flow in the right  superficial femoral artery and right calf vessels likely related  to this. Dr. Ardon was made aware of this finding by myself by  phone on 4/12/2018 at 2:48 AM.    Electronically signed by:  Nicholas Lawton  4/12/2018 2:54 AM  CDT Workstation: [id number]     Venous Doppler Lower Extremity Right (duplex) [[id number]] Collected:  04/12/18 0155     Updated:  04/12/18 0252    Addenda:         ADDENDUM   ADDENDUM #1        Addendum: After reviewing the patient's arterial examination on  the same date and correlating with the patient's prior CT  angiogram from 9/19/2017 the following is noted: What was thought  to be clot within a dilated right popliteal vein is now felt to  be clot within a dilated known popliteal artery aneurysm. The  vein on the prior CT angiogram is a very small and not very  distinguishable from the large popliteal artery aneurysm and is  likely not visualized on this examination.    No definite evidence of deep venous thrombus is noted therefore.  Dr. Ardon was made aware of this finding by myself by phone on  4/12/2018 at 2:48 AM.    Electronically signed by:  Nicholas Lawton  4/12/2018 2:51 AM  [employer] Workstation: [id number]    Signed:  04/12/18 0251 by Osiel Lawton MD    Narrative:       Ultrasound right lower extremity venous duplex exam on 4/12/2018    CLINICAL INDICATION: Right leg pain    COMPARISON: None    FINDINGS: Multiple sonographic images are obtained from the right  groin through the right calf vessels. Color flow, compression and  spectral analysis are all performed. There is echogenic material  filling a dilated right popliteal vein that is noncompressible  consistent with acute deep venous thrombus. The remainder of the  deep veins of the lower extremity show complete compressibility  and good color flow.      Impression:       Positive deep venous thrombus in the right popliteal  vein. Dr. Ardon in the emergency Department was made aware of  this finding by myself by phone on 4/12/2018 at 2:44 AM.    Electronically signed by:  Nicholas Lawton  4/12/2018 2:45 AM  [employer] Workstation: [id number]           I reviewed the patient's new clinical results.  I reviewed the patient's new imaging results and agree with the interpretation.     ASSESSMENT/PLAN:   Assessment/Plan   Active Hospital Problems (** Indicates Principal Problem)    Diagnosis Date Noted   • **Arterial occlusion,  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] 04/12/2018   • AAA (abdominal aortic aneurysm) without rupture [I71.4] 11/20/2017   • COPD (chronic obstructive pulmonary disease) [J44.9] 11/20/2017   • Hypertension [I10] 11/20/2017   • CKD (chronic kidney disease) stage 3, GFR 30-59 ml/min [N18.3] 11/01/2017   • Atherosclerosis of artery of extremity with gangrene [I70.269] 09/11/2017   • Hyperlipidemia [E78.5] 02/21/2017   • GERD (gastroesophageal reflux disease) [K21.9]    • Stroke [I63.9]       Echo: L atrial appendage thrombus        • PVD (peripheral vascular disease) [I73.9] 09/21/2016   • Atrial fibrillation [I48.91] [I48.91] 08/09/2016   • Hypercholesterolemia [E78.00] 03/15/2016   • Tobacco dependence syndrome [F17.200] 12/03/2015     2014 to E cigarette           Resolved Hospital Problems    Diagnosis Date Noted Date Resolved   No resolved problems to display.     1.Occlusion of right popliteal artery: Case was discussed with Dr. Reed while patient was in the ER regarding his current condition.  He recommended for patient to be anticoagulated.  Unable to obtain any study with contrast given the worsening of creatinine function.  Dr. Reed is aware of patient admission agree to see the patient.  2.  Acute on chronic kidney injury: Baseline creatinine is 1.7 has increased to 2.93.  We'll consult Dr. Ho for further recommendation.  We'll start patient on IV fluid.  3.  Bilateral pneumonia: We'll start patient on Rocephin and Levaquin.  Will continue monitor his response to the antibiotic.  4.  Atrial fibrillation: Currently in normal sinus rhythm.  Coumadin is on hold patient is on heparin.  We'll continue monitor.  5.  Severe peripheral vascular disease: Status post stent placement in the past.  We'll consult vascular surgery for further recommendation.  6.  Tobacco abuse: We'll provide patient with nicotine patch.  7.  Hyperlipidemia: Statin.    dvt ppx: heparin     I discussed the patients findings and my recommendations  with patient, family, nursing staff and consulting provider.      Time: total time spent coordinating pt care 40 mins.         This document has been electronically signed by Fredy Espinoza MD on April 12, 2018 5:53 AM                             Electronically signed by Fredy Espinoza MD at 4/12/2018  5:53 AM          Emergency Department Notes      Valerie Muir RN at 4/12/2018  1:47 AM        Patient going to ultrasound       Valerie Muir RN  04/12/18 0147      Electronically signed by Vaelrie Muir RN at 4/12/2018  1:47 AM     Shankar Ardon MD at 4/12/2018  3:34 AM          Subjective   77yo male pmh significant atrial flutter/pad/ckd/copd/aaa/htn/hyperlipidemia presents ED c/o 2d hx RLE 'heaviness' discomfort, becoming worse earlier today with increased pain/numbness RLE.  Pt notably vague historian.  ROS (+) wheezing/nonproductive cough.        Illness   Severity:  Moderate  Onset quality:  Gradual  Duration:  2 days  Chronicity:  New  Associated symptoms: cough, fatigue and wheezing        Review of Systems   Constitutional: Positive for fatigue.   HENT: Negative.    Respiratory: Positive for cough and wheezing.    Cardiovascular: Negative.    Gastrointestinal: Negative.    Musculoskeletal: Negative.    Skin: Positive for color change.   Neurological: Positive for weakness.   All other systems reviewed and are negative.      Past Medical History:   Diagnosis Date   • Abdominal bloating    • Anxiety    • Atherosclerosis of native arteries of extremity with intermittent claudication    • Atrial flutter    • Backache    • Benign prostatic hyperplasia     BX 2009, also has renal cyst      • Chronic bronchitis    • Chronic renal impairment    • Depressive disorder    • Drug abuse, episodic use    • Dyslipidemia    • Fatigue    • GERD (gastroesophageal reflux disease)    • Hypercholesterolemia 03/15/2016   • Injury of tendon of rotator cuff 10/22/2015   • Insomnia 06/24/2016   • Intervertebral disc disorder  10/27/2011   • Major depression    • Neck pain 01/09/2012   • Osteoarthritis of multiple joints 06/21/2013   • Pain from vascular prosthetic devices, implants and grafts, sequela 12/03/2015   • Pain in left foot 10/27/2015   • Peripheral vascular disease 12/03/2015     LEFT fem-tib bypass, embolectomy 6/2014    10/27/2015    • Senile debility 10/22/2015   • 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    weakness   • Shoulder pain    • Simple renal cyst 01/01/2011   • Stroke      Echo: L atrial appendage thrombus      • Suicide     at risk for   • Tobacco dependence syndrome 12/03/2015 2014 to E cigarette          Allergies   Allergen Reactions   • Lipitor [Atorvastatin]    • Lortab [Hydrocodone-Acetaminophen]    • Morphine And Related        Past Surgical History:   Procedure Laterality Date   • ABDOMINAL AORTIC ANEURYSM REPAIR  07/18/2011    Endovascular   • BACK SURGERY     • CARDIAC CATHETERIZATION  01/30/1984    Mixed dominant coronary arterial anatomy. No coronary atherosclerosis. Normal left ventricular function   • CHOLECYSTECTOMY  08/25/2014    Cholecystitis with gangrenous gallbladder   • ENDOSCOPY  03/22/1990    Duodenal ulcer   • ENDOSCOPY AND COLONOSCOPY  10/17/2013    Diverticulum in sigmoid colon & descending colon. Internal & external hemorrhoids found   • ESOPHAGOSCOPY / EGD  10/17/2013    With tube-Esophagitis seen. Biopsy taken. Gastritis in stomach. Biopsy taken. Stenosis in 2nd portion of duodenum. Dilatation performed.   • FEMORAL THROMBECTOMY/EMBOLECTOMY  06/29/2014    Left lower extremity arteriogram. Left popliteal artery endarterectomy. Redo left femoral to [id number] trunk bypass. Negative pressure wound therapy with placement of Prevena wound V.A.C., left groin, left lower leg   • INJECTION OF MEDICATION  03/15/2016    B12   • INJECTION OF MEDICATION  10/23/2015    Drain/Inject Major Joint    • INJECTION OF MEDICATION  01/13/2016    Kenalog   • LUMBAR LAMINECTOMY     • TRANSESOPHAGEAL  ECHOCARDIOGRAM (TEX)  10/06/2015    With color flow-Mild mitral regurg.Left atrium mild dilated.Ef of 55-60%.RV systolic pressure elevated.Trace tricuspid regurg   • TRANSESOPHAGEAL ECHOCARDIOGRAM (TEX)  02/14/2012    Without color flow-[facility] w/ early diast dysfun. AV trileaflet & structurally NRL w/ AR NRL. No regional wall motion abn Est Ef approx 50-55%. M & TR valves NRL w/ mild M & TR regurg Trace -mild pulmonic regurg. Anterior echo free space w/o hemodynamic signif. NRL RV   • TRANSESOPHAGEAL ECHOCARDIOGRAM (TEX)  03/25/2011    Normal LV systolic function with Ef of 50-55%.Left atrium ildly dilated.Moderate mitral regurg.Mitral valve regurg.Intact interatrial septum.No evidence of any cardiac thrombus or pericardial effusion.AV trileaflet       Family History   Problem Relation Age of Onset   • Osteoarthritis Mother      Lived to be 102   • Hypertension Mother    • ADD / ADHD Father    • Lung cancer Father    • ADD / ADHD Brother    • Rectal cancer Other        Social History     Social History   • Marital status:      Social History Main Topics   • Smoking status: Current Some Day Smoker     Packs/day: 0.50     Years: 50.00     Types: Cigarettes   • Smokeless tobacco: Never Used      Comment: reduced;   • Alcohol use No      Comment: Former use    • Drug use: No   • Sexual activity: Defer     Other Topics Concern   • Not on file           Objective   Physical Exam   Constitutional: He appears well-developed and well-nourished.   HENT:   Head: Normocephalic and atraumatic.   Mouth/Throat: Oropharynx is clear and moist.   Eyes: Pupils are equal, round, and reactive to light.   Neck: Neck supple.   Cardiovascular: Normal rate, regular rhythm, normal heart sounds and intact distal pulses.  Exam reveals no gallop and no friction rub.    No murmur heard.  Pulmonary/Chest: No respiratory distress. He has decreased breath sounds in the right lower field and the left lower field. He has wheezes. He has no  rhonchi. He has no rales.   Abdominal: Soft. He exhibits no mass. There is no tenderness. There is no rebound and no guarding.   Musculoskeletal:        Legs:  Neurological: GCS eye subscore is 3. GCS verbal subscore is 5. GCS motor subscore is 6.   Nursing note and vitals reviewed.      Procedures        ED Course  ED Course   Comment By Time   Dr. Reed consulted. Agrees with heparin infusion. No CTA at this time secondary to CHANDRIKA. Will follow inpatient. Shankar Ardon MD 04/12 0303      Labs Reviewed   COMPREHENSIVE METABOLIC PANEL - Abnormal; Notable for the following:        Result Value    BUN 62 (*)     Creatinine 2.93 (*)     Albumin 3.30 (*)     ALT (SGPT) 16 (*)     eGFR Non African Amer 21 (*)     Globulin 3.8 (*)     A/G Ratio 0.9 (*)     All other components within normal limits    Narrative:     The MDRD GFR formula is only valid for adults with stable renal function between ages 18 and 70.   BLOOD GAS, ARTERIAL - Abnormal; Notable for the following:     pH, Arterial 7.269 (*)     pCO2, Arterial 46.9 (*)     HCO3, Arterial 21.0 (*)     Base Excess, Arterial -5.8 (*)     Hemoglobin, Blood Gas 11.1 (*)     Hematocrit, Blood Gas 33.0 (*)     CO2 Content 22.4 (*)     Sodium, Arterial 137.5 (*)     All other components within normal limits   CBC WITH AUTO DIFFERENTIAL - Abnormal; Notable for the following:     RBC 4.06 (*)     Hemoglobin 11.4 (*)     Hematocrit 33.8 (*)     RDW 16.5 (*)     RDW-SD 50.3 (*)     All other components within normal limits   PROTIME-INR - Abnormal; Notable for the following:     Protime 17.5 (*)     INR 1.48 (*)     All other components within normal limits    Narrative:     Therapeutic range for most indications is 2.0-3.0 INR,  or 2.5-3.5 for mechanical heart valves.   APTT - Abnormal; Notable for the following:     PTT 49.6 (*)     All other components within normal limits    Narrative:     The recommended Heparin therapeutic range is 68-97 seconds.   TROPONIN (IN-HOUSE) -  Normal   CK - Normal   CK MB - Normal   PROTIME-INR   APTT   TROPONIN (IN-HOUSE)   LACTIC ACID, PLASMA   CBC AND DIFFERENTIAL    Narrative:     The following orders were created for panel order CBC & Differential.  Procedure                               Abnormality         Status                     ---------                               -----------         ------                     CBC Auto Differential[[id number]]        Abnormal            Final result                 Please view results for these tests on the individual orders.     Xr Chest 1 View    Result Date: 4/12/2018  Narrative: Chest single view on  4/12/2018 CLINICAL INDICATION: Shortness of breath COMPARISON: 3/7/2018 FINDINGS: There is mild elevation of the right hemidiaphragm. There has been improvement in but there are persistent bilateral reticular interstitial changes. This may represent residual edema or atypical pneumonia versus a chronic underlying interstitial lung disease. Vascular calcification is noted in the aorta. Heart is within normal limits for size.     Impression: Continued bilateral interstitial opacities that may all be chronic in nature but cannot exclude continued component of edema or pneumonia. Electronically signed by:  Nicholas Lawton  4/12/2018 3:08 AM CDT Workstation: [id number]    Us Arterial Doppler Lower Extremity Right    Result Date: 4/12/2018  Narrative: Ultrasound right lower extremity arterial duplex exam on 4/12/2018 CLINICAL INDICATION: Right leg pain, cold foot COMPARISON: CT angiogram from 9/19/2017 FINDINGS: Multiple sonographic images are obtained throughout the right lower extremity arterial system. Color flow, gray scale and spectral analysis are all performed. Decreased flow and monophasic waveform is noted throughout the right superficial femoral artery. There is a large amount of echogenic clot filling the known dilated popliteal artery aneurysm that appears essentially occluded. There is probable  minimal flow noted in the right peroneal artery and posterior tibial artery.     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 Dr. Ardon was made aware of this finding by myself by phone on 4/12/2018 at 2:48 AM. Electronically signed by:  Nicholas Lawton  4/12/2018 2:54 AM CDT Workstation: [id number]    Us Venous Doppler Lower Extremity Right (duplex)    Addendum Date: 4/12/2018 Addendum:    ADDENDUM ADDENDUM #1 Addendum: After reviewing the patient's arterial examination on the same date and correlating with the patient's prior CT angiogram from 9/19/2017 the following is noted: What was thought to be clot within a dilated right popliteal vein is now felt to be clot within a dilated known popliteal artery aneurysm. The vein on the prior CT angiogram is a very small and not very distinguishable from the large popliteal artery aneurysm and is likely not visualized on this examination. No definite evidence of deep venous thrombus is noted therefore. Dr. Ardon was made aware of this finding by myself by phone on 4/12/2018 at 2:48 AM. Electronically signed by:  Nicholas Lawton  4/12/2018 2:51 AM CDT Workstation: [id number]     Result Date: 4/12/2018  Narrative: Ultrasound right lower extremity venous duplex exam on 4/12/2018 CLINICAL INDICATION: Right leg pain COMPARISON: None FINDINGS: Multiple sonographic images are obtained from the right groin through the right calf vessels. Color flow, compression and spectral analysis are all performed. There is echogenic material filling a dilated right popliteal vein that is noncompressible consistent with acute deep venous thrombus. The remainder of the deep veins of the lower extremity show complete compressibility and good color flow.     Impression: Positive deep venous thrombus in the right popliteal vein.   "Duglas in the emergency Department was made aware of this finding by myself by phone on 4/12/2018 at 2:44 AM. Electronically signed by:  Nicholas Lawton  4/12/2018 2:45 AM CDT Workstation: [id number]                MDM    Final diagnoses:   Arterial occlusion, lower extremity   CHANDRIKA (acute kidney injury)            Shankar Ardon MD  04/12/18 0338      Electronically signed by Shankar Ardon MD at 4/12/2018  3:38 AM       Hospital Medications (active)       Dose Frequency Start End    albuterol (PROVENTIL) nebulizer solution 0.083% 2.5 mg/3mL 2.5 mg Every 4 Hours PRN 4/12/2018     Sig - Route: Take 2.5 mg by nebulization Every 4 (Four) Hours As Needed for Shortness of Air. - Nebulization    aspirin EC tablet 81 mg 81 mg Daily 4/12/2018     Sig - Route: Take 1 tablet by mouth Daily. - Oral    budesonide-formoterol (SYMBICORT) 160-4.5 MCG/ACT inhaler 2 puff 2 puff 2 Times Daily - RT 4/12/2018     Sig - Route: Inhale 2 puffs 2 (Two) Times a Day. - Inhalation    cefTRIAXone (ROCEPHIN) 1 g/100 mL 0.9% NS (MBP) 1 g Every 24 Hours 4/12/2018 4/22/2018    Sig - Route: Infuse 100 mL into a venous catheter Daily. - Intravenous    Linked Group 1:  \"And\" Linked Group Details        cilostazol (PLETAL) tablet 50 mg 50 mg 2 Times Daily Before Meals 4/12/2018     Sig - Route: Take 0.5 tablets by mouth 2 (Two) Times a Day Before Meals. - Oral    guaiFENesin (MUCINEX) 12 hr tablet 1,200 mg 1,200 mg Every 12 Hours Scheduled 4/12/2018     Sig - Route: Take 2 tablets by mouth Every 12 (Twelve) Hours. - Oral    heparin (porcine) 5000 UNIT/ML injection 4,100 Units 40 Units/kg × 102 kg As Needed 4/12/2018     Sig - Route: Infuse 0.82 mL into a venous catheter As Needed (Per Heparin Nomogram - PTT 35-45). - Intravenous    Linked Group 2:  \"And\" Linked Group Details        heparin (porcine) 5000 UNIT/ML injection 8,200 Units 80 Units/kg × 102 kg Once 4/12/2018 4/12/2018    Sig - Route: Infuse 1.64 mL into a venous catheter 1 (One) Time. " "- Intravenous    Linked Group 2:  \"And\" Linked Group Details        heparin (porcine) 5000 UNIT/ML injection 8,200 Units 80 Units/kg × 102 kg As Needed 4/12/2018     Sig - Route: Infuse 1.64 mL into a venous catheter As Needed (Per Heparin Nomogram - PTT Less Than 35). - Intravenous    Linked Group 2:  \"And\" Linked Group Details        heparin 82482 units/250 mL (100 units/mL) in 0.45 % NaCl infusion 14.7 Units/kg/hr × 102 kg Titrated 4/12/2018     Sig - Route: Infuse 1,499 Units/hr into a venous catheter Dose Adjusted By Provider As Needed. - Intravenous    Linked Group 2:  \"And\" Linked Group Details        HYDROmorphone (DILAUDID) injection 0.5 mg 0.5 mg Every 2 Hours PRN 4/12/2018 4/22/2018    Sig - Route: Infuse 0.5 mL into a venous catheter Every 2 (Two) Hours As Needed for Severe Pain . - Intravenous    Linked Group 3:  \"And\" Linked Group Details        ipratropium-albuterol (DUO-NEB) nebulizer solution 3 mL 3 mL 4 Times Daily - RT 4/12/2018     Sig - Route: Take 3 mL by nebulization 4 (Four) Times a Day. - Nebulization    levoFLOXacin (LEVAQUIN) 500 mg/100 mL D5W (premix) (LEVAQUIN) 500 mg 500 mg Once 4/12/2018 4/12/2018    Sig - Route: Infuse 100 mL into a venous catheter 1 (One) Time. - Intravenous    levoFLOXacin (LEVAQUIN) 750 mg/150 mL D5W (premix) (LEVAQUIN) 750 mg 750 mg Every 24 Hours 4/13/2018 4/18/2018    Sig - Route: Infuse 150 mL into a venous catheter Daily. - Intravenous    levothyroxine (SYNTHROID, LEVOTHROID) tablet 25 mcg 25 mcg Daily 4/12/2018     Sig - Route: Take 1 tablet by mouth Daily. - Oral    magnesium oxide (MAGOX) tablet 800 mg 800 mg Nightly 4/12/2018     Sig - Route: Take 2 tablets by mouth Every Night. - Oral    montelukast (SINGULAIR) tablet 10 mg 10 mg Nightly 4/12/2018     Sig - Route: Take 1 tablet by mouth Every Night. - Oral    naloxone (NARCAN) injection 0.4 mg 0.4 mg Every 5 Minutes PRN 4/12/2018     Sig - Route: Infuse 1 mL into a venous catheter Every 5 (Five) Minutes " "As Needed for Respiratory Depression. - Intravenous    Linked Group 3:  \"And\" Linked Group Details        nitroglycerin (NITROSTAT) ointment 1 inch 1 inch Every 8 Hours Scheduled 4/12/2018     Sig - Route: Apply 1 inch topically Every 8 (Eight) Hours. - Topical    pramipexole (MIRAPEX) tablet 0.25 mg 0.25 mg 3 Times Daily 4/12/2018     Sig - Route: Take 1 tablet by mouth 3 (Three) Times a Day. - Oral    QUEtiapine (SEROquel) tablet 300 mg 300 mg Nightly 4/12/2018     Sig - Route: Take 1 tablet by mouth Every Night. - Oral    sodium chloride 0.9 % bolus 500 mL 500 mL Once 4/12/2018 4/12/2018    Sig - Route: Infuse 500 mL into a venous catheter 1 (One) Time. - Intravenous    sodium chloride 0.9 % bolus 500 mL 500 mL Once 4/12/2018     Sig - Route: Infuse 500 mL into a venous catheter 1 (One) Time. - Intravenous    sodium chloride 0.9 % flush 1-10 mL 1-10 mL As Needed 4/12/2018     Sig - Route: Infuse 1-10 mL into a venous catheter As Needed for Line Care. - Intravenous    sodium chloride 0.9 % flush 10 mL 10 mL As Needed 4/12/2018     Sig - Route: Infuse 10 mL into a venous catheter As Needed for Line Care. - Intravenous    Linked Group 4:  \"And\" Linked Group Details        sodium chloride 0.9 % infusion 100 mL/hr Continuous 4/12/2018     Sig - Route: Infuse 100 mL/hr into a venous catheter Continuous. - Intravenous    sotalol (BETAPACE) tablet 120 mg 120 mg Every 12 Hours Scheduled 4/12/2018     Sig - Route: Take 1 tablet by mouth Every 12 (Twelve) Hours. - Oral    albuterol (PROVENTIL HFA;VENTOLIN HFA) inhaler 2 puff (Discontinued) 2 puff Every 4 Hours PRN 4/12/2018 4/12/2018    Sig - Route: Inhale 2 puffs Every 4 (Four) Hours As Needed for Wheezing. - Inhalation    Reason for Discontinue: Formulary change    levoFLOXacin (LEVAQUIN) 750 mg/150 mL D5W (premix) (LEVAQUIN) 750 MG/150ML  - ADS Override Pull (Discontinued)   4/12/2018 4/12/2018    Notes to Pharmacy: LIBORIO DIAZ: cabinet override    Reason for " "Discontinue: Returned to ADS    levoFLOXacin (LEVAQUIN) 750 mg/150 mL D5W (premix) (LEVAQUIN) 750 mg (Discontinued) 750 mg Every 24 Hours 4/12/2018 4/12/2018    Sig - Route: Infuse 150 mL into a venous catheter Daily. - Intravenous    Reason for Discontinue: Formulary change    Linked Group 1:  \"And\" Linked Group Details        sodium chloride 0.9 % infusion (Discontinued) 125 mL/hr Continuous 4/12/2018 4/12/2018    Sig - Route: Infuse 125 mL/hr into a venous catheter Continuous. - Intravenous    Reason for Discontinue: Duplicate order            Lab Results (last 24 hours)     Procedure Component Value Units Date/Time    aPTT [[id number]] Collected:  04/12/18 0931    Specimen:  Blood Updated:  04/12/18 0947    Respiratory Panel, PCR - Swab, Nasopharynx [[id number]]  (Normal) Collected:  04/12/18 0739    Specimen:  Swab from Nasopharynx Updated:  04/12/18 0857     ADENOVIRUS, PCR Not Detected     Coronavirus 229E Not Detected     Coronavirus HKU1 Not Detected     Coronavirus NL63 Not Detected     Coronavirus OC43 Not Detected     Human Metapneumovirus Not Detected     Human Rhinovirus/Enterovirus Not Detected     Influenza B PCR Not Detected     Parainfluenza Virus 1 Not Detected     Parainfluenza Virus 2 Not Detected     Parainfluenza Virus 3 Not Detected     Parainfluenza Virus 4 Not Detected     Bordetella pertussis pcr Not Detected     Influenza A H1 2009 PCR Not Detected     Chlamydophila pneumoniae PCR Not Detected     Mycoplasma pneumo by PCR Not Detected     Influenza A PCR Not Detected     Influenza A H3 Not Detected     Influenza A H1 Not Detected     RSV, PCR Not Detected    Troponin [[id number]]  (Normal) Collected:  04/12/18 0726    Specimen:  Blood Updated:  04/12/18 0802     Troponin I <0.012 ng/mL     S. Pneumo Ag Urine or CSF - Urine, Urine, Clean Catch [[id number]]  (Normal) Collected:  04/12/18 0642    Specimen:  Urine from Urine, Clean Catch Updated:  04/12/18 0800     Strep Pneumo Ag Negative    " Legionella Antigen, Urine - Urine, Urine, Clean Catch [[id number]]  (Normal) Collected:  04/12/18 0642    Specimen:  Urine from Urine, Clean Catch Updated:  04/12/18 0759     LEGIONELLA ANTIGEN, URINE Negative    CBC Auto Differential [[id number]]  (Abnormal) Collected:  04/12/18 0530    Specimen:  Blood Updated:  04/12/18 0710     WBC 7.44 10*3/mm3      RBC 3.72 (L) 10*6/mm3      Hemoglobin 10.5 (L) g/dL      Hematocrit 30.6 (L) %      MCV 82.3 fL      MCH 28.2 pg      MCHC 34.3 g/dL      RDW 16.7 (H) %      RDW-SD 50.5 (H) fl      MPV 10.2 fL      Platelets 180 10*3/mm3      Neutrophil % 66.4 %      Lymphocyte % 16.9 %      Monocyte % 9.7 %      Eosinophil % 6.3 %      Basophil % 0.3 %      Immature Grans % 0.4 %      Neutrophils, Absolute 4.94 10*3/mm3      Lymphocytes, Absolute 1.26 10*3/mm3      Monocytes, Absolute 0.72 10*3/mm3      Eosinophils, Absolute 0.47 10*3/mm3      Basophils, Absolute 0.02 10*3/mm3      Immature Grans, Absolute 0.03 (H) 10*3/mm3     Comprehensive Metabolic Panel [[id number]]  (Abnormal) Collected:  04/12/18 0530    Specimen:  Blood Updated:  04/12/18 0615     Glucose 90 mg/dL      BUN 61 (H) mg/dL      Creatinine 2.65 (H) mg/dL      Sodium 140 mmol/L      Potassium 4.1 mmol/L      Chloride 106 mmol/L      CO2 20.0 (L) mmol/L      Calcium 8.4 mg/dL      Total Protein 6.6 g/dL      Albumin 3.10 (L) g/dL      ALT (SGPT) 21 U/L      AST (SGOT) 16 (L) U/L      Alkaline Phosphatase 65 U/L      Total Bilirubin 0.6 mg/dL      eGFR Non African Amer 24 (L) mL/min/1.73      Globulin 3.5 gm/dL      A/G Ratio 0.9 (L) g/dL      BUN/Creatinine Ratio 23.0     Anion Gap 14.0 mmol/L     Narrative:       The MDRD GFR formula is only valid for adults with stable renal function between ages 18 and 70.    Blood Culture - Blood, [[id number]] Collected:  04/12/18 0509    Specimen:  Blood from Arm, Left Updated:  04/12/18 0555    Blood Culture - Blood, [[id number]] Collected:  04/12/18 0530    Specimen:  Blood from  Arm, Left Updated:  04/12/18 0555    Lactic Acid, Plasma [[id number]]  (Normal) Collected:  04/12/18 0355    Specimen:  Blood from Arm, Left Updated:  04/12/18 0431     Lactate 0.6 mmol/L     Protime-INR [[id number]]  (Abnormal) Collected:  04/12/18 0246    Specimen:  Blood Updated:  04/12/18 0311     Protime 17.5 (H) Seconds      INR 1.48 (H)    Narrative:       Therapeutic range for most indications is 2.0-3.0 INR,  or 2.5-3.5 for mechanical heart valves.    aPTT [[id number]]  (Abnormal) Collected:  04/12/18 0246    Specimen:  Blood Updated:  04/12/18 0311     PTT 49.6 (H) seconds     Narrative:       The recommended Heparin therapeutic range is 68-97 seconds.    Troponin [[id number]]  (Normal) Collected:  04/12/18 0246    Specimen:  Blood Updated:  04/12/18 0310     Troponin I <0.012 ng/mL     CK-MB [[id number]]  (Normal) Collected:  04/12/18 0246    Specimen:  Blood Updated:  04/12/18 0310     CKMB 3.28 ng/mL     Blood Gas, Arterial [[id number]]  (Abnormal) Collected:  04/12/18 0247    Specimen:  Arterial Blood Updated:  04/12/18 0308     Site --     Comment: Not performed at this site.        Naveen's Test --     Comment: Not performed at this site.        pH, Arterial 7.269 (L) pH units      pCO2, Arterial 46.9 (H) mm Hg      pO2, Arterial 94.9 mm Hg      HCO3, Arterial 21.0 (L) mmol/L      Base Excess, Arterial -5.8 (L) mmol/L      O2 Saturation, Arterial 96.3 %      Hemoglobin, Blood Gas 11.1 (L) g/dL      Hematocrit, Blood Gas 33.0 (L) %      CO2 Content 22.4 (L)     Sodium, Arterial 137.5 (L) mmol/L      Potassium, Arterial 4.27 mmol/L      Glucose, Arterial 101 mmol/L      Barometric Pressure for Blood Gas -- mmHg      Comment: Not performed at this site.        Modality --     Comment: Not performed at this site.        Ionized Calcium 4.60 mg/dL     Comprehensive Metabolic Panel [[id number]]  (Abnormal) Collected:  04/12/18 0246    Specimen:  Blood Updated:  04/12/18 0258     Glucose 97 mg/dL      BUN 62 (H)  mg/dL      Creatinine 2.93 (H) mg/dL      Sodium 141 mmol/L      Potassium 4.5 mmol/L      Chloride 104 mmol/L      CO2 25.0 mmol/L      Calcium 8.7 mg/dL      Total Protein 7.1 g/dL      Albumin 3.30 (L) g/dL      ALT (SGPT) 16 (L) U/L      AST (SGOT) 32 U/L      Alkaline Phosphatase 66 U/L      Total Bilirubin 0.4 mg/dL      eGFR Non African Amer 21 (L) mL/min/1.73      Globulin 3.8 (H) gm/dL      A/G Ratio 0.9 (L) g/dL      BUN/Creatinine Ratio 21.2     Anion Gap 12.0 mmol/L     Narrative:       The MDRD GFR formula is only valid for adults with stable renal function between ages 18 and 70.    CK [[id number]]  (Normal) Collected:  04/12/18 0246    Specimen:  Blood Updated:  04/12/18 0258     Creatine Kinase 93 U/L     CBC & Differential [[id number]] Collected:  04/12/18 0246    Specimen:  Blood Updated:  04/12/18 0249    Narrative:       The following orders were created for panel order CBC & Differential.  Procedure                               Abnormality         Status                     ---------                               -----------         ------                     CBC Auto Differential[[id number]]        Abnormal            Final result                 Please view results for these tests on the individual orders.    CBC Auto Differential [[id number]]  (Abnormal) Collected:  04/12/18 0246    Specimen:  Blood Updated:  04/12/18 0249     WBC 7.72 10*3/mm3      RBC 4.06 (L) 10*6/mm3      Hemoglobin 11.4 (L) g/dL      Hematocrit 33.8 (L) %      MCV 83.3 fL      MCH 28.1 pg      MCHC 33.7 g/dL      RDW 16.5 (H) %      RDW-SD 50.3 (H) fl      MPV 9.7 fL      Platelets 184 10*3/mm3      Neutrophil % 70.0 %      Lymphocyte % 16.5 %      Monocyte % 8.0 %      Eosinophil % 5.1 %      Basophil % 0.1 %      Immature Grans % 0.3 %      Neutrophils, Absolute 5.41 10*3/mm3      Lymphocytes, Absolute 1.27 10*3/mm3      Monocytes, Absolute 0.62 10*3/mm3      Eosinophils, Absolute 0.39 10*3/mm3      Basophils, Absolute  0.01 10*3/mm3      Immature Grans, Absolute 0.02 10*3/mm3         Imaging Results (last 24 hours)     Procedure Component Value Units Date/Time    XR Chest 1 View [[id number]] Collected:  04/12/18 0245     Updated:  04/12/18 0310    Narrative:         Chest single view on  4/12/2018     CLINICAL INDICATION: Shortness of breath    COMPARISON: 3/7/2018    FINDINGS: There is mild elevation of the right hemidiaphragm.  There has been improvement in but there are persistent bilateral  reticular interstitial changes. This may represent residual edema  or atypical pneumonia versus a chronic underlying interstitial  lung disease. Vascular calcification is noted in the aorta. Heart  is within normal limits for size.      Impression:       Continued bilateral interstitial opacities that may  all be chronic in nature but cannot exclude continued component  of edema or pneumonia.    Electronically signed by:  Nicholas Lawton  4/12/2018 3:08 AM  CDT Workstation: [id number]    US Arterial Doppler Lower Extremity Right [[id number]] Collected:  04/12/18 0155     Updated:  04/12/18 0255    Narrative:       Ultrasound right lower extremity arterial duplex exam on  4/12/2018    CLINICAL INDICATION: Right leg pain, cold foot    COMPARISON: CT angiogram from 9/19/2017    FINDINGS: Multiple sonographic images are obtained throughout the  right lower extremity arterial system. Color flow, gray scale and  spectral analysis are all performed. Decreased flow and  monophasic waveform is noted throughout the right superficial  femoral artery. There is a large amount of echogenic clot filling  the known dilated popliteal artery aneurysm that appears  essentially occluded. There is probable minimal flow noted in the  right peroneal artery and posterior tibial artery.      Impression:       Likely occlusion or near complete occlusion of the  popliteal artery aneurysm with decreased flow in the right  superficial femoral artery and right calf  vessels likely related  to this. Dr. Ardon was made aware of this finding by myself by  phone on 4/12/2018 at 2:48 AM.    Electronically signed by:  Nicholas Lawton  4/12/2018 2:54 AM  CDT Workstation: [id number]    US Venous Doppler Lower Extremity Right (duplex) [[id number]] Collected:  04/12/18 0155     Updated:  04/12/18 0252    Addenda:         ADDENDUM   ADDENDUM #1       Addendum: After reviewing the patient's arterial examination on  the same date and correlating with the patient's prior CT  angiogram from 9/19/2017 the following is noted: What was thought  to be clot within a dilated right popliteal vein is now felt to  be clot within a dilated known popliteal artery aneurysm. The  vein on the prior CT angiogram is a very small and not very  distinguishable from the large popliteal artery aneurysm and is  likely not visualized on this examination.    No definite evidence of deep venous thrombus is noted therefore.  Dr. Ardon was made aware of this finding by myself by phone on  4/12/2018 at 2:48 AM.    Electronically signed by:  Nicholas Lawton  4/12/2018 2:51 AM  iTaggit[employer] Workstation: [id number]    Signed:  04/12/18 0251 by Osiel Lawton MD    Narrative:       Ultrasound right lower extremity venous duplex exam on 4/12/2018    CLINICAL INDICATION: Right leg pain    COMPARISON: None    FINDINGS: Multiple sonographic images are obtained from the right  groin through the right calf vessels. Color flow, compression and  spectral analysis are all performed. There is echogenic material  filling a dilated right popliteal vein that is noncompressible  consistent with acute deep venous thrombus. The remainder of the  deep veins of the lower extremity show complete compressibility  and good color flow.      Impression:       Positive deep venous thrombus in the right popliteal  vein. Dr. Ardon in the emergency Department was made aware of  this finding by myself by phone on 4/12/2018 at 2:44  AM.    Electronically signed by:  Nicholas Lawton  4/12/2018 2:45 AM  CDT Workstation: [id number]        ECG/EMG Results (last 24 hours)     Procedure Component Value Units Date/Time    ECG 12 Lead [[id number]] Collected:  04/12/18 0342     Updated:  04/12/18 0849    Narrative:       Test Reason : cad  Blood Pressure : **/** mmHG  Vent. Rate : 061 BPM     Atrial Rate : 061 BPM     P-R Int : 194 ms          QRS Dur : 100 ms      QT Int : 482 ms       P-R-T Axes : 037 -24 004 degrees     QTc Int : 485 ms    Normal sinus rhythm  Low voltage QRS  Septal infarct , age undetermined  Abnormal ECG    Confirmed by CIELO BAUTISTA MD ([id number]),  MILAGROS LUNA ([id number]) on  4/12/2018 8:48:55 AM    Referred By:             Confirmed By:CIELO BAUTISTA MD          Physician Progress Notes (last 24 hours) (Notes from 4/11/2018 11:21 AM through 4/12/2018 11:21 AM)     No notes of this type exist for this encounter.        Consult Notes (last 24 hours) (Notes from 4/11/2018 11:21 AM through 4/12/2018 11:21 AM)     No notes of this type exist for this encounter.

## 2018-04-13 LAB
ANION GAP SERPL CALCULATED.3IONS-SCNC: 11 MMOL/L (ref 5–15)
APTT PPP: 112.6 SECONDS (ref 20–40.3)
APTT PPP: 39.2 SECONDS (ref 20–40.3)
APTT PPP: 48.9 SECONDS (ref 20–40.3)
BUN BLD-MCNC: 49 MG/DL (ref 7–21)
BUN/CREAT SERPL: 22.7 (ref 7–25)
CALCIUM SPEC-SCNC: 8.1 MG/DL (ref 8.4–10.2)
CHLORIDE SERPL-SCNC: 107 MMOL/L (ref 95–110)
CO2 SERPL-SCNC: 24 MMOL/L (ref 22–31)
CREAT BLD-MCNC: 2.16 MG/DL (ref 0.7–1.3)
GFR SERPL CREATININE-BSD FRML MDRD: 30 ML/MIN/1.73 (ref 60–98)
GLUCOSE BLD-MCNC: 86 MG/DL (ref 60–100)
HANSEL STAIN: NEGATIVE
POTASSIUM BLD-SCNC: 4.5 MMOL/L (ref 3.5–5.1)
SODIUM BLD-SCNC: 142 MMOL/L (ref 137–145)

## 2018-04-13 PROCEDURE — 25010000002 CEFTRIAXONE PER 250 MG: Performed by: FAMILY MEDICINE

## 2018-04-13 PROCEDURE — 85730 THROMBOPLASTIN TIME PARTIAL: CPT | Performed by: FAMILY MEDICINE

## 2018-04-13 PROCEDURE — 80048 BASIC METABOLIC PNL TOTAL CA: CPT | Performed by: INTERNAL MEDICINE

## 2018-04-13 PROCEDURE — 94799 UNLISTED PULMONARY SVC/PX: CPT

## 2018-04-13 PROCEDURE — 94760 N-INVAS EAR/PLS OXIMETRY 1: CPT

## 2018-04-13 PROCEDURE — 25010000002 HEPARIN (PORCINE) PER 1000 UNITS: Performed by: FAMILY MEDICINE

## 2018-04-13 PROCEDURE — 85730 THROMBOPLASTIN TIME PARTIAL: CPT | Performed by: INTERNAL MEDICINE

## 2018-04-13 PROCEDURE — 25010000002 LORAZEPAM PER 2 MG: Performed by: FAMILY MEDICINE

## 2018-04-13 PROCEDURE — 25010000002 ZIPRASIDONE MESYLATE PER 10 MG: Performed by: INTERNAL MEDICINE

## 2018-04-13 RX ORDER — ZIPRASIDONE MESYLATE 20 MG/ML
10 INJECTION, POWDER, LYOPHILIZED, FOR SOLUTION INTRAMUSCULAR ONCE
Status: COMPLETED | OUTPATIENT
Start: 2018-04-13 | End: 2018-04-13

## 2018-04-13 RX ORDER — HYDROCODONE BITARTRATE AND ACETAMINOPHEN 7.5; 325 MG/1; MG/1
1 TABLET ORAL EVERY 6 HOURS PRN
Status: DISCONTINUED | OUTPATIENT
Start: 2018-04-13 | End: 2018-04-18 | Stop reason: HOSPADM

## 2018-04-13 RX ORDER — TRAMADOL HYDROCHLORIDE 50 MG/1
50 TABLET ORAL EVERY 6 HOURS PRN
Status: DISCONTINUED | OUTPATIENT
Start: 2018-04-13 | End: 2018-04-18 | Stop reason: HOSPADM

## 2018-04-13 RX ORDER — LORAZEPAM 0.5 MG/1
0.25 TABLET ORAL ONCE
Status: DISCONTINUED | OUTPATIENT
Start: 2018-04-13 | End: 2018-04-18 | Stop reason: HOSPADM

## 2018-04-13 RX ORDER — LORAZEPAM 2 MG/ML
0.25 INJECTION INTRAMUSCULAR ONCE
Status: COMPLETED | OUTPATIENT
Start: 2018-04-13 | End: 2018-04-13

## 2018-04-13 RX ORDER — LORAZEPAM 0.5 MG/1
0.25 TABLET ORAL ONCE
Status: DISCONTINUED | OUTPATIENT
Start: 2018-04-13 | End: 2018-04-13 | Stop reason: CLARIF

## 2018-04-13 RX ORDER — ZIPRASIDONE MESYLATE 20 MG/ML
20 INJECTION, POWDER, LYOPHILIZED, FOR SOLUTION INTRAMUSCULAR EVERY 12 HOURS PRN
Status: DISCONTINUED | OUTPATIENT
Start: 2018-04-13 | End: 2018-04-18 | Stop reason: HOSPADM

## 2018-04-13 RX ADMIN — HEPARIN SODIUM 4100 UNITS: 5000 INJECTION, SOLUTION INTRAVENOUS; SUBCUTANEOUS at 20:56

## 2018-04-13 RX ADMIN — MONTELUKAST SODIUM 10 MG: 10 TABLET, FILM COATED ORAL at 20:27

## 2018-04-13 RX ADMIN — IPRATROPIUM BROMIDE AND ALBUTEROL SULFATE 3 ML: 2.5; .5 SOLUTION RESPIRATORY (INHALATION) at 16:03

## 2018-04-13 RX ADMIN — SOTALOL HYDROCHLORIDE 120 MG: 120 TABLET ORAL at 08:35

## 2018-04-13 RX ADMIN — CILOSTAZOL 50 MG: 100 TABLET ORAL at 08:35

## 2018-04-13 RX ADMIN — LEVOTHYROXINE SODIUM 25 MCG: 25 TABLET ORAL at 08:35

## 2018-04-13 RX ADMIN — PRAMIPEXOLE DIHYDROCHLORIDE 0.25 MG: 0.25 TABLET ORAL at 20:27

## 2018-04-13 RX ADMIN — HEPARIN SODIUM 8.7 UNITS/KG/HR: 10000 INJECTION, SOLUTION INTRAVENOUS at 01:00

## 2018-04-13 RX ADMIN — ZIPRASIDONE MESYLATE 10 MG: 20 INJECTION, POWDER, LYOPHILIZED, FOR SOLUTION INTRAMUSCULAR at 10:08

## 2018-04-13 RX ADMIN — ZIPRASIDONE MESYLATE 10 MG: 20 INJECTION, POWDER, LYOPHILIZED, FOR SOLUTION INTRAMUSCULAR at 12:04

## 2018-04-13 RX ADMIN — CEFTRIAXONE 1 G: 1 INJECTION, POWDER, FOR SOLUTION INTRAMUSCULAR; INTRAVENOUS at 06:19

## 2018-04-13 RX ADMIN — MAGNESIUM OXIDE TAB 400 MG (241.3 MG ELEMENTAL MG) 800 MG: 400 (241.3 MG) TAB at 20:26

## 2018-04-13 RX ADMIN — GUAIFENESIN 1200 MG: 600 TABLET, EXTENDED RELEASE ORAL at 20:27

## 2018-04-13 RX ADMIN — BUDESONIDE AND FORMOTEROL FUMARATE DIHYDRATE 2 PUFF: 160; 4.5 AEROSOL RESPIRATORY (INHALATION) at 08:45

## 2018-04-13 RX ADMIN — NITROGLYCERIN 1 INCH: 20 OINTMENT TOPICAL at 22:00

## 2018-04-13 RX ADMIN — IPRATROPIUM BROMIDE AND ALBUTEROL SULFATE 3 ML: 2.5; .5 SOLUTION RESPIRATORY (INHALATION) at 11:52

## 2018-04-13 RX ADMIN — LORAZEPAM 0.25 MG: 2 INJECTION, SOLUTION INTRAMUSCULAR; INTRAVENOUS at 03:42

## 2018-04-13 RX ADMIN — NITROGLYCERIN 1 INCH: 20 OINTMENT TOPICAL at 05:35

## 2018-04-13 RX ADMIN — QUETIAPINE FUMARATE 300 MG: 300 TABLET ORAL at 20:27

## 2018-04-13 RX ADMIN — PRAMIPEXOLE DIHYDROCHLORIDE 0.25 MG: 0.25 TABLET ORAL at 16:35

## 2018-04-13 RX ADMIN — NITROGLYCERIN 1 INCH: 20 OINTMENT TOPICAL at 14:21

## 2018-04-13 RX ADMIN — PRAMIPEXOLE DIHYDROCHLORIDE 0.25 MG: 0.25 TABLET ORAL at 08:34

## 2018-04-13 RX ADMIN — IPRATROPIUM BROMIDE AND ALBUTEROL SULFATE 3 ML: 2.5; .5 SOLUTION RESPIRATORY (INHALATION) at 08:40

## 2018-04-13 RX ADMIN — BUDESONIDE AND FORMOTEROL FUMARATE DIHYDRATE 2 PUFF: 160; 4.5 AEROSOL RESPIRATORY (INHALATION) at 19:26

## 2018-04-13 RX ADMIN — CILOSTAZOL 50 MG: 100 TABLET ORAL at 16:35

## 2018-04-13 RX ADMIN — GUAIFENESIN 1200 MG: 600 TABLET, EXTENDED RELEASE ORAL at 08:34

## 2018-04-13 RX ADMIN — ASPIRIN 81 MG: 81 TABLET, COATED ORAL at 08:35

## 2018-04-13 RX ADMIN — IPRATROPIUM BROMIDE AND ALBUTEROL SULFATE 3 ML: 2.5; .5 SOLUTION RESPIRATORY (INHALATION) at 19:25

## 2018-04-13 NOTE — PROGRESS NOTES
"[facility]  [address] [place] [address]. [address]  T - [phone]  F - [phone]     Progress Note          PATIENT  DEMOGRAPHICS   PATIENT NAME: Massimo Bentley                      PHYSICIAN: Emelia Ho MD  : 1940  MRN: [id number]   LOS: 1 day    Patient Care Team:  Belkys Hoffman MD as PCP - General  Gucci Brownlee MD as PCP - Claims Attributed  Brian Moshe Pennington MD as Surgeon (Cardiothoracic Surgery)  Emily Thacker MD as Consulting Physician (Cardiology)  Devin Menendez DPM as Consulting Physician (Podiatry)  Subjective   SUBJECTIVE   soa agitated         Objective   OBJECTIVE   Vital Signs  Temp:  [96.2 °F (35.7 °C)-98.9 °F (37.2 °C)] 98 °F (36.7 °C)  Heart Rate:  [53-70] 65  Resp:  [16-18] 18  BP: ()/(57-87) 120/57    Flowsheet Rows    Flowsheet Row First Filed Value   Admission Height 180.3 cm (71\") Documented at 2018 2319   Admission Weight 102 kg (225 lb) Documented at 2018 2319           I/O last 3 completed shifts:  In: 1317 [P.O.:240; I.V.:877; IV Piggyback:200]  Out: 1085 [Urine:1085]    PHYSICAL EXAM    Physical Exam   Constitutional: He is oriented to person, place, and time. He appears well-developed.   HENT:   Head: Normocephalic.   Eyes: Pupils are equal, round, and reactive to light.   Cardiovascular: Normal rate, regular rhythm and normal heart sounds.    Pulmonary/Chest: Effort normal and breath sounds normal.   Abdominal: Soft. Bowel sounds are normal.   Neurological: He is alert and oriented to person, place, and time.       RESULTS   Results Review:      Results from last 7 days  Lab Units 18  0417 18  0530 18  0247 18  0246   SODIUM mmol/L 142 140  --  141   SODIUM, ARTERIAL mmol/L  --   --  137.5*  --    POTASSIUM mmol/L 4.5 4.1  --  4.5   CHLORIDE mmol/L 107 106  --  104   CO2 mmol/L 24.0 20.0*  --  25.0   BUN mg/dL 49* 61*  --  62*   CREATININE mg/dL 2.16* 2.65*  --  2.93* "   CALCIUM mg/dL 8.1* 8.4  --  8.7   BILIRUBIN mg/dL  --  0.6  --  0.4   ALK PHOS U/L  --  65  --  66   ALT (SGPT) U/L  --  21  --  16*   AST (SGOT) U/L  --  16*  --  32   GLUCOSE mg/dL 86 90  --  97   GLUCOSE, ARTERIAL mmol/L  --   --  101  --        Estimated Creatinine Clearance: 35 mL/min (by C-G formula based on SCr of 2.16 mg/dL (H)).                  Results from last 7 days  Lab Units 04/12/18  0530 04/12/18  0246   WBC 10*3/mm3 7.44 7.72   HEMOGLOBIN g/dL 10.5* 11.4*   PLATELETS 10*3/mm3 180 184         Results from last 7 days  Lab Units 04/12/18  0246 04/09/18   INR  1.48* 1.40*         Imaging Results (last 24 hours)     ** No results found for the last 24 hours. **           MEDICATIONS      aspirin 81 mg Oral Daily   budesonide-formoterol 2 puff Inhalation BID - RT   ceftriaxone 1 g Intravenous Q24H   cilostazol 50 mg Oral BID AC   guaiFENesin 1,200 mg Oral Q12H   ipratropium-albuterol 3 mL Nebulization 4x Daily - RT   [START ON 4/14/2018] levoFLOXacin 750 mg Intravenous Q48H   levothyroxine 25 mcg Oral Daily   LORazepam 0.25 mg Oral Once   magnesium oxide 800 mg Oral Nightly   montelukast 10 mg Oral Nightly   nitroglycerin 1 inch Topical Q8H   pramipexole 0.25 mg Oral TID   QUEtiapine 300 mg Oral Nightly   sodium chloride 500 mL Intravenous Once   sotalol 120 mg Oral Q24H       heparin (porcine) 5.7 Units/kg/hr Last Rate: 5.7 Units/kg/hr (04/13/18 0635)   sodium chloride 100 mL/hr Last Rate: Stopped (04/13/18 0241)       Assessment/Plan   ASSESSMENT / PLAN    Principal Problem:    Arterial occlusion, lower extremity  Active Problems:    Atrial fibrillation [I48.91]    PVD (peripheral vascular disease)    Tobacco dependence syndrome    Stroke    Hypercholesterolemia    GERD (gastroesophageal reflux disease)    Hyperlipidemia    Atherosclerosis of artery of extremity with gangrene    AAA (abdominal aortic aneurysm) without rupture    CKD (chronic kidney disease) stage 3, GFR 30-59 ml/min    COPD (chronic  obstructive pulmonary disease)    Hypertension    1.chronic kidney disease stage III baseline creatinine close to 1.7 now acute kidney injury with creatinine up to 2.9.  This could be volume depletion and this has improved with IV fluid and I will continue the drip for another day.  Patient ultrasound was reviewed from late last year and there is no hydronephrosis, he does have a simple cyst. Keep sotalol daily. Patient's Lasix is on hold at present.     2.history of atrial fibrillation     3.right popliteal artery occlusion with the numbness and cold extremity.  Patient is currently on heparin drip.  He also has a right popliteal vein DVT.  Vascular surgery is on board     4.history of tobacco use with severe peripheral vascular disease      5.possible bilateral pneumonia patient is currently on Rocephin and Levaquin.  I'll observe his respiratory status closely.                This document has been electronically signed by Emelia Ho MD on April 13, 2018 9:17 AM

## 2018-04-13 NOTE — PLAN OF CARE
Problem: Patient Care Overview  Goal: Plan of Care Review  Outcome: Ongoing (interventions implemented as appropriate)   04/13/18 1659   Coping/Psychosocial   Plan of Care Reviewed With patient;daughter;spouse   Plan of Care Review   Progress no change   OTHER   Outcome Summary Pt continues to be confused, oriented to person only. Pt tearful, anxious, and sad at times, Pt combative at times. Currently patient calm and cooperative, no restraints in use at this time. Heparin gtt continues, adjusting per protocol. Right leg continues to be cool, dusky, and no dopplerable pulses in right foot. Dr. Pennington consulted. Dr. Ho following for impaired kidney function, receiving IVF, medications reviewed. VSS.       04/13/18 1659   Coping/Psychosocial   Plan of Care Reviewed With patient;daughter;spouse   Plan of Care Review   Progress no change   OTHER   Outcome Summary Pt continues to be confused, oriented to person only. Pt tearful, anxious, and sad at times, Pt combative at times. Currently patient calm and cooperative, no restraints in use at this time. Heparin gtt continues, adjusting per protocol. Right leg continues to be cool, dusky, and no dopplerable pulses in right foot. Dr. Pennington consulted. Dr. Ho following for impaired kidney function, receiving IVF, medications reviewed. Wound therapy consult ordered, see wound charting. VSS.      Goal: Individualization and Mutuality  Outcome: Ongoing (interventions implemented as appropriate)    Goal: Discharge Needs Assessment  Outcome: Ongoing (interventions implemented as appropriate)    Goal: Interprofessional Rounds/Family Conf  Outcome: Ongoing (interventions implemented as appropriate)      Problem: Fall Risk (Adult)  Goal: Absence of Fall  Outcome: Ongoing (interventions implemented as appropriate)      Problem: VTE, DVT and PE (Adult)  Goal: Signs and Symptoms of Listed Potential Problems Will be Absent, Minimized or Managed (VTE, DVT and PE)  Outcome:  Ongoing (interventions implemented as appropriate)      Problem: Restraint, Nonbehavioral (Nonviolent)  Goal: Rationale and Justification  Outcome: Ongoing (interventions implemented as appropriate)    Goal: Nonbehavioral (Nonviolent) Restraint: Absence of Injury/Harm  Outcome: Ongoing (interventions implemented as appropriate)    Goal: Nonbehavioral (Nonviolent) Restraint: Achievement of Discontinuation Criteria  Outcome: Ongoing (interventions implemented as appropriate)    Goal: Nonbehavioral (Nonviolent) Restraint: Preservation of Dignity and Wellbeing  Outcome: Ongoing (interventions implemented as appropriate)

## 2018-04-13 NOTE — PROGRESS NOTES
[facility]  INPATIENT PROGRESS NOTE    Length of Stay: 1  Date of Admission: 4/12/2018  Primary Care Physician: Belkys Hoffman MD    Subjective   Chief Complaint: Right lower extreme pain  HPI:  Intermittent confusion overnight with little mention of right lower extreme pain.    Review of Systems   Unobtainable due to mental status       Objective    Temp:  [96.2 °F (35.7 °C)-98.9 °F (37.2 °C)] 98 °F (36.7 °C)  Heart Rate:  [54-84] 68  Resp:  [16-20] 20  BP: (108-160)/(57-85) 160/85    Physical Exam  Gen.:  Altered but follows commands; normal speech  HEENT: NCAT; oral and nasal mucosa without erythema,  exudate  Neck: No lymphadenopathy  Cardiovascular: Regular rate and rhythm, no S3.  Lung: Rales noted bilaterally, symmetric rise and fall of the chest  Abdomen: Nondistended, normal bowel sounds   Extremities: No clubbing cyanosis; noted edema.  MSK: No active synovitis  normal muscle bulk and tone  Skin: No rash or skin breakdown  Neuro: Spontaneous and purposeful movement ×4 extremities      Results Review:  I have reviewed the labs, radiology results, and diagnostic studies.    Laboratory Data:     Results from last 7 days  Lab Units 04/13/18  0417 04/12/18  0530 04/12/18  0247 04/12/18  0246   SODIUM mmol/L 142 140  --  141   SODIUM, ARTERIAL mmol/L  --   --  137.5*  --    POTASSIUM mmol/L 4.5 4.1  --  4.5   CHLORIDE mmol/L 107 106  --  104   CO2 mmol/L 24.0 20.0*  --  25.0   BUN mg/dL 49* 61*  --  62*   CREATININE mg/dL 2.16* 2.65*  --  2.93*   GLUCOSE mg/dL 86 90  --  97   GLUCOSE, ARTERIAL mmol/L  --   --  101  --    CALCIUM mg/dL 8.1* 8.4  --  8.7   BILIRUBIN mg/dL  --  0.6  --  0.4   ALK PHOS U/L  --  65  --  66   ALT (SGPT) U/L  --  21  --  16*   AST (SGOT) U/L  --  16*  --  32   ANION GAP mmol/L 11.0 14.0  --  12.0     Estimated Creatinine Clearance: 35 mL/min (by C-G formula based on SCr of 2.16 mg/dL (H)).            Results from last  7 days  Lab Units 04/12/18  0530 04/12/18  0246   WBC 10*3/mm3 7.44 7.72   HEMOGLOBIN g/dL 10.5* 11.4*   HEMATOCRIT % 30.6* 33.8*   PLATELETS 10*3/mm3 180 184       Results from last 7 days  Lab Units 04/12/18  0246 04/09/18   INR  1.48* 1.40*       Culture Data:   Blood Culture   Date Value Ref Range Status   04/12/2018 No growth at 24 hours  Preliminary   04/12/2018 No growth at 24 hours  Preliminary     No results found for: URINECX  No results found for: RESPCX  No results found for: WOUNDCX  No results found for: STOOLCX  No components found for: BODYFLD    Radiology Data:   Imaging Results (last 24 hours)     ** No results found for the last 24 hours. **          I have reviewed the patient current medications.     Assessment/Plan     Hospital Problem List     * (Principal)Arterial occlusion, lower extremity    Atrial fibrillation [I48.91]    PVD (peripheral vascular disease)    Tobacco dependence syndrome    Overview Signed 11/28/2016 10:39 AM by Belkys Hoffman MD     2014 to E cigarette            Stroke    Overview Signed 11/28/2016 10:39 AM by Belkys Hoffman MD      Echo: L atrial appendage thrombus            Hypercholesterolemia    GERD (gastroesophageal reflux disease)    Hyperlipidemia    Atherosclerosis of artery of extremity with gangrene    AAA (abdominal aortic aneurysm) without rupture    CKD (chronic kidney disease) stage 3, GFR 30-59 ml/min    COPD (chronic obstructive pulmonary disease)    Hypertension          Plan:   78 y.o. male presents with PMH of AAA without rupture, atrial fibrillation, currently in sinus rhythm, CK D stage III, COPD, GERD, hyperlipidemia, hypertension, peripheral vascular disease, stroke, tobacco dependence, severe peripheral vascular disease, Acute on chronic arterial occlusion at popliteal artery.     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  As described before, there is unclear chronicity of this occlusion and his exam suggests no eminent limb infarction.  Moreover, his renal function is acutely decompensated.  Will continue to nurture renal function and repeat CT angiogram to determine course of action.  Patient with some noted hypoxia and bilateral airspace disease still evident on exam.  But, no signs of toxicity.  Will discontinue Levaquin and continue Rocephin.       Discharge Planning: I expect patient to be discharged to home in for days.    Rex Mckeon MD  04/13/18  4:53 PM

## 2018-04-13 NOTE — PLAN OF CARE
Problem: Patient Care Overview  Goal: Plan of Care Review  Outcome: Ongoing (interventions implemented as appropriate)    Goal: Discharge Needs Assessment  Outcome: Ongoing (interventions implemented as appropriate)    Goal: Interprofessional Rounds/Family Conf  Outcome: Ongoing (interventions implemented as appropriate)      Problem: Fall Risk (Adult)  Goal: Absence of Fall  Outcome: Ongoing (interventions implemented as appropriate)      Problem: VTE, DVT and PE (Adult)  Goal: Signs and Symptoms of Listed Potential Problems Will be Absent, Minimized or Managed (VTE, DVT and PE)  Outcome: Ongoing (interventions implemented as appropriate)      Problem: Restraint, Nonbehavioral (Nonviolent)  Goal: Rationale and Justification  Outcome: Ongoing (interventions implemented as appropriate)    Goal: Nonbehavioral (Nonviolent) Restraint: Absence of Injury/Harm  Outcome: Ongoing (interventions implemented as appropriate)

## 2018-04-14 ENCOUNTER — APPOINTMENT (OUTPATIENT)
Dept: CT IMAGING | Facility: HOSPITAL | Age: 78
End: 2018-04-14

## 2018-04-14 LAB
ANION GAP SERPL CALCULATED.3IONS-SCNC: 12 MMOL/L (ref 5–15)
APTT PPP: 42.3 SECONDS (ref 20–40.3)
APTT PPP: 45.8 SECONDS (ref 20–40.3)
APTT PPP: 52.4 SECONDS (ref 20–40.3)
APTT PPP: 74.9 SECONDS (ref 20–40.3)
BUN BLD-MCNC: 33 MG/DL (ref 7–21)
BUN/CREAT SERPL: 21.9 (ref 7–25)
CALCIUM SPEC-SCNC: 8.5 MG/DL (ref 8.4–10.2)
CHLORIDE SERPL-SCNC: 113 MMOL/L (ref 95–110)
CK MB SERPL-CCNC: 4.52 NG/ML (ref 0–5)
CK SERPL-CCNC: 283 U/L (ref 55–170)
CO2 SERPL-SCNC: 20 MMOL/L (ref 22–31)
CREAT BLD-MCNC: 1.51 MG/DL (ref 0.7–1.3)
GFR SERPL CREATININE-BSD FRML MDRD: 45 ML/MIN/1.73 (ref 42–98)
GLUCOSE BLD-MCNC: 97 MG/DL (ref 60–100)
HOLD SPECIMEN: NORMAL
POTASSIUM BLD-SCNC: 4.1 MMOL/L (ref 3.5–5.1)
SODIUM BLD-SCNC: 145 MMOL/L (ref 137–145)
TROPONIN I SERPL-MCNC: <0.012 NG/ML
WHOLE BLOOD HOLD SPECIMEN: NORMAL

## 2018-04-14 PROCEDURE — 99232 SBSQ HOSP IP/OBS MODERATE 35: CPT | Performed by: THORACIC SURGERY (CARDIOTHORACIC VASCULAR SURGERY)

## 2018-04-14 PROCEDURE — 94760 N-INVAS EAR/PLS OXIMETRY 1: CPT

## 2018-04-14 PROCEDURE — 84484 ASSAY OF TROPONIN QUANT: CPT | Performed by: FAMILY MEDICINE

## 2018-04-14 PROCEDURE — 93005 ELECTROCARDIOGRAM TRACING: CPT | Performed by: FAMILY MEDICINE

## 2018-04-14 PROCEDURE — 94799 UNLISTED PULMONARY SVC/PX: CPT

## 2018-04-14 PROCEDURE — 80048 BASIC METABOLIC PNL TOTAL CA: CPT | Performed by: INTERNAL MEDICINE

## 2018-04-14 PROCEDURE — 82553 CREATINE MB FRACTION: CPT | Performed by: FAMILY MEDICINE

## 2018-04-14 PROCEDURE — 82550 ASSAY OF CK (CPK): CPT | Performed by: FAMILY MEDICINE

## 2018-04-14 PROCEDURE — 75635 CT ANGIO ABDOMINAL ARTERIES: CPT

## 2018-04-14 PROCEDURE — 85730 THROMBOPLASTIN TIME PARTIAL: CPT | Performed by: INTERNAL MEDICINE

## 2018-04-14 PROCEDURE — 25010000002 HEPARIN (PORCINE) PER 1000 UNITS: Performed by: FAMILY MEDICINE

## 2018-04-14 PROCEDURE — 25010000002 FUROSEMIDE PER 20 MG: Performed by: INTERNAL MEDICINE

## 2018-04-14 PROCEDURE — 0 IOPAMIDOL PER 1 ML: Performed by: FAMILY MEDICINE

## 2018-04-14 PROCEDURE — 85730 THROMBOPLASTIN TIME PARTIAL: CPT | Performed by: FAMILY MEDICINE

## 2018-04-14 PROCEDURE — 25010000002 CEFTRIAXONE PER 250 MG: Performed by: FAMILY MEDICINE

## 2018-04-14 PROCEDURE — 25010000002 ZIPRASIDONE MESYLATE PER 10 MG: Performed by: INTERNAL MEDICINE

## 2018-04-14 RX ORDER — SODIUM CHLORIDE 9 MG/ML
60 INJECTION, SOLUTION INTRAVENOUS CONTINUOUS
Status: DISCONTINUED | OUTPATIENT
Start: 2018-04-14 | End: 2018-04-14

## 2018-04-14 RX ORDER — FUROSEMIDE 10 MG/ML
40 INJECTION INTRAMUSCULAR; INTRAVENOUS ONCE
Status: COMPLETED | OUTPATIENT
Start: 2018-04-14 | End: 2018-04-14

## 2018-04-14 RX ORDER — SODIUM CHLORIDE 9 MG/ML
60 INJECTION, SOLUTION INTRAVENOUS CONTINUOUS
Status: DISCONTINUED | OUTPATIENT
Start: 2018-04-14 | End: 2018-04-15

## 2018-04-14 RX ADMIN — QUETIAPINE FUMARATE 300 MG: 300 TABLET ORAL at 20:15

## 2018-04-14 RX ADMIN — Medication 10 ML: at 13:49

## 2018-04-14 RX ADMIN — SODIUM CHLORIDE 60 ML/HR: 9 INJECTION, SOLUTION INTRAVENOUS at 15:14

## 2018-04-14 RX ADMIN — TRAMADOL HYDROCHLORIDE 50 MG: 50 TABLET, FILM COATED ORAL at 16:30

## 2018-04-14 RX ADMIN — BUDESONIDE AND FORMOTEROL FUMARATE DIHYDRATE 2 PUFF: 160; 4.5 AEROSOL RESPIRATORY (INHALATION) at 21:08

## 2018-04-14 RX ADMIN — NITROGLYCERIN 1 INCH: 20 OINTMENT TOPICAL at 05:37

## 2018-04-14 RX ADMIN — MONTELUKAST SODIUM 10 MG: 10 TABLET, FILM COATED ORAL at 20:16

## 2018-04-14 RX ADMIN — IPRATROPIUM BROMIDE AND ALBUTEROL SULFATE 3 ML: 2.5; .5 SOLUTION RESPIRATORY (INHALATION) at 21:08

## 2018-04-14 RX ADMIN — IPRATROPIUM BROMIDE AND ALBUTEROL SULFATE 3 ML: 2.5; .5 SOLUTION RESPIRATORY (INHALATION) at 07:51

## 2018-04-14 RX ADMIN — CILOSTAZOL 50 MG: 100 TABLET ORAL at 16:30

## 2018-04-14 RX ADMIN — MAGNESIUM OXIDE TAB 400 MG (241.3 MG ELEMENTAL MG) 800 MG: 400 (241.3 MG) TAB at 20:15

## 2018-04-14 RX ADMIN — IOPAMIDOL 93 ML: 755 INJECTION, SOLUTION INTRAVENOUS at 17:45

## 2018-04-14 RX ADMIN — HYDROCODONE BITARTRATE AND ACETAMINOPHEN 1 TABLET: 7.5; 325 TABLET ORAL at 13:48

## 2018-04-14 RX ADMIN — PRAMIPEXOLE DIHYDROCHLORIDE 0.25 MG: 0.25 TABLET ORAL at 20:15

## 2018-04-14 RX ADMIN — LEVOTHYROXINE SODIUM 25 MCG: 25 TABLET ORAL at 08:21

## 2018-04-14 RX ADMIN — CEFTRIAXONE 1 G: 1 INJECTION, POWDER, FOR SOLUTION INTRAMUSCULAR; INTRAVENOUS at 05:37

## 2018-04-14 RX ADMIN — CILOSTAZOL 50 MG: 100 TABLET ORAL at 08:21

## 2018-04-14 RX ADMIN — NITROGLYCERIN 1 INCH: 20 OINTMENT TOPICAL at 22:00

## 2018-04-14 RX ADMIN — ZIPRASIDONE MESYLATE 20 MG: 20 INJECTION, POWDER, LYOPHILIZED, FOR SOLUTION INTRAMUSCULAR at 00:29

## 2018-04-14 RX ADMIN — GUAIFENESIN 1200 MG: 600 TABLET, EXTENDED RELEASE ORAL at 20:15

## 2018-04-14 RX ADMIN — IPRATROPIUM BROMIDE AND ALBUTEROL SULFATE 3 ML: 2.5; .5 SOLUTION RESPIRATORY (INHALATION) at 15:53

## 2018-04-14 RX ADMIN — HEPARIN SODIUM 4100 UNITS: 5000 INJECTION, SOLUTION INTRAVENOUS; SUBCUTANEOUS at 08:02

## 2018-04-14 RX ADMIN — FUROSEMIDE 40 MG: 10 INJECTION, SOLUTION INTRAMUSCULAR; INTRAVENOUS at 13:49

## 2018-04-14 RX ADMIN — HEPARIN SODIUM 9.7 UNITS/KG/HR: 10000 INJECTION, SOLUTION INTRAVENOUS at 11:22

## 2018-04-14 RX ADMIN — HEPARIN SODIUM 7.7 UNITS/KG/HR: 10000 INJECTION, SOLUTION INTRAVENOUS at 04:46

## 2018-04-14 RX ADMIN — NITROGLYCERIN 1 INCH: 20 OINTMENT TOPICAL at 13:49

## 2018-04-14 RX ADMIN — IPRATROPIUM BROMIDE AND ALBUTEROL SULFATE 3 ML: 2.5; .5 SOLUTION RESPIRATORY (INHALATION) at 11:42

## 2018-04-14 RX ADMIN — PRAMIPEXOLE DIHYDROCHLORIDE 0.25 MG: 0.25 TABLET ORAL at 16:30

## 2018-04-14 RX ADMIN — GUAIFENESIN 1200 MG: 600 TABLET, EXTENDED RELEASE ORAL at 08:21

## 2018-04-14 RX ADMIN — SODIUM CHLORIDE 70 ML/HR: 9 INJECTION, SOLUTION INTRAVENOUS at 07:43

## 2018-04-14 RX ADMIN — ASPIRIN 81 MG: 81 TABLET, COATED ORAL at 08:21

## 2018-04-14 RX ADMIN — PRAMIPEXOLE DIHYDROCHLORIDE 0.25 MG: 0.25 TABLET ORAL at 08:21

## 2018-04-14 NOTE — PLAN OF CARE
Problem: Patient Care Overview  Goal: Plan of Care Review  Outcome: Ongoing (interventions implemented as appropriate)   04/14/18 1706   Coping/Psychosocial   Plan of Care Reviewed With patient   Plan of Care Review   Progress improving   OTHER   Outcome Summary restraints have been off all day and pt tolerated well, kidney function better than baseline so CT completed     Goal: Individualization and Mutuality  Outcome: Ongoing (interventions implemented as appropriate)    Goal: Discharge Needs Assessment  Outcome: Ongoing (interventions implemented as appropriate)      Problem: Fall Risk (Adult)  Goal: Absence of Fall  Outcome: Ongoing (interventions implemented as appropriate)      Problem: VTE, DVT and PE (Adult)  Goal: Signs and Symptoms of Listed Potential Problems Will be Absent, Minimized or Managed (VTE, DVT and PE)  Outcome: Ongoing (interventions implemented as appropriate)      Problem: Skin Injury Risk (Adult)  Goal: Identify Related Risk Factors and Signs and Symptoms  Outcome: Outcome(s) achieved Date Met: 04/14/18    Goal: Skin Health and Integrity  Outcome: Ongoing (interventions implemented as appropriate)      Problem: Pneumonia (Adult)  Goal: Signs and Symptoms of Listed Potential Problems Will be Absent, Minimized or Managed (Pneumonia)  Outcome: Ongoing (interventions implemented as appropriate)      Problem: Renal Failure/Kidney Injury, Acute (Adult)  Goal: Signs and Symptoms of Listed Potential Problems Will be Absent, Minimized or Managed (Renal Failure/Kidney Injury, Acute)  Outcome: Ongoing (interventions implemented as appropriate)

## 2018-04-14 NOTE — NURSING NOTE
Nephrology states that pt is cleared for scan if needed to determine plan of course with leg. Pt kidney function is better than baseline no med changes needed - per ghazal mooney aprn. Dr thakkar notified

## 2018-04-14 NOTE — PLAN OF CARE
Problem: Patient Care Overview  Goal: Plan of Care Review  Outcome: Ongoing (interventions implemented as appropriate)   04/14/18 0325   Coping/Psychosocial   Plan of Care Reviewed With patient   Plan of Care Review   Progress no change   OTHER   Outcome Summary place restraints back on pt around 0000, give prn dose of geodon around 0000, pt agitated and confused, has pulled out several ivs. pt is to see dr mancuso,  consulted for no pulse in pt foot.      Goal: Individualization and Mutuality  Outcome: Ongoing (interventions implemented as appropriate)    Goal: Discharge Needs Assessment  Outcome: Ongoing (interventions implemented as appropriate)      Problem: Fall Risk (Adult)  Goal: Absence of Fall  Outcome: Ongoing (interventions implemented as appropriate)      Problem: VTE, DVT and PE (Adult)  Goal: Signs and Symptoms of Listed Potential Problems Will be Absent, Minimized or Managed (VTE, DVT and PE)  Outcome: Ongoing (interventions implemented as appropriate)      Problem: Restraint, Nonbehavioral (Nonviolent)  Goal: Rationale and Justification  Outcome: Ongoing (interventions implemented as appropriate)    Goal: Nonbehavioral (Nonviolent) Restraint: Absence of Injury/Harm  Outcome: Ongoing (interventions implemented as appropriate)    Goal: Nonbehavioral (Nonviolent) Restraint: Achievement of Discontinuation Criteria  Outcome: Ongoing (interventions implemented as appropriate)    Goal: Nonbehavioral (Nonviolent) Restraint: Preservation of Dignity and Wellbeing  Outcome: Ongoing (interventions implemented as appropriate)

## 2018-04-14 NOTE — PROGRESS NOTES
"[facility]  [address] [place] [address]. [address]  T - [phone]  F - [phone]     Progress Note          PATIENT  DEMOGRAPHICS   PATIENT NAME: Massimo Bentley                      PHYSICIAN: Eusebio SnellJUANI  : 1940  MRN: [id number]   LOS: 2 days    Patient Care Team:  Belkys Hoffman MD as PCP - General  Gucci Brownlee MD as PCP - Claims Attributed  Brian Moshe Pennington MD as Surgeon (Cardiothoracic Surgery)  Emily Thacker MD as Consulting Physician (Cardiology)  Devin Menendez DPM as Consulting Physician (Podiatry)  Subjective   SUBJECTIVE   Pleasant, confused         Objective   OBJECTIVE   Vital Signs  Temp:  [97.4 °F (36.3 °C)-98.7 °F (37.1 °C)] 98.7 °F (37.1 °C)  Heart Rate:  [28-84] 67  Resp:  [14-20] 16  BP: (126-166)/(72-85) 166/75    Flowsheet Rows    Flowsheet Row First Filed Value   Admission Height 180.3 cm (71\") Documented at 2018 2319   Admission Weight 102 kg (225 lb) Documented at 2018 2319           I/O last 3 completed shifts:  In: 2199.3 [P.O.:1260; I.V.:389.3; Other:450; IV Piggyback:100]  Out: 675 [Urine:675]    PHYSICAL EXAM    Physical Exam   Constitutional: He is oriented to person, place, and time. He appears well-developed.   HENT:   Head: Normocephalic.   Eyes: Pupils are equal, round, and reactive to light.   Cardiovascular: Normal rate, regular rhythm and normal heart sounds.    Pulmonary/Chest: Effort normal. He has wheezes.   Abdominal: Soft. Bowel sounds are normal.   Neurological: He is alert and oriented to person, place, and time.       RESULTS   Results Review:      Results from last 7 days  Lab Units 18  0214 18  0417 18  0530  18  0246   SODIUM mmol/L 145 142 140  --  141   SODIUM, ARTERIAL   --   --   --   < >  --    POTASSIUM mmol/L 4.1 4.5 4.1  --  4.5   CHLORIDE mmol/L 113* 107 106  --  104   CO2 mmol/L 20.0* 24.0 20.0*  --  25.0   BUN mg/dL 33* 49* 61*  --  62* "   CREATININE mg/dL 1.51* 2.16* 2.65*  --  2.93*   CALCIUM mg/dL 8.5 8.1* 8.4  --  8.7   BILIRUBIN mg/dL  --   --  0.6  --  0.4   ALK PHOS U/L  --   --  65  --  66   ALT (SGPT) U/L  --   --  21  --  16*   AST (SGOT) U/L  --   --  16*  --  32   GLUCOSE mg/dL 97 86 90  --  97   GLUCOSE, ARTERIAL   --   --   --   < >  --    < > = values in this interval not displayed.    Estimated Creatinine Clearance: 50.1 mL/min (by C-G formula based on SCr of 1.51 mg/dL (H)).                  Results from last 7 days  Lab Units 04/12/18  0530 04/12/18  0246   WBC 10*3/mm3 7.44 7.72   HEMOGLOBIN g/dL 10.5* 11.4*   PLATELETS 10*3/mm3 180 184         Results from last 7 days  Lab Units 04/12/18  0246 04/09/18   INR  1.48* 1.40*         Imaging Results (last 24 hours)     ** No results found for the last 24 hours. **           MEDICATIONS      aspirin 81 mg Oral Daily   budesonide-formoterol 2 puff Inhalation BID - RT   ceftriaxone 1 g Intravenous Q24H   cilostazol 50 mg Oral BID AC   guaiFENesin 1,200 mg Oral Q12H   ipratropium-albuterol 3 mL Nebulization 4x Daily - RT   levothyroxine 25 mcg Oral Daily   LORazepam 0.25 mg Oral Once   magnesium oxide 800 mg Oral Nightly   montelukast 10 mg Oral Nightly   nitroglycerin 1 inch Topical Q8H   pramipexole 0.25 mg Oral TID   QUEtiapine 300 mg Oral Nightly   sodium chloride 500 mL Intravenous Once       heparin (porcine) 9.7 Units/kg/hr Last Rate: 9.7 Units/kg/hr (04/14/18 0802)   sodium chloride 70 mL/hr Last Rate: 70 mL/hr (04/14/18 0743)       Assessment/Plan   ASSESSMENT / PLAN    Principal Problem:    Arterial occlusion, lower extremity  Active Problems:    Atrial fibrillation [I48.91]    PVD (peripheral vascular disease)    Tobacco dependence syndrome    Stroke    Hypercholesterolemia    GERD (gastroesophageal reflux disease)    Hyperlipidemia    Atherosclerosis of artery of extremity with gangrene    AAA (abdominal aortic aneurysm) without rupture    CKD (chronic kidney disease) stage 3,  GFR 30-59 ml/min    COPD (chronic obstructive pulmonary disease)    Hypertension    1. CHANDRIKA on CKD Stage III- Baseline creatinine close to 1.7 now acute kidney injury with creatinine up to 2.9.  This could be volume depletion and this has improved with IV fluid.  Patient ultrasound was reviewed from late last year and there is no hydronephrosis, he does have a simple cyst.    - Keep sotalol daily.     - Patient's Lasix is on hold at present.    -Cr now 1.5, below baseline    -Will continue IVF as there is a plan to do CTA     2. History of Atrial Fibrillation     3. Right popliteal artery occlusion- with numbness, pallor, and cold extremity.  Patient is currently on heparin drip.  He also has a right popliteal vein DVT.  Vascular surgery is consulted     4. History of tobacco use- with severe peripheral vascular disease      5. Possible bilateral pneumonia- patient is currently on Rocephin and Levaquin is d/c'd.  I'll observe his respiratory status closely.           This document has been electronically signed by JUANI De Paz on April 14, 2018 10:41 AM

## 2018-04-14 NOTE — PROGRESS NOTES
[facility] Medicine Services  INPATIENT PROGRESS NOTE    Length of Stay: 2  Date of Admission: 4/12/2018  Primary Care Physician: Belkys Hoffman MD    Subjective     HPI:  Episodically complains of right lower extremity.  Noted confusion waxing and waning overnight.    Review of Systems   Denies headache, chest pain, dyspnea, fever, diarrhea  All pertinent negatives and positives are as above. All other systems have been reviewed and are negative unless otherwise stated.     Objective    Temp:  [97.1 °F (36.2 °C)-98.7 °F (37.1 °C)] 97.1 °F (36.2 °C)  Heart Rate:  [28-84] 75  Resp:  [14-22] 20  BP: (133-166)/(72-85) 133/74    Physical Exam  Gen.:  Altered but follows commands; normal speech  HEENT: NCAT; oral and nasal mucosa without erythema,  exudate  Neck: No lymphadenopathy  Cardiovascular: Regular rate and rhythm, no S3.  Lung: Rales noted bilaterally, symmetric rise and fall of the chest; Prolonged expiratory phase  Abdomen: Nondistended, normal bowel sounds   Extremities: No clubbing cyanosis; noted edema.  MSK: No active synovitis  normal muscle bulk and tone  Skin: No rash or skin breakdown  Neuro: Spontaneous and purposeful movement ×4 extremities      Results Review:  I have reviewed the labs, radiology results, and diagnostic studies.    Laboratory Data:     Results from last 7 days  Lab Units 04/14/18  0214 04/13/18  0417 04/12/18  0530  04/12/18  0246   SODIUM mmol/L 145 142 140  --  141   SODIUM, ARTERIAL   --   --   --   < >  --    POTASSIUM mmol/L 4.1 4.5 4.1  --  4.5   CHLORIDE mmol/L 113* 107 106  --  104   CO2 mmol/L 20.0* 24.0 20.0*  --  25.0   BUN mg/dL 33* 49* 61*  --  62*   CREATININE mg/dL 1.51* 2.16* 2.65*  --  2.93*   GLUCOSE mg/dL 97 86 90  --  97   GLUCOSE, ARTERIAL   --   --   --   < >  --    CALCIUM mg/dL 8.5 8.1* 8.4  --  8.7   BILIRUBIN mg/dL  --   --  0.6  --  0.4   ALK PHOS U/L  --   --  65  --  66   ALT (SGPT) U/L  --   --  21  --   16*   AST (SGOT) U/L  --   --  16*  --  32   ANION GAP mmol/L 12.0 11.0 14.0  --  12.0   < > = values in this interval not displayed.  Estimated Creatinine Clearance: 50.1 mL/min (by C-G formula based on SCr of 1.51 mg/dL (H)).            Results from last 7 days  Lab Units 04/12/18  0530 04/12/18  0246   WBC 10*3/mm3 7.44 7.72   HEMOGLOBIN g/dL 10.5* 11.4*   HEMATOCRIT % 30.6* 33.8*   PLATELETS 10*3/mm3 180 184       Results from last 7 days  Lab Units 04/12/18  0246 04/09/18   INR  1.48* 1.40*       Culture Data:   Blood Culture   Date Value Ref Range Status   04/12/2018 No growth at 2 days  Preliminary   04/12/2018 No growth at 2 days  Preliminary     No results found for: URINECX  No results found for: RESPCX  No results found for: WOUNDCX  No results found for: STOOLCX  No components found for: BODYFLD    Radiology Data:   Imaging Results (last 24 hours)     ** No results found for the last 24 hours. **          I have reviewed the patient current medications.     Assessment/Plan     Hospital Problem List     * (Principal)Arterial occlusion, lower extremity    Atrial fibrillation [I48.91]    PVD (peripheral vascular disease)    Tobacco dependence syndrome    Overview Signed 11/28/2016 10:39 AM by Belkys Hoffman MD     2014 to E cigarette            Stroke    Overview Signed 11/28/2016 10:39 AM by Belkys Hoffman MD      Echo: L atrial appendage thrombus            Hypercholesterolemia    GERD (gastroesophageal reflux disease)    Hyperlipidemia    Atherosclerosis of artery of extremity with gangrene    AAA (abdominal aortic aneurysm) without rupture    CKD (chronic kidney disease) stage 3, GFR 30-59 ml/min    COPD (chronic obstructive pulmonary disease)    Hypertension          Plan:   78 y.o. male presents with PMH of AAA without rupture, atrial fibrillation, currently in sinus rhythm, CK D stage III, COPD, GERD, hyperlipidemia, hypertension, peripheral vascular disease, stroke, tobacco  dependence, severe peripheral vascular disease, Acute on chronic arterial occlusion at popliteal artery.     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  As described before, there is unclear chronicity of this occlusion and his exam suggests no eminent limb infarction.  Per nephrology, renal function adequate for repeat CT angiogram to determine course of action.  Continue heparin for now.   Patient with some noted hypoxia and bilateral airspace disease still evident on exam.  But, no signs of toxicity.  Will discontinue Levaquin and continue Rocephin.     Spoke with family regarding delirium.  Neurologic exam remains nonfocal.                 Discharge Planning: I expect patient to be discharged to home in 2 days.    Rex Mckeon MD  04/14/18  1:40 PM

## 2018-04-14 NOTE — PROGRESS NOTES
"CTVS DAILY NOTE     LOS: 2 days   LEFT leg pain      Patient Care Team:  Belkys Hoffman MD as PCP - General  Gucci Brownlee MD as PCP - Claims Attributed  Brian Moshe Pennington MD as Surgeon (Cardiothoracic Surgery)  Emily Thacker MD as Consulting Physician (Cardiology)  Devin Menendez DPM as Consulting Physician (Podiatry)    Chief Complaint: LEFT leg pain    Subjective:  Symptoms:  Stable.    Diet:  Adequate intake.    Activity level: Impaired due to pain.    Pain:  He complains of pain that is moderate.  Pain is well controlled.        Vital Signs  Temp:  [97.4 °F (36.3 °C)-98.7 °F (37.1 °C)] 98.7 °F (37.1 °C)  Heart Rate:  [28-84] 67  Resp:  [14-20] 16  BP: (126-166)/(72-85) 166/75  Body mass index is 31.61 kg/m².    Intake/Output Summary (Last 24 hours) at 04/14/18 1048  Last data filed at 04/14/18 0921   Gross per 24 hour   Intake          1609.29 ml   Output              250 ml   Net          1359.29 ml     I/O this shift:  In: 200 [P.O.:200]  Out: 150 [Urine:150]    Wt Readings from Last 3 Encounters:   04/14/18 103 kg (226 lb 10.1 oz)   03/09/18 103 kg (227 lb)   03/07/18 101 kg (223 lb 3.2 oz)       Objective:  General Appearance:  In no acute distress and comfortable.    Vital signs: (most recent): Blood pressure 166/75, pulse 67, temperature 98.7 °F (37.1 °C), temperature source Axillary, resp. rate 16, height 180.3 cm (71\"), weight 103 kg (226 lb 10.1 oz), SpO2 96 %.  Vital signs are normal.    Lungs:  Normal effort.    Heart: Normal rate.  Regular rhythm.    Abdomen: Abdomen is soft.    Extremities: There is no local swelling.    Pulses: There are decreased pulses.    Neurological: Patient is alert and oriented to person, place and time.    Skin:  Warm.              Results Review:      WBC No results found for: WBCS   HGB Hemoglobin   Date/Time Value Ref Range Status   04/12/2018 0530 10.5 (L) 13.7 - 17.3 g/dL Final   04/12/2018 0246 11.4 (L) 13.7 - 17.3 g/dL Final      HCT " Hematocrit   Date/Time Value Ref Range Status   04/12/2018 0530 30.6 (L) 39.0 - 49.0 % Final   04/12/2018 0246 33.8 (L) 39.0 - 49.0 % Final      Platlets No results found for: LABPLAT     PT/INR:    Protime   Date/Time Value Ref Range Status   04/12/2018 0246 17.5 (H) 11.1 - 15.3 Seconds Final   /  INR   Date/Time Value Ref Range Status   04/12/2018 0246 1.48 (H) 0.80 - 1.20 Final       Sodium Sodium   Date/Time Value Ref Range Status   04/14/2018 0214 145 137 - 145 mmol/L Final   04/13/2018 0417 142 137 - 145 mmol/L Final   04/12/2018 0530 140 137 - 145 mmol/L Final   04/12/2018 0246 141 137 - 145 mmol/L Final     Sodium, Arterial   Date/Time Value Ref Range Status   04/12/2018 0247 137.5 (L) 138 - 146 mmol/L Final      Potassium Potassium   Date/Time Value Ref Range Status   04/14/2018 0214 4.1 3.5 - 5.1 mmol/L Final   04/13/2018 0417 4.5 3.5 - 5.1 mmol/L Final   04/12/2018 0530 4.1 3.5 - 5.1 mmol/L Final   04/12/2018 0246 4.5 3.5 - 5.1 mmol/L Final      Chloride Chloride   Date/Time Value Ref Range Status   04/14/2018 0214 113 (H) 95 - 110 mmol/L Final   04/13/2018 0417 107 95 - 110 mmol/L Final   04/12/2018 0530 106 95 - 110 mmol/L Final   04/12/2018 0246 104 95 - 110 mmol/L Final      Bicarbonate No results found for: PLASMABICARB   BUN BUN   Date/Time Value Ref Range Status   04/14/2018 0214 33 (H) 7 - 21 mg/dL Final   04/13/2018 0417 49 (H) 7 - 21 mg/dL Final   04/12/2018 0530 61 (H) 7 - 21 mg/dL Final   04/12/2018 0246 62 (H) 7 - 21 mg/dL Final      Creatinine Creatinine   Date/Time Value Ref Range Status   04/14/2018 0214 1.51 (H) 0.70 - 1.30 mg/dL Final   04/13/2018 0417 2.16 (H) 0.70 - 1.30 mg/dL Final   04/12/2018 0530 2.65 (H) 0.70 - 1.30 mg/dL Final   04/12/2018 0246 2.93 (H) 0.70 - 1.30 mg/dL Final      Calcium Calcium   Date/Time Value Ref Range Status   04/14/2018 0214 8.5 8.4 - 10.2 mg/dL Final   04/13/2018 0417 8.1 (L) 8.4 - 10.2 mg/dL Final   04/12/2018 0530 8.4 8.4 - 10.2 mg/dL Final    04/12/2018 0246 8.7 8.4 - 10.2 mg/dL Final      Magnesium No results found for: MG     Imaging Results (last 72 hours)     Procedure Component Value Units Date/Time    XR Chest 1 View [[id number]] Collected:  04/12/18 0245     Updated:  04/12/18 0310    Narrative:         Chest single view on  4/12/2018     CLINICAL INDICATION: Shortness of breath    COMPARISON: 3/7/2018    FINDINGS: There is mild elevation of the right hemidiaphragm.  There has been improvement in but there are persistent bilateral  reticular interstitial changes. This may represent residual edema  or atypical pneumonia versus a chronic underlying interstitial  lung disease. Vascular calcification is noted in the aorta. Heart  is within normal limits for size.      Impression:       Continued bilateral interstitial opacities that may  all be chronic in nature but cannot exclude continued component  of edema or pneumonia.    Electronically signed by:  Nicholas Lawton  4/12/2018 3:08 AM  CDT Workstation: [id number]    US Arterial Doppler Lower Extremity Right [[id number]] Collected:  04/12/18 0155     Updated:  04/12/18 0255    Narrative:       Ultrasound right lower extremity arterial duplex exam on  4/12/2018    CLINICAL INDICATION: Right leg pain, cold foot    COMPARISON: CT angiogram from 9/19/2017    FINDINGS: Multiple sonographic images are obtained throughout the  right lower extremity arterial system. Color flow, gray scale and  spectral analysis are all performed. Decreased flow and  monophasic waveform is noted throughout the right superficial  femoral artery. There is a large amount of echogenic clot filling  the known dilated popliteal artery aneurysm that appears  essentially occluded. There is probable minimal flow noted in the  right peroneal artery and posterior tibial artery.      Impression:       Likely occlusion or near complete occlusion of the  popliteal artery aneurysm with decreased flow in the right  superficial femoral  artery and right calf vessels likely related  to this. Dr. Ardon was made aware of this finding by myself by  phone on 4/12/2018 at 2:48 AM.    Electronically signed by:  Nicholas Lawton  4/12/2018 2:54 AM  CDT Workstation: [id number]    US Venous Doppler Lower Extremity Right (duplex) [[id number]] Collected:  04/12/18 0155     Updated:  04/12/18 0252    Addenda:         ADDENDUM   ADDENDUM #1       Addendum: After reviewing the patient's arterial examination on  the same date and correlating with the patient's prior CT  angiogram from 9/19/2017 the following is noted: What was thought  to be clot within a dilated right popliteal vein is now felt to  be clot within a dilated known popliteal artery aneurysm. The  vein on the prior CT angiogram is a very small and not very  distinguishable from the large popliteal artery aneurysm and is  likely not visualized on this examination.    No definite evidence of deep venous thrombus is noted therefore.  Dr. Ardon was made aware of this finding by myself by phone on  4/12/2018 at 2:48 AM.    Electronically signed by:  Nicholas Lawton  4/12/2018 2:51 AM  [employer] Workstation: [id number]    Signed:  04/12/18 0251 by Osiel Lawton MD    Narrative:       Ultrasound right lower extremity venous duplex exam on 4/12/2018    CLINICAL INDICATION: Right leg pain    COMPARISON: None    FINDINGS: Multiple sonographic images are obtained from the right  groin through the right calf vessels. Color flow, compression and  spectral analysis are all performed. There is echogenic material  filling a dilated right popliteal vein that is noncompressible  consistent with acute deep venous thrombus. The remainder of the  deep veins of the lower extremity show complete compressibility  and good color flow.      Impression:       Positive deep venous thrombus in the right popliteal  vein. Dr. Ardon in the emergency Department was made aware of  this finding by myself by phone on  4/12/2018 at 2:44 AM.    Electronically signed by:  Nicholas Lawton  4/12/2018 2:45 AM  CDT Workstation: [id number]              aspirin 81 mg Oral Daily   budesonide-formoterol 2 puff Inhalation BID - RT   ceftriaxone 1 g Intravenous Q24H   cilostazol 50 mg Oral BID AC   guaiFENesin 1,200 mg Oral Q12H   ipratropium-albuterol 3 mL Nebulization 4x Daily - RT   levothyroxine 25 mcg Oral Daily   LORazepam 0.25 mg Oral Once   magnesium oxide 800 mg Oral Nightly   montelukast 10 mg Oral Nightly   nitroglycerin 1 inch Topical Q8H   pramipexole 0.25 mg Oral TID   QUEtiapine 300 mg Oral Nightly   sodium chloride 500 mL Intravenous Once       heparin (porcine) 9.7 Units/kg/hr Last Rate: 9.7 Units/kg/hr (04/14/18 0802)   sodium chloride 70 mL/hr Last Rate: 70 mL/hr (04/14/18 0743)         Patient Active Problem List   Diagnosis Code   • Long-term (current) use of anticoagulants Z79.01   • Atrial fibrillation [I48.91] I48.91   • Embolism and thrombosis of artery I74.9   • PVD (peripheral vascular disease) I73.9   • Tobacco dependence syndrome F17.200   • Stroke I63.9   • Suicide X83.8XXA   • Peripheral vascular disease I73.9   • Osteoarthritis of multiple joints M15.9   • Hypercholesterolemia E78.00   • GERD (gastroesophageal reflux disease) K21.9   • Major depression F32.9   • Benign prostatic hyperplasia N40.0   • Typical atrial flutter I48.3   • Non-rheumatic mitral regurgitation I34.0   • Hyperlipidemia E78.5   • Localized edema R60.0   • Atherosclerosis of artery of extremity with gangrene I70.269   • AAA (abdominal aortic aneurysm) without rupture I71.4   • CKD (chronic kidney disease) stage 3, GFR 30-59 ml/min N18.3   • COPD (chronic obstructive pulmonary disease) J44.9   • Hypertension I10   • Prediabetes R73.03   • Arterial occlusion, lower extremity I74.3         Assessment:  (RIGHT lower extremity pain  Known RIGHT popliteal aneurysm with trifurcation occlusion on CTA 9/2017  Stopped anticoagulation recently  for dental procedure, apparently bridged with lovenox.  LEFT lower extremity bypass done 12/2017 in [place], Dr Sousa  CKD  Dr Reed saw patient earlier this week, discussing details and options.).     Plan:   (No indication for urgent revascularization.  Chronic popliteal artery/trifurcation occlusion.  Based on CTA 9/2017, distal vessels do not appear suitable for successful bypass.  Continue/resume anticoagulation.  CTA runoff may be useful to evaluate current status if and when renal function stabilizes, again no urgency.      Will try to obtain documentation from Dr Sousa office in [place].  He will need to follow up with Dr Sousa in [place] after discharge.).       Brian Pennington MD  04/14/18  10:48 AM

## 2018-04-15 LAB
ANION GAP SERPL CALCULATED.3IONS-SCNC: 12 MMOL/L (ref 5–15)
APTT PPP: 36.8 SECONDS (ref 20–40.3)
APTT PPP: 47.9 SECONDS (ref 20–40.3)
APTT PPP: 83.6 SECONDS (ref 20–40.3)
BUN BLD-MCNC: 25 MG/DL (ref 7–21)
BUN/CREAT SERPL: 20.3 (ref 7–25)
CALCIUM SPEC-SCNC: 8.4 MG/DL (ref 8.4–10.2)
CHLORIDE SERPL-SCNC: 105 MMOL/L (ref 95–110)
CO2 SERPL-SCNC: 25 MMOL/L (ref 22–31)
CREAT BLD-MCNC: 1.23 MG/DL (ref 0.7–1.3)
GFR SERPL CREATININE-BSD FRML MDRD: 57 ML/MIN/1.73 (ref 60–98)
GLUCOSE BLD-MCNC: 105 MG/DL (ref 60–100)
INR PPP: 1.2 (ref 0.8–1.2)
POTASSIUM BLD-SCNC: 3.6 MMOL/L (ref 3.5–5.1)
PROTHROMBIN TIME: 14.9 SECONDS (ref 11.1–15.3)
SODIUM BLD-SCNC: 142 MMOL/L (ref 137–145)

## 2018-04-15 PROCEDURE — 94799 UNLISTED PULMONARY SVC/PX: CPT

## 2018-04-15 PROCEDURE — 80048 BASIC METABOLIC PNL TOTAL CA: CPT | Performed by: INTERNAL MEDICINE

## 2018-04-15 PROCEDURE — 85730 THROMBOPLASTIN TIME PARTIAL: CPT | Performed by: FAMILY MEDICINE

## 2018-04-15 PROCEDURE — 85610 PROTHROMBIN TIME: CPT | Performed by: INTERNAL MEDICINE

## 2018-04-15 PROCEDURE — 25010000002 CEFTRIAXONE PER 250 MG: Performed by: FAMILY MEDICINE

## 2018-04-15 PROCEDURE — 94760 N-INVAS EAR/PLS OXIMETRY 1: CPT

## 2018-04-15 PROCEDURE — 25010000002 HEPARIN (PORCINE) PER 1000 UNITS: Performed by: FAMILY MEDICINE

## 2018-04-15 RX ORDER — WARFARIN SODIUM 5 MG/1
5 TABLET ORAL
Status: DISCONTINUED | OUTPATIENT
Start: 2018-04-15 | End: 2018-04-18 | Stop reason: HOSPADM

## 2018-04-15 RX ORDER — SOTALOL HYDROCHLORIDE 80 MG/1
80 TABLET ORAL
Status: DISCONTINUED | OUTPATIENT
Start: 2018-04-16 | End: 2018-04-16

## 2018-04-15 RX ORDER — SOTALOL HYDROCHLORIDE 80 MG/1
80 TABLET ORAL EVERY 12 HOURS SCHEDULED
Status: DISCONTINUED | OUTPATIENT
Start: 2018-04-15 | End: 2018-04-15

## 2018-04-15 RX ADMIN — WARFARIN SODIUM 5 MG: 5 TABLET ORAL at 17:13

## 2018-04-15 RX ADMIN — PRAMIPEXOLE DIHYDROCHLORIDE 0.25 MG: 0.25 TABLET ORAL at 20:30

## 2018-04-15 RX ADMIN — PRAMIPEXOLE DIHYDROCHLORIDE 0.25 MG: 0.25 TABLET ORAL at 08:27

## 2018-04-15 RX ADMIN — GUAIFENESIN 1200 MG: 600 TABLET, EXTENDED RELEASE ORAL at 08:27

## 2018-04-15 RX ADMIN — BUDESONIDE AND FORMOTEROL FUMARATE DIHYDRATE 2 PUFF: 160; 4.5 AEROSOL RESPIRATORY (INHALATION) at 19:52

## 2018-04-15 RX ADMIN — NITROGLYCERIN 1 INCH: 20 OINTMENT TOPICAL at 21:08

## 2018-04-15 RX ADMIN — HYDROCODONE BITARTRATE AND ACETAMINOPHEN 1 TABLET: 7.5; 325 TABLET ORAL at 13:13

## 2018-04-15 RX ADMIN — IPRATROPIUM BROMIDE AND ALBUTEROL SULFATE 3 ML: 2.5; .5 SOLUTION RESPIRATORY (INHALATION) at 15:24

## 2018-04-15 RX ADMIN — GUAIFENESIN 1200 MG: 600 TABLET, EXTENDED RELEASE ORAL at 20:30

## 2018-04-15 RX ADMIN — SODIUM CHLORIDE 60 ML/HR: 9 INJECTION, SOLUTION INTRAVENOUS at 08:30

## 2018-04-15 RX ADMIN — NITROGLYCERIN 1 INCH: 20 OINTMENT TOPICAL at 13:11

## 2018-04-15 RX ADMIN — NITROGLYCERIN 1 INCH: 20 OINTMENT TOPICAL at 05:46

## 2018-04-15 RX ADMIN — PRAMIPEXOLE DIHYDROCHLORIDE 0.25 MG: 0.25 TABLET ORAL at 16:28

## 2018-04-15 RX ADMIN — CILOSTAZOL 50 MG: 100 TABLET ORAL at 08:27

## 2018-04-15 RX ADMIN — IPRATROPIUM BROMIDE AND ALBUTEROL SULFATE 3 ML: 2.5; .5 SOLUTION RESPIRATORY (INHALATION) at 19:52

## 2018-04-15 RX ADMIN — TRAMADOL HYDROCHLORIDE 50 MG: 50 TABLET, FILM COATED ORAL at 23:04

## 2018-04-15 RX ADMIN — ASPIRIN 81 MG: 81 TABLET, COATED ORAL at 08:27

## 2018-04-15 RX ADMIN — BUDESONIDE AND FORMOTEROL FUMARATE DIHYDRATE 2 PUFF: 160; 4.5 AEROSOL RESPIRATORY (INHALATION) at 07:57

## 2018-04-15 RX ADMIN — LEVOTHYROXINE SODIUM 25 MCG: 25 TABLET ORAL at 08:27

## 2018-04-15 RX ADMIN — IPRATROPIUM BROMIDE AND ALBUTEROL SULFATE 3 ML: 2.5; .5 SOLUTION RESPIRATORY (INHALATION) at 07:55

## 2018-04-15 RX ADMIN — QUETIAPINE FUMARATE 300 MG: 300 TABLET ORAL at 20:30

## 2018-04-15 RX ADMIN — HEPARIN SODIUM 4100 UNITS: 5000 INJECTION, SOLUTION INTRAVENOUS; SUBCUTANEOUS at 15:47

## 2018-04-15 RX ADMIN — MAGNESIUM OXIDE TAB 400 MG (241.3 MG ELEMENTAL MG) 800 MG: 400 (241.3 MG) TAB at 20:30

## 2018-04-15 RX ADMIN — HYDROCODONE BITARTRATE AND ACETAMINOPHEN 1 TABLET: 7.5; 325 TABLET ORAL at 21:40

## 2018-04-15 RX ADMIN — CILOSTAZOL 50 MG: 100 TABLET ORAL at 17:13

## 2018-04-15 RX ADMIN — Medication 10 ML: at 08:27

## 2018-04-15 RX ADMIN — MONTELUKAST SODIUM 10 MG: 10 TABLET, FILM COATED ORAL at 20:30

## 2018-04-15 RX ADMIN — SOTALOL HYDROCHLORIDE 80 MG: 80 TABLET ORAL at 13:11

## 2018-04-15 RX ADMIN — HEPARIN SODIUM 9.7 UNITS/KG/HR: 10000 INJECTION, SOLUTION INTRAVENOUS at 18:06

## 2018-04-15 RX ADMIN — CEFTRIAXONE 1 G: 1 INJECTION, POWDER, FOR SOLUTION INTRAMUSCULAR; INTRAVENOUS at 05:46

## 2018-04-15 NOTE — PLAN OF CARE
Problem: Patient Care Overview  Goal: Plan of Care Review  Outcome: Ongoing (interventions implemented as appropriate)   04/15/18 3641   Coping/Psychosocial   Plan of Care Reviewed With patient   Plan of Care Review   Progress improving   OTHER   Outcome Summary mental status improving, no change in arterial occlusion     Goal: Individualization and Mutuality  Outcome: Ongoing (interventions implemented as appropriate)    Goal: Discharge Needs Assessment  Outcome: Ongoing (interventions implemented as appropriate)      Problem: Fall Risk (Adult)  Goal: Absence of Fall  Outcome: Ongoing (interventions implemented as appropriate)      Problem: VTE, DVT and PE (Adult)  Goal: Signs and Symptoms of Listed Potential Problems Will be Absent, Minimized or Managed (VTE, DVT and PE)  Outcome: Ongoing (interventions implemented as appropriate)      Problem: Skin Injury Risk (Adult)  Goal: Skin Health and Integrity  Outcome: Ongoing (interventions implemented as appropriate)      Problem: Pneumonia (Adult)  Goal: Signs and Symptoms of Listed Potential Problems Will be Absent, Minimized or Managed (Pneumonia)  Outcome: Ongoing (interventions implemented as appropriate)      Problem: Renal Failure/Kidney Injury, Acute (Adult)  Goal: Signs and Symptoms of Listed Potential Problems Will be Absent, Minimized or Managed (Renal Failure/Kidney Injury, Acute)  Outcome: Ongoing (interventions implemented as appropriate)      Problem: Thrombolytic Therapy (Adult)  Goal: Signs and Symptoms of Listed Potential Problems Will be Absent, Minimized or Managed (Thrombolytic Therapy)  Outcome: Ongoing (interventions implemented as appropriate)

## 2018-04-15 NOTE — PROGRESS NOTES
[facility]  INPATIENT PROGRESS NOTE    Length of Stay: 3  Date of Admission: 4/12/2018  Primary Care Physician: Belkys Hoffman MD    Subjective     HPI:  Nominal confusion overnight as compared to previous.  Patient infrequently reports right lower showing the pain at times of modest severity.    Review of Systems   He denies chest pain, shortness of air, fever, headache, diarrhea.  All pertinent negatives and positives are as above. All other systems have been reviewed and are negative unless otherwise stated.     Objective    Temp:  [96.8 °F (36 °C)-98.4 °F (36.9 °C)] 97.7 °F (36.5 °C)  Heart Rate:  [] 80  Resp:  [18-20] 18  BP: (105-152)/(60-92) 146/77    Physical Exam  Gen.:   no apparent distress; altered somewhat; normal speech  HEENT: NCAT; oral and nasal mucosa without erythema,  exudate  Neck: No lymphadenopathy  Cardiovascular: Regular rate and rhythm, no S3.  Lung: Rales noted bilaterally, no wheezes today.  Prolonged expiratory phase.  symmetric rise and fall of the chest  Abdomen: Nondistended, normal bowel sounds   Extremities: No clubbing cyanosis; noted edema.  MSK: No active synovitis  normal muscle bulk and tone  Skin: No rash or skin breakdown  Neuro: Spontaneous and purposeful movement ×4 extremities      Results Review:  I have reviewed the labs, radiology results, and diagnostic studies.    Laboratory Data:     Results from last 7 days  Lab Units 04/15/18  0345 04/14/18  0214 04/13/18  0417 04/12/18  0530  04/12/18  0246   SODIUM mmol/L 142 145 142 140  --  141   SODIUM, ARTERIAL   --   --   --   --   < >  --    POTASSIUM mmol/L 3.6 4.1 4.5 4.1  --  4.5   CHLORIDE mmol/L 105 113* 107 106  --  104   CO2 mmol/L 25.0 20.0* 24.0 20.0*  --  25.0   BUN mg/dL 25* 33* 49* 61*  --  62*   CREATININE mg/dL 1.23 1.51* 2.16* 2.65*  --  2.93*   GLUCOSE mg/dL 105* 97 86 90  --  97   GLUCOSE, ARTERIAL   --   --   --   --   < >  --     CALCIUM mg/dL 8.4 8.5 8.1* 8.4  --  8.7   BILIRUBIN mg/dL  --   --   --  0.6  --  0.4   ALK PHOS U/L  --   --   --  65  --  66   ALT (SGPT) U/L  --   --   --  21  --  16*   AST (SGOT) U/L  --   --   --  16*  --  32   ANION GAP mmol/L 12.0 12.0 11.0 14.0  --  12.0   < > = values in this interval not displayed.  Estimated Creatinine Clearance: 61.5 mL/min (by C-G formula based on SCr of 1.23 mg/dL).            Results from last 7 days  Lab Units 04/12/18  0530 04/12/18  0246   WBC 10*3/mm3 7.44 7.72   HEMOGLOBIN g/dL 10.5* 11.4*   HEMATOCRIT % 30.6* 33.8*   PLATELETS 10*3/mm3 180 184       Results from last 7 days  Lab Units 04/15/18  1152 04/12/18  0246 04/09/18   INR  1.20 1.48* 1.40*       Culture Data:   No results found for: BLOODCX  No results found for: URINECX  No results found for: RESPCX  No results found for: WOUNDCX  No results found for: STOOLCX  No components found for: BODYFLD    Radiology Data:   Imaging Results (last 24 hours)     Procedure Component Value Units Date/Time    CT Angio Abdominal Aorta Bilateral Iliofem Runoff With & Without Contrast [[id number]] Collected:  04/14/18 1644     Updated:  04/14/18 1914    Narrative:       PROCEDURE: CTA RUNOFF (CT ANGIOGRAM OF THE ABDOMEN AND BILATERAL  ILIOFEMORAL LOWER EXTREMITY RUNOFF WITH INTRAVENOUS CONTRAST)     CLINICAL INFORMATION:  78-year-old male with history given for:  And painful leg, threatened limb after angioplasty.      DESCRIPTION:  Technique:  CTA of the abdominal aorta and bilateral iliofemoral  runoff was performed. Arterial phase CTA was performed from the  dome of the diaphragm through the feet.  This exam was performed according to our departmental dose  optimization program, which includes automated exposure control,  adjustment of the mA and/or KV according to patient size and/or  use of iterative reconstruction technique. The total DLP is  1746.6    Note: Reconstructed MIP images were obtained in multiple  obliquities. No 3-D  surface rendered images were obtained for  this exam. Computer generated 3-D reconstructions/MIPS were  performed      Contrast:  93 mL Isovue-370  Comparison: 9/19/2017    FINDINGS:    Aorta: Redemonstration of atherosclerotic vascular disease of the  abdominal aorta. There is redemonstration of the endovascular  stent graft which involves the abdominal aorta from the level the  renal arteries and the bilateral common iliac arteries. Its  overall appearance is similar. The lumen remains patent.  Celiac artery: Minimal vascular calcification however widely  patent.  Superior mesenteric artery:  Patent.  Inferior mesenteric artery:  Similar to the prior examination  occluded.  Right renal artery:  Single.  Patent.  Left renal artery:  Single.  Patent.    RIGHT:  Common iliac artery: Patent with atherosclerotic vascular  disease.  External iliac artery:   Patent with atherosclerotic vascular  disease.  Internal iliac artery:   Occlusion.  Common femoral artery:    Patent with atherosclerotic vascular  disease.  Profunda femoris:   Patent.  Superficial femoral artery:   Completely occluded approximately  10 cm distal to its origin. No luminal contrast seen throughout  the entire length of the superficial femoral artery, popliteal  artery, aneurysm/pseudoaneurysm at the distal SFA/popliteal, or  within the infrapopliteal vessels.    LEFT:  Common iliac artery:   Patent with abscess chronic vascular  disease.  External iliac artery:   Patent with atherosclerotic vascular  disease.  Internal iliac artery:   Patent with atherosclerotic vascular  disease.  Common femoral artery:    Patent with atherosclerotic vascular  disease.  Profunda femoris:   Patent.  Superficial femoral artery:   The left femoral to popliteal stent  graft demonstrates patency on the current exam with luminal  contrast seen throughout its length. It does become somewhat  attenuated as it approaches the popliteal. Certainly the degree  of contrast  enhancement within the limit this level is less  robust than that seen proximally. There is poor definition of  luminal contrast within the infrapopliteal vessels    ADDITIONAL OBSERVATIONS:   Imaging through the lung bases  demonstrates a background of COPD with interstitial fibrosis and  possibility of interstitial edema. There is dependent and basilar  atelectasis without consolidation or effusion. The included main  central pulmonary arteries are unremarkable for pulmonary  embolism. No cardiac enlargement or significant pericardial  effusion.    No suspicious hepatic lesion or ductal dilation. The gallbladder  is not visualized. No acute or suspicious finding in the spleen,  pancreas, or the adrenal glands. Redemonstration of bilateral  simple appearing renal cysts. There is findings of colonic  diverticulosis without acute diverticulitis. The urinary bladder  and the prostate are unremarkable.  No evidence of free air or free fluid. There is redemonstration  of degenerative and postsurgical changes within the spine.        Impression:       There is complete occlusion of the right superficial femoral  artery approximately 10 cm distal to its origin. No luminal  contrast seen throughout the length of the superficial femoral  artery, popliteal, and infrapopliteal arteries. No findings of  reconstituting vessels with perfusion of the right lower  extremity.    There is occlusion of the right internal iliac artery.    The left femoral-popliteal stent graft demonstrates luminal  contrast/patency. The contrast distally at the popliteal  anastomosis is less robust and severely attenuated by comparison  that proximally. There is poor visualization of the  infrapopliteal arteries.    Additional nonacute findings as detailed above.    Findings are discussed with Dr. Mckeon on 4/14/2018 at 7:00 PM  CST.    Electronically signed by:  Moshe Barillas MD  4/14/2018 7:13 PM  CDT Workstation: [id number]          I have reviewed  the patient current medications.     Assessment/Plan     Hospital Problem List     * (Principal)Arterial occlusion, lower extremity    Atrial fibrillation [I48.91]    PVD (peripheral vascular disease)    Tobacco dependence syndrome    Overview Signed 11/28/2016 10:39 AM by Belkys Hoffman MD     2014 to E cigarette            Stroke    Overview Signed 11/28/2016 10:39 AM by Belkys Hoffman MD      Echo: L atrial appendage thrombus            Hypercholesterolemia    GERD (gastroesophageal reflux disease)    Hyperlipidemia    Atherosclerosis of artery of extremity with gangrene    AAA (abdominal aortic aneurysm) without rupture    CKD (chronic kidney disease) stage 3, GFR 30-59 ml/min    COPD (chronic obstructive pulmonary disease)    Hypertension          Plan:  78 y.o. male presents with PMH of AAA without rupture, atrial fibrillation, currently in sinus rhythm, CK D stage III, COPD, GERD, hyperlipidemia, hypertension, peripheral vascular disease, stroke, tobacco dependence, severe peripheral vascular disease, Acute on chronic arterial occlusion at popliteal artery.     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  He does not believe these findings indicate a change in her treatment strategy.  We will resume anticoagulation with Coumadin.  Patient has been on this medication long-standing for the indication of atrial fibrillation. Appreciate nephrology's input regarding acute renal failure and managing contrast burden.     Bilateral airspace disease still evident on exam and now confirmed on CAT scan from  yesterday.  But, no signs of toxicity.  Levaquin discontinued on 4/14.  Continue Rocephin for now.  Home with Ceftin.     Delirium better than in previous days.  We will better mobilize him in preparation for returning home.                 Discharge Planning: I expect patient to be discharged to home in 2 days.    Rex Mckeon MD  04/15/18  2:39 PM

## 2018-04-15 NOTE — PLAN OF CARE
Problem: Patient Care Overview  Goal: Plan of Care Review  Outcome: Ongoing (interventions implemented as appropriate)   04/15/18 0300   Coping/Psychosocial   Plan of Care Reviewed With patient   Plan of Care Review   Progress improving   OTHER   Outcome Summary pt resting at this time.      Goal: Individualization and Mutuality  Outcome: Ongoing (interventions implemented as appropriate)    Goal: Discharge Needs Assessment  Outcome: Ongoing (interventions implemented as appropriate)      Problem: Fall Risk (Adult)  Goal: Absence of Fall  Outcome: Ongoing (interventions implemented as appropriate)      Problem: Skin Injury Risk (Adult)  Goal: Skin Health and Integrity  Outcome: Ongoing (interventions implemented as appropriate)      Problem: Pneumonia (Adult)  Goal: Signs and Symptoms of Listed Potential Problems Will be Absent, Minimized or Managed (Pneumonia)  Outcome: Ongoing (interventions implemented as appropriate)      Problem: Renal Failure/Kidney Injury, Acute (Adult)  Goal: Signs and Symptoms of Listed Potential Problems Will be Absent, Minimized or Managed (Renal Failure/Kidney Injury, Acute)  Outcome: Ongoing (interventions implemented as appropriate)

## 2018-04-15 NOTE — PROGRESS NOTES
"[facility]  [address] [place] [address]. [address]  T - [phone]  F - [phone]     Progress Note          PATIENT  DEMOGRAPHICS   PATIENT NAME: Massimo Bentley                      PHYSICIAN: Eusebio SnellJUANI  : 1940  MRN: [id number]   LOS: 3 days    Patient Care Team:  Belkys Hoffman MD as PCP - General  Gucci Brownlee MD as PCP - Claims Attributed  Brian Moshe Pennington MD as Surgeon (Cardiothoracic Surgery)  Emily Thacker MD as Consulting Physician (Cardiology)  Devin Menendez DPM as Consulting Physician (Podiatry)  Subjective   SUBJECTIVE   Pleasant, confused, resting well         Objective   OBJECTIVE   Vital Signs  Temp:  [96.8 °F (36 °C)-98.4 °F (36.9 °C)] 98.4 °F (36.9 °C)  Heart Rate:  [] 79  Resp:  [18-22] 18  BP: (105-152)/(60-92) 152/85    Flowsheet Rows    Flowsheet Row First Filed Value   Admission Height 180.3 cm (71\") Documented at 2018 2319   Admission Weight 102 kg (225 lb) Documented at 2018 2319           I/O last 3 completed shifts:  In: 1590.6 [P.O.:620; I.V.:770.6; IV Piggyback:200]  Out: 1650 [Urine:1650]    PHYSICAL EXAM    Physical Exam   Constitutional: He is oriented to person, place, and time. He appears well-developed.   HENT:   Head: Normocephalic.   Eyes: Pupils are equal, round, and reactive to light.   Cardiovascular: Normal rate, regular rhythm and normal heart sounds.    Pulmonary/Chest: Effort normal and breath sounds normal.   Congested sounding cough, lung fields clear   Abdominal: Soft. Bowel sounds are normal.   Neurological: He is alert and oriented to person, place, and time.       RESULTS   Results Review:      Results from last 7 days  Lab Units 04/15/18  0345 18  0214 18  0417 18  0530  18  0246   SODIUM mmol/L 142 145 142 140  --  141   SODIUM, ARTERIAL   --   --   --   --   < >  --    POTASSIUM mmol/L 3.6 4.1 4.5 4.1  --  4.5   CHLORIDE mmol/L 105 113* 107 " 106  --  104   CO2 mmol/L 25.0 20.0* 24.0 20.0*  --  25.0   BUN mg/dL 25* 33* 49* 61*  --  62*   CREATININE mg/dL 1.23 1.51* 2.16* 2.65*  --  2.93*   CALCIUM mg/dL 8.4 8.5 8.1* 8.4  --  8.7   BILIRUBIN mg/dL  --   --   --  0.6  --  0.4   ALK PHOS U/L  --   --   --  65  --  66   ALT (SGPT) U/L  --   --   --  21  --  16*   AST (SGOT) U/L  --   --   --  16*  --  32   GLUCOSE mg/dL 105* 97 86 90  --  97   GLUCOSE, ARTERIAL   --   --   --   --   < >  --    < > = values in this interval not displayed.    Estimated Creatinine Clearance: 61.5 mL/min (by C-G formula based on SCr of 1.23 mg/dL).                  Results from last 7 days  Lab Units 04/12/18  0530 04/12/18  0246   WBC 10*3/mm3 7.44 7.72   HEMOGLOBIN g/dL 10.5* 11.4*   PLATELETS 10*3/mm3 180 184         Results from last 7 days  Lab Units 04/12/18  0246 04/09/18   INR  1.48* 1.40*         Imaging Results (last 24 hours)     Procedure Component Value Units Date/Time    CT Angio Abdominal Aorta Bilateral Iliofem Runoff With & Without Contrast [[id number]] Collected:  04/14/18 1644     Updated:  04/14/18 1914    Narrative:       PROCEDURE: CTA RUNOFF (CT ANGIOGRAM OF THE ABDOMEN AND BILATERAL  ILIOFEMORAL LOWER EXTREMITY RUNOFF WITH INTRAVENOUS CONTRAST)     CLINICAL INFORMATION:  78-year-old male with history given for:  And painful leg, threatened limb after angioplasty.      DESCRIPTION:  Technique:  CTA of the abdominal aorta and bilateral iliofemoral  runoff was performed. Arterial phase CTA was performed from the  dome of the diaphragm through the feet.  This exam was performed according to our departmental dose  optimization program, which includes automated exposure control,  adjustment of the mA and/or KV according to patient size and/or  use of iterative reconstruction technique. The total DLP is  1746.6    Note: Reconstructed MIP images were obtained in multiple  obliquities. No 3-D surface rendered images were obtained for  this exam. Computer generated  3-D reconstructions/MIPS were  performed      Contrast:  93 mL Isovue-370  Comparison: 9/19/2017    FINDINGS:    Aorta: Redemonstration of atherosclerotic vascular disease of the  abdominal aorta. There is redemonstration of the endovascular  stent graft which involves the abdominal aorta from the level the  renal arteries and the bilateral common iliac arteries. Its  overall appearance is similar. The lumen remains patent.  Celiac artery: Minimal vascular calcification however widely  patent.  Superior mesenteric artery:  Patent.  Inferior mesenteric artery:  Similar to the prior examination  occluded.  Right renal artery:  Single.  Patent.  Left renal artery:  Single.  Patent.    RIGHT:  Common iliac artery: Patent with atherosclerotic vascular  disease.  External iliac artery:   Patent with atherosclerotic vascular  disease.  Internal iliac artery:   Occlusion.  Common femoral artery:    Patent with atherosclerotic vascular  disease.  Profunda femoris:   Patent.  Superficial femoral artery:   Completely occluded approximately  10 cm distal to its origin. No luminal contrast seen throughout  the entire length of the superficial femoral artery, popliteal  artery, aneurysm/pseudoaneurysm at the distal SFA/popliteal, or  within the infrapopliteal vessels.    LEFT:  Common iliac artery:   Patent with abscess chronic vascular  disease.  External iliac artery:   Patent with atherosclerotic vascular  disease.  Internal iliac artery:   Patent with atherosclerotic vascular  disease.  Common femoral artery:    Patent with atherosclerotic vascular  disease.  Profunda femoris:   Patent.  Superficial femoral artery:   The left femoral to popliteal stent  graft demonstrates patency on the current exam with luminal  contrast seen throughout its length. It does become somewhat  attenuated as it approaches the popliteal. Certainly the degree  of contrast enhancement within the limit this level is less  robust than that seen  proximally. There is poor definition of  luminal contrast within the infrapopliteal vessels    ADDITIONAL OBSERVATIONS:   Imaging through the lung bases  demonstrates a background of COPD with interstitial fibrosis and  possibility of interstitial edema. There is dependent and basilar  atelectasis without consolidation or effusion. The included main  central pulmonary arteries are unremarkable for pulmonary  embolism. No cardiac enlargement or significant pericardial  effusion.    No suspicious hepatic lesion or ductal dilation. The gallbladder  is not visualized. No acute or suspicious finding in the spleen,  pancreas, or the adrenal glands. Redemonstration of bilateral  simple appearing renal cysts. There is findings of colonic  diverticulosis without acute diverticulitis. The urinary bladder  and the prostate are unremarkable.  No evidence of free air or free fluid. There is redemonstration  of degenerative and postsurgical changes within the spine.        Impression:       There is complete occlusion of the right superficial femoral  artery approximately 10 cm distal to its origin. No luminal  contrast seen throughout the length of the superficial femoral  artery, popliteal, and infrapopliteal arteries. No findings of  reconstituting vessels with perfusion of the right lower  extremity.    There is occlusion of the right internal iliac artery.    The left femoral-popliteal stent graft demonstrates luminal  contrast/patency. The contrast distally at the popliteal  anastomosis is less robust and severely attenuated by comparison  that proximally. There is poor visualization of the  infrapopliteal arteries.    Additional nonacute findings as detailed above.    Findings are discussed with Dr. Mckeon on 4/14/2018 at 7:00 PM  CST.    Electronically signed by:  Moshe Barillas MD  4/14/2018 7:13 PM  CDT Workstation: [id number]           MEDICATIONS      aspirin 81 mg Oral Daily   budesonide-formoterol 2 puff Inhalation  BID - RT   ceftriaxone 1 g Intravenous Q24H   cilostazol 50 mg Oral BID AC   guaiFENesin 1,200 mg Oral Q12H   ipratropium-albuterol 3 mL Nebulization 4x Daily - RT   levothyroxine 25 mcg Oral Daily   LORazepam 0.25 mg Oral Once   magnesium oxide 800 mg Oral Nightly   montelukast 10 mg Oral Nightly   nitroglycerin 1 inch Topical Q8H   pramipexole 0.25 mg Oral TID   QUEtiapine 300 mg Oral Nightly   sodium chloride 500 mL Intravenous Once       heparin (porcine) 7.7 Units/kg/hr Last Rate: 7.7 Units/kg/hr (04/14/18 1500)       Assessment/Plan   ASSESSMENT / PLAN    Principal Problem:    Arterial occlusion, lower extremity  Active Problems:    Atrial fibrillation [I48.91]    PVD (peripheral vascular disease)    Tobacco dependence syndrome    Stroke    Hypercholesterolemia    GERD (gastroesophageal reflux disease)    Hyperlipidemia    Atherosclerosis of artery of extremity with gangrene    AAA (abdominal aortic aneurysm) without rupture    CKD (chronic kidney disease) stage 3, GFR 30-59 ml/min    COPD (chronic obstructive pulmonary disease)    Hypertension    1. CHANDRIKA on CKD Stage III- Baseline creatinine close to 1.7 now acute kidney injury with creatinine up to 2.9.  This could be volume depletion and this has improved with IV fluid.  Patient ultrasound was reviewed from late last year and there is no hydronephrosis, he does have a simple cyst.    - Keep sotalol daily.     - Patient's Lasix is on hold at present.    -Cr now 1.2, below baseline    -Off IVF, got lasix x1 prior to CTA, appears to have received NS @ 60 ml/hr overnight.     2. History of Atrial Fibrillation     3. Right popliteal artery occlusion- with numbness, pallor, and cold extremity.  Patient is currently on heparin drip.  He also has a right popliteal vein DVT.  Vascular surgery is consulted. CTA completed yesterday with findings apparently similar to CTA in September.     4. History of tobacco use- with severe peripheral vascular disease      5.  Possible bilateral pneumonia- patient is currently on Rocephin and Levaquin is d/c'd.  I'll observe his respiratory status closely.           This document has been electronically signed by JUANI De Paz on April 15, 2018 9:22 AM

## 2018-04-16 LAB
APTT PPP: 37.2 SECONDS (ref 20–40.3)
APTT PPP: 42.1 SECONDS (ref 20–40.3)
APTT PPP: 78.4 SECONDS (ref 20–40.3)
HOLD SPECIMEN: NORMAL
INR PPP: 1.18 (ref 0.8–1.2)
PROTHROMBIN TIME: 14.7 SECONDS (ref 11.1–15.3)
WHOLE BLOOD HOLD SPECIMEN: NORMAL

## 2018-04-16 PROCEDURE — 25010000002 HEPARIN (PORCINE) PER 1000 UNITS: Performed by: FAMILY MEDICINE

## 2018-04-16 PROCEDURE — 85610 PROTHROMBIN TIME: CPT | Performed by: INTERNAL MEDICINE

## 2018-04-16 PROCEDURE — 94799 UNLISTED PULMONARY SVC/PX: CPT

## 2018-04-16 PROCEDURE — 25010000002 CEFTRIAXONE: Performed by: FAMILY MEDICINE

## 2018-04-16 PROCEDURE — 94760 N-INVAS EAR/PLS OXIMETRY 1: CPT

## 2018-04-16 PROCEDURE — 85730 THROMBOPLASTIN TIME PARTIAL: CPT | Performed by: FAMILY MEDICINE

## 2018-04-16 RX ORDER — FUROSEMIDE 40 MG/1
40 TABLET ORAL DAILY
Status: DISCONTINUED | OUTPATIENT
Start: 2018-04-16 | End: 2018-04-18 | Stop reason: HOSPADM

## 2018-04-16 RX ORDER — METOPROLOL TARTRATE 5 MG/5ML
2.5 INJECTION INTRAVENOUS ONCE
Status: COMPLETED | OUTPATIENT
Start: 2018-04-17 | End: 2018-04-16

## 2018-04-16 RX ORDER — SOTALOL HYDROCHLORIDE 80 MG/1
80 TABLET ORAL EVERY 12 HOURS SCHEDULED
Status: DISCONTINUED | OUTPATIENT
Start: 2018-04-16 | End: 2018-04-18 | Stop reason: HOSPADM

## 2018-04-16 RX ADMIN — BUDESONIDE AND FORMOTEROL FUMARATE DIHYDRATE 2 PUFF: 160; 4.5 AEROSOL RESPIRATORY (INHALATION) at 20:20

## 2018-04-16 RX ADMIN — CILOSTAZOL 50 MG: 100 TABLET ORAL at 17:01

## 2018-04-16 RX ADMIN — METOROPROLOL TARTRATE 2.5 MG: 5 INJECTION, SOLUTION INTRAVENOUS at 23:54

## 2018-04-16 RX ADMIN — PRAMIPEXOLE DIHYDROCHLORIDE 0.25 MG: 0.25 TABLET ORAL at 17:02

## 2018-04-16 RX ADMIN — WARFARIN SODIUM 5 MG: 5 TABLET ORAL at 17:02

## 2018-04-16 RX ADMIN — MONTELUKAST SODIUM 10 MG: 10 TABLET, FILM COATED ORAL at 20:57

## 2018-04-16 RX ADMIN — SOTALOL HYDROCHLORIDE 80 MG: 80 TABLET ORAL at 20:56

## 2018-04-16 RX ADMIN — HEPARIN SODIUM 4100 UNITS: 5000 INJECTION, SOLUTION INTRAVENOUS; SUBCUTANEOUS at 06:54

## 2018-04-16 RX ADMIN — GUAIFENESIN 1200 MG: 600 TABLET, EXTENDED RELEASE ORAL at 20:57

## 2018-04-16 RX ADMIN — PRAMIPEXOLE DIHYDROCHLORIDE 0.25 MG: 0.25 TABLET ORAL at 08:16

## 2018-04-16 RX ADMIN — Medication 10 ML: at 20:59

## 2018-04-16 RX ADMIN — SOTALOL HYDROCHLORIDE 80 MG: 80 TABLET ORAL at 08:16

## 2018-04-16 RX ADMIN — ASPIRIN 81 MG: 81 TABLET, COATED ORAL at 08:16

## 2018-04-16 RX ADMIN — IPRATROPIUM BROMIDE AND ALBUTEROL SULFATE 3 ML: 2.5; .5 SOLUTION RESPIRATORY (INHALATION) at 11:27

## 2018-04-16 RX ADMIN — PRAMIPEXOLE DIHYDROCHLORIDE 0.25 MG: 0.25 TABLET ORAL at 20:57

## 2018-04-16 RX ADMIN — LEVOTHYROXINE SODIUM 25 MCG: 25 TABLET ORAL at 08:16

## 2018-04-16 RX ADMIN — MAGNESIUM OXIDE TAB 400 MG (241.3 MG ELEMENTAL MG) 800 MG: 400 (241.3 MG) TAB at 20:57

## 2018-04-16 RX ADMIN — HEPARIN SODIUM 4100 UNITS: 5000 INJECTION, SOLUTION INTRAVENOUS; SUBCUTANEOUS at 21:18

## 2018-04-16 RX ADMIN — IPRATROPIUM BROMIDE AND ALBUTEROL SULFATE 3 ML: 2.5; .5 SOLUTION RESPIRATORY (INHALATION) at 07:50

## 2018-04-16 RX ADMIN — Medication 10 ML: at 08:16

## 2018-04-16 RX ADMIN — HYDROCODONE BITARTRATE AND ACETAMINOPHEN 1 TABLET: 7.5; 325 TABLET ORAL at 22:16

## 2018-04-16 RX ADMIN — HYDROCORTISONE: 1 CREAM TOPICAL at 21:22

## 2018-04-16 RX ADMIN — CILOSTAZOL 50 MG: 100 TABLET ORAL at 08:16

## 2018-04-16 RX ADMIN — NITROGLYCERIN 1 INCH: 20 OINTMENT TOPICAL at 22:14

## 2018-04-16 RX ADMIN — CEFTRIAXONE 1 G: 1 INJECTION, POWDER, FOR SOLUTION INTRAMUSCULAR; INTRAVENOUS at 05:48

## 2018-04-16 RX ADMIN — NITROGLYCERIN 1 INCH: 20 OINTMENT TOPICAL at 14:24

## 2018-04-16 RX ADMIN — GUAIFENESIN 1200 MG: 600 TABLET, EXTENDED RELEASE ORAL at 08:16

## 2018-04-16 RX ADMIN — IPRATROPIUM BROMIDE AND ALBUTEROL SULFATE 3 ML: 2.5; .5 SOLUTION RESPIRATORY (INHALATION) at 20:20

## 2018-04-16 RX ADMIN — HEPARIN SODIUM 7.7 UNITS/KG/HR: 10000 INJECTION, SOLUTION INTRAVENOUS at 17:05

## 2018-04-16 RX ADMIN — QUETIAPINE FUMARATE 300 MG: 300 TABLET ORAL at 20:57

## 2018-04-16 RX ADMIN — FUROSEMIDE 40 MG: 40 TABLET ORAL at 11:21

## 2018-04-16 RX ADMIN — IPRATROPIUM BROMIDE AND ALBUTEROL SULFATE 3 ML: 2.5; .5 SOLUTION RESPIRATORY (INHALATION) at 14:33

## 2018-04-16 NOTE — PROGRESS NOTES
"[facility]  [address] [place] [address]. [address]  T - [phone]  F - [phone]     Progress Note          PATIENT  DEMOGRAPHICS   PATIENT NAME: Massimo Bentley                      PHYSICIAN: Emelia Ho MD  : 1940  MRN: [id number]   LOS: 4 days    Patient Care Team:  Belkys Hoffman MD as PCP - General  Gucci Brownlee MD as PCP - Claims Attributed  Brian Moshe Pennington MD as Surgeon (Cardiothoracic Surgery)  Emily Thacker MD as Consulting Physician (Cardiology)  Devin Menendez DPM as Consulting Physician (Podiatry)  Subjective   SUBJECTIVE   Resting no soa, drowsy but arousable         Objective   OBJECTIVE   Vital Signs  Temp:  [96.7 °F (35.9 °C)-98 °F (36.7 °C)] 96.7 °F (35.9 °C)  Heart Rate:  [] 86  Resp:  [16-20] 20  BP: (130-152)/(75-96) 132/90    Flowsheet Rows    Flowsheet Row First Filed Value   Admission Height 180.3 cm (71\") Documented at 2018 2319   Admission Weight 102 kg (225 lb) Documented at 2018 2319           I/O last 3 completed shifts:  In: 2714 [P.O.:1320; I.V.:1094; IV Piggyback:300]  Out: 1350 [Urine:1350]    PHYSICAL EXAM    Physical Exam   Constitutional: He is oriented to person, place, and time. He appears well-developed.   HENT:   Head: Normocephalic.   Eyes: Pupils are equal, round, and reactive to light.   Cardiovascular: Normal rate, regular rhythm and normal heart sounds.    Pulmonary/Chest: Effort normal and breath sounds normal.   Congested sounding cough, lung fields clear   Abdominal: Soft. Bowel sounds are normal.   Neurological: He is alert and oriented to person, place, and time.       RESULTS   Results Review:      Results from last 7 days  Lab Units 04/15/18  0345 18  0214 18  0417 18  0530  18  0246   SODIUM mmol/L 142 145 142 140  --  141   SODIUM, ARTERIAL   --   --   --   --   < >  --    POTASSIUM mmol/L 3.6 4.1 4.5 4.1  --  4.5   CHLORIDE mmol/L 105 113* 107 " 106  --  104   CO2 mmol/L 25.0 20.0* 24.0 20.0*  --  25.0   BUN mg/dL 25* 33* 49* 61*  --  62*   CREATININE mg/dL 1.23 1.51* 2.16* 2.65*  --  2.93*   CALCIUM mg/dL 8.4 8.5 8.1* 8.4  --  8.7   BILIRUBIN mg/dL  --   --   --  0.6  --  0.4   ALK PHOS U/L  --   --   --  65  --  66   ALT (SGPT) U/L  --   --   --  21  --  16*   AST (SGOT) U/L  --   --   --  16*  --  32   GLUCOSE mg/dL 105* 97 86 90  --  97   GLUCOSE, ARTERIAL   --   --   --   --   < >  --    < > = values in this interval not displayed.    Estimated Creatinine Clearance: 61.2 mL/min (by C-G formula based on SCr of 1.23 mg/dL).                  Results from last 7 days  Lab Units 04/12/18  0530 04/12/18  0246   WBC 10*3/mm3 7.44 7.72   HEMOGLOBIN g/dL 10.5* 11.4*   PLATELETS 10*3/mm3 180 184         Results from last 7 days  Lab Units 04/16/18  0535 04/15/18  1152 04/12/18  0246   INR  1.18 1.20 1.48*         Imaging Results (last 24 hours)     ** No results found for the last 24 hours. **           MEDICATIONS      aspirin 81 mg Oral Daily   budesonide-formoterol 2 puff Inhalation BID - RT   ceftriaxone 1 g Intravenous Q24H   cilostazol 50 mg Oral BID AC   guaiFENesin 1,200 mg Oral Q12H   ipratropium-albuterol 3 mL Nebulization 4x Daily - RT   levothyroxine 25 mcg Oral Daily   LORazepam 0.25 mg Oral Once   magnesium oxide 800 mg Oral Nightly   montelukast 10 mg Oral Nightly   nitroglycerin 1 inch Topical Q8H   pramipexole 0.25 mg Oral TID   QUEtiapine 300 mg Oral Nightly   sodium chloride 500 mL Intravenous Once   sotalol 80 mg Oral Q24H   warfarin 5 mg Oral Daily       heparin (porcine) 9.7 Units/kg/hr Last Rate: 9.7 Units/kg/hr (04/16/18 0628)       Assessment/Plan   ASSESSMENT / PLAN    Principal Problem:    Arterial occlusion, lower extremity  Active Problems:    Atrial fibrillation [I48.91]    PVD (peripheral vascular disease)    Tobacco dependence syndrome    Stroke    Hypercholesterolemia    GERD (gastroesophageal reflux disease)    Hyperlipidemia     Atherosclerosis of artery of extremity with gangrene    AAA (abdominal aortic aneurysm) without rupture    CKD (chronic kidney disease) stage 3, GFR 30-59 ml/min    COPD (chronic obstructive pulmonary disease)    Hypertension    1. CHANDRIKA on CKD Stage III- Baseline creatinine close to 1.7 now acute kidney injury with creatinine up to 2.9.  This could be volume depletion and this has improved with IV fluid.  Patient ultrasound was reviewed from late last year and there is no hydronephrosis, he does have a simple cyst.    - Keep sotalol daily resume lasix check lab today and if stable will see him in the office in 3-4 weeks. Cr 1.2 post contrast     2. History of Atrial Fibrillation     3. Right popliteal artery occlusion- with numbness, pallor, and cold extremity.  Patient is currently on heparin drip.  He also has a right popliteal vein DVT.  Vascular surgery is consulted. CTA completed yesterday with findings apparently similar to CTA in September.     4. History of tobacco use- with severe peripheral vascular disease      5. Possible bilateral pneumonia- patient is currently on Rocephin and Levaquin is d/c'd.            This document has been electronically signed by Emelia Ho MD on April 16, 2018 9:50 AM

## 2018-04-16 NOTE — PROGRESS NOTES
[facility] Medicine Services  INPATIENT PROGRESS NOTE    Length of Stay: 4  Date of Admission: 4/12/2018  Primary Care Physician: Belkys Hoffman MD    Subjective   Chief Complaint:  Right leg numbness   HPI:  Patient states that he has no leg pain, but he does have intermittent numbness.    Review of Systems   Constitutional: Negative for appetite change, chills, fatigue and fever.   Respiratory: Negative for chest tightness and shortness of breath.    Cardiovascular: Negative for chest pain, palpitations and leg swelling.   Gastrointestinal: Negative for abdominal pain, constipation, diarrhea, nausea and vomiting.   Skin: Positive for color change (RLE). Negative for wound.   Neurological: Negative for dizziness, weakness, light-headedness, numbness and headaches.        All pertinent negatives and positives are as above. All other systems have been reviewed and are negative unless otherwise stated.     Objective    Temp:  [96.5 °F (35.8 °C)-98 °F (36.7 °C)] 96.5 °F (35.8 °C)  Heart Rate:  [] 81  Resp:  [18-20] 20  BP: (128-152)/(75-96) 145/93      Physical Exam   Constitutional: He appears well-developed and well-nourished.   HENT:   Head: Normocephalic and atraumatic.   Eyes: EOM are normal. Pupils are equal, round, and reactive to light.   Neck: Normal range of motion. Neck supple.   Cardiovascular: Normal rate, regular rhythm and normal heart sounds.  Exam reveals decreased pulses. Exam reveals no gallop and no friction rub.    No murmur heard.  Pulses:       Dorsalis pedis pulses are 0 on the right side.        Posterior tibial pulses are 0 on the right side.   Pulmonary/Chest: Effort normal and breath sounds normal. No respiratory distress. He has no wheezes. He has no rales. He exhibits no tenderness.   Abdominal: Soft. Bowel sounds are normal. He exhibits no distension. There is no tenderness.   Psychiatric: He has a normal mood and affect. His  behavior is normal.   Vitals reviewed.          Results Review:  I have reviewed the labs, radiology results, and diagnostic studies.    Laboratory Data:     Results from last 7 days  Lab Units 04/15/18  0345 04/14/18  0214 04/13/18  0417 04/12/18  0530  04/12/18  0246   SODIUM mmol/L 142 145 142 140  --  141   SODIUM, ARTERIAL   --   --   --   --   < >  --    POTASSIUM mmol/L 3.6 4.1 4.5 4.1  --  4.5   CHLORIDE mmol/L 105 113* 107 106  --  104   CO2 mmol/L 25.0 20.0* 24.0 20.0*  --  25.0   BUN mg/dL 25* 33* 49* 61*  --  62*   CREATININE mg/dL 1.23 1.51* 2.16* 2.65*  --  2.93*   GLUCOSE mg/dL 105* 97 86 90  --  97   GLUCOSE, ARTERIAL   --   --   --   --   < >  --    CALCIUM mg/dL 8.4 8.5 8.1* 8.4  --  8.7   BILIRUBIN mg/dL  --   --   --  0.6  --  0.4   ALK PHOS U/L  --   --   --  65  --  66   ALT (SGPT) U/L  --   --   --  21  --  16*   AST (SGOT) U/L  --   --   --  16*  --  32   ANION GAP mmol/L 12.0 12.0 11.0 14.0  --  12.0   < > = values in this interval not displayed.  Estimated Creatinine Clearance: 61.2 mL/min (by C-G formula based on SCr of 1.23 mg/dL).            Results from last 7 days  Lab Units 04/12/18  0530 04/12/18  0246   WBC 10*3/mm3 7.44 7.72   HEMOGLOBIN g/dL 10.5* 11.4*   HEMATOCRIT % 30.6* 33.8*   PLATELETS 10*3/mm3 180 184       Results from last 7 days  Lab Units 04/16/18  0535 04/15/18  1152 04/12/18  0246   INR  1.18 1.20 1.48*         Radiology Data:   Imaging Results (last 24 hours)     ** No results found for the last 24 hours. **            Results from last 7 days  Lab Units 04/12/18  0247   PH, ARTERIAL pH units 7.269*   PO2 ART mm Hg 94.9   PCO2, ARTERIAL mm Hg 46.9*   HCO3 ART mmol/L 21.0*       I have reviewed the patient current medications.     Assessment/Plan     Hospital Problem List     * (Principal)Arterial occlusion, lower extremity    Atrial fibrillation [I48.91]    PVD (peripheral vascular disease)    Tobacco dependence syndrome    Overview Signed 11/28/2016 10:39 AM by  Belkys Hoffman MD     2014 to E cigarette            Stroke    Overview Signed 11/28/2016 10:39 AM by Belkys Hoffman MD      Echo: L atrial appendage thrombus            Hypercholesterolemia    GERD (gastroesophageal reflux disease)    Hyperlipidemia    Atherosclerosis of artery of extremity with gangrene    AAA (abdominal aortic aneurysm) without rupture    CKD (chronic kidney disease) stage 3, GFR 30-59 ml/min    COPD (chronic obstructive pulmonary disease)    Hypertension          Plan:  1.  Right lower extremity arterial occlusion, acute on chronic:  Per Dr. Pennington patient is not a good surgical candidate.  He feels that anticoagulation is the best option at this point.  Patient is currently on a heparin gtt and coumadin.    2.  CHANDRIKA on CKD:  Resolved.  Dr. Ho following.  3.  Atrial fibrillation:  On sotalol and anticoagulation.  4.  Bilateral pneumonia:  Continue antibiotics.  5.  Tobacco Abuse          Discharge Planning: I expect patient to be discharged when clinically indicated.        This document has been electronically signed by Rafa Doty MD on April 16, 2018 6:20 PM

## 2018-04-16 NOTE — NURSING NOTE
Patient has healing surgical wound left groin from a Fem-Pop. Right 5th toe eschar from poor arterial flow. Vascular dept treating POA yes

## 2018-04-16 NOTE — PLAN OF CARE
Problem: Patient Care Overview  Goal: Plan of Care Review  Outcome: Ongoing (interventions implemented as appropriate)   04/15/18 1631 04/15/18 1933   Coping/Psychosocial   Plan of Care Reviewed With --  patient   Plan of Care Review   Progress improving --    OTHER   Outcome Summary mental status improving, no change in arterial occlusion --      Goal: Individualization and Mutuality  Outcome: Ongoing (interventions implemented as appropriate)    Goal: Discharge Needs Assessment  Outcome: Ongoing (interventions implemented as appropriate)      Problem: Fall Risk (Adult)  Goal: Absence of Fall  Outcome: Ongoing (interventions implemented as appropriate)      Problem: VTE, DVT and PE (Adult)  Goal: Signs and Symptoms of Listed Potential Problems Will be Absent, Minimized or Managed (VTE, DVT and PE)  Outcome: Ongoing (interventions implemented as appropriate)      Problem: Skin Injury Risk (Adult)  Goal: Skin Health and Integrity  Outcome: Ongoing (interventions implemented as appropriate)      Problem: Pneumonia (Adult)  Goal: Signs and Symptoms of Listed Potential Problems Will be Absent, Minimized or Managed (Pneumonia)  Outcome: Ongoing (interventions implemented as appropriate)      Problem: Thrombolytic Therapy (Adult)  Goal: Signs and Symptoms of Listed Potential Problems Will be Absent, Minimized or Managed (Thrombolytic Therapy)  Outcome: Ongoing (interventions implemented as appropriate)

## 2018-04-16 NOTE — PLAN OF CARE
Problem: Patient Care Overview  Goal: Plan of Care Review  Outcome: Ongoing (interventions implemented as appropriate)   04/16/18 1380   Coping/Psychosocial   Plan of Care Reviewed With patient   Plan of Care Review   Progress no change   OTHER   Outcome Summary mental status stabilized no change in occlusion     Goal: Individualization and Mutuality  Outcome: Ongoing (interventions implemented as appropriate)    Goal: Discharge Needs Assessment  Outcome: Ongoing (interventions implemented as appropriate)      Problem: Fall Risk (Adult)  Goal: Absence of Fall  Outcome: Ongoing (interventions implemented as appropriate)      Problem: Skin Injury Risk (Adult)  Goal: Skin Health and Integrity  Outcome: Ongoing (interventions implemented as appropriate)      Problem: Pneumonia (Adult)  Goal: Signs and Symptoms of Listed Potential Problems Will be Absent, Minimized or Managed (Pneumonia)  Outcome: Ongoing (interventions implemented as appropriate)      Problem: Renal Failure/Kidney Injury, Acute (Adult)  Goal: Signs and Symptoms of Listed Potential Problems Will be Absent, Minimized or Managed (Renal Failure/Kidney Injury, Acute)  Outcome: Ongoing (interventions implemented as appropriate)      Problem: Thrombolytic Therapy (Adult)  Goal: Signs and Symptoms of Listed Potential Problems Will be Absent, Minimized or Managed (Thrombolytic Therapy)  Outcome: Ongoing (interventions implemented as appropriate)      Problem: Pain, Acute (Adult)  Goal: Identify Related Risk Factors and Signs and Symptoms  Outcome: Outcome(s) achieved Date Met: 04/16/18    Goal: Acceptable Pain Control/Comfort Level  Outcome: Ongoing (interventions implemented as appropriate)

## 2018-04-16 NOTE — NURSING NOTE
Informed by tele that pt has convered to atrial fib. Pt is asymptomatic and rate controlled. Pt also has a hx of afib. Dr cote notified and no new orders received. Will continue to monitor

## 2018-04-16 NOTE — NURSING NOTE
"Discussed with dr Doty pts heart rhythmn running afib 80s-150s and not steadily sustaining any consistent rate. Pt is aysmptomatic. No orders received over the phone but states \"I'm in his chart I'll take care of it\" will continue to monitor  "

## 2018-04-17 LAB
APTT PPP: 44.4 SECONDS (ref 20–40.3)
APTT PPP: 59.2 SECONDS (ref 20–40.3)
APTT PPP: 61.8 SECONDS (ref 20–40.3)
BACTERIA SPEC AEROBE CULT: NORMAL
BACTERIA SPEC AEROBE CULT: NORMAL
HOLD SPECIMEN: NORMAL
INR PPP: 1.2 (ref 0.8–1.2)
PROTHROMBIN TIME: 14.9 SECONDS (ref 11.1–15.3)
WHOLE BLOOD HOLD SPECIMEN: NORMAL

## 2018-04-17 PROCEDURE — 25010000002 HEPARIN (PORCINE) PER 1000 UNITS: Performed by: THORACIC SURGERY (CARDIOTHORACIC VASCULAR SURGERY)

## 2018-04-17 PROCEDURE — 94760 N-INVAS EAR/PLS OXIMETRY 1: CPT

## 2018-04-17 PROCEDURE — 94799 UNLISTED PULMONARY SVC/PX: CPT

## 2018-04-17 PROCEDURE — 85730 THROMBOPLASTIN TIME PARTIAL: CPT | Performed by: FAMILY MEDICINE

## 2018-04-17 PROCEDURE — 25010000002 CEFTRIAXONE PER 250 MG: Performed by: FAMILY MEDICINE

## 2018-04-17 PROCEDURE — 85610 PROTHROMBIN TIME: CPT | Performed by: INTERNAL MEDICINE

## 2018-04-17 RX ORDER — METOPROLOL TARTRATE 5 MG/5ML
2.5 INJECTION INTRAVENOUS ONCE
Status: DISCONTINUED | OUTPATIENT
Start: 2018-04-17 | End: 2018-04-18 | Stop reason: HOSPADM

## 2018-04-17 RX ORDER — HEPARIN SODIUM 5000 [USP'U]/ML
80 INJECTION, SOLUTION INTRAVENOUS; SUBCUTANEOUS AS NEEDED
Status: DISCONTINUED | OUTPATIENT
Start: 2018-04-17 | End: 2018-04-18

## 2018-04-17 RX ORDER — HEPARIN SODIUM 5000 [USP'U]/ML
40 INJECTION, SOLUTION INTRAVENOUS; SUBCUTANEOUS AS NEEDED
Status: DISCONTINUED | OUTPATIENT
Start: 2018-04-17 | End: 2018-04-18

## 2018-04-17 RX ADMIN — MONTELUKAST SODIUM 10 MG: 10 TABLET, FILM COATED ORAL at 20:26

## 2018-04-17 RX ADMIN — NITROGLYCERIN 1 INCH: 20 OINTMENT TOPICAL at 21:21

## 2018-04-17 RX ADMIN — CEFTRIAXONE 1 G: 1 INJECTION, POWDER, FOR SOLUTION INTRAMUSCULAR; INTRAVENOUS at 05:30

## 2018-04-17 RX ADMIN — IPRATROPIUM BROMIDE AND ALBUTEROL SULFATE 3 ML: 2.5; .5 SOLUTION RESPIRATORY (INHALATION) at 07:08

## 2018-04-17 RX ADMIN — WARFARIN SODIUM 5 MG: 5 TABLET ORAL at 18:00

## 2018-04-17 RX ADMIN — MAGNESIUM OXIDE TAB 400 MG (241.3 MG ELEMENTAL MG) 800 MG: 400 (241.3 MG) TAB at 20:26

## 2018-04-17 RX ADMIN — HYDROCORTISONE: 1 CREAM TOPICAL at 08:40

## 2018-04-17 RX ADMIN — LEVOTHYROXINE SODIUM 25 MCG: 25 TABLET ORAL at 08:38

## 2018-04-17 RX ADMIN — IPRATROPIUM BROMIDE AND ALBUTEROL SULFATE 3 ML: 2.5; .5 SOLUTION RESPIRATORY (INHALATION) at 10:18

## 2018-04-17 RX ADMIN — HEPARIN SODIUM 4100 UNITS: 5000 INJECTION, SOLUTION INTRAVENOUS; SUBCUTANEOUS at 22:29

## 2018-04-17 RX ADMIN — GUAIFENESIN 1200 MG: 600 TABLET, EXTENDED RELEASE ORAL at 08:38

## 2018-04-17 RX ADMIN — HYDROCODONE BITARTRATE AND ACETAMINOPHEN 1 TABLET: 7.5; 325 TABLET ORAL at 04:30

## 2018-04-17 RX ADMIN — BUDESONIDE AND FORMOTEROL FUMARATE DIHYDRATE 2 PUFF: 160; 4.5 AEROSOL RESPIRATORY (INHALATION) at 07:14

## 2018-04-17 RX ADMIN — SOTALOL HYDROCHLORIDE 80 MG: 80 TABLET ORAL at 20:26

## 2018-04-17 RX ADMIN — PRAMIPEXOLE DIHYDROCHLORIDE 0.25 MG: 0.25 TABLET ORAL at 08:38

## 2018-04-17 RX ADMIN — CILOSTAZOL 50 MG: 100 TABLET ORAL at 08:38

## 2018-04-17 RX ADMIN — PRAMIPEXOLE DIHYDROCHLORIDE 0.25 MG: 0.25 TABLET ORAL at 16:40

## 2018-04-17 RX ADMIN — ASPIRIN 81 MG: 81 TABLET, COATED ORAL at 08:38

## 2018-04-17 RX ADMIN — QUETIAPINE FUMARATE 300 MG: 300 TABLET ORAL at 20:26

## 2018-04-17 RX ADMIN — GUAIFENESIN 1200 MG: 600 TABLET, EXTENDED RELEASE ORAL at 20:26

## 2018-04-17 RX ADMIN — HEPARIN SODIUM 9.71 UNITS/KG/HR: 10000 INJECTION, SOLUTION INTRAVENOUS at 20:20

## 2018-04-17 RX ADMIN — PRAMIPEXOLE DIHYDROCHLORIDE 0.25 MG: 0.25 TABLET ORAL at 20:26

## 2018-04-17 RX ADMIN — FUROSEMIDE 40 MG: 40 TABLET ORAL at 08:38

## 2018-04-17 RX ADMIN — CILOSTAZOL 50 MG: 100 TABLET ORAL at 16:40

## 2018-04-17 RX ADMIN — IPRATROPIUM BROMIDE AND ALBUTEROL SULFATE 3 ML: 2.5; .5 SOLUTION RESPIRATORY (INHALATION) at 19:41

## 2018-04-17 RX ADMIN — IPRATROPIUM BROMIDE AND ALBUTEROL SULFATE 3 ML: 2.5; .5 SOLUTION RESPIRATORY (INHALATION) at 15:27

## 2018-04-17 RX ADMIN — HYDROCORTISONE: 1 CREAM TOPICAL at 21:21

## 2018-04-17 RX ADMIN — NITROGLYCERIN 1 INCH: 20 OINTMENT TOPICAL at 16:40

## 2018-04-17 RX ADMIN — BUDESONIDE AND FORMOTEROL FUMARATE DIHYDRATE 2 PUFF: 160; 4.5 AEROSOL RESPIRATORY (INHALATION) at 19:41

## 2018-04-17 RX ADMIN — NITROGLYCERIN 1 INCH: 20 OINTMENT TOPICAL at 05:30

## 2018-04-17 NOTE — PROGRESS NOTES
[facility] Medicine Services  INPATIENT PROGRESS NOTE    Length of Stay: 5  Date of Admission: 4/12/2018  Primary Care Physician: Belkys Hoffman MD    Subjective   Chief Complaint:  Right leg numbness   HPI:  Patient states that he has no leg pain, but he does have intermittent numbness.  He feels like the numbness is improving.    Review of Systems   Constitutional: Negative for appetite change, chills, fatigue and fever.   Respiratory: Negative for chest tightness and shortness of breath.    Cardiovascular: Negative for chest pain, palpitations and leg swelling.   Gastrointestinal: Negative for abdominal pain, constipation, diarrhea, nausea and vomiting.   Skin: Positive for color change (RLE). Negative for wound.   Neurological: Negative for dizziness, weakness, light-headedness, numbness and headaches.        All pertinent negatives and positives are as above. All other systems have been reviewed and are negative unless otherwise stated.     Objective    Temp:  [96.6 °F (35.9 °C)-97.8 °F (36.6 °C)] 97.4 °F (36.3 °C)  Heart Rate:  [] 70  Resp:  [12-20] 16  BP: (102-170)/(63-76) 131/71      Physical Exam   Constitutional: He appears well-developed and well-nourished.   HENT:   Head: Normocephalic and atraumatic.   Eyes: EOM are normal. Pupils are equal, round, and reactive to light.   Neck: Normal range of motion. Neck supple.   Cardiovascular: Normal rate, regular rhythm and normal heart sounds.  Exam reveals decreased pulses. Exam reveals no gallop and no friction rub.    No murmur heard.  Pulses:       Dorsalis pedis pulses are 0 on the right side.        Posterior tibial pulses are 0 on the right side.   Pulmonary/Chest: Effort normal and breath sounds normal. No respiratory distress. He has no wheezes. He has no rales. He exhibits no tenderness.   Abdominal: Soft. Bowel sounds are normal. He exhibits no distension. There is no tenderness.   Psychiatric:  He has a normal mood and affect. His behavior is normal.   Vitals reviewed.          Results Review:  I have reviewed the labs, radiology results, and diagnostic studies.    Laboratory Data:     Results from last 7 days  Lab Units 04/15/18  0345 04/14/18  0214 04/13/18  0417 04/12/18  0530  04/12/18  0246   SODIUM mmol/L 142 145 142 140  --  141   SODIUM, ARTERIAL   --   --   --   --   < >  --    POTASSIUM mmol/L 3.6 4.1 4.5 4.1  --  4.5   CHLORIDE mmol/L 105 113* 107 106  --  104   CO2 mmol/L 25.0 20.0* 24.0 20.0*  --  25.0   BUN mg/dL 25* 33* 49* 61*  --  62*   CREATININE mg/dL 1.23 1.51* 2.16* 2.65*  --  2.93*   GLUCOSE mg/dL 105* 97 86 90  --  97   GLUCOSE, ARTERIAL   --   --   --   --   < >  --    CALCIUM mg/dL 8.4 8.5 8.1* 8.4  --  8.7   BILIRUBIN mg/dL  --   --   --  0.6  --  0.4   ALK PHOS U/L  --   --   --  65  --  66   ALT (SGPT) U/L  --   --   --  21  --  16*   AST (SGOT) U/L  --   --   --  16*  --  32   ANION GAP mmol/L 12.0 12.0 11.0 14.0  --  12.0   < > = values in this interval not displayed.  Estimated Creatinine Clearance: 61.7 mL/min (by C-G formula based on SCr of 1.23 mg/dL).            Results from last 7 days  Lab Units 04/12/18  0530 04/12/18  0246   WBC 10*3/mm3 7.44 7.72   HEMOGLOBIN g/dL 10.5* 11.4*   HEMATOCRIT % 30.6* 33.8*   PLATELETS 10*3/mm3 180 184       Results from last 7 days  Lab Units 04/17/18  0327 04/16/18  0535 04/15/18  1152 04/12/18  0246   INR  1.20 1.18 1.20 1.48*         Radiology Data:   Imaging Results (last 24 hours)     ** No results found for the last 24 hours. **            Results from last 7 days  Lab Units 04/12/18  0247   PH, ARTERIAL pH units 7.269*   PO2 ART mm Hg 94.9   PCO2, ARTERIAL mm Hg 46.9*   HCO3 ART mmol/L 21.0*       I have reviewed the patient current medications.     Assessment/Plan     Hospital Problem List     * (Principal)Arterial occlusion, lower extremity    Atrial fibrillation [I48.91]    PVD (peripheral vascular disease)    Tobacco  dependence syndrome    Overview Signed 11/28/2016 10:39 AM by Belkys Hoffman MD     2014 to E cigarette            Stroke    Overview Signed 11/28/2016 10:39 AM by Belkys Hoffman MD      Echo: L atrial appendage thrombus            Hypercholesterolemia    GERD (gastroesophageal reflux disease)    Hyperlipidemia    Atherosclerosis of artery of extremity with gangrene    AAA (abdominal aortic aneurysm) without rupture    CKD (chronic kidney disease) stage 3, GFR 30-59 ml/min    COPD (chronic obstructive pulmonary disease)    Hypertension          Plan:  1.  Right lower extremity arterial occlusion, acute on chronic:  Per Dr. Pennington patient is not a good surgical candidate.  He feels that anticoagulation is the best option at this point.  Patient is currently on a heparin gtt until coumadin is therapeutic.  Family wants consultation with Dr. Sousa.  This is scheduled for Monday at 1000.    2.  CHANDRIKA on CKD:  Resolved.  Dr. Ho following.  3.  Atrial fibrillation:  On sotalol and anticoagulation.  4.  Bilateral pneumonia:  Continue antibiotics.  5.  Tobacco Abuse          Discharge Planning: I expect patient to be discharged on lovenox bridge in 1 day.        This document has been electronically signed by Rafa Doty MD on April 17, 2018 6:24 PM

## 2018-04-17 NOTE — PLAN OF CARE
Problem: Patient Care Overview  Goal: Plan of Care Review  Outcome: Ongoing (interventions implemented as appropriate)   04/17/18 0504   Coping/Psychosocial   Plan of Care Reviewed With patient   Plan of Care Review   Progress no change   OTHER   Outcome Summary AFib with RVR this shift, now sr with pvc's. Betapace increased to bid, lopressor given x 1 this shift. bp stable. no change in arterial occlusion     Goal: Individualization and Mutuality  Outcome: Ongoing (interventions implemented as appropriate)   04/17/18 0504   Individualization   Patient Specific Preferences door open     Goal: Discharge Needs Assessment  Outcome: Ongoing (interventions implemented as appropriate)   04/17/18 0504   Discharge Needs Assessment   Concerns to be Addressed adjustment to diagnosis/illness       Problem: Fall Risk (Adult)  Goal: Absence of Fall  Outcome: Ongoing (interventions implemented as appropriate)   04/17/18 0504   Fall Risk (Adult)   Absence of Fall making progress toward outcome       Problem: Skin Injury Risk (Adult)  Goal: Skin Health and Integrity  Outcome: Ongoing (interventions implemented as appropriate)   04/17/18 0504   Skin Injury Risk (Adult)   Skin Health and Integrity making progress toward outcome       Problem: Pneumonia (Adult)  Goal: Signs and Symptoms of Listed Potential Problems Will be Absent, Minimized or Managed (Pneumonia)  Outcome: Ongoing (interventions implemented as appropriate)   04/17/18 0504   Goal/Outcome Evaluation   Problems Assessed (Pneumonia) all   Problems Present (Pneumonia) none       Problem: Renal Failure/Kidney Injury, Acute (Adult)  Goal: Signs and Symptoms of Listed Potential Problems Will be Absent, Minimized or Managed (Renal Failure/Kidney Injury, Acute)  Outcome: Ongoing (interventions implemented as appropriate)   04/17/18 0504   Goal/Outcome Evaluation   Problems Assessed (Acute Renal Failure/Kidney Injury) all   Problems Present (ARF/Kidney Injury) situational  response       Problem: Thrombolytic Therapy (Adult)  Goal: Signs and Symptoms of Listed Potential Problems Will be Absent, Minimized or Managed (Thrombolytic Therapy)  Outcome: Ongoing (interventions implemented as appropriate)   04/17/18 0504   Goal/Outcome Evaluation   Problems Assessed (Thrombolytic Therapy) all   Problems Assessed (Thrombolytic Therapy) none       Problem: Pain, Acute (Adult)  Goal: Acceptable Pain Control/Comfort Level  Outcome: Ongoing (interventions implemented as appropriate)   04/17/18 0504   Pain, Acute (Adult)   Acceptable Pain Control/Comfort Level making progress toward outcome

## 2018-04-17 NOTE — PROGRESS NOTES
"[facility]  [address] [place] [address]. [id number]  T - [phone]  F - [phone]     Progress Note          PATIENT  DEMOGRAPHICS   PATIENT NAME: Massimo Bentley                      PHYSICIAN: Emelia Ho MD  : 1940  MRN: [id number]   LOS: 5 days    Patient Care Team:  Belkys oHffman MD as PCP - General  Gucci Brownlee MD as PCP - Claims Attributed  Brian Moshe Pennington MD as Surgeon (Cardiothoracic Surgery)  Emily Thacker MD as Consulting Physician (Cardiology)  Devin Menendez DPM as Consulting Physician (Podiatry)  Subjective   SUBJECTIVE   Events reviewed had afib last night feels better this am          Objective   OBJECTIVE   Vital Signs  Temp:  [96.5 °F (35.8 °C)-97.8 °F (36.6 °C)] 96.6 °F (35.9 °C)  Heart Rate:  [] 77  Resp:  [14-20] 16  BP: (102-170)/(63-93) 102/64    Flowsheet Rows    Flowsheet Row First Filed Value   Admission Height 180.3 cm (71\") Documented at 2018 2319   Admission Weight 102 kg (225 lb) Documented at 2018 2319           I/O last 3 completed shifts:  In: 1462.3 [P.O.:960; I.V.:302.3; IV Piggyback:200]  Out: 2300 [Urine:2300]    PHYSICAL EXAM    Physical Exam   Constitutional: He is oriented to person, place, and time. He appears well-developed.   HENT:   Head: Normocephalic.   Eyes: Pupils are equal, round, and reactive to light.   Cardiovascular: Normal rate, regular rhythm and normal heart sounds.    Pulmonary/Chest: Effort normal and breath sounds normal.   Congested sounding cough, lung fields clear   Abdominal: Soft. Bowel sounds are normal.   Neurological: He is alert and oriented to person, place, and time.       RESULTS   Results Review:      Results from last 7 days  Lab Units 04/15/18  0345 18  0214 18  0417 18  0530  18  0246   SODIUM mmol/L 142 145 142 140  --  141   SODIUM, ARTERIAL   --   --   --   --   < >  --    POTASSIUM mmol/L 3.6 4.1 4.5 4.1  --  4.5 "   CHLORIDE mmol/L 105 113* 107 106  --  104   CO2 mmol/L 25.0 20.0* 24.0 20.0*  --  25.0   BUN mg/dL 25* 33* 49* 61*  --  62*   CREATININE mg/dL 1.23 1.51* 2.16* 2.65*  --  2.93*   CALCIUM mg/dL 8.4 8.5 8.1* 8.4  --  8.7   BILIRUBIN mg/dL  --   --   --  0.6  --  0.4   ALK PHOS U/L  --   --   --  65  --  66   ALT (SGPT) U/L  --   --   --  21  --  16*   AST (SGOT) U/L  --   --   --  16*  --  32   GLUCOSE mg/dL 105* 97 86 90  --  97   GLUCOSE, ARTERIAL   --   --   --   --   < >  --    < > = values in this interval not displayed.    Estimated Creatinine Clearance: 61.7 mL/min (by C-G formula based on SCr of 1.23 mg/dL).                  Results from last 7 days  Lab Units 04/12/18  0530 04/12/18  0246   WBC 10*3/mm3 7.44 7.72   HEMOGLOBIN g/dL 10.5* 11.4*   PLATELETS 10*3/mm3 180 184         Results from last 7 days  Lab Units 04/17/18  0327 04/16/18  0535 04/15/18  1152 04/12/18  0246   INR  1.20 1.18 1.20 1.48*         Imaging Results (last 24 hours)     ** No results found for the last 24 hours. **           MEDICATIONS      aspirin 81 mg Oral Daily   budesonide-formoterol 2 puff Inhalation BID - RT   ceftriaxone 1 g Intravenous Q24H   cilostazol 50 mg Oral BID AC   furosemide 40 mg Oral Daily   guaiFENesin 1,200 mg Oral Q12H   ipratropium-albuterol 3 mL Nebulization 4x Daily - RT   levothyroxine 25 mcg Oral Daily   LORazepam 0.25 mg Oral Once   magic butt ointment  Topical BID   magnesium oxide 800 mg Oral Nightly   metoprolol tartrate 2.5 mg Intravenous Once   montelukast 10 mg Oral Nightly   nitroglycerin 1 inch Topical Q8H   pramipexole 0.25 mg Oral TID   QUEtiapine 300 mg Oral Nightly   sodium chloride 500 mL Intravenous Once   sotalol 80 mg Oral Q12H   warfarin 5 mg Oral Daily       heparin (porcine) 9.7 Units/kg/hr Last Rate: 9.706 Units/kg/hr (04/17/18 0439)       Assessment/Plan   ASSESSMENT / PLAN    Principal Problem:    Arterial occlusion, lower extremity  Active Problems:    Atrial fibrillation  [I48.91]    PVD (peripheral vascular disease)    Tobacco dependence syndrome    Stroke    Hypercholesterolemia    GERD (gastroesophageal reflux disease)    Hyperlipidemia    Atherosclerosis of artery of extremity with gangrene    AAA (abdominal aortic aneurysm) without rupture    CKD (chronic kidney disease) stage 3, GFR 30-59 ml/min    COPD (chronic obstructive pulmonary disease)    Hypertension    1. CHANDRIKA on CKD Stage III- Baseline creatinine close to 1.7 now acute kidney injury with creatinine up to 2.9.  This could be volume depletion and this has improved with IV fluid.  Patient ultrasound was reviewed from late last year and there is no hydronephrosis, he does have a simple cyst.    - since gfr is better agree with sotalol bid 80mg. F/u in office in 2-3 weeks     2. History of Atrial Fibrillation     3. Right popliteal artery occlusion- with numbness, pallor, and cold extremity.  Patient is currently on heparin drip.  He also has a right popliteal vein DVT.  Vascular surgery is consulted. CTA completed yesterday with findings apparently similar to CTA in September.     4. History of tobacco use- with severe peripheral vascular disease      5. Possible bilateral pneumonia- patient is currently on Rocephin and Levaquin is d/c'd.            This document has been electronically signed by Emelia Ho MD on April 17, 2018 10:00 AM

## 2018-04-17 NOTE — PLAN OF CARE
Problem: Patient Care Overview  Goal: Plan of Care Review  Outcome: Ongoing (interventions implemented as appropriate)    Goal: Individualization and Mutuality  Outcome: Ongoing (interventions implemented as appropriate)    Goal: Discharge Needs Assessment  Outcome: Ongoing (interventions implemented as appropriate)      Problem: Pneumonia (Adult)  Goal: Signs and Symptoms of Listed Potential Problems Will be Absent, Minimized or Managed (Pneumonia)  Outcome: Ongoing (interventions implemented as appropriate)      Problem: Renal Failure/Kidney Injury, Acute (Adult)  Goal: Signs and Symptoms of Listed Potential Problems Will be Absent, Minimized or Managed (Renal Failure/Kidney Injury, Acute)  Outcome: Ongoing (interventions implemented as appropriate)      Problem: Thrombolytic Therapy (Adult)  Goal: Signs and Symptoms of Listed Potential Problems Will be Absent, Minimized or Managed (Thrombolytic Therapy)  Outcome: Ongoing (interventions implemented as appropriate)      Problem: Pain, Acute (Adult)  Goal: Acceptable Pain Control/Comfort Level  Outcome: Ongoing (interventions implemented as appropriate)

## 2018-04-18 VITALS
SYSTOLIC BLOOD PRESSURE: 114 MMHG | TEMPERATURE: 98.3 F | HEART RATE: 80 BPM | DIASTOLIC BLOOD PRESSURE: 72 MMHG | WEIGHT: 229.94 LBS | HEIGHT: 71 IN | OXYGEN SATURATION: 92 % | BODY MASS INDEX: 32.19 KG/M2 | RESPIRATION RATE: 20 BRPM

## 2018-04-18 LAB
ALBUMIN SERPL-MCNC: 3.2 G/DL (ref 3.4–4.8)
ALBUMIN/GLOB SERPL: 0.9 G/DL (ref 1.1–1.8)
ALP SERPL-CCNC: 59 U/L (ref 38–126)
ALT SERPL W P-5'-P-CCNC: 39 U/L (ref 21–72)
ANION GAP SERPL CALCULATED.3IONS-SCNC: 10 MMOL/L (ref 5–15)
APTT PPP: 47.4 SECONDS (ref 20–40.3)
APTT PPP: 49.2 SECONDS (ref 20–40.3)
APTT PPP: 79.1 SECONDS (ref 20–40.3)
AST SERPL-CCNC: 36 U/L (ref 17–59)
BILIRUB SERPL-MCNC: 0.2 MG/DL (ref 0.2–1.3)
BUN BLD-MCNC: 28 MG/DL (ref 7–21)
BUN/CREAT SERPL: 19 (ref 7–25)
CALCIUM SPEC-SCNC: 8.9 MG/DL (ref 8.4–10.2)
CHLORIDE SERPL-SCNC: 102 MMOL/L (ref 95–110)
CO2 SERPL-SCNC: 29 MMOL/L (ref 22–31)
CREAT BLD-MCNC: 1.47 MG/DL (ref 0.7–1.3)
GFR SERPL CREATININE-BSD FRML MDRD: 46 ML/MIN/1.73 (ref 60–98)
GLOBULIN UR ELPH-MCNC: 3.6 GM/DL (ref 2.3–3.5)
GLUCOSE BLD-MCNC: 120 MG/DL (ref 60–100)
HOLD SPECIMEN: NORMAL
INR PPP: 1.21 (ref 0.8–1.2)
POTASSIUM BLD-SCNC: 3.8 MMOL/L (ref 3.5–5.1)
PROT SERPL-MCNC: 6.8 G/DL (ref 6.3–8.6)
PROTHROMBIN TIME: 15 SECONDS (ref 11.1–15.3)
SODIUM BLD-SCNC: 141 MMOL/L (ref 137–145)

## 2018-04-18 PROCEDURE — 94799 UNLISTED PULMONARY SVC/PX: CPT

## 2018-04-18 PROCEDURE — 85730 THROMBOPLASTIN TIME PARTIAL: CPT | Performed by: FAMILY MEDICINE

## 2018-04-18 PROCEDURE — 85610 PROTHROMBIN TIME: CPT | Performed by: INTERNAL MEDICINE

## 2018-04-18 PROCEDURE — 25010000002 CEFTRIAXONE PER 250 MG: Performed by: FAMILY MEDICINE

## 2018-04-18 PROCEDURE — 94760 N-INVAS EAR/PLS OXIMETRY 1: CPT

## 2018-04-18 PROCEDURE — 80053 COMPREHEN METABOLIC PANEL: CPT | Performed by: INTERNAL MEDICINE

## 2018-04-18 RX ORDER — HEPARIN SODIUM 5000 [USP'U]/ML
80 INJECTION, SOLUTION INTRAVENOUS; SUBCUTANEOUS AS NEEDED
Status: DISCONTINUED | OUTPATIENT
Start: 2018-04-18 | End: 2018-04-18 | Stop reason: HOSPADM

## 2018-04-18 RX ORDER — HEPARIN SODIUM 5000 [USP'U]/ML
40 INJECTION, SOLUTION INTRAVENOUS; SUBCUTANEOUS AS NEEDED
Status: DISCONTINUED | OUTPATIENT
Start: 2018-04-18 | End: 2018-04-18 | Stop reason: HOSPADM

## 2018-04-18 RX ADMIN — WARFARIN SODIUM 5 MG: 5 TABLET ORAL at 17:01

## 2018-04-18 RX ADMIN — HYDROCORTISONE: 1 CREAM TOPICAL at 08:56

## 2018-04-18 RX ADMIN — ASPIRIN 81 MG: 81 TABLET, COATED ORAL at 08:55

## 2018-04-18 RX ADMIN — PRAMIPEXOLE DIHYDROCHLORIDE 0.25 MG: 0.25 TABLET ORAL at 08:55

## 2018-04-18 RX ADMIN — CEFTRIAXONE 1 G: 1 INJECTION, POWDER, FOR SOLUTION INTRAMUSCULAR; INTRAVENOUS at 05:25

## 2018-04-18 RX ADMIN — FUROSEMIDE 40 MG: 40 TABLET ORAL at 08:54

## 2018-04-18 RX ADMIN — CILOSTAZOL 50 MG: 100 TABLET ORAL at 16:57

## 2018-04-18 RX ADMIN — PRAMIPEXOLE DIHYDROCHLORIDE 0.25 MG: 0.25 TABLET ORAL at 16:57

## 2018-04-18 RX ADMIN — BUDESONIDE AND FORMOTEROL FUMARATE DIHYDRATE 2 PUFF: 160; 4.5 AEROSOL RESPIRATORY (INHALATION) at 07:40

## 2018-04-18 RX ADMIN — IPRATROPIUM BROMIDE AND ALBUTEROL SULFATE 3 ML: 2.5; .5 SOLUTION RESPIRATORY (INHALATION) at 10:57

## 2018-04-18 RX ADMIN — SOTALOL HYDROCHLORIDE 80 MG: 80 TABLET ORAL at 08:55

## 2018-04-18 RX ADMIN — CILOSTAZOL 50 MG: 100 TABLET ORAL at 08:55

## 2018-04-18 RX ADMIN — IPRATROPIUM BROMIDE AND ALBUTEROL SULFATE 3 ML: 2.5; .5 SOLUTION RESPIRATORY (INHALATION) at 14:50

## 2018-04-18 RX ADMIN — GUAIFENESIN 1200 MG: 600 TABLET, EXTENDED RELEASE ORAL at 08:55

## 2018-04-18 RX ADMIN — LEVOTHYROXINE SODIUM 25 MCG: 25 TABLET ORAL at 08:55

## 2018-04-18 RX ADMIN — NITROGLYCERIN 1 INCH: 20 OINTMENT TOPICAL at 14:00

## 2018-04-18 RX ADMIN — NITROGLYCERIN 1 INCH: 20 OINTMENT TOPICAL at 05:25

## 2018-04-18 RX ADMIN — IPRATROPIUM BROMIDE AND ALBUTEROL SULFATE 3 ML: 2.5; .5 SOLUTION RESPIRATORY (INHALATION) at 07:30

## 2018-04-18 NOTE — PLAN OF CARE
Problem: Patient Care Overview  Goal: Plan of Care Review  Outcome: Ongoing (interventions implemented as appropriate)   04/18/18 0421   Coping/Psychosocial   Plan of Care Reviewed With patient   Plan of Care Review   Progress improving   OTHER   Outcome Summary remains sr with occ pvc's. bp stable. pt able to sleep better tonight. ambulated in room this shift. continue to monitor     Goal: Individualization and Mutuality  Outcome: Ongoing (interventions implemented as appropriate)   04/18/18 0421   Individualization   Patient Specific Preferences likes curtain open     Goal: Discharge Needs Assessment   04/18/18 0421   Discharge Needs Assessment   Concerns to be Addressed adjustment to diagnosis/illness       Problem: Fall Risk (Adult)  Goal: Absence of Fall  Outcome: Ongoing (interventions implemented as appropriate)   04/18/18 0421   Fall Risk (Adult)   Absence of Fall making progress toward outcome       Problem: Skin Injury Risk (Adult)  Goal: Skin Health and Integrity  Outcome: Ongoing (interventions implemented as appropriate)   04/18/18 0421   Skin Injury Risk (Adult)   Skin Health and Integrity making progress toward outcome       Problem: Pneumonia (Adult)  Goal: Signs and Symptoms of Listed Potential Problems Will be Absent, Minimized or Managed (Pneumonia)  Outcome: Ongoing (interventions implemented as appropriate)   04/18/18 0421   Goal/Outcome Evaluation   Problems Assessed (Pneumonia) all   Problems Present (Pneumonia) none       Problem: Renal Failure/Kidney Injury, Acute (Adult)  Goal: Signs and Symptoms of Listed Potential Problems Will be Absent, Minimized or Managed (Renal Failure/Kidney Injury, Acute)  Outcome: Ongoing (interventions implemented as appropriate)   04/18/18 0421   Goal/Outcome Evaluation   Problems Assessed (Acute Renal Failure/Kidney Injury) all   Problems Present (ARF/Kidney Injury) situational response       Problem: Thrombolytic Therapy (Adult)  Goal: Signs and Symptoms of  Listed Potential Problems Will be Absent, Minimized or Managed (Thrombolytic Therapy)  Outcome: Ongoing (interventions implemented as appropriate)   04/18/18 0421   Goal/Outcome Evaluation   Problems Assessed (Thrombolytic Therapy) all   Problems Assessed (Thrombolytic Therapy) none       Problem: Pain, Acute (Adult)  Goal: Acceptable Pain Control/Comfort Level   04/18/18 0421   Pain, Acute (Adult)   Acceptable Pain Control/Comfort Level making progress toward outcome

## 2018-04-18 NOTE — DISCHARGE SUMMARY
[facility] Medicine Services  DISCHARGE SUMMARY       Date of Admission: 4/12/2018  Date of Discharge:  4/18/2018  Primary Care Physician: Belkys Hoffman MD    Presenting Problem/History of Present Illness:  CHANDRIKA (acute kidney injury) [N17.9]  Arterial occlusion, 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]       Final Discharge Diagnoses:  Hospital Problem List     * (Principal)Arterial occlusion, lower extremity    Atrial fibrillation [I48.91]    PVD (peripheral vascular disease)    Tobacco dependence syndrome    Overview Signed 11/28/2016 10:39 AM by Belkys Hoffman MD     2014 to E cigarette            Stroke    Overview Signed 11/28/2016 10:39 AM by Belkys Hoffman MD      Echo: L atrial appendage thrombus            Hypercholesterolemia    GERD (gastroesophageal reflux disease)    Hyperlipidemia    Atherosclerosis of artery of extremity with gangrene    AAA (abdominal aortic aneurysm) without rupture    CKD (chronic kidney disease) stage 3, GFR 30-59 ml/min    COPD (chronic obstructive pulmonary disease)    Hypertension          Consults:   Consults     Date and Time Order Name Status Description    4/12/2018 0502 Inpatient Nephrology Consult Completed     4/12/2018 0502 Inpatient Cardiothoracic Surgery Consult      4/12/2018 0331 Vascular (on-call MD unless specified)      4/12/2018 0331 Hospitalist (on-call MD unless specified)              Pertinent Test Results:   CTA Abdominal Aorta with Runoff:  IMPRESSION:  There is complete occlusion of the right superficial femoral  artery approximately 10 cm distal to its origin. No luminal  contrast seen throughout the length of the superficial femoral  artery, popliteal, and infrapopliteal arteries. No findings of  reconstituting vessels with perfusion of the right lower  extremity.     There is occlusion of the right internal iliac artery.     The left femoral-popliteal stent graft demonstrates  "luminal  contrast/patency. The contrast distally at the popliteal  anastomosis is less robust and severely attenuated by comparison  that proximally. There is poor visualization of the  infrapopliteal arteries.     Additional nonacute findings as detailed above.    Chief Complaint on Day of Discharge: Right leg numbness    Hospital Course:  The patient is a 78 y.o. male who presented to [facility] with right leg pain and numbness.  He has known right popliteal artery occlusion.  He was continued on anticoagulation.  Vascular surgery was consulted and felt that urgent revascularization wasn't warranted, especially given poor vascular targets.  He improved clinically.  He became pain free.  He was transitioned from heparin gtt to lovenox to bridge to a therapeutic INR.  I discussed the case with Dr. Derrick Sousa office at the request of the patient's family.  Patient will follow up with Dr Sousa, who did a fem-pop bypass on the patient's left leg in December, on Monday, April 23.    Condition on Discharge:  stable    Physical Exam on Discharge:  /72 (BP Location: Right arm, Patient Position: Lying)   Pulse 80   Temp 98.3 °F (36.8 °C) (Temporal Artery )   Resp 20   Ht 180.3 cm (71\")   Wt 104 kg (229 lb 15 oz)   SpO2 92%   BMI 32.07 kg/m²      Physical Exam  Constitutional: He appears well-developed and well-nourished.   HENT:   Head: Normocephalic and atraumatic.   Eyes: EOM are normal. Pupils are equal, round, and reactive to light.   Neck: Normal range of motion. Neck supple.   Cardiovascular: Normal rate, regular rhythm and normal heart sounds.  Exam reveals decreased pulses. Exam reveals no gallop and no friction rub.    No murmur heard.  Pulses:       Dorsalis pedis pulses are 0 on the right side.        Posterior tibial pulses are 0 on the right side.   Pulmonary/Chest: Effort normal and breath sounds normal. No respiratory distress. He has no wheezes. He has no rales. He exhibits " no tenderness.   Abdominal: Soft. Bowel sounds are normal. He exhibits no distension. There is no tenderness.   Psychiatric: He has a normal mood and affect. His behavior is normal.   Vitals reviewed.    Discharge Disposition:  Home or Self Care    Discharge Medications:   Massimo Bentley   Home Medication Instructions CHARO:[id number]    Printed on:04/18/18 4968   Medication Information                      albuterol (PROVENTIL HFA;VENTOLIN HFA) 108 (90 Base) MCG/ACT inhaler  Inhale 2 puffs Every 4 (Four) Hours As Needed for Wheezing.             aspirin 81 MG EC tablet  Take 81 mg by mouth daily.             budesonide-formoterol (SYMBICORT) 160-4.5 MCG/ACT inhaler  Inhale 2 puffs 2 (Two) Times a Day.             cilostazol (PLETAL) 50 MG tablet  Take 1 tablet by mouth 2 (Two) Times a Day.             enoxaparin (LOVENOX) 100 MG/ML solution syringe  Inject 1 mL under the skin Every 12 (Twelve) Hours.             furosemide (LASIX) 40 MG tablet  Take 1 tablet by mouth Daily.             guaiFENesin (MUCINEX) 600 MG 12 hr tablet  Take 1,200 mg by mouth 2 (Two) Times a Day.             levothyroxine (SYNTHROID, LEVOTHROID) 25 MCG tablet  Take 25 mcg by mouth daily.             magnesium oxide (MAGOX) 400 (241.3 MG) MG tablet tablet  Take 800 mg by mouth Every Night.             montelukast (SINGULAIR) 10 MG tablet  Take 1 tablet by mouth Every Night.             pramipexole (MIRAPEX) 0.25 MG tablet  Take 1 tablet by mouth 3 (Three) Times a Day.             QUEtiapine (SEROquel) 100 MG tablet  take 3 tablets at bedtime             sotalol (BETAPACE) 80 MG tablet  Take 1.5 tablets by mouth 2 (Two) Times a Day.             warfarin (COUMADIN) 5 MG tablet  TAKE 1 TABLET BY MOUTH EVERY NIGHT OR AS DIRECTED BY COUMADIN CLINIC                 Discharge Diet:   Diet Instructions     Diet: Cardiac       Discharge Diet:  Cardiac          Activity at Discharge:   Activity Instructions     Activity as Tolerated                  Follow-up Appointments:   Future Appointments  Date Time Provider Department Center   4/25/2018 10:45 AM JUANI Singh [facility] None   5/7/2018 1:00 PM Belkys Hoffman MD [facility] None   9/26/2018 11:30 AM Emily Thacker MD [facility] None   Dr Derrick Sousa 4/23/2018 at 1000          This document has been electronically signed by Rafa Doty MD on April 18, 2018 6:58 PM      Time: 40 min

## 2018-04-18 NOTE — DISCHARGE INSTR - APPOINTMENTS
Dr. Ho office will call you to make appointment for 3 week follow up. Please bring your medication list.   Office: [phone]    Dr Derrick Sousa  April 23rd, 2018  10:00AM  Please bring medication list.     Dr William Brownlee Office will call you with follow up appointment. Please bring medication list. Office:[phone]

## 2018-04-18 NOTE — PAYOR COMM NOTE
"Denys Parrish (78 y.o. Male)     Date of Birth Social Security Number Address Home Phone MRN    1940  [facility] [address]  [facility] [address] [phone] [id number]    [employer] Mar[employer] Status          Sabianism        Admission Date Admission Type Admitting Provider Attending Provider Department, Room/Bed    4/12/18 Emergency Fredy Espinoza MD Ahmed, Chico Heath MD [facility] STEPDOWN UNIT, 322/1    Discharge Date Discharge Disposition Discharge Destination                       Attending Provider:  Chico Espinoza MD    Allergies:  Morphine And Related, Lipitor [Atorvastatin], Lortab [Hydrocodone-acetaminophen]    Isolation:  None   Infection:  None   Code Status:  FULL    Ht:  180.3 cm (71\")   Wt:  104 kg (229 lb 15 oz)    Admission Cmt:  None   Principal Problem:  Arterial occlusion, 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]                 Active Insurance as of 4/12/2018     Primary Coverage     Payor Plan Insurance Group Employer/Plan Group    [facility] MEDICARE REPLACEMENT [facility] MEDICARE ADVANTAGE [id number]     Payor Plan Address Payor Plan Phone Number Effective From Effective To    [id number] [phone] 1/1/2017     [place] [address]       Subscriber Name Subscriber Birth Date Member ID       DENYS PARRISH 1940 [id number]                 Emergency Contacts      (Rel.) Home Phone Work Phone Mobile Phone    Jennifer Goncalves (Daughter) [phone]    No name specified -- -- --                  ICU Vital Signs     Row Name 04/18/18 0929 04/18/18 0850 04/18/18 0824 04/18/18 0730 04/18/18 0300       Vitals    Temp  -- 97.7 °F (36.5 °C)  --  --  --    Temp src  -- Temporal Artery   --  --  --    Pulse  -- 77 78 80  --    Heart Rate Source  -- Monitor Monitor  --  --    Resp  -- 20  -- 20  --    Resp Rate Source  -- Visual  --  --  --    BP  -- 108/60  --  --  --    Noninvasive MAP (mmHg)  -- 78  --  --  --    " BP Location  -- Right arm  --  --  --    BP Method  -- Automatic  --  --  --    Patient Position  -- Lying  --  --  --       Oxygen Therapy    SpO2  -- 93 %  -- 93 %  --    Pulse Oximetry Type  -- Intermittent  -- Intermittent  --    Device (Oxygen Therapy) nasal cannula nasal cannula  -- nasal cannula nasal cannula    Flow (L/min) 2 2  -- 2 2    Row Name 04/18/18 0258 04/18/18 0020 04/18/18 0000 04/1940 04/17/18 1920       Height and Weight    Weight 104 kg (229 lb 15 oz)  --  --  --  --       Vitals    Temp 98 °F (36.7 °C)  -- 98.4 °F (36.9 °C)  --  --    Pulse 71 77 80 74 81    Heart Rate Source  -- Monitor  -- Monitor  --    Resp 24  -- 20 20 24    Resp Rate Source  --  --  -- Visual  --    /61  -- 101/61  -- 150/75   after ambulation    BP Location  --  --  --  -- Right arm       Oxygen Therapy    SpO2 91 %  -- 93 % 97 % (!)  89 %    Pulse Oximetry Type Intermittent  -- Intermittent Intermittent Intermittent    Device (Oxygen Therapy) nasal cannula  -- nasal cannula nasal cannula nasal cannula    Flow (L/min) 2  -- 2 2 1   increased up to 2 L, sat up to 92%    Row Name 04/17/18 1910 04/17/18 1904 04/17/18 1635 04/17/18 1613 04/17/18 1527       Vitals    Temp  -- 98.1 °F (36.7 °C) 97.4 °F (36.3 °C)  --  --    Temp src  --  -- Temporal Artery   --  --    Pulse  -- 79 70 81 75   heart rate of 74 after neb.    Heart Rate Source  --  -- Monitor Monitor Monitor    Resp  -- 20 16  -- 16    Resp Rate Source  --  -- Visual  -- Visual    BP  -- 115/65 131/71  --  --    Noninvasive MAP (mmHg)  --  -- 94  --  --    BP Location  -- Right arm Right arm  --  --    BP Method  --  -- Automatic  --  --    Patient Position  --  -- Lying  --  --       Oxygen Therapy    SpO2  -- 93 %   90% on room air 92 %  -- 94 %    Pulse Oximetry Type  -- Intermittent Intermittent  -- Intermittent    Device (Oxygen Therapy) nasal cannula nasal cannula nasal cannula  -- nasal cannula    Flow (L/min) 1 1 1  -- 1    Row Name 04/17/18  "1515 04/17/18 1218 04/17/18 1018             Vitals    Temp  -- 97 °F (36.1 °C)  --      Temp src  -- Temporal Artery   --      Pulse  -- 70 70   heart rate of 67 after neb.      Heart Rate Source  -- Monitor Monitor      Resp  -- 16 12      Resp Rate Source  -- Visual Visual      BP  -- 117/72  --      BP Location  -- Right arm  --      BP Method  -- Automatic  --      Patient Position  -- Lying  --         Oxygen Therapy    SpO2  -- 93 % 98 %      Pulse Oximetry Type  -- Intermittent Intermittent      Device (Oxygen Therapy) nasal cannula nasal cannula nasal cannula      Flow (L/min) 1 2 2   flow reduced to 1 lpm          Hospital Medications (active)       Dose Frequency Start End    albuterol (PROVENTIL) nebulizer solution 0.083% 2.5 mg/3mL 2.5 mg Every 4 Hours PRN 4/12/2018     Sig - Route: Take 2.5 mg by nebulization Every 4 (Four) Hours As Needed for Shortness of Air. - Nebulization    aspirin EC tablet 81 mg 81 mg Daily 4/12/2018     Sig - Route: Take 1 tablet by mouth Daily. - Oral    budesonide-formoterol (SYMBICORT) 160-4.5 MCG/ACT inhaler 2 puff 2 puff 2 Times Daily - RT 4/12/2018     Sig - Route: Inhale 2 puffs 2 (Two) Times a Day. - Inhalation    cefTRIAXone (ROCEPHIN) 1 g/100 mL 0.9% NS (MBP) 1 g Every 24 Hours 4/12/2018 4/22/2018    Sig - Route: Infuse 100 mL into a venous catheter Daily. - Intravenous    Linked Group 1:  \"And\" Linked Group Details        cilostazol (PLETAL) tablet 50 mg 50 mg 2 Times Daily Before Meals 4/12/2018     Sig - Route: Take 0.5 tablets by mouth 2 (Two) Times a Day Before Meals. - Oral    furosemide (LASIX) tablet 40 mg 40 mg Daily 4/16/2018     Sig - Route: Take 1 tablet by mouth Daily. - Oral    guaiFENesin (MUCINEX) 12 hr tablet 1,200 mg 1,200 mg Every 12 Hours Scheduled 4/12/2018     Sig - Route: Take 2 tablets by mouth Every 12 (Twelve) Hours. - Oral    heparin (porcine) 5000 UNIT/ML injection 4,100 Units 40 Units/kg × 102 kg As Needed 4/18/2018     Sig - Route: Infuse " "0.82 mL into a venous catheter As Needed (Per Heparin Nomogram - PTT 35-45). - Intravenous    Linked Group 2:  \"And\" Linked Group Details        heparin (porcine) 5000 UNIT/ML injection 8,200 Units 80 Units/kg × 102 kg As Needed 4/18/2018     Sig - Route: Infuse 1.64 mL into a venous catheter As Needed (Per Heparin Nomogram - PTT Less Than 35). - Intravenous    Linked Group 2:  \"And\" Linked Group Details        heparin 40772 units/250 mL (100 units/mL) in 0.45 % NaCl infusion 9.7 Units/kg/hr × 104 kg Titrated 4/18/2018     Sig - Route: Infuse 1,008 Units/hr into a venous catheter Dose Adjusted By Provider As Needed. - Intravenous    Linked Group 2:  \"And\" Linked Group Details        HYDROcodone-acetaminophen (NORCO) 7.5-325 MG per tablet 1 tablet 1 tablet Every 6 Hours PRN 4/13/2018 4/23/2018    Sig - Route: Take 1 tablet by mouth Every 6 (Six) Hours As Needed for Moderate Pain . - Oral    ipratropium-albuterol (DUO-NEB) nebulizer solution 3 mL 3 mL 4 Times Daily - RT 4/12/2018     Sig - Route: Take 3 mL by nebulization 4 (Four) Times a Day. - Nebulization    levothyroxine (SYNTHROID, LEVOTHROID) tablet 25 mcg 25 mcg Daily 4/12/2018     Sig - Route: Take 1 tablet by mouth Daily. - Oral    LORazepam (ATIVAN) tablet 0.25 mg 0.25 mg Once 4/13/2018     Sig - Route: Take 0.5 tablets by mouth 1 (One) Time. - Oral    magic butt ointment  2 Times Daily 4/16/2018     Sig - Route: Apply  topically 2 (Two) Times a Day. - Topical    magnesium oxide (MAGOX) tablet 800 mg 800 mg Nightly 4/12/2018     Sig - Route: Take 2 tablets by mouth Every Night. - Oral    metoprolol tartrate (LOPRESSOR) injection 2.5 mg 2.5 mg Once 4/17/2018     Sig - Route: Infuse 2.5 mL into a venous catheter 1 (One) Time. - Intravenous    montelukast (SINGULAIR) tablet 10 mg 10 mg Nightly 4/12/2018     Sig - Route: Take 1 tablet by mouth Every Night. - Oral    nitroglycerin (NITROSTAT) ointment 1 inch 1 inch Every 8 Hours Scheduled 4/12/2018     Sig - " "Route: Apply 1 inch topically Every 8 (Eight) Hours. - Topical    pramipexole (MIRAPEX) tablet 0.25 mg 0.25 mg 3 Times Daily 4/12/2018     Sig - Route: Take 1 tablet by mouth 3 (Three) Times a Day. - Oral    QUEtiapine (SEROquel) tablet 300 mg 300 mg Nightly 4/12/2018     Sig - Route: Take 1 tablet by mouth Every Night. - Oral    sodium chloride 0.9 % bolus 500 mL 500 mL Once 4/12/2018     Sig - Route: Infuse 500 mL into a venous catheter 1 (One) Time. - Intravenous    sodium chloride 0.9 % flush 1-10 mL 1-10 mL As Needed 4/12/2018     Sig - Route: Infuse 1-10 mL into a venous catheter As Needed for Line Care. - Intravenous    sodium chloride 0.9 % flush 10 mL 10 mL As Needed 4/12/2018     Sig - Route: Infuse 10 mL into a venous catheter As Needed for Line Care. - Intravenous    Linked Group 3:  \"And\" Linked Group Details        sotalol (BETAPACE) tablet 80 mg 80 mg Every 12 Hours Scheduled 4/16/2018     Sig - Route: Take 1 tablet by mouth Every 12 (Twelve) Hours. - Oral    traMADol (ULTRAM) tablet 50 mg 50 mg Every 6 Hours PRN 4/13/2018 4/23/2018    Sig - Route: Take 1 tablet by mouth Every 6 (Six) Hours As Needed for Moderate Pain . - Oral    warfarin (COUMADIN) tablet 5 mg 5 mg Daily Warfarin 4/15/2018     Sig - Route: Take 1 tablet by mouth Daily. - Oral    ziprasidone (GEODON) injection 20 mg 20 mg Every 12 Hours PRN 4/13/2018     Sig - Route: Inject 20 mg into the shoulder, thigh, or buttocks Every 12 (Twelve) Hours As Needed for Agitation. - Intramuscular    heparin (porcine) 5000 UNIT/ML injection 4,100 Units (Discontinued) 40 Units/kg × 102 kg As Needed 4/12/2018 4/17/2018    Sig - Route: Infuse 0.82 mL into a venous catheter As Needed (Per Heparin Nomogram - PTT 35-45). - Intravenous    Linked Group 2:  \"And\" Linked Group Details        heparin (porcine) 5000 UNIT/ML injection 4,100 Units (Discontinued) 40 Units/kg × 102 kg As Needed 4/17/2018 4/18/2018    Sig - Route: Infuse 0.82 mL into a venous catheter " "As Needed (Per Heparin Nomogram - PTT 35-45). - Intravenous    Linked Group 2:  \"And\" Linked Group Details        heparin (porcine) 5000 UNIT/ML injection 8,200 Units (Discontinued) 80 Units/kg × 102 kg As Needed 4/12/2018 4/17/2018    Sig - Route: Infuse 1.64 mL into a venous catheter As Needed (Per Heparin Nomogram - PTT Less Than 35). - Intravenous    Linked Group 2:  \"And\" Linked Group Details        heparin (porcine) 5000 UNIT/ML injection 8,200 Units (Discontinued) 80 Units/kg × 102 kg As Needed 4/17/2018 4/18/2018    Sig - Route: Infuse 1.64 mL into a venous catheter As Needed (Per Heparin Nomogram - PTT Less Than 35). - Intravenous    Linked Group 2:  \"And\" Linked Group Details        heparin 89536 units/250 mL (100 units/mL) in 0.45 % NaCl infusion (Discontinued) 9.7 Units/kg/hr × 102 kg Titrated 4/12/2018 4/17/2018    Sig - Route: Infuse 989 Units/hr into a venous catheter Dose Adjusted By Provider As Needed. - Intravenous    Linked Group 2:  \"And\" Linked Group Details        heparin 46303 units/250 mL (100 units/mL) in 0.45 % NaCl infusion (Discontinued) 11.7 Units/kg/hr × 102 kg Titrated 4/17/2018 4/18/2018    Sig - Route: Infuse 1,193 Units/hr into a venous catheter Dose Adjusted By Provider As Needed. - Intravenous    Linked Group 2:  \"And\" Linked Group Details              Orders (last 24 hrs)     Start     Ordered    04/18/18 1115  aPTT  Once      04/18/18 0518    04/18/18 0600  Comprehensive Metabolic Panel  Morning Draw      04/17/18 1001    04/18/18 0600  heparin 14630 units/250 mL (100 units/mL) in 0.45 % NaCl infusion  Titrated      04/18/18 0516    04/18/18 0516  heparin (porcine) 5000 UNIT/ML injection 8,200 Units  As Needed      04/18/18 0516    04/18/18 0516  heparin (porcine) 5000 UNIT/ML injection 4,100 Units  As Needed      04/18/18 0516    04/18/18 0430  aPTT  Once      04/17/18 2233    04/17/18 2330  heparin 92314 units/250 mL (100 units/mL) in 0.45 % NaCl infusion  Titrated,   Status:  " Discontinued      04/17/18 2248 04/17/18 2247  heparin (porcine) 5000 UNIT/ML injection 8,200 Units  As Needed,   Status:  Discontinued      04/17/18 2248 04/17/18 2247  heparin (porcine) 5000 UNIT/ML injection 4,100 Units  As Needed,   Status:  Discontinued      04/17/18 2248 04/17/18 2146  Extra Tubes  Once      04/17/18 2146    04/17/18 2146  Gold Top - SST  PROCEDURE ONCE      04/17/18 2146    04/17/18 2136  aPTT  Timed      04/17/18 0936    04/17/18 0130  metoprolol tartrate (LOPRESSOR) injection 2.5 mg  Once      04/17/18 0059    04/16/18 2100  magic butt ointment  2 Times Daily      04/16/18 1618    04/16/18 2100  sotalol (BETAPACE) tablet 80 mg  Every 12 Hours Scheduled      04/16/18 2006    04/16/18 1030  furosemide (LASIX) tablet 40 mg  Daily      04/16/18 0956    04/15/18 1800  warfarin (COUMADIN) tablet 5 mg  Daily Warfarin      04/15/18 1131    04/15/18 1132  Protime-INR  Daily      04/15/18 1131    04/13/18 1916  traMADol (ULTRAM) tablet 50 mg  Every 6 Hours PRN      04/13/18 1916    04/13/18 1629  ziprasidone (GEODON) injection 20 mg  Every 12 Hours PRN      04/13/18 1630    04/13/18 0922  HYDROcodone-acetaminophen (NORCO) 7.5-325 MG per tablet 1 tablet  Every 6 Hours PRN      04/13/18 0923    04/13/18 0300  LORazepam (ATIVAN) tablet 0.25 mg  Once      04/13/18 0226    04/12/18 2100  magnesium oxide (MAGOX) tablet 800 mg  Nightly      04/12/18 0501    04/12/18 2100  montelukast (SINGULAIR) tablet 10 mg  Nightly      04/12/18 0501    04/12/18 2100  QUEtiapine (SEROquel) tablet 300 mg  Nightly      04/12/18 0502    04/12/18 1400  nitroglycerin (NITROSTAT) ointment 1 inch  Every 8 Hours Scheduled      04/12/18 1039    04/12/18 0930  budesonide-formoterol (SYMBICORT) 160-4.5 MCG/ACT inhaler 2 puff  2 Times Daily - RT      04/12/18 0501    04/12/18 0900  aspirin EC tablet 81 mg  Daily      04/12/18 0501    04/12/18 0900  guaiFENesin (MUCINEX) 12 hr tablet 1,200 mg  Every 12 Hours Scheduled       04/12/18 0501    04/12/18 0900  levothyroxine (SYNTHROID, LEVOTHROID) tablet 25 mcg  Daily      04/12/18 0501    04/12/18 0900  pramipexole (MIRAPEX) tablet 0.25 mg  3 Times Daily      04/12/18 0501    04/12/18 0830  ipratropium-albuterol (DUO-NEB) nebulizer solution 3 mL  4 Times Daily - RT      04/12/18 0142    04/12/18 0730  cilostazol (PLETAL) tablet 50 mg  2 Times Daily Before Meals      04/12/18 0501    04/12/18 0528  albuterol (PROVENTIL) nebulizer solution 0.083% 2.5 mg/3mL  Every 4 Hours PRN      04/12/18 0529    04/12/18 0502  sodium chloride 0.9 % flush 1-10 mL  As Needed      04/12/18 0502    04/12/18 0502  cefTRIAXone (ROCEPHIN) 1 g/100 mL 0.9% NS (MBP)  Every 24 Hours      04/12/18 0502    04/12/18 0334  sodium chloride 0.9 % bolus 500 mL  Once      04/12/18 0332    04/12/18 0146  heparin 92751 units/250 mL (100 units/mL) in 0.45 % NaCl infusion  Titrated,   Status:  Discontinued      04/12/18 0144 04/12/18 0143  heparin (porcine) 5000 UNIT/ML injection 8,200 Units  As Needed,   Status:  Discontinued      04/12/18 0144 04/12/18 0143  heparin (porcine) 5000 UNIT/ML injection 4,100 Units  As Needed,   Status:  Discontinued      04/12/18 0144    04/12/18 0142  sodium chloride 0.9 % flush 10 mL  As Needed      04/12/18 0142    Unscheduled  aPTT  As Needed      04/12/18 0144    Unscheduled  Blood Gas, Arterial  As Needed     Comments:  Respiratory Distress      04/12/18 0502    Unscheduled  Bladder Scan if Patient Unable to Void 4-6 Hours After Catheter Removal  As Needed      04/13/18 0923    Unscheduled  If Bladder Scan Volume is Less Than 350-500mL & Patient is Without Symptoms of Bladder Discomfort / Distention Monitor Every 1-2 Hours for Spontaneous Void  As Needed      04/13/18 0923    Unscheduled  Straight Cath Every 4-6 Hours As Needed If Patient is Unable to Void After 4-6 Hours, Bladder Scan Volume is Greater Than 350-500mL & Patient Has Symptoms of Bladder Discomfort / Distention  As Needed       04/13/18 0923    Unscheduled  Schedule / Prompt Voiding For Patients With Urinary Incontinence  As Needed      04/13/18 0923    --  SCANNED EKG      04/12/18 0000    --  SCANNED - TELEMETRY        04/12/18 0000    --  SCANNED - TELEMETRY        04/12/18 0000    --  SCANNED - TELEMETRY        04/12/18 0000    --  SCANNED EKG      04/12/18 0000    --  SCANNED EKG      04/12/18 0000    --  SCANNED EKG      04/12/18 0000    --  SCANNED EKG      04/12/18 0000    --  SCANNED EKG      04/12/18 0000    --  SCANNED - TELEMETRY        04/12/18 0000    --  SCANNED - TELEMETRY        04/12/18 0000    --  SCANNED - TELEMETRY        04/12/18 0000    --  SCANNED - TELEMETRY        04/12/18 0000    --  SCANNED - TELEMETRY        04/12/18 0000    --  SCANNED - TELEMETRY        04/12/18 0000    --  SCANNED - TELEMETRY        04/12/18 0000    --  SCANNED - TELEMETRY        04/12/18 0000    --  SCANNED EKG      04/12/18 0000    --  SCANNED - TELEMETRY        04/12/18 0000    --  SCANNED - TELEMETRY        04/12/18 0000    --  SCANNED - TELEMETRY        04/12/18 0000    --  SCANNED - TELEMETRY        04/12/18 0000    --  SCANNED - TELEMETRY        04/12/18 0000    --  SCANNED - TELEMETRY        04/12/18 0000    --  SCANNED - TELEMETRY        04/12/18 0000    --  SCANNED - TELEMETRY        04/12/18 0000    --  SCANNED - TELEMETRY        04/12/18 0000    --  SCANNED - TELEMETRY        04/12/18 0000             Physician Progress Notes (last 24 hours) (Notes from 4/17/2018  9:50 AM through 4/18/2018  9:50 AM)      Rafa Doty MD at 4/17/2018  6:24 PM              [facility] Medicine Services  INPATIENT PROGRESS NOTE    Length of Stay: 5  Date of Admission: 4/12/2018  Primary Care Physician: Belkys Hoffman MD    Subjective   Chief Complaint:  Right leg numbness   HPI:  Patient states that he has no leg pain, but he does have intermittent numbness.  He feels like the numbness is  improving.    Review of Systems   Constitutional: Negative for appetite change, chills, fatigue and fever.   Respiratory: Negative for chest tightness and shortness of breath.    Cardiovascular: Negative for chest pain, palpitations and leg swelling.   Gastrointestinal: Negative for abdominal pain, constipation, diarrhea, nausea and vomiting.   Skin: Positive for color change (RLE). Negative for wound.   Neurological: Negative for dizziness, weakness, light-headedness, numbness and headaches.        All pertinent negatives and positives are as above. All other systems have been reviewed and are negative unless otherwise stated.     Objective    Temp:  [96.6 °F (35.9 °C)-97.8 °F (36.6 °C)] 97.4 °F (36.3 °C)  Heart Rate:  [] 70  Resp:  [12-20] 16  BP: (102-170)/(63-76) 131/71      Physical Exam   Constitutional: He appears well-developed and well-nourished.   HENT:   Head: Normocephalic and atraumatic.   Eyes: EOM are normal. Pupils are equal, round, and reactive to light.   Neck: Normal range of motion. Neck supple.   Cardiovascular: Normal rate, regular rhythm and normal heart sounds.  Exam reveals decreased pulses. Exam reveals no gallop and no friction rub.    No murmur heard.  Pulses:       Dorsalis pedis pulses are 0 on the right side.        Posterior tibial pulses are 0 on the right side.   Pulmonary/Chest: Effort normal and breath sounds normal. No respiratory distress. He has no wheezes. He has no rales. He exhibits no tenderness.   Abdominal: Soft. Bowel sounds are normal. He exhibits no distension. There is no tenderness.   Psychiatric: He has a normal mood and affect. His behavior is normal.   Vitals reviewed.          Results Review:  I have reviewed the labs, radiology results, and diagnostic studies.    Laboratory Data:     Results from last 7 days  Lab Units 04/15/18  0345 04/14/18  0214 04/13/18  0417 04/12/18  0530  04/12/18  0246   SODIUM mmol/L 142 145 142 140  --  141   SODIUM, ARTERIAL    --   --   --   --   < >  --    POTASSIUM mmol/L 3.6 4.1 4.5 4.1  --  4.5   CHLORIDE mmol/L 105 113* 107 106  --  104   CO2 mmol/L 25.0 20.0* 24.0 20.0*  --  25.0   BUN mg/dL 25* 33* 49* 61*  --  62*   CREATININE mg/dL 1.23 1.51* 2.16* 2.65*  --  2.93*   GLUCOSE mg/dL 105* 97 86 90  --  97   GLUCOSE, ARTERIAL   --   --   --   --   < >  --    CALCIUM mg/dL 8.4 8.5 8.1* 8.4  --  8.7   BILIRUBIN mg/dL  --   --   --  0.6  --  0.4   ALK PHOS U/L  --   --   --  65  --  66   ALT (SGPT) U/L  --   --   --  21  --  16*   AST (SGOT) U/L  --   --   --  16*  --  32   ANION GAP mmol/L 12.0 12.0 11.0 14.0  --  12.0   < > = values in this interval not displayed.  Estimated Creatinine Clearance: 61.7 mL/min (by C-G formula based on SCr of 1.23 mg/dL).            Results from last 7 days  Lab Units 04/12/18  0530 04/12/18  0246   WBC 10*3/mm3 7.44 7.72   HEMOGLOBIN g/dL 10.5* 11.4*   HEMATOCRIT % 30.6* 33.8*   PLATELETS 10*3/mm3 180 184       Results from last 7 days  Lab Units 04/17/18  0327 04/16/18  0535 04/15/18  1152 04/12/18  0246   INR  1.20 1.18 1.20 1.48*         Radiology Data:   Imaging Results (last 24 hours)     ** No results found for the last 24 hours. **            Results from last 7 days  Lab Units 04/12/18  0247   PH, ARTERIAL pH units 7.269*   PO2 ART mm Hg 94.9   PCO2, ARTERIAL mm Hg 46.9*   HCO3 ART mmol/L 21.0*       I have reviewed the patient current medications.     Assessment/Plan     Hospital Problem List     * (Principal)Arterial occlusion, lower extremity    Atrial fibrillation [I48.91]    PVD (peripheral vascular disease)    Tobacco dependence syndrome    Overview Signed 11/28/2016 10:39 AM by Belkys Hoffman MD     2014 to E cigarette            Stroke    Overview Signed 11/28/2016 10:39 AM by Belkys Hoffman MD      Echo: L atrial appendage thrombus            Hypercholesterolemia    GERD (gastroesophageal reflux disease)    Hyperlipidemia    Atherosclerosis of artery of extremity with  gangrene    AAA (abdominal aortic aneurysm) without rupture    CKD (chronic kidney disease) stage 3, GFR 30-59 ml/min    COPD (chronic obstructive pulmonary disease)    Hypertension          Plan:  1.  Right lower extremity arterial occlusion, acute on chronic:  Per Dr. Pennington patient is not a good surgical candidate.  He feels that anticoagulation is the best option at this point.  Patient is currently on a heparin gtt until coumadin is therapeutic.  Family wants consultation with Dr. Sousa.  This is scheduled for Monday at 1000.    2.  CHANDRIKA on CKD:  Resolved.  Dr. Ho following.  3.  Atrial fibrillation:  On sotalol and anticoagulation.  4.  Bilateral pneumonia:  Continue antibiotics.  5.  Tobacco Abuse          Discharge Planning: I expect patient to be discharged on lovenox bridge in 1 day.        This document has been electronically signed by Rafa Doty MD on 2018 6:24 PM          Electronically signed by Rafa Doty MD at 2018  6:26 PM     Emelia Ho MD at 2018 10:00 AM          [facility]  [address] [place] [address]. [id number]  T - [phone]  F - [phone]     Progress Note          PATIENT  DEMOGRAPHICS   PATIENT NAME: Massimo Bentley                      PHYSICIAN: Emelia Ho MD  : 1940  MRN: [id number]   LOS: 5 days    Patient Care Team:  Belkys Hoffman MD as PCP - General  Gucci Brownlee MD as PCP - Claims Attributed  Brian Pennington MD as Surgeon (Cardiothoracic Surgery)  Emily Thacker MD as Consulting Physician (Cardiology)  Devin Menendez DPM as Consulting Physician (Podiatry)  Subjective   SUBJECTIVE   Events reviewed had afib last night feels better this am          Objective   OBJECTIVE   Vital Signs  Temp:  [96.5 °F (35.8 °C)-97.8 °F (36.6 °C)] 96.6 °F (35.9 °C)  Heart Rate:  [] 77  Resp:  [14-20] 16  BP: (102-170)/(63-93) 102/64    Flowsheet Rows    Flowsheet Row First  "Filed Value   Admission Height 180.3 cm (71\") Documented at 04/11/2018 2319   Admission Weight 102 kg (225 lb) Documented at 04/11/2018 2319           I/O last 3 completed shifts:  In: 1462.3 [P.O.:960; I.V.:302.3; IV Piggyback:200]  Out: 2300 [Urine:2300]    PHYSICAL EXAM    Physical Exam   Constitutional: He is oriented to person, place, and time. He appears well-developed.   HENT:   Head: Normocephalic.   Eyes: Pupils are equal, round, and reactive to light.   Cardiovascular: Normal rate, regular rhythm and normal heart sounds.    Pulmonary/Chest: Effort normal and breath sounds normal.   Congested sounding cough, lung fields clear   Abdominal: Soft. Bowel sounds are normal.   Neurological: He is alert and oriented to person, place, and time.       RESULTS   Results Review:      Results from last 7 days  Lab Units 04/15/18  0345 04/14/18  0214 04/13/18  0417 04/12/18  0530  04/12/18  0246   SODIUM mmol/L 142 145 142 140  --  141   SODIUM, ARTERIAL   --   --   --   --   < >  --    POTASSIUM mmol/L 3.6 4.1 4.5 4.1  --  4.5   CHLORIDE mmol/L 105 113* 107 106  --  104   CO2 mmol/L 25.0 20.0* 24.0 20.0*  --  25.0   BUN mg/dL 25* 33* 49* 61*  --  62*   CREATININE mg/dL 1.23 1.51* 2.16* 2.65*  --  2.93*   CALCIUM mg/dL 8.4 8.5 8.1* 8.4  --  8.7   BILIRUBIN mg/dL  --   --   --  0.6  --  0.4   ALK PHOS U/L  --   --   --  65  --  66   ALT (SGPT) U/L  --   --   --  21  --  16*   AST (SGOT) U/L  --   --   --  16*  --  32   GLUCOSE mg/dL 105* 97 86 90  --  97   GLUCOSE, ARTERIAL   --   --   --   --   < >  --    < > = values in this interval not displayed.    Estimated Creatinine Clearance: 61.7 mL/min (by C-G formula based on SCr of 1.23 mg/dL).                  Results from last 7 days  Lab Units 04/12/18  0530 04/12/18  0246   WBC 10*3/mm3 7.44 7.72   HEMOGLOBIN g/dL 10.5* 11.4*   PLATELETS 10*3/mm3 180 184         Results from last 7 days  Lab Units 04/17/18  0327 04/16/18  0535 04/15/18  1152 04/12/18  0246   INR  1.20 " 1.18 1.20 1.48*         Imaging Results (last 24 hours)     ** No results found for the last 24 hours. **           MEDICATIONS      aspirin 81 mg Oral Daily   budesonide-formoterol 2 puff Inhalation BID - RT   ceftriaxone 1 g Intravenous Q24H   cilostazol 50 mg Oral BID AC   furosemide 40 mg Oral Daily   guaiFENesin 1,200 mg Oral Q12H   ipratropium-albuterol 3 mL Nebulization 4x Daily - RT   levothyroxine 25 mcg Oral Daily   LORazepam 0.25 mg Oral Once   magic butt ointment  Topical BID   magnesium oxide 800 mg Oral Nightly   metoprolol tartrate 2.5 mg Intravenous Once   montelukast 10 mg Oral Nightly   nitroglycerin 1 inch Topical Q8H   pramipexole 0.25 mg Oral TID   QUEtiapine 300 mg Oral Nightly   sodium chloride 500 mL Intravenous Once   sotalol 80 mg Oral Q12H   warfarin 5 mg Oral Daily       heparin (porcine) 9.7 Units/kg/hr Last Rate: 9.706 Units/kg/hr (04/17/18 0439)       Assessment/Plan   ASSESSMENT / PLAN    Principal Problem:    Arterial occlusion, lower extremity  Active Problems:    Atrial fibrillation [I48.91]    PVD (peripheral vascular disease)    Tobacco dependence syndrome    Stroke    Hypercholesterolemia    GERD (gastroesophageal reflux disease)    Hyperlipidemia    Atherosclerosis of artery of extremity with gangrene    AAA (abdominal aortic aneurysm) without rupture    CKD (chronic kidney disease) stage 3, GFR 30-59 ml/min    COPD (chronic obstructive pulmonary disease)    Hypertension    1. CHANDRIKA on CKD Stage III- Baseline creatinine close to 1.7 now acute kidney injury with creatinine up to 2.9.  This could be volume depletion and this has improved with IV fluid.  Patient ultrasound was reviewed from late last year and there is no hydronephrosis, he does have a simple cyst.    - since gfr is better agree with sotalol bid 80mg. F/u in office in 2-3 weeks     2. History of Atrial Fibrillation     3. Right popliteal artery occlusion- with numbness, pallor, and cold extremity.  Patient is  currently on heparin drip.  He also has a right popliteal vein DVT.  Vascular surgery is consulted. CTA completed yesterday with findings apparently similar to CTA in September.     4. History of tobacco use- with severe peripheral vascular disease      5. Possible bilateral pneumonia- patient is currently on Rocephin and Levaquin is d/c'd.            This document has been electronically signed by Emelia Ho MD on April 17, 2018 10:00 AM           Electronically signed by Emelia Ho MD at 4/17/2018 10:01 AM       Consult Notes (last 24 hours) (Notes from 4/17/2018  9:50 AM through 4/18/2018  9:50 AM)     No notes of this type exist for this encounter.      Ref#[id number]  Jennifer Hurtado RN  [facility]  [phone]  Fax [phone]

## 2018-04-19 ENCOUNTER — DOCUMENTATION (OUTPATIENT)
Dept: CARDIAC SURGERY | Facility: CLINIC | Age: 78
End: 2018-04-19

## 2018-04-19 NOTE — PROGRESS NOTES
Pt's daughter called to say pt was discharged from the hospital yesterday; pt was found to have an occlusion of the right superficial femoral artery.  Pt was heparinized and transitioned to lovenox.  Pt became pain free and was discharged home yesterday on lovenox 100mg twice daily.  Pt has a f/u appt with Dr. Derrick Sousa in [place] on Monday of next wk.  Coumadin appt made for tomorrow. Pt will take coumadin 5mg tonight.

## 2018-04-19 NOTE — PAYOR COMM NOTE
"Denys Parrish (78 y.o. Male)     Date of Birth Social Security Number Address Home Phone MRN    1940  [address]  [facility] [place] [facility] [phone] [id number]    Spiritism Marital Status          Yarsani        Admission Date Admission Type Admitting Provider Attending Provider Department, Room/Bed    4/12/18 Emergency Fredy Espinoza MD  [facility] STEPDOWN UNIT, 322/1    Discharge Date Discharge Disposition Discharge Destination        4/18/2018 Home or Self Care Home             Attending Provider:  (none)   Allergies:  Morphine And Related, Lipitor [Atorvastatin], Lortab [Hydrocodone-acetaminophen]    Isolation:  None   Infection:  None   Code Status:  Prior    Ht:  180.3 cm (71\")   Wt:  104 kg (229 lb 15 oz)    Admission Cmt:  None   Principal Problem:  Arterial occlusion, 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]                 Active Insurance as of 4/12/2018     Primary Coverage     Payor Plan Insurance Group Employer/Plan Group    ANTH MEDICARE REPLACEMENT ANTH MEDICARE ADVANTAGE [id number]     Payor Plan Address Payor Plan Phone Number Effective From Effective To    [address] [phone] 1/1/2017     [place] [address]       Subscriber Name Subscriber Birth Date Member ID       DENYS PARRISH 1940 [id number]                 Emergency Contacts      (Rel.) Home Phone Work Phone Mobile Phone    Jennifer Goncalves (Daughter) [phone]    No name specified -- -- --               Discharge Summary      Rafa Doty MD at 4/18/2018  6:49 PM              [facility]  DISCHARGE SUMMARY       Date of Admission: 4/12/2018  Date of Discharge:  4/18/2018  Primary Care Physician: Belkys Hoffman MD    Presenting Problem/History of Present Illness:  CHANDRIKA (acute kidney injury) [N17.9]  Arterial occlusion, 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]       Final " Discharge Diagnoses:  Hospital Problem List     * (Principal)Arterial occlusion, lower extremity    Atrial fibrillation [I48.91]    PVD (peripheral vascular disease)    Tobacco dependence syndrome    Overview Signed 11/28/2016 10:39 AM by Belkys Hoffman MD     2014 to E cigarette            Stroke    Overview Signed 11/28/2016 10:39 AM by Belkys Hoffman MD      Echo: L atrial appendage thrombus            Hypercholesterolemia    GERD (gastroesophageal reflux disease)    Hyperlipidemia    Atherosclerosis of artery of extremity with gangrene    AAA (abdominal aortic aneurysm) without rupture    CKD (chronic kidney disease) stage 3, GFR 30-59 ml/min    COPD (chronic obstructive pulmonary disease)    Hypertension          Consults:   Consults     Date and Time Order Name Status Description    4/12/2018 0502 Inpatient Nephrology Consult Completed     4/12/2018 0502 Inpatient Cardiothoracic Surgery Consult      4/12/2018 0331 Vascular (on-call MD unless specified)      4/12/2018 0331 Hospitalist (on-call MD unless specified)              Pertinent Test Results:   CTA Abdominal Aorta with Runoff:  IMPRESSION:  There is complete occlusion of the right superficial femoral  artery approximately 10 cm distal to its origin. No luminal  contrast seen throughout the length of the superficial femoral  artery, popliteal, and infrapopliteal arteries. No findings of  reconstituting vessels with perfusion of the right lower  extremity.     There is occlusion of the right internal iliac artery.     The left femoral-popliteal stent graft demonstrates luminal  contrast/patency. The contrast distally at the popliteal  anastomosis is less robust and severely attenuated by comparison  that proximally. There is poor visualization of the  infrapopliteal arteries.     Additional nonacute findings as detailed above.    Chief Complaint on Day of Discharge: Right leg numbness    Hospital Course:  The patient is a 78 y.o. male  "who presented to [facility] with right leg pain and numbness.  He has known right popliteal artery occlusion.  He was continued on anticoagulation.  Vascular surgery was consulted and felt that urgent revascularization wasn't warranted, especially given poor vascular targets.  He improved clinically.  He became pain free.  He was transitioned from heparin gtt to lovenox to bridge to a therapeutic INR.  I discussed the case with Dr. Derrick Sousa office at the request of the patient's family.  Patient will follow up with Dr Sousa, who did a fem-pop bypass on the patient's left leg in December, on Monday, April 23.    Condition on Discharge:  stable    Physical Exam on Discharge:  /72 (BP Location: Right arm, Patient Position: Lying)   Pulse 80   Temp 98.3 °F (36.8 °C) (Temporal Artery )   Resp 20   Ht 180.3 cm (71\")   Wt 104 kg (229 lb 15 oz)   SpO2 92%   BMI 32.07 kg/m²       Physical Exam  Constitutional: He appears well-developed and well-nourished.   HENT:   Head: Normocephalic and atraumatic.   Eyes: EOM are normal. Pupils are equal, round, and reactive to light.   Neck: Normal range of motion. Neck supple.   Cardiovascular: Normal rate, regular rhythm and normal heart sounds.  Exam reveals decreased pulses. Exam reveals no gallop and no friction rub.    No murmur heard.  Pulses:       Dorsalis pedis pulses are 0 on the right side.        Posterior tibial pulses are 0 on the right side.   Pulmonary/Chest: Effort normal and breath sounds normal. No respiratory distress. He has no wheezes. He has no rales. He exhibits no tenderness.   Abdominal: Soft. Bowel sounds are normal. He exhibits no distension. There is no tenderness.   Psychiatric: He has a normal mood and affect. His behavior is normal.   Vitals reviewed.    Discharge Disposition:  Home or Self Care    Discharge Medications:   Massimo Bentley   Home Medication Instructions CHARO:[id number]    Printed on:04/18/18 8051 "   Medication Information                      albuterol (PROVENTIL HFA;VENTOLIN HFA) 108 (90 Base) MCG/ACT inhaler  Inhale 2 puffs Every 4 (Four) Hours As Needed for Wheezing.             aspirin 81 MG EC tablet  Take 81 mg by mouth daily.             budesonide-formoterol (SYMBICORT) 160-4.5 MCG/ACT inhaler  Inhale 2 puffs 2 (Two) Times a Day.             cilostazol (PLETAL) 50 MG tablet  Take 1 tablet by mouth 2 (Two) Times a Day.             enoxaparin (LOVENOX) 100 MG/ML solution syringe  Inject 1 mL under the skin Every 12 (Twelve) Hours.             furosemide (LASIX) 40 MG tablet  Take 1 tablet by mouth Daily.             guaiFENesin (MUCINEX) 600 MG 12 hr tablet  Take 1,200 mg by mouth 2 (Two) Times a Day.             levothyroxine (SYNTHROID, LEVOTHROID) 25 MCG tablet  Take 25 mcg by mouth daily.             magnesium oxide (MAGOX) 400 (241.3 MG) MG tablet tablet  Take 800 mg by mouth Every Night.             montelukast (SINGULAIR) 10 MG tablet  Take 1 tablet by mouth Every Night.             pramipexole (MIRAPEX) 0.25 MG tablet  Take 1 tablet by mouth 3 (Three) Times a Day.             QUEtiapine (SEROquel) 100 MG tablet  take 3 tablets at bedtime             sotalol (BETAPACE) 80 MG tablet  Take 1.5 tablets by mouth 2 (Two) Times a Day.             warfarin (COUMADIN) 5 MG tablet  TAKE 1 TABLET BY MOUTH EVERY NIGHT OR AS DIRECTED BY COUMADIN CLINIC                 Discharge Diet:   Diet Instructions     Diet: Cardiac       Discharge Diet:  Cardiac          Activity at Discharge:   Activity Instructions     Activity as Tolerated                 Follow-up Appointments:   Future Appointments  Date Time Provider Department Center   4/25/2018 10:45 AM JUANI Singh [facility] None   5/7/2018 1:00 PM Belkys Hoffman MD [facility] None   9/26/2018 11:30 AM Emily Thacker MD [facility] None   Dr Derrick Sousa 4/23/2018 at 1000          This document has been electronically signed by Rafa ORLANDO  MD Soren on April 18, 2018 6:58 PM      Time: 40 min                Electronically signed by Rafa Doty MD at 4/18/2018  7:18 PM

## 2018-04-20 ENCOUNTER — ANTICOAGULATION VISIT (OUTPATIENT)
Dept: CARDIAC SURGERY | Facility: CLINIC | Age: 78
End: 2018-04-20

## 2018-04-20 DIAGNOSIS — I48.0 PAROXYSMAL ATRIAL FIBRILLATION (HCC): ICD-10-CM

## 2018-04-20 DIAGNOSIS — I74.9 EMBOLISM AND THROMBOSIS OF ARTERY (HCC): ICD-10-CM

## 2018-04-20 DIAGNOSIS — Z79.01 LONG-TERM (CURRENT) USE OF ANTICOAGULANTS: ICD-10-CM

## 2018-04-20 LAB — INR PPP: 2.1 (ref 0.9–1.1)

## 2018-04-20 PROCEDURE — 85610 PROTHROMBIN TIME: CPT | Performed by: NURSE PRACTITIONER

## 2018-04-20 PROCEDURE — 99211 OFF/OP EST MAY X REQ PHY/QHP: CPT | Performed by: NURSE PRACTITIONER

## 2018-04-20 NOTE — PROGRESS NOTES
PT here today post hospitalization. PT states he did take Coumadin on Wednesday night when dc'd from hospital PT denies any med changes or bleeding issues. PT has been taking Lovenox BID. PT and daughter instructed to always call CC before stopping Coumadin prior to procedure for Lovenox bridging. Instructed to take Lovenox tonight and BID tomorrow then d/c. Instructed pt on dosing which he and daughter verbalize understanding. Instructed that INR needs to be obtained on Monday. If INR is not gotten at Dr Sousa office and faxed to CC, pt's daughter states she will bring to CC after Dr Sousa. Patient instructed regarding medication; results given and questions answered. Nutritional counseling given.  Dietary factors affecting therapy addressed.  Patient instructed to monitor for excessive bruising or bleeding. PT and daughter verbalize understanding.           This document has been electronically signed by JUANI Fernandez on April 23, 2018 9:22 AM

## 2018-04-23 ENCOUNTER — ANTICOAGULATION VISIT (OUTPATIENT)
Dept: CARDIAC SURGERY | Facility: CLINIC | Age: 78
End: 2018-04-23

## 2018-04-23 ENCOUNTER — PREP FOR PROCEDURE (OUTPATIENT)
Dept: VASCULAR SURGERY | Age: 78
End: 2018-04-23

## 2018-04-23 ENCOUNTER — OFFICE VISIT (OUTPATIENT)
Dept: VASCULAR SURGERY | Age: 78
End: 2018-04-23
Payer: MEDICARE

## 2018-04-23 ENCOUNTER — TELEPHONE (OUTPATIENT)
Dept: VASCULAR SURGERY | Age: 78
End: 2018-04-23

## 2018-04-23 VITALS
TEMPERATURE: 97.6 F | WEIGHT: 225 LBS | BODY MASS INDEX: 31.5 KG/M2 | HEART RATE: 60 BPM | DIASTOLIC BLOOD PRESSURE: 64 MMHG | SYSTOLIC BLOOD PRESSURE: 96 MMHG | HEIGHT: 71 IN

## 2018-04-23 DIAGNOSIS — I74.9 EMBOLISM AND THROMBOSIS OF ARTERY (HCC): ICD-10-CM

## 2018-04-23 DIAGNOSIS — I48.0 PAROXYSMAL ATRIAL FIBRILLATION (HCC): ICD-10-CM

## 2018-04-23 DIAGNOSIS — I70.221 ATHEROSCLEROSIS OF NATIVE ARTERY OF RIGHT LOWER EXTREMITY WITH REST PAIN (HCC): Primary | ICD-10-CM

## 2018-04-23 DIAGNOSIS — Z79.01 LONG-TERM (CURRENT) USE OF ANTICOAGULANTS: ICD-10-CM

## 2018-04-23 LAB — INR PPP: 3.9

## 2018-04-23 PROCEDURE — 99213 OFFICE O/P EST LOW 20 MIN: CPT | Performed by: PHYSICIAN ASSISTANT

## 2018-04-23 RX ORDER — SODIUM CHLORIDE 9 MG/ML
INJECTION, SOLUTION INTRAVENOUS CONTINUOUS
Status: CANCELLED | OUTPATIENT
Start: 2018-04-23

## 2018-04-23 RX ORDER — ACETYLCYSTEINE 100 MG/ML
SOLUTION ORAL; RESPIRATORY (INHALATION)
Qty: 1 ML | Refills: 0 | OUTPATIENT
Start: 2018-04-23 | End: 2018-05-14 | Stop reason: CLARIF

## 2018-04-23 RX ORDER — SODIUM CHLORIDE 0.9 % (FLUSH) 0.9 %
10 SYRINGE (ML) INJECTION PRN
Status: CANCELLED | OUTPATIENT
Start: 2018-04-23

## 2018-04-23 RX ORDER — ASPIRIN 81 MG/1
81 TABLET ORAL ONCE
Status: CANCELLED | OUTPATIENT
Start: 2018-04-23 | End: 2018-04-23

## 2018-04-23 NOTE — PROGRESS NOTES
Received INR level from [facility] in [place]; pt had an appt with Dr. Sousa there today.  I spoke to pt's daughter over the phone who states Dr. Sousa is going to do an arteriogram this Wednesday the 25th and instructed for pt to hold coumadin one night prior to procedure. I gave instructions for pt to decrease today's coumadin dose to 2.5mg and to increase Vit K intake today with a large broccoli or turnip green serving.  Pt's daughter, Jennifer, states she will have INR level called to CC this Wednesday from [facility]. Instructions verbalized.          This document has been electronically signed by JUANI Fernandez on April 24, 2018 8:44 AM

## 2018-04-25 ENCOUNTER — HOSPITAL ENCOUNTER (OUTPATIENT)
Dept: INTERVENTIONAL RADIOLOGY/VASCULAR | Age: 78
Discharge: HOME | DRG: 253 | End: 2018-04-25
Payer: MEDICARE

## 2018-04-25 ENCOUNTER — HOSPITAL ENCOUNTER (OUTPATIENT)
Dept: GENERAL RADIOLOGY | Age: 78
Discharge: HOME | DRG: 253 | End: 2018-04-25
Payer: MEDICARE

## 2018-04-25 VITALS
TEMPERATURE: 97.9 F | BODY MASS INDEX: 31.5 KG/M2 | WEIGHT: 225 LBS | SYSTOLIC BLOOD PRESSURE: 105 MMHG | HEART RATE: 63 BPM | HEIGHT: 71 IN | DIASTOLIC BLOOD PRESSURE: 46 MMHG | RESPIRATION RATE: 13 BRPM | OXYGEN SATURATION: 91 %

## 2018-04-25 DIAGNOSIS — I73.9 PVD (PERIPHERAL VASCULAR DISEASE) (HCC): ICD-10-CM

## 2018-04-25 DIAGNOSIS — Z01.818 PREOP EXAMINATION: Primary | ICD-10-CM

## 2018-04-25 LAB
ANION GAP SERPL CALCULATED.3IONS-SCNC: 15 MMOL/L (ref 7–19)
BUN BLDV-MCNC: 67 MG/DL (ref 8–23)
CALCIUM SERPL-MCNC: 8.3 MG/DL (ref 8.8–10.2)
CHLORIDE BLD-SCNC: 100 MMOL/L (ref 98–111)
CO2: 27 MMOL/L (ref 22–29)
CREAT SERPL-MCNC: 2.8 MG/DL (ref 0.5–1.2)
EKG P AXIS: 31 DEGREES
EKG P-R INTERVAL: 192 MS
EKG Q-T INTERVAL: 504 MS
EKG QRS DURATION: 110 MS
EKG QTC CALCULATION (BAZETT): 496 MS
EKG T AXIS: 0 DEGREES
GFR NON-AFRICAN AMERICAN: 22
GLUCOSE BLD-MCNC: 110 MG/DL (ref 74–109)
HCT VFR BLD CALC: 34.8 % (ref 42–52)
HEMOGLOBIN: 11.1 G/DL (ref 14–18)
INR BLD: 2.09 (ref 0.88–1.18)
MCH RBC QN AUTO: 27.7 PG (ref 27–31)
MCHC RBC AUTO-ENTMCNC: 31.9 G/DL (ref 33–37)
MCV RBC AUTO: 86.8 FL (ref 80–94)
PDW BLD-RTO: 17.5 % (ref 11.5–14.5)
PLATELET # BLD: 310 K/UL (ref 130–400)
PMV BLD AUTO: 9.4 FL (ref 9.4–12.4)
POTASSIUM SERPL-SCNC: 4.3 MMOL/L (ref 3.5–5)
PROTHROMBIN TIME: 23.5 SEC (ref 12–14.6)
RBC # BLD: 4.01 M/UL (ref 4.7–6.1)
SODIUM BLD-SCNC: 142 MMOL/L (ref 136–145)
WBC # BLD: 7 K/UL (ref 4.8–10.8)

## 2018-04-25 PROCEDURE — 2500000003 HC RX 250 WO HCPCS: Performed by: SURGERY

## 2018-04-25 PROCEDURE — 87070 CULTURE OTHR SPECIMN AEROBIC: CPT

## 2018-04-25 PROCEDURE — 6360000002 HC RX W HCPCS: Performed by: PHYSICIAN ASSISTANT

## 2018-04-25 PROCEDURE — 85610 PROTHROMBIN TIME: CPT

## 2018-04-25 PROCEDURE — 2709999900 IR AORTAGRAM ABDOMINAL SERIALOGRAM

## 2018-04-25 PROCEDURE — 71046 X-RAY EXAM CHEST 2 VIEWS: CPT

## 2018-04-25 PROCEDURE — G0269 OCCLUSIVE DEVICE IN VEIN ART: HCPCS | Performed by: SURGERY

## 2018-04-25 PROCEDURE — 85027 COMPLETE CBC AUTOMATED: CPT

## 2018-04-25 PROCEDURE — 75625 CONTRAST EXAM ABDOMINL AORTA: CPT | Performed by: SURGERY

## 2018-04-25 PROCEDURE — B41DYZZ FLUOROSCOPY OF AORTA AND BILATERAL LOWER EXTREMITY ARTERIES USING OTHER CONTRAST: ICD-10-PCS | Performed by: SURGERY

## 2018-04-25 PROCEDURE — 99153 MOD SED SAME PHYS/QHP EA: CPT | Performed by: SURGERY

## 2018-04-25 PROCEDURE — 99152 MOD SED SAME PHYS/QHP 5/>YRS: CPT | Performed by: SURGERY

## 2018-04-25 PROCEDURE — 6360000002 HC RX W HCPCS: Performed by: SURGERY

## 2018-04-25 PROCEDURE — 2500000003 HC RX 250 WO HCPCS: Performed by: PHYSICIAN ASSISTANT

## 2018-04-25 PROCEDURE — 75710 ARTERY X-RAYS ARM/LEG: CPT | Performed by: SURGERY

## 2018-04-25 PROCEDURE — 36200 PLACE CATHETER IN AORTA: CPT | Performed by: SURGERY

## 2018-04-25 PROCEDURE — 93971 EXTREMITY STUDY: CPT

## 2018-04-25 PROCEDURE — 93005 ELECTROCARDIOGRAM TRACING: CPT

## 2018-04-25 PROCEDURE — 6360000004 HC RX CONTRAST MEDICATION: Performed by: SURGERY

## 2018-04-25 PROCEDURE — 36415 COLL VENOUS BLD VENIPUNCTURE: CPT

## 2018-04-25 PROCEDURE — 6370000000 HC RX 637 (ALT 250 FOR IP): Performed by: PHYSICIAN ASSISTANT

## 2018-04-25 PROCEDURE — 80048 BASIC METABOLIC PNL TOTAL CA: CPT

## 2018-04-25 PROCEDURE — 75716 ARTERY X-RAYS ARMS/LEGS: CPT | Performed by: SURGERY

## 2018-04-25 PROCEDURE — 2580000003 HC RX 258: Performed by: PHYSICIAN ASSISTANT

## 2018-04-25 RX ORDER — ONDANSETRON 2 MG/ML
4 INJECTION INTRAMUSCULAR; INTRAVENOUS EVERY 8 HOURS PRN
Status: DISCONTINUED | OUTPATIENT
Start: 2018-04-25 | End: 2018-04-27 | Stop reason: HOSPADM

## 2018-04-25 RX ORDER — FENTANYL CITRATE 50 UG/ML
INJECTION, SOLUTION INTRAMUSCULAR; INTRAVENOUS
Status: COMPLETED | OUTPATIENT
Start: 2018-04-25 | End: 2018-04-25

## 2018-04-25 RX ORDER — MIDAZOLAM HYDROCHLORIDE 1 MG/ML
INJECTION INTRAMUSCULAR; INTRAVENOUS
Status: COMPLETED | OUTPATIENT
Start: 2018-04-25 | End: 2018-04-25

## 2018-04-25 RX ORDER — ACETAMINOPHEN 325 MG/1
650 TABLET ORAL EVERY 4 HOURS PRN
Status: DISCONTINUED | OUTPATIENT
Start: 2018-04-25 | End: 2018-04-27 | Stop reason: HOSPADM

## 2018-04-25 RX ORDER — CEFAZOLIN SODIUM 1 G/50ML
1 INJECTION, SOLUTION INTRAVENOUS ONCE
Status: COMPLETED | OUTPATIENT
Start: 2018-04-25 | End: 2018-04-25

## 2018-04-25 RX ORDER — ASPIRIN 81 MG/1
81 TABLET ORAL ONCE
Status: COMPLETED | OUTPATIENT
Start: 2018-04-25 | End: 2018-04-25

## 2018-04-25 RX ORDER — SODIUM CHLORIDE 0.9 % (FLUSH) 0.9 %
10 SYRINGE (ML) INJECTION PRN
Status: DISCONTINUED | OUTPATIENT
Start: 2018-04-25 | End: 2018-04-27 | Stop reason: HOSPADM

## 2018-04-25 RX ORDER — TRAMADOL HYDROCHLORIDE 50 MG/1
50 TABLET ORAL EVERY 6 HOURS PRN
Status: DISCONTINUED | OUTPATIENT
Start: 2018-04-25 | End: 2018-04-27 | Stop reason: HOSPADM

## 2018-04-25 RX ORDER — SODIUM CHLORIDE 9 MG/ML
INJECTION, SOLUTION INTRAVENOUS CONTINUOUS
Status: DISCONTINUED | OUTPATIENT
Start: 2018-04-25 | End: 2018-04-27 | Stop reason: HOSPADM

## 2018-04-25 RX ORDER — TRAMADOL HYDROCHLORIDE 50 MG/1
100 TABLET ORAL EVERY 6 HOURS PRN
Status: DISCONTINUED | OUTPATIENT
Start: 2018-04-25 | End: 2018-04-27 | Stop reason: HOSPADM

## 2018-04-25 RX ORDER — IODIXANOL 320 MG/ML
INJECTION, SOLUTION INTRAVASCULAR
Status: COMPLETED | OUTPATIENT
Start: 2018-04-25 | End: 2018-04-25

## 2018-04-25 RX ORDER — HEPARIN SODIUM 5000 [USP'U]/ML
INJECTION, SOLUTION INTRAVENOUS; SUBCUTANEOUS
Status: COMPLETED | OUTPATIENT
Start: 2018-04-25 | End: 2018-04-25

## 2018-04-25 RX ORDER — LIDOCAINE HYDROCHLORIDE 20 MG/ML
INJECTION, SOLUTION INFILTRATION; PERINEURAL
Status: COMPLETED | OUTPATIENT
Start: 2018-04-25 | End: 2018-04-25

## 2018-04-25 RX ADMIN — ASPIRIN 81 MG: 81 TABLET, COATED ORAL at 10:42

## 2018-04-25 RX ADMIN — CEFAZOLIN SODIUM 1 G: 1 INJECTION, SOLUTION INTRAVENOUS at 11:10

## 2018-04-25 RX ADMIN — LIDOCAINE HYDROCHLORIDE 10 ML: 20 INJECTION, SOLUTION INFILTRATION; PERINEURAL at 11:24

## 2018-04-25 RX ADMIN — SODIUM BICARBONATE: 84 INJECTION, SOLUTION INTRAVENOUS at 10:20

## 2018-04-25 RX ADMIN — MIDAZOLAM HYDROCHLORIDE 0.5 MG: 1 INJECTION, SOLUTION INTRAMUSCULAR; INTRAVENOUS at 11:23

## 2018-04-25 RX ADMIN — HEPARIN SODIUM 5000 UNITS: 5000 INJECTION, SOLUTION INTRAVENOUS; SUBCUTANEOUS at 11:13

## 2018-04-25 RX ADMIN — IODIXANOL 45 ML: 320 INJECTION, SOLUTION INTRAVASCULAR at 11:45

## 2018-04-25 RX ADMIN — FENTANYL CITRATE 25 MCG: 50 INJECTION, SOLUTION INTRAMUSCULAR; INTRAVENOUS at 11:23

## 2018-04-26 ENCOUNTER — DOCUMENTATION (OUTPATIENT)
Dept: CARDIAC SURGERY | Facility: CLINIC | Age: 78
End: 2018-04-26

## 2018-04-26 LAB — MRSA CULTURE ONLY: NORMAL

## 2018-04-26 NOTE — PROGRESS NOTES
Pt's daughter, Jennifer, called to say pt is having surgery to his left leg per Dr. Sousa tomorrow for revascularization in [place].  Dr. Sousa instructed pt to hold coumadin X1 dose tonight. I instructed Jennifer to notify CC when pt is discharged home.

## 2018-04-27 ENCOUNTER — HOSPITAL ENCOUNTER (INPATIENT)
Age: 78
LOS: 4 days | Discharge: HOME HEALTH | DRG: 253 | End: 2018-05-01
Attending: SURGERY | Admitting: SURGERY
Payer: MEDICARE

## 2018-04-27 ENCOUNTER — APPOINTMENT (OUTPATIENT)
Dept: INTERVENTIONAL RADIOLOGY/VASCULAR | Age: 78
DRG: 253 | End: 2018-04-27
Attending: SURGERY
Payer: MEDICARE

## 2018-04-27 ENCOUNTER — ANESTHESIA (OUTPATIENT)
Dept: OPERATING ROOM | Age: 78
DRG: 253 | End: 2018-04-27
Payer: MEDICARE

## 2018-04-27 ENCOUNTER — ANESTHESIA EVENT (OUTPATIENT)
Dept: OPERATING ROOM | Age: 78
DRG: 253 | End: 2018-04-27
Payer: MEDICARE

## 2018-04-27 VITALS
OXYGEN SATURATION: 95 % | DIASTOLIC BLOOD PRESSURE: 55 MMHG | TEMPERATURE: 96.5 F | RESPIRATION RATE: 1 BRPM | SYSTOLIC BLOOD PRESSURE: 81 MMHG

## 2018-04-27 PROBLEM — I70.261 ATHEROSCLEROSIS OF NATIVE ARTERY OF RIGHT LEG WITH GANGRENE (HCC): Status: ACTIVE | Noted: 2018-04-27

## 2018-04-27 LAB
ABO/RH: NORMAL
ANTIBODY SCREEN: NORMAL
INR BLD: 1.38 (ref 0.88–1.18)
POC ACT LR: 208 SEC
POC ACT LR: 293 SEC
PROTHROMBIN TIME: 16.9 SEC (ref 12–14.6)

## 2018-04-27 PROCEDURE — 06BP0ZZ EXCISION OF RIGHT SAPHENOUS VEIN, OPEN APPROACH: ICD-10-PCS | Performed by: SURGERY

## 2018-04-27 PROCEDURE — 86901 BLOOD TYPING SEROLOGIC RH(D): CPT

## 2018-04-27 PROCEDURE — 2580000003 HC RX 258: Performed by: SURGERY

## 2018-04-27 PROCEDURE — C1894 INTRO/SHEATH, NON-LASER: HCPCS | Performed by: SURGERY

## 2018-04-27 PROCEDURE — 3700000001 HC ADD 15 MINUTES (ANESTHESIA): Performed by: SURGERY

## 2018-04-27 PROCEDURE — 041K09M BYPASS RIGHT FEMORAL ARTERY TO PERONEAL ARTERY WITH AUTOLOGOUS VENOUS TISSUE, OPEN APPROACH: ICD-10-PCS | Performed by: SURGERY

## 2018-04-27 PROCEDURE — B41FYZZ FLUOROSCOPY OF RIGHT LOWER EXTREMITY ARTERIES USING OTHER CONTRAST: ICD-10-PCS | Performed by: SURGERY

## 2018-04-27 PROCEDURE — 04CT0ZZ EXTIRPATION OF MATTER FROM RIGHT PERONEAL ARTERY, OPEN APPROACH: ICD-10-PCS | Performed by: SURGERY

## 2018-04-27 PROCEDURE — 86850 RBC ANTIBODY SCREEN: CPT

## 2018-04-27 PROCEDURE — C1725 CATH, TRANSLUMIN NON-LASER: HCPCS | Performed by: SURGERY

## 2018-04-27 PROCEDURE — 2000000000 HC ICU R&B

## 2018-04-27 PROCEDURE — 2500000003 HC RX 250 WO HCPCS: Performed by: NURSE ANESTHETIST, CERTIFIED REGISTERED

## 2018-04-27 PROCEDURE — 35585 VEIN BYP FEM-TIBIAL PERONEAL: CPT | Performed by: SURGERY

## 2018-04-27 PROCEDURE — 6360000002 HC RX W HCPCS: Performed by: SURGERY

## 2018-04-27 PROCEDURE — 37228 PR REVSC OPN/PRQ TIB/PERO W/ANGIOPLASTY UNI: CPT | Performed by: SURGERY

## 2018-04-27 PROCEDURE — 86900 BLOOD TYPING SEROLOGIC ABO: CPT

## 2018-04-27 PROCEDURE — 3600000005 HC SURGERY LEVEL 5 BASE: Performed by: SURGERY

## 2018-04-27 PROCEDURE — 7100000000 HC PACU RECOVERY - FIRST 15 MIN: Performed by: SURGERY

## 2018-04-27 PROCEDURE — 94640 AIRWAY INHALATION TREATMENT: CPT

## 2018-04-27 PROCEDURE — C1887 CATHETER, GUIDING: HCPCS | Performed by: SURGERY

## 2018-04-27 PROCEDURE — 04CK0ZZ EXTIRPATION OF MATTER FROM RIGHT FEMORAL ARTERY, OPEN APPROACH: ICD-10-PCS | Performed by: SURGERY

## 2018-04-27 PROCEDURE — 6360000002 HC RX W HCPCS: Performed by: NURSE ANESTHETIST, CERTIFIED REGISTERED

## 2018-04-27 PROCEDURE — 85347 COAGULATION TIME ACTIVATED: CPT

## 2018-04-27 PROCEDURE — C1769 GUIDE WIRE: HCPCS | Performed by: SURGERY

## 2018-04-27 PROCEDURE — 3600000015 HC SURGERY LEVEL 5 ADDTL 15MIN: Performed by: SURGERY

## 2018-04-27 PROCEDURE — 3700000000 HC ANESTHESIA ATTENDED CARE: Performed by: SURGERY

## 2018-04-27 PROCEDURE — 6360000002 HC RX W HCPCS: Performed by: ANESTHESIOLOGY

## 2018-04-27 PROCEDURE — 2580000003 HC RX 258: Performed by: ANESTHESIOLOGY

## 2018-04-27 PROCEDURE — 94664 DEMO&/EVAL PT USE INHALER: CPT

## 2018-04-27 PROCEDURE — 047T3ZZ DILATION OF RIGHT PERONEAL ARTERY, PERCUTANEOUS APPROACH: ICD-10-PCS | Performed by: SURGERY

## 2018-04-27 PROCEDURE — 7100000001 HC PACU RECOVERY - ADDTL 15 MIN: Performed by: SURGERY

## 2018-04-27 PROCEDURE — 2720000001 HC MISC SURG SUPPLY STERILE $51-500: Performed by: SURGERY

## 2018-04-27 PROCEDURE — 36415 COLL VENOUS BLD VENIPUNCTURE: CPT

## 2018-04-27 PROCEDURE — 85610 PROTHROMBIN TIME: CPT

## 2018-04-27 PROCEDURE — 2700000000 HC OXYGEN THERAPY PER DAY

## 2018-04-27 PROCEDURE — 2580000003 HC RX 258: Performed by: NURSE ANESTHETIST, CERTIFIED REGISTERED

## 2018-04-27 RX ORDER — EPHEDRINE SULFATE 50 MG/ML
INJECTION, SOLUTION INTRAVENOUS PRN
Status: DISCONTINUED | OUTPATIENT
Start: 2018-04-27 | End: 2018-04-27 | Stop reason: SDUPTHER

## 2018-04-27 RX ORDER — ACETAMINOPHEN 325 MG/1
650 TABLET ORAL EVERY 4 HOURS PRN
Status: DISCONTINUED | OUTPATIENT
Start: 2018-04-27 | End: 2018-05-01 | Stop reason: HOSPADM

## 2018-04-27 RX ORDER — FENTANYL CITRATE 50 UG/ML
INJECTION, SOLUTION INTRAMUSCULAR; INTRAVENOUS PRN
Status: DISCONTINUED | OUTPATIENT
Start: 2018-04-27 | End: 2018-04-27 | Stop reason: SDUPTHER

## 2018-04-27 RX ORDER — SUCCINYLCHOLINE CHLORIDE 20 MG/ML
INJECTION INTRAMUSCULAR; INTRAVENOUS PRN
Status: DISCONTINUED | OUTPATIENT
Start: 2018-04-27 | End: 2018-04-27 | Stop reason: SDUPTHER

## 2018-04-27 RX ORDER — FENTANYL CITRATE 50 UG/ML
50 INJECTION, SOLUTION INTRAMUSCULAR; INTRAVENOUS EVERY 5 MIN PRN
Status: DISCONTINUED | OUTPATIENT
Start: 2018-04-27 | End: 2018-04-27 | Stop reason: HOSPADM

## 2018-04-27 RX ORDER — LIDOCAINE HYDROCHLORIDE 10 MG/ML
INJECTION, SOLUTION INFILTRATION; PERINEURAL PRN
Status: DISCONTINUED | OUTPATIENT
Start: 2018-04-27 | End: 2018-04-27 | Stop reason: SDUPTHER

## 2018-04-27 RX ORDER — ROCURONIUM BROMIDE 10 MG/ML
INJECTION, SOLUTION INTRAVENOUS PRN
Status: DISCONTINUED | OUTPATIENT
Start: 2018-04-27 | End: 2018-04-27 | Stop reason: SDUPTHER

## 2018-04-27 RX ORDER — VANCOMYCIN HYDROCHLORIDE 1 G/200ML
1000 INJECTION, SOLUTION INTRAVENOUS ONCE
Status: COMPLETED | OUTPATIENT
Start: 2018-04-27 | End: 2018-04-27

## 2018-04-27 RX ORDER — MIDAZOLAM HYDROCHLORIDE 1 MG/ML
2 INJECTION INTRAMUSCULAR; INTRAVENOUS
Status: COMPLETED | OUTPATIENT
Start: 2018-04-27 | End: 2018-04-27

## 2018-04-27 RX ORDER — MEPERIDINE HYDROCHLORIDE 50 MG/ML
12.5 INJECTION INTRAMUSCULAR; INTRAVENOUS; SUBCUTANEOUS EVERY 5 MIN PRN
Status: DISCONTINUED | OUTPATIENT
Start: 2018-04-27 | End: 2018-04-27 | Stop reason: HOSPADM

## 2018-04-27 RX ORDER — ONDANSETRON 2 MG/ML
INJECTION INTRAMUSCULAR; INTRAVENOUS PRN
Status: DISCONTINUED | OUTPATIENT
Start: 2018-04-27 | End: 2018-04-27 | Stop reason: SDUPTHER

## 2018-04-27 RX ORDER — METOCLOPRAMIDE HYDROCHLORIDE 5 MG/ML
10 INJECTION INTRAMUSCULAR; INTRAVENOUS
Status: DISCONTINUED | OUTPATIENT
Start: 2018-04-27 | End: 2018-04-27 | Stop reason: HOSPADM

## 2018-04-27 RX ORDER — LABETALOL HYDROCHLORIDE 5 MG/ML
5 INJECTION, SOLUTION INTRAVENOUS EVERY 10 MIN PRN
Status: DISCONTINUED | OUTPATIENT
Start: 2018-04-27 | End: 2018-04-27 | Stop reason: HOSPADM

## 2018-04-27 RX ORDER — PROMETHAZINE HYDROCHLORIDE 25 MG/ML
6.25 INJECTION, SOLUTION INTRAMUSCULAR; INTRAVENOUS
Status: DISCONTINUED | OUTPATIENT
Start: 2018-04-27 | End: 2018-04-27 | Stop reason: HOSPADM

## 2018-04-27 RX ORDER — FENTANYL CITRATE 50 UG/ML
50 INJECTION, SOLUTION INTRAMUSCULAR; INTRAVENOUS
Status: DISCONTINUED | OUTPATIENT
Start: 2018-04-27 | End: 2018-04-27 | Stop reason: HOSPADM

## 2018-04-27 RX ORDER — SODIUM CHLORIDE 0.9 % (FLUSH) 0.9 %
10 SYRINGE (ML) INJECTION PRN
Status: DISCONTINUED | OUTPATIENT
Start: 2018-04-27 | End: 2018-04-27 | Stop reason: HOSPADM

## 2018-04-27 RX ORDER — SODIUM CHLORIDE, SODIUM LACTATE, POTASSIUM CHLORIDE, CALCIUM CHLORIDE 600; 310; 30; 20 MG/100ML; MG/100ML; MG/100ML; MG/100ML
INJECTION, SOLUTION INTRAVENOUS CONTINUOUS PRN
Status: DISCONTINUED | OUTPATIENT
Start: 2018-04-27 | End: 2018-04-27

## 2018-04-27 RX ORDER — GLYCOPYRROLATE 0.2 MG/ML
INJECTION INTRAMUSCULAR; INTRAVENOUS PRN
Status: DISCONTINUED | OUTPATIENT
Start: 2018-04-27 | End: 2018-04-27 | Stop reason: SDUPTHER

## 2018-04-27 RX ORDER — SODIUM CHLORIDE, SODIUM LACTATE, POTASSIUM CHLORIDE, CALCIUM CHLORIDE 600; 310; 30; 20 MG/100ML; MG/100ML; MG/100ML; MG/100ML
INJECTION, SOLUTION INTRAVENOUS CONTINUOUS
Status: DISCONTINUED | OUTPATIENT
Start: 2018-04-27 | End: 2018-04-27

## 2018-04-27 RX ORDER — ENALAPRILAT 2.5 MG/2ML
1.25 INJECTION INTRAVENOUS
Status: DISCONTINUED | OUTPATIENT
Start: 2018-04-27 | End: 2018-04-27 | Stop reason: HOSPADM

## 2018-04-27 RX ORDER — FENTANYL CITRATE 50 UG/ML
25 INJECTION, SOLUTION INTRAMUSCULAR; INTRAVENOUS
Status: DISCONTINUED | OUTPATIENT
Start: 2018-04-27 | End: 2018-04-27 | Stop reason: HOSPADM

## 2018-04-27 RX ORDER — FENTANYL CITRATE 50 UG/ML
25 INJECTION, SOLUTION INTRAMUSCULAR; INTRAVENOUS EVERY 5 MIN PRN
Status: DISCONTINUED | OUTPATIENT
Start: 2018-04-27 | End: 2018-04-27 | Stop reason: HOSPADM

## 2018-04-27 RX ORDER — DEXAMETHASONE SODIUM PHOSPHATE 10 MG/ML
INJECTION INTRAMUSCULAR; INTRAVENOUS PRN
Status: DISCONTINUED | OUTPATIENT
Start: 2018-04-27 | End: 2018-04-27 | Stop reason: SDUPTHER

## 2018-04-27 RX ORDER — DIPHENHYDRAMINE HYDROCHLORIDE 50 MG/ML
12.5 INJECTION INTRAMUSCULAR; INTRAVENOUS
Status: DISCONTINUED | OUTPATIENT
Start: 2018-04-27 | End: 2018-04-27 | Stop reason: HOSPADM

## 2018-04-27 RX ORDER — CLONIDINE HYDROCHLORIDE 0.1 MG/1
0.1 TABLET ORAL
Status: DISCONTINUED | OUTPATIENT
Start: 2018-04-27 | End: 2018-05-01 | Stop reason: HOSPADM

## 2018-04-27 RX ORDER — PROPOFOL 10 MG/ML
INJECTION, EMULSION INTRAVENOUS PRN
Status: DISCONTINUED | OUTPATIENT
Start: 2018-04-27 | End: 2018-04-27 | Stop reason: SDUPTHER

## 2018-04-27 RX ORDER — SODIUM CHLORIDE 9 MG/ML
INJECTION, SOLUTION INTRAVENOUS CONTINUOUS
Status: DISCONTINUED | OUTPATIENT
Start: 2018-04-27 | End: 2018-04-28

## 2018-04-27 RX ORDER — SODIUM CHLORIDE 0.9 % (FLUSH) 0.9 %
10 SYRINGE (ML) INJECTION EVERY 12 HOURS SCHEDULED
Status: DISCONTINUED | OUTPATIENT
Start: 2018-04-27 | End: 2018-04-27 | Stop reason: HOSPADM

## 2018-04-27 RX ORDER — SODIUM CHLORIDE 0.9 % (FLUSH) 0.9 %
10 SYRINGE (ML) INJECTION PRN
Status: DISCONTINUED | OUTPATIENT
Start: 2018-04-27 | End: 2018-05-01 | Stop reason: HOSPADM

## 2018-04-27 RX ORDER — HEPARIN SODIUM 1000 [USP'U]/ML
INJECTION, SOLUTION INTRAVENOUS; SUBCUTANEOUS PRN
Status: DISCONTINUED | OUTPATIENT
Start: 2018-04-27 | End: 2018-04-27 | Stop reason: SDUPTHER

## 2018-04-27 RX ORDER — SODIUM CHLORIDE 0.9 % (FLUSH) 0.9 %
10 SYRINGE (ML) INJECTION EVERY 12 HOURS SCHEDULED
Status: DISCONTINUED | OUTPATIENT
Start: 2018-04-27 | End: 2018-05-01 | Stop reason: HOSPADM

## 2018-04-27 RX ORDER — LIDOCAINE HYDROCHLORIDE 10 MG/ML
1 INJECTION, SOLUTION EPIDURAL; INFILTRATION; INTRACAUDAL; PERINEURAL
Status: DISCONTINUED | OUTPATIENT
Start: 2018-04-27 | End: 2018-04-27 | Stop reason: HOSPADM

## 2018-04-27 RX ORDER — HYDROMORPHONE HCL IN 0.9% NACL 0.5 MG/ML
0.5 SYRINGE (ML) INTRAVENOUS
Status: DISCONTINUED | OUTPATIENT
Start: 2018-04-27 | End: 2018-05-01 | Stop reason: HOSPADM

## 2018-04-27 RX ORDER — HYDRALAZINE HYDROCHLORIDE 20 MG/ML
5 INJECTION INTRAMUSCULAR; INTRAVENOUS EVERY 10 MIN PRN
Status: DISCONTINUED | OUTPATIENT
Start: 2018-04-27 | End: 2018-04-27 | Stop reason: HOSPADM

## 2018-04-27 RX ORDER — ONDANSETRON 2 MG/ML
4 INJECTION INTRAMUSCULAR; INTRAVENOUS EVERY 6 HOURS PRN
Status: DISCONTINUED | OUTPATIENT
Start: 2018-04-27 | End: 2018-05-01 | Stop reason: HOSPADM

## 2018-04-27 RX ORDER — HYDROMORPHONE HCL IN 0.9% NACL 0.5 MG/ML
0.25 SYRINGE (ML) INTRAVENOUS
Status: DISCONTINUED | OUTPATIENT
Start: 2018-04-27 | End: 2018-05-01 | Stop reason: HOSPADM

## 2018-04-27 RX ADMIN — FENTANYL CITRATE 25 MCG: 50 INJECTION, SOLUTION INTRAMUSCULAR; INTRAVENOUS at 16:19

## 2018-04-27 RX ADMIN — FENTANYL CITRATE 25 MCG: 50 INJECTION, SOLUTION INTRAMUSCULAR; INTRAVENOUS at 16:54

## 2018-04-27 RX ADMIN — ROCURONIUM BROMIDE 5 MG: 10 INJECTION INTRAVENOUS at 12:25

## 2018-04-27 RX ADMIN — MIDAZOLAM 2 MG: 1 INJECTION INTRAMUSCULAR; INTRAVENOUS at 11:57

## 2018-04-27 RX ADMIN — Medication 10 ML: at 21:33

## 2018-04-27 RX ADMIN — Medication 140 MG: at 12:25

## 2018-04-27 RX ADMIN — ONDANSETRON HYDROCHLORIDE 4 MG: 2 SOLUTION INTRAMUSCULAR; INTRAVENOUS at 16:28

## 2018-04-27 RX ADMIN — PHENYLEPHRINE HYDROCHLORIDE 25 MCG/MIN: 10 INJECTION INTRAVENOUS at 13:14

## 2018-04-27 RX ADMIN — SODIUM CHLORIDE, SODIUM LACTATE, POTASSIUM CHLORIDE, AND CALCIUM CHLORIDE: 600; 310; 30; 20 INJECTION, SOLUTION INTRAVENOUS at 10:30

## 2018-04-27 RX ADMIN — GLYCOPYRROLATE 0.2 MG: 0.2 INJECTION, SOLUTION INTRAMUSCULAR; INTRAVENOUS at 13:14

## 2018-04-27 RX ADMIN — PROPOFOL 180 MG: 10 INJECTION, EMULSION INTRAVENOUS at 12:25

## 2018-04-27 RX ADMIN — HEPARIN SODIUM 2000 UNITS: 1000 INJECTION, SOLUTION INTRAVENOUS; SUBCUTANEOUS at 15:22

## 2018-04-27 RX ADMIN — EPHEDRINE SULFATE 20 MG: 50 INJECTION, SOLUTION INTRAMUSCULAR; INTRAVENOUS; SUBCUTANEOUS at 13:15

## 2018-04-27 RX ADMIN — LIDOCAINE HYDROCHLORIDE 50 MG: 10 INJECTION, SOLUTION INFILTRATION; PERINEURAL at 12:25

## 2018-04-27 RX ADMIN — Medication 0.5 MG: at 20:23

## 2018-04-27 RX ADMIN — DEXAMETHASONE SODIUM PHOSPHATE 10 MG: 10 INJECTION INTRAMUSCULAR; INTRAVENOUS at 13:14

## 2018-04-27 RX ADMIN — EPHEDRINE SULFATE 20 MG: 50 INJECTION, SOLUTION INTRAMUSCULAR; INTRAVENOUS; SUBCUTANEOUS at 12:33

## 2018-04-27 RX ADMIN — SODIUM CHLORIDE, SODIUM LACTATE, POTASSIUM CHLORIDE, AND CALCIUM CHLORIDE: 600; 310; 30; 20 INJECTION, SOLUTION INTRAVENOUS at 12:55

## 2018-04-27 RX ADMIN — SODIUM CHLORIDE: 9 INJECTION, SOLUTION INTRAVENOUS at 20:23

## 2018-04-27 RX ADMIN — FENTANYL CITRATE 25 MCG: 50 INJECTION, SOLUTION INTRAMUSCULAR; INTRAVENOUS at 16:37

## 2018-04-27 RX ADMIN — FENTANYL CITRATE 50 MCG: 50 INJECTION, SOLUTION INTRAMUSCULAR; INTRAVENOUS at 12:25

## 2018-04-27 RX ADMIN — HEPARIN SODIUM 7000 UNITS: 1000 INJECTION, SOLUTION INTRAVENOUS; SUBCUTANEOUS at 14:27

## 2018-04-27 RX ADMIN — VANCOMYCIN HYDROCHLORIDE 1000 MG: 1 INJECTION, SOLUTION INTRAVENOUS at 11:45

## 2018-04-27 RX ADMIN — ROCURONIUM BROMIDE 45 MG: 10 INJECTION INTRAVENOUS at 12:30

## 2018-04-27 RX ADMIN — SODIUM CHLORIDE, SODIUM LACTATE, POTASSIUM CHLORIDE, AND CALCIUM CHLORIDE: 600; 310; 30; 20 INJECTION, SOLUTION INTRAVENOUS at 12:21

## 2018-04-27 ASSESSMENT — PAIN SCALES - GENERAL: PAINLEVEL_OUTOF10: 7

## 2018-04-27 ASSESSMENT — LIFESTYLE VARIABLES: SMOKING_STATUS: 1

## 2018-04-28 LAB
ANION GAP SERPL CALCULATED.3IONS-SCNC: 10 MMOL/L (ref 7–19)
BUN BLDV-MCNC: 37 MG/DL (ref 8–23)
CALCIUM SERPL-MCNC: 8.3 MG/DL (ref 8.8–10.2)
CHLORIDE BLD-SCNC: 105 MMOL/L (ref 98–111)
CO2: 24 MMOL/L (ref 22–29)
CREAT SERPL-MCNC: 1.7 MG/DL (ref 0.5–1.2)
GFR NON-AFRICAN AMERICAN: 39
GLUCOSE BLD-MCNC: 93 MG/DL (ref 74–109)
HCT VFR BLD CALC: 31.2 % (ref 42–52)
HEMOGLOBIN: 9.9 G/DL (ref 14–18)
MCH RBC QN AUTO: 27.9 PG (ref 27–31)
MCHC RBC AUTO-ENTMCNC: 31.7 G/DL (ref 33–37)
MCV RBC AUTO: 87.9 FL (ref 80–94)
PDW BLD-RTO: 17.4 % (ref 11.5–14.5)
PLATELET # BLD: 282 K/UL (ref 130–400)
PMV BLD AUTO: 9.6 FL (ref 9.4–12.4)
POTASSIUM REFLEX MAGNESIUM: 5.3 MMOL/L (ref 3.5–5)
RBC # BLD: 3.55 M/UL (ref 4.7–6.1)
SODIUM BLD-SCNC: 139 MMOL/L (ref 136–145)
WBC # BLD: 9.8 K/UL (ref 4.8–10.8)

## 2018-04-28 PROCEDURE — 6360000002 HC RX W HCPCS: Performed by: SURGERY

## 2018-04-28 PROCEDURE — 80048 BASIC METABOLIC PNL TOTAL CA: CPT

## 2018-04-28 PROCEDURE — 1210000000 HC MED SURG R&B

## 2018-04-28 PROCEDURE — 99024 POSTOP FOLLOW-UP VISIT: CPT | Performed by: SURGERY

## 2018-04-28 PROCEDURE — 2580000003 HC RX 258: Performed by: SURGERY

## 2018-04-28 PROCEDURE — 85027 COMPLETE CBC AUTOMATED: CPT

## 2018-04-28 RX ADMIN — Medication 0.5 MG: at 14:47

## 2018-04-28 RX ADMIN — Medication 0.5 MG: at 22:59

## 2018-04-28 RX ADMIN — Medication 0.5 MG: at 06:52

## 2018-04-28 RX ADMIN — Medication 0.5 MG: at 19:48

## 2018-04-28 RX ADMIN — Medication 10 ML: at 22:59

## 2018-04-28 RX ADMIN — Medication 0.5 MG: at 00:56

## 2018-04-28 ASSESSMENT — PAIN SCALES - GENERAL
PAINLEVEL_OUTOF10: 0
PAINLEVEL_OUTOF10: 7
PAINLEVEL_OUTOF10: 8
PAINLEVEL_OUTOF10: 7

## 2018-04-29 LAB
INR BLD: 1.25 (ref 0.88–1.18)
PROTHROMBIN TIME: 15.6 SEC (ref 12–14.6)

## 2018-04-29 PROCEDURE — 6370000000 HC RX 637 (ALT 250 FOR IP): Performed by: SURGERY

## 2018-04-29 PROCEDURE — 1210000000 HC MED SURG R&B

## 2018-04-29 PROCEDURE — 2700000000 HC OXYGEN THERAPY PER DAY

## 2018-04-29 PROCEDURE — 99024 POSTOP FOLLOW-UP VISIT: CPT | Performed by: SURGERY

## 2018-04-29 PROCEDURE — 2580000003 HC RX 258: Performed by: SURGERY

## 2018-04-29 PROCEDURE — 36415 COLL VENOUS BLD VENIPUNCTURE: CPT

## 2018-04-29 PROCEDURE — 6360000002 HC RX W HCPCS: Performed by: SURGERY

## 2018-04-29 PROCEDURE — 85610 PROTHROMBIN TIME: CPT

## 2018-04-29 RX ORDER — CILOSTAZOL 100 MG/1
100 TABLET ORAL 2 TIMES DAILY
Status: DISCONTINUED | OUTPATIENT
Start: 2018-04-29 | End: 2018-05-01 | Stop reason: HOSPADM

## 2018-04-29 RX ORDER — QUETIAPINE FUMARATE 100 MG/1
300 TABLET, FILM COATED ORAL 2 TIMES DAILY
Status: COMPLETED | OUTPATIENT
Start: 2018-04-29 | End: 2018-04-29

## 2018-04-29 RX ORDER — ASPIRIN 81 MG/1
81 TABLET, CHEWABLE ORAL DAILY
Status: DISCONTINUED | OUTPATIENT
Start: 2018-04-29 | End: 2018-05-01 | Stop reason: HOSPADM

## 2018-04-29 RX ORDER — QUETIAPINE FUMARATE 300 MG/1
300 TABLET, FILM COATED ORAL NIGHTLY
Status: DISCONTINUED | OUTPATIENT
Start: 2018-04-30 | End: 2018-05-01 | Stop reason: HOSPADM

## 2018-04-29 RX ORDER — SOTALOL HYDROCHLORIDE 80 MG/1
160 TABLET ORAL 2 TIMES DAILY
Status: DISCONTINUED | OUTPATIENT
Start: 2018-04-29 | End: 2018-05-01 | Stop reason: HOSPADM

## 2018-04-29 RX ORDER — WARFARIN SODIUM 5 MG/1
5 TABLET ORAL DAILY
Status: DISCONTINUED | OUTPATIENT
Start: 2018-04-29 | End: 2018-05-01 | Stop reason: HOSPADM

## 2018-04-29 RX ADMIN — Medication 10 ML: at 10:07

## 2018-04-29 RX ADMIN — WARFARIN SODIUM 5 MG: 5 TABLET ORAL at 17:33

## 2018-04-29 RX ADMIN — CILOSTAZOL 100 MG: 100 TABLET ORAL at 20:17

## 2018-04-29 RX ADMIN — ENOXAPARIN SODIUM 105 MG: 120 INJECTION SUBCUTANEOUS at 20:19

## 2018-04-29 RX ADMIN — ENOXAPARIN SODIUM 105 MG: 120 INJECTION SUBCUTANEOUS at 09:40

## 2018-04-29 RX ADMIN — CILOSTAZOL 100 MG: 100 TABLET ORAL at 09:39

## 2018-04-29 RX ADMIN — SOTALOL HYDROCHLORIDE 160 MG: 80 TABLET ORAL at 09:39

## 2018-04-29 RX ADMIN — SOTALOL HYDROCHLORIDE 160 MG: 80 TABLET ORAL at 20:17

## 2018-04-29 RX ADMIN — QUETIAPINE FUMARATE 300 MG: 300 TABLET ORAL at 09:40

## 2018-04-29 RX ADMIN — ACETAMINOPHEN 650 MG: 325 TABLET ORAL at 10:07

## 2018-04-29 RX ADMIN — Medication 0.5 MG: at 01:59

## 2018-04-29 RX ADMIN — QUETIAPINE FUMARATE 300 MG: 300 TABLET ORAL at 20:20

## 2018-04-29 RX ADMIN — Medication 400 MG: at 09:48

## 2018-04-29 RX ADMIN — Medication 0.5 MG: at 20:18

## 2018-04-29 RX ADMIN — Medication 10 ML: at 20:17

## 2018-04-29 RX ADMIN — ASPIRIN 81 MG CHEWABLE TABLET 81 MG: 81 TABLET CHEWABLE at 09:40

## 2018-04-29 ASSESSMENT — PAIN SCALES - GENERAL
PAINLEVEL_OUTOF10: 7
PAINLEVEL_OUTOF10: 6
PAINLEVEL_OUTOF10: 7
PAINLEVEL_OUTOF10: 0

## 2018-04-30 LAB
HCT VFR BLD CALC: 31.1 % (ref 42–52)
HEMOGLOBIN: 9.9 G/DL (ref 14–18)
INR BLD: 1.33 (ref 0.88–1.18)
MCH RBC QN AUTO: 27.7 PG (ref 27–31)
MCHC RBC AUTO-ENTMCNC: 31.8 G/DL (ref 33–37)
MCV RBC AUTO: 86.9 FL (ref 80–94)
PDW BLD-RTO: 17.2 % (ref 11.5–14.5)
PLATELET # BLD: 248 K/UL (ref 130–400)
PMV BLD AUTO: 9.8 FL (ref 9.4–12.4)
PROTHROMBIN TIME: 16.4 SEC (ref 12–14.6)
RBC # BLD: 3.58 M/UL (ref 4.7–6.1)
WBC # BLD: 8.1 K/UL (ref 4.8–10.8)

## 2018-04-30 PROCEDURE — 36415 COLL VENOUS BLD VENIPUNCTURE: CPT

## 2018-04-30 PROCEDURE — 93922 UPR/L XTREMITY ART 2 LEVELS: CPT

## 2018-04-30 PROCEDURE — 6360000002 HC RX W HCPCS: Performed by: SURGERY

## 2018-04-30 PROCEDURE — 6370000000 HC RX 637 (ALT 250 FOR IP): Performed by: SURGERY

## 2018-04-30 PROCEDURE — 85027 COMPLETE CBC AUTOMATED: CPT

## 2018-04-30 PROCEDURE — 85610 PROTHROMBIN TIME: CPT

## 2018-04-30 PROCEDURE — 2700000000 HC OXYGEN THERAPY PER DAY

## 2018-04-30 PROCEDURE — 2580000003 HC RX 258: Performed by: SURGERY

## 2018-04-30 PROCEDURE — 1210000000 HC MED SURG R&B

## 2018-04-30 RX ORDER — FUROSEMIDE 10 MG/ML
20 INJECTION INTRAMUSCULAR; INTRAVENOUS ONCE
Status: COMPLETED | OUTPATIENT
Start: 2018-04-30 | End: 2018-04-30

## 2018-04-30 RX ADMIN — Medication 0.5 MG: at 19:58

## 2018-04-30 RX ADMIN — ENOXAPARIN SODIUM 105 MG: 120 INJECTION SUBCUTANEOUS at 09:28

## 2018-04-30 RX ADMIN — ACETAMINOPHEN 650 MG: 325 TABLET ORAL at 22:17

## 2018-04-30 RX ADMIN — ASPIRIN 81 MG CHEWABLE TABLET 81 MG: 81 TABLET CHEWABLE at 09:28

## 2018-04-30 RX ADMIN — WARFARIN SODIUM 5 MG: 5 TABLET ORAL at 18:12

## 2018-04-30 RX ADMIN — CILOSTAZOL 100 MG: 100 TABLET ORAL at 09:28

## 2018-04-30 RX ADMIN — Medication 10 ML: at 19:57

## 2018-04-30 RX ADMIN — Medication 400 MG: at 09:28

## 2018-04-30 RX ADMIN — ENOXAPARIN SODIUM 105 MG: 120 INJECTION SUBCUTANEOUS at 19:56

## 2018-04-30 RX ADMIN — SOTALOL HYDROCHLORIDE 160 MG: 80 TABLET ORAL at 19:56

## 2018-04-30 RX ADMIN — CILOSTAZOL 100 MG: 100 TABLET ORAL at 19:55

## 2018-04-30 RX ADMIN — Medication 10 ML: at 09:29

## 2018-04-30 RX ADMIN — MOMETASONE FUROATE AND FORMOTEROL FUMARATE DIHYDRATE 2 PUFF: 100; 5 AEROSOL RESPIRATORY (INHALATION) at 11:32

## 2018-04-30 RX ADMIN — Medication 0.5 MG: at 11:32

## 2018-04-30 RX ADMIN — QUETIAPINE FUMARATE 300 MG: 300 TABLET ORAL at 19:56

## 2018-04-30 RX ADMIN — FUROSEMIDE 20 MG: 10 INJECTION INTRAMUSCULAR; INTRAVENOUS at 09:28

## 2018-04-30 RX ADMIN — SOTALOL HYDROCHLORIDE 160 MG: 80 TABLET ORAL at 09:28

## 2018-04-30 ASSESSMENT — PAIN SCALES - GENERAL
PAINLEVEL_OUTOF10: 7
PAINLEVEL_OUTOF10: 5
PAINLEVEL_OUTOF10: 0
PAINLEVEL_OUTOF10: 7
PAINLEVEL_OUTOF10: 3
PAINLEVEL_OUTOF10: 8
PAINLEVEL_OUTOF10: 0

## 2018-05-01 VITALS
SYSTOLIC BLOOD PRESSURE: 94 MMHG | WEIGHT: 233.1 LBS | OXYGEN SATURATION: 95 % | BODY MASS INDEX: 32.63 KG/M2 | DIASTOLIC BLOOD PRESSURE: 57 MMHG | HEART RATE: 65 BPM | HEIGHT: 71 IN | TEMPERATURE: 97.5 F | RESPIRATION RATE: 16 BRPM

## 2018-05-01 LAB
INR BLD: 1.37 (ref 0.88–1.18)
PROTHROMBIN TIME: 16.8 SEC (ref 12–14.6)

## 2018-05-01 PROCEDURE — 6360000002 HC RX W HCPCS: Performed by: SURGERY

## 2018-05-01 PROCEDURE — 85610 PROTHROMBIN TIME: CPT

## 2018-05-01 PROCEDURE — 2580000003 HC RX 258: Performed by: SURGERY

## 2018-05-01 PROCEDURE — 99024 POSTOP FOLLOW-UP VISIT: CPT | Performed by: SURGERY

## 2018-05-01 PROCEDURE — 6370000000 HC RX 637 (ALT 250 FOR IP): Performed by: SURGERY

## 2018-05-01 PROCEDURE — 36415 COLL VENOUS BLD VENIPUNCTURE: CPT

## 2018-05-01 RX ORDER — OXYCODONE AND ACETAMINOPHEN 7.5; 325 MG/1; MG/1
1 TABLET ORAL EVERY 6 HOURS PRN
Qty: 40 TABLET | Refills: 0 | Status: SHIPPED | OUTPATIENT
Start: 2018-05-01 | End: 2018-05-11

## 2018-05-01 RX ADMIN — Medication 10 ML: at 08:49

## 2018-05-01 RX ADMIN — SOTALOL HYDROCHLORIDE 160 MG: 80 TABLET ORAL at 08:21

## 2018-05-01 RX ADMIN — Medication 400 MG: at 08:21

## 2018-05-01 RX ADMIN — ASPIRIN 81 MG CHEWABLE TABLET 81 MG: 81 TABLET CHEWABLE at 08:21

## 2018-05-01 RX ADMIN — MOMETASONE FUROATE AND FORMOTEROL FUMARATE DIHYDRATE 2 PUFF: 100; 5 AEROSOL RESPIRATORY (INHALATION) at 08:47

## 2018-05-01 RX ADMIN — CILOSTAZOL 100 MG: 100 TABLET ORAL at 08:21

## 2018-05-01 RX ADMIN — ENOXAPARIN SODIUM 105 MG: 120 INJECTION SUBCUTANEOUS at 08:46

## 2018-05-02 ENCOUNTER — DOCUMENTATION (OUTPATIENT)
Dept: CARDIAC SURGERY | Facility: CLINIC | Age: 78
End: 2018-05-02

## 2018-05-02 NOTE — PROGRESS NOTES
Per Jo Ann Gonzalez RN [facility] pt was dc'd home from hospital yesterday. PT's daughter called questioning if he should be on Lovenox. Spoke with Crystal GLORIA with Dr Sousa' office who states pt is not to have Lovenox. Called daughter and instructed no Lovenox and to take 5mg of Coumadin tonight. Daughter verbalized understanding.

## 2018-05-03 ENCOUNTER — ANTICOAGULATION VISIT (OUTPATIENT)
Dept: CARDIAC SURGERY | Facility: CLINIC | Age: 78
End: 2018-05-03

## 2018-05-03 DIAGNOSIS — I74.9 EMBOLISM AND THROMBOSIS OF ARTERY (HCC): ICD-10-CM

## 2018-05-03 DIAGNOSIS — Z79.01 LONG-TERM (CURRENT) USE OF ANTICOAGULANTS: ICD-10-CM

## 2018-05-03 DIAGNOSIS — I48.0 PAROXYSMAL ATRIAL FIBRILLATION (HCC): ICD-10-CM

## 2018-05-03 LAB — INR PPP: 1.9

## 2018-05-03 PROCEDURE — 85610 PROTHROMBIN TIME: CPT | Performed by: SURGERY

## 2018-05-03 NOTE — PROGRESS NOTES
Spoke to DANITA Kevin with [facility]  who was still in the home with pt.  Pt INR on the rise from previous procedure with Dr. Sousa this wk.  Pt maintenance medications have not changed and pt is not on Abs at this time.  Adjusted coumadin dose and instructed for pt to limit green intake for two days.  Instructed Dorita to recheck INR on Monday, May 7th.  Pt will come to the CC on Monday as he has another physician appt in town.  Will notify  of a recheck date following CC appt.  Instructions verbalized.  Patient instructed regarding medication; results given and questions answered. Nutritional counseling given.  Dietary factors affecting therapy addressed.  Patient instructed to monitor for excessive bruising or bleeding.

## 2018-05-07 ENCOUNTER — OFFICE VISIT (OUTPATIENT)
Dept: FAMILY MEDICINE CLINIC | Facility: CLINIC | Age: 78
End: 2018-05-07

## 2018-05-07 ENCOUNTER — DOCUMENTATION (OUTPATIENT)
Dept: CARDIAC SURGERY | Facility: CLINIC | Age: 78
End: 2018-05-07

## 2018-05-07 VITALS
HEIGHT: 71 IN | SYSTOLIC BLOOD PRESSURE: 104 MMHG | HEART RATE: 52 BPM | OXYGEN SATURATION: 98 % | DIASTOLIC BLOOD PRESSURE: 64 MMHG | BODY MASS INDEX: 31.5 KG/M2 | WEIGHT: 225 LBS

## 2018-05-07 DIAGNOSIS — E78.00 HYPERCHOLESTEROLEMIA: ICD-10-CM

## 2018-05-07 DIAGNOSIS — J44.9 CHRONIC OBSTRUCTIVE PULMONARY DISEASE, UNSPECIFIED COPD TYPE (HCC): Primary | ICD-10-CM

## 2018-05-07 PROCEDURE — 99213 OFFICE O/P EST LOW 20 MIN: CPT | Performed by: FAMILY MEDICINE

## 2018-05-07 NOTE — PATIENT INSTRUCTIONS
Alirocumab injection  What is this medicine?  ALIROCUMAB (al i TEE ue mab) is known as a PCSK9 inhibitor. It is used to lower the level of cholesterol in the blood. This medicine is only for patients whose cholesterol is not controlled by diet and statin therapy.  This medicine may be used for other purposes; ask your health care provider or pharmacist if you have questions.  COMMON BRAND NAME(S): Praluent  What should I tell my health care provider before I take this medicine?  They need to know if you have any of these conditions:  -any unusual or allergic reaction to alirocumab, other medicines, foods, dyes, or preservatives  -pregnant or trying to get pregnant  -breast-feeding  How should I use this medicine?  This medicine is for injection under the skin. You will be taught how to prepare and give this medicine. Use exactly as directed. Take your medicine at regular intervals. Do not take your medicine more often than directed.  It is important that you put your used needles and syringes in a special sharps container. Do not put them in a trash can. If you do not have a sharps container, call your pharmacist or healthcare provider to get one.  Talk to your pediatrician regarding the use of this medicine in children. Special care may be needed.  Overdosage: If you think you have taken too much of this medicine contact a poison control center or emergency room at once.  NOTE: This medicine is only for you. Do not share this medicine with others.  What if I miss a dose?  If you are on an every 2 week schedule and miss a dose, take it as soon as you can. If your next dose is to be taken in less than 7 days, then do not take the missed dose. Take the next dose at your regular time. Do not take double or extra doses. If you are on a monthly schedule and miss a dose, take it as soon as you can. If the dose given is within 7 days of the missed dose, continue with your regular monthly schedule. If the missed dose is  administered after 7 days of the original date, administer the dose and start a new monthly schedule based on this date.  What may interact with this medicine?  Interactions are not expected.  This list may not describe all possible interactions. Give your health care provider a list of all the medicines, herbs, non-prescription drugs, or dietary supplements you use. Also tell them if you smoke, drink alcohol, or use illegal drugs. Some items may interact with your medicine.  What should I watch for while using this medicine?  You may need blood work done while you are taking this medicine.  What side effects may I notice from receiving this medicine?  Side effects that you should report to your doctor or health care professional as soon as possible:  -allergic reactions like skin rash, itching or hives, swelling of the face, lips, or tongue  -signs and symptoms of infection like fever or chills; cough; sore throat; pain or trouble passing urine  -signs and symptoms of liver injury like dark yellow or brown urine; general ill feeling or flu-like symptoms; light-colored stools; loss of appetite; nausea; right upper belly pain; unusually weak or tired; yellowing of the eyes or skin  Side effects that usually do not require medical attention (report to your doctor or health care professional if they continue or are bothersome):  -diarrhea  -muscle cramps  -muscle pain  -pain, redness, or irritation at site where injected  This list may not describe all possible side effects. Call your doctor for medical advice about side effects. You may report side effects to FDA at 0-628-FDA-0251.  Where should I keep my medicine?  Keep out of the reach of children.  You will be instructed on how to store this medicine. Throw away any unused medicine after the expiration date on the label.  NOTE: This sheet is a summary. It may not cover all possible information. If you have questions about this medicine, talk to your doctor,  pharmacist, or health care provider.  © 2018 [employer] (2017-04-26 15:09:06)

## 2018-05-07 NOTE — PROGRESS NOTES
PT did not present to [facility] for INR. Attempted to call pt without answer. Called and spoke with pt's daughter who was instructed to have father take 2.5mg tonight. Daughter verbalizes. Spoke with Millicent SAMAYOA [facility] who was instructed to check INR in home on 5/8.

## 2018-05-07 NOTE — PROGRESS NOTES
Subjective   Massimo Bentley is a 78 y.o. male.     COPD   This is a chronic problem. The current episode started more than 1 year ago. The problem occurs intermittently. The problem has been gradually improving. Associated symptoms include fatigue. Pertinent negatives include no abdominal pain, anorexia, arthralgias, change in bowel habit, chest pain, chills, congestion, coughing, diaphoresis, fever, headaches, joint swelling, myalgias, nausea, neck pain, numbness, rash, sore throat, swollen glands, urinary symptoms, vertigo, visual change, vomiting or weakness. Treatments tried: albuterol. The treatment provided mild relief.   He has had CVA and has bad PVD. He has had normal cholesterol numbers and tries to watch his diet. He hasn't tolerated statin medications in the past.           Current Outpatient Prescriptions:   •  albuterol (PROVENTIL HFA;VENTOLIN HFA) 108 (90 Base) MCG/ACT inhaler, Inhale 2 puffs Every 4 (Four) Hours As Needed for Wheezing., Disp: 1 inhaler, Rfl: 6  •  aspirin 81 MG EC tablet, Take 81 mg by mouth daily., Disp: , Rfl:   •  budesonide-formoterol (SYMBICORT) 160-4.5 MCG/ACT inhaler, Inhale 2 puffs 2 (Two) Times a Day., Disp: 1 inhaler, Rfl: 12  •  cilostazol (PLETAL) 50 MG tablet, Take 1 tablet by mouth 2 (Two) Times a Day., Disp: 60 tablet, Rfl: 5  •  enoxaparin (LOVENOX) 100 MG/ML solution syringe, Inject 1 mL under the skin Every 12 (Twelve) Hours., Disp: 14 syringe, Rfl: 0  •  furosemide (LASIX) 40 MG tablet, Take 1 tablet by mouth Daily., Disp: 30 tablet, Rfl: 6  •  guaiFENesin (MUCINEX) 600 MG 12 hr tablet, Take 1,200 mg by mouth 2 (Two) Times a Day., Disp: , Rfl:   •  magnesium oxide (MAGOX) 400 (241.3 MG) MG tablet tablet, Take 800 mg by mouth Every Night., Disp: , Rfl:   •  montelukast (SINGULAIR) 10 MG tablet, Take 1 tablet by mouth Every Night., Disp: 90 tablet, Rfl: 2  •  pramipexole (MIRAPEX) 0.25 MG tablet, Take 1 tablet by mouth 3 (Three) Times a Day., Disp: 90 tablet,  "Rfl: 5  •  QUEtiapine (SEROquel) 100 MG tablet, take 3 tablets at bedtime, Disp: , Rfl: 0  •  sotalol (BETAPACE) 80 MG tablet, Take 1.5 tablets by mouth 2 (Two) Times a Day., Disp: 270 tablet, Rfl: 2  •  warfarin (COUMADIN) 5 MG tablet, TAKE 1 TABLET BY MOUTH EVERY NIGHT OR AS DIRECTED BY COUMADIN CLINIC, Disp: 30 tablet, Rfl: 0    The following portions of the patient's history were reviewed and updated as appropriate: allergies, current medications, past family history, past medical history, past social history, past surgical history and problem list.    Review of Systems   Constitutional: Positive for activity change, appetite change and fatigue. Negative for chills, diaphoresis, fever and unexpected weight change.   HENT: Negative for congestion and sore throat.    Respiratory: Negative for cough, shortness of breath and wheezing.    Cardiovascular: Positive for leg swelling. Negative for chest pain and palpitations.   Gastrointestinal: Negative for abdominal distention, abdominal pain, anorexia, change in bowel habit, constipation, diarrhea, nausea and vomiting.   Musculoskeletal: Negative for arthralgias, joint swelling, myalgias and neck pain.   Skin: Positive for wound. Negative for color change and rash.   Neurological: Negative for vertigo, weakness, numbness and headaches.   Psychiatric/Behavioral: Negative for dysphoric mood and sleep disturbance. The patient is not nervous/anxious.        Objective    Vitals:    05/07/18 1323   BP: 104/64   Pulse: 52   SpO2: 98%   Weight: 102 kg (225 lb)   Height: 180.3 cm (71\")       Physical Exam   Constitutional: He is oriented to person, place, and time. He appears well-developed and well-nourished.   In wheelchair today.  Slurred speech at times.  Slow to respond to questions.   Cardiovascular: Normal rate, regular rhythm and normal heart sounds.    No murmur heard.  +1 BL LE edema.  Incision healing well.   Pulmonary/Chest: Effort normal and breath sounds normal. " No respiratory distress. He has no wheezes.   Abdominal: Soft. Bowel sounds are normal. He exhibits no distension. There is no tenderness.   Neurological: He is alert and oriented to person, place, and time.   Psychiatric: His behavior is normal. Judgment and thought content normal.   Flat affect   Nursing note and vitals reviewed.      Assessment/Plan   Problems Addressed this Visit        Cardiovascular and Mediastinum    Hypercholesterolemia    Relevant Medications    Alirocumab (PRALUENT) 75 MG/ML solution pen-injector       Respiratory    COPD (chronic obstructive pulmonary disease) - Primary      Other Visit Diagnoses    None.       1.) COPD-  He has been doing well. He hasn't been using Symbicort. Encouraged him to restart. Continue albuterol PRN.  2.) Hyperlipidemia-  Will try to get Praluent.  He has tried statins before and didn't do well with them. Printed him out some information about the medication to review and will likely have to have authorization to get covered.  RTC in 3 months or sooner PRN

## 2018-05-08 ENCOUNTER — ANTICOAGULATION VISIT (OUTPATIENT)
Dept: CARDIAC SURGERY | Facility: CLINIC | Age: 78
End: 2018-05-08

## 2018-05-08 DIAGNOSIS — Z79.01 LONG-TERM (CURRENT) USE OF ANTICOAGULANTS: ICD-10-CM

## 2018-05-08 DIAGNOSIS — I48.0 PAROXYSMAL ATRIAL FIBRILLATION (HCC): ICD-10-CM

## 2018-05-08 DIAGNOSIS — I74.9 EMBOLISM AND THROMBOSIS OF ARTERY (HCC): ICD-10-CM

## 2018-05-08 LAB — INR PPP: 3.3

## 2018-05-08 NOTE — PROGRESS NOTES
Spoke to DANITA Shelton with [facility]  who was still in the home with pt.  Pt medications have not changed since last CC encounter.  Pt denies bleeding issues.  Instructed Cat on weekly coumadin dosing schedule and to recheck INR on Monday, May 14th.  Instructions verbalized.  Patient instructed regarding medication; results given and questions answered. Nutritional counseling given.  Dietary factors affecting therapy addressed.  Patient instructed to monitor for excessive bruising or bleeding.

## 2018-05-10 ENCOUNTER — APPOINTMENT (OUTPATIENT)
Dept: GENERAL RADIOLOGY | Age: 78
End: 2018-05-10
Payer: MEDICARE

## 2018-05-10 ENCOUNTER — HOSPITAL ENCOUNTER (EMERGENCY)
Age: 78
Discharge: HOME | End: 2018-05-10
Attending: EMERGENCY MEDICINE
Payer: MEDICARE

## 2018-05-10 VITALS
SYSTOLIC BLOOD PRESSURE: 114 MMHG | RESPIRATION RATE: 14 BRPM | HEIGHT: 71 IN | WEIGHT: 225 LBS | TEMPERATURE: 97.6 F | BODY MASS INDEX: 31.5 KG/M2 | OXYGEN SATURATION: 94 % | DIASTOLIC BLOOD PRESSURE: 73 MMHG | HEART RATE: 60 BPM

## 2018-05-10 DIAGNOSIS — E86.0 MILD DEHYDRATION: Primary | ICD-10-CM

## 2018-05-10 LAB
ALBUMIN SERPL-MCNC: 3.5 G/DL (ref 3.5–5.2)
ALP BLD-CCNC: 89 U/L (ref 40–130)
ALT SERPL-CCNC: 12 U/L (ref 5–41)
ANION GAP SERPL CALCULATED.3IONS-SCNC: 16 MMOL/L (ref 7–19)
AST SERPL-CCNC: 12 U/L (ref 5–40)
BASOPHILS ABSOLUTE: 0 K/UL (ref 0–0.2)
BASOPHILS RELATIVE PERCENT: 0.2 % (ref 0–1)
BILIRUB SERPL-MCNC: <0.2 MG/DL (ref 0.2–1.2)
BUN BLDV-MCNC: 40 MG/DL (ref 8–23)
CALCIUM SERPL-MCNC: 8.7 MG/DL (ref 8.8–10.2)
CHLORIDE BLD-SCNC: 99 MMOL/L (ref 98–111)
CO2: 24 MMOL/L (ref 22–29)
CREAT SERPL-MCNC: 2.8 MG/DL (ref 0.5–1.2)
EOSINOPHILS ABSOLUTE: 0.4 K/UL (ref 0–0.6)
EOSINOPHILS RELATIVE PERCENT: 4.6 % (ref 0–5)
GFR NON-AFRICAN AMERICAN: 22
GLUCOSE BLD-MCNC: 109 MG/DL (ref 74–109)
HCT VFR BLD CALC: 34.8 % (ref 42–52)
HEMOGLOBIN: 10.9 G/DL (ref 14–18)
LYMPHOCYTES ABSOLUTE: 0.9 K/UL (ref 1.1–4.5)
LYMPHOCYTES RELATIVE PERCENT: 9.9 % (ref 20–40)
MCH RBC QN AUTO: 27.3 PG (ref 27–31)
MCHC RBC AUTO-ENTMCNC: 31.3 G/DL (ref 33–37)
MCV RBC AUTO: 87.2 FL (ref 80–94)
MONOCYTES ABSOLUTE: 0.4 K/UL (ref 0–0.9)
MONOCYTES RELATIVE PERCENT: 5.1 % (ref 0–10)
NEUTROPHILS ABSOLUTE: 6.9 K/UL (ref 1.5–7.5)
NEUTROPHILS RELATIVE PERCENT: 79.7 % (ref 50–65)
PDW BLD-RTO: 17.9 % (ref 11.5–14.5)
PLATELET # BLD: 307 K/UL (ref 130–400)
PMV BLD AUTO: 9.3 FL (ref 9.4–12.4)
POTASSIUM SERPL-SCNC: 3.9 MMOL/L (ref 3.5–5)
RBC # BLD: 3.99 M/UL (ref 4.7–6.1)
SODIUM BLD-SCNC: 139 MMOL/L (ref 136–145)
TOTAL PROTEIN: 7.5 G/DL (ref 6.6–8.7)
TROPONIN: 0.03 NG/ML (ref 0–0.03)
WBC # BLD: 8.7 K/UL (ref 4.8–10.8)

## 2018-05-10 PROCEDURE — 99283 EMERGENCY DEPT VISIT LOW MDM: CPT | Performed by: EMERGENCY MEDICINE

## 2018-05-10 PROCEDURE — 2580000003 HC RX 258: Performed by: EMERGENCY MEDICINE

## 2018-05-10 PROCEDURE — 84484 ASSAY OF TROPONIN QUANT: CPT

## 2018-05-10 PROCEDURE — 71045 X-RAY EXAM CHEST 1 VIEW: CPT

## 2018-05-10 PROCEDURE — 85025 COMPLETE CBC W/AUTO DIFF WBC: CPT

## 2018-05-10 PROCEDURE — 93005 ELECTROCARDIOGRAM TRACING: CPT

## 2018-05-10 PROCEDURE — 36415 COLL VENOUS BLD VENIPUNCTURE: CPT

## 2018-05-10 PROCEDURE — 80053 COMPREHEN METABOLIC PANEL: CPT

## 2018-05-10 PROCEDURE — 99285 EMERGENCY DEPT VISIT HI MDM: CPT

## 2018-05-10 RX ORDER — 0.9 % SODIUM CHLORIDE 0.9 %
250 INTRAVENOUS SOLUTION INTRAVENOUS ONCE
Status: COMPLETED | OUTPATIENT
Start: 2018-05-10 | End: 2018-05-10

## 2018-05-10 RX ADMIN — SODIUM CHLORIDE 250 ML: 9 INJECTION, SOLUTION INTRAVENOUS at 19:20

## 2018-05-10 ASSESSMENT — ENCOUNTER SYMPTOMS
ABDOMINAL PAIN: 0
COUGH: 0
VOMITING: 0
NAUSEA: 0
SHORTNESS OF BREATH: 0

## 2018-05-12 LAB
EKG P AXIS: 16 DEGREES
EKG P-R INTERVAL: 196 MS
EKG Q-T INTERVAL: 476 MS
EKG QRS DURATION: 106 MS
EKG QTC CALCULATION (BAZETT): 468 MS
EKG T AXIS: 8 DEGREES

## 2018-05-14 ENCOUNTER — OFFICE VISIT (OUTPATIENT)
Dept: VASCULAR SURGERY | Age: 78
End: 2018-05-14

## 2018-05-14 ENCOUNTER — ANTICOAGULATION VISIT (OUTPATIENT)
Dept: CARDIAC SURGERY | Facility: CLINIC | Age: 78
End: 2018-05-14

## 2018-05-14 ENCOUNTER — LAB REQUISITION (OUTPATIENT)
Dept: LAB | Facility: HOSPITAL | Age: 78
End: 2018-05-14

## 2018-05-14 VITALS — SYSTOLIC BLOOD PRESSURE: 117 MMHG | DIASTOLIC BLOOD PRESSURE: 75 MMHG | HEART RATE: 60 BPM | RESPIRATION RATE: 18 BRPM

## 2018-05-14 DIAGNOSIS — Z79.01 LONG TERM CURRENT USE OF ANTICOAGULANT: ICD-10-CM

## 2018-05-14 DIAGNOSIS — I70.244 ATHEROSCLEROSIS OF NATIVE ARTERIES OF LEFT LEG WITH ULCERATION OF HEEL AND MIDFOOT (HCC): ICD-10-CM

## 2018-05-14 DIAGNOSIS — I48.0 PAROXYSMAL ATRIAL FIBRILLATION (HCC): ICD-10-CM

## 2018-05-14 DIAGNOSIS — I74.9 EMBOLISM AND THROMBOSIS OF ARTERY (HCC): ICD-10-CM

## 2018-05-14 DIAGNOSIS — N18.30 CKD (CHRONIC KIDNEY DISEASE) STAGE 3, GFR 30-59 ML/MIN (HCC): ICD-10-CM

## 2018-05-14 DIAGNOSIS — Z79.01 LONG-TERM (CURRENT) USE OF ANTICOAGULANTS: ICD-10-CM

## 2018-05-14 DIAGNOSIS — N18.30 CKD (CHRONIC KIDNEY DISEASE) STAGE 3, GFR 30-59 ML/MIN (HCC): Primary | ICD-10-CM

## 2018-05-14 LAB
ANION GAP SERPL CALCULATED.3IONS-SCNC: 13 MMOL/L (ref 7–19)
BUN BLDV-MCNC: 37 MG/DL (ref 8–23)
CALCIUM SERPL-MCNC: 8.8 MG/DL (ref 8.8–10.2)
CHLORIDE BLD-SCNC: 102 MMOL/L (ref 98–111)
CO2: 27 MMOL/L (ref 22–29)
CREAT SERPL-MCNC: 2.5 MG/DL (ref 0.5–1.2)
GFR NON-AFRICAN AMERICAN: 25
GLUCOSE BLD-MCNC: 94 MG/DL (ref 74–109)
HCT VFR BLD CALC: 33.7 % (ref 42–52)
HEMOGLOBIN: 10.7 G/DL (ref 14–18)
INR BLD: 1.8 (ref 0.88–1.18)
INR PPP: 1.9
INR PPP: 1.9 (ref 0.8–1.2)
MCH RBC QN AUTO: 27.9 PG (ref 27–31)
MCHC RBC AUTO-ENTMCNC: 31.8 G/DL (ref 33–37)
MCV RBC AUTO: 87.8 FL (ref 80–94)
PDW BLD-RTO: 17.3 % (ref 11.5–14.5)
PLATELET # BLD: 297 K/UL (ref 130–400)
PMV BLD AUTO: 9.6 FL (ref 9.4–12.4)
POTASSIUM SERPL-SCNC: 4.8 MMOL/L (ref 3.5–5)
PROTHROMBIN TIME: 20.9 SEC (ref 12–14.6)
PROTHROMBIN TIME: 23 SECONDS (ref 11–15)
RBC # BLD: 3.84 M/UL (ref 4.7–6.1)
SODIUM BLD-SCNC: 142 MMOL/L (ref 136–145)
WBC # BLD: 9.3 K/UL (ref 4.8–10.8)

## 2018-05-14 PROCEDURE — 99024 POSTOP FOLLOW-UP VISIT: CPT | Performed by: NURSE PRACTITIONER

## 2018-05-14 RX ORDER — DOXYCYCLINE HYCLATE 100 MG
100 TABLET ORAL 2 TIMES DAILY
Qty: 20 TABLET | Refills: 0 | Status: SHIPPED | OUTPATIENT
Start: 2018-05-14 | End: 2018-05-24

## 2018-05-14 NOTE — PROGRESS NOTES
PT's INR was drawn while at Dr Sousa office in [place]. Spoke with SERGIO Fung Dr' office who states pt's INR is 1.9 today and he is starting Doxycyline BID x10 days. Even though pt is starting AB with recent blood clot, dose was increased. Spoke with pt's daughter who was given pt's dose which she repeated back correctly. Spoke with Connie SAMAYOA [facility] over the phone who was given dose and to recheck INR on 5/17. Connie repeated back correctly. Patient instructed regarding medication; results given and questions answered. Nutritional counseling given.  Dietary factors affecting therapy addressed.  Patient instructed to monitor for excessive bruising or bleeding.  Electronically signed by JUANI Sheets

## 2018-05-15 ENCOUNTER — TELEPHONE (OUTPATIENT)
Dept: VASCULAR SURGERY | Age: 78
End: 2018-05-15

## 2018-05-17 ENCOUNTER — ANTICOAGULATION VISIT (OUTPATIENT)
Dept: CARDIAC SURGERY | Facility: CLINIC | Age: 78
End: 2018-05-17

## 2018-05-17 DIAGNOSIS — I48.0 PAROXYSMAL ATRIAL FIBRILLATION (HCC): ICD-10-CM

## 2018-05-17 DIAGNOSIS — I74.9 EMBOLISM AND THROMBOSIS OF ARTERY (HCC): ICD-10-CM

## 2018-05-17 DIAGNOSIS — Z79.01 LONG-TERM (CURRENT) USE OF ANTICOAGULANTS: ICD-10-CM

## 2018-05-17 LAB — INR PPP: 2.4 (ref 0.9–1.1)

## 2018-05-17 PROCEDURE — 85610 PROTHROMBIN TIME: CPT | Performed by: THORACIC SURGERY (CARDIOTHORACIC VASCULAR SURGERY)

## 2018-05-17 PROCEDURE — 85610 PROTHROMBIN TIME: CPT | Performed by: NURSE PRACTITIONER

## 2018-05-17 RX ORDER — WARFARIN SODIUM 5 MG/1
TABLET ORAL
Qty: 30 TABLET | Refills: 0 | Status: SHIPPED | OUTPATIENT
Start: 2018-05-17 | End: 2018-01-01 | Stop reason: SDUPTHER

## 2018-05-17 NOTE — PROGRESS NOTES
Received above INR from Hanh SAMAYOA [facility] over the phone who is still in pt's home. Per Hanh, pt continues Doxycyline. PT denies any other med changes or bleeding issues. Instructed Hanh on dosing and to recheck INR on Monday, 5/21. Hanh repeated back correctly and will inform pt/family. Patient instructed regarding medication; results given and questions answered. Nutritional counseling given.  Dietary factors affecting therapy addressed.  Patient instructed to monitor for excessive bruising or bleeding.   Electronically signed by JUANI Sheets

## 2018-05-21 ENCOUNTER — OFFICE VISIT (OUTPATIENT)
Dept: VASCULAR SURGERY | Age: 78
End: 2018-05-21

## 2018-05-21 ENCOUNTER — ANTICOAGULATION VISIT (OUTPATIENT)
Dept: CARDIAC SURGERY | Facility: CLINIC | Age: 78
End: 2018-05-21

## 2018-05-21 ENCOUNTER — TELEPHONE (OUTPATIENT)
Dept: VASCULAR SURGERY | Age: 78
End: 2018-05-21

## 2018-05-21 VITALS
HEART RATE: 62 BPM | SYSTOLIC BLOOD PRESSURE: 96 MMHG | RESPIRATION RATE: 18 BRPM | DIASTOLIC BLOOD PRESSURE: 60 MMHG | TEMPERATURE: 97 F

## 2018-05-21 DIAGNOSIS — I48.0 PAROXYSMAL ATRIAL FIBRILLATION (HCC): ICD-10-CM

## 2018-05-21 DIAGNOSIS — I74.9 EMBOLISM AND THROMBOSIS OF ARTERY (HCC): ICD-10-CM

## 2018-05-21 DIAGNOSIS — Z09 FOLLOW UP: Primary | ICD-10-CM

## 2018-05-21 DIAGNOSIS — Z79.01 LONG-TERM (CURRENT) USE OF ANTICOAGULANTS: ICD-10-CM

## 2018-05-21 LAB — INR PPP: 3.8

## 2018-05-21 PROCEDURE — 99024 POSTOP FOLLOW-UP VISIT: CPT | Performed by: PHYSICIAN ASSISTANT

## 2018-05-21 NOTE — PROGRESS NOTES
Received INR [facility] in [place].  Spoke to pt  Over the phone.  Pt continue Doxycycline for infection to wound site.  Adjusted coumadin dose and instructed pt to increase Vit K intake today with a broccoli serving.  [facility] to recheck INR on Wed, May 23rd.  Pt verbalizes instructions. Patient instructed regarding medication; results given and questions answered. Nutritional counseling given.  Dietary factors affecting therapy addressed.  Patient instructed to monitor for excessive bruising or bleeding.  I then called and left message for Millicent Benitez with same instructions.  Instructed Millicent to notify CC with receipt of message.

## 2018-05-23 ENCOUNTER — ANTICOAGULATION VISIT (OUTPATIENT)
Dept: CARDIAC SURGERY | Facility: CLINIC | Age: 78
End: 2018-05-23

## 2018-05-23 DIAGNOSIS — Z79.01 LONG-TERM (CURRENT) USE OF ANTICOAGULANTS: ICD-10-CM

## 2018-05-23 DIAGNOSIS — I74.9 EMBOLISM AND THROMBOSIS OF ARTERY (HCC): ICD-10-CM

## 2018-05-23 DIAGNOSIS — I48.0 PAROXYSMAL ATRIAL FIBRILLATION (HCC): ICD-10-CM

## 2018-05-23 LAB — INR PPP: 3.5

## 2018-05-23 NOTE — PROGRESS NOTES
Spoke to DANITA Kevin with [facility]  who was still in the home with pt.  Pt will finish AB I two days.  Pt denies bleeding issues.  Instructed Dorita on coumadin dosing schedule and recheck INR on Wednesday, May 30th.  Instructions verbalized.  Patient instructed regarding medication; results given and questions answered. Nutritional counseling given.  Dietary factors affecting therapy addressed.  Patient instructed to monitor for excessive bruising or bleeding.

## 2018-05-29 ENCOUNTER — HOSPITAL ENCOUNTER (EMERGENCY)
Age: 78
Discharge: HOME | DRG: 862 | End: 2018-05-30
Attending: EMERGENCY MEDICINE
Payer: MEDICARE

## 2018-05-29 ENCOUNTER — TELEPHONE (OUTPATIENT)
Dept: VASCULAR SURGERY | Age: 78
End: 2018-05-29

## 2018-05-29 DIAGNOSIS — L03.115 CELLULITIS OF RIGHT THIGH: Primary | ICD-10-CM

## 2018-05-29 LAB
BASOPHILS ABSOLUTE: 0 K/UL (ref 0–0.2)
BASOPHILS RELATIVE PERCENT: 0.4 % (ref 0–1)
EOSINOPHILS ABSOLUTE: 0.4 K/UL (ref 0–0.6)
EOSINOPHILS RELATIVE PERCENT: 5 % (ref 0–5)
HCT VFR BLD CALC: 35.5 % (ref 42–52)
HEMOGLOBIN: 11.2 G/DL (ref 14–18)
LYMPHOCYTES ABSOLUTE: 1.1 K/UL (ref 1.1–4.5)
LYMPHOCYTES RELATIVE PERCENT: 13.4 % (ref 20–40)
MCH RBC QN AUTO: 27.5 PG (ref 27–31)
MCHC RBC AUTO-ENTMCNC: 31.5 G/DL (ref 33–37)
MCV RBC AUTO: 87.2 FL (ref 80–94)
MONOCYTES ABSOLUTE: 0.5 K/UL (ref 0–0.9)
MONOCYTES RELATIVE PERCENT: 6.3 % (ref 0–10)
NEUTROPHILS ABSOLUTE: 6 K/UL (ref 1.5–7.5)
NEUTROPHILS RELATIVE PERCENT: 74.4 % (ref 50–65)
PDW BLD-RTO: 18.4 % (ref 11.5–14.5)
PLATELET # BLD: 237 K/UL (ref 130–400)
PMV BLD AUTO: 9.6 FL (ref 9.4–12.4)
RBC # BLD: 4.07 M/UL (ref 4.7–6.1)
WBC # BLD: 8.1 K/UL (ref 4.8–10.8)

## 2018-05-29 PROCEDURE — 99283 EMERGENCY DEPT VISIT LOW MDM: CPT

## 2018-05-29 RX ORDER — OXYCODONE AND ACETAMINOPHEN 7.5; 325 MG/1; MG/1
1 TABLET ORAL EVERY 4 HOURS PRN
Status: ON HOLD | COMMUNITY
End: 2018-05-31

## 2018-05-29 ASSESSMENT — PAIN DESCRIPTION - ORIENTATION: ORIENTATION: RIGHT;LEFT

## 2018-05-29 ASSESSMENT — PAIN DESCRIPTION - LOCATION: LOCATION: LEG

## 2018-05-29 ASSESSMENT — PAIN SCALES - GENERAL: PAINLEVEL_OUTOF10: 4

## 2018-05-30 ENCOUNTER — ANTICOAGULATION VISIT (OUTPATIENT)
Dept: CARDIAC SURGERY | Facility: CLINIC | Age: 78
End: 2018-05-30

## 2018-05-30 VITALS
HEIGHT: 71 IN | HEART RATE: 58 BPM | BODY MASS INDEX: 31.5 KG/M2 | RESPIRATION RATE: 16 BRPM | TEMPERATURE: 97.7 F | OXYGEN SATURATION: 94 % | DIASTOLIC BLOOD PRESSURE: 68 MMHG | SYSTOLIC BLOOD PRESSURE: 112 MMHG | WEIGHT: 225 LBS

## 2018-05-30 DIAGNOSIS — I74.9 EMBOLISM AND THROMBOSIS OF ARTERY (HCC): ICD-10-CM

## 2018-05-30 DIAGNOSIS — I48.0 PAROXYSMAL ATRIAL FIBRILLATION (HCC): ICD-10-CM

## 2018-05-30 DIAGNOSIS — Z79.01 LONG-TERM (CURRENT) USE OF ANTICOAGULANTS: ICD-10-CM

## 2018-05-30 LAB
ALBUMIN SERPL-MCNC: 3.2 G/DL (ref 3.5–5.2)
ALP BLD-CCNC: 105 U/L (ref 40–130)
ALT SERPL-CCNC: 10 U/L (ref 5–41)
ANION GAP SERPL CALCULATED.3IONS-SCNC: 12 MMOL/L (ref 7–19)
APTT: 46.5 SEC (ref 26–36.2)
AST SERPL-CCNC: 16 U/L (ref 5–40)
BILIRUB SERPL-MCNC: <0.2 MG/DL (ref 0.2–1.2)
BUN BLDV-MCNC: 59 MG/DL (ref 8–23)
CALCIUM SERPL-MCNC: 8.7 MG/DL (ref 8.8–10.2)
CHLORIDE BLD-SCNC: 103 MMOL/L (ref 98–111)
CO2: 28 MMOL/L (ref 22–29)
CREAT SERPL-MCNC: 2.8 MG/DL (ref 0.5–1.2)
GFR NON-AFRICAN AMERICAN: 22
GLUCOSE BLD-MCNC: 102 MG/DL (ref 74–109)
INR BLD: 2.13 (ref 0.88–1.18)
INR PPP: 2.8
LACTIC ACID: 1 MMOL/L (ref 0.5–1.9)
POTASSIUM SERPL-SCNC: 4.5 MMOL/L (ref 3.5–5)
PROTHROMBIN TIME: 23.9 SEC (ref 12–14.6)
SODIUM BLD-SCNC: 143 MMOL/L (ref 136–145)
TOTAL PROTEIN: 7.3 G/DL (ref 6.6–8.7)

## 2018-05-30 PROCEDURE — 36415 COLL VENOUS BLD VENIPUNCTURE: CPT

## 2018-05-30 PROCEDURE — 6370000000 HC RX 637 (ALT 250 FOR IP): Performed by: EMERGENCY MEDICINE

## 2018-05-30 PROCEDURE — 83605 ASSAY OF LACTIC ACID: CPT

## 2018-05-30 PROCEDURE — 80053 COMPREHEN METABOLIC PANEL: CPT

## 2018-05-30 PROCEDURE — 85025 COMPLETE CBC W/AUTO DIFF WBC: CPT

## 2018-05-30 PROCEDURE — 99284 EMERGENCY DEPT VISIT MOD MDM: CPT | Performed by: EMERGENCY MEDICINE

## 2018-05-30 PROCEDURE — 85610 PROTHROMBIN TIME: CPT

## 2018-05-30 PROCEDURE — 85610 PROTHROMBIN TIME: CPT | Performed by: THORACIC SURGERY (CARDIOTHORACIC VASCULAR SURGERY)

## 2018-05-30 PROCEDURE — 96365 THER/PROPH/DIAG IV INF INIT: CPT

## 2018-05-30 PROCEDURE — 2500000003 HC RX 250 WO HCPCS: Performed by: EMERGENCY MEDICINE

## 2018-05-30 PROCEDURE — 87040 BLOOD CULTURE FOR BACTERIA: CPT

## 2018-05-30 PROCEDURE — 85730 THROMBOPLASTIN TIME PARTIAL: CPT

## 2018-05-30 RX ORDER — OXYCODONE AND ACETAMINOPHEN 10; 325 MG/1; MG/1
1 TABLET ORAL ONCE
Status: COMPLETED | OUTPATIENT
Start: 2018-05-30 | End: 2018-05-30

## 2018-05-30 RX ORDER — CLINDAMYCIN HYDROCHLORIDE 150 MG/1
300 CAPSULE ORAL 3 TIMES DAILY
Qty: 60 CAPSULE | Refills: 0 | Status: ON HOLD | OUTPATIENT
Start: 2018-05-30 | End: 2018-06-13 | Stop reason: HOSPADM

## 2018-05-30 RX ORDER — CLINDAMYCIN PHOSPHATE 900 MG/50ML
900 INJECTION INTRAVENOUS ONCE
Status: COMPLETED | OUTPATIENT
Start: 2018-05-30 | End: 2018-05-30

## 2018-05-30 RX ADMIN — OXYCODONE HYDROCHLORIDE AND ACETAMINOPHEN 1 TABLET: 10; 325 TABLET ORAL at 05:07

## 2018-05-30 RX ADMIN — CLINDAMYCIN PHOSPHATE 900 MG: 900 INJECTION INTRAVENOUS at 01:40

## 2018-05-30 ASSESSMENT — ENCOUNTER SYMPTOMS
DIARRHEA: 0
ABDOMINAL PAIN: 0
PHOTOPHOBIA: 0
CHEST TIGHTNESS: 0
EYE PAIN: 0
SINUS PRESSURE: 0
VOMITING: 0
SHORTNESS OF BREATH: 0
NAUSEA: 0
WHEEZING: 0
CONSTIPATION: 0
COLOR CHANGE: 1
BACK PAIN: 0
TROUBLE SWALLOWING: 0

## 2018-05-30 ASSESSMENT — PAIN SCALES - GENERAL: PAINLEVEL_OUTOF10: 4

## 2018-05-31 ENCOUNTER — APPOINTMENT (OUTPATIENT)
Dept: ULTRASOUND IMAGING | Age: 78
DRG: 862 | End: 2018-05-31
Attending: SURGERY
Payer: MEDICARE

## 2018-05-31 ENCOUNTER — OFFICE VISIT (OUTPATIENT)
Dept: VASCULAR SURGERY | Age: 78
End: 2018-05-31

## 2018-05-31 ENCOUNTER — HOSPITAL ENCOUNTER (INPATIENT)
Age: 78
LOS: 13 days | Discharge: NURSING, REHAB OR LONG-TERM CARE | DRG: 862 | End: 2018-06-13
Attending: SURGERY | Admitting: SURGERY
Payer: MEDICARE

## 2018-05-31 VITALS
SYSTOLIC BLOOD PRESSURE: 140 MMHG | RESPIRATION RATE: 18 BRPM | DIASTOLIC BLOOD PRESSURE: 88 MMHG | HEART RATE: 80 BPM | TEMPERATURE: 98.6 F

## 2018-05-31 DIAGNOSIS — T81.30XA WOUND DEHISCENCE: Primary | ICD-10-CM

## 2018-05-31 DIAGNOSIS — L03.115 CELLULITIS OF RIGHT LOWER EXTREMITY: ICD-10-CM

## 2018-05-31 PROBLEM — L03.90 CELLULITIS: Status: ACTIVE | Noted: 2018-05-31

## 2018-05-31 LAB
FERRITIN: 43.8 NG/ML (ref 30–400)
INR BLD: 2.55 (ref 0.88–1.18)
IRON SATURATION: 15 % (ref 14–50)
IRON: 31 UG/DL (ref 59–158)
PARATHYROID HORMONE INTACT: 75.2 PG/ML (ref 15–65)
PROTHROMBIN TIME: 27.5 SEC (ref 12–14.6)
SODIUM URINE: 115 MMOL/L
TOTAL IRON BINDING CAPACITY: 213 UG/DL (ref 250–400)
VITAMIN D 25-HYDROXY: 24.5 NG/ML

## 2018-05-31 PROCEDURE — 87205 SMEAR GRAM STAIN: CPT

## 2018-05-31 PROCEDURE — 2500000003 HC RX 250 WO HCPCS: Performed by: PHYSICIAN ASSISTANT

## 2018-05-31 PROCEDURE — 1210000000 HC MED SURG R&B

## 2018-05-31 PROCEDURE — 85610 PROTHROMBIN TIME: CPT

## 2018-05-31 PROCEDURE — 87070 CULTURE OTHR SPECIMN AEROBIC: CPT

## 2018-05-31 PROCEDURE — 82728 ASSAY OF FERRITIN: CPT

## 2018-05-31 PROCEDURE — 6370000000 HC RX 637 (ALT 250 FOR IP): Performed by: PHYSICIAN ASSISTANT

## 2018-05-31 PROCEDURE — 82306 VITAMIN D 25 HYDROXY: CPT

## 2018-05-31 PROCEDURE — 83970 ASSAY OF PARATHORMONE: CPT

## 2018-05-31 PROCEDURE — S0028 INJECTION, FAMOTIDINE, 20 MG: HCPCS | Performed by: PHYSICIAN ASSISTANT

## 2018-05-31 PROCEDURE — 87075 CULTR BACTERIA EXCEPT BLOOD: CPT

## 2018-05-31 PROCEDURE — 36415 COLL VENOUS BLD VENIPUNCTURE: CPT

## 2018-05-31 PROCEDURE — 83550 IRON BINDING TEST: CPT

## 2018-05-31 PROCEDURE — 84300 ASSAY OF URINE SODIUM: CPT

## 2018-05-31 PROCEDURE — 99024 POSTOP FOLLOW-UP VISIT: CPT | Performed by: PHYSICIAN ASSISTANT

## 2018-05-31 PROCEDURE — 76770 US EXAM ABDO BACK WALL COMP: CPT

## 2018-05-31 PROCEDURE — 87186 SC STD MICRODIL/AGAR DIL: CPT

## 2018-05-31 PROCEDURE — 83540 ASSAY OF IRON: CPT

## 2018-05-31 PROCEDURE — 87077 CULTURE AEROBIC IDENTIFY: CPT

## 2018-05-31 PROCEDURE — 94664 DEMO&/EVAL PT USE INHALER: CPT

## 2018-05-31 RX ORDER — OXYCODONE AND ACETAMINOPHEN 10; 325 MG/1; MG/1
1 TABLET ORAL EVERY 4 HOURS PRN
Status: DISCONTINUED | OUTPATIENT
Start: 2018-05-31 | End: 2018-06-13 | Stop reason: HOSPADM

## 2018-05-31 RX ORDER — WARFARIN SODIUM 5 MG/1
5 TABLET ORAL DAILY
Status: DISCONTINUED | OUTPATIENT
Start: 2018-05-31 | End: 2018-06-13 | Stop reason: HOSPADM

## 2018-05-31 RX ORDER — ONDANSETRON 2 MG/ML
4 INJECTION INTRAMUSCULAR; INTRAVENOUS EVERY 6 HOURS PRN
Status: DISCONTINUED | OUTPATIENT
Start: 2018-05-31 | End: 2018-06-13 | Stop reason: HOSPADM

## 2018-05-31 RX ORDER — ACETAMINOPHEN 325 MG/1
650 TABLET ORAL EVERY 4 HOURS PRN
Status: DISCONTINUED | OUTPATIENT
Start: 2018-05-31 | End: 2018-06-13 | Stop reason: HOSPADM

## 2018-05-31 RX ORDER — CLINDAMYCIN PHOSPHATE 300 MG/50ML
300 INJECTION INTRAVENOUS EVERY 8 HOURS
Status: DISCONTINUED | OUTPATIENT
Start: 2018-05-31 | End: 2018-06-01 | Stop reason: ALTCHOICE

## 2018-05-31 RX ORDER — BUDESONIDE AND FORMOTEROL FUMARATE DIHYDRATE 160; 4.5 UG/1; UG/1
2 AEROSOL RESPIRATORY (INHALATION) 2 TIMES DAILY
Status: DISCONTINUED | OUTPATIENT
Start: 2018-05-31 | End: 2018-05-31 | Stop reason: SDUPTHER

## 2018-05-31 RX ORDER — PRAMIPEXOLE DIHYDROCHLORIDE 0.25 MG/1
0.25 TABLET ORAL 2 TIMES DAILY
Status: DISCONTINUED | OUTPATIENT
Start: 2018-05-31 | End: 2018-06-13 | Stop reason: HOSPADM

## 2018-05-31 RX ORDER — SOTALOL HYDROCHLORIDE 80 MG/1
160 TABLET ORAL 2 TIMES DAILY
Status: DISCONTINUED | OUTPATIENT
Start: 2018-05-31 | End: 2018-06-13 | Stop reason: HOSPADM

## 2018-05-31 RX ORDER — OXYCODONE AND ACETAMINOPHEN 10; 325 MG/1; MG/1
TABLET ORAL
Refills: 0 | COMMUNITY
Start: 2018-05-22

## 2018-05-31 RX ORDER — SODIUM CHLORIDE 0.9 % (FLUSH) 0.9 %
10 SYRINGE (ML) INJECTION EVERY 12 HOURS SCHEDULED
Status: DISCONTINUED | OUTPATIENT
Start: 2018-05-31 | End: 2018-06-13 | Stop reason: HOSPADM

## 2018-05-31 RX ORDER — CILOSTAZOL 100 MG/1
100 TABLET ORAL 2 TIMES DAILY
Status: DISCONTINUED | OUTPATIENT
Start: 2018-05-31 | End: 2018-06-13 | Stop reason: HOSPADM

## 2018-05-31 RX ORDER — SODIUM CHLORIDE 450 MG/100ML
INJECTION, SOLUTION INTRAVENOUS CONTINUOUS
Status: DISCONTINUED | OUTPATIENT
Start: 2018-05-31 | End: 2018-05-31

## 2018-05-31 RX ORDER — FUROSEMIDE 80 MG
80 TABLET ORAL DAILY
Status: DISCONTINUED | OUTPATIENT
Start: 2018-06-01 | End: 2018-06-13 | Stop reason: HOSPADM

## 2018-05-31 RX ORDER — FUROSEMIDE 40 MG/1
40 TABLET ORAL DAILY
Status: DISCONTINUED | OUTPATIENT
Start: 2018-05-31 | End: 2018-05-31

## 2018-05-31 RX ORDER — CETIRIZINE HYDROCHLORIDE 10 MG/1
10 TABLET ORAL DAILY
Status: DISCONTINUED | OUTPATIENT
Start: 2018-05-31 | End: 2018-06-13 | Stop reason: HOSPADM

## 2018-05-31 RX ORDER — QUETIAPINE FUMARATE 50 MG/1
100 TABLET, FILM COATED ORAL NIGHTLY
Status: DISCONTINUED | OUTPATIENT
Start: 2018-05-31 | End: 2018-06-13 | Stop reason: HOSPADM

## 2018-05-31 RX ORDER — MONTELUKAST SODIUM 10 MG/1
10 TABLET ORAL NIGHTLY
Status: DISCONTINUED | OUTPATIENT
Start: 2018-05-31 | End: 2018-06-13 | Stop reason: HOSPADM

## 2018-05-31 RX ORDER — SODIUM CHLORIDE 0.9 % (FLUSH) 0.9 %
10 SYRINGE (ML) INJECTION PRN
Status: DISCONTINUED | OUTPATIENT
Start: 2018-05-31 | End: 2018-06-13 | Stop reason: HOSPADM

## 2018-05-31 RX ADMIN — WARFARIN SODIUM 5 MG: 5 TABLET ORAL at 19:22

## 2018-05-31 RX ADMIN — FAMOTIDINE 20 MG: 10 INJECTION INTRAVENOUS at 14:45

## 2018-05-31 RX ADMIN — CLINDAMYCIN PHOSPHATE 300 MG: 300 INJECTION INTRAVENOUS at 16:32

## 2018-05-31 RX ADMIN — OXYCODONE HYDROCHLORIDE AND ACETAMINOPHEN 1 TABLET: 10; 325 TABLET ORAL at 17:35

## 2018-05-31 ASSESSMENT — PAIN SCALES - GENERAL: PAINLEVEL_OUTOF10: 6

## 2018-06-01 LAB
ALBUMIN SERPL-MCNC: 2.5 G/DL (ref 3.5–5.2)
ALP BLD-CCNC: 84 U/L (ref 40–130)
ALT SERPL-CCNC: 8 U/L (ref 5–41)
ANION GAP SERPL CALCULATED.3IONS-SCNC: 11 MMOL/L (ref 7–19)
APTT: 59.9 SEC (ref 26–36.2)
AST SERPL-CCNC: 16 U/L (ref 5–40)
BASOPHILS ABSOLUTE: 0 K/UL (ref 0–0.2)
BASOPHILS RELATIVE PERCENT: 0.2 % (ref 0–1)
BILIRUB SERPL-MCNC: <0.2 MG/DL (ref 0.2–1.2)
BUN BLDV-MCNC: 46 MG/DL (ref 8–23)
CALCIUM SERPL-MCNC: 7.8 MG/DL (ref 8.8–10.2)
CHLORIDE BLD-SCNC: 104 MMOL/L (ref 98–111)
CO2: 28 MMOL/L (ref 22–29)
CREAT SERPL-MCNC: 2.5 MG/DL (ref 0.5–1.2)
EOSINOPHILS ABSOLUTE: 0.3 K/UL (ref 0–0.6)
EOSINOPHILS RELATIVE PERCENT: 4.6 % (ref 0–5)
GFR NON-AFRICAN AMERICAN: 25
GLUCOSE BLD-MCNC: 93 MG/DL (ref 74–109)
HCT VFR BLD CALC: 30.7 % (ref 42–52)
HEMOGLOBIN: 9.4 G/DL (ref 14–18)
LYMPHOCYTES ABSOLUTE: 0.9 K/UL (ref 1.1–4.5)
LYMPHOCYTES RELATIVE PERCENT: 15.2 % (ref 20–40)
MCH RBC QN AUTO: 27.1 PG (ref 27–31)
MCHC RBC AUTO-ENTMCNC: 30.6 G/DL (ref 33–37)
MCV RBC AUTO: 88.5 FL (ref 80–94)
MONOCYTES ABSOLUTE: 0.5 K/UL (ref 0–0.9)
MONOCYTES RELATIVE PERCENT: 8.7 % (ref 0–10)
NEUTROPHILS ABSOLUTE: 4.4 K/UL (ref 1.5–7.5)
NEUTROPHILS RELATIVE PERCENT: 71 % (ref 50–65)
PDW BLD-RTO: 18.3 % (ref 11.5–14.5)
PLATELET # BLD: 201 K/UL (ref 130–400)
PMV BLD AUTO: 9.5 FL (ref 9.4–12.4)
POTASSIUM SERPL-SCNC: 4.1 MMOL/L (ref 3.5–5)
RBC # BLD: 3.47 M/UL (ref 4.7–6.1)
SODIUM BLD-SCNC: 143 MMOL/L (ref 136–145)
TOTAL PROTEIN: 6.2 G/DL (ref 6.6–8.7)
WBC # BLD: 6.1 K/UL (ref 4.8–10.8)

## 2018-06-01 PROCEDURE — 1210000000 HC MED SURG R&B

## 2018-06-01 PROCEDURE — 85730 THROMBOPLASTIN TIME PARTIAL: CPT

## 2018-06-01 PROCEDURE — 99024 POSTOP FOLLOW-UP VISIT: CPT | Performed by: SURGERY

## 2018-06-01 PROCEDURE — 6360000002 HC RX W HCPCS: Performed by: SURGERY

## 2018-06-01 PROCEDURE — 80053 COMPREHEN METABOLIC PANEL: CPT

## 2018-06-01 PROCEDURE — 6360000002 HC RX W HCPCS: Performed by: INTERNAL MEDICINE

## 2018-06-01 PROCEDURE — 2580000003 HC RX 258: Performed by: PHYSICIAN ASSISTANT

## 2018-06-01 PROCEDURE — 2500000003 HC RX 250 WO HCPCS: Performed by: PHYSICIAN ASSISTANT

## 2018-06-01 PROCEDURE — 2580000003 HC RX 258: Performed by: SURGERY

## 2018-06-01 PROCEDURE — 6370000000 HC RX 637 (ALT 250 FOR IP): Performed by: PHYSICIAN ASSISTANT

## 2018-06-01 PROCEDURE — 36415 COLL VENOUS BLD VENIPUNCTURE: CPT

## 2018-06-01 PROCEDURE — 85025 COMPLETE CBC W/AUTO DIFF WBC: CPT

## 2018-06-01 PROCEDURE — S0028 INJECTION, FAMOTIDINE, 20 MG: HCPCS | Performed by: PHYSICIAN ASSISTANT

## 2018-06-01 PROCEDURE — 6370000000 HC RX 637 (ALT 250 FOR IP): Performed by: INTERNAL MEDICINE

## 2018-06-01 PROCEDURE — 2500000003 HC RX 250 WO HCPCS: Performed by: SURGERY

## 2018-06-01 RX ORDER — ERGOCALCIFEROL 1.25 MG/1
50000 CAPSULE ORAL WEEKLY
Status: DISCONTINUED | OUTPATIENT
Start: 2018-06-01 | End: 2018-06-13 | Stop reason: HOSPADM

## 2018-06-01 RX ORDER — LEVOFLOXACIN 5 MG/ML
500 INJECTION, SOLUTION INTRAVENOUS EVERY 24 HOURS
Status: DISCONTINUED | OUTPATIENT
Start: 2018-06-01 | End: 2018-06-01

## 2018-06-01 RX ADMIN — MONTELUKAST SODIUM 10 MG: 10 TABLET, FILM COATED ORAL at 00:12

## 2018-06-01 RX ADMIN — SOTALOL HYDROCHLORIDE 160 MG: 80 TABLET ORAL at 21:07

## 2018-06-01 RX ADMIN — MONTELUKAST SODIUM 10 MG: 10 TABLET, FILM COATED ORAL at 21:07

## 2018-06-01 RX ADMIN — FUROSEMIDE 80 MG: 80 TABLET ORAL at 11:18

## 2018-06-01 RX ADMIN — SOTALOL HYDROCHLORIDE 160 MG: 80 TABLET ORAL at 11:19

## 2018-06-01 RX ADMIN — Medication 10 ML: at 00:12

## 2018-06-01 RX ADMIN — Medication 400 MG: at 00:11

## 2018-06-01 RX ADMIN — MEROPENEM 1 G: 1 INJECTION, POWDER, FOR SOLUTION INTRAVENOUS at 17:54

## 2018-06-01 RX ADMIN — ERGOCALCIFEROL 50000 UNITS: 1.25 CAPSULE ORAL at 11:20

## 2018-06-01 RX ADMIN — Medication 10 ML: at 11:18

## 2018-06-01 RX ADMIN — CILOSTAZOL 100 MG: 100 TABLET ORAL at 11:19

## 2018-06-01 RX ADMIN — IRON SUCROSE 200 MG: 20 INJECTION, SOLUTION INTRAVENOUS at 11:20

## 2018-06-01 RX ADMIN — PRAMIPEXOLE DIHYDROCHLORIDE 0.25 MG: 0.25 TABLET ORAL at 21:07

## 2018-06-01 RX ADMIN — CLINDAMYCIN PHOSPHATE 300 MG: 300 INJECTION INTRAVENOUS at 12:15

## 2018-06-01 RX ADMIN — PRAMIPEXOLE DIHYDROCHLORIDE 0.25 MG: 0.25 TABLET ORAL at 00:11

## 2018-06-01 RX ADMIN — QUETIAPINE FUMARATE 100 MG: 50 TABLET, FILM COATED ORAL at 00:12

## 2018-06-01 RX ADMIN — CILOSTAZOL 100 MG: 100 TABLET ORAL at 21:07

## 2018-06-01 RX ADMIN — CILOSTAZOL 100 MG: 100 TABLET ORAL at 00:11

## 2018-06-01 RX ADMIN — MOMETASONE FUROATE AND FORMOTEROL FUMARATE DIHYDRATE 2 PUFF: 200; 5 AEROSOL RESPIRATORY (INHALATION) at 21:08

## 2018-06-01 RX ADMIN — CETIRIZINE HYDROCHLORIDE 10 MG: 10 TABLET ORAL at 11:19

## 2018-06-01 RX ADMIN — MOMETASONE FUROATE AND FORMOTEROL FUMARATE DIHYDRATE 2 PUFF: 200; 5 AEROSOL RESPIRATORY (INHALATION) at 11:21

## 2018-06-01 RX ADMIN — Medication 10 ML: at 21:07

## 2018-06-01 RX ADMIN — AZTREONAM 1 G: 1 INJECTION, SOLUTION INTRAVENOUS at 18:50

## 2018-06-01 RX ADMIN — CLINDAMYCIN PHOSPHATE 300 MG: 300 INJECTION INTRAVENOUS at 00:12

## 2018-06-01 RX ADMIN — SOTALOL HYDROCHLORIDE 160 MG: 80 TABLET ORAL at 00:11

## 2018-06-01 RX ADMIN — Medication 400 MG: at 21:07

## 2018-06-01 RX ADMIN — FAMOTIDINE 20 MG: 10 INJECTION INTRAVENOUS at 11:20

## 2018-06-01 RX ADMIN — PRAMIPEXOLE DIHYDROCHLORIDE 0.25 MG: 0.25 TABLET ORAL at 12:15

## 2018-06-01 RX ADMIN — WARFARIN SODIUM 5 MG: 5 TABLET ORAL at 18:50

## 2018-06-01 ASSESSMENT — PAIN DESCRIPTION - LOCATION: LOCATION: HEAD

## 2018-06-01 ASSESSMENT — PAIN SCALES - GENERAL: PAINLEVEL_OUTOF10: 8

## 2018-06-01 ASSESSMENT — PAIN DESCRIPTION - DESCRIPTORS: DESCRIPTORS: HEADACHE

## 2018-06-02 LAB
ALBUMIN SERPL-MCNC: 2.7 G/DL (ref 3.5–5.2)
ALP BLD-CCNC: 85 U/L (ref 40–130)
ALT SERPL-CCNC: 9 U/L (ref 5–41)
ANION GAP SERPL CALCULATED.3IONS-SCNC: 15 MMOL/L (ref 7–19)
AST SERPL-CCNC: 14 U/L (ref 5–40)
BILIRUB SERPL-MCNC: <0.2 MG/DL (ref 0.2–1.2)
BUN BLDV-MCNC: 37 MG/DL (ref 8–23)
CALCIUM SERPL-MCNC: 8 MG/DL (ref 8.8–10.2)
CHLORIDE BLD-SCNC: 101 MMOL/L (ref 98–111)
CO2: 24 MMOL/L (ref 22–29)
CREAT SERPL-MCNC: 2.3 MG/DL (ref 0.5–1.2)
GFR NON-AFRICAN AMERICAN: 28
GLUCOSE BLD-MCNC: 97 MG/DL (ref 74–109)
HCT VFR BLD CALC: 31 % (ref 42–52)
HEMOGLOBIN: 9.7 G/DL (ref 14–18)
INR BLD: 2.19 (ref 0.88–1.18)
MCH RBC QN AUTO: 27.3 PG (ref 27–31)
MCHC RBC AUTO-ENTMCNC: 31.3 G/DL (ref 33–37)
MCV RBC AUTO: 87.3 FL (ref 80–94)
PDW BLD-RTO: 17.9 % (ref 11.5–14.5)
PLATELET # BLD: 226 K/UL (ref 130–400)
PMV BLD AUTO: 9.9 FL (ref 9.4–12.4)
POTASSIUM SERPL-SCNC: 3.9 MMOL/L (ref 3.5–5)
PROTHROMBIN TIME: 24.4 SEC (ref 12–14.6)
RBC # BLD: 3.55 M/UL (ref 4.7–6.1)
SODIUM BLD-SCNC: 140 MMOL/L (ref 136–145)
TOTAL PROTEIN: 6.5 G/DL (ref 6.6–8.7)
WBC # BLD: 7.3 K/UL (ref 4.8–10.8)

## 2018-06-02 PROCEDURE — 85610 PROTHROMBIN TIME: CPT

## 2018-06-02 PROCEDURE — 36415 COLL VENOUS BLD VENIPUNCTURE: CPT

## 2018-06-02 PROCEDURE — 80053 COMPREHEN METABOLIC PANEL: CPT

## 2018-06-02 PROCEDURE — 1210000000 HC MED SURG R&B

## 2018-06-02 PROCEDURE — 2500000003 HC RX 250 WO HCPCS: Performed by: SURGERY

## 2018-06-02 PROCEDURE — 6360000002 HC RX W HCPCS: Performed by: INTERNAL MEDICINE

## 2018-06-02 PROCEDURE — 85027 COMPLETE CBC AUTOMATED: CPT

## 2018-06-02 PROCEDURE — 2580000003 HC RX 258: Performed by: PHYSICIAN ASSISTANT

## 2018-06-02 PROCEDURE — 6360000002 HC RX W HCPCS: Performed by: SURGERY

## 2018-06-02 PROCEDURE — 6370000000 HC RX 637 (ALT 250 FOR IP): Performed by: INTERNAL MEDICINE

## 2018-06-02 PROCEDURE — 2580000003 HC RX 258: Performed by: SURGERY

## 2018-06-02 PROCEDURE — 6370000000 HC RX 637 (ALT 250 FOR IP): Performed by: PHYSICIAN ASSISTANT

## 2018-06-02 RX ORDER — FAMOTIDINE 20 MG/1
20 TABLET, FILM COATED ORAL DAILY
Status: DISCONTINUED | OUTPATIENT
Start: 2018-06-02 | End: 2018-06-13 | Stop reason: HOSPADM

## 2018-06-02 RX ADMIN — AZTREONAM 1 G: 1 INJECTION, SOLUTION INTRAVENOUS at 00:36

## 2018-06-02 RX ADMIN — OXYCODONE HYDROCHLORIDE AND ACETAMINOPHEN 1 TABLET: 10; 325 TABLET ORAL at 00:36

## 2018-06-02 RX ADMIN — OXYCODONE HYDROCHLORIDE AND ACETAMINOPHEN 1 TABLET: 10; 325 TABLET ORAL at 04:36

## 2018-06-02 RX ADMIN — AZTREONAM 1 G: 1 INJECTION, SOLUTION INTRAVENOUS at 16:59

## 2018-06-02 RX ADMIN — WARFARIN SODIUM 5 MG: 5 TABLET ORAL at 17:40

## 2018-06-02 RX ADMIN — FAMOTIDINE 20 MG: 20 TABLET ORAL at 08:36

## 2018-06-02 RX ADMIN — PRAMIPEXOLE DIHYDROCHLORIDE 0.25 MG: 0.25 TABLET ORAL at 08:36

## 2018-06-02 RX ADMIN — MEROPENEM 1 G: 1 INJECTION, POWDER, FOR SOLUTION INTRAVENOUS at 15:35

## 2018-06-02 RX ADMIN — CETIRIZINE HYDROCHLORIDE 10 MG: 10 TABLET ORAL at 08:36

## 2018-06-02 RX ADMIN — SOTALOL HYDROCHLORIDE 160 MG: 80 TABLET ORAL at 09:59

## 2018-06-02 RX ADMIN — MOMETASONE FUROATE AND FORMOTEROL FUMARATE DIHYDRATE 2 PUFF: 200; 5 AEROSOL RESPIRATORY (INHALATION) at 20:47

## 2018-06-02 RX ADMIN — MOMETASONE FUROATE AND FORMOTEROL FUMARATE DIHYDRATE 2 PUFF: 200; 5 AEROSOL RESPIRATORY (INHALATION) at 08:36

## 2018-06-02 RX ADMIN — Medication 400 MG: at 20:45

## 2018-06-02 RX ADMIN — SOTALOL HYDROCHLORIDE 160 MG: 80 TABLET ORAL at 20:50

## 2018-06-02 RX ADMIN — FUROSEMIDE 80 MG: 80 TABLET ORAL at 08:36

## 2018-06-02 RX ADMIN — MONTELUKAST SODIUM 10 MG: 10 TABLET, FILM COATED ORAL at 20:45

## 2018-06-02 RX ADMIN — CILOSTAZOL 100 MG: 100 TABLET ORAL at 20:45

## 2018-06-02 RX ADMIN — Medication 10 ML: at 08:36

## 2018-06-02 RX ADMIN — CILOSTAZOL 100 MG: 100 TABLET ORAL at 08:36

## 2018-06-02 RX ADMIN — QUETIAPINE FUMARATE 100 MG: 50 TABLET, FILM COATED ORAL at 00:43

## 2018-06-02 RX ADMIN — IRON SUCROSE 200 MG: 20 INJECTION, SOLUTION INTRAVENOUS at 09:57

## 2018-06-02 RX ADMIN — QUETIAPINE FUMARATE 100 MG: 50 TABLET, FILM COATED ORAL at 20:45

## 2018-06-02 RX ADMIN — AZTREONAM 1 G: 1 INJECTION, SOLUTION INTRAVENOUS at 08:39

## 2018-06-02 RX ADMIN — MEROPENEM 1 G: 1 INJECTION, POWDER, FOR SOLUTION INTRAVENOUS at 04:55

## 2018-06-02 RX ADMIN — PRAMIPEXOLE DIHYDROCHLORIDE 0.25 MG: 0.25 TABLET ORAL at 20:45

## 2018-06-02 RX ADMIN — Medication 10 ML: at 20:51

## 2018-06-02 ASSESSMENT — PAIN SCALES - GENERAL
PAINLEVEL_OUTOF10: 2
PAINLEVEL_OUTOF10: 7
PAINLEVEL_OUTOF10: 4

## 2018-06-03 LAB
ALBUMIN SERPL-MCNC: 2.8 G/DL (ref 3.5–5.2)
ALP BLD-CCNC: 87 U/L (ref 40–130)
ALT SERPL-CCNC: 8 U/L (ref 5–41)
ANION GAP SERPL CALCULATED.3IONS-SCNC: 12 MMOL/L (ref 7–19)
AST SERPL-CCNC: 13 U/L (ref 5–40)
BILIRUB SERPL-MCNC: <0.2 MG/DL (ref 0.2–1.2)
BUN BLDV-MCNC: 28 MG/DL (ref 8–23)
CALCIUM SERPL-MCNC: 8.2 MG/DL (ref 8.8–10.2)
CHLORIDE BLD-SCNC: 101 MMOL/L (ref 98–111)
CO2: 26 MMOL/L (ref 22–29)
CREAT SERPL-MCNC: 2 MG/DL (ref 0.5–1.2)
GFR NON-AFRICAN AMERICAN: 33
GLUCOSE BLD-MCNC: 102 MG/DL (ref 74–109)
HCT VFR BLD CALC: 33.9 % (ref 42–52)
HEMOGLOBIN: 10.6 G/DL (ref 14–18)
INR BLD: 1.93 (ref 0.88–1.18)
MCH RBC QN AUTO: 27.2 PG (ref 27–31)
MCHC RBC AUTO-ENTMCNC: 31.3 G/DL (ref 33–37)
MCV RBC AUTO: 86.9 FL (ref 80–94)
PDW BLD-RTO: 17.6 % (ref 11.5–14.5)
PLATELET # BLD: 248 K/UL (ref 130–400)
PMV BLD AUTO: 10.2 FL (ref 9.4–12.4)
POTASSIUM SERPL-SCNC: 4.3 MMOL/L (ref 3.5–5)
PROTHROMBIN TIME: 22.1 SEC (ref 12–14.6)
RBC # BLD: 3.9 M/UL (ref 4.7–6.1)
SODIUM BLD-SCNC: 139 MMOL/L (ref 136–145)
TOTAL PROTEIN: 6.6 G/DL (ref 6.6–8.7)
WBC # BLD: 8 K/UL (ref 4.8–10.8)

## 2018-06-03 PROCEDURE — 99024 POSTOP FOLLOW-UP VISIT: CPT | Performed by: SURGERY

## 2018-06-03 PROCEDURE — 6360000002 HC RX W HCPCS: Performed by: INTERNAL MEDICINE

## 2018-06-03 PROCEDURE — 1210000000 HC MED SURG R&B

## 2018-06-03 PROCEDURE — 6370000000 HC RX 637 (ALT 250 FOR IP): Performed by: PHYSICIAN ASSISTANT

## 2018-06-03 PROCEDURE — 36415 COLL VENOUS BLD VENIPUNCTURE: CPT

## 2018-06-03 PROCEDURE — 80053 COMPREHEN METABOLIC PANEL: CPT

## 2018-06-03 PROCEDURE — 2500000003 HC RX 250 WO HCPCS: Performed by: SURGERY

## 2018-06-03 PROCEDURE — 2580000003 HC RX 258: Performed by: SURGERY

## 2018-06-03 PROCEDURE — 2580000003 HC RX 258: Performed by: PHYSICIAN ASSISTANT

## 2018-06-03 PROCEDURE — 85610 PROTHROMBIN TIME: CPT

## 2018-06-03 PROCEDURE — 6360000002 HC RX W HCPCS: Performed by: SURGERY

## 2018-06-03 PROCEDURE — 2700000000 HC OXYGEN THERAPY PER DAY

## 2018-06-03 PROCEDURE — 6370000000 HC RX 637 (ALT 250 FOR IP): Performed by: INTERNAL MEDICINE

## 2018-06-03 PROCEDURE — 85027 COMPLETE CBC AUTOMATED: CPT

## 2018-06-03 RX ADMIN — Medication 400 MG: at 19:51

## 2018-06-03 RX ADMIN — PRAMIPEXOLE DIHYDROCHLORIDE 0.25 MG: 0.25 TABLET ORAL at 09:29

## 2018-06-03 RX ADMIN — WARFARIN SODIUM 5 MG: 5 TABLET ORAL at 17:46

## 2018-06-03 RX ADMIN — FUROSEMIDE 80 MG: 80 TABLET ORAL at 09:29

## 2018-06-03 RX ADMIN — MEROPENEM 1 G: 1 INJECTION, POWDER, FOR SOLUTION INTRAVENOUS at 16:38

## 2018-06-03 RX ADMIN — SOTALOL HYDROCHLORIDE 160 MG: 80 TABLET ORAL at 19:51

## 2018-06-03 RX ADMIN — AZTREONAM 1 G: 1 INJECTION, SOLUTION INTRAVENOUS at 01:51

## 2018-06-03 RX ADMIN — Medication 10 ML: at 19:51

## 2018-06-03 RX ADMIN — MEROPENEM 1 G: 1 INJECTION, POWDER, FOR SOLUTION INTRAVENOUS at 04:39

## 2018-06-03 RX ADMIN — IRON SUCROSE 200 MG: 20 INJECTION, SOLUTION INTRAVENOUS at 14:19

## 2018-06-03 RX ADMIN — MONTELUKAST SODIUM 10 MG: 10 TABLET, FILM COATED ORAL at 19:51

## 2018-06-03 RX ADMIN — QUETIAPINE FUMARATE 100 MG: 50 TABLET, FILM COATED ORAL at 19:51

## 2018-06-03 RX ADMIN — MOMETASONE FUROATE AND FORMOTEROL FUMARATE DIHYDRATE 2 PUFF: 200; 5 AEROSOL RESPIRATORY (INHALATION) at 19:52

## 2018-06-03 RX ADMIN — AZTREONAM 1 G: 1 INJECTION, SOLUTION INTRAVENOUS at 17:46

## 2018-06-03 RX ADMIN — MOMETASONE FUROATE AND FORMOTEROL FUMARATE DIHYDRATE 2 PUFF: 200; 5 AEROSOL RESPIRATORY (INHALATION) at 09:30

## 2018-06-03 RX ADMIN — FAMOTIDINE 20 MG: 20 TABLET ORAL at 09:29

## 2018-06-03 RX ADMIN — AZTREONAM 1 G: 1 INJECTION, SOLUTION INTRAVENOUS at 09:29

## 2018-06-03 RX ADMIN — CILOSTAZOL 100 MG: 100 TABLET ORAL at 19:51

## 2018-06-03 RX ADMIN — CILOSTAZOL 100 MG: 100 TABLET ORAL at 09:29

## 2018-06-03 RX ADMIN — PRAMIPEXOLE DIHYDROCHLORIDE 0.25 MG: 0.25 TABLET ORAL at 19:51

## 2018-06-03 RX ADMIN — CETIRIZINE HYDROCHLORIDE 10 MG: 10 TABLET ORAL at 09:29

## 2018-06-03 RX ADMIN — SOTALOL HYDROCHLORIDE 160 MG: 80 TABLET ORAL at 09:29

## 2018-06-04 LAB
ALBUMIN SERPL-MCNC: 2.7 G/DL (ref 3.5–5.2)
ALP BLD-CCNC: 78 U/L (ref 40–130)
ALT SERPL-CCNC: 9 U/L (ref 5–41)
ANAEROBIC CULTURE: ABNORMAL
ANION GAP SERPL CALCULATED.3IONS-SCNC: 12 MMOL/L (ref 7–19)
AST SERPL-CCNC: 13 U/L (ref 5–40)
BILIRUB SERPL-MCNC: <0.2 MG/DL (ref 0.2–1.2)
BLOOD CULTURE, ROUTINE: NORMAL
BUN BLDV-MCNC: 28 MG/DL (ref 8–23)
CALCIUM SERPL-MCNC: 8.2 MG/DL (ref 8.8–10.2)
CHLORIDE BLD-SCNC: 102 MMOL/L (ref 98–111)
CO2: 26 MMOL/L (ref 22–29)
CREAT SERPL-MCNC: 2 MG/DL (ref 0.5–1.2)
CULTURE, BLOOD 2: NORMAL
GFR NON-AFRICAN AMERICAN: 33
GLUCOSE BLD-MCNC: 112 MG/DL (ref 74–109)
GRAM STAIN RESULT: ABNORMAL
HCT VFR BLD CALC: 33.1 % (ref 42–52)
HEMOGLOBIN: 10.4 G/DL (ref 14–18)
INR BLD: 1.69 (ref 0.88–1.18)
MCH RBC QN AUTO: 26.9 PG (ref 27–31)
MCHC RBC AUTO-ENTMCNC: 31.4 G/DL (ref 33–37)
MCV RBC AUTO: 85.8 FL (ref 80–94)
ORGANISM: ABNORMAL
PDW BLD-RTO: 17.5 % (ref 11.5–14.5)
PLATELET # BLD: 227 K/UL (ref 130–400)
PMV BLD AUTO: 9.9 FL (ref 9.4–12.4)
POTASSIUM SERPL-SCNC: 3.9 MMOL/L (ref 3.5–5)
PROTHROMBIN TIME: 19.9 SEC (ref 12–14.6)
RBC # BLD: 3.86 M/UL (ref 4.7–6.1)
SODIUM BLD-SCNC: 140 MMOL/L (ref 136–145)
TOTAL PROTEIN: 6.6 G/DL (ref 6.6–8.7)
WBC # BLD: 7.1 K/UL (ref 4.8–10.8)
WOUND/ABSCESS: ABNORMAL

## 2018-06-04 PROCEDURE — 2700000000 HC OXYGEN THERAPY PER DAY

## 2018-06-04 PROCEDURE — 2580000003 HC RX 258: Performed by: SURGERY

## 2018-06-04 PROCEDURE — 36415 COLL VENOUS BLD VENIPUNCTURE: CPT

## 2018-06-04 PROCEDURE — 6360000002 HC RX W HCPCS: Performed by: SURGERY

## 2018-06-04 PROCEDURE — 85610 PROTHROMBIN TIME: CPT

## 2018-06-04 PROCEDURE — 80053 COMPREHEN METABOLIC PANEL: CPT

## 2018-06-04 PROCEDURE — 85027 COMPLETE CBC AUTOMATED: CPT

## 2018-06-04 PROCEDURE — 6370000000 HC RX 637 (ALT 250 FOR IP): Performed by: INTERNAL MEDICINE

## 2018-06-04 PROCEDURE — 2500000003 HC RX 250 WO HCPCS: Performed by: SURGERY

## 2018-06-04 PROCEDURE — 2580000003 HC RX 258: Performed by: PHYSICIAN ASSISTANT

## 2018-06-04 PROCEDURE — 6370000000 HC RX 637 (ALT 250 FOR IP): Performed by: PHYSICIAN ASSISTANT

## 2018-06-04 PROCEDURE — 6360000002 HC RX W HCPCS: Performed by: INTERNAL MEDICINE

## 2018-06-04 PROCEDURE — 1210000000 HC MED SURG R&B

## 2018-06-04 RX ADMIN — WARFARIN SODIUM 5 MG: 5 TABLET ORAL at 17:39

## 2018-06-04 RX ADMIN — AZTREONAM 1 G: 1 INJECTION, SOLUTION INTRAVENOUS at 01:40

## 2018-06-04 RX ADMIN — FAMOTIDINE 20 MG: 20 TABLET ORAL at 10:03

## 2018-06-04 RX ADMIN — AZTREONAM 1 G: 1 INJECTION, SOLUTION INTRAVENOUS at 10:02

## 2018-06-04 RX ADMIN — IRON SUCROSE 200 MG: 20 INJECTION, SOLUTION INTRAVENOUS at 10:07

## 2018-06-04 RX ADMIN — Medication 10 ML: at 10:04

## 2018-06-04 RX ADMIN — CETIRIZINE HYDROCHLORIDE 10 MG: 10 TABLET ORAL at 10:03

## 2018-06-04 RX ADMIN — CILOSTAZOL 100 MG: 100 TABLET ORAL at 21:57

## 2018-06-04 RX ADMIN — PRAMIPEXOLE DIHYDROCHLORIDE 0.25 MG: 0.25 TABLET ORAL at 21:58

## 2018-06-04 RX ADMIN — MONTELUKAST SODIUM 10 MG: 10 TABLET, FILM COATED ORAL at 21:58

## 2018-06-04 RX ADMIN — FUROSEMIDE 80 MG: 80 TABLET ORAL at 10:03

## 2018-06-04 RX ADMIN — SOTALOL HYDROCHLORIDE 160 MG: 80 TABLET ORAL at 10:03

## 2018-06-04 RX ADMIN — MOMETASONE FUROATE AND FORMOTEROL FUMARATE DIHYDRATE 2 PUFF: 200; 5 AEROSOL RESPIRATORY (INHALATION) at 21:58

## 2018-06-04 RX ADMIN — PRAMIPEXOLE DIHYDROCHLORIDE 0.25 MG: 0.25 TABLET ORAL at 10:02

## 2018-06-04 RX ADMIN — MEROPENEM 1 G: 1 INJECTION, POWDER, FOR SOLUTION INTRAVENOUS at 04:38

## 2018-06-04 RX ADMIN — MEROPENEM 1 G: 1 INJECTION, POWDER, FOR SOLUTION INTRAVENOUS at 17:39

## 2018-06-04 RX ADMIN — CILOSTAZOL 100 MG: 100 TABLET ORAL at 10:03

## 2018-06-04 RX ADMIN — Medication 10 ML: at 22:02

## 2018-06-04 RX ADMIN — MOMETASONE FUROATE AND FORMOTEROL FUMARATE DIHYDRATE 2 PUFF: 200; 5 AEROSOL RESPIRATORY (INHALATION) at 10:05

## 2018-06-04 RX ADMIN — Medication 400 MG: at 21:58

## 2018-06-04 RX ADMIN — QUETIAPINE FUMARATE 100 MG: 50 TABLET, FILM COATED ORAL at 22:02

## 2018-06-04 RX ADMIN — SOTALOL HYDROCHLORIDE 160 MG: 80 TABLET ORAL at 21:58

## 2018-06-05 LAB
ANION GAP SERPL CALCULATED.3IONS-SCNC: 13 MMOL/L (ref 7–19)
BUN BLDV-MCNC: 30 MG/DL (ref 8–23)
CALCIUM SERPL-MCNC: 8 MG/DL (ref 8.8–10.2)
CHLORIDE BLD-SCNC: 103 MMOL/L (ref 98–111)
CO2: 25 MMOL/L (ref 22–29)
CREAT SERPL-MCNC: 1.9 MG/DL (ref 0.5–1.2)
GFR NON-AFRICAN AMERICAN: 35
GLUCOSE BLD-MCNC: 97 MG/DL (ref 74–109)
INR BLD: 1.77 (ref 0.88–1.18)
POTASSIUM SERPL-SCNC: 3.9 MMOL/L (ref 3.5–5)
PROTHROMBIN TIME: 20.6 SEC (ref 12–14.6)
SODIUM BLD-SCNC: 141 MMOL/L (ref 136–145)

## 2018-06-05 PROCEDURE — 80048 BASIC METABOLIC PNL TOTAL CA: CPT

## 2018-06-05 PROCEDURE — 2580000003 HC RX 258: Performed by: SURGERY

## 2018-06-05 PROCEDURE — 85610 PROTHROMBIN TIME: CPT

## 2018-06-05 PROCEDURE — 6370000000 HC RX 637 (ALT 250 FOR IP): Performed by: PHYSICIAN ASSISTANT

## 2018-06-05 PROCEDURE — 1210000000 HC MED SURG R&B

## 2018-06-05 PROCEDURE — 36415 COLL VENOUS BLD VENIPUNCTURE: CPT

## 2018-06-05 PROCEDURE — 2700000000 HC OXYGEN THERAPY PER DAY

## 2018-06-05 PROCEDURE — 6370000000 HC RX 637 (ALT 250 FOR IP): Performed by: INTERNAL MEDICINE

## 2018-06-05 PROCEDURE — 2580000003 HC RX 258: Performed by: PHYSICIAN ASSISTANT

## 2018-06-05 PROCEDURE — 6360000002 HC RX W HCPCS: Performed by: SURGERY

## 2018-06-05 RX ADMIN — FUROSEMIDE 80 MG: 80 TABLET ORAL at 08:33

## 2018-06-05 RX ADMIN — CILOSTAZOL 100 MG: 100 TABLET ORAL at 20:40

## 2018-06-05 RX ADMIN — SOTALOL HYDROCHLORIDE 160 MG: 80 TABLET ORAL at 20:40

## 2018-06-05 RX ADMIN — PRAMIPEXOLE DIHYDROCHLORIDE 0.25 MG: 0.25 TABLET ORAL at 08:30

## 2018-06-05 RX ADMIN — QUETIAPINE FUMARATE 100 MG: 50 TABLET, FILM COATED ORAL at 20:40

## 2018-06-05 RX ADMIN — MEROPENEM 1 G: 1 INJECTION, POWDER, FOR SOLUTION INTRAVENOUS at 03:25

## 2018-06-05 RX ADMIN — SOTALOL HYDROCHLORIDE 160 MG: 80 TABLET ORAL at 08:30

## 2018-06-05 RX ADMIN — FAMOTIDINE 20 MG: 20 TABLET ORAL at 08:33

## 2018-06-05 RX ADMIN — CILOSTAZOL 100 MG: 100 TABLET ORAL at 08:30

## 2018-06-05 RX ADMIN — Medication 10 ML: at 08:30

## 2018-06-05 RX ADMIN — MEROPENEM 1 G: 1 INJECTION, POWDER, FOR SOLUTION INTRAVENOUS at 16:40

## 2018-06-05 RX ADMIN — PRAMIPEXOLE DIHYDROCHLORIDE 0.25 MG: 0.25 TABLET ORAL at 20:41

## 2018-06-05 RX ADMIN — OXYCODONE HYDROCHLORIDE AND ACETAMINOPHEN 1 TABLET: 10; 325 TABLET ORAL at 20:52

## 2018-06-05 RX ADMIN — Medication 400 MG: at 20:41

## 2018-06-05 RX ADMIN — CETIRIZINE HYDROCHLORIDE 10 MG: 10 TABLET ORAL at 08:30

## 2018-06-05 RX ADMIN — Medication 10 ML: at 20:41

## 2018-06-05 RX ADMIN — MOMETASONE FUROATE AND FORMOTEROL FUMARATE DIHYDRATE 2 PUFF: 200; 5 AEROSOL RESPIRATORY (INHALATION) at 19:40

## 2018-06-05 RX ADMIN — WARFARIN SODIUM 5 MG: 5 TABLET ORAL at 17:03

## 2018-06-05 RX ADMIN — MONTELUKAST SODIUM 10 MG: 10 TABLET, FILM COATED ORAL at 20:41

## 2018-06-05 RX ADMIN — MOMETASONE FUROATE AND FORMOTEROL FUMARATE DIHYDRATE 2 PUFF: 200; 5 AEROSOL RESPIRATORY (INHALATION) at 08:31

## 2018-06-05 ASSESSMENT — PAIN SCALES - GENERAL
PAINLEVEL_OUTOF10: 0
PAINLEVEL_OUTOF10: 6

## 2018-06-06 LAB
ANION GAP SERPL CALCULATED.3IONS-SCNC: 14 MMOL/L (ref 7–19)
BUN BLDV-MCNC: 32 MG/DL (ref 8–23)
CALCIUM SERPL-MCNC: 8 MG/DL (ref 8.8–10.2)
CHLORIDE BLD-SCNC: 103 MMOL/L (ref 98–111)
CO2: 24 MMOL/L (ref 22–29)
CREAT SERPL-MCNC: 1.8 MG/DL (ref 0.5–1.2)
GFR NON-AFRICAN AMERICAN: 37
GLUCOSE BLD-MCNC: 108 MG/DL (ref 74–109)
INR BLD: 1.66 (ref 0.88–1.18)
POTASSIUM SERPL-SCNC: 3.6 MMOL/L (ref 3.5–5)
PROTHROMBIN TIME: 19.6 SEC (ref 12–14.6)
SODIUM BLD-SCNC: 141 MMOL/L (ref 136–145)

## 2018-06-06 PROCEDURE — 85610 PROTHROMBIN TIME: CPT

## 2018-06-06 PROCEDURE — 6370000000 HC RX 637 (ALT 250 FOR IP): Performed by: INTERNAL MEDICINE

## 2018-06-06 PROCEDURE — 2580000003 HC RX 258: Performed by: SURGERY

## 2018-06-06 PROCEDURE — 99024 POSTOP FOLLOW-UP VISIT: CPT | Performed by: SURGERY

## 2018-06-06 PROCEDURE — 97530 THERAPEUTIC ACTIVITIES: CPT

## 2018-06-06 PROCEDURE — 36415 COLL VENOUS BLD VENIPUNCTURE: CPT

## 2018-06-06 PROCEDURE — 2580000003 HC RX 258: Performed by: PHYSICIAN ASSISTANT

## 2018-06-06 PROCEDURE — G8988 SELF CARE GOAL STATUS: HCPCS

## 2018-06-06 PROCEDURE — 80048 BASIC METABOLIC PNL TOTAL CA: CPT

## 2018-06-06 PROCEDURE — 6360000002 HC RX W HCPCS: Performed by: SURGERY

## 2018-06-06 PROCEDURE — 6370000000 HC RX 637 (ALT 250 FOR IP): Performed by: PHYSICIAN ASSISTANT

## 2018-06-06 PROCEDURE — 97166 OT EVAL MOD COMPLEX 45 MIN: CPT

## 2018-06-06 PROCEDURE — G8987 SELF CARE CURRENT STATUS: HCPCS

## 2018-06-06 PROCEDURE — 2700000000 HC OXYGEN THERAPY PER DAY

## 2018-06-06 PROCEDURE — 1210000000 HC MED SURG R&B

## 2018-06-06 RX ADMIN — ACETAMINOPHEN 650 MG: 325 TABLET ORAL at 19:59

## 2018-06-06 RX ADMIN — FUROSEMIDE 80 MG: 80 TABLET ORAL at 08:20

## 2018-06-06 RX ADMIN — MONTELUKAST SODIUM 10 MG: 10 TABLET, FILM COATED ORAL at 22:24

## 2018-06-06 RX ADMIN — MEROPENEM 1 G: 1 INJECTION, POWDER, FOR SOLUTION INTRAVENOUS at 04:05

## 2018-06-06 RX ADMIN — FAMOTIDINE 20 MG: 20 TABLET ORAL at 08:20

## 2018-06-06 RX ADMIN — CETIRIZINE HYDROCHLORIDE 10 MG: 10 TABLET ORAL at 08:20

## 2018-06-06 RX ADMIN — Medication 400 MG: at 22:24

## 2018-06-06 RX ADMIN — MEROPENEM 1 G: 1 INJECTION, POWDER, FOR SOLUTION INTRAVENOUS at 16:30

## 2018-06-06 RX ADMIN — PRAMIPEXOLE DIHYDROCHLORIDE 0.25 MG: 0.25 TABLET ORAL at 22:25

## 2018-06-06 RX ADMIN — Medication 10 ML: at 22:25

## 2018-06-06 RX ADMIN — CILOSTAZOL 100 MG: 100 TABLET ORAL at 08:20

## 2018-06-06 RX ADMIN — Medication 10 ML: at 08:22

## 2018-06-06 RX ADMIN — PRAMIPEXOLE DIHYDROCHLORIDE 0.25 MG: 0.25 TABLET ORAL at 08:29

## 2018-06-06 RX ADMIN — SOTALOL HYDROCHLORIDE 160 MG: 80 TABLET ORAL at 22:24

## 2018-06-06 RX ADMIN — CILOSTAZOL 100 MG: 100 TABLET ORAL at 22:24

## 2018-06-06 RX ADMIN — WARFARIN SODIUM 5 MG: 5 TABLET ORAL at 17:30

## 2018-06-06 RX ADMIN — SOTALOL HYDROCHLORIDE 160 MG: 80 TABLET ORAL at 08:20

## 2018-06-06 RX ADMIN — MOMETASONE FUROATE AND FORMOTEROL FUMARATE DIHYDRATE 2 PUFF: 200; 5 AEROSOL RESPIRATORY (INHALATION) at 22:26

## 2018-06-06 RX ADMIN — QUETIAPINE FUMARATE 100 MG: 50 TABLET, FILM COATED ORAL at 22:25

## 2018-06-06 RX ADMIN — MOMETASONE FUROATE AND FORMOTEROL FUMARATE DIHYDRATE 2 PUFF: 200; 5 AEROSOL RESPIRATORY (INHALATION) at 08:21

## 2018-06-06 ASSESSMENT — PAIN SCALES - GENERAL
PAINLEVEL_OUTOF10: 0
PAINLEVEL_OUTOF10: 6
PAINLEVEL_OUTOF10: 4

## 2018-06-07 LAB
ANION GAP SERPL CALCULATED.3IONS-SCNC: 14 MMOL/L (ref 7–19)
BUN BLDV-MCNC: 36 MG/DL (ref 8–23)
CALCIUM SERPL-MCNC: 8.5 MG/DL (ref 8.8–10.2)
CHLORIDE BLD-SCNC: 104 MMOL/L (ref 98–111)
CO2: 25 MMOL/L (ref 22–29)
CREAT SERPL-MCNC: 1.9 MG/DL (ref 0.5–1.2)
GFR NON-AFRICAN AMERICAN: 35
GLUCOSE BLD-MCNC: 94 MG/DL (ref 74–109)
INR BLD: 1.58 (ref 0.88–1.18)
POTASSIUM SERPL-SCNC: 3.5 MMOL/L (ref 3.5–5)
PROTHROMBIN TIME: 18.8 SEC (ref 12–14.6)
SODIUM BLD-SCNC: 143 MMOL/L (ref 136–145)

## 2018-06-07 PROCEDURE — 2580000003 HC RX 258: Performed by: PHYSICIAN ASSISTANT

## 2018-06-07 PROCEDURE — 99024 POSTOP FOLLOW-UP VISIT: CPT | Performed by: SURGERY

## 2018-06-07 PROCEDURE — 85610 PROTHROMBIN TIME: CPT

## 2018-06-07 PROCEDURE — 6360000002 HC RX W HCPCS: Performed by: SURGERY

## 2018-06-07 PROCEDURE — 2700000000 HC OXYGEN THERAPY PER DAY

## 2018-06-07 PROCEDURE — G8978 MOBILITY CURRENT STATUS: HCPCS

## 2018-06-07 PROCEDURE — G8979 MOBILITY GOAL STATUS: HCPCS

## 2018-06-07 PROCEDURE — 80048 BASIC METABOLIC PNL TOTAL CA: CPT

## 2018-06-07 PROCEDURE — 1210000000 HC MED SURG R&B

## 2018-06-07 PROCEDURE — 6370000000 HC RX 637 (ALT 250 FOR IP): Performed by: PHYSICIAN ASSISTANT

## 2018-06-07 PROCEDURE — 97530 THERAPEUTIC ACTIVITIES: CPT

## 2018-06-07 PROCEDURE — 6370000000 HC RX 637 (ALT 250 FOR IP): Performed by: INTERNAL MEDICINE

## 2018-06-07 PROCEDURE — 97161 PT EVAL LOW COMPLEX 20 MIN: CPT

## 2018-06-07 PROCEDURE — 2580000003 HC RX 258: Performed by: SURGERY

## 2018-06-07 PROCEDURE — 97535 SELF CARE MNGMENT TRAINING: CPT

## 2018-06-07 PROCEDURE — 36415 COLL VENOUS BLD VENIPUNCTURE: CPT

## 2018-06-07 RX ADMIN — Medication 10 ML: at 20:14

## 2018-06-07 RX ADMIN — SOTALOL HYDROCHLORIDE 160 MG: 80 TABLET ORAL at 09:34

## 2018-06-07 RX ADMIN — FUROSEMIDE 80 MG: 80 TABLET ORAL at 09:34

## 2018-06-07 RX ADMIN — MOMETASONE FUROATE AND FORMOTEROL FUMARATE DIHYDRATE 2 PUFF: 200; 5 AEROSOL RESPIRATORY (INHALATION) at 20:16

## 2018-06-07 RX ADMIN — PRAMIPEXOLE DIHYDROCHLORIDE 0.25 MG: 0.25 TABLET ORAL at 20:13

## 2018-06-07 RX ADMIN — CILOSTAZOL 100 MG: 100 TABLET ORAL at 20:13

## 2018-06-07 RX ADMIN — SOTALOL HYDROCHLORIDE 160 MG: 80 TABLET ORAL at 20:14

## 2018-06-07 RX ADMIN — QUETIAPINE FUMARATE 100 MG: 50 TABLET, FILM COATED ORAL at 20:13

## 2018-06-07 RX ADMIN — Medication 400 MG: at 20:13

## 2018-06-07 RX ADMIN — CETIRIZINE HYDROCHLORIDE 10 MG: 10 TABLET ORAL at 09:34

## 2018-06-07 RX ADMIN — MOMETASONE FUROATE AND FORMOTEROL FUMARATE DIHYDRATE 2 PUFF: 200; 5 AEROSOL RESPIRATORY (INHALATION) at 09:35

## 2018-06-07 RX ADMIN — Medication 10 ML: at 09:36

## 2018-06-07 RX ADMIN — MEROPENEM 1 G: 1 INJECTION, POWDER, FOR SOLUTION INTRAVENOUS at 04:45

## 2018-06-07 RX ADMIN — CILOSTAZOL 100 MG: 100 TABLET ORAL at 09:34

## 2018-06-07 RX ADMIN — WARFARIN SODIUM 5 MG: 5 TABLET ORAL at 17:00

## 2018-06-07 RX ADMIN — MEROPENEM 1 G: 1 INJECTION, POWDER, FOR SOLUTION INTRAVENOUS at 17:00

## 2018-06-07 RX ADMIN — FAMOTIDINE 20 MG: 20 TABLET ORAL at 09:34

## 2018-06-07 RX ADMIN — MONTELUKAST SODIUM 10 MG: 10 TABLET, FILM COATED ORAL at 20:14

## 2018-06-07 RX ADMIN — PRAMIPEXOLE DIHYDROCHLORIDE 0.25 MG: 0.25 TABLET ORAL at 09:34

## 2018-06-07 ASSESSMENT — PAIN SCALES - GENERAL: PAINLEVEL_OUTOF10: 0

## 2018-06-08 LAB
INR BLD: 1.8 (ref 0.88–1.18)
PROTHROMBIN TIME: 20.9 SEC (ref 12–14.6)

## 2018-06-08 PROCEDURE — 85610 PROTHROMBIN TIME: CPT

## 2018-06-08 PROCEDURE — 6360000002 HC RX W HCPCS: Performed by: SURGERY

## 2018-06-08 PROCEDURE — 6370000000 HC RX 637 (ALT 250 FOR IP): Performed by: INTERNAL MEDICINE

## 2018-06-08 PROCEDURE — 6370000000 HC RX 637 (ALT 250 FOR IP): Performed by: SURGERY

## 2018-06-08 PROCEDURE — 97110 THERAPEUTIC EXERCISES: CPT

## 2018-06-08 PROCEDURE — 2580000003 HC RX 258: Performed by: PHYSICIAN ASSISTANT

## 2018-06-08 PROCEDURE — 6370000000 HC RX 637 (ALT 250 FOR IP): Performed by: PHYSICIAN ASSISTANT

## 2018-06-08 PROCEDURE — 36415 COLL VENOUS BLD VENIPUNCTURE: CPT

## 2018-06-08 PROCEDURE — 99024 POSTOP FOLLOW-UP VISIT: CPT | Performed by: SURGERY

## 2018-06-08 PROCEDURE — 97530 THERAPEUTIC ACTIVITIES: CPT

## 2018-06-08 PROCEDURE — 97535 SELF CARE MNGMENT TRAINING: CPT

## 2018-06-08 PROCEDURE — 1210000000 HC MED SURG R&B

## 2018-06-08 PROCEDURE — 97116 GAIT TRAINING THERAPY: CPT

## 2018-06-08 PROCEDURE — 2580000003 HC RX 258: Performed by: SURGERY

## 2018-06-08 RX ADMIN — CILOSTAZOL 100 MG: 100 TABLET ORAL at 20:39

## 2018-06-08 RX ADMIN — Medication 10 ML: at 20:40

## 2018-06-08 RX ADMIN — Medication: at 17:02

## 2018-06-08 RX ADMIN — Medication 10 ML: at 12:00

## 2018-06-08 RX ADMIN — QUETIAPINE FUMARATE 100 MG: 50 TABLET, FILM COATED ORAL at 20:39

## 2018-06-08 RX ADMIN — Medication 400 MG: at 20:39

## 2018-06-08 RX ADMIN — PRAMIPEXOLE DIHYDROCHLORIDE 0.25 MG: 0.25 TABLET ORAL at 20:39

## 2018-06-08 RX ADMIN — MEROPENEM 1 G: 1 INJECTION, POWDER, FOR SOLUTION INTRAVENOUS at 17:02

## 2018-06-08 RX ADMIN — FUROSEMIDE 80 MG: 80 TABLET ORAL at 12:00

## 2018-06-08 RX ADMIN — WARFARIN SODIUM 5 MG: 5 TABLET ORAL at 17:02

## 2018-06-08 RX ADMIN — FAMOTIDINE 20 MG: 20 TABLET ORAL at 12:01

## 2018-06-08 RX ADMIN — MOMETASONE FUROATE AND FORMOTEROL FUMARATE DIHYDRATE 2 PUFF: 200; 5 AEROSOL RESPIRATORY (INHALATION) at 20:40

## 2018-06-08 RX ADMIN — PRAMIPEXOLE DIHYDROCHLORIDE 0.25 MG: 0.25 TABLET ORAL at 12:01

## 2018-06-08 RX ADMIN — SOTALOL HYDROCHLORIDE 160 MG: 80 TABLET ORAL at 12:00

## 2018-06-08 RX ADMIN — MOMETASONE FUROATE AND FORMOTEROL FUMARATE DIHYDRATE 2 PUFF: 200; 5 AEROSOL RESPIRATORY (INHALATION) at 12:01

## 2018-06-08 RX ADMIN — SOTALOL HYDROCHLORIDE 160 MG: 80 TABLET ORAL at 20:39

## 2018-06-08 RX ADMIN — CILOSTAZOL 100 MG: 100 TABLET ORAL at 12:00

## 2018-06-08 RX ADMIN — MEROPENEM 1 G: 1 INJECTION, POWDER, FOR SOLUTION INTRAVENOUS at 05:00

## 2018-06-08 RX ADMIN — CETIRIZINE HYDROCHLORIDE 10 MG: 10 TABLET ORAL at 12:00

## 2018-06-08 RX ADMIN — ERGOCALCIFEROL 50000 UNITS: 1.25 CAPSULE ORAL at 12:01

## 2018-06-08 RX ADMIN — MONTELUKAST SODIUM 10 MG: 10 TABLET, FILM COATED ORAL at 20:39

## 2018-06-08 ASSESSMENT — PAIN SCALES - GENERAL: PAINLEVEL_OUTOF10: 0

## 2018-06-09 LAB
INR BLD: 1.76 (ref 0.88–1.18)
PROTHROMBIN TIME: 20.5 SEC (ref 12–14.6)

## 2018-06-09 PROCEDURE — 1210000000 HC MED SURG R&B

## 2018-06-09 PROCEDURE — 97530 THERAPEUTIC ACTIVITIES: CPT

## 2018-06-09 PROCEDURE — 85610 PROTHROMBIN TIME: CPT

## 2018-06-09 PROCEDURE — 6370000000 HC RX 637 (ALT 250 FOR IP): Performed by: INTERNAL MEDICINE

## 2018-06-09 PROCEDURE — 6360000002 HC RX W HCPCS: Performed by: SURGERY

## 2018-06-09 PROCEDURE — 2580000003 HC RX 258: Performed by: PHYSICIAN ASSISTANT

## 2018-06-09 PROCEDURE — 97116 GAIT TRAINING THERAPY: CPT

## 2018-06-09 PROCEDURE — 6370000000 HC RX 637 (ALT 250 FOR IP): Performed by: PHYSICIAN ASSISTANT

## 2018-06-09 PROCEDURE — 2580000003 HC RX 258: Performed by: SURGERY

## 2018-06-09 PROCEDURE — 36415 COLL VENOUS BLD VENIPUNCTURE: CPT

## 2018-06-09 RX ADMIN — Medication 10 ML: at 21:16

## 2018-06-09 RX ADMIN — MEROPENEM 1 G: 1 INJECTION, POWDER, FOR SOLUTION INTRAVENOUS at 18:04

## 2018-06-09 RX ADMIN — Medication 400 MG: at 21:16

## 2018-06-09 RX ADMIN — CETIRIZINE HYDROCHLORIDE 10 MG: 10 TABLET ORAL at 09:55

## 2018-06-09 RX ADMIN — Medication 10 ML: at 09:55

## 2018-06-09 RX ADMIN — MOMETASONE FUROATE AND FORMOTEROL FUMARATE DIHYDRATE 2 PUFF: 200; 5 AEROSOL RESPIRATORY (INHALATION) at 09:56

## 2018-06-09 RX ADMIN — CILOSTAZOL 100 MG: 100 TABLET ORAL at 21:16

## 2018-06-09 RX ADMIN — MONTELUKAST SODIUM 10 MG: 10 TABLET, FILM COATED ORAL at 21:16

## 2018-06-09 RX ADMIN — FUROSEMIDE 80 MG: 80 TABLET ORAL at 09:55

## 2018-06-09 RX ADMIN — QUETIAPINE FUMARATE 100 MG: 50 TABLET, FILM COATED ORAL at 21:16

## 2018-06-09 RX ADMIN — MOMETASONE FUROATE AND FORMOTEROL FUMARATE DIHYDRATE 2 PUFF: 200; 5 AEROSOL RESPIRATORY (INHALATION) at 21:17

## 2018-06-09 RX ADMIN — MEROPENEM 1 G: 1 INJECTION, POWDER, FOR SOLUTION INTRAVENOUS at 04:35

## 2018-06-09 RX ADMIN — PRAMIPEXOLE DIHYDROCHLORIDE 0.25 MG: 0.25 TABLET ORAL at 21:16

## 2018-06-09 RX ADMIN — FAMOTIDINE 20 MG: 20 TABLET ORAL at 09:55

## 2018-06-09 RX ADMIN — OXYCODONE HYDROCHLORIDE AND ACETAMINOPHEN 1 TABLET: 10; 325 TABLET ORAL at 10:02

## 2018-06-09 RX ADMIN — WARFARIN SODIUM 5 MG: 5 TABLET ORAL at 18:04

## 2018-06-09 RX ADMIN — OXYCODONE HYDROCHLORIDE AND ACETAMINOPHEN 1 TABLET: 10; 325 TABLET ORAL at 21:22

## 2018-06-09 RX ADMIN — CILOSTAZOL 100 MG: 100 TABLET ORAL at 09:55

## 2018-06-09 RX ADMIN — PRAMIPEXOLE DIHYDROCHLORIDE 0.25 MG: 0.25 TABLET ORAL at 09:55

## 2018-06-09 RX ADMIN — SOTALOL HYDROCHLORIDE 160 MG: 80 TABLET ORAL at 09:55

## 2018-06-09 RX ADMIN — SOTALOL HYDROCHLORIDE 160 MG: 80 TABLET ORAL at 21:16

## 2018-06-09 ASSESSMENT — PAIN SCALES - GENERAL
PAINLEVEL_OUTOF10: 8
PAINLEVEL_OUTOF10: 2
PAINLEVEL_OUTOF10: 7
PAINLEVEL_OUTOF10: 1

## 2018-06-10 LAB
INR BLD: 1.89 (ref 0.88–1.18)
PROTHROMBIN TIME: 21.7 SEC (ref 12–14.6)

## 2018-06-10 PROCEDURE — 85610 PROTHROMBIN TIME: CPT

## 2018-06-10 PROCEDURE — 6360000002 HC RX W HCPCS: Performed by: SURGERY

## 2018-06-10 PROCEDURE — 97530 THERAPEUTIC ACTIVITIES: CPT

## 2018-06-10 PROCEDURE — 6370000000 HC RX 637 (ALT 250 FOR IP): Performed by: INTERNAL MEDICINE

## 2018-06-10 PROCEDURE — 6370000000 HC RX 637 (ALT 250 FOR IP): Performed by: PHYSICIAN ASSISTANT

## 2018-06-10 PROCEDURE — 1210000000 HC MED SURG R&B

## 2018-06-10 PROCEDURE — 2580000003 HC RX 258: Performed by: PHYSICIAN ASSISTANT

## 2018-06-10 PROCEDURE — 97116 GAIT TRAINING THERAPY: CPT

## 2018-06-10 PROCEDURE — 2580000003 HC RX 258: Performed by: SURGERY

## 2018-06-10 PROCEDURE — 36415 COLL VENOUS BLD VENIPUNCTURE: CPT

## 2018-06-10 RX ADMIN — Medication 10 ML: at 09:39

## 2018-06-10 RX ADMIN — CILOSTAZOL 100 MG: 100 TABLET ORAL at 09:39

## 2018-06-10 RX ADMIN — MEROPENEM 1 G: 1 INJECTION, POWDER, FOR SOLUTION INTRAVENOUS at 04:31

## 2018-06-10 RX ADMIN — MOMETASONE FUROATE AND FORMOTEROL FUMARATE DIHYDRATE 2 PUFF: 200; 5 AEROSOL RESPIRATORY (INHALATION) at 09:40

## 2018-06-10 RX ADMIN — SOTALOL HYDROCHLORIDE 160 MG: 80 TABLET ORAL at 22:48

## 2018-06-10 RX ADMIN — Medication 400 MG: at 22:49

## 2018-06-10 RX ADMIN — WARFARIN SODIUM 5 MG: 5 TABLET ORAL at 18:31

## 2018-06-10 RX ADMIN — QUETIAPINE FUMARATE 100 MG: 50 TABLET, FILM COATED ORAL at 22:48

## 2018-06-10 RX ADMIN — MONTELUKAST SODIUM 10 MG: 10 TABLET, FILM COATED ORAL at 22:50

## 2018-06-10 RX ADMIN — MEROPENEM 1 G: 1 INJECTION, POWDER, FOR SOLUTION INTRAVENOUS at 15:39

## 2018-06-10 RX ADMIN — Medication 10 ML: at 22:50

## 2018-06-10 RX ADMIN — FUROSEMIDE 80 MG: 80 TABLET ORAL at 09:39

## 2018-06-10 RX ADMIN — FAMOTIDINE 20 MG: 20 TABLET ORAL at 09:39

## 2018-06-10 RX ADMIN — MOMETASONE FUROATE AND FORMOTEROL FUMARATE DIHYDRATE 2 PUFF: 200; 5 AEROSOL RESPIRATORY (INHALATION) at 22:51

## 2018-06-10 RX ADMIN — CILOSTAZOL 100 MG: 100 TABLET ORAL at 22:47

## 2018-06-10 RX ADMIN — CETIRIZINE HYDROCHLORIDE 10 MG: 10 TABLET ORAL at 09:39

## 2018-06-10 RX ADMIN — OXYCODONE HYDROCHLORIDE AND ACETAMINOPHEN 1 TABLET: 10; 325 TABLET ORAL at 15:39

## 2018-06-10 RX ADMIN — OXYCODONE HYDROCHLORIDE AND ACETAMINOPHEN 1 TABLET: 10; 325 TABLET ORAL at 05:51

## 2018-06-10 RX ADMIN — PRAMIPEXOLE DIHYDROCHLORIDE 0.25 MG: 0.25 TABLET ORAL at 09:39

## 2018-06-10 RX ADMIN — PRAMIPEXOLE DIHYDROCHLORIDE 0.25 MG: 0.25 TABLET ORAL at 22:49

## 2018-06-10 ASSESSMENT — PAIN SCALES - GENERAL
PAINLEVEL_OUTOF10: 1
PAINLEVEL_OUTOF10: 6
PAINLEVEL_OUTOF10: 0
PAINLEVEL_OUTOF10: 1
PAINLEVEL_OUTOF10: 9

## 2018-06-11 ENCOUNTER — LAB REQUISITION (OUTPATIENT)
Dept: LAB | Facility: HOSPITAL | Age: 78
End: 2018-06-11

## 2018-06-11 DIAGNOSIS — Z79.01 LONG TERM CURRENT USE OF ANTICOAGULANT: ICD-10-CM

## 2018-06-11 LAB
INR BLD: 1.78 (ref 0.88–1.18)
INR PPP: 2.4 (ref 0.8–1.2)
INR PPP: 2.8 (ref 0.8–1.2)
PROTHROMBIN TIME: 20.7 SEC (ref 12–14.6)
PROTHROMBIN TIME: 28.8 SECONDS (ref 11–15)
PROTHROMBIN TIME: 33.6 SECONDS (ref 11–15)

## 2018-06-11 PROCEDURE — 6360000002 HC RX W HCPCS: Performed by: SURGERY

## 2018-06-11 PROCEDURE — 1210000000 HC MED SURG R&B

## 2018-06-11 PROCEDURE — 97116 GAIT TRAINING THERAPY: CPT

## 2018-06-11 PROCEDURE — 6370000000 HC RX 637 (ALT 250 FOR IP): Performed by: PHYSICIAN ASSISTANT

## 2018-06-11 PROCEDURE — 97110 THERAPEUTIC EXERCISES: CPT

## 2018-06-11 PROCEDURE — 2580000003 HC RX 258: Performed by: SURGERY

## 2018-06-11 PROCEDURE — 85610 PROTHROMBIN TIME: CPT

## 2018-06-11 PROCEDURE — 6370000000 HC RX 637 (ALT 250 FOR IP): Performed by: INTERNAL MEDICINE

## 2018-06-11 PROCEDURE — 2580000003 HC RX 258: Performed by: PHYSICIAN ASSISTANT

## 2018-06-11 PROCEDURE — 36415 COLL VENOUS BLD VENIPUNCTURE: CPT

## 2018-06-11 RX ADMIN — MEROPENEM 1 G: 1 INJECTION, POWDER, FOR SOLUTION INTRAVENOUS at 17:24

## 2018-06-11 RX ADMIN — Medication 10 ML: at 21:11

## 2018-06-11 RX ADMIN — CILOSTAZOL 100 MG: 100 TABLET ORAL at 21:10

## 2018-06-11 RX ADMIN — MOMETASONE FUROATE AND FORMOTEROL FUMARATE DIHYDRATE 2 PUFF: 200; 5 AEROSOL RESPIRATORY (INHALATION) at 09:49

## 2018-06-11 RX ADMIN — SOTALOL HYDROCHLORIDE 160 MG: 80 TABLET ORAL at 09:45

## 2018-06-11 RX ADMIN — QUETIAPINE FUMARATE 100 MG: 50 TABLET, FILM COATED ORAL at 21:10

## 2018-06-11 RX ADMIN — MOMETASONE FUROATE AND FORMOTEROL FUMARATE DIHYDRATE 2 PUFF: 200; 5 AEROSOL RESPIRATORY (INHALATION) at 21:11

## 2018-06-11 RX ADMIN — SOTALOL HYDROCHLORIDE 160 MG: 80 TABLET ORAL at 21:10

## 2018-06-11 RX ADMIN — CETIRIZINE HYDROCHLORIDE 10 MG: 10 TABLET ORAL at 09:45

## 2018-06-11 RX ADMIN — FAMOTIDINE 20 MG: 20 TABLET ORAL at 09:45

## 2018-06-11 RX ADMIN — WARFARIN SODIUM 5 MG: 5 TABLET ORAL at 17:24

## 2018-06-11 RX ADMIN — MONTELUKAST SODIUM 10 MG: 10 TABLET, FILM COATED ORAL at 21:10

## 2018-06-11 RX ADMIN — Medication 10 ML: at 09:45

## 2018-06-11 RX ADMIN — OXYCODONE HYDROCHLORIDE AND ACETAMINOPHEN 1 TABLET: 10; 325 TABLET ORAL at 22:36

## 2018-06-11 RX ADMIN — PRAMIPEXOLE DIHYDROCHLORIDE 0.25 MG: 0.25 TABLET ORAL at 09:45

## 2018-06-11 RX ADMIN — CILOSTAZOL 100 MG: 100 TABLET ORAL at 09:45

## 2018-06-11 RX ADMIN — MEROPENEM 1 G: 1 INJECTION, POWDER, FOR SOLUTION INTRAVENOUS at 04:14

## 2018-06-11 RX ADMIN — PRAMIPEXOLE DIHYDROCHLORIDE 0.25 MG: 0.25 TABLET ORAL at 21:10

## 2018-06-11 RX ADMIN — Medication 400 MG: at 21:10

## 2018-06-11 RX ADMIN — FUROSEMIDE 80 MG: 80 TABLET ORAL at 09:45

## 2018-06-11 ASSESSMENT — PAIN SCALES - GENERAL
PAINLEVEL_OUTOF10: 4
PAINLEVEL_OUTOF10: 7
PAINLEVEL_OUTOF10: 2

## 2018-06-12 LAB
INR BLD: 1.92 (ref 0.88–1.18)
PROTHROMBIN TIME: 22 SEC (ref 12–14.6)

## 2018-06-12 PROCEDURE — 1210000000 HC MED SURG R&B

## 2018-06-12 PROCEDURE — 36415 COLL VENOUS BLD VENIPUNCTURE: CPT

## 2018-06-12 PROCEDURE — 6370000000 HC RX 637 (ALT 250 FOR IP): Performed by: INTERNAL MEDICINE

## 2018-06-12 PROCEDURE — 85610 PROTHROMBIN TIME: CPT

## 2018-06-12 PROCEDURE — 97530 THERAPEUTIC ACTIVITIES: CPT

## 2018-06-12 PROCEDURE — 6370000000 HC RX 637 (ALT 250 FOR IP): Performed by: SURGERY

## 2018-06-12 PROCEDURE — 2580000003 HC RX 258: Performed by: PHYSICIAN ASSISTANT

## 2018-06-12 PROCEDURE — 6370000000 HC RX 637 (ALT 250 FOR IP): Performed by: PHYSICIAN ASSISTANT

## 2018-06-12 PROCEDURE — 99024 POSTOP FOLLOW-UP VISIT: CPT | Performed by: SURGERY

## 2018-06-12 RX ORDER — CEFDINIR 300 MG/1
300 CAPSULE ORAL EVERY 12 HOURS SCHEDULED
Status: DISCONTINUED | OUTPATIENT
Start: 2018-06-12 | End: 2018-06-13 | Stop reason: HOSPADM

## 2018-06-12 RX ADMIN — SOTALOL HYDROCHLORIDE 160 MG: 80 TABLET ORAL at 21:07

## 2018-06-12 RX ADMIN — QUETIAPINE FUMARATE 100 MG: 50 TABLET, FILM COATED ORAL at 21:07

## 2018-06-12 RX ADMIN — PRAMIPEXOLE DIHYDROCHLORIDE 0.25 MG: 0.25 TABLET ORAL at 21:07

## 2018-06-12 RX ADMIN — Medication 400 MG: at 21:07

## 2018-06-12 RX ADMIN — CILOSTAZOL 100 MG: 100 TABLET ORAL at 08:06

## 2018-06-12 RX ADMIN — CETIRIZINE HYDROCHLORIDE 10 MG: 10 TABLET ORAL at 08:06

## 2018-06-12 RX ADMIN — Medication 10 ML: at 21:08

## 2018-06-12 RX ADMIN — MOMETASONE FUROATE AND FORMOTEROL FUMARATE DIHYDRATE 2 PUFF: 200; 5 AEROSOL RESPIRATORY (INHALATION) at 21:08

## 2018-06-12 RX ADMIN — PRAMIPEXOLE DIHYDROCHLORIDE 0.25 MG: 0.25 TABLET ORAL at 08:06

## 2018-06-12 RX ADMIN — MOMETASONE FUROATE AND FORMOTEROL FUMARATE DIHYDRATE 2 PUFF: 200; 5 AEROSOL RESPIRATORY (INHALATION) at 08:07

## 2018-06-12 RX ADMIN — WARFARIN SODIUM 5 MG: 5 TABLET ORAL at 17:06

## 2018-06-12 RX ADMIN — CEFDINIR 300 MG: 300 CAPSULE ORAL at 21:07

## 2018-06-12 RX ADMIN — FUROSEMIDE 80 MG: 80 TABLET ORAL at 08:06

## 2018-06-12 RX ADMIN — ACETAMINOPHEN 650 MG: 325 TABLET ORAL at 04:13

## 2018-06-12 RX ADMIN — MONTELUKAST SODIUM 10 MG: 10 TABLET, FILM COATED ORAL at 21:07

## 2018-06-12 RX ADMIN — FAMOTIDINE 20 MG: 20 TABLET ORAL at 08:06

## 2018-06-12 RX ADMIN — CILOSTAZOL 100 MG: 100 TABLET ORAL at 21:07

## 2018-06-12 RX ADMIN — SOTALOL HYDROCHLORIDE 160 MG: 80 TABLET ORAL at 08:06

## 2018-06-12 ASSESSMENT — PAIN SCALES - GENERAL
PAINLEVEL_OUTOF10: 0
PAINLEVEL_OUTOF10: 6

## 2018-06-13 VITALS
TEMPERATURE: 97.6 F | RESPIRATION RATE: 18 BRPM | HEIGHT: 71 IN | DIASTOLIC BLOOD PRESSURE: 56 MMHG | BODY MASS INDEX: 28.73 KG/M2 | OXYGEN SATURATION: 94 % | WEIGHT: 205.25 LBS | HEART RATE: 67 BPM | SYSTOLIC BLOOD PRESSURE: 102 MMHG

## 2018-06-13 LAB
INR BLD: 1.97 (ref 0.88–1.18)
PROTHROMBIN TIME: 22.4 SEC (ref 12–14.6)

## 2018-06-13 PROCEDURE — 6370000000 HC RX 637 (ALT 250 FOR IP): Performed by: SURGERY

## 2018-06-13 PROCEDURE — 6370000000 HC RX 637 (ALT 250 FOR IP): Performed by: PHYSICIAN ASSISTANT

## 2018-06-13 PROCEDURE — 36415 COLL VENOUS BLD VENIPUNCTURE: CPT

## 2018-06-13 PROCEDURE — 85610 PROTHROMBIN TIME: CPT

## 2018-06-13 PROCEDURE — 6370000000 HC RX 637 (ALT 250 FOR IP): Performed by: INTERNAL MEDICINE

## 2018-06-13 RX ORDER — CEFDINIR 300 MG/1
300 CAPSULE ORAL EVERY 12 HOURS SCHEDULED
Qty: 20 CAPSULE | Refills: 0 | Status: SHIPPED | OUTPATIENT
Start: 2018-06-13 | End: 2018-06-23

## 2018-06-13 RX ADMIN — FUROSEMIDE 80 MG: 80 TABLET ORAL at 08:07

## 2018-06-13 RX ADMIN — MOMETASONE FUROATE AND FORMOTEROL FUMARATE DIHYDRATE 2 PUFF: 200; 5 AEROSOL RESPIRATORY (INHALATION) at 08:07

## 2018-06-13 RX ADMIN — CETIRIZINE HYDROCHLORIDE 10 MG: 10 TABLET ORAL at 08:06

## 2018-06-13 RX ADMIN — SOTALOL HYDROCHLORIDE 160 MG: 80 TABLET ORAL at 08:07

## 2018-06-13 RX ADMIN — CEFDINIR 300 MG: 300 CAPSULE ORAL at 08:06

## 2018-06-13 RX ADMIN — CILOSTAZOL 100 MG: 100 TABLET ORAL at 08:06

## 2018-06-13 RX ADMIN — FAMOTIDINE 20 MG: 20 TABLET ORAL at 08:06

## 2018-06-13 RX ADMIN — PRAMIPEXOLE DIHYDROCHLORIDE 0.25 MG: 0.25 TABLET ORAL at 08:06

## 2018-06-15 ENCOUNTER — LAB REQUISITION (OUTPATIENT)
Dept: LAB | Facility: HOSPITAL | Age: 78
End: 2018-06-15

## 2018-06-15 DIAGNOSIS — Z79.01 LONG TERM CURRENT USE OF ANTICOAGULANT: ICD-10-CM

## 2018-06-15 DIAGNOSIS — I50.20 SYSTOLIC CONGESTIVE HEART FAILURE (HCC): ICD-10-CM

## 2018-06-15 DIAGNOSIS — E03.9 HYPOTHYROIDISM: ICD-10-CM

## 2018-06-15 DIAGNOSIS — E55.9 VITAMIN D DEFICIENCY: ICD-10-CM

## 2018-06-15 DIAGNOSIS — I10 ESSENTIAL (PRIMARY) HYPERTENSION: ICD-10-CM

## 2018-06-15 LAB
25(OH)D3 SERPL-MCNC: 21.8 NG/ML (ref 30–100)
ALBUMIN SERPL-MCNC: 3 G/DL (ref 3.4–4.8)
ALBUMIN/GLOB SERPL: 0.8 G/DL (ref 1.1–1.8)
ALP SERPL-CCNC: 103 U/L (ref 38–126)
ALT SERPL W P-5'-P-CCNC: 26 U/L (ref 21–72)
ANION GAP SERPL CALCULATED.3IONS-SCNC: 11 MMOL/L (ref 5–15)
AST SERPL-CCNC: 18 U/L (ref 17–59)
BASOPHILS # BLD AUTO: 0.01 10*3/MM3 (ref 0–0.2)
BASOPHILS NFR BLD AUTO: 0.1 % (ref 0–2)
BILIRUB SERPL-MCNC: 0.1 MG/DL (ref 0.2–1.3)
BUN BLD-MCNC: 43 MG/DL (ref 7–21)
BUN/CREAT SERPL: 27.6 (ref 7–25)
CALCIUM SPEC-SCNC: 8.3 MG/DL (ref 8.4–10.2)
CHLORIDE SERPL-SCNC: 104 MMOL/L (ref 95–110)
CO2 SERPL-SCNC: 25 MMOL/L (ref 22–31)
CREAT BLD-MCNC: 1.56 MG/DL (ref 0.7–1.3)
DEPRECATED RDW RBC AUTO: 51.3 FL (ref 35.1–43.9)
EOSINOPHIL # BLD AUTO: 0.31 10*3/MM3 (ref 0–0.7)
EOSINOPHIL NFR BLD AUTO: 3.9 % (ref 0–7)
ERYTHROCYTE [DISTWIDTH] IN BLOOD BY AUTOMATED COUNT: 16.7 % (ref 11.5–14.5)
GFR SERPL CREATININE-BSD FRML MDRD: 43 ML/MIN/1.73 (ref 42–98)
GLOBULIN UR ELPH-MCNC: 3.6 GM/DL (ref 2.3–3.5)
GLUCOSE BLD-MCNC: 107 MG/DL (ref 60–100)
HCT VFR BLD AUTO: 34.6 % (ref 39–49)
HGB BLD-MCNC: 11.3 G/DL (ref 13.7–17.3)
IMM GRANULOCYTES # BLD: 0.05 10*3/MM3 (ref 0–0.02)
IMM GRANULOCYTES NFR BLD: 0.6 % (ref 0–0.5)
INR PPP: 2.09 (ref 0.8–1.2)
LYMPHOCYTES # BLD AUTO: 1.03 10*3/MM3 (ref 0.6–4.2)
LYMPHOCYTES NFR BLD AUTO: 12.9 % (ref 10–50)
MAGNESIUM SERPL-MCNC: 2.1 MG/DL (ref 1.6–2.3)
MCH RBC QN AUTO: 27.3 PG (ref 26.5–34)
MCHC RBC AUTO-ENTMCNC: 32.7 G/DL (ref 31.5–36.3)
MCV RBC AUTO: 83.6 FL (ref 80–98)
MONOCYTES # BLD AUTO: 0.45 10*3/MM3 (ref 0–0.9)
MONOCYTES NFR BLD AUTO: 5.6 % (ref 0–12)
NEUTROPHILS # BLD AUTO: 6.15 10*3/MM3 (ref 2–8.6)
NEUTROPHILS NFR BLD AUTO: 76.9 % (ref 37–80)
NT-PROBNP SERPL-MCNC: 770 PG/ML (ref 0–1800)
PLATELET # BLD AUTO: 280 10*3/MM3 (ref 150–450)
PMV BLD AUTO: 9.6 FL (ref 8–12)
POTASSIUM BLD-SCNC: 3.5 MMOL/L (ref 3.5–5.1)
PROT SERPL-MCNC: 6.6 G/DL (ref 6.3–8.6)
PROTHROMBIN TIME: 22.6 SECONDS (ref 11.1–15.3)
RBC # BLD AUTO: 4.14 10*6/MM3 (ref 4.37–5.74)
SODIUM BLD-SCNC: 140 MMOL/L (ref 137–145)
T4 FREE SERPL-MCNC: 1.24 NG/DL (ref 0.78–2.19)
TSH SERPL DL<=0.05 MIU/L-ACNC: 5.01 MIU/ML (ref 0.46–4.68)
WBC NRBC COR # BLD: 8 10*3/MM3 (ref 3.2–9.8)

## 2018-06-15 PROCEDURE — 83880 ASSAY OF NATRIURETIC PEPTIDE: CPT | Performed by: FAMILY MEDICINE

## 2018-06-15 PROCEDURE — 84443 ASSAY THYROID STIM HORMONE: CPT | Performed by: FAMILY MEDICINE

## 2018-06-15 PROCEDURE — 85025 COMPLETE CBC W/AUTO DIFF WBC: CPT | Performed by: FAMILY MEDICINE

## 2018-06-15 PROCEDURE — 82306 VITAMIN D 25 HYDROXY: CPT | Performed by: FAMILY MEDICINE

## 2018-06-15 PROCEDURE — 83735 ASSAY OF MAGNESIUM: CPT | Performed by: FAMILY MEDICINE

## 2018-06-15 PROCEDURE — 84439 ASSAY OF FREE THYROXINE: CPT | Performed by: FAMILY MEDICINE

## 2018-06-15 PROCEDURE — 85610 PROTHROMBIN TIME: CPT | Performed by: FAMILY MEDICINE

## 2018-06-15 PROCEDURE — 80053 COMPREHEN METABOLIC PANEL: CPT | Performed by: FAMILY MEDICINE

## 2018-06-18 ENCOUNTER — OFFICE VISIT (OUTPATIENT)
Dept: VASCULAR SURGERY | Age: 78
End: 2018-06-18

## 2018-06-18 ENCOUNTER — DOCUMENTATION (OUTPATIENT)
Dept: CARDIAC SURGERY | Facility: CLINIC | Age: 78
End: 2018-06-18

## 2018-06-18 VITALS
TEMPERATURE: 97.6 F | HEART RATE: 68 BPM | SYSTOLIC BLOOD PRESSURE: 87 MMHG | RESPIRATION RATE: 18 BRPM | DIASTOLIC BLOOD PRESSURE: 60 MMHG

## 2018-06-18 DIAGNOSIS — I10 HYPERTENSION, UNSPECIFIED TYPE: Primary | ICD-10-CM

## 2018-06-18 DIAGNOSIS — I73.9 PVD (PERIPHERAL VASCULAR DISEASE) (HCC): ICD-10-CM

## 2018-06-18 DIAGNOSIS — I10 HYPERTENSION, UNSPECIFIED TYPE: ICD-10-CM

## 2018-06-18 LAB
HCT VFR BLD CALC: 37.5 % (ref 42–52)
HEMOGLOBIN: 11.9 G/DL (ref 14–18)
MCH RBC QN AUTO: 27.5 PG (ref 27–31)
MCHC RBC AUTO-ENTMCNC: 31.7 G/DL (ref 33–37)
MCV RBC AUTO: 86.6 FL (ref 80–94)
PDW BLD-RTO: 16.7 % (ref 11.5–14.5)
PLATELET # BLD: 372 K/UL (ref 130–400)
PMV BLD AUTO: 9.9 FL (ref 9.4–12.4)
RBC # BLD: 4.33 M/UL (ref 4.7–6.1)
WBC # BLD: 11.4 K/UL (ref 4.8–10.8)

## 2018-06-18 PROCEDURE — 99024 POSTOP FOLLOW-UP VISIT: CPT | Performed by: NURSE PRACTITIONER

## 2018-06-18 NOTE — PROGRESS NOTES
Spoke to pt over the phone who states he has been with [facility] nursing home since last Wednesday the 13th.  I did call [facility] and spoke to Sofie and she verified that pt is a resident at this time.

## 2018-06-19 ENCOUNTER — TELEPHONE (OUTPATIENT)
Dept: VASCULAR SURGERY | Age: 78
End: 2018-06-19

## 2018-07-02 ENCOUNTER — OFFICE VISIT (OUTPATIENT)
Dept: VASCULAR SURGERY | Age: 78
End: 2018-07-02

## 2018-07-02 VITALS — HEART RATE: 74 BPM | DIASTOLIC BLOOD PRESSURE: 78 MMHG | SYSTOLIC BLOOD PRESSURE: 116 MMHG | TEMPERATURE: 97.7 F

## 2018-07-02 DIAGNOSIS — Z09 POSTOP CHECK: Primary | ICD-10-CM

## 2018-07-02 PROCEDURE — 99024 POSTOP FOLLOW-UP VISIT: CPT | Performed by: PHYSICIAN ASSISTANT

## 2018-07-02 NOTE — PROGRESS NOTES
Lorenzo Garner is a 69 y.o. male who presents for post op evaluation. Patient had a  fem distal bypass on 4/27/18. This was performed by Dr. Carlos Palma. He denies any fever/chills. .      On evaluation today, incision has a scabbed area with a small amount of serous drainage noted. 2+ DS noted over the graft, DP and PT pulses. There is mild swelling noted of the right LE. The incisional wound is shallow with crusting noted. Today we have recommended he wash incision with soap and water, apply Santyl BID and cover with dry gauze until healed. We have recommended continued ASA 81 mg po qd daily. We will follow up with him in 1 week. This should bring you up to date on Lorenzo Garner  As always we want to thank you for allowing us to participate in the care of your patients.     Sincerely,    Audrey Barnes PA-C

## 2018-07-07 ENCOUNTER — APPOINTMENT (OUTPATIENT)
Dept: GENERAL RADIOLOGY | Facility: HOSPITAL | Age: 78
End: 2018-07-07

## 2018-07-07 ENCOUNTER — HOSPITAL ENCOUNTER (INPATIENT)
Facility: HOSPITAL | Age: 78
LOS: 6 days | Discharge: NURSING, REHAB OR LONG-TERM CARE | End: 2018-07-13
Attending: EMERGENCY MEDICINE | Admitting: FAMILY MEDICINE

## 2018-07-07 DIAGNOSIS — T45.515A WARFARIN-INDUCED COAGULOPATHY (HCC): ICD-10-CM

## 2018-07-07 DIAGNOSIS — R09.02 HYPOXEMIA: ICD-10-CM

## 2018-07-07 DIAGNOSIS — I50.9 CONGESTIVE HEART FAILURE, UNSPECIFIED CONGESTIVE HEART FAILURE CHRONICITY, UNSPECIFIED CONGESTIVE HEART FAILURE TYPE: Primary | ICD-10-CM

## 2018-07-07 DIAGNOSIS — Z74.09 IMPAIRED FUNCTIONAL MOBILITY, BALANCE, GAIT, AND ENDURANCE: ICD-10-CM

## 2018-07-07 DIAGNOSIS — Z74.09 IMPAIRED MOBILITY AND ACTIVITIES OF DAILY LIVING: ICD-10-CM

## 2018-07-07 DIAGNOSIS — Z78.9 IMPAIRED MOBILITY AND ACTIVITIES OF DAILY LIVING: ICD-10-CM

## 2018-07-07 DIAGNOSIS — D68.32 WARFARIN-INDUCED COAGULOPATHY (HCC): ICD-10-CM

## 2018-07-07 LAB
ANION GAP SERPL CALCULATED.3IONS-SCNC: 9 MMOL/L (ref 5–15)
ARTERIAL PATENCY WRIST A: POSITIVE
ATMOSPHERIC PRESS: 752 MMHG
BASE EXCESS BLDA CALC-SCNC: 3.2 MMOL/L (ref 0–2)
BASOPHILS # BLD AUTO: 0.02 10*3/MM3 (ref 0–0.2)
BASOPHILS NFR BLD AUTO: 0.2 % (ref 0–2)
BDY SITE: ABNORMAL
BUN BLD-MCNC: 33 MG/DL (ref 7–21)
BUN/CREAT SERPL: 18.8 (ref 7–25)
CALCIUM SPEC-SCNC: 8.3 MG/DL (ref 8.4–10.2)
CHLORIDE SERPL-SCNC: 101 MMOL/L (ref 95–110)
CO2 SERPL-SCNC: 29 MMOL/L (ref 22–31)
CREAT BLD-MCNC: 1.76 MG/DL (ref 0.7–1.3)
DEPRECATED RDW RBC AUTO: 56.5 FL (ref 35.1–43.9)
EOSINOPHIL # BLD AUTO: 0.26 10*3/MM3 (ref 0–0.7)
EOSINOPHIL NFR BLD AUTO: 3.2 % (ref 0–7)
ERYTHROCYTE [DISTWIDTH] IN BLOOD BY AUTOMATED COUNT: 18.6 % (ref 11.5–14.5)
GAS FLOW AIRWAY: 2 LPM
GFR SERPL CREATININE-BSD FRML MDRD: 38 ML/MIN/1.73 (ref 42–98)
GLUCOSE BLD-MCNC: 107 MG/DL (ref 60–100)
HCO3 BLDA-SCNC: 27.6 MMOL/L (ref 20–26)
HCT VFR BLD AUTO: 34 % (ref 39–49)
HGB BLD-MCNC: 11.2 G/DL (ref 13.7–17.3)
HOROWITZ INDEX BLD+IHG-RTO: 28 %
IMM GRANULOCYTES # BLD: 0.03 10*3/MM3 (ref 0–0.02)
IMM GRANULOCYTES NFR BLD: 0.4 % (ref 0–0.5)
INR PPP: 5.02 (ref 0.8–1.2)
LYMPHOCYTES # BLD AUTO: 0.79 10*3/MM3 (ref 0.6–4.2)
LYMPHOCYTES NFR BLD AUTO: 9.6 % (ref 10–50)
Lab: ABNORMAL
MCH RBC QN AUTO: 27.9 PG (ref 26.5–34)
MCHC RBC AUTO-ENTMCNC: 32.9 G/DL (ref 31.5–36.3)
MCV RBC AUTO: 84.6 FL (ref 80–98)
MODALITY: ABNORMAL
MONOCYTES # BLD AUTO: 0.52 10*3/MM3 (ref 0–0.9)
MONOCYTES NFR BLD AUTO: 6.3 % (ref 0–12)
NEUTROPHILS # BLD AUTO: 6.58 10*3/MM3 (ref 2–8.6)
NEUTROPHILS NFR BLD AUTO: 80.3 % (ref 37–80)
NT-PROBNP SERPL-MCNC: 1810 PG/ML (ref 0–1800)
PCO2 BLDA: 40.4 MM HG (ref 35–45)
PH BLDA: 7.44 PH UNITS (ref 7.35–7.45)
PLATELET # BLD AUTO: 273 10*3/MM3 (ref 150–450)
PMV BLD AUTO: 8.9 FL (ref 8–12)
PO2 BLDA: 61.2 MM HG (ref 83–108)
POTASSIUM BLD-SCNC: 4.3 MMOL/L (ref 3.5–5.1)
PROTHROMBIN TIME: 43.7 SECONDS (ref 11.1–15.3)
RBC # BLD AUTO: 4.02 10*6/MM3 (ref 4.37–5.74)
SAO2 % BLDCOA: 91.7 % (ref 94–99)
SODIUM BLD-SCNC: 139 MMOL/L (ref 137–145)
VENTILATOR MODE: ABNORMAL
WBC NRBC COR # BLD: 8.2 10*3/MM3 (ref 3.2–9.8)

## 2018-07-07 PROCEDURE — 94640 AIRWAY INHALATION TREATMENT: CPT

## 2018-07-07 PROCEDURE — 36600 WITHDRAWAL OF ARTERIAL BLOOD: CPT

## 2018-07-07 PROCEDURE — 85025 COMPLETE CBC W/AUTO DIFF WBC: CPT | Performed by: EMERGENCY MEDICINE

## 2018-07-07 PROCEDURE — 94799 UNLISTED PULMONARY SVC/PX: CPT

## 2018-07-07 PROCEDURE — 93005 ELECTROCARDIOGRAM TRACING: CPT | Performed by: EMERGENCY MEDICINE

## 2018-07-07 PROCEDURE — 99285 EMERGENCY DEPT VISIT HI MDM: CPT

## 2018-07-07 PROCEDURE — 63710000001 PREDNISONE PER 1 MG: Performed by: EMERGENCY MEDICINE

## 2018-07-07 PROCEDURE — 80048 BASIC METABOLIC PNL TOTAL CA: CPT | Performed by: EMERGENCY MEDICINE

## 2018-07-07 PROCEDURE — 83880 ASSAY OF NATRIURETIC PEPTIDE: CPT | Performed by: EMERGENCY MEDICINE

## 2018-07-07 PROCEDURE — 85610 PROTHROMBIN TIME: CPT | Performed by: EMERGENCY MEDICINE

## 2018-07-07 PROCEDURE — 25010000002 FUROSEMIDE PER 20 MG: Performed by: EMERGENCY MEDICINE

## 2018-07-07 PROCEDURE — 82803 BLOOD GASES ANY COMBINATION: CPT

## 2018-07-07 PROCEDURE — 94760 N-INVAS EAR/PLS OXIMETRY 1: CPT

## 2018-07-07 PROCEDURE — 93010 ELECTROCARDIOGRAM REPORT: CPT | Performed by: INTERNAL MEDICINE

## 2018-07-07 PROCEDURE — 71045 X-RAY EXAM CHEST 1 VIEW: CPT

## 2018-07-07 RX ORDER — SODIUM CHLORIDE 0.9 % (FLUSH) 0.9 %
1-10 SYRINGE (ML) INJECTION AS NEEDED
Status: DISCONTINUED | OUTPATIENT
Start: 2018-07-07 | End: 2018-07-13 | Stop reason: HOSPADM

## 2018-07-07 RX ORDER — ACETAMINOPHEN 325 MG/1
650 TABLET ORAL EVERY 4 HOURS PRN
Status: DISCONTINUED | OUTPATIENT
Start: 2018-07-07 | End: 2018-07-13 | Stop reason: HOSPADM

## 2018-07-07 RX ORDER — BISACODYL 10 MG
10 SUPPOSITORY, RECTAL RECTAL DAILY PRN
Status: DISCONTINUED | OUTPATIENT
Start: 2018-07-07 | End: 2018-07-13 | Stop reason: HOSPADM

## 2018-07-07 RX ORDER — OXYCODONE AND ACETAMINOPHEN 10; 325 MG/1; MG/1
1 TABLET ORAL EVERY 4 HOURS PRN
Status: ON HOLD | COMMUNITY
End: 2018-07-13

## 2018-07-07 RX ORDER — ASPIRIN 81 MG/1
81 TABLET ORAL DAILY
Status: DISCONTINUED | OUTPATIENT
Start: 2018-07-08 | End: 2018-07-13 | Stop reason: HOSPADM

## 2018-07-07 RX ORDER — DOCUSATE SODIUM 100 MG/1
200 CAPSULE, LIQUID FILLED ORAL 2 TIMES DAILY
Status: DISCONTINUED | OUTPATIENT
Start: 2018-07-08 | End: 2018-07-13 | Stop reason: HOSPADM

## 2018-07-07 RX ORDER — PANTOPRAZOLE SODIUM 40 MG/1
40 TABLET, DELAYED RELEASE ORAL
Status: DISCONTINUED | OUTPATIENT
Start: 2018-07-08 | End: 2018-07-13 | Stop reason: HOSPADM

## 2018-07-07 RX ORDER — QUETIAPINE FUMARATE 300 MG/1
300 TABLET, FILM COATED ORAL NIGHTLY
Status: DISCONTINUED | OUTPATIENT
Start: 2018-07-08 | End: 2018-07-13 | Stop reason: HOSPADM

## 2018-07-07 RX ORDER — MONTELUKAST SODIUM 10 MG/1
10 TABLET ORAL NIGHTLY
Status: DISCONTINUED | OUTPATIENT
Start: 2018-07-08 | End: 2018-07-13 | Stop reason: HOSPADM

## 2018-07-07 RX ORDER — NICOTINE 21 MG/24HR
1 PATCH, TRANSDERMAL 24 HOURS TRANSDERMAL EVERY 24 HOURS
Status: DISCONTINUED | OUTPATIENT
Start: 2018-07-08 | End: 2018-07-13 | Stop reason: HOSPADM

## 2018-07-07 RX ORDER — BISACODYL 10 MG
10 SUPPOSITORY, RECTAL RECTAL DAILY PRN
Status: DISCONTINUED | OUTPATIENT
Start: 2018-07-07 | End: 2018-07-08 | Stop reason: SDUPTHER

## 2018-07-07 RX ORDER — BUDESONIDE AND FORMOTEROL FUMARATE DIHYDRATE 160; 4.5 UG/1; UG/1
2 AEROSOL RESPIRATORY (INHALATION)
Status: DISCONTINUED | OUTPATIENT
Start: 2018-07-08 | End: 2018-07-13 | Stop reason: HOSPADM

## 2018-07-07 RX ORDER — SODIUM CHLORIDE 9 MG/ML
100 INJECTION, SOLUTION INTRAVENOUS CONTINUOUS
Status: DISCONTINUED | OUTPATIENT
Start: 2018-07-08 | End: 2018-07-08

## 2018-07-07 RX ORDER — SOTALOL HYDROCHLORIDE 80 MG/1
80 TABLET ORAL EVERY 12 HOURS SCHEDULED
Status: DISCONTINUED | OUTPATIENT
Start: 2018-07-08 | End: 2018-07-13 | Stop reason: HOSPADM

## 2018-07-07 RX ORDER — SOTALOL HYDROCHLORIDE 80 MG/1
80 TABLET ORAL DAILY PRN
COMMUNITY
End: 2018-01-01 | Stop reason: HOSPADM

## 2018-07-07 RX ORDER — CILOSTAZOL 100 MG/1
50 TABLET ORAL
Status: DISCONTINUED | OUTPATIENT
Start: 2018-07-08 | End: 2018-07-13 | Stop reason: HOSPADM

## 2018-07-07 RX ORDER — SOTALOL HYDROCHLORIDE 80 MG/1
80 TABLET ORAL DAILY PRN
Status: DISCONTINUED | OUTPATIENT
Start: 2018-07-07 | End: 2018-07-13 | Stop reason: HOSPADM

## 2018-07-07 RX ORDER — PREDNISONE 20 MG/1
60 TABLET ORAL ONCE
Status: COMPLETED | OUTPATIENT
Start: 2018-07-07 | End: 2018-07-07

## 2018-07-07 RX ORDER — OXYCODONE AND ACETAMINOPHEN 10; 325 MG/1; MG/1
1 TABLET ORAL EVERY 4 HOURS PRN
Status: DISCONTINUED | OUTPATIENT
Start: 2018-07-07 | End: 2018-07-13 | Stop reason: HOSPADM

## 2018-07-07 RX ORDER — QUETIAPINE FUMARATE 300 MG/1
300 TABLET, FILM COATED ORAL NIGHTLY
COMMUNITY

## 2018-07-07 RX ORDER — IPRATROPIUM BROMIDE AND ALBUTEROL SULFATE 2.5; .5 MG/3ML; MG/3ML
3 SOLUTION RESPIRATORY (INHALATION)
Status: DISCONTINUED | OUTPATIENT
Start: 2018-07-08 | End: 2018-07-13 | Stop reason: HOSPADM

## 2018-07-07 RX ORDER — BISACODYL 10 MG
10 SUPPOSITORY, RECTAL RECTAL DAILY PRN
COMMUNITY
End: 2018-01-01

## 2018-07-07 RX ORDER — ASCORBIC ACID 500 MG
500 TABLET ORAL 2 TIMES DAILY
COMMUNITY
Start: 2018-07-07 | End: 2018-07-14

## 2018-07-07 RX ORDER — IPRATROPIUM BROMIDE AND ALBUTEROL SULFATE 2.5; .5 MG/3ML; MG/3ML
3 SOLUTION RESPIRATORY (INHALATION)
Status: DISCONTINUED | OUTPATIENT
Start: 2018-07-07 | End: 2018-07-07

## 2018-07-07 RX ORDER — FUROSEMIDE 10 MG/ML
40 INJECTION INTRAMUSCULAR; INTRAVENOUS 3 TIMES DAILY
Status: DISCONTINUED | OUTPATIENT
Start: 2018-07-08 | End: 2018-07-11

## 2018-07-07 RX ORDER — FUROSEMIDE 10 MG/ML
40 INJECTION INTRAMUSCULAR; INTRAVENOUS ONCE
Status: COMPLETED | OUTPATIENT
Start: 2018-07-07 | End: 2018-07-07

## 2018-07-07 RX ORDER — SODIUM PHOSPHATE, DIBASIC AND SODIUM PHOSPHATE, MONOBASIC, UNSPECIFIED FORM 7; 19 G/118ML; G/118ML
1 ENEMA RECTAL DAILY PRN
COMMUNITY
End: 2018-01-01

## 2018-07-07 RX ORDER — QUETIAPINE FUMARATE 100 MG/1
100 TABLET, FILM COATED ORAL NIGHTLY
Status: DISCONTINUED | OUTPATIENT
Start: 2018-07-08 | End: 2018-07-08 | Stop reason: SDUPTHER

## 2018-07-07 RX ORDER — PRAMIPEXOLE DIHYDROCHLORIDE 0.25 MG/1
0.25 TABLET ORAL 3 TIMES DAILY
Status: DISCONTINUED | OUTPATIENT
Start: 2018-07-08 | End: 2018-07-13 | Stop reason: HOSPADM

## 2018-07-07 RX ADMIN — IPRATROPIUM BROMIDE AND ALBUTEROL SULFATE 3 ML: .5; 3 SOLUTION RESPIRATORY (INHALATION) at 20:13

## 2018-07-07 RX ADMIN — FUROSEMIDE 40 MG: 10 INJECTION, SOLUTION INTRAMUSCULAR; INTRAVENOUS at 21:38

## 2018-07-07 RX ADMIN — PREDNISONE 60 MG: 20 TABLET ORAL at 20:11

## 2018-07-08 PROBLEM — I51.7 LVH (LEFT VENTRICULAR HYPERTROPHY): Chronic | Status: ACTIVE | Noted: 2018-07-08

## 2018-07-08 PROBLEM — T45.511A COUMADIN TOXICITY: Status: ACTIVE | Noted: 2018-07-08

## 2018-07-08 PROBLEM — J44.1 COPD WITH EXACERBATION (HCC): Chronic | Status: ACTIVE | Noted: 2017-11-20

## 2018-07-08 PROBLEM — I51.89 DIASTOLIC DYSFUNCTION: Chronic | Status: ACTIVE | Noted: 2018-07-08

## 2018-07-08 PROBLEM — N18.30 CKD (CHRONIC KIDNEY DISEASE) STAGE 3, GFR 30-59 ML/MIN (HCC): Chronic | Status: ACTIVE | Noted: 2017-11-01

## 2018-07-08 PROBLEM — I10 HYPERTENSION: Chronic | Status: ACTIVE | Noted: 2017-11-20

## 2018-07-08 PROBLEM — J44.9 COPD (CHRONIC OBSTRUCTIVE PULMONARY DISEASE) (HCC): Chronic | Status: ACTIVE | Noted: 2017-11-20

## 2018-07-08 LAB
ALBUMIN SERPL-MCNC: 3.6 G/DL (ref 3.4–4.8)
ALBUMIN/GLOB SERPL: 0.9 G/DL (ref 1.1–1.8)
ALP SERPL-CCNC: 76 U/L (ref 38–126)
ALT SERPL W P-5'-P-CCNC: 20 U/L (ref 21–72)
ANION GAP SERPL CALCULATED.3IONS-SCNC: 9 MMOL/L (ref 5–15)
AST SERPL-CCNC: 24 U/L (ref 17–59)
BASOPHILS # BLD AUTO: 0.01 10*3/MM3 (ref 0–0.2)
BASOPHILS NFR BLD AUTO: 0.1 % (ref 0–2)
BILIRUB SERPL-MCNC: 0.3 MG/DL (ref 0.2–1.3)
BILIRUB UR QL STRIP: NEGATIVE
BUN BLD-MCNC: 32 MG/DL (ref 7–21)
BUN/CREAT SERPL: 17.7 (ref 7–25)
CALCIUM SPEC-SCNC: 8.6 MG/DL (ref 8.4–10.2)
CHLORIDE SERPL-SCNC: 102 MMOL/L (ref 95–110)
CLARITY UR: CLEAR
CO2 SERPL-SCNC: 30 MMOL/L (ref 22–31)
COLOR UR: YELLOW
CRE SCREEN PCR: NOT DETECTED
CREAT BLD-MCNC: 1.81 MG/DL (ref 0.7–1.3)
DEPRECATED RDW RBC AUTO: 56.7 FL (ref 35.1–43.9)
EOSINOPHIL # BLD AUTO: 0.04 10*3/MM3 (ref 0–0.7)
EOSINOPHIL NFR BLD AUTO: 0.5 % (ref 0–7)
ERYTHROCYTE [DISTWIDTH] IN BLOOD BY AUTOMATED COUNT: 18.6 % (ref 11.5–14.5)
GFR SERPL CREATININE-BSD FRML MDRD: 37 ML/MIN/1.73 (ref 42–98)
GLOBULIN UR ELPH-MCNC: 4 GM/DL (ref 2.3–3.5)
GLUCOSE BLD-MCNC: 129 MG/DL (ref 60–100)
GLUCOSE UR STRIP-MCNC: NEGATIVE MG/DL
HCT VFR BLD AUTO: 33.4 % (ref 39–49)
HGB BLD-MCNC: 10.9 G/DL (ref 13.7–17.3)
HGB UR QL STRIP.AUTO: NEGATIVE
IMM GRANULOCYTES # BLD: 0.04 10*3/MM3 (ref 0–0.02)
IMM GRANULOCYTES NFR BLD: 0.5 % (ref 0–0.5)
IMP STRAIN: NORMAL
INR PPP: 3.19 (ref 0.8–1.2)
INR PPP: 4.09 (ref 0.8–1.2)
KETONES UR QL STRIP: NEGATIVE
KPC STRAIN: NORMAL
L PNEUMO1 AG UR QL IA: NEGATIVE
LEUKOCYTE ESTERASE UR QL STRIP.AUTO: NEGATIVE
LYMPHOCYTES # BLD AUTO: 0.68 10*3/MM3 (ref 0.6–4.2)
LYMPHOCYTES NFR BLD AUTO: 7.8 % (ref 10–50)
MAGNESIUM SERPL-MCNC: 2.9 MG/DL (ref 1.6–2.3)
MCH RBC QN AUTO: 27.8 PG (ref 26.5–34)
MCHC RBC AUTO-ENTMCNC: 32.6 G/DL (ref 31.5–36.3)
MCV RBC AUTO: 85.2 FL (ref 80–98)
MONOCYTES # BLD AUTO: 0.3 10*3/MM3 (ref 0–0.9)
MONOCYTES NFR BLD AUTO: 3.5 % (ref 0–12)
NDM STRAIN: NORMAL
NEUTROPHILS # BLD AUTO: 7.6 10*3/MM3 (ref 2–8.6)
NEUTROPHILS NFR BLD AUTO: 87.6 % (ref 37–80)
NITRITE UR QL STRIP: NEGATIVE
OXA 48 STRAIN: NORMAL
PH UR STRIP.AUTO: 7 [PH] (ref 5–9)
PLATELET # BLD AUTO: 258 10*3/MM3 (ref 150–450)
PMV BLD AUTO: 9.3 FL (ref 8–12)
POTASSIUM BLD-SCNC: 4.9 MMOL/L (ref 3.5–5.1)
PROT SERPL-MCNC: 7.6 G/DL (ref 6.3–8.6)
PROT UR QL STRIP: NEGATIVE
PROTHROMBIN TIME: 31.1 SECONDS (ref 11.1–15.3)
PROTHROMBIN TIME: 37.5 SECONDS (ref 11.1–15.3)
RBC # BLD AUTO: 3.92 10*6/MM3 (ref 4.37–5.74)
S PNEUM AG SPEC QL LA: NEGATIVE
SODIUM BLD-SCNC: 141 MMOL/L (ref 137–145)
SP GR UR STRIP: 1.01 (ref 1–1.03)
UROBILINOGEN UR QL STRIP: NORMAL
VIM STRAIN: NORMAL
WBC NRBC COR # BLD: 8.67 10*3/MM3 (ref 3.2–9.8)

## 2018-07-08 PROCEDURE — G8978 MOBILITY CURRENT STATUS: HCPCS

## 2018-07-08 PROCEDURE — G8979 MOBILITY GOAL STATUS: HCPCS

## 2018-07-08 PROCEDURE — 81003 URINALYSIS AUTO W/O SCOPE: CPT | Performed by: INTERNAL MEDICINE

## 2018-07-08 PROCEDURE — 87899 AGENT NOS ASSAY W/OPTIC: CPT | Performed by: INTERNAL MEDICINE

## 2018-07-08 PROCEDURE — 85610 PROTHROMBIN TIME: CPT | Performed by: INTERNAL MEDICINE

## 2018-07-08 PROCEDURE — 87081 CULTURE SCREEN ONLY: CPT | Performed by: INTERNAL MEDICINE

## 2018-07-08 PROCEDURE — 94799 UNLISTED PULMONARY SVC/PX: CPT

## 2018-07-08 PROCEDURE — 85025 COMPLETE CBC W/AUTO DIFF WBC: CPT | Performed by: INTERNAL MEDICINE

## 2018-07-08 PROCEDURE — 97162 PT EVAL MOD COMPLEX 30 MIN: CPT

## 2018-07-08 PROCEDURE — 80053 COMPREHEN METABOLIC PANEL: CPT | Performed by: INTERNAL MEDICINE

## 2018-07-08 PROCEDURE — 97116 GAIT TRAINING THERAPY: CPT

## 2018-07-08 PROCEDURE — 25010000002 FUROSEMIDE PER 20 MG: Performed by: INTERNAL MEDICINE

## 2018-07-08 PROCEDURE — 94760 N-INVAS EAR/PLS OXIMETRY 1: CPT

## 2018-07-08 PROCEDURE — 83735 ASSAY OF MAGNESIUM: CPT | Performed by: INTERNAL MEDICINE

## 2018-07-08 RX ADMIN — IPRATROPIUM BROMIDE AND ALBUTEROL SULFATE 3 ML: 2.5; .5 SOLUTION RESPIRATORY (INHALATION) at 03:46

## 2018-07-08 RX ADMIN — ASPIRIN 81 MG: 81 TABLET, COATED ORAL at 08:10

## 2018-07-08 RX ADMIN — BUDESONIDE AND FORMOTEROL FUMARATE DIHYDRATE 2 PUFF: 160; 4.5 AEROSOL RESPIRATORY (INHALATION) at 19:02

## 2018-07-08 RX ADMIN — IPRATROPIUM BROMIDE AND ALBUTEROL SULFATE 3 ML: 2.5; .5 SOLUTION RESPIRATORY (INHALATION) at 00:10

## 2018-07-08 RX ADMIN — DOCUSATE SODIUM 200 MG: 100 CAPSULE, LIQUID FILLED ORAL at 20:03

## 2018-07-08 RX ADMIN — FUROSEMIDE 40 MG: 10 INJECTION, SOLUTION INTRAMUSCULAR; INTRAVENOUS at 08:26

## 2018-07-08 RX ADMIN — IPRATROPIUM BROMIDE AND ALBUTEROL SULFATE 3 ML: 2.5; .5 SOLUTION RESPIRATORY (INHALATION) at 06:43

## 2018-07-08 RX ADMIN — MAGNESIUM OXIDE TAB 400 MG (241.3 MG ELEMENTAL MG) 400 MG: 400 (241.3 MG) TAB at 20:03

## 2018-07-08 RX ADMIN — NICOTINE 1 PATCH: 21 PATCH, EXTENDED RELEASE TRANSDERMAL at 22:26

## 2018-07-08 RX ADMIN — QUETIAPINE FUMARATE 300 MG: 300 TABLET ORAL at 20:03

## 2018-07-08 RX ADMIN — PRAMIPEXOLE DIHYDROCHLORIDE 0.25 MG: 0.25 TABLET ORAL at 01:04

## 2018-07-08 RX ADMIN — IPRATROPIUM BROMIDE AND ALBUTEROL SULFATE 3 ML: 2.5; .5 SOLUTION RESPIRATORY (INHALATION) at 10:12

## 2018-07-08 RX ADMIN — QUETIAPINE FUMARATE 300 MG: 300 TABLET ORAL at 01:04

## 2018-07-08 RX ADMIN — IPRATROPIUM BROMIDE AND ALBUTEROL SULFATE 3 ML: 2.5; .5 SOLUTION RESPIRATORY (INHALATION) at 14:26

## 2018-07-08 RX ADMIN — FUROSEMIDE 40 MG: 10 INJECTION, SOLUTION INTRAMUSCULAR; INTRAVENOUS at 20:03

## 2018-07-08 RX ADMIN — BUDESONIDE AND FORMOTEROL FUMARATE DIHYDRATE 2 PUFF: 160; 4.5 AEROSOL RESPIRATORY (INHALATION) at 06:46

## 2018-07-08 RX ADMIN — PANTOPRAZOLE SODIUM 40 MG: 40 TABLET, DELAYED RELEASE ORAL at 06:40

## 2018-07-08 RX ADMIN — IPRATROPIUM BROMIDE AND ALBUTEROL SULFATE 3 ML: 2.5; .5 SOLUTION RESPIRATORY (INHALATION) at 19:02

## 2018-07-08 RX ADMIN — SOTALOL HYDROCHLORIDE 80 MG: 80 TABLET ORAL at 08:09

## 2018-07-08 RX ADMIN — IPRATROPIUM BROMIDE AND ALBUTEROL SULFATE 3 ML: 2.5; .5 SOLUTION RESPIRATORY (INHALATION) at 23:18

## 2018-07-08 RX ADMIN — MONTELUKAST SODIUM 10 MG: 10 TABLET, FILM COATED ORAL at 20:03

## 2018-07-08 RX ADMIN — PRAMIPEXOLE DIHYDROCHLORIDE 0.25 MG: 0.25 TABLET ORAL at 20:02

## 2018-07-08 RX ADMIN — CILOSTAZOL 50 MG: 100 TABLET ORAL at 17:54

## 2018-07-08 RX ADMIN — MONTELUKAST SODIUM 10 MG: 10 TABLET, FILM COATED ORAL at 01:03

## 2018-07-08 RX ADMIN — SOTALOL HYDROCHLORIDE 80 MG: 80 TABLET ORAL at 20:02

## 2018-07-08 RX ADMIN — PRAMIPEXOLE DIHYDROCHLORIDE 0.25 MG: 0.25 TABLET ORAL at 08:09

## 2018-07-08 RX ADMIN — DOCUSATE SODIUM 200 MG: 100 CAPSULE, LIQUID FILLED ORAL at 08:09

## 2018-07-08 RX ADMIN — MAGNESIUM OXIDE TAB 400 MG (241.3 MG ELEMENTAL MG) 400 MG: 400 (241.3 MG) TAB at 01:03

## 2018-07-08 RX ADMIN — PRAMIPEXOLE DIHYDROCHLORIDE 0.25 MG: 0.25 TABLET ORAL at 17:54

## 2018-07-08 RX ADMIN — OXYCODONE HYDROCHLORIDE AND ACETAMINOPHEN 1 TABLET: 10; 325 TABLET ORAL at 22:26

## 2018-07-08 RX ADMIN — FUROSEMIDE 40 MG: 10 INJECTION, SOLUTION INTRAMUSCULAR; INTRAVENOUS at 17:54

## 2018-07-08 RX ADMIN — CILOSTAZOL 50 MG: 100 TABLET ORAL at 08:09

## 2018-07-08 NOTE — ED PROVIDER NOTES
Subjective   78-year-old male with a history of chronic bronchitis and peripheral vascular disease comes the emergency department with cough and shortness of breath for a few days.  He's been having a productive cough with brown sputum.  He does have a history of pneumonia in the past.  His O2 sats were in the 80s at his assisted living facility.  He did get some breathing treatments which she said helped him feel less short of breath.  He denies any chest pain or fevers.        Shortness of Breath   Severity:  Severe  Onset quality:  Unable to specify  Timing:  Constant  Progression:  Worsening  Chronicity:  Recurrent  Context: activity    Relieved by:  Nothing  Worsened by:  Nothing  Associated symptoms: sputum production    Associated symptoms: no abdominal pain, no chest pain, no cough, no fever, no rash, no sore throat and no vomiting        Review of Systems   Constitutional: Negative for chills, fatigue and fever.   HENT: Negative for congestion and sore throat.    Eyes: Negative for visual disturbance.   Respiratory: Positive for sputum production and shortness of breath. Negative for cough.    Cardiovascular: Negative for chest pain and leg swelling.   Gastrointestinal: Negative for abdominal pain, nausea and vomiting.   Genitourinary: Negative for dysuria.   Musculoskeletal: Negative for back pain.   Skin: Negative for rash.   Neurological: Negative for dizziness and weakness.   Psychiatric/Behavioral: Negative for behavioral problems.       Past Medical History:   Diagnosis Date   • Abdominal bloating    • Anxiety    • Atherosclerosis of native arteries of extremity with intermittent claudication (CMS/HCC)    • Atrial flutter (CMS/HCC)    • Backache    • Benign prostatic hyperplasia     BX 2009, also has renal cyst      • Chronic bronchitis (CMS/HCC)    • Chronic renal impairment    • Depressive disorder    • Drug abuse, episodic use    • Dyslipidemia    • Fatigue    • GERD (gastroesophageal reflux  disease)    • Hypercholesterolemia 03/15/2016   • Injury of tendon of rotator cuff 10/22/2015   • Insomnia 06/24/2016   • Intervertebral disc disorder 10/27/2011   • Major depression    • Neck pain 01/09/2012   • Osteoarthritis of multiple joints 06/21/2013   • Pain from vascular prosthetic devices, implants and grafts, sequela 12/03/2015   • Pain in left foot 10/27/2015   • Peripheral vascular disease (CMS/HCC) 12/03/2015     LEFT fem-tib bypass, embolectomy 6/2014    10/27/2015    • Senile debility 10/22/2015   • 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    weakness   • Shoulder pain    • Simple renal cyst 01/01/2011   • Stroke (CMS/[facility])      Echo: L atrial appendage thrombus      • Suicide (CMS/[facility])     at risk for   • Tobacco dependence syndrome 12/03/2015 2014 to E cigarette          Allergies   Allergen Reactions   • Morphine And Related Urinary Retention   • Lipitor [Atorvastatin] Nausea And Vomiting   • Lortab [Hydrocodone-Acetaminophen] Nausea And Vomiting       Past Surgical History:   Procedure Laterality Date   • ABDOMINAL AORTIC ANEURYSM REPAIR  07/18/2011    Endovascular   • BACK SURGERY     • CARDIAC CATHETERIZATION  01/30/1984    Mixed dominant coronary arterial anatomy. No coronary atherosclerosis. Normal left ventricular function   • CHOLECYSTECTOMY  08/25/2014    Cholecystitis with gangrenous gallbladder   • ENDOSCOPY  03/22/1990    Duodenal ulcer   • ENDOSCOPY AND COLONOSCOPY  10/17/2013    Diverticulum in sigmoid colon & descending colon. Internal & external hemorrhoids found   • ESOPHAGOSCOPY / EGD  10/17/2013    With tube-Esophagitis seen. Biopsy taken. Gastritis in stomach. Biopsy taken. Stenosis in 2nd portion of duodenum. Dilatation performed.   • FEMORAL THROMBECTOMY/EMBOLECTOMY  06/29/2014    Left lower extremity arteriogram. Left popliteal artery endarterectomy. Redo left femoral to [id number] trunk bypass. Negative pressure wound therapy with placement of Prevena wound V.A.C., left  groin, left lower leg   • INJECTION OF MEDICATION  03/15/2016    B12   • INJECTION OF MEDICATION  10/23/2015    Drain/Inject Major Joint    • INJECTION OF MEDICATION  01/13/2016    Kenalog   • LUMBAR LAMINECTOMY     • TRANSESOPHAGEAL ECHOCARDIOGRAM (TEX)  10/06/2015    With color flow-Mild mitral regurg.Left atrium mild dilated.Ef of 55-60%.RV systolic pressure elevated.Trace tricuspid regurg   • TRANSESOPHAGEAL ECHOCARDIOGRAM (TEX)  02/14/2012    Without color flow-[facility] w/ early diast dysfun. AV trileaflet & structurally NRL w/ AR NRL. No regional wall motion abn Est Ef approx 50-55%. M & TR valves NRL w/ mild M & TR regurg Trace -mild pulmonic regurg. Anterior echo free space w/o hemodynamic signif. NRL RV   • TRANSESOPHAGEAL ECHOCARDIOGRAM (TEX)  03/25/2011    Normal LV systolic function with Ef of 50-55%.Left atrium ildly dilated.Moderate mitral regurg.Mitral valve regurg.Intact interatrial septum.No evidence of any cardiac thrombus or pericardial effusion.AV trileaflet       Family History   Problem Relation Age of Onset   • Osteoarthritis Mother         Lived to be 89   • Hypertension Mother    • ADD / ADHD Father    • Lung cancer Father    • ADD / ADHD Brother    • Rectal cancer Other        Social History     Social History   • Marital status:      Social History Main Topics   • Smoking status: Current Some Day Smoker     Packs/day: 0.50     Years: 50.00     Types: Cigarettes   • Smokeless tobacco: Never Used      Comment: reduced;   • Alcohol use No      Comment: Former use    • Drug use: No   • Sexual activity: Defer     Other Topics Concern   • Not on file           Objective   Physical Exam   Constitutional: He is oriented to person, place, and time. He appears well-developed and well-nourished.   HENT:   Head: Normocephalic and atraumatic.   Eyes: EOM are normal. Pupils are equal, round, and reactive to light.   Neck: Normal range of motion. Neck supple.   No midline tenderness   Cardiovascular:  Normal rate, regular rhythm, normal heart sounds and intact distal pulses.  Exam reveals no gallop and no friction rub.    No murmur heard.  Pulmonary/Chest: He has wheezes. He has no rales.   Abdominal: Soft. Bowel sounds are normal. He exhibits no distension. There is no tenderness.   Musculoskeletal: Normal range of motion. He exhibits no tenderness or deformity.   Neurological: He is alert and oriented to person, place, and time. No cranial nerve deficit. He exhibits normal muscle tone. Coordination normal.   Skin: Skin is warm and dry. No rash noted.   Psychiatric: He has a normal mood and affect. His behavior is normal.       Critical Care  Performed by: KATIA SPEAR  Authorized by: KATIA SPEAR     Critical care provider statement:     Critical care time (minutes):  60    Critical care was necessary to treat or prevent imminent or life-threatening deterioration of the following conditions:  Respiratory failure    Critical care was time spent personally by me on the following activities:  Evaluation of patient's response to treatment, examination of patient, ordering and performing treatments and interventions, ordering and review of laboratory studies, ordering and review of radiographic studies, pulse oximetry and re-evaluation of patient's condition                 ED Course                  MDM  Number of Diagnoses or Management Options  Congestive heart failure, unspecified congestive heart failure chronicity, unspecified congestive heart failure type (CMS/HCC): new and requires workup  Hypoxemia: new and requires workup  Warfarin-induced coagulopathy (CMS/HCC): new and requires workup  Diagnosis management comments: 9:15 PM patient is stable.  The shortness of breath seems to be due to congestive heart failure.  The patient will be given Lasix and admitted to the hospital for further evaluation.    9:21 PM Dr. Interiano will admit the patient       Amount and/or Complexity of Data Reviewed  Clinical  lab tests: ordered and reviewed  Tests in the radiology section of CPT®: ordered and reviewed  Decide to obtain previous medical records or to obtain history from someone other than the patient: yes    Risk of Complications, Morbidity, and/or Mortality  Presenting problems: high  Diagnostic procedures: high  Management options: high    Critical Care  Total time providing critical care: 30-74 minutes        Final diagnoses:   Congestive heart failure, unspecified congestive heart failure chronicity, unspecified congestive heart failure type (CMS/HCC)   Warfarin-induced coagulopathy (CMS/HCC)   Hypoxemia            Derrick Baires MD  07/07/18 8588

## 2018-07-08 NOTE — PLAN OF CARE
Problem: Patient Care Overview  Goal: Plan of Care Review  Outcome: Ongoing (interventions implemented as appropriate)   07/08/18 0109   Coping/Psychosocial   Plan of Care Reviewed With patient   Plan of Care Review   Progress improving   OTHER   Outcome Summary voiding frequently in response to lasix, breathing improved     Goal: Individualization and Mutuality  Outcome: Ongoing (interventions implemented as appropriate)      Problem: Fluid Volume Excess (Adult)  Goal: Identify Related Risk Factors and Signs and Symptoms  Outcome: Ongoing (interventions implemented as appropriate)    Goal: Optimal Fluid Balance  Outcome: Ongoing (interventions implemented as appropriate)      Problem: Fall Risk (Adult)  Goal: Identify Related Risk Factors and Signs and Symptoms  Outcome: Ongoing (interventions implemented as appropriate)    Goal: Absence of Fall  Outcome: Ongoing (interventions implemented as appropriate)      Problem: Wound (Includes Pressure Injury) (Adult)  Goal: Signs and Symptoms of Listed Potential Problems Will be Absent, Minimized or Managed (Wound)  Outcome: Ongoing (interventions implemented as appropriate)      Problem: Skin Injury Risk (Adult)  Goal: Identify Related Risk Factors and Signs and Symptoms  Outcome: Ongoing (interventions implemented as appropriate)    Goal: Skin Health and Integrity  Outcome: Ongoing (interventions implemented as appropriate)

## 2018-07-08 NOTE — PROGRESS NOTES
Progress Note  Donnell Gonzalez MD  Hospitalist    Date of visit: 7/8/2018     LOS: 1 day   Patient Care Team:  Belkys Hoffman MD as PCP - Claims Attributed    Chief Complaint: dyspnea     Subjective     Interval History:     Patient Complaints: worsening dyspnea, cough, sputum production. He is feeling worse lately, his effort capacity is significantly reduced after the recent peripheral vascular surgery.    History taken from: patient / nursing    Medication Review:   Current Facility-Administered Medications   Medication Dose Route Frequency Provider Last Rate Last Dose   • acetaminophen (TYLENOL) tablet 650 mg  650 mg Oral Q4H PRN Umm Interiano MD       • aspirin EC tablet 81 mg  81 mg Oral Daily Umm Interiano MD   81 mg at 07/08/18 0810   • bisacodyl (DULCOLAX) suppository 10 mg  10 mg Rectal Daily PRN Umm Interiano MD       • budesonide-formoterol (SYMBICORT) 160-4.5 MCG/ACT inhaler 2 puff  2 puff Inhalation BID - RT Umm Interiano MD   2 puff at 07/08/18 0646   • cilostazol (PLETAL) tablet 50 mg  50 mg Oral BID AC Umm Interiano MD   50 mg at 07/08/18 0809   • docusate sodium (COLACE) capsule 200 mg  200 mg Oral BID Umm Interiano MD   200 mg at 07/08/18 0809   • furosemide (LASIX) injection 40 mg  40 mg Intravenous TID Umm Interiano MD   40 mg at 07/08/18 0826   • ipratropium-albuterol (DUO-NEB) nebulizer solution 3 mL  3 mL Nebulization Q4H - RT Umm Interiano MD   3 mL at 07/08/18 1426   • magnesium hydroxide (MILK OF MAGNESIA) suspension 2400 mg/10mL 10 mL  10 mL Oral Daily PRN Umm Interiano MD       • magnesium oxide (MAGOX) tablet 400 mg  400 mg Oral Nightly Umm Interiano MD   400 mg at 07/08/18 0103   • montelukast (SINGULAIR) tablet 10 mg  10 mg Oral Nightly Umm Interiano MD   10 mg at 07/08/18 0103   • nicotine (NICODERM CQ) 21 MG/24HR patch 1 patch  1 patch Transdermal Q24H Harshul  James Interiano MD       • oxyCODONE-acetaminophen (PERCOCET)  MG per tablet 1 tablet  1 tablet Oral Q4H PRN Umm Interiano MD       • pantoprazole (PROTONIX) EC tablet 40 mg  40 mg Oral Q AM Umm Interiano MD   40 mg at 07/08/18 0640   • Pharmacy to dose warfarin   Does not apply Continuous PRN Umm Interiano MD       • pramipexole (MIRAPEX) tablet 0.25 mg  0.25 mg Oral TID Umm Interiano MD   0.25 mg at 07/08/18 0809   • QUEtiapine (SEROquel) tablet 300 mg  300 mg Oral Nightly Umm Interiano MD   300 mg at 07/08/18 0104   • sodium chloride 0.9 % flush 1-10 mL  1-10 mL Intravenous PRN Umm Interiano MD       • sotalol (BETAPACE) tablet 80 mg  80 mg Oral Q12H Umm Interiano MD   80 mg at 07/08/18 0809   • sotalol (BETAPACE) tablet 80 mg  80 mg Oral Daily PRN Umm Interiano MD           Review of Systems:   Review of Systems   Constitutional: Positive for fatigue. Negative for fever.   Respiratory: Positive for cough, shortness of breath and wheezing.    Cardiovascular: Positive for chest pain and leg swelling. Negative for palpitations.   Gastrointestinal: Positive for abdominal distention. Negative for abdominal pain.   Genitourinary: Negative for dysuria, frequency, hematuria, testicular pain and urgency.   Musculoskeletal: Positive for arthralgias and back pain. Negative for gait problem.   Neurological: Positive for weakness. Negative for syncope, numbness and headaches.   Psychiatric/Behavioral: Negative for agitation, behavioral problems and confusion.   All other systems reviewed and are negative.      Objective     Vital Signs  Temp:  [96.2 °F (35.7 °C)-97.7 °F (36.5 °C)] 96.7 °F (35.9 °C)  Heart Rate:  [45-76] 57  Resp:  [16-22] 16  BP: ()/(50-79) 111/56    Physical Exam:  Physical Exam   Constitutional: He is oriented to person, place, and time. He appears ill. No distress.   Obese    HENT:   Head: Normocephalic and  atraumatic.   Eyes: EOM are normal. Pupils are equal, round, and reactive to light. No scleral icterus.   Neck: Normal range of motion. Neck supple.   Cardiovascular: Normal rate and regular rhythm.    Pulmonary/Chest: Effort normal. No respiratory distress. He has wheezes. He has rales.   Abdominal: Soft. Bowel sounds are normal. He exhibits no distension. There is no tenderness.   Musculoskeletal: He exhibits tenderness (over the recent surgical incisions). He exhibits no edema or deformity.   Neurological: He is alert and oriented to person, place, and time. He displays normal reflexes. No cranial nerve deficit. Coordination normal.   Skin: No rash noted. No erythema. There is pallor.   Psychiatric: He has a normal mood and affect. His behavior is normal.   Vitals reviewed.       Results Review:    Lab Results (last 24 hours)     Procedure Component Value Units Date/Time    CRE Screen by PCR - Swab, Large Intestine, Rectum [[id number]] Collected:  07/08/18 0821    Specimen:  Swab from Large Intestine, Rectum Updated:  07/08/18 0948     CRE SCREEN Not Detected     Comment: Test performed by real-time polymerase chain reaction (qPCR).        OXA 48 Strain --     Comment: Not Applicable.        IMP STRAIN --     Comment: Not Applicable        VIM STRAING --     Comment: Not Applicable        NDM Strain --     Comment: Not Applicable        KPC Strain --     Comment: Not Applicable       Protime-INR [[id number]]  (Abnormal) Collected:  07/08/18 0533    Specimen:  Blood Updated:  07/08/18 0618     Protime 37.5 (H) Seconds      INR 4.09 (H)    Narrative:       Therapeutic range for most indications is 2.0-3.0 INR,  or 2.5-3.5 for mechanical heart valves.    Magnesium [[id number]]  (Abnormal) Collected:  07/08/18 0533    Specimen:  Blood Updated:  07/08/18 0615     Magnesium 2.9 (H) mg/dL     Comprehensive Metabolic Panel [[id number]]  (Abnormal) Collected:  07/08/18 0533    Specimen:  Blood Updated:  07/08/18 0613      Glucose 129 (H) mg/dL      BUN 32 (H) mg/dL      Creatinine 1.81 (H) mg/dL      Sodium 141 mmol/L      Potassium 4.9 mmol/L      Chloride 102 mmol/L      CO2 30.0 mmol/L      Calcium 8.6 mg/dL      Total Protein 7.6 g/dL      Albumin 3.60 g/dL      ALT (SGPT) 20 (L) U/L      AST (SGOT) 24 U/L      Alkaline Phosphatase 76 U/L      Total Bilirubin 0.3 mg/dL      eGFR Non African Amer 37 (L) mL/min/1.73      Globulin 4.0 (H) gm/dL      A/G Ratio 0.9 (L) g/dL      BUN/Creatinine Ratio 17.7     Anion Gap 9.0 mmol/L     Narrative:       The MDRD GFR formula is only valid for adults with stable renal function between ages 18 and 70.    CBC Auto Differential [[id number]]  (Abnormal) Collected:  07/08/18 0533    Specimen:  Blood Updated:  07/08/18 0556     WBC 8.67 10*3/mm3      RBC 3.92 (L) 10*6/mm3      Hemoglobin 10.9 (L) g/dL      Hematocrit 33.4 (L) %      MCV 85.2 fL      MCH 27.8 pg      MCHC 32.6 g/dL      RDW 18.6 (H) %      RDW-SD 56.7 (H) fl      MPV 9.3 fL      Platelets 258 10*3/mm3      Neutrophil % 87.6 (H) %      Lymphocyte % 7.8 (L) %      Monocyte % 3.5 %      Eosinophil % 0.5 %      Basophil % 0.1 %      Immature Grans % 0.5 %      Neutrophils, Absolute 7.60 10*3/mm3      Lymphocytes, Absolute 0.68 10*3/mm3      Monocytes, Absolute 0.30 10*3/mm3      Eosinophils, Absolute 0.04 10*3/mm3      Basophils, Absolute 0.01 10*3/mm3      Immature Grans, Absolute 0.04 (H) 10*3/mm3     Legionella Antigen, Urine - Urine, Urine, Clean Catch [[id number]]  (Normal) Collected:  07/08/18 0031    Specimen:  Urine from Urine, Clean Catch Updated:  07/08/18 0058     LEGIONELLA ANTIGEN, URINE Negative    S. Pneumo Ag Urine or CSF - Urine, Urine, Clean Catch [[id number]]  (Normal) Collected:  07/08/18 0031    Specimen:  Urine from Urine, Clean Catch Updated:  07/08/18 0058     Strep Pneumo Ag Negative    Urinalysis With Microscopic If Indicated (No Culture) - Urine, Clean Catch [[id number]]  (Normal) Collected:  07/08/18 0031     Specimen:  Urine from Urine, Clean Catch Updated:  07/08/18 0037     Color, UA Yellow     Appearance, UA Clear     pH, UA 7.0     Specific Gravity, UA 1.007     Glucose, UA Negative     Ketones, UA Negative     Bilirubin, UA Negative     Blood, UA Negative     Protein, UA Negative     Leuk Esterase, UA Negative     Nitrite, UA Negative     Urobilinogen, UA 0.2 E.U./dL    Narrative:       Urine microscopic not indicated.    Protime-INR [[id number]]  (Abnormal) Collected:  07/07/18 2002    Specimen:  Blood Updated:  07/07/18 2049     Protime 43.7 (H) Seconds      INR 5.02 (C)    Narrative:       Therapeutic range for most indications is 2.0-3.0 INR,  or 2.5-3.5 for mechanical heart valves.    BNP [[id number]]  (Abnormal) Collected:  07/07/18 2002    Specimen:  Blood Updated:  07/07/18 2028     proBNP 1,810.0 (H) pg/mL     Basic Metabolic Panel [[id number]]  (Abnormal) Collected:  07/07/18 2002    Specimen:  Blood Updated:  07/07/18 2023     Glucose 107 (H) mg/dL      BUN 33 (H) mg/dL      Creatinine 1.76 (H) mg/dL      Sodium 139 mmol/L      Potassium 4.3 mmol/L      Chloride 101 mmol/L      CO2 29.0 mmol/L      Calcium 8.3 (L) mg/dL      eGFR Non African Amer 38 (L) mL/min/1.73      BUN/Creatinine Ratio 18.8     Anion Gap 9.0 mmol/L     Narrative:       The MDRD GFR formula is only valid for adults with stable renal function between ages 18 and 70.    Blood Gas, Arterial [[id number]]  (Abnormal) Collected:  07/07/18 2012    Specimen:  Arterial Blood Updated:  07/07/18 2022     Site Right Radial     Naveen's Test Positive     pH, Arterial 7.443 pH units      pCO2, Arterial 40.4 mm Hg      pO2, Arterial 61.2 (L) mm Hg      HCO3, Arterial 27.6 (H) mmol/L      Base Excess, Arterial 3.2 (H) mmol/L      O2 Saturation, Arterial 91.7 (L) %      Barometric Pressure for Blood Gas 752 mmHg      Modality Nasal Cannula     FIO2 28 %      Flow Rate 2.0 lpm      Ventilator Mode NA     Collected by sonia bridges rrt    CBC & Differential  [[id number]] Collected:  07/07/18 2002    Specimen:  Blood Updated:  07/07/18 2013    Narrative:       The following orders were created for panel order CBC & Differential.  Procedure                               Abnormality         Status                     ---------                               -----------         ------                     CBC Auto Differential[[id number]]        Abnormal            Final result                 Please view results for these tests on the individual orders.    CBC Auto Differential [[id number]]  (Abnormal) Collected:  07/07/18 2002    Specimen:  Blood Updated:  07/07/18 2013     WBC 8.20 10*3/mm3      RBC 4.02 (L) 10*6/mm3      Hemoglobin 11.2 (L) g/dL      Hematocrit 34.0 (L) %      MCV 84.6 fL      MCH 27.9 pg      MCHC 32.9 g/dL      RDW 18.6 (H) %      RDW-SD 56.5 (H) fl      MPV 8.9 fL      Platelets 273 10*3/mm3      Neutrophil % 80.3 (H) %      Lymphocyte % 9.6 (L) %      Monocyte % 6.3 %      Eosinophil % 3.2 %      Basophil % 0.2 %      Immature Grans % 0.4 %      Neutrophils, Absolute 6.58 10*3/mm3      Lymphocytes, Absolute 0.79 10*3/mm3      Monocytes, Absolute 0.52 10*3/mm3      Eosinophils, Absolute 0.26 10*3/mm3      Basophils, Absolute 0.02 10*3/mm3      Immature Grans, Absolute 0.03 (H) 10*3/mm3           Imaging Results (last 24 hours)     Procedure Component Value Units Date/Time    XR Chest 1 View [[id number]] Collected:  07/07/18 2000     Updated:  07/07/18 2016    Narrative:         EXAM:         Radiograph(s), Chest   VIEWS:   Portable ; 1       DATE/TIME:  7/7/2018 8:14 PM CDT                INDICATION:   cough    COMPARISON:  CXR: 4/12/18             FINDINGS:             - lines/tubes:    none     - cardiac:         size within normal limits         - mediastinum: contour within normal limits         - lungs:         Prominence of the interstitium suggests the  presence of moderate to severe pulmonary interstitial edema. No  distinct focal airspace process.              - pleura:         no evidence of  fluid                  - osseous:         unremarkable for age                  - misc.:         Impression:       CONCLUSION:        1. Prominence of the interstitium suggesting a presence of  moderate to severe pulmonary interstitial edema.    Electronically signed by:  MISSAEL Rea MD  7/7/2018 8:15 PM  CDT Workstation: [id number]          Assessment/Plan     Principal Problem:    Hypoxemia  Active Problems:    COPD with exacerbation (CMS/HCC)    Atrial fibrillation [I48.91]    PVD (peripheral vascular disease) (CMS/HCC)    Hypertension    LVH (left ventricular hypertrophy)    Diastolic dysfunction    Coumadin toxicity    CKD (chronic kidney disease) stage 3, GFR 30-59 ml/min    Continue with the nebulized treatments, diuresis, the other medications. Pharmacy is dosing his Coumadin now, as his INR was higher than 5.0 on admission.    Donnell Gonzalez MD  07/08/18  3:19 PM

## 2018-07-08 NOTE — H&P
[facility]  INPATIENT HISTORY AND PHYSICAL       Patient Care Team:  Gregory Pino MD as PCP - General (Family Medicine)  Belkys Hoffman MD as PCP - Claims Attributed  Brian Moshe Pennington MD as Surgeon (Cardiothoracic Surgery)  Emily Thacker MD as Consulting Physician (Cardiology)  Devin Menendez DPM as Consulting Physician (Podiatry)    Date of Admission July 7, 2018 11:20 PM      Chief complaint   Chief Complaint   Patient presents with   • Shortness of Breath       Subjective     Patient is a 78 y.o. male presents with Complaint of having shortness of air.  Patient said he has been having decreased excess tolerance due to dyspnea on exertion.  Patient denies any chest pain or palpitation.  However does complain of having lower shortness swelling.  Patient denies any orthopnea or proximal nocturnal dyspnea.  Patient has any fever or chills.  Patient does complain of having increased fatigue and weakness.  Patient occasionally complained of lightheadedness.  Patient denies any urinary complaints.  Patient states he has been taking medication as prescribed.  Patient denies any weight loss.  No unusual bleeding have been reported.  No change in bowel habits or appetite have been reported.    Review of Systems   Review of Systems   Constitutional: Positive for activity change, appetite change, fatigue and unexpected weight change. Negative for chills, diaphoresis and fever.   HENT: Negative for congestion, rhinorrhea, sore throat and trouble swallowing.    Eyes: Negative for visual disturbance.   Respiratory: Positive for chest tightness and shortness of breath. Negative for cough and wheezing.    Cardiovascular: Positive for leg swelling. Negative for chest pain and palpitations.   Gastrointestinal: Negative for abdominal pain, blood in stool, diarrhea, nausea and vomiting.   Endocrine: Negative for cold intolerance and heat  intolerance.   Genitourinary: Negative for decreased urine volume and difficulty urinating.   Musculoskeletal: Negative for back pain, gait problem and neck pain.   Skin: Negative for rash.   Neurological: Positive for weakness and light-headedness. Negative for dizziness, syncope, numbness and headaches.   Psychiatric/Behavioral: The patient is not nervous/anxious.        History  Past Medical History:   Diagnosis Date   • Abdominal bloating    • Anxiety    • Atherosclerosis of native arteries of extremity with intermittent claudication (CMS/HCC)    • Atrial flutter (CMS/HCC)    • Backache    • Benign prostatic hyperplasia     BX 2009, also has renal cyst      • Chronic bronchitis (CMS/HCC)    • Chronic renal impairment    • Depressive disorder    • Drug abuse, episodic use    • Dyslipidemia    • Fatigue    • GERD (gastroesophageal reflux disease)    • Hypercholesterolemia 03/15/2016   • Injury of tendon of rotator cuff 10/22/2015   • Insomnia 06/24/2016   • Intervertebral disc disorder 10/27/2011   • Major depression    • Neck pain 01/09/2012   • Osteoarthritis of multiple joints 06/21/2013   • Pain from vascular prosthetic devices, implants and grafts, sequela 12/03/2015   • Pain in left foot 10/27/2015   • Peripheral vascular disease (CMS/HCC) 12/03/2015     LEFT fem-tib bypass, embolectomy 6/2014    10/27/2015    • Senile debility 10/22/2015   • 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 No coronary atherosclerosis. Normal left ventricular function   • CHOLECYSTECTOMY   08/25/2014    Cholecystitis with gangrenous gallbladder   • ENDOSCOPY  03/22/1990    Duodenal ulcer   • ENDOSCOPY AND COLONOSCOPY  10/17/2013    Diverticulum in sigmoid colon & descending colon. Internal & external hemorrhoids found   • ESOPHAGOSCOPY / EGD  10/17/2013    With tube-Esophagitis seen. Biopsy taken. Gastritis in stomach. Biopsy taken. Stenosis in 2nd portion of duodenum. Dilatation performed.   • FEMORAL THROMBECTOMY/EMBOLECTOMY  06/29/2014    Left lower extremity arteriogram. Left popliteal artery endarterectomy. Redo left femoral to [id number] trunk bypass. Negative pressure wound therapy with placement of Prevena wound V.A.C., left groin, left lower leg   • INJECTION OF MEDICATION  03/15/2016    B12   • INJECTION OF MEDICATION  10/23/2015    Drain/Inject Major Joint    • INJECTION OF MEDICATION  01/13/2016    Kenalog   • LUMBAR LAMINECTOMY     • TRANSESOPHAGEAL ECHOCARDIOGRAM (TEX)  10/06/2015    With color flow-Mild mitral regurg.Left atrium mild dilated.Ef of 55-60%.RV systolic pressure elevated.Trace tricuspid regurg   • TRANSESOPHAGEAL ECHOCARDIOGRAM (TEX)  02/14/2012    Without color flow-[facility] w/ early diast dysfun. AV trileaflet & structurally NRL w/ AR NRL. No regional wall motion abn Est Ef approx 50-55%. M & TR valves NRL w/ mild M & TR regurg Trace -mild pulmonic regurg. Anterior echo free space w/o hemodynamic signif. NRL RV   • TRANSESOPHAGEAL ECHOCARDIOGRAM (TEX)  03/25/2011    Normal LV systolic function with Ef of 50-55%.Left atrium ildly dilated.Moderate mitral regurg.Mitral valve regurg.Intact interatrial septum.No evidence of any cardiac thrombus or pericardial effusion.AV trileaflet     Family History   Problem Relation Age of Onset   • Osteoarthritis Mother         Lived to be 102   • Hypertension Mother    • ADD / ADHD Father    • Lung cancer Father    • ADD / ADHD Brother    • Rectal cancer Other      Social History   Substance Use Topics   • Smoking status: Current Some Day  Smoker     Packs/day: 0.50     Years: 50.00     Types: Cigarettes   • Smokeless tobacco: Never Used      Comment: reduced;   • Alcohol use No      Comment: Former use      Prescriptions Prior to Admission   Medication Sig Dispense Refill Last Dose   • Aspirin-Acetaminophen-Caffeine (EXCEDRIN EXTRA STRENGTH PO) Take 1 tablet by mouth Daily As Needed.      • bisacodyl (DULCOLAX) 10 MG suppository Insert 10 mg into the rectum Daily As Needed for Constipation.      • budesonide-formoterol (SYMBICORT) 160-4.5 MCG/ACT inhaler Inhale 2 puffs 2 (Two) Times a Day. 1 inhaler 12 Taking   • cilostazol (PLETAL) 50 MG tablet Take 1 tablet by mouth 2 (Two) Times a Day. (Patient taking differently: Take 100 mg by mouth 2 (Two) Times a Day.) 60 tablet 5 Taking   • collagenase (SANTYL) 250 UNIT/GM ointment Apply  topically Daily.      • furosemide (LASIX) 40 MG tablet Take 1 tablet by mouth Daily. 30 tablet 6 Taking   • magnesium hydroxide (MILK OF MAGNESIA) 400 MG/5ML suspension Take 30 mL by mouth Daily As Needed for Constipation (if no BM x 3 days).      • magnesium oxide (MAGOX) 400 (241.3 MG) MG tablet tablet Take 400 mg by mouth Every Night.   Taking   • montelukast (SINGULAIR) 10 MG tablet Take 1 tablet by mouth Every Night. 90 tablet 2 Taking   • Multiple Vitamins-Minerals (THERA-TABS M PO) Take 1 tablet by mouth Daily.      • oxyCODONE-acetaminophen (PERCOCET)  MG per tablet Take 1 tablet by mouth Every 4 (Four) Hours As Needed for Moderate Pain .      • pramipexole (MIRAPEX) 0.25 MG tablet Take 1 tablet by mouth 3 (Three) Times a Day. (Patient taking differently: Take 0.5 mg by mouth Daily. 0.5 mg in the morning, 0.25 mg at bedtime) 90 tablet 5 Taking   • QUEtiapine (SEROquel) 300 MG tablet Take 300 mg by mouth Every Night.      • Sodium Phosphates (ENEMA READY-TO-USE) 7-19 GM/118ML enema Insert 1 Bag into the rectum Daily As Needed. If dulcolax ineffective      • sotalol (BETAPACE) 80 MG tablet Take 1.5 tablets by  mouth 2 (Two) Times a Day. (Patient taking differently: Take 160 mg by mouth 2 (Two) Times a Day.) 270 tablet 2 Taking   • sotalol (BETAPACE) 80 MG tablet Take 80 mg by mouth Daily As Needed (for a fib with rate greater than 120).      • vitamin C (ASCORBIC ACID) 500 MG tablet Take 500 mg by mouth 2 (Two) Times a Day. For wound healing   ends:7/14/18      • warfarin (COUMADIN) 5 MG tablet Take as directed per coumadin clinic (Patient taking differently: Take 5 mg by mouth Daily. Take as directed per coumadin clinic) 30 tablet 0    • albuterol (PROVENTIL HFA;VENTOLIN HFA) 108 (90 Base) MCG/ACT inhaler Inhale 2 puffs Every 4 (Four) Hours As Needed for Wheezing. 1 inhaler 6 Taking   • Alirocumab (PRALUENT) 75 MG/ML solution pen-injector Inject 75 mg under the skin Every 14 (Fourteen) Days. 2 mL 5    • aspirin 81 MG EC tablet Take 81 mg by mouth daily.   Taking   • enoxaparin (LOVENOX) 100 MG/ML solution syringe Inject 1 mL under the skin Every 12 (Twelve) Hours. 14 syringe 0 Taking   • guaiFENesin (MUCINEX) 600 MG 12 hr tablet Take 1,200 mg by mouth 2 (Two) Times a Day.   Taking   • QUEtiapine (SEROquel) 100 MG tablet take 3 tablets at bedtime  0 Taking     Allergies:  Morphine and related; Lipitor [atorvastatin]; and Lortab [hydrocodone-acetaminophen]  Prior to Admission medications    Medication Sig Start Date End Date Taking? Authorizing Provider   Aspirin-Acetaminophen-Caffeine (EXCEDRIN EXTRA STRENGTH PO) Take 1 tablet by mouth Daily As Needed.   Yes Historical Provider, MD   bisacodyl (DULCOLAX) 10 MG suppository Insert 10 mg into the rectum Daily As Needed for Constipation.   Yes Historical Provider, MD   budesonide-formoterol (SYMBICORT) 160-4.5 MCG/ACT inhaler Inhale 2 puffs 2 (Two) Times a Day. 11/20/17  Yes Belkys Hoffman MD   cilostazol (PLETAL) 50 MG tablet Take 1 tablet by mouth 2 (Two) Times a Day.  Patient taking differently: Take 100 mg by mouth 2 (Two) Times a Day. 12/6/17  Yes Emily  MD Kirstie   collagenase (SANTYL) 250 UNIT/GM ointment Apply  topically Daily.   Yes Historical Provider, MD   furosemide (LASIX) 40 MG tablet Take 1 tablet by mouth Daily. 3/30/18  Yes Emily Thacker MD   magnesium hydroxide (MILK OF MAGNESIA) 400 MG/5ML suspension Take 30 mL by mouth Daily As Needed for Constipation (if no BM x 3 days).   Yes Historical Provider, MD   magnesium oxide (MAGOX) 400 (241.3 MG) MG tablet tablet Take 400 mg by mouth Every Night.   Yes Historical Provider, MD   montelukast (SINGULAIR) 10 MG tablet Take 1 tablet by mouth Every Night. 1/17/18  Yes Belkys Hoffman MD   Multiple Vitamins-Minerals (THERA-TABS M PO) Take 1 tablet by mouth Daily.   Yes Historical Provider, MD   oxyCODONE-acetaminophen (PERCOCET)  MG per tablet Take 1 tablet by mouth Every 4 (Four) Hours As Needed for Moderate Pain .   Yes Historical Provider, MD   pramipexole (MIRAPEX) 0.25 MG tablet Take 1 tablet by mouth 3 (Three) Times a Day.  Patient taking differently: Take 0.5 mg by mouth Daily. 0.5 mg in the morning, 0.25 mg at bedtime 1/17/18  Yes Belkys Hoffman MD   QUEtiapine (SEROquel) 300 MG tablet Take 300 mg by mouth Every Night.   Yes Historical Provider, MD   Sodium Phosphates (ENEMA READY-TO-USE) 7-19 GM/118ML enema Insert 1 Bag into the rectum Daily As Needed. If dulcolax ineffective   Yes Historical Provider, MD   sotalol (BETAPACE) 80 MG tablet Take 1.5 tablets by mouth 2 (Two) Times a Day.  Patient taking differently: Take 160 mg by mouth 2 (Two) Times a Day. 11/13/17  Yes Emily Thacker MD   sotalol (BETAPACE) 80 MG tablet Take 80 mg by mouth Daily As Needed (for a fib with rate greater than 120).   Yes Historical Provider, MD   vitamin C (ASCORBIC ACID) 500 MG tablet Take 500 mg by mouth 2 (Two) Times a Day. For wound healing   ends:7/14/18 7/7/18 7/14/18 Yes Historical Provider, MD   warfarin (COUMADIN) 5 MG tablet Take as directed per coumadin clinic  Patient taking differently:  Take 5 mg by mouth Daily. Take as directed per coumadin clinic 5/17/18  Yes JUANI Fernandez   albuterol (PROVENTIL HFA;VENTOLIN HFA) 108 (90 Base) MCG/ACT inhaler Inhale 2 puffs Every 4 (Four) Hours As Needed for Wheezing. 2/5/18   Belkys Hoffman MD   Alirocumab (PRALUENT) 75 MG/ML solution pen-injector Inject 75 mg under the skin Every 14 (Fourteen) Days. 5/7/18   Belkys Hoffman MD   aspirin 81 MG EC tablet Take 81 mg by mouth daily.    Historical Provider, MD   enoxaparin (LOVENOX) 100 MG/ML solution syringe Inject 1 mL under the skin Every 12 (Twelve) Hours. 4/18/18   Rafa Doty MD   guaiFENesin (MUCINEX) 600 MG 12 hr tablet Take 1,200 mg by mouth 2 (Two) Times a Day.    Historical Provider, MD   QUEtiapine (SEROquel) 100 MG tablet take 3 tablets at bedtime 11/15/16   Historical Provider, MD       Objective     Vital Signs    Temp:  [96.2 °F (35.7 °C)-97.7 °F (36.5 °C)] 96.2 °F (35.7 °C)  Heart Rate:  [56-63] 63  Resp:  [18-22] 20  BP: (114-159)/(65-79) 159/67    Physical Exam:      Physical Exam   Constitutional: He is oriented to person, place, and time. He appears well-developed and well-nourished.   HENT:   Head: Normocephalic and atraumatic.   Nose: Nose normal.   Eyes: Conjunctivae and EOM are normal. Pupils are equal, round, and reactive to light.   Neck: Normal range of motion. Neck supple. No JVD present. No tracheal deviation present. No thyromegaly present.   Cardiovascular: Normal rate, regular rhythm, normal heart sounds and intact distal pulses.    Pulmonary/Chest: He is in respiratory distress. He has no wheezes. He has rales. He exhibits no tenderness.   Abdominal: Soft. Bowel sounds are normal. He exhibits no distension. There is no tenderness. There is no rebound and no guarding.   Musculoskeletal: Normal range of motion. He exhibits edema.   Lymphadenopathy:     He has no cervical adenopathy.   Neurological: He is alert and oriented to person, place, and time.  He has normal reflexes. No cranial nerve deficit.   Skin: Skin is warm and dry.   Intact   Psychiatric: He has a normal mood and affect.   Nursing note and vitals reviewed.      Results Review:       Results from last 7 days  Lab Units 07/07/18 2002   SODIUM mmol/L 139   POTASSIUM mmol/L 4.3   CHLORIDE mmol/L 101   CO2 mmol/L 29.0   BUN mg/dL 33*   CREATININE mg/dL 1.76*   GLUCOSE mg/dL 107*   CALCIUM mg/dL 8.3*               Results from last 7 days  Lab Units 07/07/18 2002   WBC 10*3/mm3 8.20   HEMOGLOBIN g/dL 11.2*   HEMATOCRIT % 34.0*   PLATELETS 10*3/mm3 273         Results from last 7 days  Lab Units 07/07/18 2002   INR  5.02*     Imaging Results (last 7 days)     Procedure Component Value Units Date/Time    XR Chest 1 View [[id number]] Collected:  07/07/18 2000     Updated:  07/07/18 2016    Narrative:         EXAM:         Radiograph(s), Chest   VIEWS:   Portable ; 1       DATE/TIME:  7/7/2018 8:14 PM CDT                INDICATION:   cough    COMPARISON:  CXR: 4/12/18             FINDINGS:             - lines/tubes:    none     - cardiac:         size within normal limits         - mediastinum: contour within normal limits         - lungs:         Prominence of the interstitium suggests the  presence of moderate to severe pulmonary interstitial edema. No  distinct focal airspace process.             - pleura:         no evidence of  fluid                  - osseous:         unremarkable for age                  - misc.:         Impression:       CONCLUSION:        1. Prominence of the interstitium suggesting a presence of  moderate to severe pulmonary interstitial edema.                                                Electronically signed by:  MISSAEL Rea MD  7/7/2018 8:15 PM  CDT Workstation: [id number]          Assessment/Plan     Principal Problem:    Congestive heart failure (CMS/HCC)  Active Problems:    Atrial fibrillation [I48.91]    Embolism and thrombosis of artery (CMS/HCC)    PVD (peripheral  vascular disease) (CMS/HCC)    Tobacco dependence syndrome    Stroke (CMS/HCC)    Osteoarthritis of multiple joints    Hypercholesterolemia    GERD (gastroesophageal reflux disease)    Major depression    Benign prostatic hyperplasia    Typical atrial flutter (CMS/HCC)    Hyperlipidemia    Localized edema    Atherosclerosis of artery of extremity with gangrene (CMS/HCC)    AAA (abdominal aortic aneurysm) without rupture (CMS/HCC)    CKD (chronic kidney disease) stage 3, GFR 30-59 ml/min    Hypertension    Arterial occlusion, lower extremity (CMS/HCC)    Hypoxemia    Warfarin-induced coagulopathy (CMS/HCC)      -We'll give patient diuretics.  -We'll hold off on anticoagulation due to patient having supratherapeutic INR.  We'll get pharmacy evaluation for Coumadin dosing.  -We will give patient some supplemental oxygenation.  -We'll resume patient's home medication as prior to coming to hospital.  --DVT and GI prophylaxis in place.  -We'll continue monitoring patient in hospital setting and treat patient as course dictates.    I discussed the patients findings and my recommendations with patient and nursing staff.         This document has been electronically signed by Umm Interiano MD on July 7, 2018 11:20 PM        Total Time Spent: 43 minutes.    EMR Dragon/Transcription disclaimer:   Dictated utilizing Dragon dictation.

## 2018-07-08 NOTE — DISCHARGE PLACEMENT REQUEST
"Denys Parrish (78 y.o. Male)     Date of Birth Social Security Number Address Home Phone MRN    1940  [address]  [facility] [id number] [phone] [id number]    [employer] Marital Status          [place]        Admission Date Admission Type Admitting Provider Attending Provider Department, Room/Bed    7/7/18 Emergency Umm Interiano MD Patel, Harshul Amrutlal, MD [address], [address]    Discharge Date Discharge Disposition Discharge Destination                       Attending Provider:  Umm Interiano MD    Allergies:  Morphine And Related, Lipitor [Atorvastatin], Lortab [Hydrocodone-acetaminophen]    Isolation:  None   Infection:  None   Code Status:  CPR    Ht:  180.3 cm (71\")   Wt:  97.3 kg (214 lb 6.4 oz)    Admission Cmt:  None   Principal Problem:  Congestive heart failure (CMS/HCC) [I50.9]                 Active Insurance as of 7/7/2018     Primary Coverage     Payor Plan Insurance Group Employer/Plan Group    ANTHEM MEDICARE REPLACEMENT ANTHEM MEDICARE ADVANTAGE [id number]     Payor Plan Address Payor Plan Phone Number Effective From Effective To    [address] [phone] 1/1/2017     [facility] [address]       Subscriber Name Subscriber Birth Date Member ID       DENYS PARRISH 1940 [id number]                 Emergency Contacts      (Rel.) Home Phone Work Phone Mobile Phone    Jennifer Goncalves (Daughter) [phone]            "

## 2018-07-09 ENCOUNTER — HOSPITAL ENCOUNTER (OUTPATIENT)
Dept: VASCULAR LAB | Age: 78
Discharge: HOME | End: 2018-07-09
Payer: MEDICARE

## 2018-07-09 ENCOUNTER — TELEPHONE (OUTPATIENT)
Dept: VASCULAR SURGERY | Age: 78
End: 2018-07-09

## 2018-07-09 LAB
ALBUMIN SERPL-MCNC: 3.4 G/DL (ref 3.4–4.8)
ALBUMIN/GLOB SERPL: 0.9 G/DL (ref 1.1–1.8)
ALP SERPL-CCNC: 65 U/L (ref 38–126)
ALT SERPL W P-5'-P-CCNC: 21 U/L (ref 21–72)
ANION GAP SERPL CALCULATED.3IONS-SCNC: 8 MMOL/L (ref 5–15)
AST SERPL-CCNC: 25 U/L (ref 17–59)
BASOPHILS # BLD AUTO: 0.01 10*3/MM3 (ref 0–0.2)
BASOPHILS NFR BLD AUTO: 0.1 % (ref 0–2)
BILIRUB SERPL-MCNC: 0.2 MG/DL (ref 0.2–1.3)
BUN BLD-MCNC: 39 MG/DL (ref 7–21)
BUN/CREAT SERPL: 20 (ref 7–25)
CALCIUM SPEC-SCNC: 8.3 MG/DL (ref 8.4–10.2)
CHLORIDE SERPL-SCNC: 101 MMOL/L (ref 95–110)
CO2 SERPL-SCNC: 28 MMOL/L (ref 22–31)
CREAT BLD-MCNC: 1.95 MG/DL (ref 0.7–1.3)
DEPRECATED RDW RBC AUTO: 55.9 FL (ref 35.1–43.9)
EOSINOPHIL # BLD AUTO: 0.02 10*3/MM3 (ref 0–0.7)
EOSINOPHIL NFR BLD AUTO: 0.2 % (ref 0–7)
ERYTHROCYTE [DISTWIDTH] IN BLOOD BY AUTOMATED COUNT: 18.3 % (ref 11.5–14.5)
GFR SERPL CREATININE-BSD FRML MDRD: 34 ML/MIN/1.73 (ref 42–98)
GLOBULIN UR ELPH-MCNC: 3.8 GM/DL (ref 2.3–3.5)
GLUCOSE BLD-MCNC: 122 MG/DL (ref 60–100)
HCT VFR BLD AUTO: 33.2 % (ref 39–49)
HGB BLD-MCNC: 11 G/DL (ref 13.7–17.3)
IMM GRANULOCYTES # BLD: 0.05 10*3/MM3 (ref 0–0.02)
IMM GRANULOCYTES NFR BLD: 0.4 % (ref 0–0.5)
INR PPP: 2.49 (ref 0.8–1.2)
LYMPHOCYTES # BLD AUTO: 0.99 10*3/MM3 (ref 0.6–4.2)
LYMPHOCYTES NFR BLD AUTO: 8.6 % (ref 10–50)
MAGNESIUM SERPL-MCNC: 2.7 MG/DL (ref 1.6–2.3)
MCH RBC QN AUTO: 28.1 PG (ref 26.5–34)
MCHC RBC AUTO-ENTMCNC: 33.1 G/DL (ref 31.5–36.3)
MCV RBC AUTO: 84.9 FL (ref 80–98)
MONOCYTES # BLD AUTO: 0.8 10*3/MM3 (ref 0–0.9)
MONOCYTES NFR BLD AUTO: 7 % (ref 0–12)
NEUTROPHILS # BLD AUTO: 9.59 10*3/MM3 (ref 2–8.6)
NEUTROPHILS NFR BLD AUTO: 83.7 % (ref 37–80)
PLATELET # BLD AUTO: 278 10*3/MM3 (ref 150–450)
PMV BLD AUTO: 8.5 FL (ref 8–12)
POTASSIUM BLD-SCNC: 3.8 MMOL/L (ref 3.5–5.1)
PROT SERPL-MCNC: 7.2 G/DL (ref 6.3–8.6)
PROTHROMBIN TIME: 25.8 SECONDS (ref 11.1–15.3)
RBC # BLD AUTO: 3.91 10*6/MM3 (ref 4.37–5.74)
SODIUM BLD-SCNC: 137 MMOL/L (ref 137–145)
WBC NRBC COR # BLD: 11.46 10*3/MM3 (ref 3.2–9.8)

## 2018-07-09 PROCEDURE — 97110 THERAPEUTIC EXERCISES: CPT

## 2018-07-09 PROCEDURE — 97166 OT EVAL MOD COMPLEX 45 MIN: CPT

## 2018-07-09 PROCEDURE — 93005 ELECTROCARDIOGRAM TRACING: CPT | Performed by: INTERNAL MEDICINE

## 2018-07-09 PROCEDURE — 83735 ASSAY OF MAGNESIUM: CPT | Performed by: INTERNAL MEDICINE

## 2018-07-09 PROCEDURE — 94799 UNLISTED PULMONARY SVC/PX: CPT

## 2018-07-09 PROCEDURE — 25010000002 FUROSEMIDE PER 20 MG: Performed by: INTERNAL MEDICINE

## 2018-07-09 PROCEDURE — G8988 SELF CARE GOAL STATUS: HCPCS

## 2018-07-09 PROCEDURE — 94760 N-INVAS EAR/PLS OXIMETRY 1: CPT

## 2018-07-09 PROCEDURE — 97530 THERAPEUTIC ACTIVITIES: CPT

## 2018-07-09 PROCEDURE — G8987 SELF CARE CURRENT STATUS: HCPCS

## 2018-07-09 PROCEDURE — 85025 COMPLETE CBC W/AUTO DIFF WBC: CPT | Performed by: INTERNAL MEDICINE

## 2018-07-09 PROCEDURE — 80053 COMPREHEN METABOLIC PANEL: CPT | Performed by: INTERNAL MEDICINE

## 2018-07-09 PROCEDURE — 93010 ELECTROCARDIOGRAM REPORT: CPT | Performed by: INTERNAL MEDICINE

## 2018-07-09 PROCEDURE — 85610 PROTHROMBIN TIME: CPT | Performed by: INTERNAL MEDICINE

## 2018-07-09 RX ORDER — WARFARIN SODIUM 2.5 MG/1
2.5 TABLET ORAL
Status: DISCONTINUED | OUTPATIENT
Start: 2018-07-09 | End: 2018-07-11

## 2018-07-09 RX ADMIN — OXYCODONE HYDROCHLORIDE AND ACETAMINOPHEN 1 TABLET: 10; 325 TABLET ORAL at 11:04

## 2018-07-09 RX ADMIN — BUDESONIDE AND FORMOTEROL FUMARATE DIHYDRATE 2 PUFF: 160; 4.5 AEROSOL RESPIRATORY (INHALATION) at 18:51

## 2018-07-09 RX ADMIN — PRAMIPEXOLE DIHYDROCHLORIDE 0.25 MG: 0.25 TABLET ORAL at 20:42

## 2018-07-09 RX ADMIN — QUETIAPINE FUMARATE 300 MG: 300 TABLET ORAL at 20:43

## 2018-07-09 RX ADMIN — SOTALOL HYDROCHLORIDE 80 MG: 80 TABLET ORAL at 11:04

## 2018-07-09 RX ADMIN — FUROSEMIDE 40 MG: 10 INJECTION, SOLUTION INTRAMUSCULAR; INTRAVENOUS at 17:37

## 2018-07-09 RX ADMIN — PANTOPRAZOLE SODIUM 40 MG: 40 TABLET, DELAYED RELEASE ORAL at 06:23

## 2018-07-09 RX ADMIN — DOCUSATE SODIUM 200 MG: 100 CAPSULE, LIQUID FILLED ORAL at 08:40

## 2018-07-09 RX ADMIN — OXYCODONE HYDROCHLORIDE AND ACETAMINOPHEN 1 TABLET: 10; 325 TABLET ORAL at 23:18

## 2018-07-09 RX ADMIN — MAGNESIUM HYDROXIDE 10 ML: 2400 SUSPENSION ORAL at 20:43

## 2018-07-09 RX ADMIN — IPRATROPIUM BROMIDE AND ALBUTEROL SULFATE 3 ML: 2.5; .5 SOLUTION RESPIRATORY (INHALATION) at 06:36

## 2018-07-09 RX ADMIN — SOTALOL HYDROCHLORIDE 80 MG: 80 TABLET ORAL at 22:09

## 2018-07-09 RX ADMIN — CILOSTAZOL 50 MG: 100 TABLET ORAL at 17:41

## 2018-07-09 RX ADMIN — CILOSTAZOL 50 MG: 100 TABLET ORAL at 08:40

## 2018-07-09 RX ADMIN — ASPIRIN 81 MG: 81 TABLET, COATED ORAL at 08:39

## 2018-07-09 RX ADMIN — IPRATROPIUM BROMIDE AND ALBUTEROL SULFATE 3 ML: 2.5; .5 SOLUTION RESPIRATORY (INHALATION) at 03:20

## 2018-07-09 RX ADMIN — DOCUSATE SODIUM 200 MG: 100 CAPSULE, LIQUID FILLED ORAL at 20:43

## 2018-07-09 RX ADMIN — PRAMIPEXOLE DIHYDROCHLORIDE 0.25 MG: 0.25 TABLET ORAL at 08:40

## 2018-07-09 RX ADMIN — MONTELUKAST SODIUM 10 MG: 10 TABLET, FILM COATED ORAL at 20:43

## 2018-07-09 RX ADMIN — PRAMIPEXOLE DIHYDROCHLORIDE 0.25 MG: 0.25 TABLET ORAL at 17:42

## 2018-07-09 RX ADMIN — BUDESONIDE AND FORMOTEROL FUMARATE DIHYDRATE 2 PUFF: 160; 4.5 AEROSOL RESPIRATORY (INHALATION) at 06:36

## 2018-07-09 RX ADMIN — IPRATROPIUM BROMIDE AND ALBUTEROL SULFATE 3 ML: 2.5; .5 SOLUTION RESPIRATORY (INHALATION) at 11:08

## 2018-07-09 RX ADMIN — WARFARIN SODIUM 2.5 MG: 2.5 TABLET ORAL at 17:40

## 2018-07-09 RX ADMIN — FUROSEMIDE 40 MG: 10 INJECTION, SOLUTION INTRAMUSCULAR; INTRAVENOUS at 08:42

## 2018-07-09 RX ADMIN — FUROSEMIDE 40 MG: 10 INJECTION, SOLUTION INTRAMUSCULAR; INTRAVENOUS at 20:41

## 2018-07-09 RX ADMIN — IPRATROPIUM BROMIDE AND ALBUTEROL SULFATE 3 ML: 2.5; .5 SOLUTION RESPIRATORY (INHALATION) at 15:50

## 2018-07-09 RX ADMIN — IPRATROPIUM BROMIDE AND ALBUTEROL SULFATE 3 ML: 2.5; .5 SOLUTION RESPIRATORY (INHALATION) at 18:51

## 2018-07-09 NOTE — PLAN OF CARE
Problem: Patient Care Overview  Goal: Plan of Care Review   07/09/18 1509   Coping/Psychosocial   Plan of Care Reviewed With patient   Plan of Care Review   Progress no change

## 2018-07-09 NOTE — CONSULTS
Adult Nutrition  Assessment    Patient Name:  Massimo Bentley  YOB: 1940  MRN: [id number]  Admit Date:  7/7/2018    Assessment Date:  7/9/2018    Comments:  Pt reports good appetite.  Reports 15 lb wt loss the past 6 weeks due to being sick and in the hospital and nursing home.  Willing to receive Magic Cups.  Intake 100% - 2x.  Meds and labs reviewed.  Pt with right lower calf incision.            Reason for Assessment     Row Name 07/09/18 1429          Reason for Assessment    Reason For Assessment per organizational policy   wound     Diagnosis other (see comments)   right lower calf incision                 Anthropometrics     Row Name 07/09/18 0645          Anthropometrics    Weight 96.7 kg (213 lb 3.2 oz)             Labs/Tests/Procedures/Meds     Row Name 07/09/18 1430          Labs/Procedures/Meds    Lab Results Reviewed reviewed        Medications    Pertinent Medications Reviewed reviewed             Physical Findings     Row Name 07/09/18 1431          Physical Findings    Skin other (see comments)   right lower calf incision             Estimated/Assessed Needs     Row Name 07/09/18 1431          Calculation Measurements    Weight Used For Calculations 96.7 kg (213 lb 3 oz)        Estimated/Assessed Needs    Additional Documentation Calorie Requirements (Group);Fluid Requirements (Group);Bristol-St. Jeor Equation (Group);Protein Requirements (Group)        Calorie Requirements    Estimated Calorie Requirement (kcal/day) 2228     Estimated Calorie Need Method Bristol-St Jeor        KCAL/KG    14 Kcal/Kg (kcal) 1353.8     15 Kcal/Kg (kcal) 1450.5     18 Kcal/Kg (kcal) 1740.6     20 Kcal/Kg (kcal) 1934     25 Kcal/Kg (kcal) 2417.5     30 Kcal/Kg (kcal) 2901     35 Kcal/Kg (kcal) 3384.5     40 Kcal/Kg (kcal) 3868     45 Kcal/Kg (kcal) 4351.5     50 Kcal/Kg (kcal) 4835        Bristol-St. Jeor Equation    RMR (Bristol-St. Jeor Equation) 1714.13        Protein Requirements    Est Protein  Requirement Amount (gms/kg) 1.2 gm protein     Estimated Protein Requirements (gms/day) 116.04        Fluid Requirements    Estimated Fluid Requirements (mL/day) --   1500cc fluid/day     Spike-Lorne Method (over 20 kg) 3434             Nutrition Prescription Ordered     Row Name 07/09/18 1435          Nutrition Prescription PO    Common Modifiers Cardiac;Fluid Restriction     Fluid Restriction mL per Tray 250 mL     Fluid Restriction mL per Day 1500 mL             Evaluation of Received Nutrient/Fluid Intake     Row Name 07/09/18 1435 07/09/18 1431       Calculation Measurements    Weight Used For Calculations  -- 96.7 kg (213 lb 3 oz)       PO Evaluation    Number of Meals 2  --    % PO Intake 100% - 2x  --            Evaluation of Prescribed Nutrient/Fluid Intake     Row Name 07/09/18 1431          Calculation Measurements    Weight Used For Calculations 96.7 kg (213 lb 3 oz)           Electronically signed by:  Annelise Romo RD  07/09/18 2:36 PM

## 2018-07-09 NOTE — THERAPY EVALUATION
Acute Care - Physical Therapy Initial Evaluation  [facility]     Patient Name: Massimo Bentley  : 1940  MRN: [id number]  Today's Date: 2018   Onset of Illness/Injury or Date of Surgery: 18  Date of Referral to PT: 18  Referring Physician: Dr SUZAN Interiano      Admit Date: 2018    Visit Dx:     ICD-10-CM ICD-9-CM   1. Congestive heart failure, unspecified congestive heart failure chronicity, unspecified congestive heart failure type (CMS/HCC) I50.9 428.0   2. Warfarin-induced coagulopathy (CMS/HCC) D68.9 286.9    T45.515A E934.2   3. Hypoxemia R09.02 799.02   4. Impaired functional mobility, balance, gait, and endurance Z74.09 V49.89     Patient Active Problem List   Diagnosis   • Long-term (current) use of anticoagulants   • Atrial fibrillation [I48.91]   • Embolism and thrombosis of artery (CMS/HCC)   • PVD (peripheral vascular disease) (CMS/HCC)   • Tobacco dependence syndrome   • Stroke (CMS/HCC)   • Suicide (CMS/HCC)   • Osteoarthritis of multiple joints   • Hypercholesterolemia   • GERD (gastroesophageal reflux disease)   • Major depression   • Benign prostatic hyperplasia   • Typical atrial flutter (CMS/HCC)   • Non-rheumatic mitral regurgitation   • Hyperlipidemia   • Localized edema   • Atherosclerosis of artery of extremity with gangrene (CMS/HCC)   • AAA (abdominal aortic aneurysm) without rupture (CMS/HCC)   • CKD (chronic kidney disease) stage 3, GFR 30-59 ml/min   • COPD with exacerbation (CMS/HCC)   • Hypertension   • Prediabetes   • Arterial occlusion, lower extremity (CMS/HCC)   • Hypoxemia   • Warfarin-induced coagulopathy (CMS/HCC)   • LVH (left ventricular hypertrophy)   • Diastolic dysfunction   • Coumadin toxicity     Past Medical History:   Diagnosis Date   • Abdominal bloating    • Anxiety    • Atherosclerosis of native arteries of extremity with intermittent claudication (CMS/HCC)    • Atrial flutter (CMS/HCC)    • Backache    • Benign prostatic hyperplasia      BX 2009, also has renal cyst      • Chronic bronchitis (CMS/HCC)    • Chronic renal impairment    • Depressive disorder    • Drug abuse, episodic use    • Dyslipidemia    • Fatigue    • GERD (gastroesophageal reflux disease)    • Hypercholesterolemia 03/15/2016   • Injury of tendon of rotator cuff 10/22/2015   • Insomnia 06/24/2016   • Intervertebral disc disorder 10/27/2011   • Major depression    • Neck pain 01/09/2012   • Osteoarthritis of multiple joints 06/21/2013   • Pain from vascular prosthetic devices, implants and grafts, sequela 12/03/2015   • Pain in left foot 10/27/2015   • Peripheral vascular disease (CMS/HCC) 12/03/2015     LEFT fem-tib bypass, embolectomy 6/2014    10/27/2015    • Senile debility 10/22/2015   • 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 No coronary atherosclerosis. Normal left ventricular function   • CHOLECYSTECTOMY  08/25/2014    Cholecystitis with gangrenous gallbladder   • ENDOSCOPY  03/22/1990    Duodenal ulcer   • ENDOSCOPY AND COLONOSCOPY  10/17/2013    Diverticulum in sigmoid colon & descending colon. Internal & external hemorrhoids found   • ESOPHAGOSCOPY / EGD  10/17/2013    With tube-Esophagitis seen. Biopsy taken. Gastritis in stomach. Biopsy taken. Stenosis in 2nd portion of duodenum. Dilatation performed.   • FEMORAL THROMBECTOMY/EMBOLECTOMY  06/29/2014    Left lower extremity arteriogram. Left popliteal artery endarterectomy. Redo left femoral to [id number] trunk bypass. Negative pressure wound therapy with placement of  Prevena wound V.A.C., left groin, left lower leg   • INJECTION OF MEDICATION  03/15/2016    B12   • INJECTION OF MEDICATION  10/23/2015    Drain/Inject Major Joint    • INJECTION OF MEDICATION  01/13/2016    Kenalog   • LUMBAR LAMINECTOMY     • TRANSESOPHAGEAL ECHOCARDIOGRAM (TEX)  10/06/2015    With color flow-Mild mitral regurg.Left atrium mild dilated.Ef of 55-60%.RV systolic pressure elevated.Trace tricuspid regurg   • TRANSESOPHAGEAL ECHOCARDIOGRAM (TEX)  02/14/2012    Without color flow-[facility] w/ early diast dysfun. AV trileaflet & structurally NRL w/ AR NRL. No regional wall motion abn Est Ef approx 50-55%. M & TR valves NRL w/ mild M & TR regurg Trace -mild pulmonic regurg. Anterior echo free space w/o hemodynamic signif. NRL RV   • TRANSESOPHAGEAL ECHOCARDIOGRAM (TEX)  03/25/2011    Normal LV systolic function with Ef of 50-55%.Left atrium ildly dilated.Moderate mitral regurg.Mitral valve regurg.Intact interatrial septum.No evidence of any cardiac thrombus or pericardial effusion.AV trileaflet        PT ASSESSMENT (last 12 hours)      Physical Therapy Evaluation     Row Name 07/08/18 1700          PT Evaluation Time/Intention    Subjective Information no complaints  -GB     Document Type evaluation  -GB     Mode of Treatment individual therapy;physical therapy  -GB     Patient Effort good  -GB     Row Name 07/08/18 1700          General Information    Patient Profile Reviewed? yes  -GB     Onset of Illness/Injury or Date of Surgery 07/07/18  -GB     Referring Physician Dr SUZAN Interiano  -GB     Patient Observations alert;cooperative;agree to therapy;lethargic  -GB     Patient/Family Observations no family present; pt tried to call dtr at end of therapy to ask her to bring his rollator  -GB     General Observations of Patient --   leans to R in bed, repositions self; IV; tele; 02  -GB     Prior Level of Function independent:;all household mobility;gait;bed mobility   pt gave hx as pre [facility] resident where he is there  for tx   -GB     Equipment Currently Used at Home grab bar;oxygen;raised toilet;rollator;shower chair   has to sleep in chair;  -GB     Pertinent History of Current Functional Problem pt residing at [facility] post venous sx RLE but hopes to return home again;admitted for hypoxemia;  -GB     Existing Precautions/Restrictions fall;cardiac;oxygen therapy device and L/min  -GB     Limitations/Impairments hearing;safety/cognitive  -GB     Equipment Issued to Patient gait belt  -GB     Risks Reviewed patient:;LOB;dizziness  -GB     Benefits Reviewed patient:;improve function;increase independence;increase strength  -GB     Row Name 07/08/18 1700          Relationship/Environment    Primary Source of Support/Comfort child(charlene)   dtr & family assist on weekends when sitters not present;   -GB     Lives With facility resident;spouse   pt at [facility] but wants to return to live w/ spouse  -GB     Primary Roles/Responsibilities caregiver for other(s)   pt has sitters for wife  -GB     Concerns About Impact on Relationships pt has been caring for wife when sitters off; he has problems with IADL's due to endurance; has help to take him to store;  -GB     Row Name 07/08/18 1700          Resource/Environmental Concerns    Current Living Arrangements extended care facility  -GB     Row Name 07/08/18 1700          Stairs Within Home, Primary    Stairs, Within Home, Primary flight to basement which he does  not use   at home, not NHC/SNF  -GB     Row Name 07/08/18 1700          Cognitive Assessment/Intervention- PT/OT    Orientation Status (Cognition) person;place;situation  -GB     Follows Commands (Cognition) follows two step commands;over 90% accuracy;repetition of directions required;verbal cues/prompting required  -GB     Safety Deficit (Cognitive) mild deficit;impulsivity  -GB     Cognitive Assessment/Intervention Comment --   pt [place] so he reacted to what he thought was said in gait  -GB     Row Name 07/08/18 1700          Bed Mobility  Assessment/Treatment    Bed Mobility Assessment/Treatment scooting/bridging;supine-sit-supine  -GB     Scooting/Bridging [place] (Bed Mobility) conditional independence;verbal cues  -     Supine-Sit-Supine [place] (Bed Mobility) conditional independence;supervision  -     Assistive Device (Bed Mobility) bed rails;draw sheet;head of bed elevated  -     Row Name 07/08/18 1700          Gait/Stairs Assessment/Training    [place] Level (Gait) minimum assist (75% patient effort);moderate assist (50% patient effort);1 person assist  -     Assistive Device (Gait) walker, front-wheeled  -GB     Distance in Feet (Gait) 6  -GB     Pattern (Gait) step-to  -GB     Deviations/Abnormal Patterns (Gait) ataxic  -     Bilateral Gait Deviations forward flexed posture  -     Comment (Gait/Stairs) pt unsteady; his wt primarily at upperbody, has trunk fwd flexion, and strong but shakey LE motor control sitting. He appears unsteady as his momentum with difficulty grading his movement w/ his fwd trunk; he also takes large steps accentuating this which match his heigth but not his proximal stability  -     Row Name 07/08/18 1700          General ROM    GENERAL ROM COMMENTS shoudler flx ~10 R ~5 deg L; elbow flx/ext and LE AROM WFL; good ;  -     Row Name 07/08/18 1700          General Assessment (Manual Muscle Testing)    General Manual Muscle Testing (MMT) Assessment upper extremity strength deficits identified  -     Comment, General Manual Muscle Testing (MMT) Assessment LE knee flx/ext and DF/PF 4+/5; pt has some motor control difficulty at knee ext/flx/  -     Row Name 07/08/18 1700          Upper Extremity (Manual Muscle Testing)    Comment, MMT: Upper Extremity shoulder flx 2+/5; elbow flx/ext and  good;   -     Row Name 07/08/18 1700          Sensory Assessment/Intervention    Sensory General Assessment no sensation deficits identified  -[facility] Name 07/08/18 1700          Pain  Assessment    Additional Documentation Pain Scale: Numbers Pre/Post-Treatment (Group)  -GB     Row Name 07/08/18 1700          Pain Scale: Numbers Pre/Post-Treatment    Pain Scale: Numbers, Pretreatment 0/10 - no pain  -GB     Pain Scale: Numbers, Post-Treatment 0/10 - no pain  -GB     Row Name             Wound 07/07/18 2300 Right lower calf incision;other (see comments)    Wound - Properties Group Date first assessed: 07/07/18  -LC Time first assessed: 2300  -LC Present On Admission : yes  -LC Side: Right  -LC Orientation: lower  -LC Location: calf  -LC Type: incision;other (see comments)  -LC, healing vascular surgery wound  Additional Comments: cleaned soap and water, santyl, dry gauze, and tape covered BID per [facility] directions  -LC    Row Name 07/08/18 1700          Coping    Observed Emotional State anxious;tearful/crying   cried when describing his need for help for him & wife  -GB     Row Name 07/08/18 1700          Plan of Care Review    Plan of Care Reviewed With patient  -GB     Row Name 07/08/18 1700          Physical Therapy Clinical Impression    Date of Referral to PT 07/08/18  -GB     PT Diagnosis (PT Clinical Impression) Hypoxemia; warfarin/coumadin toxicity; s/p recent vascular sx RLE; now has impaired functional mobiltiy endurance gait and balance.   -GB     Prognosis (PT Clinical Impression) good work ethic; notable motor control and gait needs  -GB     Functional Level at Time of Evaluation (PT Clinical Impression) --   requires assist for mobility and self care  -GB     Patient/Family Goals Statement (PT Clinical Impression) LTG home w/ wife again;  -GB     Criteria for Skilled Interventions Met (PT Clinical Impression) yes;treatment indicated  -GB     Pathology/Pathophysiology Noted (Describe Specifically for Each System) musculoskeletal;cardiovascular;pulmonary  -GB     Impairments Found (describe specific impairments) aerobic capacity/endurance;gait, locomotion, and balance;motor function  -GB      Functional Limitations in Following Categories (Describe Specific Limitations) self-care;home management  -GB     Rehab Potential (PT Clinical Summary) good, to achieve stated therapy goals  -GB     Predicted Duration of Therapy (PT) till goals met or modified  -     Care Plan Review (PT) patient/other agree to care plan  -     Row Name 07/08/18 1700          Vital Signs    Pre Systolic BP Rehab 110  -GB     Pre Treatment Diastolic BP 56  -GB     Intra Systolic BP Rehab 95  -GB     Intra Treatment Diastolic BP 60  -GB     Post Systolic BP Rehab 102  -GB     Post Treatment Diastolic BP 62  -GB     Pretreatment Heart Rate (beats/min) 64  -GB     Intratreatment Heart Rate (beats/min) 76  -GB     Posttreatment Heart Rate (beats/min) 65  -GB     Pre SpO2 (%) 94  -GB     O2 Delivery Pre Treatment nasal cannula  -GB     Intra SpO2 (%) 86  -GB     O2 Delivery Intra Treatment supplemental O2  -GB     Post SpO2 (%) 93  -GB     O2 Delivery Post Treatment supplemental O2  -GB     Pre Patient Position Supine  -GB     Intra Patient Position Standing  -GB     Post Patient Position Sitting  -GB     Recovery Time 8 min sitting post gait of 10 steps at bedside, SOA/fatigued  -     Row Name 07/08/18 1700          Physical Therapy Goals    Transfer Goal Selection (PT) transfer, PT goal 1  -     Gait Training Goal Selection (PT) gait training, PT goal 1;gait training, PT goal (free text)  -     Row Name 07/08/18 1700          Transfer Goal 1 (PT)    Activity/Assistive Device (Transfer Goal 1, PT) bed-to-chair/chair-to-bed;toilet  -     [place] Level/Cues Needed (Transfer Goal 1, PT) conditional independence;supervision required  -     Time Frame (Transfer Goal 1, PT) long term goal (LTG);5 days  -     Progress/Outcome (Transfer Goal 1, PT) goal not met;goal ongoing  -     Row Name 07/08/18 1700          Gait Training Goal 1 (PT)    Activity/Assistive Device (Gait Training Goal 1, PT) gait (walking  locomotion);assistive device use;decrease fall risk;increase endurance/gait distance  -GB     [place] Level (Gait Training Goal 1, PT) minimum assist (75% or more patient effort)  -GB     Distance (Gait Goal 1, PT) 50 ft   -GB     Time Frame (Gait Training Goal 1, PT) short term goal (STG);5 days  -GB     Progress/Outcome (Gait Training Goal 1, PT) goal not met  -     Row Name 07/08/18 1700          Gait Training Goal (PT)    Gait Training Goal (PT) --   pt will demo problem solving skills in amb w/ RW w/ VSS  -GB     Time Frame (Gait Training Goal, PT) by discharge  -GB     Progress/Outcome (Gait Training Goal, PT) goal ongoing  -     Row Name 07/08/18 1700          Positioning and Restraints    Pre-Treatment Position in bed  -GB     Post Treatment Position bed  -GB     In Bed notified nsg;supine;fowlers;call light within reach;encouraged to call for assist;exit alarm on;with nsg;side rails up x2;heels elevated  -     Row Name 07/08/18 1700          Living Environment    Home Accessibility wheelchair accessible;stairs within home   home description  -       User Key  (r) = Recorded By, (t) = Taken By, (c) = Cosigned By    Initials Name Provider Type    LEONID Lester, ARYAN Physical Therapist    JIM Carrillo, RN Registered Nurse          Physical Therapy Education     Title: PT OT SLP Therapies (Active)     Topic: Physical Therapy (Active)     Point: Mobility training (Active)    Learning Progress Summary     Learner Status Readiness Method Response Comment Documented by    Patient Active Acceptance D NR POC;mobiltiy w/ RW; balance in gait  07/08/18 1903          Point: Precautions (Active)    Learning Progress Summary     Learner Status Readiness Method Response Comment Documented by    Patient Active Acceptance D NR POC;mobiltiy w/ RW; balance in gait  07/08/18 1903                      User Key     Initials Effective Dates Name Provider Type [facility] 04/03/18 -  Leonila NORRIS  Trevon, PT Physical Therapist PT                PT Recommendation and Plan  Anticipated Discharge Disposition (PT): anticipate therapy at next level of care  Planned Therapy Interventions (PT Eval): balance training, bed mobility training, gait training, home exercise program, motor coordination training, strengthening, transfer training  Therapy Frequency (PT Clinical Impression): daily  Outcome Summary/Treatment Plan (PT)  Anticipated Discharge Disposition (PT): anticipate therapy at next level of care  Plan of Care Reviewed With: patient  Outcome Summary: PT eval completed. He was able to sit EOB and stand at bedside w/ RW; had difficulty managing balance and was not safe in gait , required min-mod assist at one point to stay along bedside due to imbalnce in gait. He was very SOA post about 5 ft sidesteping w/ sats 85-86%  & generally low b/p through tx; he will need  skilled services post d/c josseline to meet his LTG to go home to spouse.  Became very teary describing his inabitly to shop for them due to endurance issues.  Rec return to [facility] /facility to offer rehab services to him on regular basis;           Outcome Measures     Row Name 07/08/18 1900             How much help from another person do you currently need...    Turning from your back to your side while in flat bed without using bedrails? 3  -GB      Moving from lying on back to sitting on the side of a flat bed without bedrails? 3  -GB      Moving to and from a bed to a chair (including a wheelchair)? 2  -GB      Standing up from a chair using your arms (e.g., wheelchair, bedside chair)? 3  -GB      Climbing 3-5 steps with a railing? 1  -GB      To walk in hospital room? 1  -GB      AM-PAC 6 Clicks Score 13  -GB         Functional Assessment    Outcome Measure Options AM-PAC 6 Clicks Basic Mobility (PT)  -GB        User Key  (r) = Recorded By, (t) = Taken By, (c) = Cosigned By    Initials Name Provider Type    LEONID Lester, PT  Physical Therapist           Time Calculation:     Therapy Suggested Charges     Code   Minutes Charges    35187 (CPT®) Hc Pt Neuromusc Re Education Ea 15 Min      59238 (CPT®) Hc Pt Ther Proc Ea 15 Min      63780 (CPT®) Hc Gait Training Ea 15 Min 15 1    23995 (CPT®) Hc Pt Therapeutic Act Ea 15 Min      19026 (CPT®) Hc Pt Manual Therapy Ea 15 Min      06308 (CPT®) Hc Pt Iontophoresis Ea 15 Min      06267 (CPT®) Hc Pt Elec Stim Ea-Per 15 Min      26373 (CPT®) Hc Pt Ultrasound Ea 15 Min      44970 (CPT®) Hc Pt Self Care/Mgmt/Train Ea 15 Min      Total  15 1        Therapy Charges for Today     Code Description Service Date Service Provider Modifiers Qty    96762301458 HC PT MOBILITY CURRENT 7/8/2018 Leonila Lester, PT GP, CK 1    32359211169 HC PT MOBILITY PROJECTED 7/8/2018 Leonila Lester, PT GP, CI 1    00415639661 HC PT EVAL MOD COMPLEXITY 2 7/8/2018 Leonila Lester, PT GP 1    68232122453 HC GAIT TRAINING EA 15 MIN 7/8/2018 Leonila Lester, PT GP 1          PT G-Codes  PT Professional Judgement Used?: Yes  Outcome Measure Options: AM-PAC 6 Clicks Basic Mobility (PT)  Score: 13  Functional Limitation: Mobility: Walking and moving around  Mobility: Walking and Moving Around Current Status (): At least 40 percent but less than 60 percent impaired, limited or restricted  Mobility: Walking and Moving Around Goal Status (): At least 1 percent but less than 20 percent impaired, limited or restricted      Leonila Lester, PT  7/8/2018

## 2018-07-09 NOTE — PLAN OF CARE
Problem: Patient Care Overview  Goal: Plan of Care Review  Outcome: Ongoing (interventions implemented as appropriate)  Encourage intake of meals and supplement.   07/09/18 5046   Coping/Psychosocial   Plan of Care Reviewed With patient   Plan of Care Review   Progress no change   OTHER   Outcome Summary initial assessment

## 2018-07-09 NOTE — PLAN OF CARE
Problem: Patient Care Overview  Goal: Plan of Care Review  Outcome: Ongoing (interventions implemented as appropriate)   07/08/18 0594   Coping/Psychosocial   Plan of Care Reviewed With patient   OTHER   Outcome Summary PT alexandra completed. He was able to sit EOB and stand at bedside w/ RW; had difficulty managing balance and was not safe in gait , required min-mod assist at one point to stay along bedside due to imbalnce in gait. He was very SOA post about 5 ft sidesteping w/ sats 85-86% & generally low b/p through tx; he will need skilled services post d/c josseline to meet his LTG to go home to spouse. Became very teary describing his inabitly to shop for them due to endurance issues. Rec return to [facility] /facility to offer rehab services to him on regular basis;

## 2018-07-09 NOTE — THERAPY TREATMENT NOTE
Acute Care - Occupational Therapy Treatment Note  [facility]     Patient Name: Massimo Bentley  : 1940  MRN: [id number]  Today's Date: 2018  Onset of Illness/Injury or Date of Surgery: 18  Date of Referral to OT: 18  Referring Physician: VALENTINA Sykes MD.    Admit Date: 2018       ICD-10-CM ICD-9-CM   1. Congestive heart failure, unspecified congestive heart failure chronicity, unspecified congestive heart failure type (CMS/[facility]) I50.9 428.0   2. Warfarin-induced coagulopathy (CMS/HCC) D68.9 286.9    T45.515A E934.2   3. Hypoxemia R09.02 799.02   4. Impaired functional mobility, balance, gait, and endurance Z74.09 V49.89   5. Impaired mobility and activities of daily living Z74.09 799.89     Patient Active Problem List   Diagnosis   • Long-term (current) use of anticoagulants   • Atrial fibrillation [I48.91]   • Embolism and thrombosis of artery (CMS/[facility])   • PVD (peripheral vascular disease) (CMS/[facility])   • Tobacco dependence syndrome   • Stroke (CMS/HCC)   • Suicide (CMS/[facility])   • Osteoarthritis of multiple joints   • Hypercholesterolemia   • GERD (gastroesophageal reflux disease)   • Major depression   • Benign prostatic hyperplasia   • Typical atrial flutter (CMS/HCC)   • Non-rheumatic mitral regurgitation   • Hyperlipidemia   • Localized edema   • Atherosclerosis of artery of extremity with gangrene (CMS/HCC)   • AAA (abdominal aortic aneurysm) without rupture (CMS/HCC)   • CKD (chronic kidney disease) stage 3, GFR 30-59 ml/min   • COPD with exacerbation (CMS/HCC)   • Hypertension   • Prediabetes   • Arterial occlusion, lower extremity (CMS/HCC)   • Hypoxemia   • Warfarin-induced coagulopathy (CMS/HCC)   • LVH (left ventricular hypertrophy)   • Diastolic dysfunction   • Coumadin toxicity     Past Medical History:   Diagnosis Date   • Abdominal bloating    • Anxiety    • Atherosclerosis of native arteries of extremity with intermittent claudication (CMS/HCC)    • Atrial flutter  (CMS/[facility])    • Backache    • Benign prostatic hyperplasia     BX 2009, also has renal cyst      • Chronic bronchitis (CMS/HCC)    • Chronic renal impairment    • Depressive disorder    • Drug abuse, episodic use    • Dyslipidemia    • Fatigue    • GERD (gastroesophageal reflux disease)    • Hypercholesterolemia 03/15/2016   • Injury of tendon of rotator cuff 10/22/2015   • Insomnia 06/24/2016   • Intervertebral disc disorder 10/27/2011   • Major depression    • Neck pain 01/09/2012   • Osteoarthritis of multiple joints 06/21/2013   • Pain from vascular prosthetic devices, implants and grafts, sequela 12/03/2015   • Pain in left foot 10/27/2015   • Peripheral vascular disease (CMS/HCC) 12/03/2015     LEFT fem-tib bypass, embolectomy 6/2014    10/27/2015    • Senile debility 10/22/2015   • 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 No coronary atherosclerosis. Normal left ventricular function   • CHOLECYSTECTOMY  08/25/2014    Cholecystitis with gangrenous gallbladder   • ENDOSCOPY  03/22/1990    Duodenal ulcer   • ENDOSCOPY AND COLONOSCOPY  10/17/2013    Diverticulum in sigmoid colon & descending colon. Internal & external hemorrhoids found   • ESOPHAGOSCOPY / EGD  10/17/2013    With tube-Esophagitis seen. Biopsy taken. Gastritis in stomach. Biopsy taken. Stenosis in 2nd portion of duodenum. Dilatation performed.   • FEMORAL THROMBECTOMY/EMBOLECTOMY  06/29/2014    Left lower extremity arteriogram. Left popliteal artery endarterectomy. Redo left femoral to [id number] trunk  bypass. Negative pressure wound therapy with placement of Prevena wound V.A.C., left groin, left lower leg   • INJECTION OF MEDICATION  03/15/2016    B12   • INJECTION OF MEDICATION  10/23/2015    Drain/Inject Major Joint    • INJECTION OF MEDICATION  01/13/2016    Kenalog   • LUMBAR LAMINECTOMY     • TRANSESOPHAGEAL ECHOCARDIOGRAM (TEX)  10/06/2015    With color flow-Mild mitral regurg.Left atrium mild dilated.Ef of 55-60%.RV systolic pressure elevated.Trace tricuspid regurg   • TRANSESOPHAGEAL ECHOCARDIOGRAM (TEX)  02/14/2012    Without color flow-[facility] w/ early diast dysfun. AV trileaflet & structurally NRL w/ AR NRL. No regional wall motion abn Est Ef approx 50-55%. M & TR valves NRL w/ mild M & TR regurg Trace -mild pulmonic regurg. Anterior echo free space w/o hemodynamic signif. NRL RV   • TRANSESOPHAGEAL ECHOCARDIOGRAM (TEX)  03/25/2011    Normal LV systolic function with Ef of 50-55%.Left atrium ildly dilated.Moderate mitral regurg.Mitral valve regurg.Intact interatrial septum.No evidence of any cardiac thrombus or pericardial effusion.AV trileaflet       Therapy Treatment          Rehabilitation Treatment Summary     Row Name 07/09/18 9559             Treatment Time/Intention    Discipline occupational therapy assistant  -KD      Document Type therapy note (daily note)  -KD      Subjective Information no complaints  -KD      Mode of Treatment occupational therapy  -KD      Patient/Family Observations no family present  -KD      Care Plan Review care plan/treatment goals reviewed  -KD      Therapy Frequency (OT Eval) other (see comments)   3-14x/wk  -KD      Patient Effort good  -KD      Recorded by [KD] LEVI Hazel 07/09/18 1453      Row Name 07/09/18 1355             Vital Signs    Pre Systolic BP Rehab 102  -KD      Pre Treatment Diastolic BP 58  -KD      Pretreatment Heart Rate (beats/min) 72  -KD      Posttreatment Heart Rate (beats/min) 76  -KD      Pre SpO2 (%) 94  -KD      O2 Delivery Pre  Treatment room air  -KD      Post SpO2 (%) 94  -KD      O2 Delivery Post Treatment room air  -KD      Pre Patient Position Sitting  -KD      Intra Patient Position Standing  -KD      Post Patient Position Sitting  -KD      Recorded by [KD] NALLELY HazelA/L 07/09/18 1506      Row Name 07/09/18 1355             Cognitive Assessment/Intervention- PT/OT    Orientation Status (Cognition) oriented x 3  -KD      Follows Commands (Cognition) WFL  -KD      Recorded by [KD] NALLELY HazelA/L 07/09/18 1506      Row Name 07/09/18 1355             Sit-Stand Transfer    Sit-Stand [place] (Transfers) contact guard   pt stood x 4.5 mins  -KD      Assistive Device (Sit-Stand Transfers) walker, front-wheeled  -KD      Recorded by [KD] ALDAIR Hazel/L 07/09/18 1506      Row Name 07/09/18 1355             Stand-Sit Transfer    Stand-Sit [place] (Transfers) contact guard  -KD      Assistive Device (Stand-Sit Transfers) walker, front-wheeled  -KD      Recorded by [KD] ALDAIR Hazel/L 07/09/18 1506      Row Name 07/09/18 1355             Therapeutic Exercise    Upper Extremity Range of Motion (Therapeutic Exercise) shoulder flexion/extension, bilateral;shoulder abduction/adduction, bilateral;shoulder horizontal abduction/adduction, bilateral;shoulder internal/external rotation, bilateral;elbow flexion/extension, bilateral;forearm supination/pronation, bilateral;wrist flexion/extension, bilateral  -KD      Weight/Resistance (Therapeutic Exercise) 2 pounds  -KD      Exercise Type (Therapeutic Exercise) AROM (active range of motion)  -KD      Sets/Reps (Therapeutic Exercise) 2/20  -KD      Equipment (Therapeutic Exercise) free weight, barbell  -KD      Expected Outcome (Therapeutic Exercise) improve functional tolerance, self-care activity  -KD      Recorded by [KD] ALDAIR Hazel/L 07/09/18 1506      Row Name 07/09/18 1355             Positioning and Restraints    Pre-Treatment Position sitting in  chair/recliner  -KD      Post Treatment Position chair  -KD      In Chair sitting;call light within reach;encouraged to call for assist;exit alarm on  -KD      Recorded by [GILBERT] LEVI Hazel 07/09/18 1506      Row Name 07/09/18 1355             Pain Scale: Numbers Pre/Post-Treatment    Pain Scale: Numbers, Pretreatment 0/10 - no pain  -KD      Pain Scale: Numbers, Post-Treatment 0/10 - no pain  -KD      Recorded by [GILBERT] ALDAIR Hazel/L 07/09/18 1506      Row Name                Wound 07/07/18 2300 Right lower calf incision;other (see comments)    Wound - Properties Group Date first assessed: 07/07/18 [LC] Time first assessed: 2300 [LC] Present On Admission : yes [LC] Side: Right [LC] Orientation: lower [LC] Location: calf [LC] Type: incision;other (see comments) [LC], healing vascular surgery wound  Additional Comments: cleaned soap and water, santyl, dry gauze, and tape covered BID per [facility] directions [LC] Recorded by:  [LC] Anais Carrillo RN 07/07/18 2310    Row Name 07/09/18 1355             Outcome Summary/Treatment Plan (OT)    Daily Summary of Progress (OT) progress toward functional goals as expected  -KD      Plan for Continued Treatment (OT) cont  -KD      Recorded by [GILBERT] ALDAIR Hazel/L 07/09/18 1506        User Key  (r) = Recorded By, (t) = Taken By, (c) = Cosigned By    Initials Name Effective Dates Discipline    LC Anais Carrillo RN 10/17/16 -  Nurse    KD ALDAIR Hazel/ELLIE 03/07/18 -  OT        Wound 07/07/18 2300 Right lower calf incision;other (see comments) (Active)   Dressing Appearance dry;intact;no drainage 7/9/2018  8:51 AM   Base red/granulating 7/8/2018  8:00 PM   Edges rolled/closed 7/8/2018  8:00 PM   Care, Wound cleansed with;sterile normal saline 7/8/2018  8:00 PM   Dressing Care, Wound dressing changed 7/8/2018  8:00 PM             OT Rehab Goals     Row Name 07/09/18 1355 07/09/18 1005          Transfer Goal 1 (OT)    Activity/Assistive Device (Transfer Goal 1,  OT) transfers, all  -KD transfers, all  -RB     [place] Level/Cues Needed (Transfer Goal 1, OT) supervision required  -KD supervision required  -RB     Time Frame (Transfer Goal 1, OT) long term goal (LTG);by discharge  -KD long term goal (LTG);by discharge  -RB     Progress/Outcome (Transfer Goal 1, OT) goal not met  -KD goal not met  -RB        Bathing Goal 1 (OT)    Activity/Assistive Device (Bathing Goal 1, OT) bathing skills, all  -KD bathing skills, all  -RB     [place] Level/Cues Needed (Bathing Goal 1, OT) supervision required  -KD supervision required  -RB     Time Frame (Bathing Goal 1, OT) long term goal (LTG);by discharge  -KD long term goal (LTG);by discharge  -RB     Progress/Outcomes (Bathing Goal 1, OT) goal not met  -KD goal not met  -RB        Dressing Goal 1 (OT)    Activity/Assistive Device (Dressing Goal 1, OT) dressing skills, all  -KD dressing skills, all  -RB     [place]/Cues Needed (Dressing Goal 1, OT) supervision required  -KD supervision required  -RB     Time Frame (Dressing Goal 1, OT) long term goal (LTG);by discharge  -KD long term goal (LTG);by discharge  -RB     Progress/Outcome (Dressing Goal 1, OT) goal not met  -KD goal not met  -RB        Toileting Goal 1 (OT)    Activity/Device (Toileting Goal 1, OT) toileting skills, all  -KD toileting skills, all  -RB     [place] Level/Cues Needed (Toileting Goal 1, OT) supervision required  -KD supervision required  -RB     Time Frame (Toileting Goal 1, OT) long term goal (LTG);by discharge  -KD long term goal (LTG);by discharge  -RB     Progress/Outcome (Toileting Goal 1, OT) goal not met  -KD goal not met  -RB        Patient Education Goal (OT)    Activity (Patient Education Goal, OT) B HEP for increased str for mobility and ADLs independence, EC/WS and home safety/fall prevention.  -KD B HEP for increased str for mobility and ADLs independence, EC/WS and home safety/fall prevention.  -RB     [place]/Cues/Accuracy  (Memory Goal 2, OT) demonstrates adequately;verbalizes understanding  -KD demonstrates adequately;verbalizes understanding  -RB     Time Frame (Patient Education Goal, OT) long term goal (LTG);by discharge  -KD long term goal (LTG);by discharge  -RB     Progress/Outcome (Patient Education Goal, OT) goal not met  -KD goal not met  -RB       User Key  (r) = Recorded By, (t) = Taken By, (c) = Cosigned By    Initials Name Provider Type Discipline    RB Dann Cardoso OT Occupational Therapist OT    LEVI Fraser Occupational Therapy Assistant OT        Occupational Therapy Education     Title: PT OT SLP Therapies (Active)     Topic: Occupational Therapy (Active)     Point: Precautions (Done)     Description: Instruct learner(s) on prescribed precautions during self-care and functional transfers.   Learning Progress Summary     Learner Status Readiness Method Response Comment Documented by    Patient Done Acceptance E VU Edu pt on use of gait belt and non skid socks when OOB and no OOB without assist.  07/09/18 1316                      User Key     Initials Effective Dates Name Provider Type Discipline     06/15/16 -  Dann Cardoso OT Occupational Therapist OT                OT Recommendation and Plan  Outcome Summary/Treatment Plan (OT)  Daily Summary of Progress (OT): progress toward functional goals as expected  Plan for Continued Treatment (OT): cont  Therapy Frequency (OT Eval): other (see comments) (3-14x/wk)  Daily Summary of Progress (OT): progress toward functional goals as expected        Outcome Measures     Row Name 07/09/18 1355 07/09/18 1005 07/08/18 1900       How much help from another person do you currently need...    Turning from your back to your side while in flat bed without using bedrails?  --  -- 3  -GB    Moving from lying on back to sitting on the side of a flat bed without bedrails?  --  -- 3  -GB    Moving to and from a bed to a chair (including a wheelchair)?  --  -- 2  -GB     Standing up from a chair using your arms (e.g., wheelchair, bedside chair)?  --  -- 3  -GB    Climbing 3-5 steps with a railing?  --  -- 1  -GB    To walk in hospital room?  --  -- 1  -GB    AM-PAC 6 Clicks Score  --  -- 13  -GB       How much help from another is currently needed...    Putting on and taking off regular lower body clothing? 2  -KD 2  -RB  --    Bathing (including washing, rinsing, and drying) 2  -KD 2  -RB  --    Toileting (which includes using toilet bed pan or urinal) 2  -KD 2  -RB  --    Putting on and taking off regular upper body clothing 2  -KD 2  -RB  --    Taking care of personal grooming (such as brushing teeth) 3  -KD 3  -RB  --    Eating meals 4  -KD 4  -RB  --    Score 15  -KD 15  -RB  --       Functional Assessment    Outcome Measure Options  -- AM-PAC 6 Clicks Daily Activity (OT)  -RB AM-PAC 6 Clicks Basic Mobility (PT)  -GB      User Key  (r) = Recorded By, (t) = Taken By, (c) = Cosigned By    Initials Name Provider Type    YOGESH Cardoso, OT Occupational Therapist    LEONID Lester, PT Physical Therapist    KD ALDAIR Hazel/L Occupational Therapy Assistant           Time Calculation:         Time Calculation- OT     Row Name 07/09/18 1508 07/09/18 1324          Time Calculation- OT    OT Start Time 1355  -KD 1005  -RB     OT Stop Time 1420  -KD 1030  -     OT Time Calculation (min) 25 min  -KD 25 min  -RB     Total Timed Code Minutes- OT 25 minute(s)  -KD  --     OT Received On 07/09/18  -KD 07/09/18  -RB     OT Goal Re-Cert Due Date  -- 07/22/18  -RB       User Key  (r) = Recorded By, (t) = Taken By, (c) = Cosigned By    Initials Name Provider Type    YOGESH Cardoso OT Occupational Therapist    ALDAIR Fraser/L Occupational Therapy Assistant           Therapy Suggested Charges     Code   Minutes Charges    None           Therapy Charges for Today     Code Description Service Date Service Provider Modifiers Qty    [id number]  OT THERAPEUTIC ACT EA  15 MIN 7/9/2018 ALDAIR Hazel/L GO 1    [id number] HC OT THER PROC EA 15 MIN 7/9/2018 ALDAIR Hazel/L GO 1          OT G-codes  OT Professional Judgement Used?: Yes  OT Functional Scales Options: AM-PAC 6 Clicks Daily Activity (OT)  Score: 15  Functional Limitation: Self care  Self Care Current Status (): At least 40 percent but less than 60 percent impaired, limited or restricted  Self Care Goal Status (): At least 20 percent but less than 40 percent impaired, limited or restricted    ALDAIR Hazel/ELLIE  7/9/2018

## 2018-07-09 NOTE — PAYOR COMM NOTE
"Denys Parrish (78 y.o. Male)     Date of Birth Social Security Number Address Home Phone MRN    1940  [facility] [address]  [facility] [address] [phone] [id number]    Hinduism Marital Status          Episcopalian        Admission Date Admission Type Admitting Provider Attending Provider Department, Room/Bed    7/7/18 Emergency Umm Interiano MD Patel, Harshul Amrutlal, MD [address], [address]    Discharge Date Discharge Disposition Discharge Destination                       Attending Provider:  Umm Interiano MD    Allergies:  Morphine And Related, Lipitor [Atorvastatin], Lortab [Hydrocodone-acetaminophen]    Isolation:  None   Infection:  None   Code Status:  CPR    Ht:  180.3 cm (71\")   Wt:  96.7 kg (213 lb 3.2 oz)    Admission Cmt:  None   Principal Problem:  Hypoxemia [R09.02]                 Active Insurance as of 7/7/2018     Primary Coverage     Payor Plan Insurance Group Employer/Plan Group    ANTH MEDICARE REPLACEMENT ANTHEM MEDICARE ADVANTAGE [id number]     Payor Plan Address Payor Plan Phone Number Effective From Effective To    [address] [phone] 1/1/2017     [facility] [id number]       Subscriber Name Subscriber Birth Date Member ID       DENYS PARRISH 1940 [id number]                 Emergency Contacts      (Rel.) Home Phone Work Phone Mobile Phone    Jennifer Goncalves (Daughter) [phone]        Jacy Berman- [facility]  P:[phone]  F:[phone]    Ref#[id number]       History & Physical      Umm Interiano MD at 7/7/2018 11:20 PM                [facility] Medicine Services  INPATIENT HISTORY AND PHYSICAL       Patient Care Team:  Gregory Pino MD as PCP - General (Family Medicine)  Belkys Hoffman MD as PCP - Claims Attributed  Brian Pennington MD as Surgeon (Cardiothoracic " Surgery)  Emily Thacker MD as Consulting Physician (Cardiology)  Devin Menendez DPM as Consulting Physician (Podiatry)    Date of Admission July 7, 2018 11:20 PM      Chief complaint   Chief Complaint   Patient presents with   • Shortness of Breath       Subjective     Patient is a 78 y.o. male presents with Complaint of having shortness of air.  Patient said he has been having decreased excess tolerance due to dyspnea on exertion.  Patient denies any chest pain or palpitation.  However does complain of having lower shortness swelling.  Patient denies any orthopnea or proximal nocturnal dyspnea.  Patient has any fever or chills.  Patient does complain of having increased fatigue and weakness.  Patient occasionally complained of lightheadedness.  Patient denies any urinary complaints.  Patient states he has been taking medication as prescribed.  Patient denies any weight loss.  No unusual bleeding have been reported.  No change in bowel habits or appetite have been reported.    Review of Systems   Review of Systems   Constitutional: Positive for activity change, appetite change, fatigue and unexpected weight change. Negative for chills, diaphoresis and fever.   HENT: Negative for congestion, rhinorrhea, sore throat and trouble swallowing.    Eyes: Negative for visual disturbance.   Respiratory: Positive for chest tightness and shortness of breath. Negative for cough and wheezing.    Cardiovascular: Positive for leg swelling. Negative for chest pain and palpitations.   Gastrointestinal: Negative for abdominal pain, blood in stool, diarrhea, nausea and vomiting.   Endocrine: Negative for cold intolerance and heat intolerance.   Genitourinary: Negative for decreased urine volume and difficulty urinating.   Musculoskeletal: Negative for back pain, gait problem and neck pain.   Skin: Negative for rash.   Neurological: Positive for weakness and light-headedness. Negative for dizziness, syncope, numbness and  headaches.   Psychiatric/Behavioral: The patient is not nervous/anxious.        History  Past Medical History:   Diagnosis Date   • Abdominal bloating    • Anxiety    • Atherosclerosis of native arteries of extremity with intermittent claudication (CMS/HCC)    • Atrial flutter (CMS/HCC)    • Backache    • Benign prostatic hyperplasia     BX 2009, also has renal cyst      • Chronic bronchitis (CMS/HCC)    • Chronic renal impairment    • Depressive disorder    • Drug abuse, episodic use    • Dyslipidemia    • Fatigue    • GERD (gastroesophageal reflux disease)    • Hypercholesterolemia 03/15/2016   • Injury of tendon of rotator cuff 10/22/2015   • Insomnia 06/24/2016   • Intervertebral disc disorder 10/27/2011   • Major depression    • Neck pain 01/09/2012   • Osteoarthritis of multiple joints 06/21/2013   • Pain from vascular prosthetic devices, implants and grafts, sequela 12/03/2015   • Pain in left foot 10/27/2015   • Peripheral vascular disease (CMS/HCC) 12/03/2015     LEFT fem-tib bypass, embolectomy 6/2014    10/27/2015    • Senile debility 10/22/2015   • 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 No coronary atherosclerosis. Normal left ventricular function   • CHOLECYSTECTOMY  08/25/2014    Cholecystitis with gangrenous gallbladder   • ENDOSCOPY  03/22/1990    Duodenal ulcer   • ENDOSCOPY AND COLONOSCOPY  10/17/2013    Diverticulum in sigmoid colon & descending colon. Internal & external hemorrhoids found   • ESOPHAGOSCOPY / EGD  10/17/2013    With tube-Esophagitis  seen. Biopsy taken. Gastritis in stomach. Biopsy taken. Stenosis in 2nd portion of duodenum. Dilatation performed.   • FEMORAL THROMBECTOMY/EMBOLECTOMY  06/29/2014    Left lower extremity arteriogram. Left popliteal artery endarterectomy. Redo left femoral to [id number] trunk bypass. Negative pressure wound therapy with placement of Prevena wound V.A.C., left groin, left lower leg   • INJECTION OF MEDICATION  03/15/2016    B12   • INJECTION OF MEDICATION  10/23/2015    Drain/Inject Major Joint    • INJECTION OF MEDICATION  01/13/2016    Kenalog   • LUMBAR LAMINECTOMY     • TRANSESOPHAGEAL ECHOCARDIOGRAM (TEX)  10/06/2015    With color flow-Mild mitral regurg.Left atrium mild dilated.Ef of 55-60%.RV systolic pressure elevated.Trace tricuspid regurg   • TRANSESOPHAGEAL ECHOCARDIOGRAM (TEX)  02/14/2012    Without color flow-[facility] w/ early diast dysfun. AV trileaflet & structurally NRL w/ AR NRL. No regional wall motion abn Est Ef approx 50-55%. M & TR valves NRL w/ mild M & TR regurg Trace -mild pulmonic regurg. Anterior echo free space w/o hemodynamic signif. NRL RV   • TRANSESOPHAGEAL ECHOCARDIOGRAM (TEX)  03/25/2011    Normal LV systolic function with Ef of 50-55%.Left atrium ildly dilated.Moderate mitral regurg.Mitral valve regurg.Intact interatrial septum.No evidence of any cardiac thrombus or pericardial effusion.AV trileaflet     Family History   Problem Relation Age of Onset   • Osteoarthritis Mother         Lived to be 89   • Hypertension Mother    • ADD / ADHD Father    • Lung cancer Father    • ADD / ADHD Brother    • Rectal cancer Other      Social History   Substance Use Topics   • Smoking status: Current Some Day Smoker     Packs/day: 0.50     Years: 50.00     Types: Cigarettes   • Smokeless tobacco: Never Used      Comment: reduced;   • Alcohol use No      Comment: Former use      Prescriptions Prior to Admission   Medication Sig Dispense Refill Last Dose   • Aspirin-Acetaminophen-Caffeine (EXCEDRIN  EXTRA STRENGTH PO) Take 1 tablet by mouth Daily As Needed.      • bisacodyl (DULCOLAX) 10 MG suppository Insert 10 mg into the rectum Daily As Needed for Constipation.      • budesonide-formoterol (SYMBICORT) 160-4.5 MCG/ACT inhaler Inhale 2 puffs 2 (Two) Times a Day. 1 inhaler 12 Taking   • cilostazol (PLETAL) 50 MG tablet Take 1 tablet by mouth 2 (Two) Times a Day. (Patient taking differently: Take 100 mg by mouth 2 (Two) Times a Day.) 60 tablet 5 Taking   • collagenase (SANTYL) 250 UNIT/GM ointment Apply  topically Daily.      • furosemide (LASIX) 40 MG tablet Take 1 tablet by mouth Daily. 30 tablet 6 Taking   • magnesium hydroxide (MILK OF MAGNESIA) 400 MG/5ML suspension Take 30 mL by mouth Daily As Needed for Constipation (if no BM x 3 days).      • magnesium oxide (MAGOX) 400 (241.3 MG) MG tablet tablet Take 400 mg by mouth Every Night.   Taking   • montelukast (SINGULAIR) 10 MG tablet Take 1 tablet by mouth Every Night. 90 tablet 2 Taking   • Multiple Vitamins-Minerals (THERA-TABS M PO) Take 1 tablet by mouth Daily.      • oxyCODONE-acetaminophen (PERCOCET)  MG per tablet Take 1 tablet by mouth Every 4 (Four) Hours As Needed for Moderate Pain .      • pramipexole (MIRAPEX) 0.25 MG tablet Take 1 tablet by mouth 3 (Three) Times a Day. (Patient taking differently: Take 0.5 mg by mouth Daily. 0.5 mg in the morning, 0.25 mg at bedtime) 90 tablet 5 Taking   • QUEtiapine (SEROquel) 300 MG tablet Take 300 mg by mouth Every Night.      • Sodium Phosphates (ENEMA READY-TO-USE) 7-19 GM/118ML enema Insert 1 Bag into the rectum Daily As Needed. If dulcolax ineffective      • sotalol (BETAPACE) 80 MG tablet Take 1.5 tablets by mouth 2 (Two) Times a Day. (Patient taking differently: Take 160 mg by mouth 2 (Two) Times a Day.) 270 tablet 2 Taking   • sotalol (BETAPACE) 80 MG tablet Take 80 mg by mouth Daily As Needed (for a fib with rate greater than 120).      • vitamin C (ASCORBIC ACID) 500 MG tablet Take 500 mg by  mouth 2 (Two) Times a Day. For wound healing   ends:7/14/18      • warfarin (COUMADIN) 5 MG tablet Take as directed per coumadin clinic (Patient taking differently: Take 5 mg by mouth Daily. Take as directed per coumadin clinic) 30 tablet 0    • albuterol (PROVENTIL HFA;VENTOLIN HFA) 108 (90 Base) MCG/ACT inhaler Inhale 2 puffs Every 4 (Four) Hours As Needed for Wheezing. 1 inhaler 6 Taking   • Alirocumab (PRALUENT) 75 MG/ML solution pen-injector Inject 75 mg under the skin Every 14 (Fourteen) Days. 2 mL 5    • aspirin 81 MG EC tablet Take 81 mg by mouth daily.   Taking   • enoxaparin (LOVENOX) 100 MG/ML solution syringe Inject 1 mL under the skin Every 12 (Twelve) Hours. 14 syringe 0 Taking   • guaiFENesin (MUCINEX) 600 MG 12 hr tablet Take 1,200 mg by mouth 2 (Two) Times a Day.   Taking   • QUEtiapine (SEROquel) 100 MG tablet take 3 tablets at bedtime  0 Taking     Allergies:  Morphine and related; Lipitor [atorvastatin]; and Lortab [hydrocodone-acetaminophen]  Prior to Admission medications    Medication Sig Start Date End Date Taking? Authorizing Provider   Aspirin-Acetaminophen-Caffeine (EXCEDRIN EXTRA STRENGTH PO) Take 1 tablet by mouth Daily As Needed.   Yes Historical Provider, MD   bisacodyl (DULCOLAX) 10 MG suppository Insert 10 mg into the rectum Daily As Needed for Constipation.   Yes Historical Provider, MD   budesonide-formoterol (SYMBICORT) 160-4.5 MCG/ACT inhaler Inhale 2 puffs 2 (Two) Times a Day. 11/20/17  Yes Belkys Hoffman MD   cilostazol (PLETAL) 50 MG tablet Take 1 tablet by mouth 2 (Two) Times a Day.  Patient taking differently: Take 100 mg by mouth 2 (Two) Times a Day. 12/6/17  Yes Emily Thacker MD   collagenase (SANTYL) 250 UNIT/GM ointment Apply  topically Daily.   Yes Historical Provider, MD   furosemide (LASIX) 40 MG tablet Take 1 tablet by mouth Daily. 3/30/18  Yes Emily Thacker MD   magnesium hydroxide (MILK OF MAGNESIA) 400 MG/5ML suspension Take 30 mL by mouth Daily As  Needed for Constipation (if no BM x 3 days).   Yes Historical Provider, MD   magnesium oxide (MAGOX) 400 (241.3 MG) MG tablet tablet Take 400 mg by mouth Every Night.   Yes Historical Provider, MD   montelukast (SINGULAIR) 10 MG tablet Take 1 tablet by mouth Every Night. 1/17/18  Yes Belkys Hoffman MD   Multiple Vitamins-Minerals (THERA-TABS M PO) Take 1 tablet by mouth Daily.   Yes Historical Provider, MD   oxyCODONE-acetaminophen (PERCOCET)  MG per tablet Take 1 tablet by mouth Every 4 (Four) Hours As Needed for Moderate Pain .   Yes Historical Provider, MD   pramipexole (MIRAPEX) 0.25 MG tablet Take 1 tablet by mouth 3 (Three) Times a Day.  Patient taking differently: Take 0.5 mg by mouth Daily. 0.5 mg in the morning, 0.25 mg at bedtime 1/17/18  Yes Belkys Hoffman MD   QUEtiapine (SEROquel) 300 MG tablet Take 300 mg by mouth Every Night.   Yes Historical Provider, MD   Sodium Phosphates (ENEMA READY-TO-USE) 7-19 GM/118ML enema Insert 1 Bag into the rectum Daily As Needed. If dulcolax ineffective   Yes Historical Provider, MD   sotalol (BETAPACE) 80 MG tablet Take 1.5 tablets by mouth 2 (Two) Times a Day.  Patient taking differently: Take 160 mg by mouth 2 (Two) Times a Day. 11/13/17  Yes Emily Thacker MD   sotalol (BETAPACE) 80 MG tablet Take 80 mg by mouth Daily As Needed (for a fib with rate greater than 120).   Yes Historical Provider, MD   vitamin C (ASCORBIC ACID) 500 MG tablet Take 500 mg by mouth 2 (Two) Times a Day. For wound healing   ends:7/14/18 7/7/18 7/14/18 Yes Historical Provider, MD   warfarin (COUMADIN) 5 MG tablet Take as directed per coumadin clinic  Patient taking differently: Take 5 mg by mouth Daily. Take as directed per coumadin clinic 5/17/18  Yes JUANI Fernandez   albuterol (PROVENTIL HFA;VENTOLIN HFA) 108 (90 Base) MCG/ACT inhaler Inhale 2 puffs Every 4 (Four) Hours As Needed for Wheezing. 2/5/18   Belkys Hoffman MD   Alirocumab (PRALUENT) 75  MG/ML solution pen-injector Inject 75 mg under the skin Every 14 (Fourteen) Days. 5/7/18   Belkys Hoffman MD   aspirin 81 MG EC tablet Take 81 mg by mouth daily.    Historical Provider, MD   enoxaparin (LOVENOX) 100 MG/ML solution syringe Inject 1 mL under the skin Every 12 (Twelve) Hours. 4/18/18   Rafa Doty MD   guaiFENesin (MUCINEX) 600 MG 12 hr tablet Take 1,200 mg by mouth 2 (Two) Times a Day.    Historical Provider, MD   QUEtiapine (SEROquel) 100 MG tablet take 3 tablets at bedtime 11/15/16   Historical Provider, MD       Objective     Vital Signs    Temp:  [96.2 °F (35.7 °C)-97.7 °F (36.5 °C)] 96.2 °F (35.7 °C)  Heart Rate:  [56-63] 63  Resp:  [18-22] 20  BP: (114-159)/(65-79) 159/67    Physical Exam:      Physical Exam   Constitutional: He is oriented to person, place, and time. He appears well-developed and well-nourished.   HENT:   Head: Normocephalic and atraumatic.   Nose: Nose normal.   Eyes: Conjunctivae and EOM are normal. Pupils are equal, round, and reactive to light.   Neck: Normal range of motion. Neck supple. No JVD present. No tracheal deviation present. No thyromegaly present.   Cardiovascular: Normal rate, regular rhythm, normal heart sounds and intact distal pulses.    Pulmonary/Chest: He is in respiratory distress. He has no wheezes. He has rales. He exhibits no tenderness.   Abdominal: Soft. Bowel sounds are normal. He exhibits no distension. There is no tenderness. There is no rebound and no guarding.   Musculoskeletal: Normal range of motion. He exhibits edema.   Lymphadenopathy:     He has no cervical adenopathy.   Neurological: He is alert and oriented to person, place, and time. He has normal reflexes. No cranial nerve deficit.   Skin: Skin is warm and dry.   Intact   Psychiatric: He has a normal mood and affect.   Nursing note and vitals reviewed.      Results Review:       Results from last 7 days  Lab Units 07/07/18 2002   SODIUM mmol/L 139   POTASSIUM mmol/L 4.3    CHLORIDE mmol/L 101   CO2 mmol/L 29.0   BUN mg/dL 33*   CREATININE mg/dL 1.76*   GLUCOSE mg/dL 107*   CALCIUM mg/dL 8.3*               Results from last 7 days  Lab Units 07/07/18 2002   WBC 10*3/mm3 8.20   HEMOGLOBIN g/dL 11.2*   HEMATOCRIT % 34.0*   PLATELETS 10*3/mm3 273         Results from last 7 days  Lab Units 07/07/18 2002   INR  5.02*     Imaging Results (last 7 days)     Procedure Component Value Units Date/Time    XR Chest 1 View [[id number]] Collected:  07/07/18 2000     Updated:  07/07/18 2016    Narrative:         EXAM:         Radiograph(s), Chest   VIEWS:   Portable ; 1       DATE/TIME:  7/7/2018 8:14 PM CDT                INDICATION:   cough    COMPARISON:  CXR: 4/12/18             FINDINGS:             - lines/tubes:    none     - cardiac:         size within normal limits         - mediastinum: contour within normal limits         - lungs:         Prominence of the interstitium suggests the  presence of moderate to severe pulmonary interstitial edema. No  distinct focal airspace process.             - pleura:         no evidence of  fluid                  - osseous:         unremarkable for age                  - misc.:         Impression:       CONCLUSION:        1. Prominence of the interstitium suggesting a presence of  moderate to severe pulmonary interstitial edema.                                                Electronically signed by:  MISSAEL Rea MD  7/7/2018 8:15 PM  CDT Workstation: [id number]          Assessment/Plan     Principal Problem:    Congestive heart failure (CMS/HCC)  Active Problems:    Atrial fibrillation [I48.91]    Embolism and thrombosis of artery (CMS/HCC)    PVD (peripheral vascular disease) (CMS/HCC)    Tobacco dependence syndrome    Stroke (CMS/HCC)    Osteoarthritis of multiple joints    Hypercholesterolemia    GERD (gastroesophageal reflux disease)    Major depression    Benign prostatic hyperplasia    Typical atrial flutter (CMS/HCC)    Hyperlipidemia     Localized edema    Atherosclerosis of artery of extremity with gangrene (CMS/HCC)    AAA (abdominal aortic aneurysm) without rupture (CMS/HCC)    CKD (chronic kidney disease) stage 3, GFR 30-59 ml/min    Hypertension    Arterial occlusion, lower extremity (CMS/HCC)    Hypoxemia    Warfarin-induced coagulopathy (CMS/HCC)      -We'll give patient diuretics.  -We'll hold off on anticoagulation due to patient having supratherapeutic INR.  We'll get pharmacy evaluation for Coumadin dosing.  -We will give patient some supplemental oxygenation.  -We'll resume patient's home medication as prior to coming to hospital.  --DVT and GI prophylaxis in place.  -We'll continue monitoring patient in hospital setting and treat patient as course dictates.    I discussed the patients findings and my recommendations with patient and nursing staff.         This document has been electronically signed by Umm Interiano MD on July 7, 2018 11:20 PM        Total Time Spent: 43 minutes.    EMR Dragon/Transcription disclaimer:   Dictated utilizing Dragon dictation.           Electronically signed by Umm Interiano MD at 7/8/2018  9:32 AM          Emergency Department Notes      Derrick Baires MD at 7/7/2018  7:46 PM      Procedure Orders:    1. Critical Care [[id number]] ordered by Derrick Baires MD at 07/07/18 2113                Subjective   78-year-old male with a history of chronic bronchitis and peripheral vascular disease comes the emergency department with cough and shortness of breath for a few days.  He's been having a productive cough with brown sputum.  He does have a history of pneumonia in the past.  His O2 sats were in the 80s at his assisted living facility.  He did get some breathing treatments which she said helped him feel less short of breath.  He denies any chest pain or fevers.        Shortness of Breath   Severity:  Severe  Onset quality:  Unable to specify  Timing:  Constant  Progression:   Worsening  Chronicity:  Recurrent  Context: activity    Relieved by:  Nothing  Worsened by:  Nothing  Associated symptoms: sputum production    Associated symptoms: no abdominal pain, no chest pain, no cough, no fever, no rash, no sore throat and no vomiting        Review of Systems   Constitutional: Negative for chills, fatigue and fever.   HENT: Negative for congestion and sore throat.    Eyes: Negative for visual disturbance.   Respiratory: Positive for sputum production and shortness of breath. Negative for cough.    Cardiovascular: Negative for chest pain and leg swelling.   Gastrointestinal: Negative for abdominal pain, nausea and vomiting.   Genitourinary: Negative for dysuria.   Musculoskeletal: Negative for back pain.   Skin: Negative for rash.   Neurological: Negative for dizziness and weakness.   Psychiatric/Behavioral: Negative for behavioral problems.       Past Medical History:   Diagnosis Date   • Abdominal bloating    • Anxiety    • Atherosclerosis of native arteries of extremity with intermittent claudication (CMS/HCC)    • Atrial flutter (CMS/HCC)    • Backache    • Benign prostatic hyperplasia     BX 2009, also has renal cyst      • Chronic bronchitis (CMS/HCC)    • Chronic renal impairment    • Depressive disorder    • Drug abuse, episodic use    • Dyslipidemia    • Fatigue    • GERD (gastroesophageal reflux disease)    • Hypercholesterolemia 03/15/2016   • Injury of tendon of rotator cuff 10/22/2015   • Insomnia 06/24/2016   • Intervertebral disc disorder 10/27/2011   • Major depression    • Neck pain 01/09/2012   • Osteoarthritis of multiple joints 06/21/2013   • Pain from vascular prosthetic devices, implants and grafts, sequela 12/03/2015   • Pain in left foot 10/27/2015   • Peripheral vascular disease (CMS/HCC) 12/03/2015     LEFT fem-tib bypass, embolectomy 6/2014    10/27/2015    • Senile debility 10/22/2015   • 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    weakness   • Shoulder pain    • Simple  renal cyst 01/01/2011   • Stroke (CMS/[facility])      Echo: L atrial appendage thrombus      • Suicide (CMS/[facility])     at risk for   • Tobacco dependence syndrome 12/03/2015 2014 to E cigarette          Allergies   Allergen Reactions   • Morphine And Related Urinary Retention   • Lipitor [Atorvastatin] Nausea And Vomiting   • Lortab [Hydrocodone-Acetaminophen] Nausea And Vomiting       Past Surgical History:   Procedure Laterality Date   • ABDOMINAL AORTIC ANEURYSM REPAIR  07/18/2011    Endovascular   • BACK SURGERY     • CARDIAC CATHETERIZATION  01/30/1984    Mixed dominant coronary arterial anatomy. No coronary atherosclerosis. Normal left ventricular function   • CHOLECYSTECTOMY  08/25/2014    Cholecystitis with gangrenous gallbladder   • ENDOSCOPY  03/22/1990    Duodenal ulcer   • ENDOSCOPY AND COLONOSCOPY  10/17/2013    Diverticulum in sigmoid colon & descending colon. Internal & external hemorrhoids found   • ESOPHAGOSCOPY / EGD  10/17/2013    With tube-Esophagitis seen. Biopsy taken. Gastritis in stomach. Biopsy taken. Stenosis in 2nd portion of duodenum. Dilatation performed.   • FEMORAL THROMBECTOMY/EMBOLECTOMY  06/29/2014    Left lower extremity arteriogram. Left popliteal artery endarterectomy. Redo left femoral to [id number] trunk bypass. Negative pressure wound therapy with placement of Prevena wound V.A.C., left groin, left lower leg   • INJECTION OF MEDICATION  03/15/2016    B12   • INJECTION OF MEDICATION  10/23/2015    Drain/Inject Major Joint    • INJECTION OF MEDICATION  01/13/2016    Kenalog   • LUMBAR LAMINECTOMY     • TRANSESOPHAGEAL ECHOCARDIOGRAM (TEX)  10/06/2015    With color flow-Mild mitral regurg.Left atrium mild dilated.Ef of 55-60%.RV systolic pressure elevated.Trace tricuspid regurg   • TRANSESOPHAGEAL ECHOCARDIOGRAM (TEX)  02/14/2012    Without color flow-[facility] w/ early diast dysfun. AV trileaflet & structurally NRL w/ AR NRL. No regional wall motion abn Est Ef approx 50-55%. M & TR  valves NRL w/ mild M & TR regurg Trace -mild pulmonic regurg. Anterior echo free space w/o hemodynamic signif. NRL RV   • TRANSESOPHAGEAL ECHOCARDIOGRAM (TEX)  03/25/2011    Normal LV systolic function with Ef of 50-55%.Left atrium ildly dilated.Moderate mitral regurg.Mitral valve regurg.Intact interatrial septum.No evidence of any cardiac thrombus or pericardial effusion.AV trileaflet       Family History   Problem Relation Age of Onset   • Osteoarthritis Mother         Lived to be 89   • Hypertension Mother    • ADD / ADHD Father    • Lung cancer Father    • ADD / ADHD Brother    • Rectal cancer Other        Social History     Social History   • Marital status:      Social History Main Topics   • Smoking status: Current Some Day Smoker     Packs/day: 0.50     Years: 50.00     Types: Cigarettes   • Smokeless tobacco: Never Used      Comment: reduced;   • Alcohol use No      Comment: Former use    • Drug use: No   • Sexual activity: Defer     Other Topics Concern   • Not on file           Objective   Physical Exam   Constitutional: He is oriented to person, place, and time. He appears well-developed and well-nourished.   HENT:   Head: Normocephalic and atraumatic.   Eyes: EOM are normal. Pupils are equal, round, and reactive to light.   Neck: Normal range of motion. Neck supple.   No midline tenderness   Cardiovascular: Normal rate, regular rhythm, normal heart sounds and intact distal pulses.  Exam reveals no gallop and no friction rub.    No murmur heard.  Pulmonary/Chest: He has wheezes. He has no rales.   Abdominal: Soft. Bowel sounds are normal. He exhibits no distension. There is no tenderness.   Musculoskeletal: Normal range of motion. He exhibits no tenderness or deformity.   Neurological: He is alert and oriented to person, place, and time. No cranial nerve deficit. He exhibits normal muscle tone. Coordination normal.   Skin: Skin is warm and dry. No rash noted.   Psychiatric: He has a normal mood  and affect. His behavior is normal.       Critical Care  Performed by: KATIA SPEAR  Authorized by: KATIA SPEAR     Critical care provider statement:     Critical care time (minutes):  60    Critical care was necessary to treat or prevent imminent or life-threatening deterioration of the following conditions:  Respiratory failure    Critical care was time spent personally by me on the following activities:  Evaluation of patient's response to treatment, examination of patient, ordering and performing treatments and interventions, ordering and review of laboratory studies, ordering and review of radiographic studies, pulse oximetry and re-evaluation of patient's condition                ED Course                  MDM  Number of Diagnoses or Management Options  Congestive heart failure, unspecified congestive heart failure chronicity, unspecified congestive heart failure type (CMS/HCC): new and requires workup  Hypoxemia: new and requires workup  Warfarin-induced coagulopathy (CMS/HCC): new and requires workup  Diagnosis management comments: 9:15 PM patient is stable.  The shortness of breath seems to be due to congestive heart failure.  The patient will be given Lasix and admitted to the hospital for further evaluation.    9:21 PM Dr. Interiano will admit the patient       Amount and/or Complexity of Data Reviewed  Clinical lab tests: ordered and reviewed  Tests in the radiology section of CPT®:  ordered and reviewed  Decide to obtain previous medical records or to obtain history from someone other than the patient: yes    Risk of Complications, Morbidity, and/or Mortality  Presenting problems: high  Diagnostic procedures: high  Management options: high    Critical Care  Total time providing critical care: 30-74 minutes        Final diagnoses:   Congestive heart failure, unspecified congestive heart failure chronicity, unspecified congestive heart failure type (CMS/HCC)   Warfarin-induced coagulopathy (CMS/HCC)    Hypoxemia            Derrick Baires MD  07/07/18 2121      Electronically signed by Derrick Bairse MD at 7/7/2018  9:21 PM     Rodriguez Hernandez at 7/7/2018 10:14 PM        Called and verified tele box     Rodriguez Hernandez  07/07/18 2214      Electronically signed by Rodriguez Hernandez at 7/7/2018 10:14 PM       Hospital Medications (all)       Dose Frequency Start End    acetaminophen (TYLENOL) tablet 650 mg 650 mg Every 4 Hours PRN 7/7/2018     Sig - Route: Take 2 tablets by mouth Every 4 (Four) Hours As Needed for Mild Pain . - Oral    aspirin EC tablet 81 mg 81 mg Daily 7/8/2018     Sig - Route: Take 1 tablet by mouth Daily. - Oral    bisacodyl (DULCOLAX) suppository 10 mg 10 mg Daily PRN 7/7/2018     Sig - Route: Insert 1 suppository into the rectum Daily As Needed for Constipation. - Rectal    budesonide-formoterol (SYMBICORT) 160-4.5 MCG/ACT inhaler 2 puff 2 puff 2 Times Daily - RT 7/8/2018     Sig - Route: Inhale 2 puffs 2 (Two) Times a Day. - Inhalation    cilostazol (PLETAL) tablet 50 mg 50 mg 2 Times Daily Before Meals 7/8/2018     Sig - Route: Take 0.5 tablets by mouth 2 (Two) Times a Day Before Meals. - Oral    docusate sodium (COLACE) capsule 200 mg 200 mg 2 Times Daily 7/8/2018     Sig - Route: Take 2 capsules by mouth 2 (Two) Times a Day. - Oral    furosemide (LASIX) injection 40 mg 40 mg Once 7/7/2018 7/7/2018    Sig - Route: Infuse 4 mL into a venous catheter 1 (One) Time. - Intravenous    furosemide (LASIX) injection 40 mg 40 mg 3 Times Daily 7/8/2018     Sig - Route: Infuse 4 mL into a venous catheter 3 (Three) Times a Day. - Intravenous    ipratropium-albuterol (DUO-NEB) nebulizer solution 3 mL 3 mL Every 4 Hours - RT 7/8/2018     Sig - Route: Take 3 mL by nebulization Every 4 (Four) Hours. - Nebulization    magnesium hydroxide (MILK OF MAGNESIA) suspension 2400 mg/10mL 10 mL 10 mL Daily PRN 7/7/2018     Sig - Route: Take 10 mL by mouth Daily As Needed for Constipation. - Oral    magnesium oxide (MAGOX)  tablet 400 mg 400 mg Nightly 7/8/2018     Sig - Route: Take 1 tablet by mouth Every Night. - Oral    montelukast (SINGULAIR) tablet 10 mg 10 mg Nightly 7/8/2018     Sig - Route: Take 1 tablet by mouth Every Night. - Oral    nicotine (NICODERM CQ) 21 MG/24HR patch 1 patch 1 patch Every 24 Hours 7/8/2018     Sig - Route: Place 1 patch on the skin Daily. - Transdermal    oxyCODONE-acetaminophen (PERCOCET)  MG per tablet 1 tablet 1 tablet Every 4 Hours PRN 7/7/2018     Sig - Route: Take 1 tablet by mouth Every 4 (Four) Hours As Needed for Moderate Pain . - Oral    pantoprazole (PROTONIX) EC tablet 40 mg 40 mg Every Early Morning 7/8/2018     Sig - Route: Take 1 tablet by mouth Every Morning. - Oral    Pharmacy to dose warfarin  Continuous PRN 7/7/2018     Sig - Route: Continuous As Needed for Consult (to keep INR ). - Does not apply    pramipexole (MIRAPEX) tablet 0.25 mg 0.25 mg 3 Times Daily 7/8/2018     Sig - Route: Take 1 tablet by mouth 3 (Three) Times a Day. - Oral    predniSONE (DELTASONE) tablet 60 mg 60 mg Once 7/7/2018 7/7/2018    Sig - Route: Take 3 tablets by mouth 1 (One) Time. - Oral    QUEtiapine (SEROquel) tablet 300 mg 300 mg Nightly 7/8/2018     Sig - Route: Take 1 tablet by mouth Every Night. - Oral    sodium chloride 0.9 % flush 1-10 mL 1-10 mL As Needed 7/7/2018     Sig - Route: Infuse 1-10 mL into a venous catheter As Needed for Line Care. - Intravenous    sotalol (BETAPACE) tablet 80 mg 80 mg Every 12 Hours Scheduled 7/8/2018     Sig - Route: Take 1 tablet by mouth Every 12 (Twelve) Hours. - Oral    sotalol (BETAPACE) tablet 80 mg 80 mg Daily PRN 7/7/2018     Sig - Route: Take 1 tablet by mouth Daily As Needed (for a fib with rate greater than 120). - Oral    bisacodyl (DULCOLAX) suppository 10 mg (Discontinued) 10 mg Daily PRN 7/7/2018 7/8/2018    Sig - Route: Insert 1 suppository into the rectum Daily As Needed for Constipation. - Rectal    Reason for Discontinue: Duplicate order     ipratropium-albuterol (DUO-NEB) nebulizer solution 3 mL (Discontinued) 3 mL 4 Times Daily - RT 7/7/2018 7/7/2018    Sig - Route: Take 3 mL by nebulization 4 (Four) Times a Day. - Nebulization    QUEtiapine (SEROquel) tablet 100 mg (Discontinued) 100 mg Nightly 7/8/2018 7/8/2018    Sig - Route: Take 1 tablet by mouth Every Night. - Oral    Reason for Discontinue: Duplicate order    sodium chloride 0.9 % infusion (Discontinued) 100 mL/hr Continuous 7/8/2018 7/8/2018    Sig - Route: Infuse 100 mL/hr into a venous catheter Continuous. - Intravenous          Lab Results (last 72 hours)     Procedure Component Value Units Date/Time    Comprehensive Metabolic Panel [[id number]]  (Abnormal) Collected:  07/09/18 0537    Specimen:  Blood Updated:  07/09/18 0626     Glucose 122 (H) mg/dL      BUN 39 (H) mg/dL      Creatinine 1.95 (H) mg/dL      Sodium 137 mmol/L      Potassium 3.8 mmol/L      Chloride 101 mmol/L      CO2 28.0 mmol/L      Calcium 8.3 (L) mg/dL      Total Protein 7.2 g/dL      Albumin 3.40 g/dL      ALT (SGPT) 21 U/L      AST (SGOT) 25 U/L      Alkaline Phosphatase 65 U/L      Total Bilirubin 0.2 mg/dL      eGFR Non African Amer 34 (L) mL/min/1.73      Globulin 3.8 (H) gm/dL      A/G Ratio 0.9 (L) g/dL      BUN/Creatinine Ratio 20.0     Anion Gap 8.0 mmol/L     Narrative:       The MDRD GFR formula is only valid for adults with stable renal function between ages 18 and 70.    Protime-INR [[id number]]  (Abnormal) Collected:  07/09/18 0537    Specimen:  Blood Updated:  07/09/18 0610     Protime 25.8 (H) Seconds      INR 2.49 (H)    Narrative:       Therapeutic range for most indications is 2.0-3.0 INR,  or 2.5-3.5 for mechanical heart valves.    Magnesium [[id number]]  (Abnormal) Collected:  07/09/18 0537    Specimen:  Blood Updated:  07/09/18 0609     Magnesium 2.7 (H) mg/dL     CBC Auto Differential [[id number]]  (Abnormal) Collected:  07/09/18 0537    Specimen:  Blood Updated:  07/09/18 0601     WBC 11.46 (H)  10*3/mm3      RBC 3.91 (L) 10*6/mm3      Hemoglobin 11.0 (L) g/dL      Hematocrit 33.2 (L) %      MCV 84.9 fL      MCH 28.1 pg      MCHC 33.1 g/dL      RDW 18.3 (H) %      RDW-SD 55.9 (H) fl      MPV 8.5 fL      Platelets 278 10*3/mm3      Neutrophil % 83.7 (H) %      Lymphocyte % 8.6 (L) %      Monocyte % 7.0 %      Eosinophil % 0.2 %      Basophil % 0.1 %      Immature Grans % 0.4 %      Neutrophils, Absolute 9.59 (H) 10*3/mm3      Lymphocytes, Absolute 0.99 10*3/mm3      Monocytes, Absolute 0.80 10*3/mm3      Eosinophils, Absolute 0.02 10*3/mm3      Basophils, Absolute 0.01 10*3/mm3      Immature Grans, Absolute 0.05 (H) 10*3/mm3     Protime-INR [[id number]]  (Abnormal) Collected:  07/08/18 1617    Specimen:  Blood Updated:  07/08/18 1645     Protime 31.1 (H) Seconds      INR 3.19 (H)    Narrative:       Therapeutic range for most indications is 2.0-3.0 INR,  or 2.5-3.5 for mechanical heart valves.    CRE Screen by PCR - Swab, Large Intestine, Rectum [[id number]] Collected:  07/08/18 0821    Specimen:  Swab from Large Intestine, Rectum Updated:  07/08/18 0948     CRE SCREEN Not Detected     Comment: Test performed by real-time polymerase chain reaction (qPCR).        OXA 48 Strain --     Comment: Not Applicable.        IMP STRAIN --     Comment: Not Applicable        VIM STRAING --     Comment: Not Applicable        NDM Strain --     Comment: Not Applicable        KPC Strain --     Comment: Not Applicable       Protime-INR [[id number]]  (Abnormal) Collected:  07/08/18 0533    Specimen:  Blood Updated:  07/08/18 0618     Protime 37.5 (H) Seconds      INR 4.09 (H)    Narrative:       Therapeutic range for most indications is 2.0-3.0 INR,  or 2.5-3.5 for mechanical heart valves.    Magnesium [[id number]]  (Abnormal) Collected:  07/08/18 0533    Specimen:  Blood Updated:  07/08/18 0615     Magnesium 2.9 (H) mg/dL     Comprehensive Metabolic Panel [[id number]]  (Abnormal) Collected:  07/08/18 6044    Specimen:  Blood  Updated:  07/08/18 0613     Glucose 129 (H) mg/dL      BUN 32 (H) mg/dL      Creatinine 1.81 (H) mg/dL      Sodium 141 mmol/L      Potassium 4.9 mmol/L      Chloride 102 mmol/L      CO2 30.0 mmol/L      Calcium 8.6 mg/dL      Total Protein 7.6 g/dL      Albumin 3.60 g/dL      ALT (SGPT) 20 (L) U/L      AST (SGOT) 24 U/L      Alkaline Phosphatase 76 U/L      Total Bilirubin 0.3 mg/dL      eGFR Non African Amer 37 (L) mL/min/1.73      Globulin 4.0 (H) gm/dL      A/G Ratio 0.9 (L) g/dL      BUN/Creatinine Ratio 17.7     Anion Gap 9.0 mmol/L     Narrative:       The MDRD GFR formula is only valid for adults with stable renal function between ages 18 and 70.    CBC Auto Differential [[id number]]  (Abnormal) Collected:  07/08/18 0533    Specimen:  Blood Updated:  07/08/18 0556     WBC 8.67 10*3/mm3      RBC 3.92 (L) 10*6/mm3      Hemoglobin 10.9 (L) g/dL      Hematocrit 33.4 (L) %      MCV 85.2 fL      MCH 27.8 pg      MCHC 32.6 g/dL      RDW 18.6 (H) %      RDW-SD 56.7 (H) fl      MPV 9.3 fL      Platelets 258 10*3/mm3      Neutrophil % 87.6 (H) %      Lymphocyte % 7.8 (L) %      Monocyte % 3.5 %      Eosinophil % 0.5 %      Basophil % 0.1 %      Immature Grans % 0.5 %      Neutrophils, Absolute 7.60 10*3/mm3      Lymphocytes, Absolute 0.68 10*3/mm3      Monocytes, Absolute 0.30 10*3/mm3      Eosinophils, Absolute 0.04 10*3/mm3      Basophils, Absolute 0.01 10*3/mm3      Immature Grans, Absolute 0.04 (H) 10*3/mm3     Legionella Antigen, Urine - Urine, Urine, Clean Catch [[id number]]  (Normal) Collected:  07/08/18 0031    Specimen:  Urine from Urine, Clean Catch Updated:  07/08/18 0058     LEGIONELLA ANTIGEN, URINE Negative    S. Pneumo Ag Urine or CSF - Urine, Urine, Clean Catch [[id number]]  (Normal) Collected:  07/08/18 0031    Specimen:  Urine from Urine, Clean Catch Updated:  07/08/18 0058     Strep Pneumo Ag Negative    Urinalysis With Microscopic If Indicated (No Culture) - Urine, Clean Catch [[id number]]  (Normal)  Collected:  07/08/18 0031    Specimen:  Urine from Urine, Clean Catch Updated:  07/08/18 0037     Color, UA Yellow     Appearance, UA Clear     pH, UA 7.0     Specific Gravity, UA 1.007     Glucose, UA Negative     Ketones, UA Negative     Bilirubin, UA Negative     Blood, UA Negative     Protein, UA Negative     Leuk Esterase, UA Negative     Nitrite, UA Negative     Urobilinogen, UA 0.2 E.U./dL    Narrative:       Urine microscopic not indicated.    Protime-INR [[id number]]  (Abnormal) Collected:  07/07/18 2002    Specimen:  Blood Updated:  07/07/18 2049     Protime 43.7 (H) Seconds      INR 5.02 (C)    Narrative:       Therapeutic range for most indications is 2.0-3.0 INR,  or 2.5-3.5 for mechanical heart valves.    BNP [[id number]]  (Abnormal) Collected:  07/07/18 2002    Specimen:  Blood Updated:  07/07/18 2028     proBNP 1,810.0 (H) pg/mL     Basic Metabolic Panel [[id number]]  (Abnormal) Collected:  07/07/18 2002    Specimen:  Blood Updated:  07/07/18 2023     Glucose 107 (H) mg/dL      BUN 33 (H) mg/dL      Creatinine 1.76 (H) mg/dL      Sodium 139 mmol/L      Potassium 4.3 mmol/L      Chloride 101 mmol/L      CO2 29.0 mmol/L      Calcium 8.3 (L) mg/dL      eGFR Non African Amer 38 (L) mL/min/1.73      BUN/Creatinine Ratio 18.8     Anion Gap 9.0 mmol/L     Narrative:       The MDRD GFR formula is only valid for adults with stable renal function between ages 18 and 70.    Blood Gas, Arterial [[id number]]  (Abnormal) Collected:  07/07/18 2012    Specimen:  Arterial Blood Updated:  07/07/18 2022     Site Right Radial     Naveen's Test Positive     pH, Arterial 7.443 pH units      pCO2, Arterial 40.4 mm Hg      pO2, Arterial 61.2 (L) mm Hg      HCO3, Arterial 27.6 (H) mmol/L      Base Excess, Arterial 3.2 (H) mmol/L      O2 Saturation, Arterial 91.7 (L) %      Barometric Pressure for Blood Gas 752 mmHg      Modality Nasal Cannula     FIO2 28 %      Flow Rate 2.0 lpm      Ventilator Mode NA     Collected by sonia bridges  rrt    CBC & Differential [[id number]] Collected:  07/07/18 2002    Specimen:  Blood Updated:  07/07/18 2013    Narrative:       The following orders were created for panel order CBC & Differential.  Procedure                               Abnormality         Status                     ---------                               -----------         ------                     CBC Auto Differential[[id number]]        Abnormal            Final result                 Please view results for these tests on the individual orders.    CBC Auto Differential [[id number]]  (Abnormal) Collected:  07/07/18 2002    Specimen:  Blood Updated:  07/07/18 2013     WBC 8.20 10*3/mm3      RBC 4.02 (L) 10*6/mm3      Hemoglobin 11.2 (L) g/dL      Hematocrit 34.0 (L) %      MCV 84.6 fL      MCH 27.9 pg      MCHC 32.9 g/dL      RDW 18.6 (H) %      RDW-SD 56.5 (H) fl      MPV 8.9 fL      Platelets 273 10*3/mm3      Neutrophil % 80.3 (H) %      Lymphocyte % 9.6 (L) %      Monocyte % 6.3 %      Eosinophil % 3.2 %      Basophil % 0.2 %      Immature Grans % 0.4 %      Neutrophils, Absolute 6.58 10*3/mm3      Lymphocytes, Absolute 0.79 10*3/mm3      Monocytes, Absolute 0.52 10*3/mm3      Eosinophils, Absolute 0.26 10*3/mm3      Basophils, Absolute 0.02 10*3/mm3      Immature Grans, Absolute 0.03 (H) 10*3/mm3           Imaging Results (last 72 hours)     Procedure Component Value Units Date/Time    XR Chest 1 View [[id number]] Collected:  07/07/18 2000     Updated:  07/07/18 2016    Narrative:         EXAM:         Radiograph(s), Chest   VIEWS:   Portable ; 1       DATE/TIME:  7/7/2018 8:14 PM CDT                INDICATION:   cough    COMPARISON:  CXR: 4/12/18             FINDINGS:             - lines/tubes:    none     - cardiac:         size within normal limits         - mediastinum: contour within normal limits         - lungs:         Prominence of the interstitium suggests the  presence of moderate to severe pulmonary interstitial edema.  No  distinct focal airspace process.             - pleura:         no evidence of  fluid                  - osseous:         unremarkable for age                  - misc.:         Impression:       CONCLUSION:        1. Prominence of the interstitium suggesting a presence of  moderate to severe pulmonary interstitial edema.                                                Electronically signed by:  MISSAEL Rea MD  7/7/2018 8:15 PM  CDT Workstation: [id number]          ECG/EMG Results (last 72 hours)     Procedure Component Value Units Date/Time    SCANNED EKG [[id number]] Resulted:  07/07/18      Updated:  07/07/18 2140    ECG 12 Lead [[id number]] Collected:  07/07/18 1956     Updated:  07/09/18 0740    Narrative:       Test Reason : cp  Blood Pressure : **/** mmHG  Vent. Rate : 062 BPM     Atrial Rate : 062 BPM     P-R Int : 178 ms          QRS Dur : 100 ms      QT Int : 482 ms       P-R-T Axes : 085 -29 019 degrees     QTc Int : 489 ms    Normal sinus rhythm  Septal infarct , age undetermined  Abnormal ECG    Confirmed by MADDIE RIVERA MD ([id number]) on 7/9/2018 7:39:47 AM    Referred By:             Confirmed By:MADDIE RIVERA MD             Physician Progress Notes (last 72 hours) (Notes from 7/6/2018  9:52 AM through 7/9/2018  9:52 AM)      Donnell Gonzalez MD at 7/8/2018 12:01 PM          Progress Note  Donnell Gonzalez MD  Hospitalist    Date of visit: 7/8/2018     LOS: 1 day   Patient Care Team:  Belkys Hoffman MD as PCP - Claims Attributed    Chief Complaint: dyspnea     Subjective     Interval History:     Patient Complaints: worsening dyspnea, cough, sputum production. He is feeling worse lately, his effort capacity is significantly reduced after the recent peripheral vascular surgery.    History taken from: patient / nursing    Medication Review:   Current Facility-Administered Medications   Medication Dose Route Frequency Provider Last Rate Last Dose   • acetaminophen (TYLENOL) tablet 650 mg  650  mg Oral Q4H PRN Umm Interiano MD       • aspirin EC tablet 81 mg  81 mg Oral Daily Umm Interiano MD   81 mg at 07/08/18 0810   • bisacodyl (DULCOLAX) suppository 10 mg  10 mg Rectal Daily PRN Umm Interiano MD       • budesonide-formoterol (SYMBICORT) 160-4.5 MCG/ACT inhaler 2 puff  2 puff Inhalation BID - RT Umm Interiano MD   2 puff at 07/08/18 0646   • cilostazol (PLETAL) tablet 50 mg  50 mg Oral BID AC Umm Interiano MD   50 mg at 07/08/18 0809   • docusate sodium (COLACE) capsule 200 mg  200 mg Oral BID Umm Interiano MD   200 mg at 07/08/18 0809   • furosemide (LASIX) injection 40 mg  40 mg Intravenous TID Umm Interiano MD   40 mg at 07/08/18 0826   • ipratropium-albuterol (DUO-NEB) nebulizer solution 3 mL  3 mL Nebulization Q4H - RT Umm Interiano MD   3 mL at 07/08/18 1426   • magnesium hydroxide (MILK OF MAGNESIA) suspension 2400 mg/10mL 10 mL  10 mL Oral Daily PRN Umm Interiano MD       • magnesium oxide (MAGOX) tablet 400 mg  400 mg Oral Nightly Umm Interiano MD   400 mg at 07/08/18 0103   • montelukast (SINGULAIR) tablet 10 mg  10 mg Oral Nightly Umm Interiano MD   10 mg at 07/08/18 0103   • nicotine (NICODERM CQ) 21 MG/24HR patch 1 patch  1 patch Transdermal Q24H Umm Interiano MD       • oxyCODONE-acetaminophen (PERCOCET)  MG per tablet 1 tablet  1 tablet Oral Q4H PRN Umm Interiano MD       • pantoprazole (PROTONIX) EC tablet 40 mg  40 mg Oral Q AM Umm Interiano MD   40 mg at 07/08/18 0640   • Pharmacy to dose warfarin   Does not apply Continuous PRN Umm Interiano MD       • pramipexole (MIRAPEX) tablet 0.25 mg  0.25 mg Oral TID Umm Interiano MD   0.25 mg at 07/08/18 0809   • QUEtiapine (SEROquel) tablet 300 mg  300 mg Oral Nightly Umm Interiano MD   300 mg at 07/08/18 0104   • sodium chloride 0.9 % flush 1-10 mL  1-10 mL Intravenous  PRTIFFANY Interiano MD       • sotalol (BETAPACE) tablet 80 mg  80 mg Oral Q12H Umm Interiano MD   80 mg at 07/08/18 0809   • sotalol (BETAPACE) tablet 80 mg  80 mg Oral Daily MARLO Interiano MD           Review of Systems:   Review of Systems   Constitutional: Positive for fatigue. Negative for fever.   Respiratory: Positive for cough, shortness of breath and wheezing.    Cardiovascular: Positive for chest pain and leg swelling. Negative for palpitations.   Gastrointestinal: Positive for abdominal distention. Negative for abdominal pain.   Genitourinary: Negative for dysuria, frequency, hematuria, testicular pain and urgency.   Musculoskeletal: Positive for arthralgias and back pain. Negative for gait problem.   Neurological: Positive for weakness. Negative for syncope, numbness and headaches.   Psychiatric/Behavioral: Negative for agitation, behavioral problems and confusion.   All other systems reviewed and are negative.      Objective     Vital Signs  Temp:  [96.2 °F (35.7 °C)-97.7 °F (36.5 °C)] 96.7 °F (35.9 °C)  Heart Rate:  [45-76] 57  Resp:  [16-22] 16  BP: ()/(50-79) 111/56    Physical Exam:  Physical Exam   Constitutional: He is oriented to person, place, and time. He appears ill. No distress.   Obese    HENT:   Head: Normocephalic and atraumatic.   Eyes: EOM are normal. Pupils are equal, round, and reactive to light. No scleral icterus.   Neck: Normal range of motion. Neck supple.   Cardiovascular: Normal rate and regular rhythm.    Pulmonary/Chest: Effort normal. No respiratory distress. He has wheezes. He has rales.   Abdominal: Soft. Bowel sounds are normal. He exhibits no distension. There is no tenderness.   Musculoskeletal: He exhibits tenderness (over the recent surgical incisions). He exhibits no edema or deformity.   Neurological: He is alert and oriented to person, place, and time. He displays normal reflexes. No cranial nerve deficit. Coordination normal.    Skin: No rash noted. No erythema. There is pallor.   Psychiatric: He has a normal mood and affect. His behavior is normal.   Vitals reviewed.       Results Review:    Lab Results (last 24 hours)     Procedure Component Value Units Date/Time    CRE Screen by PCR - Swab, Large Intestine, Rectum [[id number]] Collected:  07/08/18 0821    Specimen:  Swab from Large Intestine, Rectum Updated:  07/08/18 0948     CRE SCREEN Not Detected     Comment: Test performed by real-time polymerase chain reaction (qPCR).        OXA 48 Strain --     Comment: Not Applicable.        IMP STRAIN --     Comment: Not Applicable        VIM STRAING --     Comment: Not Applicable        NDM Strain --     Comment: Not Applicable        KPC Strain --     Comment: Not Applicable       Protime-INR [[id number]]  (Abnormal) Collected:  07/08/18 0533    Specimen:  Blood Updated:  07/08/18 0618     Protime 37.5 (H) Seconds      INR 4.09 (H)    Narrative:       Therapeutic range for most indications is 2.0-3.0 INR,  or 2.5-3.5 for mechanical heart valves.    Magnesium [[id number]]  (Abnormal) Collected:  07/08/18 0533    Specimen:  Blood Updated:  07/08/18 0615     Magnesium 2.9 (H) mg/dL     Comprehensive Metabolic Panel [[id number]]  (Abnormal) Collected:  07/08/18 0533    Specimen:  Blood Updated:  07/08/18 0613     Glucose 129 (H) mg/dL      BUN 32 (H) mg/dL      Creatinine 1.81 (H) mg/dL      Sodium 141 mmol/L      Potassium 4.9 mmol/L      Chloride 102 mmol/L      CO2 30.0 mmol/L      Calcium 8.6 mg/dL      Total Protein 7.6 g/dL      Albumin 3.60 g/dL      ALT (SGPT) 20 (L) U/L      AST (SGOT) 24 U/L      Alkaline Phosphatase 76 U/L      Total Bilirubin 0.3 mg/dL      eGFR Non African Amer 37 (L) mL/min/1.73      Globulin 4.0 (H) gm/dL      A/G Ratio 0.9 (L) g/dL      BUN/Creatinine Ratio 17.7     Anion Gap 9.0 mmol/L     Narrative:       The MDRD GFR formula is only valid for adults with stable renal function between ages 18 and 70.    CBC Auto  Differential [[id number]]  (Abnormal) Collected:  07/08/18 0533    Specimen:  Blood Updated:  07/08/18 0556     WBC 8.67 10*3/mm3      RBC 3.92 (L) 10*6/mm3      Hemoglobin 10.9 (L) g/dL      Hematocrit 33.4 (L) %      MCV 85.2 fL      MCH 27.8 pg      MCHC 32.6 g/dL      RDW 18.6 (H) %      RDW-SD 56.7 (H) fl      MPV 9.3 fL      Platelets 258 10*3/mm3      Neutrophil % 87.6 (H) %      Lymphocyte % 7.8 (L) %      Monocyte % 3.5 %      Eosinophil % 0.5 %      Basophil % 0.1 %      Immature Grans % 0.5 %      Neutrophils, Absolute 7.60 10*3/mm3      Lymphocytes, Absolute 0.68 10*3/mm3      Monocytes, Absolute 0.30 10*3/mm3      Eosinophils, Absolute 0.04 10*3/mm3      Basophils, Absolute 0.01 10*3/mm3      Immature Grans, Absolute 0.04 (H) 10*3/mm3     Legionella Antigen, Urine - Urine, Urine, Clean Catch [[id number]]  (Normal) Collected:  07/08/18 0031    Specimen:  Urine from Urine, Clean Catch Updated:  07/08/18 0058     LEGIONELLA ANTIGEN, URINE Negative    S. Pneumo Ag Urine or CSF - Urine, Urine, Clean Catch [[id number]]  (Normal) Collected:  07/08/18 0031    Specimen:  Urine from Urine, Clean Catch Updated:  07/08/18 0058     Strep Pneumo Ag Negative    Urinalysis With Microscopic If Indicated (No Culture) - Urine, Clean Catch [[id number]]  (Normal) Collected:  07/08/18 0031    Specimen:  Urine from Urine, Clean Catch Updated:  07/08/18 0037     Color, UA Yellow     Appearance, UA Clear     pH, UA 7.0     Specific Gravity, UA 1.007     Glucose, UA Negative     Ketones, UA Negative     Bilirubin, UA Negative     Blood, UA Negative     Protein, UA Negative     Leuk Esterase, UA Negative     Nitrite, UA Negative     Urobilinogen, UA 0.2 E.U./dL    Narrative:       Urine microscopic not indicated.    Protime-INR [[id number]]  (Abnormal) Collected:  07/07/18 2002    Specimen:  Blood Updated:  07/07/18 2049     Protime 43.7 (H) Seconds      INR 5.02 (C)    Narrative:       Therapeutic range for most indications is  2.0-3.0 INR,  or 2.5-3.5 for mechanical heart valves.    BNP [[id number]]  (Abnormal) Collected:  07/07/18 2002    Specimen:  Blood Updated:  07/07/18 2028     proBNP 1,810.0 (H) pg/mL     Basic Metabolic Panel [[id number]]  (Abnormal) Collected:  07/07/18 2002    Specimen:  Blood Updated:  07/07/18 2023     Glucose 107 (H) mg/dL      BUN 33 (H) mg/dL      Creatinine 1.76 (H) mg/dL      Sodium 139 mmol/L      Potassium 4.3 mmol/L      Chloride 101 mmol/L      CO2 29.0 mmol/L      Calcium 8.3 (L) mg/dL      eGFR Non African Amer 38 (L) mL/min/1.73      BUN/Creatinine Ratio 18.8     Anion Gap 9.0 mmol/L     Narrative:       The MDRD GFR formula is only valid for adults with stable renal function between ages 18 and 70.    Blood Gas, Arterial [[id number]]  (Abnormal) Collected:  07/07/18 2012    Specimen:  Arterial Blood Updated:  07/07/18 2022     Site Right Radial     Naveen's Test Positive     pH, Arterial 7.443 pH units      pCO2, Arterial 40.4 mm Hg      pO2, Arterial 61.2 (L) mm Hg      HCO3, Arterial 27.6 (H) mmol/L      Base Excess, Arterial 3.2 (H) mmol/L      O2 Saturation, Arterial 91.7 (L) %      Barometric Pressure for Blood Gas 752 mmHg      Modality Nasal Cannula     FIO2 28 %      Flow Rate 2.0 lpm      Ventilator Mode NA     Collected by sonia bridges rrt    CBC & Differential [[id number]] Collected:  07/07/18 2002    Specimen:  Blood Updated:  07/07/18 2013    Narrative:       The following orders were created for panel order CBC & Differential.  Procedure                               Abnormality         Status                     ---------                               -----------         ------                     CBC Auto Differential[[id number]]        Abnormal            Final result                 Please view results for these tests on the individual orders.    CBC Auto Differential [[id number]]  (Abnormal) Collected:  07/07/18 2002    Specimen:  Blood Updated:  07/07/18 2013     WBC 8.20 10*3/mm3       RBC 4.02 (L) 10*6/mm3      Hemoglobin 11.2 (L) g/dL      Hematocrit 34.0 (L) %      MCV 84.6 fL      MCH 27.9 pg      MCHC 32.9 g/dL      RDW 18.6 (H) %      RDW-SD 56.5 (H) fl      MPV 8.9 fL      Platelets 273 10*3/mm3      Neutrophil % 80.3 (H) %      Lymphocyte % 9.6 (L) %      Monocyte % 6.3 %      Eosinophil % 3.2 %      Basophil % 0.2 %      Immature Grans % 0.4 %      Neutrophils, Absolute 6.58 10*3/mm3      Lymphocytes, Absolute 0.79 10*3/mm3      Monocytes, Absolute 0.52 10*3/mm3      Eosinophils, Absolute 0.26 10*3/mm3      Basophils, Absolute 0.02 10*3/mm3      Immature Grans, Absolute 0.03 (H) 10*3/mm3           Imaging Results (last 24 hours)     Procedure Component Value Units Date/Time    XR Chest 1 View [[id number]] Collected:  07/07/18 2000     Updated:  07/07/18 2016    Narrative:         EXAM:         Radiograph(s), Chest   VIEWS:   Portable ; 1       DATE/TIME:  7/7/2018 8:14 PM CDT                INDICATION:   cough    COMPARISON:  CXR: 4/12/18             FINDINGS:             - lines/tubes:    none     - cardiac:         size within normal limits         - mediastinum: contour within normal limits         - lungs:         Prominence of the interstitium suggests the  presence of moderate to severe pulmonary interstitial edema. No  distinct focal airspace process.             - pleura:         no evidence of  fluid                  - osseous:         unremarkable for age                  - misc.:         Impression:       CONCLUSION:        1. Prominence of the interstitium suggesting a presence of  moderate to severe pulmonary interstitial edema.    Electronically signed by:  MISSAEL Rea MD  7/7/2018 8:15 PM  CDT Workstation: [id number]          Assessment/Plan     Principal Problem:    Hypoxemia  Active Problems:    COPD with exacerbation (CMS/HCC)    Atrial fibrillation [I48.91]    PVD (peripheral vascular disease) (CMS/HCC)    Hypertension    LVH (left ventricular hypertrophy)     Diastolic dysfunction    Coumadin toxicity    CKD (chronic kidney disease) stage 3, GFR 30-59 ml/min    Continue with the nebulized treatments, diuresis, the other medications. Pharmacy is dosing his Coumadin now, as his INR was higher than 5.0 on admission.    Donnell Gonzalez MD  07/08/18  3:19 PM        Electronically signed by Donnell Gonzalez MD at 7/8/2018  3:20 PM       Consult Notes (last 72 hours) (Notes from 7/6/2018  9:52 AM through 7/9/2018  9:52 AM)     No notes of this type exist for this encounter.

## 2018-07-09 NOTE — PLAN OF CARE
Problem: Patient Care Overview  Goal: Plan of Care Review  Outcome: Ongoing (interventions implemented as appropriate)   07/09/18 1722   Coping/Psychosocial   Plan of Care Reviewed With patient   Plan of Care Review   Progress improving   OTHER   Outcome Summary no complaints of chest pain; wound on leg appears to be healing well       Problem: Fluid Volume Excess (Adult)  Goal: Identify Related Risk Factors and Signs and Symptoms  Outcome: Ongoing (interventions implemented as appropriate)    Goal: Optimal Fluid Balance  Outcome: Ongoing (interventions implemented as appropriate)      Problem: Fall Risk (Adult)  Goal: Identify Related Risk Factors and Signs and Symptoms  Outcome: Outcome(s) achieved Date Met: 07/09/18    Goal: Absence of Fall  Outcome: Ongoing (interventions implemented as appropriate)      Problem: Wound (Includes Pressure Injury) (Adult)  Goal: Signs and Symptoms of Listed Potential Problems Will be Absent, Minimized or Managed (Wound)  Outcome: Ongoing (interventions implemented as appropriate)      Problem: Skin Injury Risk (Adult)  Goal: Identify Related Risk Factors and Signs and Symptoms  Outcome: Outcome(s) achieved Date Met: 07/09/18    Goal: Skin Health and Integrity  Outcome: Ongoing (interventions implemented as appropriate)

## 2018-07-09 NOTE — PROGRESS NOTES
"Anticoagulation by Pharmacy - Warfarin    Massimo Bentley is a 78 y.o.male being continued on warfarin for atrial fibrillation  Home regimen: 2.5 mg nightly except 5 mg on Tu, Th, Sat  INR Goal: 2-3  Today's INR:   Lab Results   Component Value Date    INR 2.49 (H) 07/09/2018       Objective:  [Ht: 180.3 cm (71\"); Wt: 96.7 kg (213 lb 3.2 oz)]  Lab Results   Component Value Date    INR 2.49 (H) 07/09/2018    INR 3.19 (H) 07/08/2018    INR 4.09 (H) 07/08/2018    PROTIME 25.8 (H) 07/09/2018    PROTIME 31.1 (H) 07/08/2018    PROTIME 37.5 (H) 07/08/2018     Lab Results   Component Value Date    HGB 11.0 (L) 07/09/2018    HGB 10.9 (L) 07/08/2018    HGB 11.2 (L) 07/07/2018    HCT 33.2 (L) 07/09/2018    HCT 33.4 (L) 07/08/2018    HCT 34.0 (L) 07/07/2018     07/09/2018     07/08/2018     07/07/2018           Assessment  Interacting medications: N/A  INR is therapeutic after holding warfarin.  Restart warfarin 2.5 mg nightly.    Plan:  1.  Give warfarin 2.5 mg tablet PO @ 1800 tonight  2.  Draw a PT/INR in AM  3.  Pharmacy will continue to follow    Nicko Hodge III [facility]  07/09/18 2:26 PM     "

## 2018-07-09 NOTE — PLAN OF CARE
Problem: Patient Care Overview  Goal: Plan of Care Review  Outcome: Ongoing (interventions implemented as appropriate)   07/09/18 1116   Coping/Psychosocial   Plan of Care Reviewed With patient   Plan of Care Review   Progress no change   OTHER   Outcome Summary Pt with no change to goal status at this time. Pt showing improvement in t/f and gait ability but minimal. Pt cont to be very unsteady when up and fatigues very quickly.

## 2018-07-09 NOTE — THERAPY TREATMENT NOTE
Acute Care - Physical Therapy Treatment Note  [facility]     Patient Name: Massimo Bentley  : 1940  MRN: [id number]  Today's Date: 2018  Onset of Illness/Injury or Date of Surgery: 18  Date of Referral to PT: 18  Referring Physician: VALENTINA Sykes MD.    Admit Date: 2018    Visit Dx:    ICD-10-CM ICD-9-CM   1. Congestive heart failure, unspecified congestive heart failure chronicity, unspecified congestive heart failure type (CMS/HCC) I50.9 428.0   2. Warfarin-induced coagulopathy (CMS/HCC) D68.9 286.9    T45.515A E934.2   3. Hypoxemia R09.02 799.02   4. Impaired functional mobility, balance, gait, and endurance Z74.09 V49.89   5. Impaired mobility and activities of daily living Z74.09 799.89     Patient Active Problem List   Diagnosis   • Long-term (current) use of anticoagulants   • Atrial fibrillation [I48.91]   • Embolism and thrombosis of artery (CMS/HCC)   • PVD (peripheral vascular disease) (CMS/HCC)   • Tobacco dependence syndrome   • Stroke (CMS/HCC)   • Suicide (CMS/HCC)   • Osteoarthritis of multiple joints   • Hypercholesterolemia   • GERD (gastroesophageal reflux disease)   • Major depression   • Benign prostatic hyperplasia   • Typical atrial flutter (CMS/HCC)   • Non-rheumatic mitral regurgitation   • Hyperlipidemia   • Localized edema   • Atherosclerosis of artery of extremity with gangrene (CMS/HCC)   • AAA (abdominal aortic aneurysm) without rupture (CMS/HCC)   • CKD (chronic kidney disease) stage 3, GFR 30-59 ml/min   • COPD with exacerbation (CMS/HCC)   • Hypertension   • Prediabetes   • Arterial occlusion, lower extremity (CMS/HCC)   • Hypoxemia   • Warfarin-induced coagulopathy (CMS/HCC)   • LVH (left ventricular hypertrophy)   • Diastolic dysfunction   • Coumadin toxicity       Therapy Treatment          Rehabilitation Treatment Summary     Row Name 18 1355 18 1116          Treatment Time/Intention    Discipline occupational therapy assistant  -GILBERT  physical therapy assistant  -TW     Document Type therapy note (daily note)  -KD therapy note (daily note)  -TW     Subjective Information no complaints  -KD no complaints  -TW     Mode of Treatment occupational therapy  -KD physical therapy;individual therapy  -TW     Patient/Family Observations no family present  -KD  --     Care Plan Review care plan/treatment goals reviewed  -KD  --     Therapy Frequency (OT Eval) other (see comments)   3-14x/wk  -KD  --     Patient Effort good  -KD good  -TW     Existing Precautions/Restrictions  -- fall;cardiac;oxygen therapy device and L/min  -TW     Equipment Issued to Patient  -- gait belt  -TW     Recorded by [KD] ALDAIR Hazel/L 07/09/18 1453 [TW] Sergei Doty, PTA 07/09/18 1547     Row Name 07/09/18 1355 07/09/18 1116          Vital Signs    Pre Systolic BP Rehab 102  -  -TW     Pre Treatment Diastolic BP 58  -KD 69  -TW     Post Systolic BP Rehab  -- 135  -TW     Post Treatment Diastolic BP  -- 70  -TW     Pretreatment Heart Rate (beats/min) 72  -KD 62  -TW     Posttreatment Heart Rate (beats/min) 76  -KD 68  -TW     Pre SpO2 (%) 94  -KD 95  -TW     O2 Delivery Pre Treatment room air  -KD supplemental O2  -TW     Intra SpO2 (%)  -- 92  -TW     Post SpO2 (%) 94  -KD 93  -TW     O2 Delivery Post Treatment room air  -KD  --     Pre Patient Position Sitting  -KD Supine  -TW     Intra Patient Position Standing  -KD Standing  -TW     Post Patient Position Sitting  -KD Sitting  -TW     Recorded by [KD] ALDAIR Hazel/L 07/09/18 1506 [TW] Sergei Doty, PTA 07/09/18 1547     Row Name 07/09/18 1355 07/09/18 1116          Cognitive Assessment/Intervention- PT/OT    Orientation Status (Cognition) oriented x 3  -KD oriented x 3  -TW     Follows Commands (Cognition) WFL  -KD WFL  -TW     Recorded by [KD] ALDAIR Hazel/L 07/09/18 1506 [TW] Sergei Doty, PTA 07/09/18 1547     Row Name 07/09/18 1116             Bed Mobility  Assessment/Treatment    Supine-Sit [place] (Bed Mobility) conditional independence  -TW      Assistive Device (Bed Mobility) bed rails;head of bed elevated  -TW      Recorded by [TW] Sergei Doty, PTA 07/09/18 1547      Row Name 07/09/18 1355 07/09/18 1116          Sit-Stand Transfer    Sit-Stand [place] (Transfers) contact guard   pt stood x 4.5 mins  -KD contact guard  -TW     Assistive Device (Sit-Stand Transfers) walker, front-wheeled  -KD walker, front-wheeled  -TW     Recorded by [KD] NALLELY HazelA/L 07/09/18 1506 [TW] Sergei Doty, PTA 07/09/18 1547     Row Name 07/09/18 1355 07/09/18 1116          Stand-Sit Transfer    Stand-Sit [place] (Transfers) contact guard  -KD contact guard  -TW     Assistive Device (Stand-Sit Transfers) walker, front-wheeled  -KD walker, front-wheeled  -TW     Recorded by [KD] NALLELY HazelA/L 07/09/18 1506 [TW] Sergei Doty, PTA 07/09/18 1547     Row Name 07/09/18 1116             Gait/Stairs Assessment/Training    Gait/Stairs Assessment/Training gait/ambulation assistive device  -TW      [place] Level (Gait) minimum assist (75% patient effort);contact guard  -TW      Assistive Device (Gait) walker, front-wheeled  -TW      Distance in Feet (Gait) 14ft  -TW      Pattern (Gait) step-to  -TW      Deviations/Abnormal Patterns (Gait) ataxic  -TW      Bilateral Gait Deviations forward flexed posture  -TW      Recorded by [TW] Sergei Doty, PTA 07/09/18 1547      Row Name 07/09/18 1355             Therapeutic Exercise    Upper Extremity Range of Motion (Therapeutic Exercise) shoulder flexion/extension, bilateral;shoulder abduction/adduction, bilateral;shoulder horizontal abduction/adduction, bilateral;shoulder internal/external rotation, bilateral;elbow flexion/extension, bilateral;forearm supination/pronation, bilateral;wrist flexion/extension, bilateral  -KD      Weight/Resistance (Therapeutic Exercise) 2 pounds  -KD      Exercise  Type (Therapeutic Exercise) AROM (active range of motion)  -KD      Sets/Reps (Therapeutic Exercise) 2/20  -KD      Equipment (Therapeutic Exercise) free weight, barbell  -KD      Expected Outcome (Therapeutic Exercise) improve functional tolerance, self-care activity  -KD      Recorded by [KD] ALDAIR Hazel/L 07/09/18 1506      Row Name 07/09/18 1355 07/09/18 1116          Positioning and Restraints    Pre-Treatment Position sitting in chair/recliner  -KD in bed  -TW     Post Treatment Position chair  -KD chair  -TW     In Chair sitting;call light within reach;encouraged to call for assist;exit alarm on  -KD reclined;call light within reach;encouraged to call for assist;notified nsg  -TW     Restraints  -- notified nsg:  -TW     Recorded by [KD] ALDAIR Hazel/L 07/09/18 1506 [TW] Sergei Doty, [facility] 07/09/18 1547     Row Name 07/09/18 1355 07/09/18 1116          Pain Scale: Numbers Pre/Post-Treatment    Pain Scale: Numbers, Pretreatment 0/10 - no pain  -KD 0/10 - no pain  -TW     Pain Scale: Numbers, Post-Treatment 0/10 - no pain  -KD 0/10 - no pain  -TW     Recorded by [KD] ALDAIR Hazel/L 07/09/18 1506 [TW] Sergei Doty, PTA 07/09/18 1547     Row Name                Wound 07/07/18 2300 Right lower calf incision;other (see comments)    Wound - Properties Group Date first assessed: 07/07/18 [LC] Time first assessed: 2300 [LC] Present On Admission : yes [LC] Side: Right [LC] Orientation: lower [LC] Location: calf [LC] Type: incision;other (see comments) [LC], healing vascular surgery wound  Additional Comments: cleaned soap and water, santyl, dry gauze, and tape covered BID per [facility] directions [LC] Recorded by:  [LC] Anais Carrillo RN 07/07/18 2317    Row Name 07/09/18 1355             Outcome Summary/Treatment Plan (OT)    Daily Summary of Progress (OT) progress toward functional goals as expected  -KD      Plan for Continued Treatment (OT) cont  -KD      Recorded by [KD] Coral Carrillo,  QUINTEROS/L 07/09/18 1506      Row Name 07/09/18 1116             Outcome Summary/Treatment Plan (PT)    Daily Summary of Progress (PT) progress toward functional goals is good  -TW      Barriers to Overall Progress (PT) Deconditioning  -TW      Plan for Continued Treatment (PT) Cont  -TW      Anticipated Discharge Disposition (PT) anticipate therapy at next level of care  -TW      Recorded by [TW] Sergei Doty, PTA 07/09/18 1547        User Key  (r) = Recorded By, (t) = Taken By, (c) = Cosigned By    Initials Name Effective Dates Discipline    LC Anais Carrillo RN 10/17/16 -  Nurse    TW Sergei Doty, PTA 03/07/18 -  PT    KD Coral Carrillo, QUINTEROS/L 03/07/18 -  OT          Wound 07/07/18 2300 Right lower calf incision;other (see comments) (Active)   Dressing Appearance dry;intact;no drainage 7/9/2018  8:51 AM   Base red/granulating 7/8/2018  8:00 PM   Edges rolled/closed 7/8/2018  8:00 PM   Care, Wound cleansed with;sterile normal saline 7/8/2018  8:00 PM   Dressing Care, Wound dressing changed 7/8/2018  8:00 PM               PT Rehab Goals     Row Name 07/09/18 1116             Transfer Goal 1 (PT)    Activity/Assistive Device (Transfer Goal 1, PT) bed-to-chair/chair-to-bed;toilet  -TW      [place] Level/Cues Needed (Transfer Goal 1, PT) conditional independence;supervision required  -TW      Time Frame (Transfer Goal 1, PT) long term goal (LTG);5 days  -TW      Progress/Outcome (Transfer Goal 1, PT) goal not met;goal ongoing  -TW         Gait Training Goal 1 (PT)    Activity/Assistive Device (Gait Training Goal 1, PT) gait (walking locomotion);assistive device use;decrease fall risk;increase endurance/gait distance  -TW      [place] Level (Gait Training Goal 1, PT) minimum assist (75% or more patient effort)  -TW      Distance (Gait Goal 1, PT) 50 ft   -TW      Time Frame (Gait Training Goal 1, PT) short term goal (STG);5 days  -TW      Progress/Outcome (Gait Training Goal 1, PT) goal not met  -TW          Gait Training Goal (PT)    Gait Training Goal (PT) --   pt will demo problem solving skills in amb w/ RW w/ VSS  -TW      Time Frame (Gait Training Goal, PT) by discharge  -TW      Progress/Outcome (Gait Training Goal, PT) goal ongoing  -TW        User Key  (r) = Recorded By, (t) = Taken By, (c) = Cosigned By    Initials Name Provider Type Discipline     Sergei Doty PTA Physical Therapy Assistant PT          Physical Therapy Education     Title: PT OT SLP Therapies (Active)     Topic: Physical Therapy (Active)     Point: Mobility training (Active)    Learning Progress Summary     Learner Status Readiness Method Response Comment Documented by    Patient Active Acceptance D NR POC;mobiltiy w/ RW; balance in gait  07/08/18 1903          Point: Precautions (Active)    Learning Progress Summary     Learner Status Readiness Method Response Comment Documented by    Patient Active Acceptance D NR POC;mobiltiy w/ RW; balance in gait  07/08/18 1903                      User Key     Initials Effective Dates Name Provider Type Discipline     04/03/18 -  Leonila Lester, PT Physical Therapist PT                    PT Recommendation and Plan  Anticipated Discharge Disposition (PT): anticipate therapy at next level of care  Outcome Summary/Treatment Plan (PT)  Daily Summary of Progress (PT): progress toward functional goals is good  Barriers to Overall Progress (PT): Deconditioning  Plan for Continued Treatment (PT): Cont  Anticipated Discharge Disposition (PT): anticipate therapy at next level of care  Plan of Care Reviewed With: patient  Progress: no change  Outcome Summary: Pt with no change to goal status at this time. Pt showing improvement in t/f and gait ability but minimal. Pt cont to be very unsteady when up and fatigues very quickly.          Outcome Measures     Row Name 07/09/18 1355 07/09/18 1116 07/09/18 1005       How much help from another person do you currently need...    Turning  from your back to your side while in flat bed without using bedrails?  -- 3  -TW  --    Moving from lying on back to sitting on the side of a flat bed without bedrails?  -- 3  -TW  --    Moving to and from a bed to a chair (including a wheelchair)?  -- 2  -TW  --    Standing up from a chair using your arms (e.g., wheelchair, bedside chair)?  -- 3  -TW  --    Climbing 3-5 steps with a railing?  -- 1  -TW  --    To walk in hospital room?  -- 1  -TW  --    AM-PAC 6 Clicks Score  -- 13  -TW  --       How much help from another is currently needed...    Putting on and taking off regular lower body clothing? 2  -KD  -- 2  -RB    Bathing (including washing, rinsing, and drying) 2  -KD  -- 2  -RB    Toileting (which includes using toilet bed pan or urinal) 2  -KD  -- 2  -RB    Putting on and taking off regular upper body clothing 2  -KD  -- 2  -RB    Taking care of personal grooming (such as brushing teeth) 3  -KD  -- 3  -RB    Eating meals 4  -KD  -- 4  -RB    Score 15  -KD  -- 15  -RB       Functional Assessment    Outcome Measure Options  -- AM-PAC 6 Clicks Basic Mobility (PT)  -TW AM-PAC 6 Clicks Daily Activity (OT)  -RB    Row Name 07/08/18 1900             How much help from another person do you currently need...    Turning from your back to your side while in flat bed without using bedrails? 3  -GB      Moving from lying on back to sitting on the side of a flat bed without bedrails? 3  -GB      Moving to and from a bed to a chair (including a wheelchair)? 2  -GB      Standing up from a chair using your arms (e.g., wheelchair, bedside chair)? 3  -GB      Climbing 3-5 steps with a railing? 1  -GB      To walk in hospital room? 1  -GB      AM-PAC 6 Clicks Score 13  -GB         Functional Assessment    Outcome Measure Options AM-PAC 6 Clicks Basic Mobility (PT)  -GB        User Key  (r) = Recorded By, (t) = Taken By, (c) = Cosigned By    Initials Name Provider Type    YOGESH Cardoso OT Occupational Therapist    LEONID  Leonila Lester, PT Physical Therapist    TW Sergei Doty PTA Physical Therapy Assistant    ALDAIR Fraser/L Occupational Therapy Assistant           Time Calculation:         PT Charges     Row Name 07/09/18 1550             Time Calculation    Start Time 1116  -TW      Stop Time 1145  -TW      Time Calculation (min) 29 min  -TW      PT Received On 07/09/18  -TW      PT Goal Re-Cert Due Date 07/21/18  -TW         Time Calculation- PT    Total Timed Code Minutes- PT 29 minute(s)  -TW        User Key  (r) = Recorded By, (t) = Taken By, (c) = Cosigned By    Initials Name Provider Type    TW Sergei Doty PTA Physical Therapy Assistant        Therapy Suggested Charges     Code   Minutes Charges    79162 (CPT®) Hc Pt Neuromusc Re Education Ea 15 Min      05259 (CPT®) Hc Pt Ther Proc Ea 15 Min      41901 (CPT®) Hc Gait Training Ea 15 Min 15 1    11664 (CPT®) Hc Pt Therapeutic Act Ea 15 Min      28281 (CPT®) Hc Pt Manual Therapy Ea 15 Min      47378 (CPT®) Hc Pt Iontophoresis Ea 15 Min      16867 (CPT®) Hc Pt Elec Stim Ea-Per 15 Min      54214 (CPT®) Hc Pt Ultrasound Ea 15 Min      84192 (CPT®) Hc Pt Self Care/Mgmt/Train Ea 15 Min      Total  15 1        Therapy Charges for Today     Code Description Service Date Service Provider Modifiers Qty    76897735090 HC PT THERAPEUTIC ACT EA 15 MIN 7/9/2018 Sergei Doty PTA GP 2          PT G-Codes  PT Professional Judgement Used?: Yes  Outcome Measure Options: AM-PAC 6 Clicks Basic Mobility (PT)  Score: 13  Functional Limitation: Mobility: Walking and moving around  Mobility: Walking and Moving Around Current Status (): At least 40 percent but less than 60 percent impaired, limited or restricted  Mobility: Walking and Moving Around Goal Status (): At least 1 percent but less than 20 percent impaired, limited or restricted    Sergei Doty PTA  7/9/2018

## 2018-07-09 NOTE — PROGRESS NOTES
MEDICINE DAILY PROGRESS NOTE  NAME: Massimo Bentley  : 1940  MRN: [id number]     LOS: 2 days     PROVIDER OF SERVICE: Royal Gutierrez MD    Chief Complaint: Hypoxemia    Subjective:   HPI: Patient admitted with shortness of air, dyspnea on exertion, and decrease in exercise tolerance.    Interval History:  History taken from: patient chart RN  . Some improvement in dyspnea.  No acute events overnight. No complaints at time of exam.    Review of Systems:   Review of Systems   Constitutional: Positive for activity change and fatigue. Negative for appetite change and fever.   HENT: Negative for ear pain and sore throat.    Eyes: Negative for pain and visual disturbance.   Respiratory: Positive for shortness of breath. Negative for cough.    Cardiovascular: Positive for leg swelling. Negative for chest pain and palpitations.   Gastrointestinal: Negative for abdominal pain and nausea.   Endocrine: Negative for cold intolerance and heat intolerance.   Genitourinary: Negative for difficulty urinating and dysuria.   Musculoskeletal: Positive for arthralgias. Negative for gait problem.   Skin: Negative for color change and rash.   Neurological: Positive for weakness. Negative for dizziness and headaches.   Hematological: Negative for adenopathy. Does not bruise/bleed easily.   Psychiatric/Behavioral: Negative for agitation, confusion and sleep disturbance.       Objective:     Vital Signs  Vitals:    18 0447 18 0636 18 0645 18 0814   BP: 104/59   100/58   BP Location:    Left arm   Patient Position:    Lying   Pulse: 58 62 63 59   Resp: 20 18  18   Temp: 97.4 °F (36.3 °C)   96.1 °F (35.6 °C)   TempSrc:    Temporal Artery    SpO2: 92% 92%  96%   Weight:   96.7 kg (213 lb 3.2 oz)    Height:           Physical Exam  Physical Exam   Constitutional: He is oriented to person, place, and time. He appears well-developed and well-nourished. No distress.   HENT:   Head: Normocephalic and  atraumatic.   Right Ear: External ear normal.   Left Ear: External ear normal.   Nose: Nose normal.   Eyes: Conjunctivae and EOM are normal. Pupils are equal, round, and reactive to light.   Neck: Normal range of motion. Neck supple.   Cardiovascular: Normal rate, regular rhythm, normal heart sounds and intact distal pulses.    Pulmonary/Chest: Effort normal. No respiratory distress. He has wheezes. He has no rales. He exhibits no tenderness.   Abdominal: Soft. Bowel sounds are normal. He exhibits no distension and no mass. There is no tenderness. There is no rebound and no guarding.   Musculoskeletal: Normal range of motion. He exhibits no edema.   Neurological: He is alert and oriented to person, place, and time.   Skin: Skin is warm and dry. No rash noted. He is not diaphoretic. No erythema. No pallor.   Psychiatric: He has a normal mood and affect. His behavior is normal.   Nursing note and vitals reviewed.      Medication Review    Current Facility-Administered Medications:   •  acetaminophen (TYLENOL) tablet 650 mg, 650 mg, Oral, Q4H PRN, Umm Interiano MD  •  aspirin EC tablet 81 mg, 81 mg, Oral, Daily, Umm Interiano MD, 81 mg at 07/09/18 0839  •  bisacodyl (DULCOLAX) suppository 10 mg, 10 mg, Rectal, Daily PRN, Umm Interiano MD  •  budesonide-formoterol (SYMBICORT) 160-4.5 MCG/ACT inhaler 2 puff, 2 puff, Inhalation, BID - RT, Umm Interiano MD, 2 puff at 07/09/18 0636  •  cilostazol (PLETAL) tablet 50 mg, 50 mg, Oral, BID AC, Umm Interiano MD, 50 mg at 07/09/18 0840  •  docusate sodium (COLACE) capsule 200 mg, 200 mg, Oral, BID, Umm Interiano MD, 200 mg at 07/09/18 0840  •  furosemide (LASIX) injection 40 mg, 40 mg, Intravenous, TID, Umm Interiano MD, 40 mg at 07/09/18 0842  •  ipratropium-albuterol (DUO-NEB) nebulizer solution 3 mL, 3 mL, Nebulization, Q4H - RT, Umm Interiano MD, 3 mL at 07/09/18 0636  •  magnesium hydroxide (MILK  OF MAGNESIA) suspension 2400 mg/10mL 10 mL, 10 mL, Oral, Daily PRN, Umm Interiano MD  •  magnesium oxide (MAGOX) tablet 400 mg, 400 mg, Oral, Nightly, Umm Interiano MD, 400 mg at 07/08/18 2003  •  montelukast (SINGULAIR) tablet 10 mg, 10 mg, Oral, Nightly, Umm Interiano MD, 10 mg at 07/08/18 2003  •  nicotine (NICODERM CQ) 21 MG/24HR patch 1 patch, 1 patch, Transdermal, Q24H, Umm Interiano MD, 1 patch at 07/08/18 2226  •  oxyCODONE-acetaminophen (PERCOCET)  MG per tablet 1 tablet, 1 tablet, Oral, Q4H PRN, Umm Interiano MD, 1 tablet at 07/08/18 2226  •  pantoprazole (PROTONIX) EC tablet 40 mg, 40 mg, Oral, Q AM, Umm Interiano MD, 40 mg at 07/09/18 0623  •  Pharmacy to dose warfarin, , Does not apply, Continuous PRN, Umm Interiano MD  •  pramipexole (MIRAPEX) tablet 0.25 mg, 0.25 mg, Oral, TID, Umm Interiano MD, 0.25 mg at 07/09/18 0840  •  QUEtiapine (SEROquel) tablet 300 mg, 300 mg, Oral, Nightly, Umm Interiano MD, 300 mg at 07/08/18 2003  •  sodium chloride 0.9 % flush 1-10 mL, 1-10 mL, Intravenous, PRN, Umm Interiano MD  •  sotalol (BETAPACE) tablet 80 mg, 80 mg, Oral, Q12H, Umm Interiano MD, 80 mg at 07/08/18 2002  •  sotalol (BETAPACE) tablet 80 mg, 80 mg, Oral, Daily PRN, Umm Interiano MD     Diagnostic Data    Lab Results (last 24 hours)     Procedure Component Value Units Date/Time    Comprehensive Metabolic Panel [[id number]]  (Abnormal) Collected:  07/09/18 0537    Specimen:  Blood Updated:  07/09/18 0626     Glucose 122 (H) mg/dL      BUN 39 (H) mg/dL      Creatinine 1.95 (H) mg/dL      Sodium 137 mmol/L      Potassium 3.8 mmol/L      Chloride 101 mmol/L      CO2 28.0 mmol/L      Calcium 8.3 (L) mg/dL      Total Protein 7.2 g/dL      Albumin 3.40 g/dL      ALT (SGPT) 21 U/L      AST (SGOT) 25 U/L      Alkaline Phosphatase 65 U/L      Total Bilirubin 0.2 mg/dL      eGFR Non   Amer 34 (L) mL/min/1.73      Globulin 3.8 (H) gm/dL      A/G Ratio 0.9 (L) g/dL      BUN/Creatinine Ratio 20.0     Anion Gap 8.0 mmol/L     Narrative:       The MDRD GFR formula is only valid for adults with stable renal function between ages 18 and 70.    Protime-INR [[id number]]  (Abnormal) Collected:  07/09/18 0537    Specimen:  Blood Updated:  07/09/18 0610     Protime 25.8 (H) Seconds      INR 2.49 (H)    Narrative:       Therapeutic range for most indications is 2.0-3.0 INR,  or 2.5-3.5 for mechanical heart valves.    Magnesium [[id number]]  (Abnormal) Collected:  07/09/18 0537    Specimen:  Blood Updated:  07/09/18 0609     Magnesium 2.7 (H) mg/dL     CBC Auto Differential [[id number]]  (Abnormal) Collected:  07/09/18 0537    Specimen:  Blood Updated:  07/09/18 0601     WBC 11.46 (H) 10*3/mm3      RBC 3.91 (L) 10*6/mm3      Hemoglobin 11.0 (L) g/dL      Hematocrit 33.2 (L) %      MCV 84.9 fL      MCH 28.1 pg      MCHC 33.1 g/dL      RDW 18.3 (H) %      RDW-SD 55.9 (H) fl      MPV 8.5 fL      Platelets 278 10*3/mm3      Neutrophil % 83.7 (H) %      Lymphocyte % 8.6 (L) %      Monocyte % 7.0 %      Eosinophil % 0.2 %      Basophil % 0.1 %      Immature Grans % 0.4 %      Neutrophils, Absolute 9.59 (H) 10*3/mm3      Lymphocytes, Absolute 0.99 10*3/mm3      Monocytes, Absolute 0.80 10*3/mm3      Eosinophils, Absolute 0.02 10*3/mm3      Basophils, Absolute 0.01 10*3/mm3      Immature Grans, Absolute 0.05 (H) 10*3/mm3     Protime-INR [[id number]]  (Abnormal) Collected:  07/08/18 1617    Specimen:  Blood Updated:  07/08/18 1645     Protime 31.1 (H) Seconds      INR 3.19 (H)    Narrative:       Therapeutic range for most indications is 2.0-3.0 INR,  or 2.5-3.5 for mechanical heart valves.    CRE Screen by PCR - Swab, Large Intestine, Rectum [[id number]] Collected:  07/08/18 0821    Specimen:  Swab from Large Intestine, Rectum Updated:  07/08/18 0948     CRE SCREEN Not Detected     Comment: Test performed by real-time  polymerase chain reaction (qPCR).        OXA 48 Strain --     Comment: Not Applicable.        IMP STRAIN --     Comment: Not Applicable        VIM STRAING --     Comment: Not Applicable        NDM Strain --     Comment: Not Applicable        KPC Strain --     Comment: Not Applicable             I reviewed the patient's new clinical results.    Assessment/Plan:     Active Hospital Problems (** Indicates Principal Problem)    Diagnosis Date Noted   • **Hypoxemia [R09.02] 07/07/2018   • LVH (left ventricular hypertrophy) [I51.7] 07/08/2018   • Diastolic dysfunction [I51.9] 07/08/2018   • Coumadin toxicity [T45.511A] 07/08/2018   • Hypertension [I10] 11/20/2017   • COPD with exacerbation (CMS/[facility]) [J44.1] 11/20/2017   • CKD (chronic kidney disease) stage 3, GFR 30-59 ml/min [N18.3] 11/01/2017   • PVD (peripheral vascular disease) (CMS/[facility]) [I73.9] 09/21/2016   • Atrial fibrillation [I48.91] [I48.91] 08/09/2016      Resolved Hospital Problems    Diagnosis Date Noted Date Resolved   No resolved problems to display.     1. Acute hypoxic respiratory failure. Good diuresis. Improvement overnight. Wean oxygen.   2. CKD. Small bump with diuresis. Close monitoring. Consult nephrology as needed.    Results from last 7 days  Lab Units 07/09/18  0537 07/08/18  0533 07/07/18 2002   CREATININE mg/dL 1.95* 1.81* 1.76*   3. COPD exacerbation.  Oxygen. Nebs.   4. Afib. Coumadin. Sotalol. Rate controlled.   5. Deconditioning. PT/OT.  6. HFpEF. Lasix. FR.    Will monitor patient's hospital course and adjust treatment as hospital course dictates.    DVT prophylaxis: Warfarin  Code status is   Code Status and Medical Interventions:   Ordered at: 07/07/18 9950     Level Of Support Discussed With:    Patient     Code Status:    CPR     Medical Interventions (Level of Support Prior to Arrest):    Full       Plan for disposition:Where: SNF and When:  1-2 days      Time:           This document has been electronically signed by Royal Gutierrez  MD on July 9, 2018 8:43 AM

## 2018-07-09 NOTE — THERAPY EVALUATION
Acute Care - Occupational Therapy Initial Evaluation  [facility]     Patient Name: Massimo Bentley  : 1940  MRN: [id number]  Today's Date: 2018  Onset of Illness/Injury or Date of Surgery: 18  Date of Referral to OT: 18  Referring Physician: VALENTINA Sykes MD.    Admit Date: 2018       ICD-10-CM ICD-9-CM   1. Congestive heart failure, unspecified congestive heart failure chronicity, unspecified congestive heart failure type (CMS/HCC) I50.9 428.0   2. Warfarin-induced coagulopathy (CMS/HCC) D68.9 286.9    T45.515A E934.2   3. Hypoxemia R09.02 799.02   4. Impaired functional mobility, balance, gait, and endurance Z74.09 V49.89   5. Impaired mobility and activities of daily living Z74.09 799.89     Patient Active Problem List   Diagnosis   • Long-term (current) use of anticoagulants   • Atrial fibrillation [I48.91]   • Embolism and thrombosis of artery (CMS/HCC)   • PVD (peripheral vascular disease) (CMS/HCC)   • Tobacco dependence syndrome   • Stroke (CMS/HCC)   • Suicide (CMS/[facility])   • Osteoarthritis of multiple joints   • Hypercholesterolemia   • GERD (gastroesophageal reflux disease)   • Major depression   • Benign prostatic hyperplasia   • Typical atrial flutter (CMS/HCC)   • Non-rheumatic mitral regurgitation   • Hyperlipidemia   • Localized edema   • Atherosclerosis of artery of extremity with gangrene (CMS/HCC)   • AAA (abdominal aortic aneurysm) without rupture (CMS/HCC)   • CKD (chronic kidney disease) stage 3, GFR 30-59 ml/min   • COPD with exacerbation (CMS/HCC)   • Hypertension   • Prediabetes   • Arterial occlusion, lower extremity (CMS/HCC)   • Hypoxemia   • Warfarin-induced coagulopathy (CMS/HCC)   • LVH (left ventricular hypertrophy)   • Diastolic dysfunction   • Coumadin toxicity     Past Medical History:   Diagnosis Date   • Abdominal bloating    • Anxiety    • Atherosclerosis of native arteries of extremity with intermittent claudication (CMS/HCC)    • Atrial flutter  (CMS/[facility])    • Backache    • Benign prostatic hyperplasia     BX 2009, also has renal cyst      • Chronic bronchitis (CMS/HCC)    • Chronic renal impairment    • Depressive disorder    • Drug abuse, episodic use    • Dyslipidemia    • Fatigue    • GERD (gastroesophageal reflux disease)    • Hypercholesterolemia 03/15/2016   • Injury of tendon of rotator cuff 10/22/2015   • Insomnia 06/24/2016   • Intervertebral disc disorder 10/27/2011   • Major depression    • Neck pain 01/09/2012   • Osteoarthritis of multiple joints 06/21/2013   • Pain from vascular prosthetic devices, implants and grafts, sequela 12/03/2015   • Pain in left foot 10/27/2015   • Peripheral vascular disease (CMS/HCC) 12/03/2015     LEFT fem-tib bypass, embolectomy 6/2014    10/27/2015    • Senile debility 10/22/2015   • 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 No coronary atherosclerosis. Normal left ventricular function   • CHOLECYSTECTOMY  08/25/2014    Cholecystitis with gangrenous gallbladder   • ENDOSCOPY  03/22/1990    Duodenal ulcer   • ENDOSCOPY AND COLONOSCOPY  10/17/2013    Diverticulum in sigmoid colon & descending colon. Internal & external hemorrhoids found   • ESOPHAGOSCOPY / EGD  10/17/2013    With tube-Esophagitis seen. Biopsy taken. Gastritis in stomach. Biopsy taken. Stenosis in 2nd portion of duodenum. Dilatation performed.   • FEMORAL THROMBECTOMY/EMBOLECTOMY  06/29/2014    Left lower extremity arteriogram. Left popliteal artery endarterectomy. Redo left femoral to [id number] trunk  bypass. Negative pressure wound therapy with placement of Prevena wound V.A.C., left groin, left lower leg   • INJECTION OF MEDICATION  03/15/2016    B12   • INJECTION OF MEDICATION  10/23/2015    Drain/Inject Major Joint    • INJECTION OF MEDICATION  01/13/2016    Kenalog   • LUMBAR LAMINECTOMY     • TRANSESOPHAGEAL ECHOCARDIOGRAM (TEX)  10/06/2015    With color flow-Mild mitral regurg.Left atrium mild dilated.Ef of 55-60%.RV systolic pressure elevated.Trace tricuspid regurg   • TRANSESOPHAGEAL ECHOCARDIOGRAM (TEX)  02/14/2012    Without color flow-[facility] w/ early diast dysfun. AV trileaflet & structurally NRL w/ AR NRL. No regional wall motion abn Est Ef approx 50-55%. M & TR valves NRL w/ mild M & TR regurg Trace -mild pulmonic regurg. Anterior echo free space w/o hemodynamic signif. NRL RV   • TRANSESOPHAGEAL ECHOCARDIOGRAM (TEX)  03/25/2011    Normal LV systolic function with Ef of 50-55%.Left atrium ildly dilated.Moderate mitral regurg.Mitral valve regurg.Intact interatrial septum.No evidence of any cardiac thrombus or pericardial effusion.AV trileaflet          OT ASSESSMENT FLOWSHEET (last 72 hours)      Occupational Therapy Evaluation     Row Name 07/09/18 1005                   OT Evaluation Time/Intention    Subjective Information complains of;pain;weakness;fatigue  -RB        Document Type evaluation  -RB        Mode of Treatment individual therapy;occupational therapy  -RB        Patient Effort good  -RB        Symptoms Noted During/After Treatment fatigue;shortness of breath  -RB           General Information    Patient Profile Reviewed? yes  -RB        Onset of Illness/Injury or Date of Surgery 07/07/18  -RB        Referring Physician VALENTINA Sykes MD.  -RB        Patient Observations alert;cooperative;agree to therapy  -RB        General Observations of Patient IV, telemetry and 02 4Ls.  -RB        Prior Level of Function independent:;all household mobility;gait;transfer;ADL's   Prior to nursing home  stay.  -RB        Equipment Currently Used at Home grab bar;oxygen;raised toilet;rollator;shower chair   has to sleep in chair;  -RB        Pertinent History of Current Functional Problem Pt has been at SNF for 2 wks after venous sx on R LE.  -RB        Existing Precautions/Restrictions fall;cardiac;oxygen therapy device and L/min  -RB        Limitations/Impairments hearing;safety/cognitive  -RB        Equipment Issued to Patient gait belt  -RB        Risks Reviewed patient:;LOB  -RB        Benefits Reviewed patient:;improve function;increase independence;increase strength  -RB           Relationship/Environment    Lives With facility resident  -RB        Primary Roles/Responsibilities caregiver for other(s)   He and sitters care for demented wife.  -RB           Resource/Environmental Concerns    Current Living Arrangements residential facility  -RB           Cognitive Assessment/Intervention- PT/OT    Orientation Status (Cognition) person;place;situation  -RB        Follows Commands (Cognition) follows two step commands;over 90% accuracy;repetition of directions required;verbal cues/prompting required  -RB           Safety Issues, Functional Mobility    Impairments Affecting Function (Mobility) endurance/activity tolerance;cognition;shortness of breath;strength  -RB           Bed Mobility Assessment/Treatment    Bed Mobility Assessment/Treatment supine-sit-supine  -RB        Scooting/Bridging [place] (Bed Mobility) --  -RB        Supine-Sit-Supine [place] (Bed Mobility) conditional independence;supervision  -RB        Assistive Device (Bed Mobility) bed rails;head of bed elevated  -RB           Functional Mobility    Functional Mobility- Ind. Level contact guard assist  -RB        Functional Mobility- Device rolling walker  -RB        Functional Mobility-Distance (Feet) 8  -RB        Functional Mobility- Safety Issues step length decreased;supplemental O2  -RB           Transfer Assessment/Treatment     Transfer Assessment/Treatment toilet transfer;sit-stand transfer;stand-sit transfer  -RB           Sit-Stand Transfer    Sit-Stand [place] (Transfers) contact guard  -RB        Assistive Device (Sit-Stand Transfers) walker, front-wheeled  -RB           Stand-Sit Transfer    Stand-Sit [place] (Transfers) contact guard  -RB        Assistive Device (Stand-Sit Transfers) walker, front-wheeled  -RB           Toilet Transfer    Type (Toilet Transfer) sit-stand;stand-sit  -RB        [place] Level (Toilet Transfer) contact guard  -RB        Assistive Device (Toilet Transfer) walker, front-wheeled  -RB           General ROM    GENERAL ROM COMMENTS L shld flex ~ 40* and rest of B UE AROOM is WNLs.  -RB           General Assessment (Manual Muscle Testing)    General Manual Muscle Testing (MMT) Assessment upper extremity strength deficits identified  -RB        Comment, General Manual Muscle Testing (MMT) Assessment 2-/5 for L shld flex, 4-/5 for R shld flex and 4 to 4+/5 for rest of B UE strenght.  -RB           Sensory Assessment/Intervention    Sensory General Assessment no sensation deficits identified  -RB           Pain Assessment    Additional Documentation Pain Scale: Numbers Pre/Post-Treatment (Group)  -RB           Pain Scale: Numbers Pre/Post-Treatment    Pain Scale: Numbers, Pretreatment 6/10  -RB        Pain Scale: Numbers, Post-Treatment 5/10  -RB        Pain Location - Side Bilateral  -RB        Pain Location - Orientation generalized  -RB        Pain Location back  -RB           Wound 07/07/18 2300 Right lower calf incision;other (see comments)    Wound - Properties Group Date first assessed: 07/07/18  -LC Time first assessed: 2300  -LC Present On Admission : yes  -LC Side: Right  -LC Orientation: lower  -LC Location: calf  -LC Type: incision;other (see comments)  -LC, healing vascular surgery wound  Additional Comments: cleaned soap and water, santyl, dry gauze, and tape covered BID per [facility]  directions  -       Coping    Observed Emotional State anxious;tearful/crying   cried when describing his need for help for him & wife  -RB           Clinical Impression (OT)    Date of Referral to OT 07/07/18  -RB        OT Diagnosis Impaired mobility and ADLs.  -RB        Functional Level at Time of Evaluation (OT Eval) Impaired mobility and ADLs.  -RB        Criteria for Skilled Therapeutic Interventions Met (OT Eval) yes;treatment indicated  -RB        Rehab Potential (OT Eval) fair, will monitor progress closely  -RB        Therapy Frequency (OT Eval) --   5-7 days/wk.  -RB        Predicted Duration of Therapy Intervention (Therapy Eval) Until D/C or goals met.  -RB        Care Plan Review (OT) evaluation/treatment results reviewed;care plan/treatment goals reviewed;risks/benefits reviewed;patient/other agree to care plan  -RB        Anticipated Discharge Disposition (OT) [facility] nursing facility  -RB           Vital Signs    Pre Systolic BP Rehab 97  -RB        Pre Treatment Diastolic BP 55  -RB        Post Systolic BP Rehab 118  -RB        Post Treatment Diastolic BP 61  -RB        Pretreatment Heart Rate (beats/min) 62  -RB        Intratreatment Heart Rate (beats/min) 65  -RB        Posttreatment Heart Rate (beats/min) 64  -RB        Pre SpO2 (%) 95  -RB        O2 Delivery Pre Treatment supplemental O2   4 L.  -RB        Intra SpO2 (%) 89   after walking to bathroom.  -RB        O2 Delivery Intra Treatment supplemental O2  -RB        Post SpO2 (%) 93  -RB        O2 Delivery Post Treatment supplemental O2  -RB        Pre Patient Position Supine  -RB        Intra Patient Position Standing  -RB        Post Patient Position Supine  -RB           Planned OT Interventions    Planned Therapy Interventions (OT Eval) activity tolerance training;BADL retraining;adaptive equipment training;functional balance retraining;occupation/activity based interventions;transfer/mobility retraining;strengthening  exercise;ROM/therapeutic exercise;patient/caregiver education/training  -RB           OT Goals    Transfer Goal Selection (OT) transfer, OT goal 1  -RB        Bathing Goal Selection (OT) bathing, OT goal 1  -RB        Dressing Goal Selection (OT) dressing, OT goal 1  -RB        Toileting Goal Selection (OT) toileting, OT goal 1  -RB           Transfer Goal 1 (OT)    Activity/Assistive Device (Transfer Goal 1, OT) transfers, all  -RB        [place] Level/Cues Needed (Transfer Goal 1, OT) supervision required  -RB        Time Frame (Transfer Goal 1, OT) long term goal (LTG);by discharge  -RB        Progress/Outcome (Transfer Goal 1, OT) goal not met  -RB           Bathing Goal 1 (OT)    Activity/Assistive Device (Bathing Goal 1, OT) bathing skills, all  -RB        [place] Level/Cues Needed (Bathing Goal 1, OT) supervision required  -RB        Time Frame (Bathing Goal 1, OT) long term goal (LTG);by discharge  -RB        Progress/Outcomes (Bathing Goal 1, OT) goal not met  -RB           Dressing Goal 1 (OT)    Activity/Assistive Device (Dressing Goal 1, OT) dressing skills, all  -RB        [place]/Cues Needed (Dressing Goal 1, OT) supervision required  -RB        Time Frame (Dressing Goal 1, OT) long term goal (LTG);by discharge  -RB        Progress/Outcome (Dressing Goal 1, OT) goal not met  -RB           Toileting Goal 1 (OT)    Activity/Device (Toileting Goal 1, OT) toileting skills, all  -RB        [place] Level/Cues Needed (Toileting Goal 1, OT) supervision required  -RB        Time Frame (Toileting Goal 1, OT) long term goal (LTG);by discharge  -RB        Progress/Outcome (Toileting Goal 1, OT) goal not met  -RB           Patient Education Goal (OT)    Activity (Patient Education Goal, OT) B HEP for increased str for mobility and ADLs independence, EC/WS and home safety/fall prevention.  -RB        [place]/Cues/Accuracy (Memory Goal 2, OT) demonstrates adequately;verbalizes understanding   -RB        Time Frame (Patient Education Goal, OT) long term goal (LTG);by discharge  -RB        Progress/Outcome (Patient Education Goal, OT) goal not met  -RB          User Key  (r) = Recorded By, (t) = Taken By, (c) = Cosigned By    Initials Name Effective Dates    RB Dann Cardoso OT 06/15/16 -     LC Anais Carrillo RN 10/17/16 -            Occupational Therapy Education     Title: PT OT SLP Therapies (Active)     Topic: Occupational Therapy (Active)     Point: Precautions (Done)     Description: Instruct learner(s) on prescribed precautions during self-care and functional transfers.   Learning Progress Summary     Learner Status Readiness Method Response Comment Documented by    Patient Done Acceptance E VU Edu pt on use of gait belt and non skid socks when OOB and no OOB without assist. RB 07/09/18 1316                      User Key     Initials Effective Dates Name Provider Type Discipline    RB 06/15/16 -  Dann Cardoso OT Occupational Therapist OT                  OT Recommendation and Plan  Outcome Summary/Treatment Plan (OT)  Anticipated Discharge Disposition (OT): skilled nursing facility  Planned Therapy Interventions (OT Eval): activity tolerance training, BADL retraining, adaptive equipment training, functional balance retraining, occupation/activity based interventions, transfer/mobility retraining, strengthening exercise, ROM/therapeutic exercise, patient/caregiver education/training  Therapy Frequency (OT Eval):  (5-7 days/wk.)  Plan of Care Review  Plan of Care Reviewed With: patient  Plan of Care Reviewed With: patient  Outcome Summary: OT eval complete on this date.  Pt required CGA  for sit to stand, toilet transfers and 8' ambulation.  02 down to 89% with 4' walk to bathroom.  Pt could benefit from OT services to increase strength and endurance for ADLs and functional mobility.          Outcome Measures     Row Name 07/09/18 1005 07/08/18 1900          How much help from another person do you  currently need...    Turning from your back to your side while in flat bed without using bedrails?  -- 3  -GB     Moving from lying on back to sitting on the side of a flat bed without bedrails?  -- 3  -GB     Moving to and from a bed to a chair (including a wheelchair)?  -- 2  -GB     Standing up from a chair using your arms (e.g., wheelchair, bedside chair)?  -- 3  -GB     Climbing 3-5 steps with a railing?  -- 1  -GB     To walk in hospital room?  -- 1  -GB     AM-PAC 6 Clicks Score  -- 13  -GB        How much help from another is currently needed...    Putting on and taking off regular lower body clothing? 2  -RB  --     Bathing (including washing, rinsing, and drying) 2  -RB  --     Toileting (which includes using toilet bed pan or urinal) 2  -RB  --     Putting on and taking off regular upper body clothing 2  -RB  --     Taking care of personal grooming (such as brushing teeth) 3  -RB  --     Eating meals 4  -RB  --     Score 15  -RB  --        Functional Assessment    Outcome Measure Options AM-PAC 6 Clicks Daily Activity (OT)  -RB AM-PAC 6 Clicks Basic Mobility (PT)  -GB       User Key  (r) = Recorded By, (t) = Taken By, (c) = Cosigned By    Initials Name Provider Type    YOGESH Cardoso OT Occupational Therapist    LEONID Lester, PT Physical Therapist          Time Calculation:   OT Start Time: 1005  OT Stop Time: 1030  OT Time Calculation (min): 25 min  Therapy Suggested Charges     Code   Minutes Charges    None           Therapy Charges for Today     Code Description Service Date Service Provider Modifiers Qty    [id number]  OT SELFCARE CURRENT 7/9/2018 MARY Bryant CK 1    [id number]  OT SELFCARE PROJECTED 7/9/2018 MARY Bryant CJ 1    [id number]  OT EVAL MOD COMPLEXITY 2 7/9/2018 Dann Cardoso OT GO 1          OT G-codes  OT Professional Judgement Used?: Yes  OT Functional Scales Options: AM-PAC 6 Clicks Daily Activity (OT)  Score: 15  Functional Limitation: Self  care  Self Care Current Status (): At least 40 percent but less than 60 percent impaired, limited or restricted  Self Care Goal Status (): At least 20 percent but less than 40 percent impaired, limited or restricted    Dann Cardoso OT  7/9/2018

## 2018-07-09 NOTE — PLAN OF CARE
Problem: Patient Care Overview  Goal: Plan of Care Review  Outcome: Ongoing (interventions implemented as appropriate)    Goal: Individualization and Mutuality  Outcome: Ongoing (interventions implemented as appropriate)    Goal: Discharge Needs Assessment  Outcome: Ongoing (interventions implemented as appropriate)      Problem: Fluid Volume Excess (Adult)  Goal: Identify Related Risk Factors and Signs and Symptoms  Outcome: Ongoing (interventions implemented as appropriate)    Goal: Optimal Fluid Balance  Outcome: Ongoing (interventions implemented as appropriate)      Problem: Fall Risk (Adult)  Goal: Identify Related Risk Factors and Signs and Symptoms  Outcome: Ongoing (interventions implemented as appropriate)    Goal: Absence of Fall  Outcome: Ongoing (interventions implemented as appropriate)      Problem: Wound (Includes Pressure Injury) (Adult)  Goal: Signs and Symptoms of Listed Potential Problems Will be Absent, Minimized or Managed (Wound)  Outcome: Ongoing (interventions implemented as appropriate)      Problem: Skin Injury Risk (Adult)  Goal: Identify Related Risk Factors and Signs and Symptoms  Outcome: Ongoing (interventions implemented as appropriate)    Goal: Skin Health and Integrity  Outcome: Ongoing (interventions implemented as appropriate)

## 2018-07-09 NOTE — PLAN OF CARE
Problem: Patient Care Overview  Goal: Plan of Care Review  Outcome: Ongoing (interventions implemented as appropriate)   07/09/18 7857   Coping/Psychosocial   Plan of Care Reviewed With patient   OTHER   Outcome Summary OT eval complete on this date. Pt required CGA for sit to stand, toilet transfers and 8' ambulation. 02 down to 89% with 4' walk to bathroom. Pt could benefit from OT services to increase strength and endurance for ADLs and functional mobility.

## 2018-07-10 LAB
ALBUMIN SERPL-MCNC: 3.7 G/DL (ref 3.4–4.8)
ALBUMIN/GLOB SERPL: 0.9 G/DL (ref 1.1–1.8)
ALP SERPL-CCNC: 69 U/L (ref 38–126)
ALT SERPL W P-5'-P-CCNC: 30 U/L (ref 21–72)
ANION GAP SERPL CALCULATED.3IONS-SCNC: 9 MMOL/L (ref 5–15)
AST SERPL-CCNC: 32 U/L (ref 17–59)
BASOPHILS # BLD AUTO: 0.02 10*3/MM3 (ref 0–0.2)
BASOPHILS NFR BLD AUTO: 0.2 % (ref 0–2)
BILIRUB SERPL-MCNC: 0.3 MG/DL (ref 0.2–1.3)
BUN BLD-MCNC: 43 MG/DL (ref 7–21)
BUN/CREAT SERPL: 22.3 (ref 7–25)
CALCIUM SPEC-SCNC: 8.3 MG/DL (ref 8.4–10.2)
CHLORIDE SERPL-SCNC: 99 MMOL/L (ref 95–110)
CO2 SERPL-SCNC: 31 MMOL/L (ref 22–31)
CREAT BLD-MCNC: 1.93 MG/DL (ref 0.7–1.3)
DEPRECATED RDW RBC AUTO: 55.8 FL (ref 35.1–43.9)
EOSINOPHIL # BLD AUTO: 0.36 10*3/MM3 (ref 0–0.7)
EOSINOPHIL NFR BLD AUTO: 3.3 % (ref 0–7)
ERYTHROCYTE [DISTWIDTH] IN BLOOD BY AUTOMATED COUNT: 18.3 % (ref 11.5–14.5)
GFR SERPL CREATININE-BSD FRML MDRD: 34 ML/MIN/1.73 (ref 42–98)
GLOBULIN UR ELPH-MCNC: 4.2 GM/DL (ref 2.3–3.5)
GLUCOSE BLD-MCNC: 110 MG/DL (ref 60–100)
HCT VFR BLD AUTO: 37 % (ref 39–49)
HGB BLD-MCNC: 12.2 G/DL (ref 13.7–17.3)
IMM GRANULOCYTES # BLD: 0.04 10*3/MM3 (ref 0–0.02)
IMM GRANULOCYTES NFR BLD: 0.4 % (ref 0–0.5)
INR PPP: 1.81 (ref 0.8–1.2)
LYMPHOCYTES # BLD AUTO: 1.17 10*3/MM3 (ref 0.6–4.2)
LYMPHOCYTES NFR BLD AUTO: 10.6 % (ref 10–50)
MAGNESIUM SERPL-MCNC: 2.5 MG/DL (ref 1.6–2.3)
MCH RBC QN AUTO: 28 PG (ref 26.5–34)
MCHC RBC AUTO-ENTMCNC: 33 G/DL (ref 31.5–36.3)
MCV RBC AUTO: 85.1 FL (ref 80–98)
MONOCYTES # BLD AUTO: 0.85 10*3/MM3 (ref 0–0.9)
MONOCYTES NFR BLD AUTO: 7.7 % (ref 0–12)
NEUTROPHILS # BLD AUTO: 8.55 10*3/MM3 (ref 2–8.6)
NEUTROPHILS NFR BLD AUTO: 77.8 % (ref 37–80)
PLATELET # BLD AUTO: 279 10*3/MM3 (ref 150–450)
PMV BLD AUTO: 9 FL (ref 8–12)
POTASSIUM BLD-SCNC: 3.9 MMOL/L (ref 3.5–5.1)
PROT SERPL-MCNC: 7.9 G/DL (ref 6.3–8.6)
PROTHROMBIN TIME: 20.3 SECONDS (ref 11.1–15.3)
RBC # BLD AUTO: 4.35 10*6/MM3 (ref 4.37–5.74)
SODIUM BLD-SCNC: 139 MMOL/L (ref 137–145)
T4 FREE SERPL-MCNC: 0.85 NG/DL (ref 0.78–2.19)
TROPONIN I SERPL-MCNC: <0.012 NG/ML
TSH SERPL DL<=0.05 MIU/L-ACNC: 6.03 MIU/ML (ref 0.46–4.68)
WBC NRBC COR # BLD: 10.99 10*3/MM3 (ref 3.2–9.8)

## 2018-07-10 PROCEDURE — 84484 ASSAY OF TROPONIN QUANT: CPT | Performed by: FAMILY MEDICINE

## 2018-07-10 PROCEDURE — 94799 UNLISTED PULMONARY SVC/PX: CPT

## 2018-07-10 PROCEDURE — 84439 ASSAY OF FREE THYROXINE: CPT | Performed by: FAMILY MEDICINE

## 2018-07-10 PROCEDURE — 94760 N-INVAS EAR/PLS OXIMETRY 1: CPT

## 2018-07-10 PROCEDURE — 83735 ASSAY OF MAGNESIUM: CPT | Performed by: FAMILY MEDICINE

## 2018-07-10 PROCEDURE — 84443 ASSAY THYROID STIM HORMONE: CPT | Performed by: FAMILY MEDICINE

## 2018-07-10 PROCEDURE — 84481 FREE ASSAY (FT-3): CPT | Performed by: FAMILY MEDICINE

## 2018-07-10 PROCEDURE — 99232 SBSQ HOSP IP/OBS MODERATE 35: CPT | Performed by: INTERNAL MEDICINE

## 2018-07-10 PROCEDURE — 25010000002 FUROSEMIDE PER 20 MG: Performed by: INTERNAL MEDICINE

## 2018-07-10 PROCEDURE — 85610 PROTHROMBIN TIME: CPT | Performed by: INTERNAL MEDICINE

## 2018-07-10 PROCEDURE — 80053 COMPREHEN METABOLIC PANEL: CPT | Performed by: FAMILY MEDICINE

## 2018-07-10 PROCEDURE — 85025 COMPLETE CBC W/AUTO DIFF WBC: CPT | Performed by: INTERNAL MEDICINE

## 2018-07-10 RX ADMIN — IPRATROPIUM BROMIDE AND ALBUTEROL SULFATE 3 ML: 2.5; .5 SOLUTION RESPIRATORY (INHALATION) at 23:09

## 2018-07-10 RX ADMIN — SOTALOL HYDROCHLORIDE 80 MG: 80 TABLET ORAL at 09:59

## 2018-07-10 RX ADMIN — OXYCODONE HYDROCHLORIDE AND ACETAMINOPHEN 1 TABLET: 10; 325 TABLET ORAL at 17:12

## 2018-07-10 RX ADMIN — IPRATROPIUM BROMIDE AND ALBUTEROL SULFATE 3 ML: 2.5; .5 SOLUTION RESPIRATORY (INHALATION) at 16:21

## 2018-07-10 RX ADMIN — SOTALOL HYDROCHLORIDE 80 MG: 80 TABLET ORAL at 21:32

## 2018-07-10 RX ADMIN — QUETIAPINE FUMARATE 300 MG: 300 TABLET ORAL at 21:33

## 2018-07-10 RX ADMIN — BUDESONIDE AND FORMOTEROL FUMARATE DIHYDRATE 2 PUFF: 160; 4.5 AEROSOL RESPIRATORY (INHALATION) at 08:23

## 2018-07-10 RX ADMIN — WARFARIN SODIUM 2.5 MG: 2.5 TABLET ORAL at 17:01

## 2018-07-10 RX ADMIN — PANTOPRAZOLE SODIUM 40 MG: 40 TABLET, DELAYED RELEASE ORAL at 06:24

## 2018-07-10 RX ADMIN — IPRATROPIUM BROMIDE AND ALBUTEROL SULFATE 3 ML: 2.5; .5 SOLUTION RESPIRATORY (INHALATION) at 12:36

## 2018-07-10 RX ADMIN — IPRATROPIUM BROMIDE AND ALBUTEROL SULFATE 3 ML: 2.5; .5 SOLUTION RESPIRATORY (INHALATION) at 20:12

## 2018-07-10 RX ADMIN — CILOSTAZOL 50 MG: 100 TABLET ORAL at 17:01

## 2018-07-10 RX ADMIN — PRAMIPEXOLE DIHYDROCHLORIDE 0.25 MG: 0.25 TABLET ORAL at 21:32

## 2018-07-10 RX ADMIN — OXYCODONE HYDROCHLORIDE AND ACETAMINOPHEN 1 TABLET: 10; 325 TABLET ORAL at 21:44

## 2018-07-10 RX ADMIN — ASPIRIN 81 MG: 81 TABLET, COATED ORAL at 10:01

## 2018-07-10 RX ADMIN — FUROSEMIDE 40 MG: 10 INJECTION, SOLUTION INTRAMUSCULAR; INTRAVENOUS at 21:33

## 2018-07-10 RX ADMIN — FUROSEMIDE 40 MG: 10 INJECTION, SOLUTION INTRAMUSCULAR; INTRAVENOUS at 16:54

## 2018-07-10 RX ADMIN — PRAMIPEXOLE DIHYDROCHLORIDE 0.25 MG: 0.25 TABLET ORAL at 17:01

## 2018-07-10 RX ADMIN — IPRATROPIUM BROMIDE AND ALBUTEROL SULFATE 3 ML: 2.5; .5 SOLUTION RESPIRATORY (INHALATION) at 08:15

## 2018-07-10 RX ADMIN — IPRATROPIUM BROMIDE AND ALBUTEROL SULFATE 3 ML: 2.5; .5 SOLUTION RESPIRATORY (INHALATION) at 02:00

## 2018-07-10 RX ADMIN — BUDESONIDE AND FORMOTEROL FUMARATE DIHYDRATE 2 PUFF: 160; 4.5 AEROSOL RESPIRATORY (INHALATION) at 20:18

## 2018-07-10 RX ADMIN — MONTELUKAST SODIUM 10 MG: 10 TABLET, FILM COATED ORAL at 21:32

## 2018-07-10 RX ADMIN — MAGNESIUM HYDROXIDE 10 ML: 2400 SUSPENSION ORAL at 10:09

## 2018-07-10 RX ADMIN — CILOSTAZOL 50 MG: 100 TABLET ORAL at 10:01

## 2018-07-10 RX ADMIN — DOCUSATE SODIUM 200 MG: 100 CAPSULE, LIQUID FILLED ORAL at 10:01

## 2018-07-10 RX ADMIN — PRAMIPEXOLE DIHYDROCHLORIDE 0.25 MG: 0.25 TABLET ORAL at 10:01

## 2018-07-10 NOTE — PLAN OF CARE
Problem: Patient Care Overview  Goal: Plan of Care Review  Outcome: Ongoing (interventions implemented as appropriate)   07/10/18 0321   Coping/Psychosocial   Plan of Care Reviewed With patient   Plan of Care Review   Progress no change   OTHER   Outcome Summary patient converted from NSR to afib on shift, Dr. Ellis notified, will continue to monitor        Problem: Fluid Volume Excess (Adult)  Goal: Identify Related Risk Factors and Signs and Symptoms  Outcome: Ongoing (interventions implemented as appropriate)      Problem: Fall Risk (Adult)  Goal: Absence of Fall  Outcome: Ongoing (interventions implemented as appropriate)      Problem: Wound (Includes Pressure Injury) (Adult)  Goal: Signs and Symptoms of Listed Potential Problems Will be Absent, Minimized or Managed (Wound)  Outcome: Ongoing (interventions implemented as appropriate)      Problem: Skin Injury Risk (Adult)  Goal: Skin Health and Integrity  Outcome: Ongoing (interventions implemented as appropriate)

## 2018-07-10 NOTE — NURSING NOTE
MD Bray notified via phone call that patient is continuing to have pauses, more frequent. Last few pauses were 3.4 and 3.8 seconds, stated he would talk to MD Gutierrez today and get a cardiology consult. Stated to continue to monitor patient for now

## 2018-07-10 NOTE — PROGRESS NOTES
MEDICINE DAILY PROGRESS NOTE  NAME: Massimo Bentley  : 1940  MRN: [id number]     LOS: 3 days     PROVIDER OF SERVICE: Royal Gutierrez MD    Chief Complaint: Hypoxemia    Subjective:   HPI: Patient admitted with shortness of air, dyspnea on exertion, and decrease in exercise tolerance.    Interval History:  History taken from: patient chart RN  7/10. Patient feeling some better today.  Overall nearing baseline.  . Some improvement in dyspnea.  No acute events overnight. No complaints at time of exam.    Review of Systems:   Review of Systems   Constitutional: Positive for activity change and fatigue. Negative for appetite change and fever.   HENT: Negative for ear pain and sore throat.    Eyes: Negative for pain and visual disturbance.   Respiratory: Positive for shortness of breath. Negative for cough.    Cardiovascular: Positive for leg swelling. Negative for chest pain and palpitations.   Gastrointestinal: Negative for abdominal pain and nausea.   Endocrine: Negative for cold intolerance and heat intolerance.   Genitourinary: Negative for difficulty urinating and dysuria.   Musculoskeletal: Positive for arthralgias. Negative for gait problem.   Skin: Negative for color change and rash.   Neurological: Positive for weakness. Negative for dizziness and headaches.   Hematological: Negative for adenopathy. Does not bruise/bleed easily.   Psychiatric/Behavioral: Negative for agitation, confusion and sleep disturbance.       Objective:     Vital Signs  Vitals:    07/10/18 0957 07/10/18 1132 07/10/18 1236 07/10/18 1245   BP: 101/59 115/65     BP Location: Right arm Left arm     Patient Position:  Lying     Pulse: 58 67 70 68   Resp:  18     Temp:  96.9 °F (36.1 °C)     TempSrc:  Temporal Artery      SpO2:  93% 92%    Weight:       Height:           Physical Exam  Physical Exam   Constitutional: He is oriented to person, place, and time. He appears well-developed and well-nourished. No distress.   HENT:    Head: Normocephalic and atraumatic.   Right Ear: External ear normal.   Left Ear: External ear normal.   Nose: Nose normal.   Eyes: Conjunctivae and EOM are normal. Pupils are equal, round, and reactive to light.   Neck: Normal range of motion. Neck supple.   Cardiovascular: Normal rate, regular rhythm, normal heart sounds and intact distal pulses.    Pulmonary/Chest: Effort normal. No respiratory distress. He has wheezes. He has no rales. He exhibits no tenderness.   Abdominal: Soft. Bowel sounds are normal. He exhibits no distension and no mass. There is no tenderness. There is no rebound and no guarding.   Musculoskeletal: Normal range of motion. He exhibits no edema.   Neurological: He is alert and oriented to person, place, and time.   Skin: Skin is warm and dry. No rash noted. He is not diaphoretic. No erythema. No pallor.   Psychiatric: He has a normal mood and affect. His behavior is normal.   Nursing note and vitals reviewed.      Medication Review    Current Facility-Administered Medications:   •  acetaminophen (TYLENOL) tablet 650 mg, 650 mg, Oral, Q4H PRN, Umm Interiano MD  •  aspirin EC tablet 81 mg, 81 mg, Oral, Daily, Umm Interiano MD, 81 mg at 07/10/18 1001  •  bisacodyl (DULCOLAX) suppository 10 mg, 10 mg, Rectal, Daily PRN, Umm Interiano MD  •  budesonide-formoterol (SYMBICORT) 160-4.5 MCG/ACT inhaler 2 puff, 2 puff, Inhalation, BID - RT, Umm Interiano MD, 2 puff at 07/10/18 0823  •  cilostazol (PLETAL) tablet 50 mg, 50 mg, Oral, BID AC, Umm Interiano MD, 50 mg at 07/10/18 1001  •  docusate sodium (COLACE) capsule 200 mg, 200 mg, Oral, BID, Umm Interiano MD, 200 mg at 07/10/18 1001  •  furosemide (LASIX) injection 40 mg, 40 mg, Intravenous, TID, Umm Interiano MD, 40 mg at 07/09/18 2041  •  ipratropium-albuterol (DUO-NEB) nebulizer solution 3 mL, 3 mL, Nebulization, Q4H - RT, Umm Interiano MD, 3 mL at 07/10/18 1236  •   magnesium hydroxide (MILK OF MAGNESIA) suspension 2400 mg/10mL 10 mL, 10 mL, Oral, Daily PRN, Umm Interiano MD, 10 mL at 07/10/18 1009  •  magnesium oxide (MAGOX) tablet 400 mg, 400 mg, Oral, Nightly, Umm Interiano MD, 400 mg at 07/08/18 2003  •  montelukast (SINGULAIR) tablet 10 mg, 10 mg, Oral, Nightly, Umm Interiano MD, 10 mg at 07/09/18 2043  •  nicotine (NICODERM CQ) 21 MG/24HR patch 1 patch, 1 patch, Transdermal, Q24H, Umm Interiano MD, 1 patch at 07/08/18 2226  •  oxyCODONE-acetaminophen (PERCOCET)  MG per tablet 1 tablet, 1 tablet, Oral, Q4H PRN, Umm Interiano MD, 1 tablet at 07/09/18 2318  •  pantoprazole (PROTONIX) EC tablet 40 mg, 40 mg, Oral, Q AM, Umm Interiano MD, 40 mg at 07/10/18 0624  •  Pharmacy to dose warfarin, , Does not apply, Continuous PRN, Umm Interiano MD  •  pramipexole (MIRAPEX) tablet 0.25 mg, 0.25 mg, Oral, TID, Umm Interiano MD, 0.25 mg at 07/10/18 1001  •  QUEtiapine (SEROquel) tablet 300 mg, 300 mg, Oral, Nightly, Umm Interiano MD, 300 mg at 07/09/18 2043  •  sodium chloride 0.9 % flush 1-10 mL, 1-10 mL, Intravenous, PRN, Umm Interiano MD  •  sotalol (BETAPACE) tablet 80 mg, 80 mg, Oral, Q12H, Umm Interiano MD, 80 mg at 07/10/18 0959  •  sotalol (BETAPACE) tablet 80 mg, 80 mg, Oral, Daily PRN, Umm Interiano MD  •  warfarin (COUMADIN) tablet 2.5 mg, 2.5 mg, Oral, Daily, Umm Interiano MD, 2.5 mg at 07/09/18 1740     Diagnostic Data    Lab Results (last 24 hours)     Procedure Component Value Units Date/Time    T3, Free [[id number]] Collected:  07/10/18 0718    Specimen:  Blood Updated:  07/10/18 0724    T4, Free [[id number]]  (Normal) Collected:  07/10/18 0355    Specimen:  Blood Updated:  07/10/18 0550     Free T4 0.85 ng/dL     TSH [[id number]]  (Abnormal) Collected:  07/10/18 0355    Specimen:  Blood Updated:  07/10/18 0502     TSH 6.030 (H) mIU/mL      Troponin [[id number]]  (Normal) Collected:  07/10/18 0355    Specimen:  Blood Updated:  07/10/18 0442     Troponin I <0.012 ng/mL     Comprehensive Metabolic Panel [[id number]]  (Abnormal) Collected:  07/10/18 0355    Specimen:  Blood Updated:  07/10/18 0434     Glucose 110 (H) mg/dL      BUN 43 (H) mg/dL      Creatinine 1.93 (H) mg/dL      Sodium 139 mmol/L      Potassium 3.9 mmol/L      Chloride 99 mmol/L      CO2 31.0 mmol/L      Calcium 8.3 (L) mg/dL      Total Protein 7.9 g/dL      Albumin 3.70 g/dL      ALT (SGPT) 30 U/L      AST (SGOT) 32 U/L      Alkaline Phosphatase 69 U/L      Total Bilirubin 0.3 mg/dL      eGFR Non African Amer 34 (L) mL/min/1.73      Globulin 4.2 (H) gm/dL      A/G Ratio 0.9 (L) g/dL      BUN/Creatinine Ratio 22.3     Anion Gap 9.0 mmol/L     Narrative:       The MDRD GFR formula is only valid for adults with stable renal function between ages 18 and 70.    Protime-INR [[id number]]  (Abnormal) Collected:  07/10/18 0355    Specimen:  Blood Updated:  07/10/18 0433     Protime 20.3 (H) Seconds      INR 1.81 (H)    Narrative:       Therapeutic range for most indications is 2.0-3.0 INR,  or 2.5-3.5 for mechanical heart valves.    Magnesium [[id number]]  (Abnormal) Collected:  07/10/18 0355    Specimen:  Blood Updated:  07/10/18 0431     Magnesium 2.5 (H) mg/dL     CBC Auto Differential [[id number]]  (Abnormal) Collected:  07/10/18 0355    Specimen:  Blood Updated:  07/10/18 0405     WBC 10.99 (H) 10*3/mm3      RBC 4.35 (L) 10*6/mm3      Hemoglobin 12.2 (L) g/dL      Hematocrit 37.0 (L) %      MCV 85.1 fL      MCH 28.0 pg      MCHC 33.0 g/dL      RDW 18.3 (H) %      RDW-SD 55.8 (H) fl      MPV 9.0 fL      Platelets 279 10*3/mm3      Neutrophil % 77.8 %      Lymphocyte % 10.6 %      Monocyte % 7.7 %      Eosinophil % 3.3 %      Basophil % 0.2 %      Immature Grans % 0.4 %      Neutrophils, Absolute 8.55 10*3/mm3      Lymphocytes, Absolute 1.17 10*3/mm3      Monocytes, Absolute 0.85 10*3/mm3       Eosinophils, Absolute 0.36 10*3/mm3      Basophils, Absolute 0.02 10*3/mm3      Immature Grans, Absolute 0.04 (H) 10*3/mm3           I reviewed the patient's new clinical results.    Assessment/Plan:       1. Acute hypoxic respiratory failure.   7/10. Wean oxygen.  7/9. Good diuresis. Improvement overnight. Wean oxygen.   2. CKD.     Results from last 7 days  Lab Units 07/10/18  0355 07/09/18  0537 07/08/18  0533 07/07/18 2002   CREATININE mg/dL 1.93* 1.95* 1.81* 1.76*   7/10. Stable.  7/9. Small bump with diuresis. Close monitoring. Consult nephrology as needed.  3. COPD exacerbation.  Oxygen. Nebs.   4. Afib. Coumadin. Sotalol. Rate controlled.   5. Deconditioning. PT/OT.  6. HFpEF. Lasix. FR.    Will monitor patient's hospital course and adjust treatment as hospital course dictates.    DVT prophylaxis: Warfarin  Code status is   Code Status and Medical Interventions:   Ordered at: 07/07/18 2320     Level Of Support Discussed With:    Patient     Code Status:    CPR     Medical Interventions (Level of Support Prior to Arrest):    Full       Plan for disposition:Where: SNF and When:  1-2 days      Time:           This document has been electronically signed by Royal Gutierrez MD on July 10, 2018 2:18 PM

## 2018-07-10 NOTE — CONSULTS
Cardiology at [facility]  Cardiovascular Consultation Note      Massimo Bentley  306/1  [id number]  1940    DATE OF ADMISSION: 7/7/2018  DATE OF CONSULTATION:  7/10/2018    No primary care provider on file.  Treatment Team:   Attending Provider: Umm Interiano MD  Consulting Physician: Umm Interiano MD  Admitting Provider: Umm Interiano MD    Chief Complaint   Patient presents with   • Shortness of Breath       Chief complaint/ Reason for Consultation:Increasing shortness breath and history of paroxysmal atrial fibrillation/atrial flutter with a significant peripheral vascular disease      History of Present Illness  77 years old gentleman limited to [facility] for evaluations of increasing shortness of breath with mildly abnormal BNP and chest x-ray suggesting of congestive changes the possibility of superimposed bronchitis and pneumonia cannot be excluded.  Patient to was in sinus rhythm developed atrial fibrillation and spontaneously converted to sinus rhythm.  who walk with the help of cane with the past medical history results of hypertension hypertensive heart disease, history of for atrial fibrillation/atrial flutter underwent EP study and isthmus ablation.  The patient noted to have  left-sided atrial tachycardia no ablation performed in left atrum.  The patient  started on Betapace doing remarkably well.  The patient have peripheral embolism and peripheral  Vascular disease status post stent placement and vascular surgery by Dr. Pennington. AAA under care of Dr. Pennington.  Tchronic oral anti-coagulation patient denied any orthopnea, PND, chest pain, palpitation, fever, chills, intermittent claudication, syncope or near syncopal episode  Past Medical History:   Diagnosis Date   • Abdominal bloating    • Anxiety    • Atherosclerosis of native arteries of extremity with intermittent claudication (CMS/HCC)    • Atrial flutter (CMS/HCC)    •  Backache    • Benign prostatic hyperplasia     BX 2009, also has renal cyst      • Chronic bronchitis (CMS/HCC)    • Chronic renal impairment    • Depressive disorder    • Drug abuse, episodic use    • Dyslipidemia    • Fatigue    • GERD (gastroesophageal reflux disease)    • Hypercholesterolemia 03/15/2016   • Injury of tendon of rotator cuff 10/22/2015   • Insomnia 06/24/2016   • Intervertebral disc disorder 10/27/2011   • Major depression    • Neck pain 01/09/2012   • Osteoarthritis of multiple joints 06/21/2013   • Pain from vascular prosthetic devices, implants and grafts, sequela 12/03/2015   • Pain in left foot 10/27/2015   • Peripheral vascular disease (CMS/HCC) 12/03/2015     LEFT fem-tib bypass, embolectomy 6/2014    10/27/2015    • Senile debility 10/22/2015   • 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 No coronary atherosclerosis. Normal left ventricular function   • CHOLECYSTECTOMY  08/25/2014    Cholecystitis with gangrenous gallbladder   • ENDOSCOPY  03/22/1990    Duodenal ulcer   • ENDOSCOPY AND COLONOSCOPY  10/17/2013    Diverticulum in sigmoid colon & descending colon. Internal & external hemorrhoids found   • ESOPHAGOSCOPY / EGD  10/17/2013    With tube-Esophagitis seen. Biopsy taken. Gastritis in stomach. Biopsy taken. Stenosis in 2nd portion of duodenum. Dilatation performed.   • FEMORAL THROMBECTOMY/EMBOLECTOMY  06/29/2014    Left lower extremity arteriogram. Left popliteal artery endarterectomy. Redo left femoral to [id number] trunk bypass.  Negative pressure wound therapy with placement of Prevena wound V.A.C., left groin, left lower leg   • INJECTION OF MEDICATION  03/15/2016    B12   • INJECTION OF MEDICATION  10/23/2015    Drain/Inject Major Joint    • INJECTION OF MEDICATION  01/13/2016    Kenalog   • LUMBAR LAMINECTOMY     • TRANSESOPHAGEAL ECHOCARDIOGRAM (TEX)  10/06/2015    With color flow-Mild mitral regurg.Left atrium mild dilated.Ef of 55-60%.RV systolic pressure elevated.Trace tricuspid regurg   • TRANSESOPHAGEAL ECHOCARDIOGRAM (TEX)  02/14/2012    Without color flow-[facility] w/ early diast dysfun. AV trileaflet & structurally NRL w/ AR NRL. No regional wall motion abn Est Ef approx 50-55%. M & TR valves NRL w/ mild M & TR regurg Trace -mild pulmonic regurg. Anterior echo free space w/o hemodynamic signif. NRL RV   • TRANSESOPHAGEAL ECHOCARDIOGRAM (TEX)  03/25/2011    Normal LV systolic function with Ef of 50-55%.Left atrium ildly dilated.Moderate mitral regurg.Mitral valve regurg.Intact interatrial septum.No evidence of any cardiac thrombus or pericardial effusion.AV trileaflet       Allergies   Allergen Reactions   • Morphine And Related Urinary Retention   • Lipitor [Atorvastatin] Nausea And Vomiting   • Lortab [Hydrocodone-Acetaminophen] Nausea And Vomiting       No current facility-administered medications on file prior to encounter.      Current Outpatient Prescriptions on File Prior to Encounter   Medication Sig Dispense Refill   • budesonide-formoterol (SYMBICORT) 160-4.5 MCG/ACT inhaler Inhale 2 puffs 2 (Two) Times a Day. 1 inhaler 12   • cilostazol (PLETAL) 50 MG tablet Take 1 tablet by mouth 2 (Two) Times a Day. (Patient taking differently: Take 100 mg by mouth 2 (Two) Times a Day.) 60 tablet 5   • furosemide (LASIX) 40 MG tablet Take 1 tablet by mouth Daily. 30 tablet 6   • magnesium oxide (MAGOX) 400 (241.3 MG) MG tablet tablet Take 400 mg by mouth Every Night.     • montelukast (SINGULAIR) 10 MG tablet Take 1 tablet by mouth Every  Night. 90 tablet 2   • pramipexole (MIRAPEX) 0.25 MG tablet Take 1 tablet by mouth 3 (Three) Times a Day. (Patient taking differently: Take 0.5 mg by mouth Daily. 0.5 mg in the morning, 0.25 mg at bedtime) 90 tablet 5   • sotalol (BETAPACE) 80 MG tablet Take 1.5 tablets by mouth 2 (Two) Times a Day. (Patient taking differently: Take 160 mg by mouth 2 (Two) Times a Day.) 270 tablet 2   • warfarin (COUMADIN) 5 MG tablet Take as directed per coumadin clinic (Patient taking differently: Take 5 mg by mouth Daily. Take as directed per coumadin clinic) 30 tablet 0   • albuterol (PROVENTIL HFA;VENTOLIN HFA) 108 (90 Base) MCG/ACT inhaler Inhale 2 puffs Every 4 (Four) Hours As Needed for Wheezing. 1 inhaler 6   • Alirocumab (PRALUENT) 75 MG/ML solution pen-injector Inject 75 mg under the skin Every 14 (Fourteen) Days. 2 mL 5   • aspirin 81 MG EC tablet Take 81 mg by mouth daily.     • enoxaparin (LOVENOX) 100 MG/ML solution syringe Inject 1 mL under the skin Every 12 (Twelve) Hours. 14 syringe 0   • guaiFENesin (MUCINEX) 600 MG 12 hr tablet Take 1,200 mg by mouth 2 (Two) Times a Day.     • QUEtiapine (SEROquel) 100 MG tablet take 3 tablets at bedtime  0       Social History     Social History   • Marital status:      Spouse name: N/A   • Number of children: N/A   • Years of education: N/A     Occupational History   • Not on file.     Social History Main Topics   • Smoking status: Current Some Day Smoker     Packs/day: 0.50     Years: 50.00     Types: Cigarettes   • Smokeless tobacco: Never Used      Comment: reduced;   • Alcohol use No      Comment: Former use    • Drug use: No   • Sexual activity: Defer     Other Topics Concern   • Not on file     Social History Narrative   • No narrative on file           REVIEW OF SYSTEMS:   ROS  Constitutional: Positive for activity change, appetite change, fatigue and unexpected weight change. Negative for chills, diaphoresis and fever.   HENT: Negative for congestion, rhinorrhea,  "sore throat and trouble swallowing.    Eyes: Negative for visual disturbance.   Respiratory: Positive for chest tightness and shortness of breath. Negative for cough and wheezing.    Cardiovascular: Positive for leg swelling. Negative for chest pain and palpitations.   Gastrointestinal: Negative for abdominal pain, blood in stool, diarrhea, nausea and vomiting.   Endocrine: Negative for cold intolerance and heat intolerance.   Genitourinary: Negative for decreased urine volume and difficulty urinating.   Musculoskeletal: Negative for back pain, gait problem and neck pain.   Skin: Negative for rash.   Neurological: Positive for weakness and light-headedness. Negative for dizziness, syncope, numbness and headaches.   Psychiatric/Behavioral: The patient is not nervous/anxious.     Objective:     Vitals:    07/10/18 1245 07/10/18 1500 07/10/18 1621 07/10/18 1632   BP:  109/60     BP Location:  Left arm     Patient Position:  Lying     Pulse: 68 59 60 82   Resp:  18     Temp:  96.9 °F (36.1 °C)     TempSrc:  Temporal Artery      SpO2:  95% 93%    Weight:       Height:         Body mass index is 29.14 kg/m².  Flowsheet Rows      First Filed Value   Admission Height  180.3 cm (71\") Documented at 07/07/2018 1945   Admission Weight  99.8 kg (220 lb) Documented at 07/07/2018 1945          Intake/Output Summary (Last 24 hours) at 07/10/18 1718  Last data filed at 07/10/18 1355   Gross per 24 hour   Intake              640 ml   Output             1050 ml   Net             -410 ml         Physical Exam   Physical Exam  Constitutional: He is oriented to person, place, and time. He appears well-developed and well-nourished.   HENT:   Head: Normocephalic and atraumatic.     Neck: Normal range of motion. Neck supple. No JVD present. No tracheal deviation present. No thyromegaly present.   Cardiovascular: Normal rate, regular rhythm, normal heart sounds and intact distal pulses.    Pulmonary/Chest: He is in respiratory distress. He " has no wheezes. He has rales. He exhibits no tenderness.   Abdominal: Soft. Bowel sounds are normal. He exhibits no distension. There is no tenderness. There is no rebound and no guarding.   Musculoskeletal: Normal range of motion. He exhibits edema.   Lymphadenopathy:     He has no cervical adenopathy.   Neurological: He is alert and oriented to person, place, and time. He has normal reflexes. No cranial nerve deficit.   Skin: Skin is warm and dry.   Intact   Psychiatric: He has a normal mood and affect.   Nursing note and vitals reviewed.  Radiology Review    XR Chest 1 View   Final Result   CONCLUSION:          1. Prominence of the interstitium suggesting a presence of   moderate to severe pulmonary interstitial edema.                                                       Electronically signed by:  MISSAEL Rea MD  7/7/2018 8:15 PM   CDT Workstation: [id number]          Lab Results:  Lab Results (last 24 hours)     Procedure Component Value Units Date/Time    T3, Free [[id number]] Collected:  07/10/18 0718    Specimen:  Blood Updated:  07/10/18 0724    T4, Free [[id number]]  (Normal) Collected:  07/10/18 0355    Specimen:  Blood Updated:  07/10/18 0550     Free T4 0.85 ng/dL     TSH [[id number]]  (Abnormal) Collected:  07/10/18 0355    Specimen:  Blood Updated:  07/10/18 0502     TSH 6.030 (H) mIU/mL     Troponin [[id number]]  (Normal) Collected:  07/10/18 0355    Specimen:  Blood Updated:  07/10/18 0442     Troponin I <0.012 ng/mL     Comprehensive Metabolic Panel [[id number]]  (Abnormal) Collected:  07/10/18 0355    Specimen:  Blood Updated:  07/10/18 0434     Glucose 110 (H) mg/dL      BUN 43 (H) mg/dL      Creatinine 1.93 (H) mg/dL      Sodium 139 mmol/L      Potassium 3.9 mmol/L      Chloride 99 mmol/L      CO2 31.0 mmol/L      Calcium 8.3 (L) mg/dL      Total Protein 7.9 g/dL      Albumin 3.70 g/dL      ALT (SGPT) 30 U/L      AST (SGOT) 32 U/L      Alkaline Phosphatase 69 U/L      Total Bilirubin 0.3 mg/dL       eGFR Non African Amer 34 (L) mL/min/1.73      Globulin 4.2 (H) gm/dL      A/G Ratio 0.9 (L) g/dL      BUN/Creatinine Ratio 22.3     Anion Gap 9.0 mmol/L     Narrative:       The MDRD GFR formula is only valid for adults with stable renal function between ages 18 and 70.    Protime-INR [[id number]]  (Abnormal) Collected:  07/10/18 0355    Specimen:  Blood Updated:  07/10/18 0433     Protime 20.3 (H) Seconds      INR 1.81 (H)    Narrative:       Therapeutic range for most indications is 2.0-3.0 INR,  or 2.5-3.5 for mechanical heart valves.    Magnesium [[id number]]  (Abnormal) Collected:  07/10/18 0355    Specimen:  Blood Updated:  07/10/18 0431     Magnesium 2.5 (H) mg/dL     CBC Auto Differential [[id number]]  (Abnormal) Collected:  07/10/18 0355    Specimen:  Blood Updated:  07/10/18 0405     WBC 10.99 (H) 10*3/mm3      RBC 4.35 (L) 10*6/mm3      Hemoglobin 12.2 (L) g/dL      Hematocrit 37.0 (L) %      MCV 85.1 fL      MCH 28.0 pg      MCHC 33.0 g/dL      RDW 18.3 (H) %      RDW-SD 55.8 (H) fl      MPV 9.0 fL      Platelets 279 10*3/mm3      Neutrophil % 77.8 %      Lymphocyte % 10.6 %      Monocyte % 7.7 %      Eosinophil % 3.3 %      Basophil % 0.2 %      Immature Grans % 0.4 %      Neutrophils, Absolute 8.55 10*3/mm3      Lymphocytes, Absolute 1.17 10*3/mm3      Monocytes, Absolute 0.85 10*3/mm3      Eosinophils, Absolute 0.36 10*3/mm3      Basophils, Absolute 0.02 10*3/mm3      Immature Grans, Absolute 0.04 (H) 10*3/mm3           I personally viewed and interpreted the patient's EKG/Telemetry data       Assessment/Plan:    #1Increasing shortness breath probably secondary to congestive heart failure on the basis of diastolic dysfunction for the possibility of bronchitis/pneumonia cannot be excluded     #2 atrial flutter/atrial fibrillation status post EP study  #3 hypertension with hypertensive heart disease and normal left and a systolic function.  Mild mitral regurgitation previous echocardiographic  study.  #4  embolization with PVD status post stenting to care of Dr. Pennington  78 years old patient who walks with the help of cane with a significant peripheral vascular disease, hypertension hypertensive heart disease, atrial fibrillation/atrial flutter maintain sinus rhythm with the help of Betapace.  The patient have brief episode of atrial fibrillation spontaneously converted to sinus rhythm.  We'll resume the Betapace gain.  He admitted with increasing shortness breath the possibility of mild congestive heart failure on the basis of diastolic dysfunction cannot be excluded.  I will arrange an echocardiographic study to reassess the left ventricle systolic function.  Recommend to continue Lasix 40 mg IV for management of congestive heart failure on the basis of diastolic dysfunction, Betapace with history approximately fibrillations, warfarin dose dependent on PT/INR with a significant history of peripheral was can disease and atrial arrhythmia.          Thank you for allowing me to participate in the care of Massimo Hyde Bentley. Feel free to contact me directly with any further questions or concerns.    Emily Thacker MD  07/10/18  5:18 PM.      EMR Dragon/Transcription disclaimer:   Much of this encounter note is an electronic transcription/translation of spoken language to printed text. The electronic translation of spoken language may permit erroneous, or at times, nonsensical words or phrases to be inadvertently transcribed; Although I have reviewed the note for such errors, some may still exist.

## 2018-07-10 NOTE — NURSING NOTE
At beginning of shift, patient was in normal sinus rhythm with a HR of 52, and remained in 50's. Patient then converted to Afib with a HR of 130, MD NIDIA Campuzano was notified as patient has a history of Afib, and stated to continue to watch HR and to call if he remained above 120. At 0330, patient had pause of 3.2 seconds and a few minutes later a pause of 1.4, along with multiple smaller pauses every few minutes. Dr. Campuzano was notified again via phone call and ordered a EKG, along with Mag and Trop levels. Will notify MD of abnormal labs and continue to monitor.

## 2018-07-10 NOTE — PROGRESS NOTES
"Anticoagulation by Pharmacy - Warfarin    Massimo Bentley is a 78 y.o.male being continued on warfarin for atrial fibrillation    Home regimen: 2.5 mg nightly except 5 mg on Tuesday, Thursday, and Saturdays    INR Goal: 2-3  Today's INR:   Lab Results   Component Value Date    INR 1.81 (H) 07/10/2018       Objective:  [Ht: 180.3 cm (71\"); Wt: 94.8 kg (208 lb 14.4 oz)]  Lab Results   Component Value Date    INR 1.81 (H) 07/10/2018    INR 2.49 (H) 07/09/2018    INR 3.19 (H) 07/08/2018    PROTIME 20.3 (H) 07/10/2018    PROTIME 25.8 (H) 07/09/2018    PROTIME 31.1 (H) 07/08/2018     Lab Results   Component Value Date    HGB 12.2 (L) 07/10/2018    HGB 11.0 (L) 07/09/2018    HGB 10.9 (L) 07/08/2018    HCT 37.0 (L) 07/10/2018    HCT 33.2 (L) 07/09/2018    HCT 33.4 (L) 07/08/2018     07/10/2018     07/09/2018     07/08/2018       Recent Warfarin Administrations       The 5 most recent administrations since 07/03/2018 are shown below each listed medication.    Warfarin Sodium         Order Dose Date Given     warfarin (COUMADIN) tablet 2.5 mg 2.5 mg 07/09/2018                      Assessment  Interacting medications: N/A  INR is SUBtherapeutic    Plan:  1.  Give warfarin 2.5 mg tablet PO @ 1800 tonight  2.  Draw a PT/INR in AM  3.  Pharmacy will continue to follow    Nicko Hodge III [facility]  07/10/18 1:59 PM     "

## 2018-07-10 NOTE — PLAN OF CARE
Problem: Patient Care Overview  Goal: Plan of Care Review   07/10/18 8017   Coping/Psychosocial   Plan of Care Reviewed With patient   Plan of Care Review   Progress no change   OTHER   Outcome Summary pt converted to SR, no new complaints or needs voiced     Goal: Individualization and Mutuality  Outcome: Ongoing (interventions implemented as appropriate)    Goal: Discharge Needs Assessment  Outcome: Ongoing (interventions implemented as appropriate)    Goal: Interprofessional Rounds/Family Conf  Outcome: Ongoing (interventions implemented as appropriate)      Problem: Fluid Volume Excess (Adult)  Goal: Identify Related Risk Factors and Signs and Symptoms  Outcome: Ongoing (interventions implemented as appropriate)    Goal: Optimal Fluid Balance  Outcome: Ongoing (interventions implemented as appropriate)      Problem: Fall Risk (Adult)  Goal: Absence of Fall  Outcome: Ongoing (interventions implemented as appropriate)      Problem: Wound (Includes Pressure Injury) (Adult)  Goal: Signs and Symptoms of Listed Potential Problems Will be Absent, Minimized or Managed (Wound)  Outcome: Ongoing (interventions implemented as appropriate)      Problem: Skin Injury Risk (Adult)  Goal: Skin Health and Integrity  Outcome: Ongoing (interventions implemented as appropriate)

## 2018-07-10 NOTE — SIGNIFICANT NOTE
07/10/18 1123   Rehab Treatment   Discipline occupational therapy assistant   Reason Treatment Not Performed other (see comments)  (Pt having pauses this AM, and has order for Cardiology Consult. Discussed with PT who advised hold until results of Cardiology report are in.)

## 2018-07-10 NOTE — SIGNIFICANT NOTE
07/10/18 1201   Rehab Treatment   Discipline physical therapy assistant   Reason Treatment Not Performed other (see comments)  (Pt awaiting cardiac consult due to multiple pauses in HR this AM.)

## 2018-07-11 LAB
ALBUMIN SERPL-MCNC: 3.7 G/DL (ref 3.4–4.8)
ALBUMIN/GLOB SERPL: 0.9 G/DL (ref 1.1–1.8)
ALP SERPL-CCNC: 88 U/L (ref 38–126)
ALT SERPL W P-5'-P-CCNC: 41 U/L (ref 21–72)
ANION GAP SERPL CALCULATED.3IONS-SCNC: 8 MMOL/L (ref 5–15)
AST SERPL-CCNC: 51 U/L (ref 17–59)
BASOPHILS # BLD AUTO: 0.02 10*3/MM3 (ref 0–0.2)
BASOPHILS NFR BLD AUTO: 0.2 % (ref 0–2)
BILIRUB SERPL-MCNC: 0.4 MG/DL (ref 0.2–1.3)
BUN BLD-MCNC: 49 MG/DL (ref 7–21)
BUN/CREAT SERPL: 26.9 (ref 7–25)
CALCIUM SPEC-SCNC: 8.4 MG/DL (ref 8.4–10.2)
CHLORIDE SERPL-SCNC: 94 MMOL/L (ref 95–110)
CO2 SERPL-SCNC: 36 MMOL/L (ref 22–31)
CREAT BLD-MCNC: 1.82 MG/DL (ref 0.7–1.3)
DEPRECATED RDW RBC AUTO: 55.7 FL (ref 35.1–43.9)
EOSINOPHIL # BLD AUTO: 0.49 10*3/MM3 (ref 0–0.7)
EOSINOPHIL NFR BLD AUTO: 4.8 % (ref 0–7)
ERYTHROCYTE [DISTWIDTH] IN BLOOD BY AUTOMATED COUNT: 18.2 % (ref 11.5–14.5)
GFR SERPL CREATININE-BSD FRML MDRD: 36 ML/MIN/1.73 (ref 42–98)
GLOBULIN UR ELPH-MCNC: 4.1 GM/DL (ref 2.3–3.5)
GLUCOSE BLD-MCNC: 107 MG/DL (ref 60–100)
HCT VFR BLD AUTO: 38.5 % (ref 39–49)
HGB BLD-MCNC: 12.8 G/DL (ref 13.7–17.3)
IMM GRANULOCYTES # BLD: 0.07 10*3/MM3 (ref 0–0.02)
IMM GRANULOCYTES NFR BLD: 0.7 % (ref 0–0.5)
INR PPP: 1.43 (ref 0.8–1.2)
LYMPHOCYTES # BLD AUTO: 1.3 10*3/MM3 (ref 0.6–4.2)
LYMPHOCYTES NFR BLD AUTO: 12.7 % (ref 10–50)
MAGNESIUM SERPL-MCNC: 2.8 MG/DL (ref 1.6–2.3)
MCH RBC QN AUTO: 28.3 PG (ref 26.5–34)
MCHC RBC AUTO-ENTMCNC: 33.2 G/DL (ref 31.5–36.3)
MCV RBC AUTO: 85.2 FL (ref 80–98)
MONOCYTES # BLD AUTO: 0.83 10*3/MM3 (ref 0–0.9)
MONOCYTES NFR BLD AUTO: 8.1 % (ref 0–12)
NEUTROPHILS # BLD AUTO: 7.56 10*3/MM3 (ref 2–8.6)
NEUTROPHILS NFR BLD AUTO: 73.5 % (ref 37–80)
PLATELET # BLD AUTO: 274 10*3/MM3 (ref 150–450)
PMV BLD AUTO: 9.1 FL (ref 8–12)
POTASSIUM BLD-SCNC: 4.1 MMOL/L (ref 3.5–5.1)
PROT SERPL-MCNC: 7.8 G/DL (ref 6.3–8.6)
PROTHROMBIN TIME: 17 SECONDS (ref 11.1–15.3)
RBC # BLD AUTO: 4.52 10*6/MM3 (ref 4.37–5.74)
SODIUM BLD-SCNC: 138 MMOL/L (ref 137–145)
T3FREE SERPL-MCNC: 2.1 PG/ML (ref 2–4.4)
WBC NRBC COR # BLD: 10.27 10*3/MM3 (ref 3.2–9.8)

## 2018-07-11 PROCEDURE — 97530 THERAPEUTIC ACTIVITIES: CPT

## 2018-07-11 PROCEDURE — G8987 SELF CARE CURRENT STATUS: HCPCS

## 2018-07-11 PROCEDURE — 85025 COMPLETE CBC W/AUTO DIFF WBC: CPT | Performed by: INTERNAL MEDICINE

## 2018-07-11 PROCEDURE — 97110 THERAPEUTIC EXERCISES: CPT

## 2018-07-11 PROCEDURE — 25010000002 FUROSEMIDE PER 20 MG: Performed by: INTERNAL MEDICINE

## 2018-07-11 PROCEDURE — 99232 SBSQ HOSP IP/OBS MODERATE 35: CPT | Performed by: INTERNAL MEDICINE

## 2018-07-11 PROCEDURE — G8988 SELF CARE GOAL STATUS: HCPCS

## 2018-07-11 PROCEDURE — 85610 PROTHROMBIN TIME: CPT | Performed by: INTERNAL MEDICINE

## 2018-07-11 PROCEDURE — 94760 N-INVAS EAR/PLS OXIMETRY 1: CPT

## 2018-07-11 PROCEDURE — 94799 UNLISTED PULMONARY SVC/PX: CPT

## 2018-07-11 PROCEDURE — 97116 GAIT TRAINING THERAPY: CPT

## 2018-07-11 PROCEDURE — 83735 ASSAY OF MAGNESIUM: CPT | Performed by: INTERNAL MEDICINE

## 2018-07-11 PROCEDURE — 80053 COMPREHEN METABOLIC PANEL: CPT | Performed by: INTERNAL MEDICINE

## 2018-07-11 PROCEDURE — 97535 SELF CARE MNGMENT TRAINING: CPT

## 2018-07-11 RX ORDER — WARFARIN SODIUM 4 MG/1
4 TABLET ORAL
Status: DISCONTINUED | OUTPATIENT
Start: 2018-07-12 | End: 2018-07-13 | Stop reason: HOSPADM

## 2018-07-11 RX ORDER — WARFARIN SODIUM 5 MG/1
5 TABLET ORAL
Status: COMPLETED | OUTPATIENT
Start: 2018-07-11 | End: 2018-07-11

## 2018-07-11 RX ORDER — FUROSEMIDE 10 MG/ML
40 INJECTION INTRAMUSCULAR; INTRAVENOUS
Status: DISCONTINUED | OUTPATIENT
Start: 2018-07-11 | End: 2018-07-13

## 2018-07-11 RX ADMIN — OXYCODONE HYDROCHLORIDE AND ACETAMINOPHEN 1 TABLET: 10; 325 TABLET ORAL at 01:34

## 2018-07-11 RX ADMIN — OXYCODONE HYDROCHLORIDE AND ACETAMINOPHEN 1 TABLET: 10; 325 TABLET ORAL at 22:13

## 2018-07-11 RX ADMIN — PANTOPRAZOLE SODIUM 40 MG: 40 TABLET, DELAYED RELEASE ORAL at 05:59

## 2018-07-11 RX ADMIN — SOTALOL HYDROCHLORIDE 80 MG: 80 TABLET ORAL at 22:08

## 2018-07-11 RX ADMIN — BUDESONIDE AND FORMOTEROL FUMARATE DIHYDRATE 2 PUFF: 160; 4.5 AEROSOL RESPIRATORY (INHALATION) at 19:15

## 2018-07-11 RX ADMIN — DOCUSATE SODIUM 200 MG: 100 CAPSULE, LIQUID FILLED ORAL at 08:40

## 2018-07-11 RX ADMIN — BUDESONIDE AND FORMOTEROL FUMARATE DIHYDRATE 2 PUFF: 160; 4.5 AEROSOL RESPIRATORY (INHALATION) at 06:43

## 2018-07-11 RX ADMIN — IPRATROPIUM BROMIDE AND ALBUTEROL SULFATE 3 ML: 2.5; .5 SOLUTION RESPIRATORY (INHALATION) at 14:04

## 2018-07-11 RX ADMIN — CILOSTAZOL 50 MG: 100 TABLET ORAL at 17:51

## 2018-07-11 RX ADMIN — MONTELUKAST SODIUM 10 MG: 10 TABLET, FILM COATED ORAL at 22:08

## 2018-07-11 RX ADMIN — FUROSEMIDE 40 MG: 10 INJECTION, SOLUTION INTRAMUSCULAR; INTRAVENOUS at 08:40

## 2018-07-11 RX ADMIN — FUROSEMIDE 40 MG: 10 INJECTION, SOLUTION INTRAMUSCULAR; INTRAVENOUS at 17:51

## 2018-07-11 RX ADMIN — ASPIRIN 81 MG: 81 TABLET, COATED ORAL at 08:40

## 2018-07-11 RX ADMIN — PRAMIPEXOLE DIHYDROCHLORIDE 0.25 MG: 0.25 TABLET ORAL at 17:50

## 2018-07-11 RX ADMIN — IPRATROPIUM BROMIDE AND ALBUTEROL SULFATE 3 ML: 2.5; .5 SOLUTION RESPIRATORY (INHALATION) at 10:17

## 2018-07-11 RX ADMIN — IPRATROPIUM BROMIDE AND ALBUTEROL SULFATE 3 ML: 2.5; .5 SOLUTION RESPIRATORY (INHALATION) at 03:14

## 2018-07-11 RX ADMIN — WARFARIN SODIUM 5 MG: 5 TABLET ORAL at 17:51

## 2018-07-11 RX ADMIN — CILOSTAZOL 50 MG: 100 TABLET ORAL at 08:39

## 2018-07-11 RX ADMIN — PRAMIPEXOLE DIHYDROCHLORIDE 0.25 MG: 0.25 TABLET ORAL at 22:07

## 2018-07-11 RX ADMIN — IPRATROPIUM BROMIDE AND ALBUTEROL SULFATE 3 ML: 2.5; .5 SOLUTION RESPIRATORY (INHALATION) at 19:14

## 2018-07-11 RX ADMIN — IPRATROPIUM BROMIDE AND ALBUTEROL SULFATE 3 ML: 2.5; .5 SOLUTION RESPIRATORY (INHALATION) at 06:38

## 2018-07-11 RX ADMIN — PRAMIPEXOLE DIHYDROCHLORIDE 0.25 MG: 0.25 TABLET ORAL at 08:40

## 2018-07-11 RX ADMIN — QUETIAPINE FUMARATE 300 MG: 300 TABLET ORAL at 22:08

## 2018-07-11 NOTE — PROGRESS NOTES
"Anticoagulation by Pharmacy - Warfarin    Massimo Bentley is a 78 y.o.male being continued on warfarin for atrial fibrillation  Home regimen: 2.5 mg nightly except 5 mg on T, T, Sat  INR Goal: 2-3  Today's INR:   Lab Results   Component Value Date    INR 1.43 (H) 07/11/2018       Objective:  [Ht: 180.3 cm (71\"); Wt: 94.2 kg (207 lb 11.2 oz)]  Lab Results   Component Value Date    INR 1.43 (H) 07/11/2018    INR 1.81 (H) 07/10/2018    INR 2.49 (H) 07/09/2018    PROTIME 17.0 (H) 07/11/2018    PROTIME 20.3 (H) 07/10/2018    PROTIME 25.8 (H) 07/09/2018     Lab Results   Component Value Date    HGB 12.8 (L) 07/11/2018    HGB 12.2 (L) 07/10/2018    HGB 11.0 (L) 07/09/2018    HCT 38.5 (L) 07/11/2018    HCT 37.0 (L) 07/10/2018    HCT 33.2 (L) 07/09/2018     07/11/2018     07/10/2018     07/09/2018       Recent Warfarin Administrations       The 5 most recent administrations since 07/04/2018 are shown below each listed medication.    Warfarin Sodium         Order Dose Date Given     warfarin (COUMADIN) tablet 2.5 mg 2.5 mg 07/10/2018      2.5 mg 07/09/2018                      Assessment  Interacting medications: N/A  INR is subtherapeutic. Will increase warfarin dose to 5 mg tonight then 4 mg nightly.    Plan:  1.  Give warfarin 5 mg tablet PO @ 1800 tonight  2.  Draw a PT/INR in AM  3.  Pharmacy will continue to follow    Nicko Hodge III, [facility]  07/11/18 2:09 PM     "

## 2018-07-11 NOTE — PLAN OF CARE
Problem: Patient Care Overview  Goal: Plan of Care Review  Outcome: Ongoing (interventions implemented as appropriate)   07/11/18 7665   Coping/Psychosocial   Plan of Care Reviewed With patient   OTHER   Outcome Summary OT re-cert after cardiac difficulties with MD cleared pt for therapy this date. Pt mod I with bed mobility, min assist for sit to stand t/f and CGA to min assist for mobility with RW side stepping, pt unsafe/unsteady and fatigued with the task. Pt had max difficuty with don and doff socks but did not want assist CGA for sitting EOB balance with extended time. Pt O2 dropped to 88 on O2 2 L michele to 91 after rest and within a minute. Pt could continue to benefit from skilled OT to reach maximum level of potential with ADL, recommend to d/c to SNF for further rehab.

## 2018-07-11 NOTE — PLAN OF CARE
Problem: Patient Care Overview  Goal: Plan of Care Review  Outcome: Ongoing (interventions implemented as appropriate)   07/11/18 2674   Coping/Psychosocial   Plan of Care Reviewed With patient   OTHER   Outcome Summary PT reassessment post cardiac consult; Pt in much better state of mind and ex/upright tolerance showing sitting EOB indep ; standing w/supervsion and gait w/ RW and Min-CGA x 1 x 42 ft; He walked back to room w/ PTA post PT leaving and he was more unsteady and needed cuing to back up to chair. Good rehab potential and improved tolerance of activity but needs further rehab for increased ex joseluis/enduranc for functional activity.

## 2018-07-11 NOTE — PLAN OF CARE
Problem: Patient Care Overview  Goal: Plan of Care Review  Outcome: Ongoing (interventions implemented as appropriate)   07/11/18 1130 07/11/18 1144   Coping/Psychosocial   Plan of Care Reviewed With --  patient   Plan of Care Review   Progress improving --    OTHER   Outcome Summary pt sit<>stand with independence, pt ambulated 50` with RW & SBA, pt would benefit from 24/7 care & continued PT services @ D/C --

## 2018-07-11 NOTE — THERAPY PROGRESS REPORT/RE-CERT
Acute Care - Physical Therapy Re-Assessment  [facility]     Patient Name: Massimo Bentley  : 1940  MRN: [id number]  Today's Date: 2018   Onset of Illness/Injury or Date of Surgery: 18  Date of Referral to PT: 18  Referring Physician: Matt; approved restart post cardiac eval      Admit Date: 2018    Visit Dx:     ICD-10-CM ICD-9-CM   1. Congestive heart failure, unspecified congestive heart failure chronicity, unspecified congestive heart failure type (CMS/[facility]) I50.9 428.0   2. Warfarin-induced coagulopathy (CMS/[facility]) D68.9 286.9    T45.515A E934.2   3. Hypoxemia R09.02 799.02   4. Impaired functional mobility, balance, gait, and endurance Z74.09 V49.89   5. Impaired mobility and activities of daily living Z74.09 799.89     Patient Active Problem List   Diagnosis   • Long-term (current) use of anticoagulants   • Atrial fibrillation [I48.91]   • Embolism and thrombosis of artery (CMS/[facility])   • PVD (peripheral vascular disease) (CMS/[facility])   • Tobacco dependence syndrome   • Stroke (CMS/[facility])   • Suicide (CMS/[facility])   • Osteoarthritis of multiple joints   • Hypercholesterolemia   • GERD (gastroesophageal reflux disease)   • Major depression   • Benign prostatic hyperplasia   • Typical atrial flutter (CMS/HCC)   • Non-rheumatic mitral regurgitation   • Hyperlipidemia   • Localized edema   • Atherosclerosis of artery of extremity with gangrene (CMS/HCC)   • AAA (abdominal aortic aneurysm) without rupture (CMS/[facility])   • CKD (chronic kidney disease) stage 3, GFR 30-59 ml/min   • COPD with exacerbation (CMS/HCC)   • Hypertension   • Prediabetes   • Arterial occlusion, lower extremity (CMS/HCC)   • Hypoxemia   • Warfarin-induced coagulopathy (CMS/HCC)   • LVH (left ventricular hypertrophy)   • Diastolic dysfunction   • Coumadin toxicity     Past Medical History:   Diagnosis Date   • Abdominal bloating    • Anxiety    • Atherosclerosis of native arteries of extremity with intermittent  claudication (CMS/HCC)    • Atrial flutter (CMS/HCC)    • Backache    • Benign prostatic hyperplasia     BX 2009, also has renal cyst      • Chronic bronchitis (CMS/HCC)    • Chronic renal impairment    • Depressive disorder    • Drug abuse, episodic use    • Dyslipidemia    • Fatigue    • GERD (gastroesophageal reflux disease)    • Hypercholesterolemia 03/15/2016   • Injury of tendon of rotator cuff 10/22/2015   • Insomnia 06/24/2016   • Intervertebral disc disorder 10/27/2011   • Major depression    • Neck pain 01/09/2012   • Osteoarthritis of multiple joints 06/21/2013   • Pain from vascular prosthetic devices, implants and grafts, sequela 12/03/2015   • Pain in left foot 10/27/2015   • Peripheral vascular disease (CMS/HCC) 12/03/2015     LEFT fem-tib bypass, embolectomy 6/2014    10/27/2015    • Senile debility 10/22/2015   • 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 No coronary atherosclerosis. Normal left ventricular function   • CHOLECYSTECTOMY  08/25/2014    Cholecystitis with gangrenous gallbladder   • ENDOSCOPY  03/22/1990    Duodenal ulcer   • ENDOSCOPY AND COLONOSCOPY  10/17/2013    Diverticulum in sigmoid colon & descending colon. Internal & external hemorrhoids found   • ESOPHAGOSCOPY / EGD  10/17/2013    With tube-Esophagitis seen. Biopsy taken. Gastritis in stomach. Biopsy taken. Stenosis in 2nd portion of duodenum. Dilatation performed.   • FEMORAL THROMBECTOMY/EMBOLECTOMY  06/29/2014    Left lower extremity arteriogram. Left popliteal artery  endarterectomy. Redo left femoral to [id number] trunk bypass. Negative pressure wound therapy with placement of Prevena wound V.A.C., left groin, left lower leg   • INJECTION OF MEDICATION  03/15/2016    B12   • INJECTION OF MEDICATION  10/23/2015    Drain/Inject Major Joint    • INJECTION OF MEDICATION  01/13/2016    Kenalog   • LUMBAR LAMINECTOMY     • TRANSESOPHAGEAL ECHOCARDIOGRAM (TEX)  10/06/2015    With color flow-Mild mitral regurg.Left atrium mild dilated.Ef of 55-60%.RV systolic pressure elevated.Trace tricuspid regurg   • TRANSESOPHAGEAL ECHOCARDIOGRAM (TEX)  02/14/2012    Without color flow-[facility] w/ early diast dysfun. AV trileaflet & structurally NRL w/ AR NRL. No regional wall motion abn Est Ef approx 50-55%. M & TR valves NRL w/ mild M & TR regurg Trace -mild pulmonic regurg. Anterior echo free space w/o hemodynamic signif. NRL RV   • TRANSESOPHAGEAL ECHOCARDIOGRAM (TEX)  03/25/2011    Normal LV systolic function with Ef of 50-55%.Left atrium ildly dilated.Moderate mitral regurg.Mitral valve regurg.Intact interatrial septum.No evidence of any cardiac thrombus or pericardial effusion.AV trileaflet        PT ASSESSMENT (last 12 hours)      Physical Therapy Evaluation     Row Name 07/11/18 1113          PT Evaluation Time/Intention    Subjective Information no complaints  -GB     Document Type progress note/recertification  -GB     Mode of Treatment individual therapy;physical therapy  -GB     Patient Effort excellent  -     Row Name 07/11/18 1113          General Information    Patient Profile Reviewed? yes  -GB     Referring Physician Matt; approved restart post cardiac eval  -GB     Patient Observations alert;agree to therapy;cooperative  -GB     Patient/Family Observations friend visiting initially  -GB     General Observations of Patient sitting EOB w/ CNA at entry ; wants to go to chair; plans/hopes d/c to NH to  -     Row Name 07/11/18 1115          Transfer Assessment/Treatment     Sit-Stand [place] (Transfers) independent;supervision  -GB     Stand-Sit [place] (Transfers) independent;supervision  -GB     Row Name 07/11/18 1113          Gait/Stairs Assessment/Training    [place] Level (Gait) contact guard;1 person assist;1 person to manage equipment  -GB     Assistive Device (Gait) walker, front-wheeled  -GB     Distance in Feet (Gait) 46  -GB     Pattern (Gait) step-through  -GB     Deviations/Abnormal Patterns (Gait) gait speed decreased  -GB     Comment (Gait/Stairs) reports hx fwd flx posture due to old spine sx  -GB     Row Name 07/11/18 1113          General ROM    GENERAL ROM COMMENTS limited shoulder fxl as per eval; margo LE AROM WFL  -GB     Row Name 07/11/18 1113          General Assessment (Manual Muscle Testing)    Comment, General Manual Muscle Testing (MMT) Assessment no changes reported/noted   -GB     Row Name             Wound 07/07/18 2300 Right lower calf incision;other (see comments)    Wound - Properties Group Date first assessed: 07/07/18  -LC Time first assessed: 2300  -LC Present On Admission : yes  -LC Side: Right  -LC Orientation: lower  -LC Location: calf  -LC Type: incision;other (see comments)  -LC, healing vascular surgery wound  Additional Comments: cleaned soap and water, santyl, dry gauze, and tape covered BID per [facility] directions  -LC    Row Name 07/11/18 1113          Vital Signs    Pre Systolic BP Rehab 90  -GB     Pre Treatment Diastolic BP 53  -GB     Intra Systolic BP Rehab 113  -GB     Intra Treatment Diastolic BP 54  -GB     Post Systolic BP Rehab 134  -GB     Post Treatment Diastolic BP 77  -GB     Pretreatment Heart Rate (beats/min) 66  -GB     Intratreatment Heart Rate (beats/min) 71  -GB     Pre SpO2 (%) 93  -GB     O2 Delivery Pre Treatment supplemental O2  -GB     Intra SpO2 (%) 84  -GB     O2 Delivery Intra Treatment supplemental O2  -GB     Post SpO2 (%) 96  -GB     O2 Delivery Post Treatment supplemental O2  -GB     Pre Patient  Position Sitting  -GB     Intra Patient Position Standing  -GB     Post Patient Position Sitting  -GB     Row Name 07/11/18 1113          Gait Training Goal 1 (PT)    Distance (Gait Goal 1, PT) 80  -GB     Progress/Outcome (Gait Training Goal 1, PT) goal not met;goal revised this date  -GB     Row Name 07/11/18 1113          Positioning and Restraints    Pre-Treatment Position in bed  -GB     Post Treatment Position chair  -GB     In Bed sitting;with PT   with PTA to continue work on ex  -GB       User Key  (r) = Recorded By, (t) = Taken By, (c) = Cosigned By    Initials Name Provider Type     Leonila Lester, PT Physical Therapist    JIM Carrillo, RN Registered Nurse          Physical Therapy Education     Title: PT OT SLP Therapies (Active)     Topic: Physical Therapy (Active)     Point: Mobility training (Active)    Learning Progress Summary     Learner Status Readiness Method Response Comment Documented by    Patient Active Acceptance D NR POC;mobiltiy w/ RW; balance in gait  07/08/18 1903          Point: Precautions (Active)    Learning Progress Summary     Learner Status Readiness Method Response Comment Documented by    Patient Active Acceptance D NR POC;mobiltiy w/ RW; balance in gait  07/08/18 1903                      User Key     Initials Effective Dates Name Provider Type Discipline     04/03/18 -  Leonila Lester, PT Physical Therapist PT                PT Recommendation and Plan  Anticipated Discharge Disposition (PT): anticipate therapy at next level of care  Planned Therapy Interventions (PT Eval): balance training, gait training, stair training, strengthening, transfer training  Therapy Frequency (PT Clinical Impression): daily  Outcome Summary/Treatment Plan (PT)  Anticipated Discharge Disposition (PT): anticipate therapy at next level of care  Plan of Care Reviewed With: patient  Outcome Summary: PT reassessment post cardiac consult; Pt in much better state of mind and  ex/upright tolerance showing sitting EOB indep ; standing w/supervsion and gait w/ RW and Min-CGA x 1 x 42 ft; He walked back to room w/ PTA post PT leaving and he was more unsteady and needed cuing to back up to chair.  Good rehab potential and improved tolerance of activity but needs further rehab for increased ex joseluis/enduranc for functional activity.          Outcome Measures     Row Name 07/11/18 1100 07/09/18 1355 07/09/18 1116       How much help from another person do you currently need...    Turning from your back to your side while in flat bed without using bedrails? 3  -GB  -- 3  -TW    Moving from lying on back to sitting on the side of a flat bed without bedrails? 3  -GB  -- 3  -TW    Moving to and from a bed to a chair (including a wheelchair)? 3  -GB  -- 2  -TW    Standing up from a chair using your arms (e.g., wheelchair, bedside chair)? 3  -GB  -- 3  -TW    Climbing 3-5 steps with a railing? 2  -GB  -- 1  -TW    To walk in hospital room? 3  -GB  -- 1  -TW    AM-PAC 6 Clicks Score 17  -GB  -- 13  -TW       How much help from another is currently needed...    Putting on and taking off regular lower body clothing?  -- 2  -KD  --    Bathing (including washing, rinsing, and drying)  -- 2  -KD  --    Toileting (which includes using toilet bed pan or urinal)  -- 2  -KD  --    Putting on and taking off regular upper body clothing  -- 2  -KD  --    Taking care of personal grooming (such as brushing teeth)  -- 3  -KD  --    Eating meals  -- 4  -KD  --    Score  -- 15  -KD  --       Functional Assessment    Outcome Measure Options AM-PAC 6 Clicks Basic Mobility (PT)  -GB  -- AM-PAC 6 Clicks Basic Mobility (PT)  -TW    Row Name 07/09/18 1005 07/08/18 1900          How much help from another person do you currently need...    Turning from your back to your side while in flat bed without using bedrails?  -- 3  -GB     Moving from lying on back to sitting on the side of a flat bed without bedrails?  -- 3  -GB      Moving to and from a bed to a chair (including a wheelchair)?  -- 2  -GB     Standing up from a chair using your arms (e.g., wheelchair, bedside chair)?  -- 3  -GB     Climbing 3-5 steps with a railing?  -- 1  -GB     To walk in hospital room?  -- 1  -GB     AM-PAC 6 Clicks Score  -- 13  -GB        How much help from another is currently needed...    Putting on and taking off regular lower body clothing? 2  -RB  --     Bathing (including washing, rinsing, and drying) 2  -RB  --     Toileting (which includes using toilet bed pan or urinal) 2  -RB  --     Putting on and taking off regular upper body clothing 2  -RB  --     Taking care of personal grooming (such as brushing teeth) 3  -RB  --     Eating meals 4  -RB  --     Score 15  -RB  --        Functional Assessment    Outcome Measure Options AM-PAC 6 Clicks Daily Activity (OT)  -RB AM-PAC 6 Clicks Basic Mobility (PT)  -GB       User Key  (r) = Recorded By, (t) = Taken By, (c) = Cosigned By    Initials Name Provider Type    YOGESH Cardoso, OT Occupational Therapist    LEONID Lester, PT Physical Therapist    MIK Doty PTA Physical Therapy Assistant    LEVI Fraser Occupational Therapy Assistant           Time Calculation:         PT Charges     Row Name 07/11/18 1149             Time Calculation    Start Time 1113  -GB      Stop Time 1130  -GB      Time Calculation (min) 17 min  -GB        User Key  (r) = Recorded By, (t) = Taken By, (c) = Cosigned By    Initials Name Provider Type    LEONID Lester, PT Physical Therapist        Therapy Suggested Charges     Code   Minutes Charges    39420 (CPT®) Hc Pt Neuromusc Re Education Ea 15 Min      11343 (CPT®) Hc Pt Ther Proc Ea 15 Min      14854 (CPT®) Hc Gait Training Ea 15 Min 15 1    64919 (CPT®) Hc Pt Therapeutic Act Ea 15 Min      25942 (CPT®) Hc Pt Manual Therapy Ea 15 Min      72971 (CPT®) Hc Pt Iontophoresis Ea 15 Min      99902 (CPT®) Hc Pt Elec Stim Ea-Per 15  Min      10658 (CPT®) Hc Pt Ultrasound Ea 15 Min      03089 (CPT®) Hc Pt Self Care/Mgmt/Train Ea 15 Min      Total  15 1        Therapy Charges for Today     Code Description Service Date Service Provider Modifiers Aleey    51261703171 HC PT THERAPEUTIC ACT EA 15 MIN 7/11/2018 Leonila Lester, PT GP 1          PT G-Codes  PT Professional Judgement Used?: Yes  Outcome Measure Options: AM-PAC 6 Clicks Basic Mobility (PT)  Score: 17  Functional Limitation: Mobility: Walking and moving around  Mobility: Walking and Moving Around Current Status (): At least 40 percent but less than 60 percent impaired, limited or restricted  Mobility: Walking and Moving Around Goal Status (): At least 1 percent but less than 20 percent impaired, limited or restricted      Leonila Lester, PT  7/11/2018

## 2018-07-11 NOTE — THERAPY TREATMENT NOTE
Acute Care - Physical Therapy Treatment Note  [facility]     Patient Name: Massimo Bentley  : 1940  MRN: [id number]  Today's Date: 2018  Onset of Illness/Injury or Date of Surgery: 18  Date of Referral to PT: 18  Referring Physician: Matt; approved restart post cardiac eval    Admit Date: 2018    Visit Dx:    ICD-10-CM ICD-9-CM   1. Congestive heart failure, unspecified congestive heart failure chronicity, unspecified congestive heart failure type (CMS/HCC) I50.9 428.0   2. Warfarin-induced coagulopathy (CMS/HCC) D68.9 286.9    T45.515A E934.2   3. Hypoxemia R09.02 799.02   4. Impaired functional mobility, balance, gait, and endurance Z74.09 V49.89   5. Impaired mobility and activities of daily living Z74.09 799.89     Patient Active Problem List   Diagnosis   • Long-term (current) use of anticoagulants   • Atrial fibrillation [I48.91]   • Embolism and thrombosis of artery (CMS/HCC)   • PVD (peripheral vascular disease) (CMS/HCC)   • Tobacco dependence syndrome   • Stroke (CMS/HCC)   • Suicide (CMS/HCC)   • Osteoarthritis of multiple joints   • Hypercholesterolemia   • GERD (gastroesophageal reflux disease)   • Major depression   • Benign prostatic hyperplasia   • Typical atrial flutter (CMS/HCC)   • Non-rheumatic mitral regurgitation   • Hyperlipidemia   • Localized edema   • Atherosclerosis of artery of extremity with gangrene (CMS/HCC)   • AAA (abdominal aortic aneurysm) without rupture (CMS/HCC)   • CKD (chronic kidney disease) stage 3, GFR 30-59 ml/min   • COPD with exacerbation (CMS/HCC)   • Hypertension   • Prediabetes   • Arterial occlusion, lower extremity (CMS/HCC)   • Hypoxemia   • Warfarin-induced coagulopathy (CMS/HCC)   • LVH (left ventricular hypertrophy)   • Diastolic dysfunction   • Coumadin toxicity       Therapy Treatment          Rehabilitation Treatment Summary     Row Name 18 1130             Treatment Time/Intention    Discipline physical therapy  assistant  -TA      Document Type therapy note (daily note)  -TA      Subjective Information no complaints  -TA      Mode of Treatment physical therapy  -TA      Therapy Frequency (PT Clinical Impression) daily  -TA      Patient Effort excellent  -TA      Existing Precautions/Restrictions fall;cardiac  -TA      Recorded by [TA] Pepper Enriquez PTA 07/11/18 1357      Row Name 07/11/18 1130             Vital Signs    Post Systolic BP Rehab 126  -TA      Post Treatment Diastolic BP 69  -TA      Pretreatment Heart Rate (beats/min) 68  -TA      Posttreatment Heart Rate (beats/min) 71  -TA      Pre SpO2 (%) 87   returned to >90% with PLB  -TA      O2 Delivery Pre Treatment supplemental O2  -TA      Post SpO2 (%) 92  -TA      Pre Patient Position Sitting  -TA      Post Patient Position Sitting  -TA      Recorded by [TA] Pepper Enriquez PTA 07/11/18 1357      Row Name 07/11/18 1130             Cognitive Assessment/Intervention- PT/OT    Orientation Status (Cognition) oriented x 3  -TA      Follows Commands (Cognition) WFL  -TA      Safety Deficit (Cognitive) mild deficit  -TA      Recorded by [TA] Pepper Enriquez PTA 07/11/18 1357      Row Name 07/11/18 1130             Bed Mobility Assessment/Treatment    Comment (Bed Mobility) pt sitting @ EOB with PT upon PTA entering room  -TA      Recorded by [TA] Pepper Enriquez PTA 07/11/18 1357      Row Name 07/11/18 1130             Sit-Stand Transfer    Sit-Stand [place] (Transfers) independent  -TA      Assistive Device (Sit-Stand Transfers) walker, front-wheeled  -TA      Recorded by [TA] Pepper Enriqeuz PTA 07/11/18 1357      Row Name 07/11/18 1130             Stand-Sit Transfer    Stand-Sit [place] (Transfers) independent  -TA      Assistive Device (Stand-Sit Transfers) walker, front-wheeled  -TA      Recorded by [TA] Pepper Enriquez PTA 07/11/18 1357      Row Name 07/11/18 1130             Gait/Stairs Assessment/Training    Gait/Stairs Assessment/Training  gait/ambulation assistive device  -TA      [place] Level (Gait) contact guard  -TA      Assistive Device (Gait) walker, front-wheeled  -TA      Distance in Feet (Gait) 50  -TA      Pattern (Gait) step-through  -TA      Deviations/Abnormal Patterns (Gait) gait speed decreased  -TA      Bilateral Gait Deviations forward flexed posture  -TA      Recorded by [TA] Pepper Enriquez, TONI 07/11/18 1357      Row Name 07/11/18 1130             Therapeutic Exercise    Lower Extremity (Therapeutic Exercise) LAQ (long arc quad), bilateral;marching while seated  -TA      Lower Extremity Range of Motion (Therapeutic Exercise) ankle dorsiflexion/plantar flexion, bilateral  -TA      Exercise Type (Therapeutic Exercise) AROM (active range of motion)  -TA      Sets/Reps (Therapeutic Exercise) 20  -TA      Recorded by [TA] Pepper Enriquez, TONI 07/11/18 1357      Row Name 07/11/18 1130             Positioning and Restraints    Pre-Treatment Position in bed  -TA      Post Treatment Position chair  -TA      In Chair sitting;call light within reach;encouraged to call for assist  -TA      Recorded by [TA] Pepper Enriquez, TONI 07/11/18 1357      Row Name 07/11/18 1130             Pain Scale: Numbers Pre/Post-Treatment    Pain Scale: Numbers, Pretreatment 0/10 - no pain  -TA      Pain Scale: Numbers, Post-Treatment 0/10 - no pain  -TA      Recorded by [TA] Pepper Enriquez, PTA 07/11/18 1357      Row Name                Wound 07/07/18 2300 Right lower calf incision;other (see comments)    Wound - Properties Group Date first assessed: 07/07/18 [LC] Time first assessed: 2300 [LC] Present On Admission : yes [LC] Side: Right [LC] Orientation: lower [LC] Location: calf [LC] Type: incision;other (see comments) [LC], healing vascular surgery wound  Additional Comments: cleaned soap and water, santyl, dry gauze, and tape covered BID per [facility] directions [LC] Recorded by:  [LC] Anais Carrillo RN 07/07/18 9597    Row Name 07/11/18 1130             Outcome  Summary/Treatment Plan (PT)    Daily Summary of Progress (PT) progress toward functional goals is good  -TA      Plan for Continued Treatment (PT) continue  -TA      Anticipated Discharge Disposition (PT) anticipate therapy at next level of care  -TA      Recorded by [TA] Pepper Enriquez, PTA 07/11/18 6607        User Key  (r) = Recorded By, (t) = Taken By, (c) = Cosigned By    Initials Name Effective Dates Discipline    LC Anais Carrillo RN 10/17/16 -  Nurse    TA Pepper Enriquez, PTA 03/07/18 -  PT          Wound 07/07/18 2300 Right lower calf incision;other (see comments) (Active)   Dressing Appearance dry;intact 7/10/2018  8:10 PM   Base other (see comments) 7/11/2018  7:31 AM   Edges other (see comments) 7/11/2018  7:31 AM   Care, Wound cleansed with;soap and water 7/11/2018  7:31 AM   Dressing Care, Wound dressing changed 7/11/2018  7:31 AM               PT Rehab Goals     Row Name 07/11/18 1130 07/11/18 1113          Transfer Goal 1 (PT)    Activity/Assistive Device (Transfer Goal 1, PT) bed-to-chair/chair-to-bed;toilet  -TA bed-to-chair/chair-to-bed;toilet  -GB     [place] Level/Cues Needed (Transfer Goal 1, PT) conditional independence;supervision required  -TA conditional independence;supervision required  -GB     Time Frame (Transfer Goal 1, PT) long term goal (LTG);5 days  -TA long term goal (LTG);5 days  -GB     Progress/Outcome (Transfer Goal 1, PT) goal not met;goal ongoing  -TA goal not met;goal ongoing  -GB        Gait Training Goal 1 (PT)    Activity/Assistive Device (Gait Training Goal 1, PT) gait (walking locomotion);assistive device use;decrease fall risk;increase endurance/gait distance  -TA gait (walking locomotion);assistive device use;decrease fall risk;increase endurance/gait distance  -GB     [place] Level (Gait Training Goal 1, PT) minimum assist (75% or more patient effort)  -TA minimum assist (75% or more patient effort)  -GB     Distance (Gait Goal 1, PT) 80  -TA 80  -GB     Time  Frame (Gait Training Goal 1, PT) short term goal (STG);5 days  -TA short term goal (STG);5 days  -GB     Progress/Outcome (Gait Training Goal 1, PT) goal not met;goal revised this date  -TA goal not met;goal revised this date  -GB        Gait Training Goal (PT)    Gait Training Goal (PT) --   pt will demo problem solving skills in amb w/ RW w/ VSS  -TA --   pt will demo problem solving skills in amb w/ RW w/ VSS  -GB     Time Frame (Gait Training Goal, PT) by discharge  -TA by discharge  -GB     Progress/Outcome (Gait Training Goal, PT) goal ongoing  -TA goal ongoing  -GB       User Key  (r) = Recorded By, (t) = Taken By, (c) = Cosigned By    Initials Name Provider Type Discipline    LEONID Lester, PT Physical Therapist PT    TA Pepper Enriquez, TONI Physical Therapy Assistant PT          Physical Therapy Education     Title: PT OT SLP Therapies (Active)     Topic: Physical Therapy (Active)     Point: Mobility training (Active)    Learning Progress Summary     Learner Status Readiness Method Response Comment Documented by    Patient Active Acceptance D NR POC;mobiltiy w/ RW; balance in gait  07/08/18 1903          Point: Precautions (Active)    Learning Progress Summary     Learner Status Readiness Method Response Comment Documented by    Patient Active Acceptance D NR POC;mobiltiy w/ RW; balance in gait  07/08/18 1903                      User Key     Initials Effective Dates Name Provider Type Discipline     04/03/18 -  Leonila Lester, PT Physical Therapist PT                    PT Recommendation and Plan  Anticipated Discharge Disposition (PT): anticipate therapy at next level of care  Therapy Frequency (PT Clinical Impression): daily  Outcome Summary/Treatment Plan (PT)  Daily Summary of Progress (PT): progress toward functional goals is good  Plan for Continued Treatment (PT): continue  Anticipated Discharge Disposition (PT): anticipate therapy at next level of care  Progress:  improving  Outcome Summary: pt sit<>stand with independence, pt ambulated 50` with RW & SBA, pt would benefit from 24/7 care & continued PT services @ D/C          Outcome Measures     Row Name 07/11/18 1300 07/11/18 1100 07/09/18 1355       How much help from another person do you currently need...    Turning from your back to your side while in flat bed without using bedrails? 3  -TA 3  -GB  --    Moving from lying on back to sitting on the side of a flat bed without bedrails? 3  -TA 3  -GB  --    Moving to and from a bed to a chair (including a wheelchair)? 3  -TA 3  -GB  --    Standing up from a chair using your arms (e.g., wheelchair, bedside chair)? 3  -TA 3  -GB  --    Climbing 3-5 steps with a railing? 2  -TA 2  -GB  --    To walk in hospital room? 3  -TA 3  -GB  --    AM-PAC 6 Clicks Score 17  -TA 17  -GB  --       How much help from another is currently needed...    Putting on and taking off regular lower body clothing?  --  -- 2  -KD    Bathing (including washing, rinsing, and drying)  --  -- 2  -KD    Toileting (which includes using toilet bed pan or urinal)  --  -- 2  -KD    Putting on and taking off regular upper body clothing  --  -- 2  -KD    Taking care of personal grooming (such as brushing teeth)  --  -- 3  -KD    Eating meals  --  -- 4  -KD    Score  --  -- 15  -KD       Functional Assessment    Outcome Measure Options AM-PAC 6 Clicks Basic Mobility (PT)  -TA AM-PAC 6 Clicks Basic Mobility (PT)  -GB  --    Row Name 07/09/18 1116 07/09/18 1005 07/08/18 1900       How much help from another person do you currently need...    Turning from your back to your side while in flat bed without using bedrails? 3  -TW  -- 3  -GB    Moving from lying on back to sitting on the side of a flat bed without bedrails? 3  -TW  -- 3  -GB    Moving to and from a bed to a chair (including a wheelchair)? 2  -TW  -- 2  -GB    Standing up from a chair using your arms (e.g., wheelchair, bedside chair)? 3  -TW  -- 3  -GB     Climbing 3-5 steps with a railing? 1  -TW  -- 1  -GB    To walk in hospital room? 1  -TW  -- 1  -GB    AM-PAC 6 Clicks Score 13  -TW  -- 13  -GB       How much help from another is currently needed...    Putting on and taking off regular lower body clothing?  -- 2  -RB  --    Bathing (including washing, rinsing, and drying)  -- 2  -RB  --    Toileting (which includes using toilet bed pan or urinal)  -- 2  -RB  --    Putting on and taking off regular upper body clothing  -- 2  -RB  --    Taking care of personal grooming (such as brushing teeth)  -- 3  -RB  --    Eating meals  -- 4  -RB  --    Score  -- 15  -RB  --       Functional Assessment    Outcome Measure Options AM-PAC 6 Clicks Basic Mobility (PT)  -TW AM-[facility] 6 Clicks Daily Activity (OT)  -RB AM-[facility] 6 Clicks Basic Mobility (PT)  -GB      User Key  (r) = Recorded By, (t) = Taken By, (c) = Cosigned By    Initials Name Provider Type    RB Dann Cardoso, OT Occupational Therapist    LEONID Lester, PT Physical Therapist    DMITRY Enriquez, TONI Physical Therapy Assistant    MIK Doty, TONI Physical Therapy Assistant    LEVI Fraser Occupational Therapy Assistant           Time Calculation:         PT Charges     Row Name 07/11/18 1401 07/11/18 1149          Time Calculation    Start Time 1130  -TA 1113  -GB     Stop Time 1158  -TA 1130  -GB     Time Calculation (min) 28 min  -TA 17 min  -GB        Time Calculation- PT    Total Timed Code Minutes- PT 28 minute(s)  -TA  --       User Key  (r) = Recorded By, (t) = Taken By, (c) = Cosigned By    Initials Name Provider Type    LEONID Lester, PT Physical Therapist    MDITRY Enriquez, PTA Physical Therapy Assistant        Therapy Suggested Charges     Code   Minutes Charges    40317 (CPT®) Hc Pt Neuromusc Re Education Ea 15 Min      55446 (CPT®) Hc Pt Ther Proc Ea 15 Min      26596 (CPT®) Hc Gait Training Ea 15 Min 15 1    47988 (CPT®) Hc Pt Therapeutic Act Ea 15 Min       29769 (CPT®) Hc Pt Manual Therapy Ea 15 Min      06770 (CPT®) Hc Pt Iontophoresis Ea 15 Min      73400 (CPT®) Hc Pt Elec Stim Ea-Per 15 Min      44848 (CPT®) Hc Pt Ultrasound Ea 15 Min      39264 (CPT®) Hc Pt Self Care/Mgmt/Train Ea 15 Min      Total  15 1        Therapy Charges for Today     Code Description Service Date Service Provider Modifiers Qty    31097592195 HC GAIT TRAINING EA 15 MIN 7/11/2018 Pepper Enriquez, TONI GP 1    79795865936 HC PT THER PROC EA 15 MIN 7/11/2018 Pepper Enriquez PTA GP 1          PT G-Codes  PT Professional Judgement Used?: Yes  Outcome Measure Options: AM-PAC 6 Clicks Basic Mobility (PT)  Score: 17  Functional Limitation: Mobility: Walking and moving around  Mobility: Walking and Moving Around Current Status (): At least 40 percent but less than 60 percent impaired, limited or restricted  Mobility: Walking and Moving Around Goal Status (): At least 1 percent but less than 20 percent impaired, limited or restricted    Pepper Enriquez PTA  7/11/2018

## 2018-07-11 NOTE — PROGRESS NOTES
MEDICINE DAILY PROGRESS NOTE  NAME: Massimo Bentley  : 1940  MRN: [id number]     LOS: 4 days     PROVIDER OF SERVICE: Royal Gutierrez MD    Chief Complaint: Hypoxemia    Subjective:   HPI: Patient admitted with shortness of air, dyspnea on exertion, and decrease in exercise tolerance.    Interval History:  History taken from: patient chart RN  . Patient up to chair today. Weaning oxygen.   7/10. Patient feeling some better today.  Overall nearing baseline.  . Some improvement in dyspnea.  No acute events overnight. No complaints at time of exam.    Review of Systems:   Review of Systems   Constitutional: Positive for activity change and fatigue. Negative for appetite change and fever.   HENT: Negative for ear pain and sore throat.    Eyes: Negative for pain and visual disturbance.   Respiratory: Negative for cough and shortness of breath.    Cardiovascular: Negative for chest pain, palpitations and leg swelling.   Gastrointestinal: Negative for abdominal pain and nausea.   Endocrine: Negative for cold intolerance and heat intolerance.   Genitourinary: Negative for difficulty urinating and dysuria.   Musculoskeletal: Positive for arthralgias. Negative for gait problem.   Skin: Negative for color change and rash.   Neurological: Positive for weakness. Negative for dizziness and headaches.   Hematological: Negative for adenopathy. Does not bruise/bleed easily.   Psychiatric/Behavioral: Negative for agitation, confusion and sleep disturbance.       Objective:     Vital Signs  Vitals:    18 0600 18 0638 18 0710 18 0833   BP:    113/73   BP Location:    Right arm   Patient Position:    Lying   Pulse:  63 58 61   Resp:  16  18   Temp:    97.3 °F (36.3 °C)   TempSrc:    Temporal Artery    SpO2:  94%  93%   Weight: 94.2 kg (207 lb 11.2 oz)      Height:           Physical Exam  Physical Exam   Constitutional: He is oriented to person, place, and time. He appears well-developed and  well-nourished. No distress.   HENT:   Head: Normocephalic and atraumatic.   Right Ear: External ear normal.   Left Ear: External ear normal.   Nose: Nose normal.   Eyes: Conjunctivae and EOM are normal. Pupils are equal, round, and reactive to light.   Neck: Normal range of motion. Neck supple.   Cardiovascular: Normal rate, regular rhythm, normal heart sounds and intact distal pulses.    Pulmonary/Chest: Effort normal. No respiratory distress. He has no wheezes. He has no rales. He exhibits no tenderness.   Abdominal: Soft. Bowel sounds are normal. He exhibits no distension and no mass. There is no tenderness. There is no rebound and no guarding.   Musculoskeletal: Normal range of motion. He exhibits no edema.   Neurological: He is alert and oriented to person, place, and time.   Skin: Skin is warm and dry. No rash noted. He is not diaphoretic. No erythema. No pallor.   Psychiatric: He has a normal mood and affect. His behavior is normal.   Nursing note and vitals reviewed.      Medication Review    Current Facility-Administered Medications:   •  acetaminophen (TYLENOL) tablet 650 mg, 650 mg, Oral, Q4H PRN, Umm Interiano MD  •  aspirin EC tablet 81 mg, 81 mg, Oral, Daily, Umm Interiano MD, 81 mg at 07/11/18 0840  •  bisacodyl (DULCOLAX) suppository 10 mg, 10 mg, Rectal, Daily PRN, Umm Interiano MD  •  budesonide-formoterol (SYMBICORT) 160-4.5 MCG/ACT inhaler 2 puff, 2 puff, Inhalation, BID - RT, Umm Interiano MD, 2 puff at 07/11/18 0643  •  cilostazol (PLETAL) tablet 50 mg, 50 mg, Oral, BID AC, Umm Interiano MD, 50 mg at 07/11/18 0839  •  docusate sodium (COLACE) capsule 200 mg, 200 mg, Oral, BID, Umm Interiano MD, 200 mg at 07/11/18 0840  •  furosemide (LASIX) injection 40 mg, 40 mg, Intravenous, TID, Umm Interiano MD, 40 mg at 07/11/18 0840  •  ipratropium-albuterol (DUO-NEB) nebulizer solution 3 mL, 3 mL, Nebulization, Q4H - RT, Umm  James Interiano MD, 3 mL at 07/11/18 0638  •  magnesium hydroxide (MILK OF MAGNESIA) suspension 2400 mg/10mL 10 mL, 10 mL, Oral, Daily PRN, Umm Interiano MD, 10 mL at 07/10/18 1009  •  magnesium oxide (MAGOX) tablet 400 mg, 400 mg, Oral, Nightly, Umm Interiano MD, 400 mg at 07/08/18 2003  •  montelukast (SINGULAIR) tablet 10 mg, 10 mg, Oral, Nightly, Umm Interiano MD, 10 mg at 07/10/18 2132  •  nicotine (NICODERM CQ) 21 MG/24HR patch 1 patch, 1 patch, Transdermal, Q24H, Umm Interiano MD, 1 patch at 07/08/18 2226  •  oxyCODONE-acetaminophen (PERCOCET)  MG per tablet 1 tablet, 1 tablet, Oral, Q4H PRN, Umm Interiano MD, 1 tablet at 07/11/18 0134  •  pantoprazole (PROTONIX) EC tablet 40 mg, 40 mg, Oral, Q AM, Umm Interiano MD, 40 mg at 07/11/18 0559  •  Pharmacy to dose warfarin, , Does not apply, Continuous PRN, Umm Interiano MD  •  pramipexole (MIRAPEX) tablet 0.25 mg, 0.25 mg, Oral, TID, Umm Interiano MD, 0.25 mg at 07/11/18 0840  •  QUEtiapine (SEROquel) tablet 300 mg, 300 mg, Oral, Nightly, Umm Interiano MD, 300 mg at 07/10/18 2133  •  sodium chloride 0.9 % flush 1-10 mL, 1-10 mL, Intravenous, PRN, Umm Interiano MD  •  sotalol (BETAPACE) tablet 80 mg, 80 mg, Oral, Q12H, Umm Interiano MD, 80 mg at 07/10/18 2132  •  sotalol (BETAPACE) tablet 80 mg, 80 mg, Oral, Daily PRN, Umm Interiano MD  •  warfarin (COUMADIN) tablet 2.5 mg, 2.5 mg, Oral, Daily, Umm Interiano MD, 2.5 mg at 07/10/18 1701     Diagnostic Data    Lab Results (last 24 hours)     Procedure Component Value Units Date/Time    Protime-INR [[id number]]  (Abnormal) Collected:  07/11/18 0530    Specimen:  Blood Updated:  07/11/18 0618     Protime 17.0 (H) Seconds      INR 1.43 (H)    Narrative:       Therapeutic range for most indications is 2.0-3.0 INR,  or 2.5-3.5 for mechanical heart valves.    T3, Free [[id number]]  Collected:  07/10/18 0718    Specimen:  Blood Updated:  07/11/18 0615     T3, Free 2.1 pg/mL     Narrative:       Performed at:  [address] [place] [address]  [id number]  : Segun Biswas PhD, Phone:  [phone]    Comprehensive Metabolic Panel [[id number]]  (Abnormal) Collected:  07/11/18 0530    Specimen:  Blood Updated:  07/11/18 0605     Glucose 107 (H) mg/dL      BUN 49 (H) mg/dL      Creatinine 1.82 (H) mg/dL      Sodium 138 mmol/L      Potassium 4.1 mmol/L      Chloride 94 (L) mmol/L      CO2 36.0 (H) mmol/L      Calcium 8.4 mg/dL      Total Protein 7.8 g/dL      Albumin 3.70 g/dL      ALT (SGPT) 41 U/L      AST (SGOT) 51 U/L      Alkaline Phosphatase 88 U/L      Total Bilirubin 0.4 mg/dL      eGFR Non African Amer 36 (L) mL/min/1.73      Globulin 4.1 (H) gm/dL      A/G Ratio 0.9 (L) g/dL      BUN/Creatinine Ratio 26.9 (H)     Anion Gap 8.0 mmol/L     Narrative:       The MDRD GFR formula is only valid for adults with stable renal function between ages 18 and 70.    Magnesium [[id number]]  (Abnormal) Collected:  07/11/18 0530    Specimen:  Blood Updated:  07/11/18 0602     Magnesium 2.8 (H) mg/dL     CBC Auto Differential [[id number]]  (Abnormal) Collected:  07/11/18 0530    Specimen:  Blood Updated:  07/11/18 0552     WBC 10.27 (H) 10*3/mm3      RBC 4.52 10*6/mm3      Hemoglobin 12.8 (L) g/dL      Hematocrit 38.5 (L) %      MCV 85.2 fL      MCH 28.3 pg      MCHC 33.2 g/dL      RDW 18.2 (H) %      RDW-SD 55.7 (H) fl      MPV 9.1 fL      Platelets 274 10*3/mm3      Neutrophil % 73.5 %      Lymphocyte % 12.7 %      Monocyte % 8.1 %      Eosinophil % 4.8 %      Basophil % 0.2 %      Immature Grans % 0.7 (H) %      Neutrophils, Absolute 7.56 10*3/mm3      Lymphocytes, Absolute 1.30 10*3/mm3      Monocytes, Absolute 0.83 10*3/mm3      Eosinophils, Absolute 0.49 10*3/mm3      Basophils, Absolute 0.02 10*3/mm3      Immature Grans, Absolute 0.07 (H) 10*3/mm3           I reviewed the  patient's new clinical results.    Assessment/Plan:       1. Acute hypoxic respiratory failure.  7/11. Weaned to 1 lpm nasal cannula. Will wean to room air.   7/10. Wean oxygen.  7/9. Good diuresis. Improvement overnight. Wean oxygen.   2. CKD.     Results from last 7 days  Lab Units 07/11/18  0530 07/10/18  0355 07/09/18  0537 07/08/18  0533 07/07/18 2002   CREATININE mg/dL 1.82* 1.93* 1.95* 1.81* 1.76*   7/10 - 7/11. Stable.  7/9. Small bump with diuresis. Close monitoring. Consult nephrology as needed.  3. COPD exacerbation.  Oxygen. Nebs.   4. Afib. Coumadin. Sotalol. Rate controlled.   5. Deconditioning. PT/OT.  6. HFpEF. Lasix. FR.    Will monitor patient's hospital course and adjust treatment as hospital course dictates.    DVT prophylaxis: Warfarin  Code status is   Code Status and Medical Interventions:   Ordered at: 07/07/18 2320     Level Of Support Discussed With:    Patient     Code Status:    CPR     Medical Interventions (Level of Support Prior to Arrest):    Full       Plan for disposition:Where: SNF and When:  1-2 days      Time:           This document has been electronically signed by Royal Gutierrez MD on July 11, 2018 8:45 AM

## 2018-07-11 NOTE — PROGRESS NOTES
LOS: 4 days   Patient Care Team:  Belkys Hoffman MD as PCP - Claims Attributed    Chief Complaint:  shortness breath and mild congestive heart failure on the basis of diastolic dysfunction and history of paroxysmal atrial fibrillation/atrial flutter with a significant peripheral vascular disease       Review of Systems:   ROS    Objective     Current Facility-Administered Medications   Medication Dose Route Frequency Provider Last Rate Last Dose   • acetaminophen (TYLENOL) tablet 650 mg  650 mg Oral Q4H PRN Umm Interiano MD       • aspirin EC tablet 81 mg  81 mg Oral Daily Umm Interiano MD   81 mg at 07/11/18 0840   • bisacodyl (DULCOLAX) suppository 10 mg  10 mg Rectal Daily PRN Umm Interiano MD       • budesonide-formoterol (SYMBICORT) 160-4.5 MCG/ACT inhaler 2 puff  2 puff Inhalation BID - RT Umm Interiano MD   2 puff at 07/11/18 0643   • cilostazol (PLETAL) tablet 50 mg  50 mg Oral BID AC Umm Interiano MD   50 mg at 07/11/18 0839   • docusate sodium (COLACE) capsule 200 mg  200 mg Oral BID Umm Interiano MD   200 mg at 07/11/18 0840   • furosemide (LASIX) injection 40 mg  40 mg Intravenous TID Umm Interiano MD   40 mg at 07/11/18 0840   • ipratropium-albuterol (DUO-NEB) nebulizer solution 3 mL  3 mL Nebulization Q4H - RT Umm Interiano MD   3 mL at 07/11/18 1404   • magnesium hydroxide (MILK OF MAGNESIA) suspension 2400 mg/10mL 10 mL  10 mL Oral Daily PRN Umm Interiano MD   10 mL at 07/10/18 1009   • magnesium oxide (MAGOX) tablet 400 mg  400 mg Oral Nightly Umm Interiano MD   400 mg at 07/08/18 2003   • montelukast (SINGULAIR) tablet 10 mg  10 mg Oral Nightly Umm Interiano MD   10 mg at 07/10/18 2132   • nicotine (NICODERM CQ) 21 MG/24HR patch 1 patch  1 patch Transdermal Q24H Umm Interiano MD   1 patch at 07/08/18 2226   • oxyCODONE-acetaminophen (PERCOCET)  MG per tablet 1  "tablet  1 tablet Oral Q4H PRN Umm Interiano MD   1 tablet at 07/11/18 0134   • pantoprazole (PROTONIX) EC tablet 40 mg  40 mg Oral Q AM Umm Interiano MD   40 mg at 07/11/18 0559   • Pharmacy to dose warfarin   Does not apply Continuous PRN Umm Interiano MD       • pramipexole (MIRAPEX) tablet 0.25 mg  0.25 mg Oral TID mUm Interiano MD   0.25 mg at 07/11/18 0840   • QUEtiapine (SEROquel) tablet 300 mg  300 mg Oral Nightly Umm Interiano MD   300 mg at 07/10/18 2133   • sodium chloride 0.9 % flush 1-10 mL  1-10 mL Intravenous PRN Umm Interiano MD       • sotalol (BETAPACE) tablet 80 mg  80 mg Oral Q12H Umm Interiano MD   80 mg at 07/10/18 2132   • sotalol (BETAPACE) tablet 80 mg  80 mg Oral Daily PRN Umm Interiano MD       • [START ON 7/12/2018] warfarin (COUMADIN) tablet 4 mg  4 mg Oral Daily Umm Interiano MD       • warfarin (COUMADIN) tablet 5 mg  5 mg Oral Once Umm Interiano MD           Vital Sign Min/Max for last 24 hours  Temp  Min: 96.8 °F (36 °C)  Max: 97.3 °F (36.3 °C)   BP  Min: 90/54  Max: 118/58   Pulse  Min: 58  Max: 82   Resp  Min: 16  Max: 20   SpO2  Min: 90 %  Max: 94 %   Flow (L/min)  Min: 1  Max: 2   Weight  Min: 94.2 kg (207 lb 11.2 oz)  Max: 94.2 kg (207 lb 11.2 oz)     Flowsheet Rows      First Filed Value   Admission Height  180.3 cm (71\") Documented at 07/07/2018 1945   Admission Weight  99.8 kg (220 lb) Documented at 07/07/2018 1945            Intake/Output Summary (Last 24 hours) at 07/11/18 1551  Last data filed at 07/11/18 1300   Gross per 24 hour   Intake              480 ml   Output              800 ml   Net             -320 ml       Physical Exam:     General Appearance:    Alert, cooperative, in no acute distress   Lungs:     Clear to auscultation,respirations regular, even and                  unlabored    Heart:    Regular rhythm and normal rate, normal S1 and S2, no            murmur, " no gallop, no rub, no click   Chest Wall:    No abnormalities observed   Abdomen:     Normal bowel sounds, no masses, no organomegaly, soft        non-tender, non-distended, no guarding, no rebound                tenderness   Extremities:   Moves all extremities well, no edema, no cyanosis, no             redness   Pulses:   Pulses palpable and equal bilaterally   Skin:   No bleeding, bruising or rash        Results Review:   I reviewed the patient's new clinical results.  Lab Results   Component Value Date    WBC 10.27 (H) 07/11/2018    HGB 12.8 (L) 07/11/2018    HCT 38.5 (L) 07/11/2018    MCV 85.2 07/11/2018     07/11/2018     Lab Results   Component Value Date    GLUCOSE 107 (H) 07/11/2018    BUN 49 (H) 07/11/2018    CREATININE 1.82 (H) 07/11/2018    EGFRIFNONA 36 (L) 07/11/2018    BCR 26.9 (H) 07/11/2018    CO2 36.0 (H) 07/11/2018    CALCIUM 8.4 07/11/2018    ALBUMIN 3.70 07/11/2018    LABIL2 0.9 (L) 07/11/2018    AST 51 07/11/2018    ALT 41 07/11/2018     Lab Results   Component Value Date    TSH 6.030 (H) 07/10/2018     Lab Results   Component Value Date    INR 1.43 (H) 07/11/2018    INR 1.81 (H) 07/10/2018    INR 2.49 (H) 07/09/2018    PROTIME 17.0 (H) 07/11/2018    PROTIME 20.3 (H) 07/10/2018    PROTIME 25.8 (H) 07/09/2018     Lab Results   Component Value Date    PTT 47.4 (H) 04/18/2018       EKG:     Telemetry:    Ejection Fraction  No results found for: EF    Echo EF Estimated  Lab Results   Component Value Date    ECHOEFEST 55 03/20/2018         Present on Admission:  • Hypoxemia  • Atrial fibrillation [I48.91]  • PVD (peripheral vascular disease) (CMS/HCC)  • CKD (chronic kidney disease) stage 3, GFR 30-59 ml/min  • Hypertension  • COPD with exacerbation (CMS/HCC)  • LVH (left ventricular hypertrophy)  • Diastolic dysfunction  • Coumadin toxicity    Assessment/Plan     #1Increasing shortness breath probably secondary to congestive heart failure on the basis of diastolic dysfunction for the  possibility of bronchitis/pneumonia cannot be excluded      #2 atrial flutter/atrial fibrillation status post EP study  #3 hypertension with hypertensive heart disease and normal left and a systolic function.  Mild mitral regurgitation previous echocardiographic study.  #4  embolization with PVD status post stenting to care of Dr. Pennington     responding well to diuretics and doing much better.  Shortness of breath and cough is significantly improved rhythm is sinus and recommend to continue Betapace.  Patient oral intake ablation with history of peripheral embolization and atrial fibrillation.  At present patient being treated with Lasix 40 mg I will decreased from 3 times a day to twice a day, pantoprazole for management of gastritis, warfarin dose dependent on PT/INR with history of atrial fibrillation peripheral embolization.  We will see on when necessary basis  Emily Thacker MD  07/11/18  3:51 PM      EMR Dragon/Transcription disclaimer:   Much of this encounter note is an electronic transcription/translation of spoken language to printed text. The electronic translation of spoken language may permit erroneous, or at times, nonsensical words or phrases to be inadvertently transcribed; Although I have reviewed the note for such errors, some may still exist.

## 2018-07-11 NOTE — PLAN OF CARE
Problem: Patient Care Overview  Goal: Plan of Care Review  Outcome: Ongoing (interventions implemented as appropriate)   07/11/18 0251   Coping/Psychosocial   Plan of Care Reviewed With patient   Plan of Care Review   Progress no change   OTHER   Outcome Summary patient remains in NSR        Problem: Fluid Volume Excess (Adult)  Goal: Optimal Fluid Balance  Outcome: Ongoing (interventions implemented as appropriate)      Problem: Fall Risk (Adult)  Goal: Absence of Fall  Outcome: Ongoing (interventions implemented as appropriate)      Problem: Wound (Includes Pressure Injury) (Adult)  Goal: Signs and Symptoms of Listed Potential Problems Will be Absent, Minimized or Managed (Wound)  Outcome: Ongoing (interventions implemented as appropriate)      Problem: Skin Injury Risk (Adult)  Goal: Skin Health and Integrity  Outcome: Ongoing (interventions implemented as appropriate)

## 2018-07-11 NOTE — THERAPY PROGRESS REPORT/RE-CERT
Acute Care - Occupational Therapy Progress Note  [facility]     Patient Name: Massimo Bentley  : 1940  MRN: [id number]  Today's Date: 2018  Onset of Illness/Injury or Date of Surgery: 18  Date of Referral to OT: 18  Referring Physician: Dr. Gutierrez approved restarting OT today.     Admit Date: 2018       ICD-10-CM ICD-9-CM   1. Congestive heart failure, unspecified congestive heart failure chronicity, unspecified congestive heart failure type (CMS/[facility]) I50.9 428.0   2. Warfarin-induced coagulopathy (CMS/HCC) D68.9 286.9    T45.515A E934.2   3. Hypoxemia R09.02 799.02   4. Impaired functional mobility, balance, gait, and endurance Z74.09 V49.89   5. Impaired mobility and activities of daily living Z74.09 799.89     Patient Active Problem List   Diagnosis   • Long-term (current) use of anticoagulants   • Atrial fibrillation [I48.91]   • Embolism and thrombosis of artery (CMS/[facility])   • PVD (peripheral vascular disease) (CMS/[facility])   • Tobacco dependence syndrome   • Stroke (CMS/[facility])   • Suicide (CMS/[facility])   • Osteoarthritis of multiple joints   • Hypercholesterolemia   • GERD (gastroesophageal reflux disease)   • Major depression   • Benign prostatic hyperplasia   • Typical atrial flutter (CMS/HCC)   • Non-rheumatic mitral regurgitation   • Hyperlipidemia   • Localized edema   • Atherosclerosis of artery of extremity with gangrene (CMS/HCC)   • AAA (abdominal aortic aneurysm) without rupture (CMS/[facility])   • CKD (chronic kidney disease) stage 3, GFR 30-59 ml/min   • COPD with exacerbation (CMS/HCC)   • Hypertension   • Prediabetes   • Arterial occlusion, lower extremity (CMS/HCC)   • Hypoxemia   • Warfarin-induced coagulopathy (CMS/HCC)   • LVH (left ventricular hypertrophy)   • Diastolic dysfunction   • Coumadin toxicity     Past Medical History:   Diagnosis Date   • Abdominal bloating    • Anxiety    • Atherosclerosis of native arteries of extremity with intermittent claudication  (CMS/HCC)    • Atrial flutter (CMS/HCC)    • Backache    • Benign prostatic hyperplasia     BX 2009, also has renal cyst      • Chronic bronchitis (CMS/HCC)    • Chronic renal impairment    • Depressive disorder    • Drug abuse, episodic use    • Dyslipidemia    • Fatigue    • GERD (gastroesophageal reflux disease)    • Hypercholesterolemia 03/15/2016   • Injury of tendon of rotator cuff 10/22/2015   • Insomnia 06/24/2016   • Intervertebral disc disorder 10/27/2011   • Major depression    • Neck pain 01/09/2012   • Osteoarthritis of multiple joints 06/21/2013   • Pain from vascular prosthetic devices, implants and grafts, sequela 12/03/2015   • Pain in left foot 10/27/2015   • Peripheral vascular disease (CMS/HCC) 12/03/2015     LEFT fem-tib bypass, embolectomy 6/2014    10/27/2015    • Senile debility 10/22/2015   • 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)     at risk for   • Tobacco dependence syndrome 12/03/2015    2014 to E cigarette        Past Surgical History:   Procedure Laterality Date   • ABDOMINAL AORTIC ANEURYSM REPAIR  07/18/2011    Endovascular   • BACK SURGERY     • CARDIAC CATHETERIZATION  01/30/1984    Mixed dominant coronary arterial anatomy. No coronary atherosclerosis. Normal left ventricular function   • CHOLECYSTECTOMY  08/25/2014    Cholecystitis with gangrenous gallbladder   • ENDOSCOPY  03/22/1990    Duodenal ulcer   • ENDOSCOPY AND COLONOSCOPY  10/17/2013    Diverticulum in sigmoid colon & descending colon. Internal & external hemorrhoids found   • ESOPHAGOSCOPY / EGD  10/17/2013    With tube-Esophagitis seen. Biopsy taken. Gastritis in stomach. Biopsy taken. Stenosis in 2nd portion of duodenum. Dilatation performed.   • FEMORAL THROMBECTOMY/EMBOLECTOMY  06/29/2014    Left lower extremity arteriogram. Left popliteal artery endarterectomy. Redo left  femoral to [id number] trunk bypass. Negative pressure wound therapy with placement of Prevena wound V.A.C., left groin, left lower leg   • INJECTION OF MEDICATION  03/15/2016    B12   • INJECTION OF MEDICATION  10/23/2015    Drain/Inject Major Joint    • INJECTION OF MEDICATION  01/13/2016    Kenalog   • LUMBAR LAMINECTOMY     • TRANSESOPHAGEAL ECHOCARDIOGRAM (TEX)  10/06/2015    With color flow-Mild mitral regurg.Left atrium mild dilated.Ef of 55-60%.RV systolic pressure elevated.Trace tricuspid regurg   • TRANSESOPHAGEAL ECHOCARDIOGRAM (TEX)  02/14/2012    Without color flow-[facility] w/ early diast dysfun. AV trileaflet & structurally NRL w/ AR NRL. No regional wall motion abn Est Ef approx 50-55%. M & TR valves NRL w/ mild M & TR regurg Trace -mild pulmonic regurg. Anterior echo free space w/o hemodynamic signif. NRL RV   • TRANSESOPHAGEAL ECHOCARDIOGRAM (TEX)  03/25/2011    Normal LV systolic function with Ef of 50-55%.Left atrium ildly dilated.Moderate mitral regurg.Mitral valve regurg.Intact interatrial septum.No evidence of any cardiac thrombus or pericardial effusion.AV trileaflet          OT ASSESSMENT FLOWSHEET (last 72 hours)      Occupational Therapy Evaluation     Row Name 07/11/18 1445 07/09/18 1005                OT Evaluation Time/Intention    Subjective Information no complaints  - complains of;pain;weakness;fatigue  -       Document Type progress note/recertification  - evaluation  -RB       Mode of Treatment occupational therapy  - individual therapy;occupational therapy  -RB       Total Evaluation Minutes, Occupational Therapy 27  -  --       Patient Effort good  - good  -RB       Symptoms Noted During/After Treatment fatigue;increased pain  - fatigue;shortness of breath  -RB       Comment RN notified, recert 2* to cardiac difficulty and per RN Dr. Gutierrez stated fine to restart therapy today.   -  --          General Information    Patient Profile Reviewed? yes  - yes  -RB        Onset of Illness/Injury or Date of Surgery 07/07/18  - 07/07/18  -       Referring Physician Dr. Gutierrez approved restarting OT today.   - VALENTINA Sykes MD.  -RB       Patient Observations --   pt was asleep but awoke and engaged  - alert;cooperative;agree to therapy  -       Patient/Family Observations no family present  -  --       General Observations of Patient pt supine HOB with O2 off but placed back on with cues, tele, bed alarm, stated supposed to d/c back to SNF soon for further rehab so he can go back home  - IV, telemetry and 02 4Ls.  -RB       Prior Level of Function  -- independent:;all household mobility;gait;transfer;ADL's   Prior to nursing home stay.  -RB       Equipment Currently Used at Home  -- grab bar;oxygen;raised toilet;rollator;shower chair   has to sleep in chair;  -RB       Pertinent History of Current Functional Problem  -- Pt has been at SNF for 2 wks after venous sx on R LE.  -RB       Existing Precautions/Restrictions fall;oxygen therapy device and L/min;cardiac  - fall;cardiac;oxygen therapy device and L/min  -RB       Limitations/Impairments safety/cognitive;hearing  - hearing;safety/cognitive  -       Equipment Issued to Patient  -- gait belt  -RB       Risks Reviewed patient:  - patient:;LOB  -       Benefits Reviewed patient:  - patient:;improve function;increase independence;increase strength  -RB       Barriers to Rehab medically complex  -  --          Relationship/Environment    Lives With  -- facility resident  -       Primary Roles/Responsibilities  -- caregiver for other(s)   He and sitters care for demented wife.  -RB          Resource/Environmental Concerns    Current Living Arrangements  -- residential facility  -RB          Cognitive Assessment/Interventions    Additional Documentation Cognitive Assessment/Intervention (Group)  -  --          Cognitive Assessment/Intervention- PT/OT    Affect/Mental Status (Cognitive) WFL  -BH  --        Orientation Status (Cognition) oriented x 4;verbal cues/prompts needed for orientation  - person;place;situation  -       Follows Commands (Cognition) follows one step commands;over 90% accuracy;verbal cues/prompting required  - follows two step commands;over 90% accuracy;repetition of directions required;verbal cues/prompting required  -       Safety Deficit (Cognitive) mild deficit  -  --       Personal Safety Interventions fall prevention program maintained;gait belt;nonskid shoes/slippers when out of bed;supervised activity  -  --          Safety Issues, Functional Mobility    Safety Issues Affecting Function (Mobility) ability to follow commands;judgment;positioning of assistive device;problem solving;safety precaution awareness;safety precautions follow-through/compliance;sequencing abilities  -  --       Impairments Affecting Function (Mobility) endurance/activity tolerance;cognition;shortness of breath;strength;pain  - endurance/activity tolerance;cognition;shortness of breath;strength  -       Comment, Safety Issues/Impairments (Mobility) unsteady and unbalanced, stated his rollator at home easier to use then the RW  -  --          Bed Mobility Assessment/Treatment    Bed Mobility Assessment/Treatment  -- supine-sit-supine  -RB       Scooting/Bridging [place] (Bed Mobility)  -- --  -RB       Supine-Sit [place] (Bed Mobility) conditional independence  -  --       Supine-Sit-Supine [place] (Bed Mobility) conditional independence  - conditional independence;supervision  -       Assistive Device (Bed Mobility)  -- bed rails;head of bed elevated  -          Functional Mobility    Functional Mobility- Ind. Level contact guard assist  - contact guard assist  -       Functional Mobility- Device rolling walker  - rolling walker  -       Functional Mobility-Distance (Feet)  -- 8  -RB       Functional Mobility- Safety Issues supplemental O2;weight-shifting ability  decreased;step length decreased;sequencing ability decreased  - step length decreased;supplemental O2  -RB       Functional Mobility- Comment side stepping length of bed, cues for safety with RW in tight places   -  --          Transfer Assessment/Treatment    Transfer Assessment/Treatment  -- toilet transfer;sit-stand transfer;stand-sit transfer  -          Sit-Stand Transfer    Sit-Stand [place] (Transfers) minimum assist (75% patient effort);contact guard  - contact guard  -RB       Assistive Device (Sit-Stand Transfers) walker, front-wheeled  - walker, front-wheeled  -RB          Stand-Sit Transfer    Stand-Sit [place] (Transfers) contact guard;verbal cues  - contact guard  -RB       Assistive Device (Stand-Sit Transfers) walker, front-wheeled  - walker, front-wheeled  -RB          Toilet Transfer    Type (Toilet Transfer)  -- sit-stand;stand-sit  -RB       [place] Level (Toilet Transfer)  -- contact guard  -RB       Assistive Device (Toilet Transfer)  -- walker, front-wheeled  -RB          ADL Assessment/Intervention    BADL Assessment/Intervention lower body dressing  -  --          Lower Body Dressing Assessment/Training    Lower Body Dressing [place] Level doff;don;socks;contact guard assist;verbal cues  -  --       Lower Body Dressing Position edge of bed sitting  -  --       Comment (Lower Body Dressing) pt got very SOB unable to get an O2 reading quickly because of cold fingers : educated pt on benefits of a sock aid and displayed a picture on computure what to look for and explained how to use it, pt stated he has a reacher and explained how to use it to doff socks to keep him from bending over and getting so SOB.   -  --          BADL Safety/Performance    Impairments, BADL Safety/Performance balance;endurance/activity tolerance;coordination;pain;shortness of breath;strength;trunk/postural control;motor planning  -  --          General ROM    GENERAL ROM  COMMENTS RUE WFL, LUE elbow/wrist/digits WFL, shoulder approx 15 degrees AROM in supine WFL with AAROM for shoulder flexion   - L shld flex ~ 40* and rest of B UE AROOM is WNLs.  -RB          General Assessment (Manual Muscle Testing)    General Manual Muscle Testing (MMT) Assessment upper extremity strength deficits identified;lower extremity strength deficits identified  - upper extremity strength deficits identified  -       Comment, General Manual Muscle Testing (MMT) Assessment  -- 2-/5 for L shld flex, 4-/5 for R shld flex and 4 to 4+/5 for rest of B UE strenght.  -RB          Upper Extremity (Manual Muscle Testing)    Comment, MMT: Upper Extremity BUE  grossly 4/5; BUE grossly 4-/5 in limited range in LUE shoulder   -  --          Sensory Assessment/Intervention    Sensory General Assessment  -- no sensation deficits identified  -          Light Touch Sensation Assessment    Left Upper Extremity: Light Touch Sensation Assessment intact  -  --       Right Upper Extremity: Light Touch Sensation Assessment intact  [facility]  --          Positioning and Restraints    Pre-Treatment Position in bed  -  --       Post Treatment Position bed  -  --       In Bed notified nsg;supine;fowlers;call light within reach;encouraged to call for assist;exit alarm on  -  --          Pain Assessment    Additional Documentation  -- Pain Scale: Numbers Pre/Post-Treatment (Group)  -RB          Pain Scale: Numbers Pre/Post-Treatment    Pain Scale: Numbers, Pretreatment 4/10  - 6/10  -RB       Pain Scale: Numbers, Post-Treatment 5/10  - 5/10  -RB       Pain Location - Side  -- Bilateral  -RB       Pain Location - Orientation  -- generalized  -       Pain Location back  - back  -       Pain Intervention(s) Repositioned;Ambulation/increased activity   RN notified  -  --          Wound 07/07/18 2300 Right lower calf incision;other (see comments)    Wound - Properties Group Date first assessed: 07/07/18  -JIM  Time first assessed: 2300  -LC Present On Admission : yes  -LC Side: Right  -LC Orientation: lower  -LC Location: calf  -LC Type: incision;other (see comments)  -LC, healing vascular surgery wound  Additional Comments: cleaned soap and water, santyl, dry gauze, and tape covered BID per [facility] directions  -LC       Coping    Observed Emotional State  -- anxious;tearful/crying   cried when describing his need for help for him & wife  -RB          Plan of Care Review    Plan of Care Reviewed With patient  -BH  --          Clinical Impression (OT)    Date of Referral to OT  -- 07/07/18  -RB       OT Diagnosis Impaired mobility  - Impaired mobility and ADLs.  -RB       Prognosis (OT Eval) fair+  -  --       Functional Level at Time of Evaluation (OT Eval) limited independence and safety with ADL and mobility, gets SOB quickly decreased safety  - Impaired mobility and ADLs.  -RB       Patient/Family Goals Statement (OT Eval) to return to SNF for rehab to get back home  -  --       Criteria for Skilled Therapeutic Interventions Met (OT Eval) yes;treatment indicated  - yes;treatment indicated  -RB       Rehab Potential (OT Eval) fair, will monitor progress closely  - fair, will monitor progress closely  -RB       Therapy Frequency (OT Eval) other (see comments)   5-7 days a week  - --   5-7 days/wk.  -RB       Predicted Duration of Therapy Intervention (Therapy Eval) until d/c  - Until D/C or goals met.  -RB       Care Plan Review (OT) evaluation/treatment results reviewed;care plan/treatment goals reviewed;risks/benefits reviewed;patient/other agree to care plan  - evaluation/treatment results reviewed;care plan/treatment goals reviewed;risks/benefits reviewed;patient/other agree to care plan  -RB       Anticipated Discharge Disposition (OT) skilled nursing facility  - skilled nursing facility  -RB          Vital Signs    Pre Systolic BP Rehab 100  - 97  -RB       Pre Treatment Diastolic BP 60   OT had  max difficulty hearing  -BH 55  -RB       Post Systolic BP Rehab  -- 118  -RB       Post Treatment Diastolic BP  -- 61  -RB       Pretreatment Heart Rate (beats/min) 65  -BH 62  -RB       Intratreatment Heart Rate (beats/min)  -- 65  -RB       Posttreatment Heart Rate (beats/min) 83  -BH 64  -RB       Pre SpO2 (%) 93  -BH 95  -RB       O2 Delivery Pre Treatment room air  - supplemental O2   4 L.  -RB       Intra SpO2 (%) 88   michele to 91 within a minute  - 89   after walking to bathroom.  -RB       O2 Delivery Intra Treatment supplemental O2  - supplemental O2  -RB       Post SpO2 (%) 92  -BH 93  -RB       O2 Delivery Post Treatment supplemental O2  - supplemental O2  -RB       Pre Patient Position Supine  -BH Supine  -RB       Intra Patient Position Sitting  -BH Standing  -RB       Post Patient Position  -- Supine  -RB          Planned OT Interventions    Planned Therapy Interventions (OT Eval) activity tolerance training;adaptive equipment training;BADL retraining;functional balance retraining;occupation/activity based interventions;passive ROM/stretching;patient/caregiver education/training;ROM/therapeutic exercise;strengthening exercise;transfer/mobility retraining  - activity tolerance training;BADL retraining;adaptive equipment training;functional balance retraining;occupation/activity based interventions;transfer/mobility retraining;strengthening exercise;ROM/therapeutic exercise;patient/caregiver education/training  -RB          OT Goals    Transfer Goal Selection (OT)  -- transfer, OT goal 1  -RB       Bathing Goal Selection (OT)  -- bathing, OT goal 1  -RB       Dressing Goal Selection (OT)  -- dressing, OT goal 1  -RB       Toileting Goal Selection (OT)  -- toileting, OT goal 1  -RB          Transfer Goal 1 (OT)    Activity/Assistive Device (Transfer Goal 1, OT)  -- transfers, all  -RB       [place] Level/Cues Needed (Transfer Goal 1, OT)  -- supervision required  -RB       Time Frame  (Transfer Goal 1, OT)  -- long term goal (LTG);by discharge  -RB       Progress/Outcome (Transfer Goal 1, OT)  -- goal not met  -RB          Bathing Goal 1 (OT)    Activity/Assistive Device (Bathing Goal 1, OT)  -- bathing skills, all  -RB       [place] Level/Cues Needed (Bathing Goal 1, OT)  -- supervision required  -RB       Time Frame (Bathing Goal 1, OT)  -- long term goal (LTG);by discharge  -RB       Progress/Outcomes (Bathing Goal 1, OT)  -- goal not met  -RB          Dressing Goal 1 (OT)    Activity/Assistive Device (Dressing Goal 1, OT)  -- dressing skills, all  -RB       [place]/Cues Needed (Dressing Goal 1, OT)  -- supervision required  -RB       Time Frame (Dressing Goal 1, OT)  -- long term goal (LTG);by discharge  -RB       Progress/Outcome (Dressing Goal 1, OT)  -- goal not met  -RB          Toileting Goal 1 (OT)    Activity/Device (Toileting Goal 1, OT)  -- toileting skills, all  -RB       [place] Level/Cues Needed (Toileting Goal 1, OT)  -- supervision required  -RB       Time Frame (Toileting Goal 1, OT)  -- long term goal (LTG);by discharge  -RB       Progress/Outcome (Toileting Goal 1, OT)  -- goal not met  -RB          Patient Education Goal (OT)    Activity (Patient Education Goal, OT)  -- B HEP for increased str for mobility and ADLs independence, EC/WS and home safety/fall prevention.  -RB       [place]/Cues/Accuracy (Memory Goal 2, OT)  -- demonstrates adequately;verbalizes understanding  -RB       Time Frame (Patient Education Goal, OT)  -- long term goal (LTG);by discharge  -RB       Progress/Outcome (Patient Education Goal, OT)  -- goal not met  -RB         User Key  (r) = Recorded By, (t) = Taken By, (c) = Cosigned By    Initials Name Effective Dates    RB Dann Cardoso, OT 06/15/16 -     JOHANN Dean, OTR/L 06/08/18 -     JIM Carrillo RN 10/17/16 -            Occupational Therapy Education     Title: PT OT SLP Therapies (Active)     Topic: Occupational Therapy  (Active)     Point: ADL training (Done)     Description: Instruct learner(s) on proper safety adaptation and remediation techniques during self care or transfers.   Instruct in proper use of assistive devices.   Learning Progress Summary     Learner Status Readiness Method Response Comment Documented by    Patient Done Acceptance E BEVERLY,NR Educated about OT. Educated to call for assist and not get up on his own. Educated on safety throughout. Educated on sock aid and reacher use for LB dressing.  07/11/18 1532          Point: Precautions (Done)     Description: Instruct learner(s) on prescribed precautions during self-care and functional transfers.   Learning Progress Summary     Learner Status Readiness Method Response Comment Documented by    Patient Done Acceptance E BEVERLY,NR Educated about OT. Educated to call for assist and not get up on his own. Educated on safety throughout. Educated on sock aid and reacher use for LB dressing.  07/11/18 1532     Done Acceptance E BEVERLY Edu pt on use of gait belt and non skid socks when OOB and no OOB without assist.  07/09/18 1316                      User Key     Initials Effective Dates Name Provider Type Discipline     06/15/16 -  Dann Cardoso, OT Occupational Therapist OT     06/08/18 -  Tia Dean OTR/L Occupational Therapist OT                  OT Recommendation and Plan  Outcome Summary/Treatment Plan (OT)  Anticipated Discharge Disposition (OT): skilled nursing facility  Planned Therapy Interventions (OT Eval): activity tolerance training, adaptive equipment training, BADL retraining, functional balance retraining, occupation/activity based interventions, passive ROM/stretching, patient/caregiver education/training, ROM/therapeutic exercise, strengthening exercise, transfer/mobility retraining  Therapy Frequency (OT Eval): other (see comments) (5-7 days a week)  Plan of Care Review  Plan of Care Reviewed With: patient  Plan of Care Reviewed With:  patient  Outcome Summary: OT re-cert after cardiac difficulties with MD cleared pt for therapy this date. Pt mod I with bed mobility, min assist for sit to stand t/f and CGA to min assist for mobility with RW side stepping, pt unsafe/unsteady and fatigued with the task. Pt had max difficuty with don and doff socks but did not want assist CGA for sitting EOB balance with extended time. Pt O2 dropped to 88 on O2 2 L michele to 91 after rest and within a minute. Pt could continue to benefit from skilled OT to reach maximum level of potential with ADL, recommend to d/c to SNF for further rehab.           Outcome Measures     Row Name 07/11/18 1445 07/11/18 1300 07/11/18 1100       How much help from another person do you currently need...    Turning from your back to your side while in flat bed without using bedrails?  -- 3  -TA 3  -GB    Moving from lying on back to sitting on the side of a flat bed without bedrails?  -- 3  -TA 3  -GB    Moving to and from a bed to a chair (including a wheelchair)?  -- 3  -TA 3  -GB    Standing up from a chair using your arms (e.g., wheelchair, bedside chair)?  -- 3  -TA 3  -GB    Climbing 3-5 steps with a railing?  -- 2  -TA 2  -GB    To walk in hospital room?  -- 3  -TA 3  -GB    AM-PAC 6 Clicks Score  -- 17  -TA 17  -GB       How much help from another is currently needed...    Putting on and taking off regular lower body clothing? 2  -BH  --  --    Bathing (including washing, rinsing, and drying) 2  -BH  --  --    Toileting (which includes using toilet bed pan or urinal) 2  -BH  --  --    Putting on and taking off regular upper body clothing 3  -BH  --  --    Taking care of personal grooming (such as brushing teeth) 3  -BH  --  --    Eating meals 4  -BH  --  --    Score 16  -BH  --  --       Functional Assessment    Outcome Measure Options AM-PAC 6 Clicks Daily Activity (OT)  -BH AM-PAC 6 Clicks Basic Mobility (PT)  -TA AM-PAC 6 Clicks Basic Mobility (PT)  -GB    Row Name 07/09/18  1355 07/09/18 1116 07/09/18 1005       How much help from another person do you currently need...    Turning from your back to your side while in flat bed without using bedrails?  -- 3  -TW  --    Moving from lying on back to sitting on the side of a flat bed without bedrails?  -- 3  -TW  --    Moving to and from a bed to a chair (including a wheelchair)?  -- 2  -TW  --    Standing up from a chair using your arms (e.g., wheelchair, bedside chair)?  -- 3  -TW  --    Climbing 3-5 steps with a railing?  -- 1  -TW  --    To walk in hospital room?  -- 1  -TW  --    AM-PAC 6 Clicks Score  -- 13  -TW  --       How much help from another is currently needed...    Putting on and taking off regular lower body clothing? 2  -KD  -- 2  -RB    Bathing (including washing, rinsing, and drying) 2  -KD  -- 2  -RB    Toileting (which includes using toilet bed pan or urinal) 2  -KD  -- 2  -RB    Putting on and taking off regular upper body clothing 2  -KD  -- 2  -RB    Taking care of personal grooming (such as brushing teeth) 3  -KD  -- 3  -RB    Eating meals 4  -KD  -- 4  -RB    Score 15  -KD  -- 15  -RB       Functional Assessment    Outcome Measure Options  -- AM-PAC 6 Clicks Basic Mobility (PT)  -TW AM-PAC 6 Clicks Daily Activity (OT)  -RB    Row Name 07/08/18 1900             How much help from another person do you currently need...    Turning from your back to your side while in flat bed without using bedrails? 3  -GB      Moving from lying on back to sitting on the side of a flat bed without bedrails? 3  -GB      Moving to and from a bed to a chair (including a wheelchair)? 2  -GB      Standing up from a chair using your arms (e.g., wheelchair, bedside chair)? 3  -GB      Climbing 3-5 steps with a railing? 1  -GB      To walk in hospital room? 1  -GB      AM-PAC 6 Clicks Score 13  -GB         Functional Assessment    Outcome Measure Options AM-PAC 6 Clicks Basic Mobility (PT)  -GB        User Key  (r) = Recorded By, (t) = Taken  By, (c) = Cosigned By    Initials Name Provider Type    RB Dann Cardoso, OT Occupational Therapist    GB Leonila Lester, PT Physical Therapist    BH Tia Dean, OTR/L Occupational Therapist    TA Pepper Enriquez, PTA Physical Therapy Assistant    TW Sergei Doty, PTA Physical Therapy Assistant    KD Coral Carrillo, QUINTEROS/L Occupational Therapy Assistant          Time Calculation:   OT Start Time: 1445  OT Stop Time: 1512  OT Time Calculation (min): 27 min  Therapy Suggested Charges     Code   Minutes Charges    None           Therapy Charges for Today     Code Description Service Date Service Provider Modifiers Qty    19916037088 HC OT SELFCARE CURRENT 7/11/2018 Tia Dean OTR/L GO, CK 1    97273023699 HC OT SELFCARE PROJECTED 7/11/2018 Tia Dean OTR/L BRADFORD, CJ 1    24011356046 HC OT THERAPEUTIC ACT EA 15 MIN 7/11/2018 Tia Dean, OTR/L GO 1    11796555363 HC OT SELF CARE/MGMT/TRAIN EA 15 MIN 7/11/2018 Tia Dean OTR/L GO 1          OT G-codes  OT Professional Judgement Used?: Yes  OT Functional Scales Options: AM-PAC 6 Clicks Daily Activity (OT)  Score: 16  Functional Limitation: Self care  Self Care Current Status (): At least 40 percent but less than 60 percent impaired, limited or restricted  Self Care Goal Status (): At least 20 percent but less than 40 percent impaired, limited or restricted    Tia Dean OTR/L  7/11/2018

## 2018-07-12 ENCOUNTER — APPOINTMENT (OUTPATIENT)
Dept: GENERAL RADIOLOGY | Facility: HOSPITAL | Age: 78
End: 2018-07-12

## 2018-07-12 LAB
ALBUMIN SERPL-MCNC: 3.6 G/DL (ref 3.4–4.8)
ALBUMIN/GLOB SERPL: 0.9 G/DL (ref 1.1–1.8)
ALP SERPL-CCNC: 89 U/L (ref 38–126)
ALT SERPL W P-5'-P-CCNC: 38 U/L (ref 21–72)
ANION GAP SERPL CALCULATED.3IONS-SCNC: 8 MMOL/L (ref 5–15)
AST SERPL-CCNC: 33 U/L (ref 17–59)
BASOPHILS # BLD AUTO: 0.03 10*3/MM3 (ref 0–0.2)
BASOPHILS NFR BLD AUTO: 0.3 % (ref 0–2)
BILIRUB SERPL-MCNC: 0.4 MG/DL (ref 0.2–1.3)
BUN BLD-MCNC: 49 MG/DL (ref 7–21)
BUN/CREAT SERPL: 29.9 (ref 7–25)
CALCIUM SPEC-SCNC: 8.5 MG/DL (ref 8.4–10.2)
CHLORIDE SERPL-SCNC: 96 MMOL/L (ref 95–110)
CO2 SERPL-SCNC: 34 MMOL/L (ref 22–31)
CREAT BLD-MCNC: 1.64 MG/DL (ref 0.7–1.3)
DEPRECATED RDW RBC AUTO: 56.9 FL (ref 35.1–43.9)
EOSINOPHIL # BLD AUTO: 0.45 10*3/MM3 (ref 0–0.7)
EOSINOPHIL NFR BLD AUTO: 4.7 % (ref 0–7)
ERYTHROCYTE [DISTWIDTH] IN BLOOD BY AUTOMATED COUNT: 18 % (ref 11.5–14.5)
GFR SERPL CREATININE-BSD FRML MDRD: 41 ML/MIN/1.73 (ref 42–98)
GLOBULIN UR ELPH-MCNC: 4 GM/DL (ref 2.3–3.5)
GLUCOSE BLD-MCNC: 106 MG/DL (ref 60–100)
HCT VFR BLD AUTO: 37.3 % (ref 39–49)
HGB BLD-MCNC: 12.3 G/DL (ref 13.7–17.3)
IMM GRANULOCYTES # BLD: 0.04 10*3/MM3 (ref 0–0.02)
IMM GRANULOCYTES NFR BLD: 0.4 % (ref 0–0.5)
INR PPP: 1.35 (ref 0.8–1.2)
LYMPHOCYTES # BLD AUTO: 1.32 10*3/MM3 (ref 0.6–4.2)
LYMPHOCYTES NFR BLD AUTO: 13.7 % (ref 10–50)
MAGNESIUM SERPL-MCNC: 2.6 MG/DL (ref 1.6–2.3)
MCH RBC QN AUTO: 28.4 PG (ref 26.5–34)
MCHC RBC AUTO-ENTMCNC: 33 G/DL (ref 31.5–36.3)
MCV RBC AUTO: 86.1 FL (ref 80–98)
MONOCYTES # BLD AUTO: 0.63 10*3/MM3 (ref 0–0.9)
MONOCYTES NFR BLD AUTO: 6.6 % (ref 0–12)
NEUTROPHILS # BLD AUTO: 7.14 10*3/MM3 (ref 2–8.6)
NEUTROPHILS NFR BLD AUTO: 74.3 % (ref 37–80)
PLATELET # BLD AUTO: 300 10*3/MM3 (ref 150–450)
PMV BLD AUTO: 9.2 FL (ref 8–12)
POTASSIUM BLD-SCNC: 3.9 MMOL/L (ref 3.5–5.1)
PROT SERPL-MCNC: 7.6 G/DL (ref 6.3–8.6)
PROTHROMBIN TIME: 16.3 SECONDS (ref 11.1–15.3)
RBC # BLD AUTO: 4.33 10*6/MM3 (ref 4.37–5.74)
SODIUM BLD-SCNC: 138 MMOL/L (ref 137–145)
WBC NRBC COR # BLD: 9.61 10*3/MM3 (ref 3.2–9.8)

## 2018-07-12 PROCEDURE — 25010000002 DIGOXIN PER 500 MCG: Performed by: FAMILY MEDICINE

## 2018-07-12 PROCEDURE — 97116 GAIT TRAINING THERAPY: CPT

## 2018-07-12 PROCEDURE — 94760 N-INVAS EAR/PLS OXIMETRY 1: CPT

## 2018-07-12 PROCEDURE — 97110 THERAPEUTIC EXERCISES: CPT

## 2018-07-12 PROCEDURE — 97535 SELF CARE MNGMENT TRAINING: CPT

## 2018-07-12 PROCEDURE — 85610 PROTHROMBIN TIME: CPT | Performed by: INTERNAL MEDICINE

## 2018-07-12 PROCEDURE — 25010000002 FUROSEMIDE PER 20 MG: Performed by: INTERNAL MEDICINE

## 2018-07-12 PROCEDURE — 97530 THERAPEUTIC ACTIVITIES: CPT

## 2018-07-12 PROCEDURE — 85025 COMPLETE CBC W/AUTO DIFF WBC: CPT | Performed by: INTERNAL MEDICINE

## 2018-07-12 PROCEDURE — 94799 UNLISTED PULMONARY SVC/PX: CPT

## 2018-07-12 PROCEDURE — 80053 COMPREHEN METABOLIC PANEL: CPT | Performed by: INTERNAL MEDICINE

## 2018-07-12 PROCEDURE — 71045 X-RAY EXAM CHEST 1 VIEW: CPT

## 2018-07-12 PROCEDURE — 83735 ASSAY OF MAGNESIUM: CPT | Performed by: INTERNAL MEDICINE

## 2018-07-12 RX ORDER — DIGOXIN 0.25 MG/ML
125 INJECTION INTRAMUSCULAR; INTRAVENOUS
Status: DISCONTINUED | OUTPATIENT
Start: 2018-07-12 | End: 2018-07-13

## 2018-07-12 RX ADMIN — PRAMIPEXOLE DIHYDROCHLORIDE 0.25 MG: 0.25 TABLET ORAL at 09:13

## 2018-07-12 RX ADMIN — IPRATROPIUM BROMIDE AND ALBUTEROL SULFATE 3 ML: 2.5; .5 SOLUTION RESPIRATORY (INHALATION) at 03:38

## 2018-07-12 RX ADMIN — ASPIRIN 81 MG: 81 TABLET, COATED ORAL at 09:13

## 2018-07-12 RX ADMIN — CILOSTAZOL 50 MG: 100 TABLET ORAL at 17:05

## 2018-07-12 RX ADMIN — MONTELUKAST SODIUM 10 MG: 10 TABLET, FILM COATED ORAL at 20:26

## 2018-07-12 RX ADMIN — CILOSTAZOL 50 MG: 100 TABLET ORAL at 09:13

## 2018-07-12 RX ADMIN — MAGNESIUM HYDROXIDE 10 ML: 2400 SUSPENSION ORAL at 20:33

## 2018-07-12 RX ADMIN — IPRATROPIUM BROMIDE AND ALBUTEROL SULFATE 3 ML: 2.5; .5 SOLUTION RESPIRATORY (INHALATION) at 06:47

## 2018-07-12 RX ADMIN — IPRATROPIUM BROMIDE AND ALBUTEROL SULFATE 3 ML: 2.5; .5 SOLUTION RESPIRATORY (INHALATION) at 11:11

## 2018-07-12 RX ADMIN — PANTOPRAZOLE SODIUM 40 MG: 40 TABLET, DELAYED RELEASE ORAL at 06:02

## 2018-07-12 RX ADMIN — PRAMIPEXOLE DIHYDROCHLORIDE 0.25 MG: 0.25 TABLET ORAL at 17:04

## 2018-07-12 RX ADMIN — FUROSEMIDE 40 MG: 10 INJECTION, SOLUTION INTRAMUSCULAR; INTRAVENOUS at 09:13

## 2018-07-12 RX ADMIN — DIGOXIN 125 MCG: 250 INJECTION, SOLUTION INTRAMUSCULAR; INTRAVENOUS; PARENTERAL at 14:40

## 2018-07-12 RX ADMIN — PRAMIPEXOLE DIHYDROCHLORIDE 0.25 MG: 0.25 TABLET ORAL at 20:25

## 2018-07-12 RX ADMIN — MAGNESIUM OXIDE TAB 400 MG (241.3 MG ELEMENTAL MG) 400 MG: 400 (241.3 MG) TAB at 20:25

## 2018-07-12 RX ADMIN — QUETIAPINE FUMARATE 300 MG: 300 TABLET ORAL at 20:25

## 2018-07-12 RX ADMIN — SOTALOL HYDROCHLORIDE 80 MG: 80 TABLET ORAL at 20:26

## 2018-07-12 RX ADMIN — IPRATROPIUM BROMIDE AND ALBUTEROL SULFATE 3 ML: 2.5; .5 SOLUTION RESPIRATORY (INHALATION) at 00:01

## 2018-07-12 RX ADMIN — SOTALOL HYDROCHLORIDE 80 MG: 80 TABLET ORAL at 09:17

## 2018-07-12 RX ADMIN — OXYCODONE HYDROCHLORIDE AND ACETAMINOPHEN 1 TABLET: 10; 325 TABLET ORAL at 10:07

## 2018-07-12 RX ADMIN — METOPROLOL TARTRATE 2.5 MG: 5 INJECTION, SOLUTION INTRAVENOUS at 12:47

## 2018-07-12 RX ADMIN — WARFARIN SODIUM 4 MG: 4 TABLET ORAL at 17:05

## 2018-07-12 RX ADMIN — BUDESONIDE AND FORMOTEROL FUMARATE DIHYDRATE 2 PUFF: 160; 4.5 AEROSOL RESPIRATORY (INHALATION) at 06:49

## 2018-07-12 NOTE — PROGRESS NOTES
"Anticoagulation by Pharmacy - Warfarin    Massimo Bentley is a 78 y.o.male  [Ht: 180.3 cm (71\"); Wt: 96 kg (211 lb 11.2 oz)] on Warfarin for indication of atrial fibrillation.    Goal INR: 2-3  Home dose:2.5 mg nightly except 5 mg on T, T, Sat    Today's INR:   Lab Results   Component Value Date    INR 1.35 (H) 07/12/2018         Lab Results   Component Value Date    INR 1.35 (H) 07/12/2018    INR 1.43 (H) 07/11/2018    INR 1.81 (H) 07/10/2018    PROTIME 16.3 (H) 07/12/2018    PROTIME 17.0 (H) 07/11/2018    PROTIME 20.3 (H) 07/10/2018     Lab Results   Component Value Date    HGB 12.3 (L) 07/12/2018    HGB 12.8 (L) 07/11/2018    HGB 12.2 (L) 07/10/2018     Lab Results   Component Value Date    HCT 37.3 (L) 07/12/2018    HCT 38.5 (L) 07/11/2018    HCT 37.0 (L) 07/10/2018     Lab Results   Component Value Date     07/12/2018     07/11/2018     07/10/2018     Assessment/Plan:  Reviewed above labs. INR is 1.35.  INR is below goal. No changes in medication list. Concurrent medications include aspirin and cilostazol. Pt did receive dose of warfarin 2.5 mg last night.  MD increased continue current dose of  warfarin to 4 mg. Rx will continue to follow and adjust dose accordingly.      Helen Obrien, [facility]  07/12/18 2:04 PM     "

## 2018-07-12 NOTE — PLAN OF CARE
Problem: Patient Care Overview  Goal: Plan of Care Review   07/12/18 0950   Coping/Psychosocial   Plan of Care Reviewed With patient   Plan of Care Review   Progress improving   OTHER   Outcome Summary sup-sit-sup cond ind,sit-stand-sit min/cga of 1,amb 55'x2 with rw and cga of 1-requested to lay back down as chair would cause his back to hurt-if d/c today would benefit from snf with pt/ot     Goal: Discharge Needs Assessment   07/08/18 0038 07/08/18 0040   Disability   Equipment Currently Used at Home other (see comments);wound care supplies --    Discharge Needs Assessment   Patient/Family Anticipates Transition to --  long term care facility   Patient/Family Anticipated Services at Transition --  home health care   Transportation Anticipated --  health plan transportation

## 2018-07-12 NOTE — PLAN OF CARE
Problem: Patient Care Overview  Goal: Plan of Care Review  Outcome: Ongoing (interventions implemented as appropriate)   07/12/18 0239   Coping/Psychosocial   Plan of Care Reviewed With patient   Plan of Care Review   Progress improving   OTHER   Outcome Summary VSS, planned to go back to [facility] rehab        Problem: Fluid Volume Excess (Adult)  Goal: Optimal Fluid Balance  Outcome: Ongoing (interventions implemented as appropriate)      Problem: Fall Risk (Adult)  Goal: Absence of Fall  Outcome: Ongoing (interventions implemented as appropriate)      Problem: Wound (Includes Pressure Injury) (Adult)  Goal: Signs and Symptoms of Listed Potential Problems Will be Absent, Minimized or Managed (Wound)  Outcome: Ongoing (interventions implemented as appropriate)      Problem: Skin Injury Risk (Adult)  Goal: Skin Health and Integrity  Outcome: Ongoing (interventions implemented as appropriate)

## 2018-07-12 NOTE — PROGRESS NOTES
MEDICINE DAILY PROGRESS NOTE  NAME: Massimo Bentley  : 1940  MRN: [id number]     LOS: 5 days     PROVIDER OF SERVICE: Royal Gutierrez MD    Chief Complaint: Hypoxemia    Subjective:   HPI: Patient admitted with shortness of air, dyspnea on exertion, and decrease in exercise tolerance.    Interval History:  History taken from: patient chart RN  . Patient on RA. Feeling better. No acute events overnight. No complaints today.  . Patient up to chair today. Weaning oxygen.   7/10. Patient feeling some better today.  Overall nearing baseline.  . Some improvement in dyspnea.  No acute events overnight. No complaints at time of exam.    Review of Systems:   Review of Systems   Constitutional: Positive for activity change and fatigue. Negative for appetite change and fever.   HENT: Negative for ear pain and sore throat.    Eyes: Negative for pain and visual disturbance.   Respiratory: Negative for cough and shortness of breath.    Cardiovascular: Negative for chest pain, palpitations and leg swelling.   Gastrointestinal: Negative for abdominal pain and nausea.   Endocrine: Negative for cold intolerance and heat intolerance.   Genitourinary: Negative for difficulty urinating and dysuria.   Musculoskeletal: Positive for arthralgias. Negative for gait problem.   Skin: Negative for color change and rash.   Neurological: Positive for weakness. Negative for dizziness and headaches.   Hematological: Negative for adenopathy. Does not bruise/bleed easily.   Psychiatric/Behavioral: Negative for agitation, confusion and sleep disturbance.       Objective:     Vital Signs  Vitals:    18 1119 18 1214 18 1215 18 1245   BP:  122/64     BP Location:  Left arm     Patient Position:  Lying     Pulse: 78 92 (!) 132 117   Resp: 20 18     Temp:  96.7 °F (35.9 °C)     TempSrc:  Temporal Artery      SpO2:  90%     Weight:       Height:           Physical Exam  Physical Exam   Constitutional: He is  oriented to person, place, and time. He appears well-developed and well-nourished. No distress.   HENT:   Head: Normocephalic and atraumatic.   Right Ear: External ear normal.   Left Ear: External ear normal.   Nose: Nose normal.   Eyes: Conjunctivae and EOM are normal. Pupils are equal, round, and reactive to light.   Neck: Normal range of motion. Neck supple.   Cardiovascular: Normal rate, regular rhythm, normal heart sounds and intact distal pulses.    Pulmonary/Chest: Effort normal. No respiratory distress. He has no wheezes. He has no rales. He exhibits no tenderness.   Abdominal: Soft. Bowel sounds are normal. He exhibits no distension and no mass. There is no tenderness. There is no rebound and no guarding.   Musculoskeletal: Normal range of motion. He exhibits no edema.   Neurological: He is alert and oriented to person, place, and time.   Skin: Skin is warm and dry. No rash noted. He is not diaphoretic. No erythema. No pallor.   Psychiatric: He has a normal mood and affect. His behavior is normal.   Nursing note and vitals reviewed.      Medication Review    Current Facility-Administered Medications:   •  acetaminophen (TYLENOL) tablet 650 mg, 650 mg, Oral, Q4H PRN, Umm Interiano MD  •  aspirin EC tablet 81 mg, 81 mg, Oral, Daily, Umm Interiano MD, 81 mg at 07/12/18 0913  •  bisacodyl (DULCOLAX) suppository 10 mg, 10 mg, Rectal, Daily PRN, Umm Interiano MD  •  budesonide-formoterol (SYMBICORT) 160-4.5 MCG/ACT inhaler 2 puff, 2 puff, Inhalation, BID - RT, Umm Interiano MD, 2 puff at 07/12/18 0649  •  cilostazol (PLETAL) tablet 50 mg, 50 mg, Oral, BID AC, Umm Interiano MD, 50 mg at 07/12/18 0913  •  docusate sodium (COLACE) capsule 200 mg, 200 mg, Oral, BID, Umm Interiano MD, 200 mg at 07/11/18 0840  •  furosemide (LASIX) injection 40 mg, 40 mg, Intravenous, BID, Emily Thacker MD, 40 mg at 07/12/18 0982  •  ipratropium-albuterol (DUO-NEB) nebulizer  solution 3 mL, 3 mL, Nebulization, Q4H - RT, Umm Interiano MD, 3 mL at 07/12/18 1111  •  magnesium hydroxide (MILK OF MAGNESIA) suspension 2400 mg/10mL 10 mL, 10 mL, Oral, Daily PRN, Umm Interiano MD, 10 mL at 07/10/18 1009  •  magnesium oxide (MAGOX) tablet 400 mg, 400 mg, Oral, Nightly, Umm Interiano MD, 400 mg at 07/08/18 2003  •  montelukast (SINGULAIR) tablet 10 mg, 10 mg, Oral, Nightly, Umm Interiano MD, 10 mg at 07/11/18 2208  •  nicotine (NICODERM CQ) 21 MG/24HR patch 1 patch, 1 patch, Transdermal, Q24H, Umm Interiano MD, 1 patch at 07/08/18 2226  •  oxyCODONE-acetaminophen (PERCOCET)  MG per tablet 1 tablet, 1 tablet, Oral, Q4H PRN, Umm Interiano MD, 1 tablet at 07/12/18 1007  •  pantoprazole (PROTONIX) EC tablet 40 mg, 40 mg, Oral, Q AM, Umm Interiano MD, 40 mg at 07/12/18 0602  •  Pharmacy to dose warfarin, , Does not apply, Continuous PRN, Umm Interiano MD  •  pramipexole (MIRAPEX) tablet 0.25 mg, 0.25 mg, Oral, TID, Umm Interiano MD, 0.25 mg at 07/12/18 0913  •  QUEtiapine (SEROquel) tablet 300 mg, 300 mg, Oral, Nightly, Umm Interiano MD, 300 mg at 07/11/18 2208  •  sodium chloride 0.9 % flush 1-10 mL, 1-10 mL, Intravenous, PRN, Umm Interiano MD  •  sotalol (BETAPACE) tablet 80 mg, 80 mg, Oral, Q12H, Umm Interiano MD, 80 mg at 07/12/18 0917  •  sotalol (BETAPACE) tablet 80 mg, 80 mg, Oral, Daily PRN, Umm Interiano MD  •  warfarin (COUMADIN) tablet 4 mg, 4 mg, Oral, Daily, Umm Interiano MD     Diagnostic Data    Lab Results (last 24 hours)     Procedure Component Value Units Date/Time    Comprehensive Metabolic Panel [[id number]]  (Abnormal) Collected:  07/12/18 0535    Specimen:  Blood Updated:  07/12/18 0724     Glucose 106 (H) mg/dL      BUN 49 (H) mg/dL      Creatinine 1.64 (H) mg/dL      Sodium 138 mmol/L      Potassium 3.9 mmol/L      Chloride 96 mmol/L       CO2 34.0 (H) mmol/L      Calcium 8.5 mg/dL      Total Protein 7.6 g/dL      Albumin 3.60 g/dL      ALT (SGPT) 38 U/L      AST (SGOT) 33 U/L      Alkaline Phosphatase 89 U/L      Total Bilirubin 0.4 mg/dL      eGFR Non African Amer 41 (L) mL/min/1.73      Globulin 4.0 (H) gm/dL      A/G Ratio 0.9 (L) g/dL      BUN/Creatinine Ratio 29.9 (H)     Anion Gap 8.0 mmol/L     Narrative:       The MDRD GFR formula is only valid for adults with stable renal function between ages 18 and 70.    Magnesium [[id number]]  (Abnormal) Collected:  07/12/18 0535    Specimen:  Blood Updated:  07/12/18 0724     Magnesium 2.6 (H) mg/dL     Protime-INR [[id number]]  (Abnormal) Collected:  07/12/18 0535    Specimen:  Blood Updated:  07/12/18 0721     Protime 16.3 (H) Seconds      INR 1.35 (H)    Narrative:       Therapeutic range for most indications is 2.0-3.0 INR,  or 2.5-3.5 for mechanical heart valves.    CBC Auto Differential [[id number]]  (Abnormal) Collected:  07/12/18 0535    Specimen:  Blood Updated:  07/12/18 0708     WBC 9.61 10*3/mm3      RBC 4.33 (L) 10*6/mm3      Hemoglobin 12.3 (L) g/dL      Hematocrit 37.3 (L) %      MCV 86.1 fL      MCH 28.4 pg      MCHC 33.0 g/dL      RDW 18.0 (H) %      RDW-SD 56.9 (H) fl      MPV 9.2 fL      Platelets 300 10*3/mm3      Neutrophil % 74.3 %      Lymphocyte % 13.7 %      Monocyte % 6.6 %      Eosinophil % 4.7 %      Basophil % 0.3 %      Immature Grans % 0.4 %      Neutrophils, Absolute 7.14 10*3/mm3      Lymphocytes, Absolute 1.32 10*3/mm3      Monocytes, Absolute 0.63 10*3/mm3      Eosinophils, Absolute 0.45 10*3/mm3      Basophils, Absolute 0.03 10*3/mm3      Immature Grans, Absolute 0.04 (H) 10*3/mm3           I reviewed the patient's new clinical results.    Assessment/Plan:       1. Acute hypoxic respiratory failure.  7/11. On RA at time of exam this morning. At baseline per patient.  7/11. Weaned to 1 lpm nasal cannula. Will wean to room air.   7/10. Wean oxygen.  7/9. Good diuresis.  Improvement overnight. Wean oxygen.   2. CKD.     Results from last 7 days  Lab Units 07/12/18  0535 07/11/18  0530 07/10/18  0355 07/09/18  0537 07/08/18  0533 07/07/18 2002   CREATININE mg/dL 1.64* 1.82* 1.93* 1.95* 1.81* 1.76*   7/10 - 7/12. Stable.  7/9. Small bump with diuresis. Close monitoring. Consult nephrology as needed.  3. COPD exacerbation.  Oxygen. Nebs.   4. Afib. Coumadin. Sotalol. Rate controlled.   5. Deconditioning. PT/OT.  6. HFpEF. Lasix. FR.    Will monitor patient's hospital course and adjust treatment as hospital course dictates.    DVT prophylaxis: Warfarin  Code status is   Code Status and Medical Interventions:   Ordered at: 07/07/18 3500     Level Of Support Discussed With:    Patient     Code Status:    CPR     Medical Interventions (Level of Support Prior to Arrest):    Full       Plan for disposition:Where: SNF and When:  1-2 days      Time:           This document has been electronically signed by Royal Gutierrez MD on July 12, 2018 1:15 PM

## 2018-07-12 NOTE — PLAN OF CARE
Problem: Patient Care Overview  Goal: Plan of Care Review  Outcome: Ongoing (interventions implemented as appropriate)   07/12/18 1442   Coping/Psychosocial   Plan of Care Reviewed With patient   OTHER   Outcome Summary No new goals met this date. Cont OT POC

## 2018-07-12 NOTE — SIGNIFICANT NOTE
07/12/18 1510   Rehab Treatment   Discipline physical therapy assistant   Reason Treatment Not Performed patient/family declined treatment  (pt states he is worn out and wants pta to check back tomorrow)

## 2018-07-12 NOTE — THERAPY TREATMENT NOTE
Acute Care - Physical Therapy Treatment Note  [facility]     Patient Name: Massimo Bentley  : 1940  MRN: [id number]  Today's Date: 2018  Onset of Illness/Injury or Date of Surgery: 18  Date of Referral to PT: 18  Referring Physician: Dr. Gutierrez approved restarting OT today.     Admit Date: 2018    Visit Dx:    ICD-10-CM ICD-9-CM   1. Congestive heart failure, unspecified congestive heart failure chronicity, unspecified congestive heart failure type (CMS/HCC) I50.9 428.0   2. Warfarin-induced coagulopathy (CMS/HCC) D68.9 286.9    T45.515A E934.2   3. Hypoxemia R09.02 799.02   4. Impaired functional mobility, balance, gait, and endurance Z74.09 V49.89   5. Impaired mobility and activities of daily living Z74.09 799.89     Patient Active Problem List   Diagnosis   • Long-term (current) use of anticoagulants   • Atrial fibrillation [I48.91]   • Embolism and thrombosis of artery (CMS/HCC)   • PVD (peripheral vascular disease) (CMS/HCC)   • Tobacco dependence syndrome   • Stroke (CMS/HCC)   • Suicide (CMS/HCC)   • Osteoarthritis of multiple joints   • Hypercholesterolemia   • GERD (gastroesophageal reflux disease)   • Major depression   • Benign prostatic hyperplasia   • Typical atrial flutter (CMS/HCC)   • Non-rheumatic mitral regurgitation   • Hyperlipidemia   • Localized edema   • Atherosclerosis of artery of extremity with gangrene (CMS/HCC)   • AAA (abdominal aortic aneurysm) without rupture (CMS/HCC)   • CKD (chronic kidney disease) stage 3, GFR 30-59 ml/min   • COPD with exacerbation (CMS/HCC)   • Hypertension   • Prediabetes   • Arterial occlusion, lower extremity (CMS/HCC)   • Hypoxemia   • Warfarin-induced coagulopathy (CMS/HCC)   • LVH (left ventricular hypertrophy)   • Diastolic dysfunction   • Coumadin toxicity       Therapy Treatment          Rehabilitation Treatment Summary     Row Name 18 0950             Treatment Time/Intention    Discipline physical therapy  assistant  -LN      Document Type progress note/recertification  -LN      Subjective Information complains of;pain  -LN      Mode of Treatment occupational therapy;physical therapy  -LN      Therapy Frequency (PT Clinical Impression) daily  -LN      Therapy Frequency (OT Eval) other (see comments)   5-7 days a week  -LN      Patient Effort --  -LN      Reason Treatment Not Performed other (see comments)   Pt awaiting cardiac consult due to multiple pauses in HR this AM.  -LN      Existing Precautions/Restrictions fall;oxygen therapy device and L/min;cardiac  -LN      Recorded by [LN] Imani Houston, PTA 07/12/18 1319      Row Name 07/12/18 0950             Vital Signs    Pre Systolic BP Rehab 110  -LN      Pre Treatment Diastolic BP 50  -LN      Post Systolic BP Rehab 112  -LN      Post Treatment Diastolic BP 70  -LN      Pretreatment Heart Rate (beats/min) 67  -LN      Intratreatment Heart Rate (beats/min) 101  -LN      Posttreatment Heart Rate (beats/min) 114  -LN      Pre SpO2 (%) 93  -LN      O2 Delivery Pre Treatment room air  -LN      Intra SpO2 (%) 92  -LN      Post SpO2 (%) 93  -LN      Pre Patient Position Supine  -LN      Intra Patient Position Standing  -LN      Post Patient Position Supine  -LN      Recorded by [LN] Imani Houston, PTA 07/12/18 1319      Row Name 07/12/18 0950             Cognitive Assessment/Intervention- PT/OT    Affect/Mental Status (Cognitive) WFL  -LN      Orientation Status (Cognition) oriented x 3  -LN      Follows Commands (Cognition) --  -LN      Recorded by [LN] Imani Houston, PTA 07/12/18 1319      Row Name 07/12/18 0950             Safety Issues, Functional Mobility    Impairments Affecting Function (Mobility) endurance/activity tolerance;cognition;shortness of breath;strength;pain  -LN      Recorded by [LN] Imani Houston, PTA 07/12/18 1319      Row Name 07/12/18 0950             Bed Mobility Assessment/Treatment    Bed Mobility Assessment/Treatment supine-sit-supine  -LN       Scooting/Bridging [place] (Bed Mobility) conditional independence;verbal cues  -LN      Supine-Sit [place] (Bed Mobility) conditional independence  -LN      Supine-Sit-Supine [place] (Bed Mobility) conditional independence  -LN      Assistive Device (Bed Mobility) bed rails;head of bed elevated  -LN      Recorded by [LN] Imani Houston, PTA 07/12/18 1319      Row Name 07/12/18 0950             Functional Mobility    Functional Mobility- Ind. Level --  -LN      Functional Mobility- Device --  -LN      Recorded by [LN] Imani Houston, PTA 07/12/18 1319      Row Name 07/12/18 0950             Transfer Assessment/Treatment    Transfer Assessment/Treatment --  -LN      Recorded by [LN] Imani Houston, PTA 07/12/18 1319      Row Name 07/12/18 0950             Sit-Stand Transfer    Sit-Stand [place] (Transfers) minimum assist (75% patient effort);contact guard  -LN      Assistive Device (Sit-Stand Transfers) walker, front-wheeled  -LN      Recorded by [LN] Imani Houston, PTA 07/12/18 1319      Row Name 07/12/18 0950             Stand-Sit Transfer    Stand-Sit [place] (Transfers) contact guard;verbal cues  -LN      Assistive Device (Stand-Sit Transfers) walker, front-wheeled  -LN      Recorded by [LN] Imani Houston, PTA 07/12/18 1319      Row Name 07/12/18 0950             Toilet Transfer    Type (Toilet Transfer) --  -LN      [place] Level (Toilet Transfer) --  -LN      Assistive Device (Toilet Transfer) --  -LN      Recorded by [LN] Imani Houston, PTA 07/12/18 1319      Row Name 07/12/18 0950             Gait/Stairs Assessment/Training    Gait/Stairs Assessment/Training gait/ambulation assistive device  -LN      [place] Level (Gait) contact guard  -LN      Assistive Device (Gait) walker, front-wheeled  -LN      Distance in Feet (Gait) 55x2  -LN      Pattern (Gait) step-through  -LN      Deviations/Abnormal Patterns (Gait) gait speed decreased  -LN      Bilateral Gait Deviations forward flexed  posture  -LN      Recorded by [LN] Imani S Celso, [facility] 07/12/18 1319      Row Name 07/12/18 0950             Lower Body Dressing Assessment/Training    Lower Body Dressing [place] Level --  -LN      Lower Body Dressing Position --  -LN      Recorded by [LN] Imani S Celso, [facility] 07/12/18 1319      Row Name 07/12/18 0950             General Assessment (Manual Muscle Testing)    General Manual Muscle Testing (MMT) Assessment --  -LN      Recorded by [LN] Imani S Celso, [facility] 07/12/18 1319      Row Name 07/12/18 0950             Lower Extremity Seated Therapeutic Exercise    Performed, Seated Lower Extremity (Therapeutic Exercise) hip flexion/extension;hip abduction/adduction;ankle dorsiflexion/plantarflexion;LAQ (long arc quad), knee extension  -LN      Device, Seated Lower Extremity (Therapeutic Exercise) free weights, cuff   2#  -LN      Exercise Type, Seated Lower Extremity (Therapeutic Exercise) AROM (active range of motion)  -LN      Sets/Reps Detail, Seated Lower Extremity (Therapeutic Exercise) 1/15   20 reps ap  -LN      Recorded by [LN] Imani Houston, [facility] 07/12/18 1319      Row Name 07/12/18 0950             Positioning and Restraints    Post Treatment Position bed  -LN      Recorded by [LN] Imani S Celso, [facility] 07/12/18 1319      Row Name 07/12/18 0950             Pain Scale: Numbers Pre/Post-Treatment    Pain Scale: Numbers, Pretreatment 3/10  -LN      Pain Scale: Numbers, Post-Treatment 2/10  -LN      Pain Location - Side Bilateral  -LN      Pain Location - Orientation upper   lower  -LN      Pain Location back  -LN      Pain Intervention(s) Medication (See MAR)  -LN      Recorded by [LN] Imani Houston, [facility] 07/12/18 1319      Row Name 07/12/18 0950             Sensory Assessment/Intervention    Sensory General Assessment --  -LN      Recorded by [LN] Imani Houston, [facility] 07/12/18 1319      Row Name 07/12/18 0950             Light Touch Sensation Assessment    Left Upper Extremity: Light Touch Sensation Assessment --   -LN      Right Upper Extremity: Light Touch Sensation Assessment --  -LN      Recorded by [LN] Imani Houston, PTA 07/12/18 1319      Row Name 07/12/18 0950             Respiratory WDL    Respiratory WDL --  -LN      Rhythm/Pattern, Respiratory --  -LN      Cough Frequency --  -LN      Cough Type --  -LN      Recorded by [LN] Imani Houston, PTA 07/12/18 1319      Row Name 07/12/18 0950             Breath Sounds    Breath Sounds --  -LN      All Lung Fields Breath Sounds --  -LN      GAYLE Breath Sounds --  -LN      RUL Breath Sounds --  -LN      RML Breath Sounds --  -LN      Recorded by [LN] Imani Houston, PTA 07/12/18 1319      Row Name 07/12/18 0950             Skin WDL    Skin WDL --  -LN      Skin Color/Characteristics --  -LN      Skin Temperature --  -LN      Skin Moisture --  -LN      Skin Elasticity --  -LN      Skin Integrity --  -LN      Recorded by [LN] Imani Houston, PTA 07/12/18 1319      Row Name 07/12/18 0950             Wound 07/07/18 2300 Right lower calf incision;other (see comments)    Wound - Properties Group Date first assessed: 07/07/18 [LC] Time first assessed: 2300 [LC] Present On Admission : yes [LC] Side: Right [LC] Orientation: lower [LC] Location: calf [LC] Type: incision;other (see comments) [LC], healing vascular surgery wound  Additional Comments: cleaned soap and water, santyl, dry gauze, and tape covered BID per [facility] directions [LC] Recorded by:  [LC] Anais Carrillo RN 07/07/18 1934    Dressing Appearance --  -LN      Closure --  -LN      Base --  -LN      Edges --  -LN      Recorded by [LN] Imani Houston, PTA 07/12/18 1319      Row Name 07/12/18 0950             Coping    Observed Emotional State --  -LN      Verbalized Emotional State --  -LN      Recorded by [LN] Imani Houston, PTA 07/12/18 1319      Row Name 07/12/18 0950             Plan of Care Review    Plan of Care Reviewed With patient  -LN      Recorded by [LN] Imani LYNCH Celso, PTA 07/12/18 1319      Row Name 07/12/18 0917              Outcome Summary/Treatment Plan (OT)    Anticipated Discharge Disposition (OT) --  -LN      Recorded by [LN] Imani Houston, PTA 07/12/18 1319      Row Name 07/12/18 0950             Outcome Summary/Treatment Plan (PT)    Plan for Continued Treatment (PT) cont   -LN      Anticipated Discharge Disposition (PT) --  -LN      Recorded by [LN] Imani Houston, PTA 07/12/18 1319        User Key  (r) = Recorded By, (t) = Taken By, (c) = Cosigned By    Initials Name Effective Dates Discipline    LC Anais Carrillo RN 10/17/16 -  Nurse    LN Imani Houston, PTA 03/07/18 -  PT          Wound 07/07/18 2300 Right lower calf incision;other (see comments) (Active)   Dressing Appearance dry;intact 7/12/2018  7:44 AM   Base scab 7/12/2018  7:44 AM   Care, Wound cleansed with;soap and water 7/12/2018  7:44 AM   Dressing Care, Wound dressing changed 7/12/2018  7:44 AM               PT Rehab Goals     Row Name 07/12/18 0950             Transfer Goal 1 (PT)    Activity/Assistive Device (Transfer Goal 1, PT) bed-to-chair/chair-to-bed;toilet  -LN      [place] Level/Cues Needed (Transfer Goal 1, PT) conditional independence;supervision required  -LN      Time Frame (Transfer Goal 1, PT) long term goal (LTG);5 days  -LN      Progress/Outcome (Transfer Goal 1, PT) goal not met;goal ongoing  -LN         Gait Training Goal 1 (PT)    Activity/Assistive Device (Gait Training Goal 1, PT) gait (walking locomotion);assistive device use;decrease fall risk;increase endurance/gait distance  -LN      [place] Level (Gait Training Goal 1, PT) minimum assist (75% or more patient effort)  -LN      Distance (Gait Goal 1, PT) 80  -LN      Time Frame (Gait Training Goal 1, PT) short term goal (STG);5 days  -LN      Progress/Outcome (Gait Training Goal 1, PT) goal not met;goal revised this date  -LN         Gait Training Goal (PT)    Gait Training Goal (PT) --   pt will demo problem solving skills in amb w/ RW w/ VSS  -LN      Time Frame (Gait Training Goal,  PT) by discharge  -LN      Progress/Outcome (Gait Training Goal, PT) goal ongoing  -LN        User Key  (r) = Recorded By, (t) = Taken By, (c) = Cosigned By    Initials Name Provider Type Discipline     Imani Houston PTA Physical Therapy Assistant PT          Physical Therapy Education     Title: PT OT SLP Therapies (Active)     Topic: Physical Therapy (Done)     Point: Mobility training (Done)    Learning Progress Summary     Learner Status Readiness Method Response Comment Documented by    Patient Done Acceptance E,TB [facility] 07/12/18 1320     Active Acceptance D NR POC;mobiltiy w/ RW; balance in gait  07/08/18 1903          Point: Home exercise program (Done)    Learning Progress Summary     Learner Status Readiness Method Response Comment Documented by    Patient Done Acceptance E,[facility] 07/12/18 1320          Point: Body mechanics (Done)    Learning Progress Summary     Learner Status Readiness Method Response Comment Documented by    Patient Done Acceptance E,TB [facility] 07/12/18 1320          Point: Precautions (Done)    Learning Progress Summary     Learner Status Readiness Method Response Comment Documented by    Patient Done Acceptance E,TB [facility] 07/12/18 1320     Active Acceptance D NR POC;mobiltiy w/ RW; balance in gait  07/08/18 1903                      User Key     Initials Effective Dates Name Provider Type Discipline     04/03/18 -  Leonila Lester, PT Physical Therapist PT     03/07/18 -  Imani Houston PTA Physical Therapy Assistant PT                    PT Recommendation and Plan  Therapy Frequency (PT Clinical Impression): daily  Outcome Summary/Treatment Plan (PT)  Plan for Continued Treatment (PT): cont   Plan of Care Reviewed With: patient  Progress: improving  Outcome Summary: sup-sit-sup cond ind,sit-stand-sit min/cga of 1,amb 55'x2 with rw and cga of 1-requested to lay back down as chair  would cause his back to hurt-if d/c today would benefit from snf with pt/ot           Outcome Measures     Row Name 07/12/18 0950 07/11/18 1445 07/11/18 1300       How much help from another person do you currently need...    Turning from your back to your side while in flat bed without using bedrails? 4  -LN  -- 3  -TA    Moving from lying on back to sitting on the side of a flat bed without bedrails? 4  -LN  -- 3  -TA    Moving to and from a bed to a chair (including a wheelchair)? 3  -LN  -- 3  -TA    Standing up from a chair using your arms (e.g., wheelchair, bedside chair)? 3  -LN  -- 3  -TA    Climbing 3-5 steps with a railing? 2  -LN  -- 2  -TA    To walk in hospital room? 3  -LN  -- 3  -TA    AM-PAC 6 Clicks Score 19  -LN  -- 17  -TA       How much help from another is currently needed...    Putting on and taking off regular lower body clothing?  -- 2  -BH  --    Bathing (including washing, rinsing, and drying)  -- 2  -BH  --    Toileting (which includes using toilet bed pan or urinal)  -- 2  -BH  --    Putting on and taking off regular upper body clothing  -- 3  -BH  --    Taking care of personal grooming (such as brushing teeth)  -- 3  -BH  --    Eating meals  -- 4  -BH  --    Score  -- 16  -BH  --       Functional Assessment    Outcome Measure Options AM-PAC 6 Clicks Basic Mobility (PT)  -LN AM-PAC 6 Clicks Daily Activity (OT)  -BH AM-[facility] 6 Clicks Basic Mobility (PT)  -TA    Row Name 07/11/18 1100 07/09/18 1355          How much help from another person do you currently need...    Turning from your back to your side while in flat bed without using bedrails? 3  -GB  --     Moving from lying on back to sitting on the side of a flat bed without bedrails? 3  -GB  --     Moving to and from a bed to a chair (including a wheelchair)? 3  -GB  --     Standing up from a chair using your arms (e.g., wheelchair, bedside chair)? 3  -GB  --     Climbing 3-5 steps with a railing? 2  -GB  --     To walk in hospital room? 3  -GB  --     AM-PAC 6 Clicks Score 17  -GB  --        How much help from another  is currently needed...    Putting on and taking off regular lower body clothing?  -- 2  -KD     Bathing (including washing, rinsing, and drying)  -- 2  -KD     Toileting (which includes using toilet bed pan or urinal)  -- 2  -KD     Putting on and taking off regular upper body clothing  -- 2  -KD     Taking care of personal grooming (such as brushing teeth)  -- 3  -KD     Eating meals  -- 4  -KD     Score  -- 15  -KD        Functional Assessment    Outcome Measure Options AM-PAC 6 Clicks Basic Mobility (PT)  -GB  --       User Key  (r) = Recorded By, (t) = Taken By, (c) = Cosigned By    Initials Name Provider Type    GB Leonila Lester, PT Physical Therapist    BH Tia Dean, OTR/L Occupational Therapist    TA Pepper Enriquez, PTA Physical Therapy Assistant    LN Imani Houston, PTA Physical Therapy Assistant    KD Coral Carrillo, QUINTEROS/L Occupational Therapy Assistant           Time Calculation:         PT Charges     Row Name 07/12/18 0950             Time Calculation    Start Time 0950  -LN      Stop Time 1035  -LN      Time Calculation (min) 45 min  -LN      PT Received On 07/12/18  -LN         Time Calculation- PT    Total Timed Code Minutes- PT 45 minute(s)  -LN        User Key  (r) = Recorded By, (t) = Taken By, (c) = Cosigned By    Initials Name Provider Type    LN Imani Houston, PTA Physical Therapy Assistant        Therapy Suggested Charges     Code   Minutes Charges    62356 (CPT®) Hc Pt Neuromusc Re Education Ea 15 Min      01813 (CPT®) Hc Pt Ther Proc Ea 15 Min      39179 (CPT®) Hc Gait Training Ea 15 Min 15 1    24368 (CPT®) Hc Pt Therapeutic Act Ea 15 Min      64004 (CPT®) Hc Pt Manual Therapy Ea 15 Min      56264 (CPT®) Hc Pt Iontophoresis Ea 15 Min      51799 (CPT®) Hc Pt Elec Stim Ea-Per 15 Min      33411 (CPT®) Hc Pt Ultrasound Ea 15 Min      65705 (CPT®) Hc Pt Self Care/Mgmt/Train Ea 15 Min      Total  15 1        Therapy Charges for Today     Code Description Service Date Service  Provider Modifiers Qty    57014596911 HC PT THERAPEUTIC ACT EA 15 MIN 7/12/2018 Imani Houston, PTA GP 1    49383375530 HC GAIT TRAINING EA 15 MIN 7/12/2018 Imani Houston, PTA GP 1    42151180133 HC PT THER PROC EA 15 MIN 7/12/2018 Imani Houston, PTA GP 1          PT G-Codes  PT Professional Judgement Used?: Yes  Outcome Measure Options: AM-PAC 6 Clicks Basic Mobility (PT)  Score: 17  Functional Limitation: Mobility: Walking and moving around  Mobility: Walking and Moving Around Current Status (): At least 40 percent but less than 60 percent impaired, limited or restricted  Mobility: Walking and Moving Around Goal Status (): At least 1 percent but less than 20 percent impaired, limited or restricted    Imani Houston PTA  7/12/2018

## 2018-07-12 NOTE — PLAN OF CARE
Problem: Patient Care Overview  Goal: Plan of Care Review  Outcome: Ongoing (interventions implemented as appropriate)   07/12/18 0079   Coping/Psychosocial   Plan of Care Reviewed With patient   Plan of Care Review   Progress improving   OTHER   Outcome Summary Pending approval to go back to [facility] to finish rehab, possible d/c tomorrow.       Problem: Fall Risk (Adult)  Goal: Absence of Fall  Outcome: Ongoing (interventions implemented as appropriate)      Problem: Skin Injury Risk (Adult)  Goal: Skin Health and Integrity  Outcome: Ongoing (interventions implemented as appropriate)

## 2018-07-13 VITALS
BODY MASS INDEX: 29.6 KG/M2 | WEIGHT: 211.4 LBS | HEART RATE: 67 BPM | RESPIRATION RATE: 18 BRPM | SYSTOLIC BLOOD PRESSURE: 106 MMHG | DIASTOLIC BLOOD PRESSURE: 56 MMHG | HEIGHT: 71 IN | OXYGEN SATURATION: 93 % | TEMPERATURE: 97.1 F

## 2018-07-13 LAB
ALBUMIN SERPL-MCNC: 3.6 G/DL (ref 3.4–4.8)
ALBUMIN/GLOB SERPL: 0.9 G/DL (ref 1.1–1.8)
ALP SERPL-CCNC: 87 U/L (ref 38–126)
ALT SERPL W P-5'-P-CCNC: 32 U/L (ref 21–72)
ANION GAP SERPL CALCULATED.3IONS-SCNC: 9 MMOL/L (ref 5–15)
AST SERPL-CCNC: 24 U/L (ref 17–59)
BASOPHILS # BLD AUTO: 0.02 10*3/MM3 (ref 0–0.2)
BASOPHILS NFR BLD AUTO: 0.2 % (ref 0–2)
BILIRUB SERPL-MCNC: 0.3 MG/DL (ref 0.2–1.3)
BUN BLD-MCNC: 46 MG/DL (ref 7–21)
BUN/CREAT SERPL: 30.9 (ref 7–25)
CALCIUM SPEC-SCNC: 8.7 MG/DL (ref 8.4–10.2)
CHLORIDE SERPL-SCNC: 96 MMOL/L (ref 95–110)
CO2 SERPL-SCNC: 34 MMOL/L (ref 22–31)
CREAT BLD-MCNC: 1.49 MG/DL (ref 0.7–1.3)
DEPRECATED RDW RBC AUTO: 57.7 FL (ref 35.1–43.9)
EOSINOPHIL # BLD AUTO: 0.34 10*3/MM3 (ref 0–0.7)
EOSINOPHIL NFR BLD AUTO: 3.7 % (ref 0–7)
ERYTHROCYTE [DISTWIDTH] IN BLOOD BY AUTOMATED COUNT: 17.9 % (ref 11.5–14.5)
GFR SERPL CREATININE-BSD FRML MDRD: 46 ML/MIN/1.73 (ref 42–98)
GLOBULIN UR ELPH-MCNC: 4 GM/DL (ref 2.3–3.5)
GLUCOSE BLD-MCNC: 117 MG/DL (ref 60–100)
HCT VFR BLD AUTO: 37.4 % (ref 39–49)
HGB BLD-MCNC: 12.4 G/DL (ref 13.7–17.3)
IMM GRANULOCYTES # BLD: 0.08 10*3/MM3 (ref 0–0.02)
IMM GRANULOCYTES NFR BLD: 0.9 % (ref 0–0.5)
INR PPP: 1.32 (ref 0.8–1.2)
LYMPHOCYTES # BLD AUTO: 1.03 10*3/MM3 (ref 0.6–4.2)
LYMPHOCYTES NFR BLD AUTO: 11.1 % (ref 10–50)
MAGNESIUM SERPL-MCNC: 2.6 MG/DL (ref 1.6–2.3)
MCH RBC QN AUTO: 28.6 PG (ref 26.5–34)
MCHC RBC AUTO-ENTMCNC: 33.2 G/DL (ref 31.5–36.3)
MCV RBC AUTO: 86.4 FL (ref 80–98)
MONOCYTES # BLD AUTO: 0.63 10*3/MM3 (ref 0–0.9)
MONOCYTES NFR BLD AUTO: 6.8 % (ref 0–12)
NEUTROPHILS # BLD AUTO: 7.15 10*3/MM3 (ref 2–8.6)
NEUTROPHILS NFR BLD AUTO: 77.3 % (ref 37–80)
NRBC BLD MANUAL-RTO: 0 /100 WBC (ref 0–0)
PLATELET # BLD AUTO: 273 10*3/MM3 (ref 150–450)
PMV BLD AUTO: 9.1 FL (ref 8–12)
POTASSIUM BLD-SCNC: 3.5 MMOL/L (ref 3.5–5.1)
PROT SERPL-MCNC: 7.6 G/DL (ref 6.3–8.6)
PROTHROMBIN TIME: 16 SECONDS (ref 11.1–15.3)
RBC # BLD AUTO: 4.33 10*6/MM3 (ref 4.37–5.74)
SODIUM BLD-SCNC: 139 MMOL/L (ref 137–145)
WBC NRBC COR # BLD: 9.25 10*3/MM3 (ref 3.2–9.8)

## 2018-07-13 PROCEDURE — 97535 SELF CARE MNGMENT TRAINING: CPT

## 2018-07-13 PROCEDURE — 25010000002 FUROSEMIDE PER 20 MG: Performed by: INTERNAL MEDICINE

## 2018-07-13 PROCEDURE — 83735 ASSAY OF MAGNESIUM: CPT | Performed by: INTERNAL MEDICINE

## 2018-07-13 PROCEDURE — 94799 UNLISTED PULMONARY SVC/PX: CPT

## 2018-07-13 PROCEDURE — 80053 COMPREHEN METABOLIC PANEL: CPT | Performed by: INTERNAL MEDICINE

## 2018-07-13 PROCEDURE — 94760 N-INVAS EAR/PLS OXIMETRY 1: CPT

## 2018-07-13 PROCEDURE — 97116 GAIT TRAINING THERAPY: CPT

## 2018-07-13 PROCEDURE — 97110 THERAPEUTIC EXERCISES: CPT

## 2018-07-13 PROCEDURE — 85610 PROTHROMBIN TIME: CPT | Performed by: INTERNAL MEDICINE

## 2018-07-13 PROCEDURE — 85025 COMPLETE CBC W/AUTO DIFF WBC: CPT | Performed by: INTERNAL MEDICINE

## 2018-07-13 RX ORDER — SOTALOL HYDROCHLORIDE 80 MG/1
80 TABLET ORAL EVERY 12 HOURS SCHEDULED
Start: 2018-07-13 | End: 2018-01-01 | Stop reason: HOSPADM

## 2018-07-13 RX ORDER — FUROSEMIDE 40 MG/1
40 TABLET ORAL DAILY
Status: DISCONTINUED | OUTPATIENT
Start: 2018-07-14 | End: 2018-07-13 | Stop reason: HOSPADM

## 2018-07-13 RX ORDER — OXYCODONE AND ACETAMINOPHEN 10; 325 MG/1; MG/1
1 TABLET ORAL EVERY 8 HOURS PRN
Qty: 3 TABLET | Refills: 0 | Status: SHIPPED | OUTPATIENT
Start: 2018-07-13 | End: 2018-01-01

## 2018-07-13 RX ADMIN — IPRATROPIUM BROMIDE AND ALBUTEROL SULFATE 3 ML: 2.5; .5 SOLUTION RESPIRATORY (INHALATION) at 10:17

## 2018-07-13 RX ADMIN — IPRATROPIUM BROMIDE AND ALBUTEROL SULFATE 3 ML: 2.5; .5 SOLUTION RESPIRATORY (INHALATION) at 06:49

## 2018-07-13 RX ADMIN — OXYCODONE HYDROCHLORIDE AND ACETAMINOPHEN 1 TABLET: 10; 325 TABLET ORAL at 11:46

## 2018-07-13 RX ADMIN — ASPIRIN 81 MG: 81 TABLET, COATED ORAL at 08:29

## 2018-07-13 RX ADMIN — BUDESONIDE AND FORMOTEROL FUMARATE DIHYDRATE 2 PUFF: 160; 4.5 AEROSOL RESPIRATORY (INHALATION) at 06:49

## 2018-07-13 RX ADMIN — PANTOPRAZOLE SODIUM 40 MG: 40 TABLET, DELAYED RELEASE ORAL at 05:35

## 2018-07-13 RX ADMIN — DOCUSATE SODIUM 200 MG: 100 CAPSULE, LIQUID FILLED ORAL at 08:29

## 2018-07-13 RX ADMIN — PRAMIPEXOLE DIHYDROCHLORIDE 0.25 MG: 0.25 TABLET ORAL at 08:29

## 2018-07-13 RX ADMIN — SOTALOL HYDROCHLORIDE 80 MG: 80 TABLET ORAL at 08:29

## 2018-07-13 RX ADMIN — OXYCODONE HYDROCHLORIDE AND ACETAMINOPHEN 1 TABLET: 10; 325 TABLET ORAL at 16:08

## 2018-07-13 RX ADMIN — FUROSEMIDE 40 MG: 10 INJECTION, SOLUTION INTRAMUSCULAR; INTRAVENOUS at 08:29

## 2018-07-13 RX ADMIN — CILOSTAZOL 50 MG: 100 TABLET ORAL at 08:29

## 2018-07-13 NOTE — PLAN OF CARE
Problem: Patient Care Overview  Goal: Plan of Care Review  Outcome: Ongoing (interventions implemented as appropriate)   07/13/18 0815   Coping/Psychosocial   Plan of Care Reviewed With patient   Plan of Care Review   Progress improving   OTHER   Outcome Summary Sup-sit cond I, sit-stand-sba of 1 with RW. pt completed ADL this AM with good tolerance. Pt ambulated x 10 ' sba of 1 w/ RW to recliner . All needs within reach. Cont OT POC.

## 2018-07-13 NOTE — THERAPY TREATMENT NOTE
Acute Care - Occupational Therapy Treatment Note  [facility]     Patient Name: Massimo Bentley  : 1940  MRN: [id number]  Today's Date: 2018  Onset of Illness/Injury or Date of Surgery: 18  Date of Referral to OT: 18  Referring Physician: Dr. Gutierrez approved restarting OT today.     Admit Date: 2018       ICD-10-CM ICD-9-CM   1. Congestive heart failure, unspecified congestive heart failure chronicity, unspecified congestive heart failure type (CMS/[facility]) I50.9 428.0   2. Warfarin-induced coagulopathy (CMS/HCC) D68.9 286.9    T45.515A E934.2   3. Hypoxemia R09.02 799.02   4. Impaired functional mobility, balance, gait, and endurance Z74.09 V49.89   5. Impaired mobility and activities of daily living Z74.09 799.89     Patient Active Problem List   Diagnosis   • Long-term (current) use of anticoagulants   • Atrial fibrillation [I48.91]   • Embolism and thrombosis of artery (CMS/[facility])   • PVD (peripheral vascular disease) (CMS/[facility])   • Tobacco dependence syndrome   • Stroke (CMS/HCC)   • Suicide (CMS/[facility])   • Osteoarthritis of multiple joints   • Hypercholesterolemia   • GERD (gastroesophageal reflux disease)   • Major depression   • Benign prostatic hyperplasia   • Typical atrial flutter (CMS/HCC)   • Non-rheumatic mitral regurgitation   • Hyperlipidemia   • Localized edema   • Atherosclerosis of artery of extremity with gangrene (CMS/HCC)   • AAA (abdominal aortic aneurysm) without rupture (CMS/[facility])   • CKD (chronic kidney disease) stage 3, GFR 30-59 ml/min   • COPD with exacerbation (CMS/HCC)   • Hypertension   • Prediabetes   • Arterial occlusion, lower extremity (CMS/HCC)   • Hypoxemia   • Warfarin-induced coagulopathy (CMS/HCC)   • LVH (left ventricular hypertrophy)   • Diastolic dysfunction   • Coumadin toxicity     Past Medical History:   Diagnosis Date   • Abdominal bloating    • Anxiety    • Atherosclerosis of native arteries of extremity with intermittent claudication  (CMS/HCC)    • Atrial flutter (CMS/HCC)    • Backache    • Benign prostatic hyperplasia     BX 2009, also has renal cyst      • Chronic bronchitis (CMS/HCC)    • Chronic renal impairment    • Depressive disorder    • Drug abuse, episodic use    • Dyslipidemia    • Fatigue    • GERD (gastroesophageal reflux disease)    • Hypercholesterolemia 03/15/2016   • Injury of tendon of rotator cuff 10/22/2015   • Insomnia 06/24/2016   • Intervertebral disc disorder 10/27/2011   • Major depression    • Neck pain 01/09/2012   • Osteoarthritis of multiple joints 06/21/2013   • Pain from vascular prosthetic devices, implants and grafts, sequela 12/03/2015   • Pain in left foot 10/27/2015   • Peripheral vascular disease (CMS/HCC) 12/03/2015     LEFT fem-tib bypass, embolectomy 6/2014    10/27/2015    • Senile debility 10/22/2015   • 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 No coronary atherosclerosis. Normal left ventricular function   • CHOLECYSTECTOMY  08/25/2014    Cholecystitis with gangrenous gallbladder   • ENDOSCOPY  03/22/1990    Duodenal ulcer   • ENDOSCOPY AND COLONOSCOPY  10/17/2013    Diverticulum in sigmoid colon & descending colon. Internal & external hemorrhoids found   • ESOPHAGOSCOPY / EGD  10/17/2013    With tube-Esophagitis seen. Biopsy taken. Gastritis in stomach. Biopsy taken. Stenosis in 2nd portion of duodenum. Dilatation performed.   • FEMORAL THROMBECTOMY/EMBOLECTOMY  06/29/2014    Left lower extremity arteriogram. Left popliteal artery endarterectomy. Redo left  femoral to [id number] trunk bypass. Negative pressure wound therapy with placement of Prevena wound V.A.C., left groin, left lower leg   • INJECTION OF MEDICATION  03/15/2016    B12   • INJECTION OF MEDICATION  10/23/2015    Drain/Inject Major Joint    • INJECTION OF MEDICATION  01/13/2016    Kenalog   • LUMBAR LAMINECTOMY     • TRANSESOPHAGEAL ECHOCARDIOGRAM (TEX)  10/06/2015    With color flow-Mild mitral regurg.Left atrium mild dilated.Ef of 55-60%.RV systolic pressure elevated.Trace tricuspid regurg   • TRANSESOPHAGEAL ECHOCARDIOGRAM (TEX)  02/14/2012    Without color flow-[facility] w/ early diast dysfun. AV trileaflet & structurally NRL w/ AR NRL. No regional wall motion abn Est Ef approx 50-55%. M & TR valves NRL w/ mild M & TR regurg Trace -mild pulmonic regurg. Anterior echo free space w/o hemodynamic signif. NRL RV   • TRANSESOPHAGEAL ECHOCARDIOGRAM (TEX)  03/25/2011    Normal LV systolic function with Ef of 50-55%.Left atrium ildly dilated.Moderate mitral regurg.Mitral valve regurg.Intact interatrial septum.No evidence of any cardiac thrombus or pericardial effusion.AV trileaflet       Therapy Treatment          Rehabilitation Treatment Summary     Row Name 07/13/18 0815             Treatment Time/Intention    Discipline occupational therapy assistant  -KD      Document Type therapy note (daily note)  -KD      Subjective Information no complaints  -KD      Mode of Treatment occupational therapy  -KD      Patient/Family Observations no family present  -KD      Care Plan Review care plan/treatment goals reviewed  -KD      Therapy Frequency (OT Eval) other (see comments)   3-14x/wk  -KD      Patient Effort good  -KD      Existing Precautions/Restrictions fall;oxygen therapy device and L/min  -KD      Equipment Issued to Patient gait belt  -KD      Recorded by [KD] LEVI Hazel 07/13/18 1108      Row Name 07/13/18 0815             Vital Signs    Pre Systolic BP Rehab 109  -KD      Pre Treatment  Diastolic BP 68  -KD      Pretreatment Heart Rate (beats/min) 70  -KD      Posttreatment Heart Rate (beats/min) 82  -KD      Pre SpO2 (%) 94  -KD      O2 Delivery Pre Treatment supplemental O2  -KD      Post SpO2 (%) 95  -KD      O2 Delivery Post Treatment supplemental O2  -KD      Pre Patient Position Supine  -KD      Intra Patient Position Standing  -KD      Post Patient Position Sitting  -KD      Recorded by [KD] NALLELY HazelA/L 07/13/18 1115      Row Name 07/13/18 0815             Cognitive Assessment/Intervention- PT/OT    Affect/Mental Status (Cognitive) WFL  -KD      Orientation Status (Cognition) oriented x 4  -KD      Follows Commands (Cognition) follows multi-step commands  -KD      Personal Safety Interventions fall prevention program maintained  -KD      Recorded by [KD] NALLELY HazelA/L 07/13/18 1115      Row Name 07/13/18 0815             Bed Mobility Assessment/Treatment    Bed Mobility Assessment/Treatment bed mobility (all) activities;rolling left;rolling right;scooting/bridging;supine-sit;sit-supine  -KD      [place] Level (Bed Mobility) conditional independence  -KD      Supine-Sit [place] (Bed Mobility) conditional independence  -KD      Bed Mobility, Safety Issues decreased use of arms for pushing/pulling;decreased use of legs for bridging/pushing  -KD      Assistive Device (Bed Mobility) bed rails;head of bed elevated  -KD      Recorded by [KD] NALLELY HazelA/L 07/13/18 1115      Row Name 07/13/18 0815             Functional Mobility    Functional Mobility- Ind. Level supervision required  -KD      Functional Mobility- Device rolling walker  -KD      Functional Mobility-Distance (Feet) 10  -KD      Functional Mobility- Safety Issues supplemental O2  -KD      Recorded by [KD] NALLELY HazelA/L 07/13/18 1115      Row Name 07/13/18 0815             Transfer Assessment/Treatment    Transfer Assessment/Treatment bed-chair transfer  -KD      Recorded by [KD] Coral MARSHALL  Deer, QUINTEROS/L 07/13/18 1115      Row Name 07/13/18 0815             Sit-Stand Transfer    Sit-Stand [place] (Transfers) supervision  -KD      Assistive Device (Sit-Stand Transfers) walker, front-wheeled  -KD      Recorded by [KD] NALLELY HazelA/L 07/13/18 1115      Row Name 07/13/18 0815             Stand-Sit Transfer    Stand-Sit [place] (Transfers) supervision  -KD      Assistive Device (Stand-Sit Transfers) walker, front-wheeled  -KD      Recorded by [KD] NALLELY HazelA/L 07/13/18 1115      Row Name 07/13/18 0815             Bathing Assessment/Intervention    Bathing [place] Level bathing skills;lower body;upper body;contact guard assist  -KD      Assistive Devices (Bathing) long-handled sponge;bath mitt  -KD      Bathing Position edge of bed sitting  -KD      Recorded by [KD] ALDAIR Hazel/L 07/13/18 1115      Row Name 07/13/18 0815             Upper Body Dressing Assessment/Training    Upper Body Dressing [place] Level upper body dressing skills;doff;don;other (see comments)   hospital gown  -KD      Upper Body Dressing Position edge of bed sitting  -KD      Recorded by [KD] ALDAIR Hazel/L 07/13/18 1115      Row Name 07/13/18 0815             Lower Body Dressing Assessment/Training    Lower Body Dressing [place] Level doff;don;socks;supervision  -KD      Lower Body Dressing Position edge of bed sitting  -KD      Comment (Lower Body Dressing) Pt requires sock air to don sock on RLE  -KD      Recorded by [KD] ALDAIR Hazel/L 07/13/18 1115      Row Name 07/13/18 0815             Grooming Assessment/Training    [place] Level (Grooming) grooming skills;hair care, combing/brushing;wash face, hands;set up;shave face  -KD      Grooming Position unsupported sitting  -KD2      Recorded by [KD] NALLELY HazelA/L 07/13/18 1119  [KD2] ALDAIR Hazel/L 07/13/18 1115      Row Name 07/13/18 0815             Positioning and Restraints    Pre-Treatment  Position in bed  -KD      Post Treatment Position chair  -KD      In Chair sitting;call light within reach;encouraged to call for assist;exit alarm on  -KD      Recorded by [KD] ALDAIR Hazel/L 07/13/18 1115      Row Name 07/13/18 0815             Pain Scale: Numbers Pre/Post-Treatment    Pain Scale: Numbers, Pretreatment 3/10  -KD      Pain Scale: Numbers, Post-Treatment 3/10  -KD      Pain Location back  -KD      Pain Intervention(s) Repositioned  -KD      Recorded by [KD] ALDAIR Hazel/L 07/13/18 1115      Row Name                Wound 07/07/18 2300 Right lower calf incision;other (see comments)    Wound - Properties Group Date first assessed: 07/07/18 [LC] Time first assessed: 2300 [LC] Present On Admission : yes [LC] Side: Right [LC] Orientation: lower [LC] Location: calf [LC] Type: incision;other (see comments) [LC], healing vascular surgery wound  Additional Comments: cleaned soap and water, santyl, dry gauze, and tape covered BID per [facility] directions [LC] Recorded by:  [LC] Anais Carrillo RN 07/07/18 2316    Row Name 07/13/18 0815             Outcome Summary/Treatment Plan (OT)    Daily Summary of Progress (OT) progress toward functional goals as expected  -KD      Plan for Continued Treatment (OT) cont ot poc  -KD      Anticipated Discharge Disposition (OT) skilled nursing facility  -KD      Recorded by [KD] ALDAIR Hazel/ELLIE 07/13/18 1115        User Key  (r) = Recorded By, (t) = Taken By, (c) = Cosigned By    Initials Name Effective Dates Discipline    LC Anais Carrillo RN 10/17/16 -  Nurse    KD NALLELY HazelA/ELLIE 03/07/18 -  OT        Wound 07/07/18 2300 Right lower calf incision;other (see comments) (Active)   Dressing Appearance dry;intact 7/13/2018  9:05 AM             OT Rehab Goals     Row Name 07/13/18 0815             Transfer Goal 1 (OT)    Activity/Assistive Device (Transfer Goal 1, OT) toilet  -KD      [place]ch Level/Cues Needed (Transfer Goal 1, OT) supervision required   -KD      Time Frame (Transfer Goal 1, OT) long term goal (LTG);by discharge  -KD      Progress/Outcome (Transfer Goal 1, OT) goal revised this date  -KD         Bathing Goal 1 (OT)    Activity/Assistive Device (Bathing Goal 1, OT) bathing skills, all  -KD      [place] Level/Cues Needed (Bathing Goal 1, OT) supervision required  -KD      Time Frame (Bathing Goal 1, OT) long term goal (LTG);by discharge  -KD      Progress/Outcomes (Bathing Goal 1, OT) goal not met  -KD         Dressing Goal 1 (OT)    Activity/Assistive Device (Dressing Goal 1, OT) dressing skills, all  -KD      [place]/Cues Needed (Dressing Goal 1, OT) supervision required  -KD      Time Frame (Dressing Goal 1, OT) long term goal (LTG);by discharge  -KD      Progress/Outcome (Dressing Goal 1, OT) goal not met  -KD         Toileting Goal 1 (OT)    Activity/Device (Toileting Goal 1, OT) toileting skills, all  -KD      [place] Level/Cues Needed (Toileting Goal 1, OT) supervision required  -KD      Time Frame (Toileting Goal 1, OT) long term goal (LTG);by discharge  -KD      Progress/Outcome (Toileting Goal 1, OT) goal not met  -KD         Patient Education Goal (OT)    Activity (Patient Education Goal, OT) B HEP for increased str for mobility and ADLs independence, EC/WS and home safety/fall prevention.  -KD      [place]/Cues/Accuracy (Memory Goal 2, OT) demonstrates adequately;verbalizes understanding  -KD      Time Frame (Patient Education Goal, OT) long term goal (LTG);by discharge  -KD      Progress/Outcome (Patient Education Goal, OT) goal not met  -KD        User Key  (r) = Recorded By, (t) = Taken By, (c) = Cosigned By    Initials Name Provider Type Discipline    ALDAIR Fraser/L Occupational Therapy Assistant OT        Occupational Therapy Education     Title: PT OT SLP Therapies (Active)     Topic: Occupational Therapy (Active)     Point: ADL training (Done)     Description: Instruct learner(s) on proper safety  adaptation and remediation techniques during self care or transfers.   Instruct in proper use of assistive devices.   Learning Progress Summary     Learner Status Readiness Method Response Comment Documented by    Patient Done Acceptance E BEVERLY,NR Educated about OT. Educated to call for assist and not get up on his own. Educated on safety throughout. Educated on sock aid and reacher use for LB dressing.  07/11/18 1532          Point: Precautions (Done)     Description: Instruct learner(s) on prescribed precautions during self-care and functional transfers.   Learning Progress Summary     Learner Status Readiness Method Response Comment Documented by    Patient Done Acceptance E BEVERLY,NR Educated about OT. Educated to call for assist and not get up on his own. Educated on safety throughout. Educated on sock aid and reacher use for LB dressing.  07/11/18 1532     Done Acceptance E BEVERLY Edu pt on use of gait belt and non skid socks when OOB and no OOB without assist.  07/09/18 1316                      User Key     Initials Effective Dates Name Provider Type Discipline     06/15/16 -  Dann Cardoso, OT Occupational Therapist OT     06/08/18 -  Tia Dean OTR/L Occupational Therapist OT                OT Recommendation and Plan  Outcome Summary/Treatment Plan (OT)  Daily Summary of Progress (OT): progress toward functional goals as expected  Plan for Continued Treatment (OT): cont ot poc  Anticipated Discharge Disposition (OT): skilled nursing facility  Therapy Frequency (OT Eval): other (see comments) (3-14x/wk)  Daily Summary of Progress (OT): progress toward functional goals as expected  Plan of Care Review  Plan of Care Reviewed With: patient  Plan of Care Reviewed With: patient  Outcome Summary: Sup-sit cond I, sit-stand-sba of 1 with RW. pt completed ADL this AM with good tolerance. Pt ambulated x 10 ' sba of 1 w/ RW  to recliner . All  needs within reach. Cont OT POC.        Outcome Measures     Row Name  07/13/18 0815 07/12/18 1315 07/12/18 0950       How much help from another person do you currently need...    Turning from your back to your side while in flat bed without using bedrails?  --  -- 4  -LN    Moving from lying on back to sitting on the side of a flat bed without bedrails?  --  -- 4  -LN    Moving to and from a bed to a chair (including a wheelchair)?  --  -- 3  -LN    Standing up from a chair using your arms (e.g., wheelchair, bedside chair)?  --  -- 3  -LN    Climbing 3-5 steps with a railing?  --  -- 2  -LN    To walk in hospital room?  --  -- 3  -LN    AM-PAC 6 Clicks Score  --  -- 19  -LN       How much help from another is currently needed...    Putting on and taking off regular lower body clothing? 2  -KD 2  -KD  --    Bathing (including washing, rinsing, and drying) 2  -KD 2  -KD  --    Toileting (which includes using toilet bed pan or urinal) 2  -KD 2  -KD  --    Putting on and taking off regular upper body clothing 3  -KD 3  -KD  --    Taking care of personal grooming (such as brushing teeth) 3  -KD 3  -KD  --    Eating meals 4  -KD 4  -KD  --    Score 16  -KD 16  -KD  --       Functional Assessment    Outcome Measure Options  --  -- AM-PAC 6 Clicks Basic Mobility (PT)  -LN    Row Name 07/11/18 1445 07/11/18 1300 07/11/18 1100       How much help from another person do you currently need...    Turning from your back to your side while in flat bed without using bedrails?  -- 3  -TA 3  -GB    Moving from lying on back to sitting on the side of a flat bed without bedrails?  -- 3  -TA 3  -GB    Moving to and from a bed to a chair (including a wheelchair)?  -- 3  -TA 3  -GB    Standing up from a chair using your arms (e.g., wheelchair, bedside chair)?  -- 3  -TA 3  -GB    Climbing 3-5 steps with a railing?  -- 2  -TA 2  -GB    To walk in hospital room?  -- 3  -TA 3  -GB    AM-PAC 6 Clicks Score  -- 17  -TA 17  -GB       How much help from another is currently needed...    Putting on and taking off  regular lower body clothing? 2  -BH  --  --    Bathing (including washing, rinsing, and drying) 2  -BH  --  --    Toileting (which includes using toilet bed pan or urinal) 2  -BH  --  --    Putting on and taking off regular upper body clothing 3  -BH  --  --    Taking care of personal grooming (such as brushing teeth) 3  -BH  --  --    Eating meals 4  -BH  --  --    Score 16  -BH  --  --       Functional Assessment    Outcome Measure Options AM-PAC 6 Clicks Daily Activity (OT)  - AM-PAC 6 Clicks Basic Mobility (PT)  -TA AM-PAC 6 Clicks Basic Mobility (PT)  -      User Key  (r) = Recorded By, (t) = Taken By, (c) = Cosigned By    Initials Name Provider Type    LEONID Lester, PT Physical Therapist     Tia Dean, OTR/L Occupational Therapist    TA Pepper Enriquez, PTA Physical Therapy Assistant    MAILE Houston, PTA Physical Therapy Assistant    GILBERT Carrillo QUINTEROS/L Occupational Therapy Assistant           Time Calculation:         Time Calculation- OT     Row Name 07/13/18 1118             Time Calculation- OT    OT Start Time 0815  -KD      OT Stop Time 0923  -KD      OT Time Calculation (min) 68 min  -KD      Total Timed Code Minutes- OT 68 minute(s)  -KD      OT Received On 07/13/18  -KD        User Key  (r) = Recorded By, (t) = Taken By, (c) = Cosigned By    Initials Name Provider Type    GILBERT Carrillo QUINTEROS/L Occupational Therapy Assistant           Therapy Suggested Charges     Code   Minutes Charges    None           Therapy Charges for Today     Code Description Service Date Service Provider Modifiers Qty    71378161058 HC OT THER PROC EA 15 MIN 7/12/2018 NALLELY HazelA/L GO 1    33510607368 HC OT THERAPEUTIC ACT EA 15 MIN 7/12/2018 ALDAIR Hazel/L GO 1    26017696747 HC OT THER PROC EA 15 MIN 7/12/2018 ALDAIR Hazel/L GO 2    59139706460 HC OT THERAPEUTIC ACT EA 15 MIN 7/12/2018 ALDAIR Hazel/L GO 1    58840432129 HC OT THER PROC EA 15 MIN 7/12/2018  NALLELY HazelA/L GO 1    41151882472 HC OT THERAPEUTIC ACT EA 15 MIN 7/12/2018 NALLELY HazelA/L GO 1    75422816552 HC OT SELF CARE/MGMT/TRAIN EA 15 MIN 7/12/2018 NALLELY HazelA/L GO 1    70772349027 HC OT SELF CARE/MGMT/TRAIN EA 15 MIN 7/13/2018 NALLELY HazelA/L GO 5          OT G-codes  OT Professional Judgement Used?: Yes  OT Functional Scales Options: AM-PAC 6 Clicks Daily Activity (OT)  Score: 16  Functional Limitation: Self care  Self Care Current Status (): At least 40 percent but less than 60 percent impaired, limited or restricted  Self Care Goal Status (): At least 20 percent but less than 40 percent impaired, limited or restricted    ALDAIR Hazel/ELLIE  7/13/2018

## 2018-07-13 NOTE — PLAN OF CARE
Problem: Patient Care Overview  Goal: Plan of Care Review  Outcome: Ongoing (interventions implemented as appropriate)   07/13/18 0359   Coping/Psychosocial   Plan of Care Reviewed With patient   Plan of Care Review   Progress no change      07/13/18 0359   Coping/Psychosocial   Plan of Care Reviewed With patient   Plan of Care Review   Progress no change   OTHER   Outcome Summary poss d/c today       Problem: Fluid Volume Excess (Adult)  Goal: Identify Related Risk Factors and Signs and Symptoms  Outcome: Outcome(s) achieved Date Met: 07/13/18    Goal: Optimal Fluid Balance  Outcome: Ongoing (interventions implemented as appropriate)      Problem: Fall Risk (Adult)  Goal: Absence of Fall  Outcome: Ongoing (interventions implemented as appropriate)      Problem: Wound (Includes Pressure Injury) (Adult)  Goal: Signs and Symptoms of Listed Potential Problems Will be Absent, Minimized or Managed (Wound)  Outcome: Ongoing (interventions implemented as appropriate)      Problem: Skin Injury Risk (Adult)  Goal: Skin Health and Integrity  Outcome: Ongoing (interventions implemented as appropriate)

## 2018-07-13 NOTE — PLAN OF CARE
Problem: Patient Care Overview  Goal: Plan of Care Review  Outcome: Ongoing (interventions implemented as appropriate)   07/13/18 0476   Coping/Psychosocial   Plan of Care Reviewed With patient   Plan of Care Review   Progress improving   OTHER   Outcome Summary pt. had increased mobility this tx. and showed increased gait and bed mobility. pt. ambulated 115' with RW this tx. and had some fwd flexed posture

## 2018-07-13 NOTE — DISCHARGE SUMMARY
DISCHARGE SUMMARY    NAME: Massimo Bentley   PHYSICIAN: Royal Gutierrez MD  : 1940  MRN: [id number]    ADMITTED: 2018   DISCHARGED: 18    ADMISSION DIAGNOSES:  Present on Admission:  • Hypoxemia  • Atrial fibrillation [I48.91]  • PVD (peripheral vascular disease) (CMS/HCC)  • CKD (chronic kidney disease) stage 3, GFR 30-59 ml/min  • Hypertension  • COPD with exacerbation (CMS/HCC)  • LVH (left ventricular hypertrophy)  • Diastolic dysfunction  • Coumadin toxicity    DISCHARGE DIAGNOSES:   Principal Problem:    Acute respiratory failure with hypoxia (CMS/HCC)  Active Problems:    Atrial fibrillation [I48.91]    PVD (peripheral vascular disease) (CMS/HCC)    CKD (chronic kidney disease) stage 3, GFR 30-59 ml/min    COPD with exacerbation (CMS/HCC)    Hypertension    LVH (left ventricular hypertrophy)    Diastolic dysfunction    Coumadin toxicity    SERVICE: Medicine. Attending Royal Gutierrez MD    CONSULTS:   Consult Orders (all)     Start     Ordered    07/10/18 07  Inpatient Cardiology Consult  Once     Specialty:  Cardiology  Provider:  Emily Thacker MD    07/10/18 0738    07/07/18 2117  Hospitalist (on-call MD unless specified)  Once     Specialty:  Hospitalist  Provider:  Umm Interiano MD    18          PROCEDURES:   Imaging Results (last 7 days)     Procedure Component Value Units Date/Time    XR Chest 1 View [[id number]] Collected:  18 1022     Updated:  18 1029    Narrative:         PROCEDURE: Single chest view AP    REASON FOR EXAM:Dyspnea, I50.9 Heart failure, unspecified D68.9  Coagulation defect, unspecified T45.515A Adverse effect of  anticoagulants, initial encounter R09.02 Hypoxemia Z74.09 Other  reduced mobility Z74.09 Other reduced mobility    FINDINGS: Comparison exam dated 2018. Cardiac size  appears normal. Improvement in bilateral perihilar interstitial  changes with residual interstitial changes. Lungs are otherwise  clear. Pleural  spaces are normal. No acute osseous abnormality.      Impression:       1.  Interval improvement in bilateral perihilar interstitial  changes suggest improving pulmonary edema and/or pneumonia.    Electronically signed by:  Rashaun Martínez MD  7/12/2018 10:27 AM CDT  Workstation: [id number]    XR Chest 1 View [[id number]] Collected:  07/07/18 2000     Updated:  07/07/18 2016    Narrative:         EXAM:         Radiograph(s), Chest   VIEWS:   Portable ; 1       DATE/TIME:  7/7/2018 8:14 PM CDT                INDICATION:   cough    COMPARISON:  CXR: 4/12/18             FINDINGS:             - lines/tubes:    none     - cardiac:         size within normal limits         - mediastinum: contour within normal limits         - lungs:         Prominence of the interstitium suggests the  presence of moderate to severe pulmonary interstitial edema. No  distinct focal airspace process.             - pleura:         no evidence of  fluid                  - osseous:         unremarkable for age                  - misc.:         Impression:       CONCLUSION:        1. Prominence of the interstitium suggesting a presence of  moderate to severe pulmonary interstitial edema.                                                Electronically signed by:  MISSAEL Rea MD  7/7/2018 8:15 PM  CDT Workstation: [id number]          HISTORY OF PRESENT ILLNESS: *Copied from Umm Interiano MD's H&P*  Patient is a 78 y.o. male presents with Complaint of having shortness of air.  Patient said he has been having decreased excess tolerance due to dyspnea on exertion.  Patient denies any chest pain or palpitation.  However does complain of having lower shortness swelling.  Patient denies any orthopnea or proximal nocturnal dyspnea.  Patient has any fever or chills.  Patient does complain of having increased fatigue and weakness.  Patient occasionally complained of lightheadedness.  Patient denies any urinary complaints.  Patient states he has been taking  medication as prescribed.  Patient denies any weight loss.  No unusual bleeding have been reported.  No change in bowel habits or appetite have been reported.    DIAGNOSTIC DATA:   Lab Results (last 7 days)     Procedure Component Value Units Date/Time    Protime-INR [[id number]]  (Abnormal) Collected:  07/13/18 0539    Specimen:  Blood Updated:  07/13/18 0649     Protime 16.0 (H) Seconds      INR 1.32 (H)    Narrative:       Therapeutic range for most indications is 2.0-3.0 INR,  or 2.5-3.5 for mechanical heart valves.    Comprehensive Metabolic Panel [[id number]]  (Abnormal) Collected:  07/13/18 0539    Specimen:  Blood Updated:  07/13/18 0647     Glucose 117 (H) mg/dL      BUN 46 (H) mg/dL      Creatinine 1.49 (H) mg/dL      Sodium 139 mmol/L      Potassium 3.5 mmol/L      Chloride 96 mmol/L      CO2 34.0 (H) mmol/L      Calcium 8.7 mg/dL      Total Protein 7.6 g/dL      Albumin 3.60 g/dL      ALT (SGPT) 32 U/L      AST (SGOT) 24 U/L      Alkaline Phosphatase 87 U/L      Total Bilirubin 0.3 mg/dL      eGFR Non African Amer 46 mL/min/1.73      Globulin 4.0 (H) gm/dL      A/G Ratio 0.9 (L) g/dL      BUN/Creatinine Ratio 30.9 (H)     Anion Gap 9.0 mmol/L     Narrative:       The MDRD GFR formula is only valid for adults with stable renal function between ages 18 and 70.    Magnesium [[id number]]  (Abnormal) Collected:  07/13/18 0539    Specimen:  Blood Updated:  07/13/18 0647     Magnesium 2.6 (H) mg/dL     CBC Auto Differential [[id number]]  (Abnormal) Collected:  07/13/18 0539    Specimen:  Blood Updated:  07/13/18 0631     WBC 9.25 10*3/mm3      RBC 4.33 (L) 10*6/mm3      Hemoglobin 12.4 (L) g/dL      Hematocrit 37.4 (L) %      MCV 86.4 fL      MCH 28.6 pg      MCHC 33.2 g/dL      RDW 17.9 (H) %      RDW-SD 57.7 (H) fl      MPV 9.1 fL      Platelets 273 10*3/mm3      Neutrophil % 77.3 %      Lymphocyte % 11.1 %      Monocyte % 6.8 %      Eosinophil % 3.7 %      Basophil % 0.2 %      Immature Grans % 0.9 (H) %       Neutrophils, Absolute 7.15 10*3/mm3      Lymphocytes, Absolute 1.03 10*3/mm3      Monocytes, Absolute 0.63 10*3/mm3      Eosinophils, Absolute 0.34 10*3/mm3      Basophils, Absolute 0.02 10*3/mm3      Immature Grans, Absolute 0.08 (H) 10*3/mm3      nRBC 0.0 /100 WBC     Comprehensive Metabolic Panel [[id number]]  (Abnormal) Collected:  07/12/18 0535    Specimen:  Blood Updated:  07/12/18 0724     Glucose 106 (H) mg/dL      BUN 49 (H) mg/dL      Creatinine 1.64 (H) mg/dL      Sodium 138 mmol/L      Potassium 3.9 mmol/L      Chloride 96 mmol/L      CO2 34.0 (H) mmol/L      Calcium 8.5 mg/dL      Total Protein 7.6 g/dL      Albumin 3.60 g/dL      ALT (SGPT) 38 U/L      AST (SGOT) 33 U/L      Alkaline Phosphatase 89 U/L      Total Bilirubin 0.4 mg/dL      eGFR Non African Amer 41 (L) mL/min/1.73      Globulin 4.0 (H) gm/dL      A/G Ratio 0.9 (L) g/dL      BUN/Creatinine Ratio 29.9 (H)     Anion Gap 8.0 mmol/L     Narrative:       The MDRD GFR formula is only valid for adults with stable renal function between ages 18 and 70.    Magnesium [[id number]]  (Abnormal) Collected:  07/12/18 0535    Specimen:  Blood Updated:  07/12/18 0724     Magnesium 2.6 (H) mg/dL     Protime-INR [[id number]]  (Abnormal) Collected:  07/12/18 0535    Specimen:  Blood Updated:  07/12/18 0721     Protime 16.3 (H) Seconds      INR 1.35 (H)    Narrative:       Therapeutic range for most indications is 2.0-3.0 INR,  or 2.5-3.5 for mechanical heart valves.    CBC Auto Differential [[id number]]  (Abnormal) Collected:  07/12/18 0535    Specimen:  Blood Updated:  07/12/18 0708     WBC 9.61 10*3/mm3      RBC 4.33 (L) 10*6/mm3      Hemoglobin 12.3 (L) g/dL      Hematocrit 37.3 (L) %      MCV 86.1 fL      MCH 28.4 pg      MCHC 33.0 g/dL      RDW 18.0 (H) %      RDW-SD 56.9 (H) fl      MPV 9.2 fL      Platelets 300 10*3/mm3      Neutrophil % 74.3 %      Lymphocyte % 13.7 %      Monocyte % 6.6 %      Eosinophil % 4.7 %      Basophil % 0.3 %      Immature Grans  % 0.4 %      Neutrophils, Absolute 7.14 10*3/mm3      Lymphocytes, Absolute 1.32 10*3/mm3      Monocytes, Absolute 0.63 10*3/mm3      Eosinophils, Absolute 0.45 10*3/mm3      Basophils, Absolute 0.03 10*3/mm3      Immature Grans, Absolute 0.04 (H) 10*3/mm3     Protime-INR [[id number]]  (Abnormal) Collected:  07/11/18 0530    Specimen:  Blood Updated:  07/11/18 0618     Protime 17.0 (H) Seconds      INR 1.43 (H)    Narrative:       Therapeutic range for most indications is 2.0-3.0 INR,  or 2.5-3.5 for mechanical heart valves.    T3, Free [[id number]] Collected:  07/10/18 0718    Specimen:  Blood Updated:  07/11/18 0615     T3, Free 2.1 pg/mL     Narrative:       Performed at:  [address] [place], [place] [id number]  : Segun Biswas PhD, Phone:  [phone]    Comprehensive Metabolic Panel [[id number]]  (Abnormal) Collected:  07/11/18 0530    Specimen:  Blood Updated:  07/11/18 0605     Glucose 107 (H) mg/dL      BUN 49 (H) mg/dL      Creatinine 1.82 (H) mg/dL      Sodium 138 mmol/L      Potassium 4.1 mmol/L      Chloride 94 (L) mmol/L      CO2 36.0 (H) mmol/L      Calcium 8.4 mg/dL      Total Protein 7.8 g/dL      Albumin 3.70 g/dL      ALT (SGPT) 41 U/L      AST (SGOT) 51 U/L      Alkaline Phosphatase 88 U/L      Total Bilirubin 0.4 mg/dL      eGFR Non African Amer 36 (L) mL/min/1.73      Globulin 4.1 (H) gm/dL      A/G Ratio 0.9 (L) g/dL      BUN/Creatinine Ratio 26.9 (H)     Anion Gap 8.0 mmol/L     Narrative:       The MDRD GFR formula is only valid for adults with stable renal function between ages 18 and 70.    Magnesium [[id number]]  (Abnormal) Collected:  07/11/18 0530    Specimen:  Blood Updated:  07/11/18 0602     Magnesium 2.8 (H) mg/dL     CBC Auto Differential [[id number]]  (Abnormal) Collected:  07/11/18 0530    Specimen:  Blood Updated:  07/11/18 0552     WBC 10.27 (H) 10*3/mm3      RBC 4.52 10*6/mm3      Hemoglobin 12.8 (L) g/dL      Hematocrit 38.5 (L) %      MCV 85.2  fL      MCH 28.3 pg      MCHC 33.2 g/dL      RDW 18.2 (H) %      RDW-SD 55.7 (H) fl      MPV 9.1 fL      Platelets 274 10*3/mm3      Neutrophil % 73.5 %      Lymphocyte % 12.7 %      Monocyte % 8.1 %      Eosinophil % 4.8 %      Basophil % 0.2 %      Immature Grans % 0.7 (H) %      Neutrophils, Absolute 7.56 10*3/mm3      Lymphocytes, Absolute 1.30 10*3/mm3      Monocytes, Absolute 0.83 10*3/mm3      Eosinophils, Absolute 0.49 10*3/mm3      Basophils, Absolute 0.02 10*3/mm3      Immature Grans, Absolute 0.07 (H) 10*3/mm3     T4, Free [[id number]]  (Normal) Collected:  07/10/18 0355    Specimen:  Blood Updated:  07/10/18 0550     Free T4 0.85 ng/dL     TSH [[id number]]  (Abnormal) Collected:  07/10/18 0355    Specimen:  Blood Updated:  07/10/18 0502     TSH 6.030 (H) mIU/mL     Troponin [[id number]]  (Normal) Collected:  07/10/18 0355    Specimen:  Blood Updated:  07/10/18 0442     Troponin I <0.012 ng/mL     Comprehensive Metabolic Panel [[id number]]  (Abnormal) Collected:  07/10/18 0355    Specimen:  Blood Updated:  07/10/18 0434     Glucose 110 (H) mg/dL      BUN 43 (H) mg/dL      Creatinine 1.93 (H) mg/dL      Sodium 139 mmol/L      Potassium 3.9 mmol/L      Chloride 99 mmol/L      CO2 31.0 mmol/L      Calcium 8.3 (L) mg/dL      Total Protein 7.9 g/dL      Albumin 3.70 g/dL      ALT (SGPT) 30 U/L      AST (SGOT) 32 U/L      Alkaline Phosphatase 69 U/L      Total Bilirubin 0.3 mg/dL      eGFR Non African Amer 34 (L) mL/min/1.73      Globulin 4.2 (H) gm/dL      A/G Ratio 0.9 (L) g/dL      BUN/Creatinine Ratio 22.3     Anion Gap 9.0 mmol/L     Narrative:       The MDRD GFR formula is only valid for adults with stable renal function between ages 18 and 70.    Protime-INR [[id number]]  (Abnormal) Collected:  07/10/18 0355    Specimen:  Blood Updated:  07/10/18 0433     Protime 20.3 (H) Seconds      INR 1.81 (H)    Narrative:       Therapeutic range for most indications is 2.0-3.0 INR,  or 2.5-3.5 for mechanical heart  valves.    Magnesium [[id number]]  (Abnormal) Collected:  07/10/18 0355    Specimen:  Blood Updated:  07/10/18 0431     Magnesium 2.5 (H) mg/dL     CBC Auto Differential [[id number]]  (Abnormal) Collected:  07/10/18 0355    Specimen:  Blood Updated:  07/10/18 0405     WBC 10.99 (H) 10*3/mm3      RBC 4.35 (L) 10*6/mm3      Hemoglobin 12.2 (L) g/dL      Hematocrit 37.0 (L) %      MCV 85.1 fL      MCH 28.0 pg      MCHC 33.0 g/dL      RDW 18.3 (H) %      RDW-SD 55.8 (H) fl      MPV 9.0 fL      Platelets 279 10*3/mm3      Neutrophil % 77.8 %      Lymphocyte % 10.6 %      Monocyte % 7.7 %      Eosinophil % 3.3 %      Basophil % 0.2 %      Immature Grans % 0.4 %      Neutrophils, Absolute 8.55 10*3/mm3      Lymphocytes, Absolute 1.17 10*3/mm3      Monocytes, Absolute 0.85 10*3/mm3      Eosinophils, Absolute 0.36 10*3/mm3      Basophils, Absolute 0.02 10*3/mm3      Immature Grans, Absolute 0.04 (H) 10*3/mm3     Comprehensive Metabolic Panel [[id number]]  (Abnormal) Collected:  07/09/18 0537    Specimen:  Blood Updated:  07/09/18 0626     Glucose 122 (H) mg/dL      BUN 39 (H) mg/dL      Creatinine 1.95 (H) mg/dL      Sodium 137 mmol/L      Potassium 3.8 mmol/L      Chloride 101 mmol/L      CO2 28.0 mmol/L      Calcium 8.3 (L) mg/dL      Total Protein 7.2 g/dL      Albumin 3.40 g/dL      ALT (SGPT) 21 U/L      AST (SGOT) 25 U/L      Alkaline Phosphatase 65 U/L      Total Bilirubin 0.2 mg/dL      eGFR Non African Amer 34 (L) mL/min/1.73      Globulin 3.8 (H) gm/dL      A/G Ratio 0.9 (L) g/dL      BUN/Creatinine Ratio 20.0     Anion Gap 8.0 mmol/L     Narrative:       The MDRD GFR formula is only valid for adults with stable renal function between ages 18 and 70.    Protime-INR [[id number]]  (Abnormal) Collected:  07/09/18 0537    Specimen:  Blood Updated:  07/09/18 0610     Protime 25.8 (H) Seconds      INR 2.49 (H)    Narrative:       Therapeutic range for most indications is 2.0-3.0 INR,  or 2.5-3.5 for mechanical heart valves.     Magnesium [[id number]]  (Abnormal) Collected:  07/09/18 0537    Specimen:  Blood Updated:  07/09/18 0609     Magnesium 2.7 (H) mg/dL     CBC Auto Differential [[id number]]  (Abnormal) Collected:  07/09/18 0537    Specimen:  Blood Updated:  07/09/18 0601     WBC 11.46 (H) 10*3/mm3      RBC 3.91 (L) 10*6/mm3      Hemoglobin 11.0 (L) g/dL      Hematocrit 33.2 (L) %      MCV 84.9 fL      MCH 28.1 pg      MCHC 33.1 g/dL      RDW 18.3 (H) %      RDW-SD 55.9 (H) fl      MPV 8.5 fL      Platelets 278 10*3/mm3      Neutrophil % 83.7 (H) %      Lymphocyte % 8.6 (L) %      Monocyte % 7.0 %      Eosinophil % 0.2 %      Basophil % 0.1 %      Immature Grans % 0.4 %      Neutrophils, Absolute 9.59 (H) 10*3/mm3      Lymphocytes, Absolute 0.99 10*3/mm3      Monocytes, Absolute 0.80 10*3/mm3      Eosinophils, Absolute 0.02 10*3/mm3      Basophils, Absolute 0.01 10*3/mm3      Immature Grans, Absolute 0.05 (H) 10*3/mm3     Protime-INR [[id number]]  (Abnormal) Collected:  07/08/18 1617    Specimen:  Blood Updated:  07/08/18 1645     Protime 31.1 (H) Seconds      INR 3.19 (H)    Narrative:       Therapeutic range for most indications is 2.0-3.0 INR,  or 2.5-3.5 for mechanical heart valves.    CRE Screen by PCR - Swab, Large Intestine, Rectum [[id number]] Collected:  07/08/18 0821    Specimen:  Swab from Large Intestine, Rectum Updated:  07/08/18 0948     CRE SCREEN Not Detected     Comment: Test performed by real-time polymerase chain reaction (qPCR).        OXA 48 Strain --     Comment: Not Applicable.        IMP STRAIN --     Comment: Not Applicable        VIM STRAING --     Comment: Not Applicable        NDM Strain --     Comment: Not Applicable        KPC Strain --     Comment: Not Applicable       Protime-INR [[id number]]  (Abnormal) Collected:  07/08/18 0533    Specimen:  Blood Updated:  07/08/18 0618     Protime 37.5 (H) Seconds      INR 4.09 (H)    Narrative:       Therapeutic range for most indications is 2.0-3.0 INR,  or 2.5-3.5  for mechanical heart valves.    Magnesium [[id number]]  (Abnormal) Collected:  07/08/18 0533    Specimen:  Blood Updated:  07/08/18 0615     Magnesium 2.9 (H) mg/dL     Comprehensive Metabolic Panel [[id number]]  (Abnormal) Collected:  07/08/18 0533    Specimen:  Blood Updated:  07/08/18 0613     Glucose 129 (H) mg/dL      BUN 32 (H) mg/dL      Creatinine 1.81 (H) mg/dL      Sodium 141 mmol/L      Potassium 4.9 mmol/L      Chloride 102 mmol/L      CO2 30.0 mmol/L      Calcium 8.6 mg/dL      Total Protein 7.6 g/dL      Albumin 3.60 g/dL      ALT (SGPT) 20 (L) U/L      AST (SGOT) 24 U/L      Alkaline Phosphatase 76 U/L      Total Bilirubin 0.3 mg/dL      eGFR Non African Amer 37 (L) mL/min/1.73      Globulin 4.0 (H) gm/dL      A/G Ratio 0.9 (L) g/dL      BUN/Creatinine Ratio 17.7     Anion Gap 9.0 mmol/L     Narrative:       The MDRD GFR formula is only valid for adults with stable renal function between ages 18 and 70.    CBC Auto Differential [[id number]]  (Abnormal) Collected:  07/08/18 0533    Specimen:  Blood Updated:  07/08/18 0556     WBC 8.67 10*3/mm3      RBC 3.92 (L) 10*6/mm3      Hemoglobin 10.9 (L) g/dL      Hematocrit 33.4 (L) %      MCV 85.2 fL      MCH 27.8 pg      MCHC 32.6 g/dL      RDW 18.6 (H) %      RDW-SD 56.7 (H) fl      MPV 9.3 fL      Platelets 258 10*3/mm3      Neutrophil % 87.6 (H) %      Lymphocyte % 7.8 (L) %      Monocyte % 3.5 %      Eosinophil % 0.5 %      Basophil % 0.1 %      Immature Grans % 0.5 %      Neutrophils, Absolute 7.60 10*3/mm3      Lymphocytes, Absolute 0.68 10*3/mm3      Monocytes, Absolute 0.30 10*3/mm3      Eosinophils, Absolute 0.04 10*3/mm3      Basophils, Absolute 0.01 10*3/mm3      Immature Grans, Absolute 0.04 (H) 10*3/mm3     Legionella Antigen, Urine - Urine, Urine, Clean Catch [[id number]]  (Normal) Collected:  07/08/18 0031    Specimen:  Urine from Urine, Clean Catch Updated:  07/08/18 0058     LEGIONELLA ANTIGEN, URINE Negative    S. Pneumo Ag Urine or CSF - Urine,  Urine, Clean Catch [[id number]]  (Normal) Collected:  07/08/18 0031    Specimen:  Urine from Urine, Clean Catch Updated:  07/08/18 0058     Strep Pneumo Ag Negative    Urinalysis With Microscopic If Indicated (No Culture) - Urine, Clean Catch [[id number]]  (Normal) Collected:  07/08/18 0031    Specimen:  Urine from Urine, Clean Catch Updated:  07/08/18 0037     Color, UA Yellow     Appearance, UA Clear     pH, UA 7.0     Specific Gravity, UA 1.007     Glucose, UA Negative     Ketones, UA Negative     Bilirubin, UA Negative     Blood, UA Negative     Protein, UA Negative     Leuk Esterase, UA Negative     Nitrite, UA Negative     Urobilinogen, UA 0.2 E.U./dL    Narrative:       Urine microscopic not indicated.    Protime-INR [[id number]]  (Abnormal) Collected:  07/07/18 2002    Specimen:  Blood Updated:  07/07/18 2049     Protime 43.7 (H) Seconds      INR 5.02 (C)    Narrative:       Therapeutic range for most indications is 2.0-3.0 INR,  or 2.5-3.5 for mechanical heart valves.    BNP [[id number]]  (Abnormal) Collected:  07/07/18 2002    Specimen:  Blood Updated:  07/07/18 2028     proBNP 1,810.0 (H) pg/mL     Basic Metabolic Panel [[id number]]  (Abnormal) Collected:  07/07/18 2002    Specimen:  Blood Updated:  07/07/18 2023     Glucose 107 (H) mg/dL      BUN 33 (H) mg/dL      Creatinine 1.76 (H) mg/dL      Sodium 139 mmol/L      Potassium 4.3 mmol/L      Chloride 101 mmol/L      CO2 29.0 mmol/L      Calcium 8.3 (L) mg/dL      eGFR Non African Amer 38 (L) mL/min/1.73      BUN/Creatinine Ratio 18.8     Anion Gap 9.0 mmol/L     Narrative:       The MDRD GFR formula is only valid for adults with stable renal function between ages 18 and 70.    Blood Gas, Arterial [[id number]]  (Abnormal) Collected:  07/07/18 2012    Specimen:  Arterial Blood Updated:  07/07/18 2022     Site Right Radial     Naveen's Test Positive     pH, Arterial 7.443 pH units      pCO2, Arterial 40.4 mm Hg      pO2, Arterial 61.2 (L) mm Hg      HCO3,  Arterial 27.6 (H) mmol/L      Base Excess, Arterial 3.2 (H) mmol/L      O2 Saturation, Arterial 91.7 (L) %      Barometric Pressure for Blood Gas 752 mmHg      Modality Nasal Cannula     FIO2 28 %      Flow Rate 2.0 lpm      Ventilator Mode NA     Collected by sonia bridges rrt    CBC & Differential [[id number]] Collected:  07/07/18 2002    Specimen:  Blood Updated:  07/07/18 2013    Narrative:       The following orders were created for panel order CBC & Differential.  Procedure                               Abnormality         Status                     ---------                               -----------         ------                     CBC Auto Differential[[id number]]        Abnormal            Final result                 Please view results for these tests on the individual orders.    CBC Auto Differential [[id number]]  (Abnormal) Collected:  07/07/18 2002    Specimen:  Blood Updated:  07/07/18 2013     WBC 8.20 10*3/mm3      RBC 4.02 (L) 10*6/mm3      Hemoglobin 11.2 (L) g/dL      Hematocrit 34.0 (L) %      MCV 84.6 fL      MCH 27.9 pg      MCHC 32.9 g/dL      RDW 18.6 (H) %      RDW-SD 56.5 (H) fl      MPV 8.9 fL      Platelets 273 10*3/mm3      Neutrophil % 80.3 (H) %      Lymphocyte % 9.6 (L) %      Monocyte % 6.3 %      Eosinophil % 3.2 %      Basophil % 0.2 %      Immature Grans % 0.4 %      Neutrophils, Absolute 6.58 10*3/mm3      Lymphocytes, Absolute 0.79 10*3/mm3      Monocytes, Absolute 0.52 10*3/mm3      Eosinophils, Absolute 0.26 10*3/mm3      Basophils, Absolute 0.02 10*3/mm3      Immature Grans, Absolute 0.03 (H) 10*3/mm3           HOSPITAL COURSE:  Active Hospital Problems    Diagnosis Date Noted   • **Hypoxemia [R09.02] 07/07/2018   • LVH (left ventricular hypertrophy) [I51.7] 07/08/2018   • Diastolic dysfunction [I51.9] 07/08/2018   • Coumadin toxicity [T45.511A] 07/08/2018   • Hypertension [I10] 11/20/2017   • COPD with exacerbation (CMS/HCC) [J44.1] 11/20/2017   • CKD (chronic kidney disease)  stage 3, GFR 30-59 ml/min [N18.3] 11/01/2017   • PVD (peripheral vascular disease) (CMS/[facility]) [I73.9] 09/21/2016   • Atrial fibrillation [I48.91] [I48.91] 08/09/2016     1. Acute hypoxic respiratory failure.  7/13. Patient on RA most of yesterday. Had one small drop in oxygen, back on 1 lpm. Continue to wean.  7/12. On RA at time of exam this morning. At baseline per patient.  7/11. Weaned to 1 lpm nasal cannula. Will wean to room air.   7/10. Wean oxygen.  7/9. Good diuresis. Improvement overnight. Wean oxygen.   2. CKD.   Results from last 7 days  Lab Units 07/13/18  0539 07/12/18  0535 07/11/18  0530 07/10/18  0355 07/09/18  0537 07/08/18  0533 07/07/18 2002   CREATININE mg/dL 1.49* 1.64* 1.82* 1.93* 1.95* 1.81* 1.76*   7/10 - 7/13. Stable.  7/9. Small bump with diuresis. Close monitoring. Consult nephrology as needed.  3. COPD exacerbation.  Oxygen. Nebs.   4. Afib. Coumadin. Sotalol. Rate controlled.   5. Deconditioning. PT/OT.  6. HFpEF. Lasix. FR. Initially on IV lasix, but per cards transitioned to 40 mg PO lasix daily. Daily weights and 1500 cc fluid restriction on diet.  CHF centering outpatient.    DISCHARGE CONDITION:   Stable    DISPOSITION:  Skilled Nursing Facility (DC - External)    DISCHARGE MEDICATIONS     Discharge Medications      Changes to Medications      Instructions Start Date   cilostazol 50 MG tablet  Commonly known as:  PLETAL  What changed:  how much to take   50 mg, Oral, 2 Times Daily      oxyCODONE-acetaminophen  MG per tablet  Commonly known as:  PERCOCET  What changed:  when to take this   1 tablet, Oral, Every 8 Hours PRN      pramipexole 0.25 MG tablet  Commonly known as:  MIRAPEX  What changed:  · how much to take  · when to take this  · additional instructions   0.25 mg, Oral, 3 Times Daily      QUEtiapine 300 MG tablet  Commonly known as:  SEROquel  What changed:  Another medication with the same name was removed. Continue taking this medication, and follow the directions  you see here.   300 mg, Oral, Nightly      sotalol 80 MG tablet  Commonly known as:  BETAPACE  What changed:  Another medication with the same name was changed. Make sure you understand how and when to take each.   80 mg, Oral, Daily PRN      sotalol 80 MG tablet  Commonly known as:  BETAPACE  What changed:  · how much to take  · when to take this   80 mg, Oral, Every 12 Hours Scheduled      warfarin 5 MG tablet  Commonly known as:  COUMADIN  What changed:  · how much to take  · how to take this  · when to take this  · additional instructions   Take as directed per coumadin clinic         Continue These Medications      Instructions Start Date   albuterol 108 (90 Base) MCG/ACT inhaler  Commonly known as:  PROVENTIL HFA;VENTOLIN HFA   2 puffs, Inhalation, Every 4 Hours PRN      Alirocumab 75 MG/ML solution pen-injector  Commonly known as:  PRALUENT   75 mg, Subcutaneous, Every 14 Days      aspirin 81 MG EC tablet   81 mg, Oral, Daily      budesonide-formoterol 160-4.5 MCG/ACT inhaler  Commonly known as:  SYMBICORT   2 puffs, Inhalation, 2 Times Daily - RT      DULCOLAX 10 MG suppository  Generic drug:  bisacodyl   10 mg, Rectal, Daily PRN      ENEMA READY-TO-USE 7-19 GM/118ML enema   1 Bag, Rectal, Daily PRN, If dulcolax ineffective       enoxaparin 100 MG/ML solution syringe  Commonly known as:  LOVENOX   1 mg/kg, Subcutaneous, Every 12 Hours Scheduled      furosemide 40 MG tablet  Commonly known as:  LASIX   40 mg, Oral, Daily      guaiFENesin 600 MG 12 hr tablet  Commonly known as:  MUCINEX   1,200 mg, Oral, 2 Times Daily      magnesium oxide 400 (241.3 Mg) MG tablet tablet  Commonly known as:  MAGOX   400 mg, Oral, Nightly      MILK OF MAGNESIA 400 MG/5ML suspension  Generic drug:  magnesium hydroxide   30 mL, Oral, Daily PRN      montelukast 10 MG tablet  Commonly known as:  SINGULAIR   10 mg, Oral, Nightly      SANTYL 250 UNIT/GM ointment  Generic drug:  collagenase   Topical, Daily      THERA-TABS M PO   1  tablet, Oral, Daily      vitamin C 500 MG tablet  Commonly known as:  ASCORBIC ACID   500 mg, Oral, 2 Times Daily, For wound healing  ends:7/14/18          Stop These Medications    EXCEDRIN EXTRA STRENGTH PO            INSTRUCTIONS:  Activity:   Activity Instructions     Activity as Tolerated       PT/OT at [facility].          Diet:   Diet Instructions     Advance Diet As Tolerated       Goal HH. 1500 cc fluid restriction.          Special instructions: Patient instructed to call MD or return to ED with worsening shortness of breath, chest pain, fever greater than 100.4 degrees F or any other medical concerns..    FOLLOW UP:   Follow-up Information     Belkys Hoffman MD Follow up.    Specialty:  Family Medicine  Contact information:  [address] [place] [address]   [facility] [address] [phone]             Emily Thacker MD. Schedule an appointment as soon as possible for a visit in 1 month(s).    Specialty:  Cardiology  Contact information:  [address] [place] [address] [address]  [address]  [facility] [address] [phone]             JUANI Strange. Schedule an appointment as soon as possible for a visit in 2 week(s).    Specialty:  Cardiology  Why:  CHF centering  Contact information:  [address] [place] [address] [address]  [facility] [address] [phone]                 PENDING TEST RESULTS AT DISCHARGE      Time: Discharge 35 min          This document has been electronically signed by Royal Gutierrez MD on July 13, 2018 3:19 PM

## 2018-07-13 NOTE — THERAPY DISCHARGE NOTE
Acute Care - Physical Therapy Discharge Summary  [facility]       Patient Name: Massimo Bentley  : 1940  MRN: [id number]    Today's Date: 2018  Onset of Illness/Injury or Date of Surgery: 18    Date of Referral to PT: 18  Referring Physician: Dr. Gutierrez approved restarting OT today.       Admit Date: 2018      PT Recommendation and Plan    Visit Dx:    ICD-10-CM ICD-9-CM   1. Congestive heart failure, unspecified congestive heart failure chronicity, unspecified congestive heart failure type (CMS/[facility]) I50.9 428.0   2. Warfarin-induced coagulopathy (CMS/[facility]) D68.9 286.9    T45.515A E934.2   3. Hypoxemia R09.02 799.02   4. Impaired functional mobility, balance, gait, and endurance Z74.09 V49.89   5. Impaired mobility and activities of daily living Z74.09 799.89             Outcome Measures     Row Name 18 1257 18 0815 18 1315       How much help from another person do you currently need...    Turning from your back to your side while in flat bed without using bedrails? 4  -CA  --  --    Moving from lying on back to sitting on the side of a flat bed without bedrails? 4  -CA  --  --    Moving to and from a bed to a chair (including a wheelchair)? 3  -CA  --  --    Standing up from a chair using your arms (e.g., wheelchair, bedside chair)? 3  -CA  --  --    Climbing 3-5 steps with a railing? 2  -CA  --  --    To walk in hospital room? 3  -CA  --  --    AM-PAC 6 Clicks Score 19  -CA  --  --       How much help from another is currently needed...    Putting on and taking off regular lower body clothing?  -- 2  -KD 2  -KD    Bathing (including washing, rinsing, and drying)  -- 2  -KD 2  -KD    Toileting (which includes using toilet bed pan or urinal)  -- 2  -KD 2  -KD    Putting on and taking off regular upper body clothing  -- 3  -KD 3  -KD    Taking care of personal grooming (such as brushing teeth)  -- 3  -KD 3  -KD    Eating meals  -- 4  -KD 4  -KD    Score  -- 16   -KD 16  -KD       Functional Assessment    Outcome Measure Options AM-[facility] 6 Clicks Basic Mobility (PT)  -CA  --  --    Row Name 07/12/18 0950 07/11/18 1445 07/11/18 1300       How much help from another person do you currently need...    Turning from your back to your side while in flat bed without using bedrails? 4  -LN  -- 3  -TA    Moving from lying on back to sitting on the side of a flat bed without bedrails? 4  -LN  -- 3  -TA    Moving to and from a bed to a chair (including a wheelchair)? 3  -LN  -- 3  -TA    Standing up from a chair using your arms (e.g., wheelchair, bedside chair)? 3  -LN  -- 3  -TA    Climbing 3-5 steps with a railing? 2  -LN  -- 2  -TA    To walk in hospital room? 3  -LN  -- 3  -TA    AM-PAC 6 Clicks Score 19  -LN  -- 17  -TA       How much help from another is currently needed...    Putting on and taking off regular lower body clothing?  -- 2  -BH  --    Bathing (including washing, rinsing, and drying)  -- 2  -BH  --    Toileting (which includes using toilet bed pan or urinal)  -- 2  -BH  --    Putting on and taking off regular upper body clothing  -- 3  -BH  --    Taking care of personal grooming (such as brushing teeth)  -- 3  -BH  --    Eating meals  -- 4  -BH  --    Score  -- 16  -BH  --       Functional Assessment    Outcome Measure Options AM-PAC 6 Clicks Basic Mobility (PT)  -LN AM-[facility] 6 Clicks Daily Activity (OT)  -BH -[facility] 6 Clicks Basic Mobility (PT)  -TA    Row Name 07/11/18 1100             How much help from another person do you currently need...    Turning from your back to your side while in flat bed without using bedrails? 3  -GB      Moving from lying on back to sitting on the side of a flat bed without bedrails? 3  -GB      Moving to and from a bed to a chair (including a wheelchair)? 3  -GB      Standing up from a chair using your arms (e.g., wheelchair, bedside chair)? 3  -GB      Climbing 3-5 steps with a railing? 2  -GB      To walk in hospital room? 3  -GB       AM-PAC 6 Clicks Score 17  -GB         Functional Assessment    Outcome Measure Options AM-PAC 6 Clicks Basic Mobility (PT)  -GB        User Key  (r) = Recorded By, (t) = Taken By, (c) = Cosigned By    Initials Name Provider Type    GB Leonila Lester, PT Physical Therapist     Tia Dean, OTR/L Occupational Therapist    TA Pepper Enriquez, PTA Physical Therapy Assistant    CA Ron Carrillo, PTA Physical Therapy Assistant    MAILE Houston, PTA Physical Therapy Assistant    KD Coral Carrillo, QUINTEROS/L Occupational Therapy Assistant                PT Charges     Row Name 07/13/18 1354             Time Calculation    Start Time 1257  -CA      Stop Time 1329  -CA      Time Calculation (min) 32 min  -CA      PT Received On 07/13/18  -CA         Time Calculation- PT    Total Timed Code Minutes- PT 32 minute(s)  -CA        User Key  (r) = Recorded By, (t) = Taken By, (c) = Cosigned By    Initials Name Provider Type    CA Ron Carrillo, PTA Physical Therapy Assistant        Therapy Suggested Charges     Code   Minutes Charges    03636 (CPT®) Hc Pt Neuromusc Re Education Ea 15 Min      28223 (CPT®) Hc Pt Ther Proc Ea 15 Min      52780 (CPT®) Hc Gait Training Ea 15 Min 15 1    91142 (CPT®) Hc Pt Therapeutic Act Ea 15 Min      04702 (CPT®) Hc Pt Manual Therapy Ea 15 Min      02606 (CPT®) Hc Pt Iontophoresis Ea 15 Min      14948 (CPT®) Hc Pt Elec Stim Ea-Per 15 Min      55170 (CPT®) Hc Pt Ultrasound Ea 15 Min      71418 (CPT®) Hc Pt Self Care/Mgmt/Train Ea 15 Min      Total  15 1                PT Rehab Goals     Row Name 07/13/18 1257             Transfer Goal 1 (PT)    Activity/Assistive Device (Transfer Goal 1, PT) bed-to-chair/chair-to-bed;toilet  -CA      Marin Level/Cues Needed (Transfer Goal 1, PT) conditional independence;supervision required  -CA      Time Frame (Transfer Goal 1, PT) long term goal (LTG);5 days  -CA      Progress/Outcome (Transfer Goal 1, PT) goal met  -CA         Gait Training Goal 1  (PT)    Activity/Assistive Device (Gait Training Goal 1, PT) gait (walking locomotion);assistive device use;decrease fall risk;increase endurance/gait distance  -CA      [place] Level (Gait Training Goal 1, PT) minimum assist (75% or more patient effort)  -CA      Distance (Gait Goal 1, PT) 80  -CA      Time Frame (Gait Training Goal 1, PT) short term goal (STG);5 days  -CA      Progress/Outcome (Gait Training Goal 1, PT) goal met  -CA         Gait Training Goal (PT)    Gait Training Goal (PT) --   pt will demo problem solving skills in amb w/ RW w/ VSS  -CA      Time Frame (Gait Training Goal, PT) by discharge  -CA      Progress/Outcome (Gait Training Goal, PT) goal ongoing  -CA        User Key  (r) = Recorded By, (t) = Taken By, (c) = Cosigned By    Initials Name Provider Type Discipline    CA Ron Carrillo, PTA Physical Therapy Assistant PT              PT Discharge Summary  Anticipated Discharge Disposition (PT): skilled nursing facility  Reason for Discharge: Discharge from facility, Per MD order  Outcomes Achieved: Patient able to partially acheive established goals  Discharge Destination: SNF      Lily Lam, PT   7/13/2018

## 2018-07-13 NOTE — PROGRESS NOTES
"Anticoagulation by Pharmacy - Warfarin    Massimo Bentley is a 78 y.o.male  [Ht: 180.3 cm (71\"); Wt: 95.9 kg (211 lb 6.4 oz)] on Warfarin 4 mg PO  for indication of atrial fibrillation.    Goal INR: 2-3  Home dose: 2.5 mg nightly except 5 mg on T, T, Sat    Today's INR:   Lab Results   Component Value Date    INR 1.32 (H) 07/13/2018         Lab Results   Component Value Date    INR 1.32 (H) 07/13/2018    INR 1.35 (H) 07/12/2018    INR 1.43 (H) 07/11/2018    PROTIME 16.0 (H) 07/13/2018    PROTIME 16.3 (H) 07/12/2018    PROTIME 17.0 (H) 07/11/2018     Lab Results   Component Value Date    HGB 12.4 (L) 07/13/2018    HGB 12.3 (L) 07/12/2018    HGB 12.8 (L) 07/11/2018     Lab Results   Component Value Date    HCT 37.4 (L) 07/13/2018    HCT 37.3 (L) 07/12/2018    HCT 38.5 (L) 07/11/2018     Lab Results   Component Value Date     07/13/2018     07/12/2018     07/11/2018     Assessment/Plan:  Reviewed above labs. INR is 1.32.  INR is below goal. No changes in medication list. Concurrent medications include aspirin and cilostazol. Pt did receive dose of warfarin 4 mg last night, which was a dose change.  Will continue current dose of  4 mg. Rx will continue to follow and adjust dose accordingly.     Helen Obrien, [facility]  07/13/18 11:10 AM     "

## 2018-07-13 NOTE — PROGRESS NOTES
MEDICINE DAILY PROGRESS NOTE  NAME: Massimo Bentley  : 1940  MRN: [id number]     LOS: 6 days     PROVIDER OF SERVICE: Royal Gutierrez MD    Chief Complaint: Hypoxemia    Subjective:   HPI: Patient admitted with shortness of air, dyspnea on exertion, and decrease in exercise tolerance.    Interval History:  History taken from: patient chart RN  . Patient on 1 lpm nasal cannula. He is on and off oxygen, but overall improving. No SOA per patient.   . Patient on RA. Feeling better. No acute events overnight. No complaints today.  . Patient up to chair today. Weaning oxygen.   7/10. Patient feeling some better today.  Overall nearing baseline.  . Some improvement in dyspnea.  No acute events overnight. No complaints at time of exam.    Review of Systems:   Review of Systems   Constitutional: Positive for activity change and fatigue. Negative for appetite change and fever.   HENT: Negative for ear pain and sore throat.    Eyes: Negative for pain and visual disturbance.   Respiratory: Negative for cough and shortness of breath.    Cardiovascular: Negative for chest pain, palpitations and leg swelling.   Gastrointestinal: Negative for abdominal pain and nausea.   Endocrine: Negative for cold intolerance and heat intolerance.   Genitourinary: Negative for difficulty urinating and dysuria.   Musculoskeletal: Positive for arthralgias. Negative for gait problem.   Skin: Negative for color change and rash.   Neurological: Positive for weakness. Negative for dizziness and headaches.   Hematological: Negative for adenopathy. Does not bruise/bleed easily.   Psychiatric/Behavioral: Negative for agitation, confusion and sleep disturbance.       Objective:     Vital Signs  Vitals:    18 0833 18 1017 18 1023 18 1148   BP:       BP Location:       Patient Position:       Pulse:  66 67    Resp:     Temp:    97.7 °F (36.5 °C)   TempSrc:    Tympanic   SpO2: 95% 93% 94% 93%   Weight:        Height:           Physical Exam  Physical Exam   Constitutional: He is oriented to person, place, and time. He appears well-developed and well-nourished. No distress.   HENT:   Head: Normocephalic and atraumatic.   Right Ear: External ear normal.   Left Ear: External ear normal.   Nose: Nose normal.   Eyes: Conjunctivae and EOM are normal. Pupils are equal, round, and reactive to light.   Neck: Normal range of motion. Neck supple.   Cardiovascular: Normal rate, regular rhythm, normal heart sounds and intact distal pulses.    Pulmonary/Chest: Effort normal. No respiratory distress. He has no wheezes. He has no rales. He exhibits no tenderness.   Abdominal: Soft. Bowel sounds are normal. He exhibits no distension and no mass. There is no tenderness. There is no rebound and no guarding.   Musculoskeletal: Normal range of motion. He exhibits no edema.   Neurological: He is alert and oriented to person, place, and time.   Skin: Skin is warm and dry. No rash noted. He is not diaphoretic. No erythema. No pallor.   Psychiatric: He has a normal mood and affect. His behavior is normal.   Nursing note and vitals reviewed.      Medication Review    Current Facility-Administered Medications:   •  acetaminophen (TYLENOL) tablet 650 mg, 650 mg, Oral, Q4H PRN, Umm Interiano MD  •  aspirin EC tablet 81 mg, 81 mg, Oral, Daily, Umm Interiano MD, 81 mg at 07/13/18 0829  •  bisacodyl (DULCOLAX) suppository 10 mg, 10 mg, Rectal, Daily PRN, Umm Interiano MD  •  budesonide-formoterol (SYMBICORT) 160-4.5 MCG/ACT inhaler 2 puff, 2 puff, Inhalation, BID - RT, Umm Interiano MD, 2 puff at 07/13/18 0649  •  cilostazol (PLETAL) tablet 50 mg, 50 mg, Oral, BID AC, Umm Interiano MD, 50 mg at 07/13/18 0829  •  digoxin (LANOXIN) injection 125 mcg, 125 mcg, Intravenous, Daily, Royal Gutierrez MD, 125 mcg at 07/12/18 1440  •  docusate sodium (COLACE) capsule 200 mg, 200 mg, Oral, BID, Umm Castorenautlal  MD Jaydon, 200 mg at 07/13/18 0829  •  furosemide (LASIX) injection 40 mg, 40 mg, Intravenous, BID, Emily Thacker MD, 40 mg at 07/13/18 0829  •  ipratropium-albuterol (DUO-NEB) nebulizer solution 3 mL, 3 mL, Nebulization, Q4H - RT, Umm Interiano MD, 3 mL at 07/13/18 1017  •  magnesium hydroxide (MILK OF MAGNESIA) suspension 2400 mg/10mL 10 mL, 10 mL, Oral, Daily PRN, Umm Interiano MD, 10 mL at 07/12/18 2033  •  magnesium oxide (MAGOX) tablet 400 mg, 400 mg, Oral, Nightly, Umm Interiano MD, 400 mg at 07/12/18 2025  •  metoprolol tartrate (LOPRESSOR) injection 5 mg, 5 mg, Intravenous, Once, Royal Gutierrez MD  •  montelukast (SINGULAIR) tablet 10 mg, 10 mg, Oral, Nightly, Umm Interiano MD, 10 mg at 07/12/18 2026  •  nicotine (NICODERM CQ) 21 MG/24HR patch 1 patch, 1 patch, Transdermal, Q24H, Umm Interiano MD, 1 patch at 07/08/18 2226  •  oxyCODONE-acetaminophen (PERCOCET)  MG per tablet 1 tablet, 1 tablet, Oral, Q4H PRN, Umm Interiano MD, 1 tablet at 07/13/18 1146  •  pantoprazole (PROTONIX) EC tablet 40 mg, 40 mg, Oral, Q AM, Umm Interiano MD, 40 mg at 07/13/18 0535  •  Pharmacy to dose warfarin, , Does not apply, Continuous PRN, Umm Interiano MD  •  pramipexole (MIRAPEX) tablet 0.25 mg, 0.25 mg, Oral, TID, Umm Interiano MD, 0.25 mg at 07/13/18 0829  •  QUEtiapine (SEROquel) tablet 300 mg, 300 mg, Oral, Nightly, Umm Interiano MD, 300 mg at 07/12/18 2025  •  sodium chloride 0.9 % flush 1-10 mL, 1-10 mL, Intravenous, PRN, Umm Interiano MD  •  sotalol (BETAPACE) tablet 80 mg, 80 mg, Oral, Q12H, Umm Interiano MD, 80 mg at 07/13/18 0829  •  sotalol (BETAPACE) tablet 80 mg, 80 mg, Oral, Daily PRN, Umm Interiano MD  •  warfarin (COUMADIN) tablet 4 mg, 4 mg, Oral, Daily, Umm Interiano MD, 4 mg at 07/12/18 1705     Diagnostic Data    Lab Results (last 24 hours)     Procedure  Component Value Units Date/Time    Protime-INR [[id number]]  (Abnormal) Collected:  07/13/18 0539    Specimen:  Blood Updated:  07/13/18 0649     Protime 16.0 (H) Seconds      INR 1.32 (H)    Narrative:       Therapeutic range for most indications is 2.0-3.0 INR,  or 2.5-3.5 for mechanical heart valves.    Comprehensive Metabolic Panel [[id number]]  (Abnormal) Collected:  07/13/18 0539    Specimen:  Blood Updated:  07/13/18 0647     Glucose 117 (H) mg/dL      BUN 46 (H) mg/dL      Creatinine 1.49 (H) mg/dL      Sodium 139 mmol/L      Potassium 3.5 mmol/L      Chloride 96 mmol/L      CO2 34.0 (H) mmol/L      Calcium 8.7 mg/dL      Total Protein 7.6 g/dL      Albumin 3.60 g/dL      ALT (SGPT) 32 U/L      AST (SGOT) 24 U/L      Alkaline Phosphatase 87 U/L      Total Bilirubin 0.3 mg/dL      eGFR Non African Amer 46 mL/min/1.73      Globulin 4.0 (H) gm/dL      A/G Ratio 0.9 (L) g/dL      BUN/Creatinine Ratio 30.9 (H)     Anion Gap 9.0 mmol/L     Narrative:       The MDRD GFR formula is only valid for adults with stable renal function between ages 18 and 70.    Magnesium [[id number]]  (Abnormal) Collected:  07/13/18 0539    Specimen:  Blood Updated:  07/13/18 0647     Magnesium 2.6 (H) mg/dL     CBC Auto Differential [[id number]]  (Abnormal) Collected:  07/13/18 0539    Specimen:  Blood Updated:  07/13/18 0631     WBC 9.25 10*3/mm3      RBC 4.33 (L) 10*6/mm3      Hemoglobin 12.4 (L) g/dL      Hematocrit 37.4 (L) %      MCV 86.4 fL      MCH 28.6 pg      MCHC 33.2 g/dL      RDW 17.9 (H) %      RDW-SD 57.7 (H) fl      MPV 9.1 fL      Platelets 273 10*3/mm3      Neutrophil % 77.3 %      Lymphocyte % 11.1 %      Monocyte % 6.8 %      Eosinophil % 3.7 %      Basophil % 0.2 %      Immature Grans % 0.9 (H) %      Neutrophils, Absolute 7.15 10*3/mm3      Lymphocytes, Absolute 1.03 10*3/mm3      Monocytes, Absolute 0.63 10*3/mm3      Eosinophils, Absolute 0.34 10*3/mm3      Basophils, Absolute 0.02 10*3/mm3      Immature Grans,  Absolute 0.08 (H) 10*3/mm3      nRBC 0.0 /100 WBC           I reviewed the patient's new clinical results.    Assessment/Plan:       1. Acute hypoxic respiratory failure.  7/13. Patient on RA most of yesterday. Had one small drop in oxygen, back on 1 lpm. Continue to wean.  7/12. On RA at time of exam this morning. At baseline per patient.  7/11. Weaned to 1 lpm nasal cannula. Will wean to room air.   7/10. Wean oxygen.  7/9. Good diuresis. Improvement overnight. Wean oxygen.   2. CKD.     Results from last 7 days  Lab Units 07/13/18  0539 07/12/18  0535 07/11/18  0530 07/10/18  0355 07/09/18  0537 07/08/18  0533 07/07/18 2002   CREATININE mg/dL 1.49* 1.64* 1.82* 1.93* 1.95* 1.81* 1.76*   7/10 - 7/13. Stable.  7/9. Small bump with diuresis. Close monitoring. Consult nephrology as needed.  3. COPD exacerbation.  Oxygen. Nebs.   4. Afib. Coumadin. Sotalol. Rate controlled.   5. Deconditioning. PT/OT.  6. HFpEF. Lasix. FR.    Will monitor patient's hospital course and adjust treatment as hospital course dictates.    DVT prophylaxis: Warfarin  Code status is   Code Status and Medical Interventions:   Ordered at: 07/07/18 2320     Level Of Support Discussed With:    Patient     Code Status:    CPR     Medical Interventions (Level of Support Prior to Arrest):    Full       Plan for disposition:Where: SNF and When:  today      Time:           This document has been electronically signed by Royal Gutierrez MD on July 13, 2018 11:59 AM

## 2018-07-13 NOTE — THERAPY TREATMENT NOTE
Acute Care - Physical Therapy Treatment Note  [facility]     Patient Name: Massimo Bentley  : 1940  MRN: [id number]  Today's Date: 2018  Onset of Illness/Injury or Date of Surgery: 18  Date of Referral to PT: 18  Referring Physician: Dr. Gutierrez approved restarting OT today.     Admit Date: 2018    Visit Dx:    ICD-10-CM ICD-9-CM   1. Congestive heart failure, unspecified congestive heart failure chronicity, unspecified congestive heart failure type (CMS/HCC) I50.9 428.0   2. Warfarin-induced coagulopathy (CMS/HCC) D68.9 286.9    T45.515A E934.2   3. Hypoxemia R09.02 799.02   4. Impaired functional mobility, balance, gait, and endurance Z74.09 V49.89   5. Impaired mobility and activities of daily living Z74.09 799.89     Patient Active Problem List   Diagnosis   • Long-term (current) use of anticoagulants   • Atrial fibrillation [I48.91]   • Embolism and thrombosis of artery (CMS/HCC)   • PVD (peripheral vascular disease) (CMS/HCC)   • Tobacco dependence syndrome   • Stroke (CMS/HCC)   • Suicide (CMS/HCC)   • Osteoarthritis of multiple joints   • Hypercholesterolemia   • GERD (gastroesophageal reflux disease)   • Major depression   • Benign prostatic hyperplasia   • Typical atrial flutter (CMS/HCC)   • Non-rheumatic mitral regurgitation   • Hyperlipidemia   • Localized edema   • Atherosclerosis of artery of extremity with gangrene (CMS/HCC)   • AAA (abdominal aortic aneurysm) without rupture (CMS/HCC)   • CKD (chronic kidney disease) stage 3, GFR 30-59 ml/min   • COPD with exacerbation (CMS/HCC)   • Hypertension   • Prediabetes   • Arterial occlusion, lower extremity (CMS/HCC)   • Hypoxemia   • Warfarin-induced coagulopathy (CMS/HCC)   • LVH (left ventricular hypertrophy)   • Diastolic dysfunction   • Coumadin toxicity       Therapy Treatment          Rehabilitation Treatment Summary     Row Name 18 1257 18 0815          Treatment Time/Intention    Discipline physical  therapy assistant  -CA occupational therapy assistant  -KD     Document Type therapy note (daily note)  -CA therapy note (daily note)  -KD     Subjective Information no complaints  -CA no complaints  -KD     Mode of Treatment individual therapy;physical therapy  -CA occupational therapy  -KD     Patient/Family Observations  -- no family present  -KD     Care Plan Review  -- care plan/treatment goals reviewed  -KD     Therapy Frequency (OT Eval)  -- other (see comments)   3-14x/wk  -KD     Patient Effort  -- good  -KD     Existing Precautions/Restrictions fall  -CA2 fall;oxygen therapy device and L/min  -KD     Equipment Issued to Patient  -- gait belt  -KD     Recorded by [CA] Ron Carrillo, PTA 07/13/18 1305  [CA2] Ron Carrillo, PTA 07/13/18 1321 [KD] NALLELY HazelA/L 07/13/18 1108     Row Name 07/13/18 1257 07/13/18 0815          Vital Signs    Pre Systolic BP Rehab 120  -  -KD     Pre Treatment Diastolic BP 77  -CA 68  -KD     Pretreatment Heart Rate (beats/min) 79  -CA 70  -KD     Posttreatment Heart Rate (beats/min)  -- 82  -KD     Pre SpO2 (%) 90  -CA 94  -KD     O2 Delivery Pre Treatment room air  -CA supplemental O2  -KD     Post SpO2 (%)  -- 95  -KD     O2 Delivery Post Treatment  -- supplemental O2  -KD     Pre Patient Position  -- Supine  -KD     Intra Patient Position  -- Standing  -KD     Post Patient Position  -- Sitting  -KD     Recorded by [CA] Ron Carrillo, PTA 07/13/18 1321 [KD] NALLELY HazelA/L 07/13/18 1115     Row Name 07/13/18 1257 07/13/18 0815          Cognitive Assessment/Intervention- PT/OT    Affect/Mental Status (Cognitive) WFL  -CA WFL  -KD     Orientation Status (Cognition) oriented x 4  -CA oriented x 4  -KD     Follows Commands (Cognition)  -- follows multi-step commands  -KD     Safety Deficit (Cognitive) mild deficit  -CA  --     Personal Safety Interventions fall prevention program maintained;gait belt;nonskid shoes/slippers when out of bed  -CA fall prevention  program maintained  -KD     Recorded by [CA] Ron Carrillo, PTA 07/13/18 1321 [KD] NALLELY HazelA/L 07/13/18 1115     Row Name 07/13/18 1257 07/13/18 0815          Bed Mobility Assessment/Treatment    Bed Mobility Assessment/Treatment bed mobility (all) activities;scooting/bridging;supine-sit;sit-supine  -CA bed mobility (all) activities;rolling left;rolling right;scooting/bridging;supine-sit;sit-supine  -KD     [place] Level (Bed Mobility)  -- conditional independence  -KD     Scooting/Bridging [place] (Bed Mobility) conditional independence  -CA  --     Supine-Sit [place] (Bed Mobility) conditional independence  -CA conditional independence  -KD     Sit-Supine [place] (Bed Mobility) conditional independence  -CA  --     Bed Mobility, Safety Issues  -- decreased use of arms for pushing/pulling;decreased use of legs for bridging/pushing  -KD     Assistive Device (Bed Mobility) bed rails;head of bed elevated  -CA bed rails;head of bed elevated  -KD     Recorded by [CA] Ron Carrillo, PTA 07/13/18 1321 [KD] NALLELY HazelA/L 07/13/18 1115     Row Name 07/13/18 0815             Functional Mobility    Functional Mobility- Ind. Level supervision required  -KD      Functional Mobility- Device rolling walker  -KD      Functional Mobility-Distance (Feet) 10  -KD      Functional Mobility- Safety Issues supplemental O2  -KD      Recorded by [KD] NALLELY HazelA/L 07/13/18 1115      Row Name 07/13/18 1257 07/13/18 0815          Transfer Assessment/Treatment    Transfer Assessment/Treatment sit-stand transfer;stand-sit transfer  -CA bed-chair transfer  -KD     Recorded by [CA] Ron Carrillo, PTA 07/13/18 1351 [KD] Coral Carrillo QUINTEROS/L 07/13/18 1115     Row Name 07/13/18 1257 07/13/18 0815          Sit-Stand Transfer    Sit-Stand [place] (Transfers) supervision  -CA supervision  -KD     Assistive Device (Sit-Stand Transfers) walker, front-wheeled  -CA walker, front-wheeled  -KD      Recorded by [CA] Ron Carrillo, PTA 07/13/18 1351 [KD] Coral Carrillo, QUINTEROS/L 07/13/18 1115     Row Name 07/13/18 1257 07/13/18 0815          Stand-Sit Transfer    Stand-Sit [place] (Transfers) supervision  -CA supervision  -KD     Assistive Device (Stand-Sit Transfers) walker, front-wheeled  -CA walker, front-wheeled  -KD     Recorded by [CA] Ron Carrillo, PTA 07/13/18 1351 [KD] Coral Carrillo, QUINTEROS/L 07/13/18 1115     Row Name 07/13/18 1257             Gait/Stairs Assessment/Training    Gait/Stairs Assessment/Training gait/ambulation independence  -CA      [place] Level (Gait) contact guard  -CA      Assistive Device (Gait) walker, front-wheeled  -CA      Distance in Feet (Gait) 115  -CA      Pattern (Gait) step-through  -CA      Bilateral Gait Deviations forward flexed posture  -CA      Recorded by [CA] Ron Carrillo, PTA 07/13/18 1351      Row Name 07/13/18 0815             Bathing Assessment/Intervention    Bathing [place] Level bathing skills;lower body;upper body;contact guard assist  -KD      Assistive Devices (Bathing) long-handled sponge;bath mitt  -KD      Bathing Position edge of bed sitting  -KD      Recorded by [KD] Coral Carrillo QUINTEROS/L 07/13/18 1115      Row Name 07/13/18 0815             Upper Body Dressing Assessment/Training    Upper Body Dressing [place] Level upper body dressing skills;doff;don;other (see comments)   hospital gown  -KD      Upper Body Dressing Position edge of bed sitting  -KD      Recorded by [KD] Coral Carrillo QUINTEROS/L 07/13/18 1115      Row Name 07/13/18 0815             Lower Body Dressing Assessment/Training    Lower Body Dressing [place] Level doff;don;socks;supervision  -KD      Lower Body Dressing Position edge of bed sitting  -KD      Comment (Lower Body Dressing) Pt requires sock air to don sock on RLE  -KD      Recorded by [KD] Coral Carrillo QUINTEROS/L 07/13/18 1115      Row Name 07/13/18 0815             Grooming Assessment/Training     [place] Level (Grooming) grooming skills;hair care, combing/brushing;wash face, hands;set up;shave face  -KD      Grooming Position unsupported sitting  -KD2      Recorded by [KD] ALDAIR Hazel/L 07/13/18 1119  [KD2] ALDAIR Hazel/L 07/13/18 1115      Row Name 07/13/18 1257             Lower Extremity Seated Therapeutic Exercise    Performed, Seated Lower Extremity (Therapeutic Exercise) hip flexion/extension;LAQ (long arc quad), knee extension  -CA      Exercise Type, Seated Lower Extremity (Therapeutic Exercise) isometric contraction, static;AROM (active range of motion)  -CA      Sets/Reps Detail, Seated Lower Extremity (Therapeutic Exercise) 1x20  -CA      Recorded by [CA] Ron Carrillo, TONI 07/13/18 1351      Row Name 07/13/18 1257 07/13/18 0815          Positioning and Restraints    Pre-Treatment Position in bed  -CA in bed  -KD     Post Treatment Position bed  -CA chair  -KD     In Bed supine;call light within reach;encouraged to call for assist  -CA  --     In Chair  -- sitting;call light within reach;encouraged to call for assist;exit alarm on  -KD     Recorded by [CA] Ron Carrillo, TONI 07/13/18 1351 [KD] ALDAIR Hazel/L 07/13/18 1115     Row Name 07/13/18 1257 07/13/18 0815          Pain Scale: Numbers Pre/Post-Treatment    Pain Scale: Numbers, Pretreatment 3/10  -CA 3/10  -KD     Pain Scale: Numbers, Post-Treatment 3/10  -CA 3/10  -KD     Pain Location - Side Bilateral  -CA  --     Pain Location - Orientation lower  -CA  --     Pain Location back  -CA back  -KD     Pain Intervention(s)  -- Repositioned  -KD     Recorded by [CA] Ron Carrillo, TONI 07/13/18 1351 [KD] ALDAIR Hazel/L 07/13/18 1115     Row Name                Wound 07/07/18 2300 Right lower calf incision;other (see comments)    Wound - Properties Group Date first assessed: 07/07/18 [LC] Time first assessed: 2300 [LC] Present On Admission : yes [LC] Side: Right [LC] Orientation: lower [LC] Location: calf [LC]  Type: incision;other (see comments) [LC], healing vascular surgery wound  Additional Comments: cleaned soap and water, santyl, dry gauze, and tape covered BID per [facility] directions [LC] Recorded by:  [LC] Anais Carrillo RN 07/07/18 2317    Row Name 07/13/18 0815             Outcome Summary/Treatment Plan (OT)    Daily Summary of Progress (OT) progress toward functional goals as expected  -KD      Plan for Continued Treatment (OT) cont ot poc  -KD      Anticipated Discharge Disposition (OT) skilled nursing facility  -KD      Recorded by [KD] Coral Carrillo, QUINTEROS/L 07/13/18 1115        User Key  (r) = Recorded By, (t) = Taken By, (c) = Cosigned By    Initials Name Effective Dates Discipline     Anais Carrillo RN 10/17/16 -  Nurse    LLOYD Carrillo, TONI 03/07/18 -  PT    KD Coral Carrillo, QUINTEROS/L 03/07/18 -  OT          Wound 07/07/18 2300 Right lower calf incision;other (see comments) (Active)   Dressing Appearance dry;intact 7/13/2018  9:05 AM               PT Rehab Goals     Row Name 07/13/18 1257             Transfer Goal 1 (PT)    Activity/Assistive Device (Transfer Goal 1, PT) bed-to-chair/chair-to-bed;toilet  -CA      [place] Level/Cues Needed (Transfer Goal 1, PT) conditional independence;supervision required  -CA      Time Frame (Transfer Goal 1, PT) long term goal (LTG);5 days  -CA      Progress/Outcome (Transfer Goal 1, PT) goal met  -CA         Gait Training Goal 1 (PT)    Activity/Assistive Device (Gait Training Goal 1, PT) gait (walking locomotion);assistive device use;decrease fall risk;increase endurance/gait distance  -CA      [place] Level (Gait Training Goal 1, PT) minimum assist (75% or more patient effort)  -CA      Distance (Gait Goal 1, PT) 80  -CA      Time Frame (Gait Training Goal 1, PT) short term goal (STG);5 days  -CA      Progress/Outcome (Gait Training Goal 1, PT) goal met  -CA         Gait Training Goal (PT)    Gait Training Goal (PT) --   pt will demo problem solving skills in amb  w/ RW w/ VSS  -CA      Time Frame (Gait Training Goal, PT) by discharge  -CA      Progress/Outcome (Gait Training Goal, PT) goal ongoing  -CA        User Key  (r) = Recorded By, (t) = Taken By, (c) = Cosigned By    Initials Name Provider Type Discipline    CA Ron Carrillo PTA Physical Therapy Assistant PT          Physical Therapy Education     Title: PT OT SLP Therapies (Active)     Topic: Physical Therapy (Done)     Point: Mobility training (Done)    Learning Progress Summary     Learner Status Readiness Method Response Comment Documented by    Patient Done Acceptance E,TB VU  LN 07/12/18 1320     Active Acceptance D NR POC;mobiltiy w/ RW; balance in gait GB 07/08/18 1903          Point: Home exercise program (Done)    Learning Progress Summary     Learner Status Readiness Method Response Comment Documented by    Patient Done Acceptance E,TB   LN 07/12/18 1320          Point: Body mechanics (Done)    Learning Progress Summary     Learner Status Readiness Method Response Comment Documented by    Patient Done Acceptance E,TB   LN 07/12/18 1320          Point: Precautions (Done)    Learning Progress Summary     Learner Status Readiness Method Response Comment Documented by    Patient Done Acceptance E,TB VU  LN 07/12/18 1320     Active Acceptance D NR POC;mobiltiy w/ RW; balance in gait GB 07/08/18 1903                      User Key     Initials Effective Dates Name Provider Type Discipline     04/03/18 -  Leonila Lester, PT Physical Therapist PT     03/07/18 -  Imani Houston PTA Physical Therapy Assistant PT                    PT Recommendation and Plan     Plan of Care Reviewed With: patient  Progress: improving  Outcome Summary: pt. had increased mobility this tx. and showed increased gait and bed mobility.  pt. ambulated 115' with RW this tx. and had some fwd flexed posture          Outcome Measures     Row Name 07/13/18 1257 07/13/18 0815 07/12/18 1315       How much help from another person  do you currently need...    Turning from your back to your side while in flat bed without using bedrails? 4  -CA  --  --    Moving from lying on back to sitting on the side of a flat bed without bedrails? 4  -CA  --  --    Moving to and from a bed to a chair (including a wheelchair)? 3  -CA  --  --    Standing up from a chair using your arms (e.g., wheelchair, bedside chair)? 3  -CA  --  --    Climbing 3-5 steps with a railing? 2  -CA  --  --    To walk in hospital room? 3  -CA  --  --    AM-PAC 6 Clicks Score 19  -CA  --  --       How much help from another is currently needed...    Putting on and taking off regular lower body clothing?  -- 2  -KD 2  -KD    Bathing (including washing, rinsing, and drying)  -- 2  -KD 2  -KD    Toileting (which includes using toilet bed pan or urinal)  -- 2  -KD 2  -KD    Putting on and taking off regular upper body clothing  -- 3  -KD 3  -KD    Taking care of personal grooming (such as brushing teeth)  -- 3  -KD 3  -KD    Eating meals  -- 4  -KD 4  -KD    Score  -- 16  -KD 16  -KD       Functional Assessment    Outcome Measure Options AM-PAC 6 Clicks Basic Mobility (PT)  -CA  --  --    Row Name 07/12/18 0950 07/11/18 1445 07/11/18 1300       How much help from another person do you currently need...    Turning from your back to your side while in flat bed without using bedrails? 4  -LN  -- 3  -TA    Moving from lying on back to sitting on the side of a flat bed without bedrails? 4  -LN  -- 3  -TA    Moving to and from a bed to a chair (including a wheelchair)? 3  -LN  -- 3  -TA    Standing up from a chair using your arms (e.g., wheelchair, bedside chair)? 3  -LN  -- 3  -TA    Climbing 3-5 steps with a railing? 2  -LN  -- 2  -TA    To walk in hospital room? 3  -LN  -- 3  -TA    AM-PAC 6 Clicks Score 19  -LN  -- 17  -TA       How much help from another is currently needed...    Putting on and taking off regular lower body clothing?  -- 2  -BH  --    Bathing (including washing,  rinsing, and drying)  -- 2  -BH  --    Toileting (which includes using toilet bed pan or urinal)  -- 2  -BH  --    Putting on and taking off regular upper body clothing  -- 3  -BH  --    Taking care of personal grooming (such as brushing teeth)  -- 3  -BH  --    Eating meals  -- 4  -BH  --    Score  -- 16  -BH  --       Functional Assessment    Outcome Measure Options AM-PAC 6 Clicks Basic Mobility (PT)  - AM-PAC 6 Clicks Daily Activity (OT)  - AM-PAC 6 Clicks Basic Mobility (PT)  -TA    Row Name 07/11/18 1100             How much help from another person do you currently need...    Turning from your back to your side while in flat bed without using bedrails? 3  -GB      Moving from lying on back to sitting on the side of a flat bed without bedrails? 3  -GB      Moving to and from a bed to a chair (including a wheelchair)? 3  -GB      Standing up from a chair using your arms (e.g., wheelchair, bedside chair)? 3  -GB      Climbing 3-5 steps with a railing? 2  -GB      To walk in hospital room? 3  -GB      AM-PAC 6 Clicks Score 17  -GB         Functional Assessment    Outcome Measure Options AM-PAC 6 Clicks Basic Mobility (PT)  -        User Key  (r) = Recorded By, (t) = Taken By, (c) = Cosigned By    Initials Name Provider Type     Leonila Lester, PT Physical Therapist     Tia Dean, OTR/L Occupational Therapist    TA Pepper Enriquez, PTA Physical Therapy Assistant    LLOYD Carrillo, PTA Physical Therapy Assistant    MAILE Houston, PTA Physical Therapy Assistant    GILBERT Carrillo, QUINTEROS/L Occupational Therapy Assistant           Time Calculation:         PT Charges     Row Name 07/13/18 1354             Time Calculation    Start Time 1257  -CA      Stop Time 1329  -CA      Time Calculation (min) 32 min  -CA      PT Received On 07/13/18  -CA         Time Calculation- PT    Total Timed Code Minutes- PT 32 minute(s)  -CA        User Key  (r) = Recorded By, (t) = Taken By, (c) = Cosigned By     Initials Name Provider Type    CA Ron Carrillo PTA Physical Therapy Assistant        Therapy Suggested Charges     Code   Minutes Charges    56958 (CPT®) Hc Pt Neuromusc Re Education Ea 15 Min      76166 (CPT®) Hc Pt Ther Proc Ea 15 Min      33841 (CPT®) Hc Gait Training Ea 15 Min 15 1    60349 (CPT®) Hc Pt Therapeutic Act Ea 15 Min      56786 (CPT®) Hc Pt Manual Therapy Ea 15 Min      56814 (CPT®) Hc Pt Iontophoresis Ea 15 Min      85069 (CPT®) Hc Pt Elec Stim Ea-Per 15 Min      65466 (CPT®) Hc Pt Ultrasound Ea 15 Min      40479 (CPT®) Hc Pt Self Care/Mgmt/Train Ea 15 Min      Total  15 1        Therapy Charges for Today     Code Description Service Date Service Provider Modifiers Qty    73040590314 HC GAIT TRAINING EA 15 MIN 7/13/2018 Ron Carrillo PTA GP 1    28753537985 HC PT THER PROC EA 15 MIN 7/13/2018 Ron Carrillo PTA GP 1          PT G-Codes  PT Professional Judgement Used?: Yes  Outcome Measure Options: AM-PAC 6 Clicks Basic Mobility (PT)  Score: 17  Functional Limitation: Mobility: Walking and moving around  Mobility: Walking and Moving Around Current Status (): At least 40 percent but less than 60 percent impaired, limited or restricted  Mobility: Walking and Moving Around Goal Status (): At least 1 percent but less than 20 percent impaired, limited or restricted    Ron Carrillo PTA  7/13/2018

## 2018-07-14 PROBLEM — J96.01 ACUTE RESPIRATORY FAILURE WITH HYPOXIA (HCC): Status: ACTIVE | Noted: 2018-07-07

## 2018-07-14 NOTE — THERAPY DISCHARGE NOTE
Acute Care - Occupational Therapy Discharge Summary  [facility]     Patient Name: Massimo Bentley  : 1940  MRN: [id number]    Today's Date: 2018  Onset of Illness/Injury or Date of Surgery: 18    Date of Referral to OT: 18  Referring Physician: Dr. Gutierrez approved restarting OT today.       Admit Date: 2018        OT Recommendation and Plan    Visit Dx:    ICD-10-CM ICD-9-CM   1. Congestive heart failure, unspecified congestive heart failure chronicity, unspecified congestive heart failure type (CMS/[facility]) I50.9 428.0   2. Warfarin-induced coagulopathy (CMS/[facility]) D68.9 286.9    T45.515A E934.2   3. Hypoxemia R09.02 799.02   4. Impaired functional mobility, balance, gait, and endurance Z74.09 V49.89   5. Impaired mobility and activities of daily living Z74.09 799.89                     OT Rehab Goals     Row Name 18 0813             Transfer Goal 1 (OT)    Activity/Assistive Device (Transfer Goal 1, OT) toilet  -MR      [place] Level/Cues Needed (Transfer Goal 1, OT) supervision required  -MR      Time Frame (Transfer Goal 1, OT) long term goal (LTG);by discharge  -MR      Progress/Outcome (Transfer Goal 1, OT) goal not met  -MR         Bathing Goal 1 (OT)    Activity/Assistive Device (Bathing Goal 1, OT) bathing skills, all  -MR      [place] Level/Cues Needed (Bathing Goal 1, OT) supervision required  -MR      Time Frame (Bathing Goal 1, OT) long term goal (LTG);by discharge  -MR      Progress/Outcomes (Bathing Goal 1, OT) goal not met  -MR         Dressing Goal 1 (OT)    Activity/Assistive Device (Dressing Goal 1, OT) dressing skills, all  -MR      [place]/Cues Needed (Dressing Goal 1, OT) supervision required  -MR      Time Frame (Dressing Goal 1, OT) long term goal (LTG);by discharge  -MR      Progress/Outcome (Dressing Goal 1, OT) goal not met  -MR         Toileting Goal 1 (OT)    Activity/Device (Toileting Goal 1, OT) toileting skills, all  -MR       [place] Level/Cues Needed (Toileting Goal 1, OT) supervision required  -MR      Time Frame (Toileting Goal 1, OT) long term goal (LTG);by discharge  -MR      Progress/Outcome (Toileting Goal 1, OT) goal not met  -MR         Patient Education Goal (OT)    Activity (Patient Education Goal, OT) B HEP for increased str for mobility and ADLs independence, EC/WS and home safety/fall prevention.  -MR      [place]/Cues/Accuracy (Memory Goal 2, OT) demonstrates adequately;verbalizes understanding  -MR      Time Frame (Patient Education Goal, OT) long term goal (LTG);by discharge  -MR      Progress/Outcome (Patient Education Goal, OT) goal not met  -MR        User Key  (r) = Recorded By, (t) = Taken By, (c) = Cosigned By    Initials Name Provider Type Discipline     [place]ia NORRIS Juan, OT Occupational Therapist OT                Outcome Measures     Row Name 07/13/18 1257 07/13/18 0815 07/12/18 1315       How much help from another person do you currently need...    Turning from your back to your side while in flat bed without using bedrails? 4  -CA  --  --    Moving from lying on back to sitting on the side of a flat bed without bedrails? 4  -CA  --  --    Moving to and from a bed to a chair (including a wheelchair)? 3  -CA  --  --    Standing up from a chair using your arms (e.g., wheelchair, bedside chair)? 3  -CA  --  --    Climbing 3-5 steps with a railing? 2  -CA  --  --    To walk in hospital room? 3  -CA  --  --    AM-PAC 6 Clicks Score 19  -CA  --  --       How much help from another is currently needed...    Putting on and taking off regular lower body clothing?  -- 2  -KD 2  -KD    Bathing (including washing, rinsing, and drying)  -- 2  -KD 2  -KD    Toileting (which includes using toilet bed pan or urinal)  -- 2  -KD 2  -KD    Putting on and taking off regular upper body clothing  -- 3  -KD 3  -KD    Taking care of personal grooming (such as brushing teeth)  -- 3  -KD 3  -KD    Eating meals  -- 4  -KD 4   -KD    Score  -- 16  -KD 16  -KD       Functional Assessment    Outcome Measure Options AM-PAC 6 Clicks Basic Mobility (PT)  -CA  --  --    Row Name 07/12/18 0950 07/11/18 1445 07/11/18 1300       How much help from another person do you currently need...    Turning from your back to your side while in flat bed without using bedrails? 4  -LN  -- 3  -TA    Moving from lying on back to sitting on the side of a flat bed without bedrails? 4  -LN  -- 3  -TA    Moving to and from a bed to a chair (including a wheelchair)? 3  -LN  -- 3  -TA    Standing up from a chair using your arms (e.g., wheelchair, bedside chair)? 3  -LN  -- 3  -TA    Climbing 3-5 steps with a railing? 2  -LN  -- 2  -TA    To walk in hospital room? 3  -LN  -- 3  -TA    AM-PAC 6 Clicks Score 19  -LN  -- 17  -TA       How much help from another is currently needed...    Putting on and taking off regular lower body clothing?  -- 2  -BH  --    Bathing (including washing, rinsing, and drying)  -- 2  -BH  --    Toileting (which includes using toilet bed pan or urinal)  -- 2  -BH  --    Putting on and taking off regular upper body clothing  -- 3  -BH  --    Taking care of personal grooming (such as brushing teeth)  -- 3  -BH  --    Eating meals  -- 4  -BH  --    Score  -- 16  -BH  --       Functional Assessment    Outcome Measure Options AM-PAC 6 Clicks Basic Mobility (PT)  -LN AM-PAC 6 Clicks Daily Activity (OT)  -BH AM-PAC 6 Clicks Basic Mobility (PT)  -TA    Row Name 07/11/18 1100             How much help from another person do you currently need...    Turning from your back to your side while in flat bed without using bedrails? 3  -GB      Moving from lying on back to sitting on the side of a flat bed without bedrails? 3  -GB      Moving to and from a bed to a chair (including a wheelchair)? 3  -GB      Standing up from a chair using your arms (e.g., wheelchair, bedside chair)? 3  -GB      Climbing 3-5 steps with a railing? 2  -GB      To walk in  hospital room? 3  -GB      AM-PAC 6 Clicks Score 17  -GB         Functional Assessment    Outcome Measure Options AM-PAC 6 Clicks Basic Mobility (PT)  -GB        User Key  (r) = Recorded By, (t) = Taken By, (c) = Cosigned By    Initials Name Provider Type    LEONID Lester, PT Physical Therapist    BH Tia Dean, OTR/L Occupational Therapist    TA Pepper Enriquez, PTA Physical Therapy Assistant    CA Ron Carrillo, PTA Physical Therapy Assistant    LN Imani Houston, PTA Physical Therapy Assistant    KD Coral Carrillo, UQINTEROS/L Occupational Therapy Assistant          Therapy Suggested Charges     Code   Minutes Charges    None                 OT Discharge Summary  Anticipated Discharge Disposition (OT): skilled nursing facility  Reason for Discharge: Discharge from facility, Per MD order  Outcomes Achieved: Refer to plan of care for updates on goals achieved  Discharge Destination: SNF      Virginia Martinez, OT  7/14/2018

## 2018-07-15 ENCOUNTER — LAB REQUISITION (OUTPATIENT)
Dept: LAB | Facility: HOSPITAL | Age: 78
End: 2018-07-15

## 2018-07-15 DIAGNOSIS — Z79.01 LONG TERM CURRENT USE OF ANTICOAGULANT: ICD-10-CM

## 2018-07-15 LAB
INR PPP: 1.42 (ref 0.8–1.2)
PROTHROMBIN TIME: 16.9 SECONDS (ref 11.1–15.3)

## 2018-07-15 PROCEDURE — 85610 PROTHROMBIN TIME: CPT | Performed by: FAMILY MEDICINE

## 2018-07-16 RX ORDER — SOTALOL HYDROCHLORIDE 80 MG/1
TABLET ORAL
Qty: 270 TABLET | Refills: 2 | Status: SHIPPED | OUTPATIENT
Start: 2018-07-16 | End: 2018-01-01 | Stop reason: HOSPADM

## 2018-07-16 NOTE — PAYOR COMM NOTE
"Denys Parrish (78 y.o. Male)     Date of Birth Social Security Number Address Home Phone MRN    1940  Count includes the [facility] [address]  [facility] [address] [phone] [id number]    [place] Marital Status          Sabianist        Admission Date Admission Type Admitting Provider Attending Provider Department, Room/Bed    18 Emergency Umm Interiano MD  [address], 306/1    Discharge Date Discharge Disposition Discharge Destination        2018 Skilled Nursing Facility (DC - External)              Attending Provider:  (none)   Allergies:  Morphine And Related, Lipitor [Atorvastatin], Lortab [Hydrocodone-acetaminophen]    Isolation:  None   Infection:  None   Code Status:  Prior    Ht:  180.3 cm (71\")   Wt:  95.9 kg (211 lb 6.4 oz)    Admission Cmt:  None   Principal Problem:  Acute respiratory failure with hypoxia (CMS/HCC) [J96.01]                 Active Insurance as of 2018     Primary Coverage     Payor Plan Insurance Group Employer/Plan Group    ANTHEM MEDICARE REPLACEMENT ANTHEM MEDICARE ADVANTAGE [id number]     Payor Plan Address Payor Plan Phone Number Effective From Effective To    [address] [phone] 2017     [facility] [id number]       Subscriber Name Subscriber Birth Date Member ID       DENYS PARRISH 1940 [id number]                 Emergency Contacts      (Rel.) Home Phone Work Phone Mobile Phone    Jennifer Goncalves (Daughter) [phone]               Discharge Summary      Royal Gutierrez MD at 2018  3:19 PM          DISCHARGE SUMMARY    NAME: Denys Schaeffere Mc   PHYSICIAN: Royal Gutierrez MD  : 1940  MRN: [id number]    ADMITTED: 2018   DISCHARGED: 18    ADMISSION DIAGNOSES:  Present on Admission:  • Hypoxemia  • Atrial fibrillation [I48.91]  • PVD (peripheral vascular disease) (CMS/HCC)  • CKD (chronic kidney disease) stage 3, GFR 30-59 ml/min  • " Hypertension  • COPD with exacerbation (CMS/HCC)  • LVH (left ventricular hypertrophy)  • Diastolic dysfunction  • Coumadin toxicity    DISCHARGE DIAGNOSES:   Principal Problem:    Acute respiratory failure with hypoxia (CMS/HCC)  Active Problems:    Atrial fibrillation [I48.91]    PVD (peripheral vascular disease) (CMS/HCC)    CKD (chronic kidney disease) stage 3, GFR 30-59 ml/min    COPD with exacerbation (CMS/HCC)    Hypertension    LVH (left ventricular hypertrophy)    Diastolic dysfunction    Coumadin toxicity    SERVICE: Medicine. Attending Royal Gutierrez MD    CONSULTS:   Consult Orders (all)     Start     Ordered    07/10/18 0738  Inpatient Cardiology Consult  Once     Specialty:  Cardiology  Provider:  Emily Thacker MD    07/10/18 0738    07/07/18 2117  Hospitalist (on-call MD unless specified)  Once     Specialty:  Hospitalist  Provider:  Umm Interiano MD    07/07/18 2117          PROCEDURES:   Imaging Results (last 7 days)     Procedure Component Value Units Date/Time    XR Chest 1 View [[id number]] Collected:  07/12/18 1022     Updated:  07/12/18 1029    Narrative:         PROCEDURE: Single chest view AP    REASON FOR EXAM:Dyspnea, I50.9 Heart failure, unspecified D68.9  Coagulation defect, unspecified T45.515A Adverse effect of  anticoagulants, initial encounter R09.02 Hypoxemia Z74.09 Other  reduced mobility Z74.09 Other reduced mobility    FINDINGS: Comparison exam dated July 7, 2018. Cardiac size  appears normal. Improvement in bilateral perihilar interstitial  changes with residual interstitial changes. Lungs are otherwise  clear. Pleural spaces are normal. No acute osseous abnormality.      Impression:       1.  Interval improvement in bilateral perihilar interstitial  changes suggest improving pulmonary edema and/or pneumonia.    Electronically signed by:  Rashaun Martínez MD  7/12/2018 10:27 AM CDT  Workstation: [id number]    XR Chest 1 View [[id number]] Collected:  07/07/18 2000     Updated:   07/07/18 2016    Narrative:         EXAM:         Radiograph(s), Chest   VIEWS:   Portable ; 1       DATE/TIME:  7/7/2018 8:14 PM CDT                INDICATION:   cough    COMPARISON:  CXR: 4/12/18             FINDINGS:             - lines/tubes:    none     - cardiac:         size within normal limits         - mediastinum: contour within normal limits         - lungs:         Prominence of the interstitium suggests the  presence of moderate to severe pulmonary interstitial edema. No  distinct focal airspace process.             - pleura:         no evidence of  fluid                  - osseous:         unremarkable for age                  - misc.:         Impression:       CONCLUSION:        1. Prominence of the interstitium suggesting a presence of  moderate to severe pulmonary interstitial edema.                                                Electronically signed by:  MISSAEL Rea MD  7/7/2018 8:15 PM  CDT Workstation: [id number]          HISTORY OF PRESENT ILLNESS: *Copied from Umm Interiano MD's H&P*  Patient is a 78 y.o. male presents with Complaint of having shortness of air.  Patient said he has been having decreased excess tolerance due to dyspnea on exertion.  Patient denies any chest pain or palpitation.  However does complain of having lower shortness swelling.  Patient denies any orthopnea or proximal nocturnal dyspnea.  Patient has any fever or chills.  Patient does complain of having increased fatigue and weakness.  Patient occasionally complained of lightheadedness.  Patient denies any urinary complaints.  Patient states he has been taking medication as prescribed.  Patient denies any weight loss.  No unusual bleeding have been reported.  No change in bowel habits or appetite have been reported.    DIAGNOSTIC DATA:   Lab Results (last 7 days)     Procedure Component Value Units Date/Time    Protime-INR [[id number]]  (Abnormal) Collected:  07/13/18 0539    Specimen:  Blood Updated:  07/13/18 0649      Protime 16.0 (H) Seconds      INR 1.32 (H)    Narrative:       Therapeutic range for most indications is 2.0-3.0 INR,  or 2.5-3.5 for mechanical heart valves.    Comprehensive Metabolic Panel [[id number]]  (Abnormal) Collected:  07/13/18 0539    Specimen:  Blood Updated:  07/13/18 0647     Glucose 117 (H) mg/dL      BUN 46 (H) mg/dL      Creatinine 1.49 (H) mg/dL      Sodium 139 mmol/L      Potassium 3.5 mmol/L      Chloride 96 mmol/L      CO2 34.0 (H) mmol/L      Calcium 8.7 mg/dL      Total Protein 7.6 g/dL      Albumin 3.60 g/dL      ALT (SGPT) 32 U/L      AST (SGOT) 24 U/L      Alkaline Phosphatase 87 U/L      Total Bilirubin 0.3 mg/dL      eGFR Non African Amer 46 mL/min/1.73      Globulin 4.0 (H) gm/dL      A/G Ratio 0.9 (L) g/dL      BUN/Creatinine Ratio 30.9 (H)     Anion Gap 9.0 mmol/L     Narrative:       The MDRD GFR formula is only valid for adults with stable renal function between ages 18 and 70.    Magnesium [[id number]]  (Abnormal) Collected:  07/13/18 0539    Specimen:  Blood Updated:  07/13/18 0647     Magnesium 2.6 (H) mg/dL     CBC Auto Differential [[id number]]  (Abnormal) Collected:  07/13/18 0539    Specimen:  Blood Updated:  07/13/18 0631     WBC 9.25 10*3/mm3      RBC 4.33 (L) 10*6/mm3      Hemoglobin 12.4 (L) g/dL      Hematocrit 37.4 (L) %      MCV 86.4 fL      MCH 28.6 pg      MCHC 33.2 g/dL      RDW 17.9 (H) %      RDW-SD 57.7 (H) fl      MPV 9.1 fL      Platelets 273 10*3/mm3      Neutrophil % 77.3 %      Lymphocyte % 11.1 %      Monocyte % 6.8 %      Eosinophil % 3.7 %      Basophil % 0.2 %      Immature Grans % 0.9 (H) %      Neutrophils, Absolute 7.15 10*3/mm3      Lymphocytes, Absolute 1.03 10*3/mm3      Monocytes, Absolute 0.63 10*3/mm3      Eosinophils, Absolute 0.34 10*3/mm3      Basophils, Absolute 0.02 10*3/mm3      Immature Grans, Absolute 0.08 (H) 10*3/mm3      nRBC 0.0 /100 WBC     Comprehensive Metabolic Panel [[id number]]  (Abnormal) Collected:  07/12/18 0535    Specimen:   Blood Updated:  07/12/18 0724     Glucose 106 (H) mg/dL      BUN 49 (H) mg/dL      Creatinine 1.64 (H) mg/dL      Sodium 138 mmol/L      Potassium 3.9 mmol/L      Chloride 96 mmol/L      CO2 34.0 (H) mmol/L      Calcium 8.5 mg/dL      Total Protein 7.6 g/dL      Albumin 3.60 g/dL      ALT (SGPT) 38 U/L      AST (SGOT) 33 U/L      Alkaline Phosphatase 89 U/L      Total Bilirubin 0.4 mg/dL      eGFR Non African Amer 41 (L) mL/min/1.73      Globulin 4.0 (H) gm/dL      A/G Ratio 0.9 (L) g/dL      BUN/Creatinine Ratio 29.9 (H)     Anion Gap 8.0 mmol/L     Narrative:       The MDRD GFR formula is only valid for adults with stable renal function between ages 18 and 70.    Magnesium [[id number]]  (Abnormal) Collected:  07/12/18 0535    Specimen:  Blood Updated:  07/12/18 0724     Magnesium 2.6 (H) mg/dL     Protime-INR [[id number]]  (Abnormal) Collected:  07/12/18 0535    Specimen:  Blood Updated:  07/12/18 0721     Protime 16.3 (H) Seconds      INR 1.35 (H)    Narrative:       Therapeutic range for most indications is 2.0-3.0 INR,  or 2.5-3.5 for mechanical heart valves.    CBC Auto Differential [[id number]]  (Abnormal) Collected:  07/12/18 0535    Specimen:  Blood Updated:  07/12/18 0708     WBC 9.61 10*3/mm3      RBC 4.33 (L) 10*6/mm3      Hemoglobin 12.3 (L) g/dL      Hematocrit 37.3 (L) %      MCV 86.1 fL      MCH 28.4 pg      MCHC 33.0 g/dL      RDW 18.0 (H) %      RDW-SD 56.9 (H) fl      MPV 9.2 fL      Platelets 300 10*3/mm3      Neutrophil % 74.3 %      Lymphocyte % 13.7 %      Monocyte % 6.6 %      Eosinophil % 4.7 %      Basophil % 0.3 %      Immature Grans % 0.4 %      Neutrophils, Absolute 7.14 10*3/mm3      Lymphocytes, Absolute 1.32 10*3/mm3      Monocytes, Absolute 0.63 10*3/mm3      Eosinophils, Absolute 0.45 10*3/mm3      Basophils, Absolute 0.03 10*3/mm3      Immature Grans, Absolute 0.04 (H) 10*3/mm3     Protime-INR [[id number]]  (Abnormal) Collected:  07/11/18 0530    Specimen:  Blood Updated:  07/11/18  0618     Protime 17.0 (H) Seconds      INR 1.43 (H)    Narrative:       Therapeutic range for most indications is 2.0-3.0 INR,  or 2.5-3.5 for mechanical heart valves.    T3, Free [[id number]] Collected:  07/10/18 0718    Specimen:  Blood Updated:  07/11/18 0615     T3, Free 2.1 pg/mL     Narrative:       Performed at:  [address] [place] [address]  [id number]  : Segun Biswas PhD, Phone:  [phone]    Comprehensive Metabolic Panel [[id number]]  (Abnormal) Collected:  07/11/18 0530    Specimen:  Blood Updated:  07/11/18 0605     Glucose 107 (H) mg/dL      BUN 49 (H) mg/dL      Creatinine 1.82 (H) mg/dL      Sodium 138 mmol/L      Potassium 4.1 mmol/L      Chloride 94 (L) mmol/L      CO2 36.0 (H) mmol/L      Calcium 8.4 mg/dL      Total Protein 7.8 g/dL      Albumin 3.70 g/dL      ALT (SGPT) 41 U/L      AST (SGOT) 51 U/L      Alkaline Phosphatase 88 U/L      Total Bilirubin 0.4 mg/dL      eGFR Non African Amer 36 (L) mL/min/1.73      Globulin 4.1 (H) gm/dL      A/G Ratio 0.9 (L) g/dL      BUN/Creatinine Ratio 26.9 (H)     Anion Gap 8.0 mmol/L     Narrative:       The MDRD GFR formula is only valid for adults with stable renal function between ages 18 and 70.    Magnesium [[id number]]  (Abnormal) Collected:  07/11/18 0530    Specimen:  Blood Updated:  07/11/18 0602     Magnesium 2.8 (H) mg/dL     CBC Auto Differential [[id number]]  (Abnormal) Collected:  07/11/18 0530    Specimen:  Blood Updated:  07/11/18 0552     WBC 10.27 (H) 10*3/mm3      RBC 4.52 10*6/mm3      Hemoglobin 12.8 (L) g/dL      Hematocrit 38.5 (L) %      MCV 85.2 fL      MCH 28.3 pg      MCHC 33.2 g/dL      RDW 18.2 (H) %      RDW-SD 55.7 (H) fl      MPV 9.1 fL      Platelets 274 10*3/mm3      Neutrophil % 73.5 %      Lymphocyte % 12.7 %      Monocyte % 8.1 %      Eosinophil % 4.8 %      Basophil % 0.2 %      Immature Grans % 0.7 (H) %      Neutrophils, Absolute 7.56 10*3/mm3      Lymphocytes, Absolute 1.30 10*3/mm3       Monocytes, Absolute 0.83 10*3/mm3      Eosinophils, Absolute 0.49 10*3/mm3      Basophils, Absolute 0.02 10*3/mm3      Immature Grans, Absolute 0.07 (H) 10*3/mm3     T4, Free [[id number]]  (Normal) Collected:  07/10/18 0355    Specimen:  Blood Updated:  07/10/18 0550     Free T4 0.85 ng/dL     TSH [[id number]]  (Abnormal) Collected:  07/10/18 0355    Specimen:  Blood Updated:  07/10/18 0502     TSH 6.030 (H) mIU/mL     Troponin [[id number]]  (Normal) Collected:  07/10/18 0355    Specimen:  Blood Updated:  07/10/18 0442     Troponin I <0.012 ng/mL     Comprehensive Metabolic Panel [[id number]]  (Abnormal) Collected:  07/10/18 0355    Specimen:  Blood Updated:  07/10/18 0434     Glucose 110 (H) mg/dL      BUN 43 (H) mg/dL      Creatinine 1.93 (H) mg/dL      Sodium 139 mmol/L      Potassium 3.9 mmol/L      Chloride 99 mmol/L      CO2 31.0 mmol/L      Calcium 8.3 (L) mg/dL      Total Protein 7.9 g/dL      Albumin 3.70 g/dL      ALT (SGPT) 30 U/L      AST (SGOT) 32 U/L      Alkaline Phosphatase 69 U/L      Total Bilirubin 0.3 mg/dL      eGFR Non African Amer 34 (L) mL/min/1.73      Globulin 4.2 (H) gm/dL      A/G Ratio 0.9 (L) g/dL      BUN/Creatinine Ratio 22.3     Anion Gap 9.0 mmol/L     Narrative:       The MDRD GFR formula is only valid for adults with stable renal function between ages 18 and 70.    Protime-INR [[id number]]  (Abnormal) Collected:  07/10/18 0355    Specimen:  Blood Updated:  07/10/18 0433     Protime 20.3 (H) Seconds      INR 1.81 (H)    Narrative:       Therapeutic range for most indications is 2.0-3.0 INR,  or 2.5-3.5 for mechanical heart valves.    Magnesium [[id number]]  (Abnormal) Collected:  07/10/18 0355    Specimen:  Blood Updated:  07/10/18 0431     Magnesium 2.5 (H) mg/dL     CBC Auto Differential [[id number]]  (Abnormal) Collected:  07/10/18 0355    Specimen:  Blood Updated:  07/10/18 0405     WBC 10.99 (H) 10*3/mm3      RBC 4.35 (L) 10*6/mm3      Hemoglobin 12.2 (L) g/dL      Hematocrit  37.0 (L) %      MCV 85.1 fL      MCH 28.0 pg      MCHC 33.0 g/dL      RDW 18.3 (H) %      RDW-SD 55.8 (H) fl      MPV 9.0 fL      Platelets 279 10*3/mm3      Neutrophil % 77.8 %      Lymphocyte % 10.6 %      Monocyte % 7.7 %      Eosinophil % 3.3 %      Basophil % 0.2 %      Immature Grans % 0.4 %      Neutrophils, Absolute 8.55 10*3/mm3      Lymphocytes, Absolute 1.17 10*3/mm3      Monocytes, Absolute 0.85 10*3/mm3      Eosinophils, Absolute 0.36 10*3/mm3      Basophils, Absolute 0.02 10*3/mm3      Immature Grans, Absolute 0.04 (H) 10*3/mm3     Comprehensive Metabolic Panel [[id number]]  (Abnormal) Collected:  07/09/18 0537    Specimen:  Blood Updated:  07/09/18 0626     Glucose 122 (H) mg/dL      BUN 39 (H) mg/dL      Creatinine 1.95 (H) mg/dL      Sodium 137 mmol/L      Potassium 3.8 mmol/L      Chloride 101 mmol/L      CO2 28.0 mmol/L      Calcium 8.3 (L) mg/dL      Total Protein 7.2 g/dL      Albumin 3.40 g/dL      ALT (SGPT) 21 U/L      AST (SGOT) 25 U/L      Alkaline Phosphatase 65 U/L      Total Bilirubin 0.2 mg/dL      eGFR Non African Amer 34 (L) mL/min/1.73      Globulin 3.8 (H) gm/dL      A/G Ratio 0.9 (L) g/dL      BUN/Creatinine Ratio 20.0     Anion Gap 8.0 mmol/L     Narrative:       The MDRD GFR formula is only valid for adults with stable renal function between ages 18 and 70.    Protime-INR [[id number]]  (Abnormal) Collected:  07/09/18 0537    Specimen:  Blood Updated:  07/09/18 0610     Protime 25.8 (H) Seconds      INR 2.49 (H)    Narrative:       Therapeutic range for most indications is 2.0-3.0 INR,  or 2.5-3.5 for mechanical heart valves.    Magnesium [[id number]]  (Abnormal) Collected:  07/09/18 0537    Specimen:  Blood Updated:  07/09/18 0609     Magnesium 2.7 (H) mg/dL     CBC Auto Differential [[id number]]  (Abnormal) Collected:  07/09/18 0537    Specimen:  Blood Updated:  07/09/18 0601     WBC 11.46 (H) 10*3/mm3      RBC 3.91 (L) 10*6/mm3      Hemoglobin 11.0 (L) g/dL      Hematocrit 33.2 (L)  %      MCV 84.9 fL      MCH 28.1 pg      MCHC 33.1 g/dL      RDW 18.3 (H) %      RDW-SD 55.9 (H) fl      MPV 8.5 fL      Platelets 278 10*3/mm3      Neutrophil % 83.7 (H) %      Lymphocyte % 8.6 (L) %      Monocyte % 7.0 %      Eosinophil % 0.2 %      Basophil % 0.1 %      Immature Grans % 0.4 %      Neutrophils, Absolute 9.59 (H) 10*3/mm3      Lymphocytes, Absolute 0.99 10*3/mm3      Monocytes, Absolute 0.80 10*3/mm3      Eosinophils, Absolute 0.02 10*3/mm3      Basophils, Absolute 0.01 10*3/mm3      Immature Grans, Absolute 0.05 (H) 10*3/mm3     Protime-INR [[id number]]  (Abnormal) Collected:  07/08/18 1617    Specimen:  Blood Updated:  07/08/18 1645     Protime 31.1 (H) Seconds      INR 3.19 (H)    Narrative:       Therapeutic range for most indications is 2.0-3.0 INR,  or 2.5-3.5 for mechanical heart valves.    CRE Screen by PCR - Swab, Large Intestine, Rectum [[id number]] Collected:  07/08/18 0821    Specimen:  Swab from Large Intestine, Rectum Updated:  07/08/18 0948     CRE SCREEN Not Detected     Comment: Test performed by real-time polymerase chain reaction (qPCR).        OXA 48 Strain --     Comment: Not Applicable.        IMP STRAIN --     Comment: Not Applicable        VIM STRAING --     Comment: Not Applicable        NDM Strain --     Comment: Not Applicable        KPC Strain --     Comment: Not Applicable       Protime-INR [[id number]]  (Abnormal) Collected:  07/08/18 0533    Specimen:  Blood Updated:  07/08/18 0618     Protime 37.5 (H) Seconds      INR 4.09 (H)    Narrative:       Therapeutic range for most indications is 2.0-3.0 INR,  or 2.5-3.5 for mechanical heart valves.    Magnesium [[id number]]  (Abnormal) Collected:  07/08/18 0533    Specimen:  Blood Updated:  07/08/18 0615     Magnesium 2.9 (H) mg/dL     Comprehensive Metabolic Panel [[id number]]  (Abnormal) Collected:  07/08/18 0533    Specimen:  Blood Updated:  07/08/18 0613     Glucose 129 (H) mg/dL      BUN 32 (H) mg/dL      Creatinine 1.81 (H)  mg/dL      Sodium 141 mmol/L      Potassium 4.9 mmol/L      Chloride 102 mmol/L      CO2 30.0 mmol/L      Calcium 8.6 mg/dL      Total Protein 7.6 g/dL      Albumin 3.60 g/dL      ALT (SGPT) 20 (L) U/L      AST (SGOT) 24 U/L      Alkaline Phosphatase 76 U/L      Total Bilirubin 0.3 mg/dL      eGFR Non African Amer 37 (L) mL/min/1.73      Globulin 4.0 (H) gm/dL      A/G Ratio 0.9 (L) g/dL      BUN/Creatinine Ratio 17.7     Anion Gap 9.0 mmol/L     Narrative:       The MDRD GFR formula is only valid for adults with stable renal function between ages 18 and 70.    CBC Auto Differential [[id number]]  (Abnormal) Collected:  07/08/18 0533    Specimen:  Blood Updated:  07/08/18 0556     WBC 8.67 10*3/mm3      RBC 3.92 (L) 10*6/mm3      Hemoglobin 10.9 (L) g/dL      Hematocrit 33.4 (L) %      MCV 85.2 fL      MCH 27.8 pg      MCHC 32.6 g/dL      RDW 18.6 (H) %      RDW-SD 56.7 (H) fl      MPV 9.3 fL      Platelets 258 10*3/mm3      Neutrophil % 87.6 (H) %      Lymphocyte % 7.8 (L) %      Monocyte % 3.5 %      Eosinophil % 0.5 %      Basophil % 0.1 %      Immature Grans % 0.5 %      Neutrophils, Absolute 7.60 10*3/mm3      Lymphocytes, Absolute 0.68 10*3/mm3      Monocytes, Absolute 0.30 10*3/mm3      Eosinophils, Absolute 0.04 10*3/mm3      Basophils, Absolute 0.01 10*3/mm3      Immature Grans, Absolute 0.04 (H) 10*3/mm3     Legionella Antigen, Urine - Urine, Urine, Clean Catch [[id number]]  (Normal) Collected:  07/08/18 0031    Specimen:  Urine from Urine, Clean Catch Updated:  07/08/18 0058     LEGIONELLA ANTIGEN, URINE Negative    S. Pneumo Ag Urine or CSF - Urine, Urine, Clean Catch [[id number]]  (Normal) Collected:  07/08/18 0031    Specimen:  Urine from Urine, Clean Catch Updated:  07/08/18 0058     Strep Pneumo Ag Negative    Urinalysis With Microscopic If Indicated (No Culture) - Urine, Clean Catch [[id number]]  (Normal) Collected:  07/08/18 0031    Specimen:  Urine from Urine, Clean Catch Updated:  07/08/18 0037      Color, UA Yellow     Appearance, UA Clear     pH, UA 7.0     Specific Gravity, UA 1.007     Glucose, UA Negative     Ketones, UA Negative     Bilirubin, UA Negative     Blood, UA Negative     Protein, UA Negative     Leuk Esterase, UA Negative     Nitrite, UA Negative     Urobilinogen, UA 0.2 E.U./dL    Narrative:       Urine microscopic not indicated.    Protime-INR [[id number]]  (Abnormal) Collected:  07/07/18 2002    Specimen:  Blood Updated:  07/07/18 2049     Protime 43.7 (H) Seconds      INR 5.02 (C)    Narrative:       Therapeutic range for most indications is 2.0-3.0 INR,  or 2.5-3.5 for mechanical heart valves.    BNP [[id number]]  (Abnormal) Collected:  07/07/18 2002    Specimen:  Blood Updated:  07/07/18 2028     proBNP 1,810.0 (H) pg/mL     Basic Metabolic Panel [[id number]]  (Abnormal) Collected:  07/07/18 2002    Specimen:  Blood Updated:  07/07/18 2023     Glucose 107 (H) mg/dL      BUN 33 (H) mg/dL      Creatinine 1.76 (H) mg/dL      Sodium 139 mmol/L      Potassium 4.3 mmol/L      Chloride 101 mmol/L      CO2 29.0 mmol/L      Calcium 8.3 (L) mg/dL      eGFR Non African Amer 38 (L) mL/min/1.73      BUN/Creatinine Ratio 18.8     Anion Gap 9.0 mmol/L     Narrative:       The MDRD GFR formula is only valid for adults with stable renal function between ages 18 and 70.    Blood Gas, Arterial [[id number]]  (Abnormal) Collected:  07/07/18 2012    Specimen:  Arterial Blood Updated:  07/07/18 2022     Site Right Radial     Naveen's Test Positive     pH, Arterial 7.443 pH units      pCO2, Arterial 40.4 mm Hg      pO2, Arterial 61.2 (L) mm Hg      HCO3, Arterial 27.6 (H) mmol/L      Base Excess, Arterial 3.2 (H) mmol/L      O2 Saturation, Arterial 91.7 (L) %      Barometric Pressure for Blood Gas 752 mmHg      Modality Nasal Cannula     FIO2 28 %      Flow Rate 2.0 lpm      Ventilator Mode NA     Collected by sonia bridges rrt    CBC & Differential [[id number]] Collected:  07/07/18 2002    Specimen:  Blood Updated:   07/07/18 2013    Narrative:       The following orders were created for panel order CBC & Differential.  Procedure                               Abnormality         Status                     ---------                               -----------         ------                     CBC Auto Differential[[id number]]        Abnormal            Final result                 Please view results for these tests on the individual orders.    CBC Auto Differential [[id number]]  (Abnormal) Collected:  07/07/18 2002    Specimen:  Blood Updated:  07/07/18 2013     WBC 8.20 10*3/mm3      RBC 4.02 (L) 10*6/mm3      Hemoglobin 11.2 (L) g/dL      Hematocrit 34.0 (L) %      MCV 84.6 fL      MCH 27.9 pg      MCHC 32.9 g/dL      RDW 18.6 (H) %      RDW-SD 56.5 (H) fl      MPV 8.9 fL      Platelets 273 10*3/mm3      Neutrophil % 80.3 (H) %      Lymphocyte % 9.6 (L) %      Monocyte % 6.3 %      Eosinophil % 3.2 %      Basophil % 0.2 %      Immature Grans % 0.4 %      Neutrophils, Absolute 6.58 10*3/mm3      Lymphocytes, Absolute 0.79 10*3/mm3      Monocytes, Absolute 0.52 10*3/mm3      Eosinophils, Absolute 0.26 10*3/mm3      Basophils, Absolute 0.02 10*3/mm3      Immature Grans, Absolute 0.03 (H) 10*3/mm3           HOSPITAL COURSE:  Active Hospital Problems    Diagnosis Date Noted   • **Hypoxemia [R09.02] 07/07/2018   • LVH (left ventricular hypertrophy) [I51.7] 07/08/2018   • Diastolic dysfunction [I51.9] 07/08/2018   • Coumadin toxicity [T45.511A] 07/08/2018   • Hypertension [I10] 11/20/2017   • COPD with exacerbation (CMS/[facility]) [J44.1] 11/20/2017   • CKD (chronic kidney disease) stage 3, GFR 30-59 ml/min [N18.3] 11/01/2017   • PVD (peripheral vascular disease) (CMS/[facility]) [I73.9] 09/21/2016   • Atrial fibrillation [I48.91] [I48.91] 08/09/2016     1. Acute hypoxic respiratory failure.  7/13. Patient on RA most of yesterday. Had one small drop in oxygen, back on 1 lpm. Continue to wean.  7/12. On RA at time of exam this morning. At baseline  per patient.  7/11. Weaned to 1 lpm nasal cannula. Will wean to room air.   7/10. Wean oxygen.  7/9. Good diuresis. Improvement overnight. Wean oxygen.   2. CKD.   Results from last 7 days  Lab Units 07/13/18  0539 07/12/18  0535 07/11/18  0530 07/10/18  0355 07/09/18  0537 07/08/18  0533 07/07/18 2002   CREATININE mg/dL 1.49* 1.64* 1.82* 1.93* 1.95* 1.81* 1.76*   7/10 - 7/13. Stable.  7/9. Small bump with diuresis. Close monitoring. Consult nephrology as needed.  3. COPD exacerbation.  Oxygen. Nebs.   4. Afib. Coumadin. Sotalol. Rate controlled.   5. Deconditioning. PT/OT.  6. HFpEF. Lasix. FR. Initially on IV lasix, but per cards transitioned to 40 mg PO lasix daily. Daily weights and 1500 cc fluid restriction on diet.  CHF centering outpatient.    DISCHARGE CONDITION:   Stable    DISPOSITION:  Skilled Nursing Facility (DC - External)    DISCHARGE MEDICATIONS     Discharge Medications      Changes to Medications      Instructions Start Date   cilostazol 50 MG tablet  Commonly known as:  PLETAL  What changed:  how much to take   50 mg, Oral, 2 Times Daily      oxyCODONE-acetaminophen  MG per tablet  Commonly known as:  PERCOCET  What changed:  when to take this   1 tablet, Oral, Every 8 Hours PRN      pramipexole 0.25 MG tablet  Commonly known as:  MIRAPEX  What changed:  · how much to take  · when to take this  · additional instructions   0.25 mg, Oral, 3 Times Daily      QUEtiapine 300 MG tablet  Commonly known as:  SEROquel  What changed:  Another medication with the same name was removed. Continue taking this medication, and follow the directions you see here.   300 mg, Oral, Nightly      sotalol 80 MG tablet  Commonly known as:  BETAPACE  What changed:  Another medication with the same name was changed. Make sure you understand how and when to take each.   80 mg, Oral, Daily PRN      sotalol 80 MG tablet  Commonly known as:  BETAPACE  What changed:  · how much to take  · when to take this   80 mg, Oral,  Every 12 Hours Scheduled      warfarin 5 MG tablet  Commonly known as:  COUMADIN  What changed:  · how much to take  · how to take this  · when to take this  · additional instructions   Take as directed per coumadin clinic         Continue These Medications      Instructions Start Date   albuterol 108 (90 Base) MCG/ACT inhaler  Commonly known as:  PROVENTIL HFA;VENTOLIN HFA   2 puffs, Inhalation, Every 4 Hours PRN      Alirocumab 75 MG/ML solution pen-injector  Commonly known as:  PRALUENT   75 mg, Subcutaneous, Every 14 Days      aspirin 81 MG EC tablet   81 mg, Oral, Daily      budesonide-formoterol 160-4.5 MCG/ACT inhaler  Commonly known as:  SYMBICORT   2 puffs, Inhalation, 2 Times Daily - RT      DULCOLAX 10 MG suppository  Generic drug:  bisacodyl   10 mg, Rectal, Daily PRN      ENEMA READY-TO-USE 7-19 GM/118ML enema   1 Bag, Rectal, Daily PRN, If dulcolax ineffective       enoxaparin 100 MG/ML solution syringe  Commonly known as:  LOVENOX   1 mg/kg, Subcutaneous, Every 12 Hours Scheduled      furosemide 40 MG tablet  Commonly known as:  LASIX   40 mg, Oral, Daily      guaiFENesin 600 MG 12 hr tablet  Commonly known as:  MUCINEX   1,200 mg, Oral, 2 Times Daily      magnesium oxide 400 (241.3 Mg) MG tablet tablet  Commonly known as:  MAGOX   400 mg, Oral, Nightly      MILK OF MAGNESIA 400 MG/5ML suspension  Generic drug:  magnesium hydroxide   30 mL, Oral, Daily PRN      montelukast 10 MG tablet  Commonly known as:  SINGULAIR   10 mg, Oral, Nightly      SANTYL 250 UNIT/GM ointment  Generic drug:  collagenase   Topical, Daily      THERA-TABS M PO   1 tablet, Oral, Daily      vitamin C 500 MG tablet  Commonly known as:  ASCORBIC ACID   500 mg, Oral, 2 Times Daily, For wound healing  ends:7/14/18          Stop These Medications    EXCEDRIN EXTRA STRENGTH PO            INSTRUCTIONS:  Activity:   Activity Instructions     Activity as Tolerated       PT/OT at [facility].          Diet:   Diet Instructions     Advance Diet  As Tolerated       Goal HH. 1500 cc fluid restriction.          Special instructions: Patient instructed to call MD or return to ED with worsening shortness of breath, chest pain, fever greater than 100.4 degrees F or any other medical concerns..    FOLLOW UP:   Follow-up Information     Belkys Hoffman MD Follow up.    Specialty:  Family Medicine  Contact information:  [address] [place] [address]   [facility] [address] [phone]             Emily Thacker MD. Schedule an appointment as soon as possible for a visit in 1 month(s).    Specialty:  Cardiology  Contact information:  [address] [place] [address] [address]  [facility] [address]  [place] [id number] [phone]             JUANI Strange. Schedule an appointment as soon as possible for a visit in 2 week(s).    Specialty:  Cardiology  Why:  CHF centering  Contact information:  [address] [place] [address] [address]  [facility] [address] [phone]                 PENDING TEST RESULTS AT DISCHARGE      Time: Discharge 35 min          This document has been electronically signed by Royal Gutierrez MD on July 13, 2018 3:19 PM                  Electronically signed by Royal Gutierrez MD at 7/14/2018  4:46 PM

## 2018-07-17 ENCOUNTER — LAB REQUISITION (OUTPATIENT)
Dept: LAB | Facility: HOSPITAL | Age: 78
End: 2018-07-17

## 2018-07-17 DIAGNOSIS — Z79.01 LONG TERM CURRENT USE OF ANTICOAGULANT: ICD-10-CM

## 2018-07-17 LAB
INR PPP: 1.82 (ref 0.8–1.2)
PROTHROMBIN TIME: 20.4 SECONDS (ref 11.1–15.3)

## 2018-07-17 PROCEDURE — 85610 PROTHROMBIN TIME: CPT | Performed by: FAMILY MEDICINE

## 2018-07-17 PROCEDURE — 36415 COLL VENOUS BLD VENIPUNCTURE: CPT | Performed by: FAMILY MEDICINE

## 2018-07-20 NOTE — TELEPHONE ENCOUNTER
Kendall with physical therapy has called and said he just completed this patients eval. They will be seeing him 2 times a week for four weeks.

## 2018-07-20 NOTE — PROGRESS NOTES
Received above INR from Hanh SAMAYOA [facility] over the phone who is still in pt's home. Spoke with pt who states he will be on Clindamycin for 4-5 more day. PT was dc'd from NH yesterday and took 7.5mg last night and was given Lovenox before being dc'd. RN called [facility] and got pt's dosing from 7/13-7/18 from Majo SAMAYOA. Spoke with pt and gave him dosing instructions which he repeated back correctly. Spoke with Jo Ann SAMAYOA [facility] who was given dosing and to recheck INR on 7/23.  repeated back correctly. Patient instructed regarding medication; results given and questions answered. Nutritional counseling given.  Dietary factors affecting therapy addressed.  Patient instructed to monitor for excessive bruising or bleeding. Jo Ann repeated back correctly.

## 2018-07-23 NOTE — PROGRESS NOTES
Received above INR from Cat SAMAYOA [facility] over the phone. PT told RN on Friday he had 4-5 days left of Clindamycin. Cat counted pt's Clindamycin today and it appears he has 4.5 days left. Unsure if pt took any over the weekend or if miscounted on Friday. Denies any other med changes or bleeding issues. Denies any s/s of blood clot. Adjusted pt's dose and instructed to increase vit k today. Patient instructed regarding medication; results given and questions answered. Nutritional counseling given.  Dietary factors affecting therapy addressed.  Patient instructed to monitor for excessive bruising or bleeding. Cat repeated dose back correctly and will recheck INR on Wednesday, 7/25. Cat will inform pt/family.

## 2018-07-25 NOTE — PROGRESS NOTES
"Received above INR from Hanh SAMAYOA [facility] over the phone who is no longer in pt's home. Hanh states pt he told her the Clindamycin was an old medication he had around the house and actually started taking it on Sunday when his leg was hurting \"in case it was infected.\" Hanh states pt had 4 days of it and has taken three. PT states he has been taking Ibuprofen for pain even though he was been advised against it. Denies alcohol. Instructed pt on dosing and to increase vit k today. PT repeated back correctly. Patient instructed regarding medication; results given and questions answered. Nutritional counseling given.  Dietary factors affecting therapy addressed.  Patient instructed to monitor for excessive bruising or bleeding. Spoke with Millicent SAMAYOA [facility] over the phone who was instructed on dosing and recheck INR on 7/27. Millicent repeated back correctly.   "

## 2018-07-26 NOTE — PROGRESS NOTES
Pt called and stated Home Health came out to his house yesterday and checked his INR but nobody ever called him with results. I verified with Abril Hodge RN that she did in fact speak to him yesterday and gave him instructions. Pt was given instructions again and was told HH would be out tomorrow to check his INR again; pt verbalized an understanding and stated he held coumadin last night and ate a serving of green veggies.

## 2018-07-27 PROBLEM — R07.9 CHEST PAIN: Status: ACTIVE | Noted: 2018-01-01

## 2018-07-27 NOTE — PROGRESS NOTES
I received INR from DANITA Schafer with [facility] by phone while she was still in the patient's home. Pt denies med changes or bleeding problems. Hanh was given instructions for dosing, to have pt eat a 1/2 cup serving of green veggies on Sunday, and to recheck INR on Thursday August 2; she repeated back correctly.

## 2018-07-27 NOTE — TELEPHONE ENCOUNTER
Faviola with Home Health called and stated that patient was established with Dr Hoffman, but has an appointment in August to establish care with Dr Pino.      Pt has hx of a-fib. Pt complained this morning of being tired and having chest pain when he got up to walk.  Faviola reported vitals as BP 80/40, HR 65, O2 96-98%. Faviola also reported pt seemed short-winded, bilateral leg swelling, but added that patient sleeps sitting in chair.    Per Dr. Pino, pt was advised to go to nearest emergency room.  Faviola verbalized understanding and relayed recommendations to patient.

## 2018-07-31 PROBLEM — R07.9 CHEST PAIN: Status: RESOLVED | Noted: 2018-01-01 | Resolved: 2018-01-01

## 2018-08-02 NOTE — PROGRESS NOTES
Received above INR from Hanh SAMAYOA [facility] over the phone who is still in pt's home. Per Hanh, pt was admitted to hospital on Friday with chest pain and was dc'd on Tuesday. Dose changed in computer to reflect what it appears pt took. PT is now on Cardizem and Symbicort. Denies any steroids/AB. Adjusted dose and instructed to hold green until Sunday. Recheck INR on Monday, 8/6. Patient instructed regarding medication; results given and questions answered. Nutritional counseling given.  Dietary factors affecting therapy addressed.  Patient instructed to monitor for excessive bruising or bleeding. Hanh repeated back correctly and will inform pt/family.

## 2018-08-06 NOTE — PROGRESS NOTES
"Received above INR from Hanh SAMAYOA [facility] over the phone who is still in pt's home. Again pt did not take dose as directed. PT states he went into Afib on Saturday so he took extra dose of Coumadin \"because he didn't want to throw a clot\". PT has been warned against self dosing multiple times but continues to anyway. PT states he stopped Cardizem yesterday and restarted Betapace. Denies any bleeding issues or s/s of blood clot. Due to rapid rise in INR, dose was cut back slightly and INR will be rechecked on 8/9. Patient instructed regarding medication; results given and questions answered. Nutritional counseling given.  Dietary factors affecting therapy addressed.  Patient instructed to monitor for excessive bruising or bleeding. Hanh repeated back correctly and will inform pt/family.   "

## 2018-08-07 NOTE — PROGRESS NOTES
Massimo Bentley  78 y.o. male    08/07/2018  1. Paroxysmal atrial fibrillation (CMS/HCC)    2. PVD (peripheral vascular disease) (CMS/HCC)    3. CKD (chronic kidney disease) stage 3, GFR 30-59 ml/min    4. Essential hypertension    5. Diastolic dysfunction    6. Non-rheumatic mitral regurgitation    7. Hyperlipidemia, unspecified hyperlipidemia type    8. Prediabetes        History of Present Illness  78 years old gentleman presented to the office with history of paroxysmal atrial fibrillation off Betapace due to renal insufficiency.  discussed with patient's daughter over the phone , power of  regarding further evaluation / management of atrial fibrillation.  Patient is 78 years old and given the multiple comorbid condition in quality of life may not be ideal candidate for EP  Offered the daughter can refer to [facility] or [facility].  Patient and daughter preferred change in antiarrhythmic medication.   recently discharged from [facility] for evaluations of increasing shortness of breath with mildly abnormal BNP and chest x-ray suggesting of congestive changes the possibility of superimposed bronchitis and pneumonia cannot be excluded.  Patient to was in sinus rhythm developed atrial fibrillation and spontaneously converted to sinus rhythm.  walk with the help of cane with the past medical history results of hypertension hypertensive heart disease, history of for atrial fibrillation/atrial flutter underwent EP study and isthmus ablation.  The patient noted to have  left-sided atrial tachycardia no ablation performed in left atrum.  The patient  started on Betapace doing remarkably well.  The patient have peripheral embolism and peripheral  Vascular disease status post stent placement and vascular surgery by Dr. Pennington. AAA under care of Dr. Pennington.   On oral anti-coagulation.  patient denied any orthopnea, PND, chest pain, palpitation, fever, chills, intermittent claudication, syncope or  near syncopal episode    7/30/18  · Findings consistent with an equivocal ECG stress test.  · Left ventricular ejection fraction is normal. EF-74%  · Myocardial perfusion imaging indicates a normal myocardial perfusion study with no evidence of ischemia.  · Impressions are consistent with a low risk study.  SUBJECTIVE    Allergies   Allergen Reactions   • Morphine And Related Urinary Retention   • Lipitor [Atorvastatin] Nausea And Vomiting   • Lortab [Hydrocodone-Acetaminophen] Nausea And Vomiting         Past Medical History:   Diagnosis Date   • Abdominal bloating    • Anxiety    • Atherosclerosis of native arteries of extremity with intermittent claudication (CMS/HCC)    • Atrial flutter (CMS/HCC)    • Backache    • Benign prostatic hyperplasia     BX 2009, also has renal cyst      • Chronic bronchitis (CMS/HCC)    • Chronic renal impairment    • Depressive disorder    • Drug abuse, episodic use    • Dyslipidemia    • Fatigue    • GERD (gastroesophageal reflux disease)    • Hypercholesterolemia 03/15/2016   • Injury of tendon of rotator cuff 10/22/2015   • Insomnia 06/24/2016   • Intervertebral disc disorder 10/27/2011   • Major depression    • Neck pain 01/09/2012   • Osteoarthritis of multiple joints 06/21/2013   • Pain from vascular prosthetic devices, implants and grafts, sequela 12/03/2015   • Pain in left foot 10/27/2015   • Peripheral vascular disease (CMS/HCC) 12/03/2015     LEFT fem-tib bypass, embolectomy 6/2014    10/27/2015    • Senile debility 10/22/2015   • 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 No coronary atherosclerosis. Normal left ventricular function   • CHOLECYSTECTOMY  08/25/2014    Cholecystitis with gangrenous gallbladder   • ENDOSCOPY  03/22/1990    Duodenal ulcer   • ENDOSCOPY AND COLONOSCOPY  10/17/2013    Diverticulum in sigmoid colon & descending colon. Internal & external hemorrhoids found   • ESOPHAGOSCOPY / EGD  10/17/2013    With tube-Esophagitis seen. Biopsy taken. Gastritis in stomach. Biopsy taken. Stenosis in 2nd portion of duodenum. Dilatation performed.   • FEMORAL THROMBECTOMY/EMBOLECTOMY  06/29/2014    Left lower extremity arteriogram. Left popliteal artery endarterectomy. Redo left femoral to [id number] trunk bypass. Negative pressure wound therapy with placement of Prevena wound V.A.C., left groin, left lower leg   • INJECTION OF MEDICATION  03/15/2016    B12   • INJECTION OF MEDICATION  10/23/2015    Drain/Inject Major Joint    • INJECTION OF MEDICATION  01/13/2016    Kenalog   • LUMBAR LAMINECTOMY     • TRANSESOPHAGEAL ECHOCARDIOGRAM (TEX)  10/06/2015    With color flow-Mild mitral regurg.Left atrium mild dilated.Ef of 55-60%.RV systolic pressure elevated.Trace tricuspid regurg   • TRANSESOPHAGEAL ECHOCARDIOGRAM (TEX)  02/14/2012    Without color flow-[facility] w/ early diast dysfun. AV trileaflet & structurally NRL w/ AR NRL. No regional wall motion abn Est Ef approx 50-55%. M & TR valves NRL w/ mild M & TR regurg Trace -mild pulmonic regurg. Anterior echo free space w/o hemodynamic signif. NRL RV   • TRANSESOPHAGEAL ECHOCARDIOGRAM (TEX)  03/25/2011    Normal LV systolic function with Ef of 50-55%.Left atrium ildly dilated.Moderate mitral regurg.Mitral valve regurg.Intact interatrial septum.No evidence of any cardiac thrombus or pericardial effusion.AV trileaflet         Family History   Problem Relation Age of Onset   • Osteoarthritis Mother         Lived to be 89   • Hypertension Mother    • ADD / ADHD Father    • Lung  cancer Father    • ADD / ADHD Brother    • Rectal cancer Other          Social History     Social History   • Marital status:      Spouse name: N/A   • Number of children: N/A   • Years of education: N/A     Occupational History   • Not on file.     Social History Main Topics   • Smoking status: Current Some Day Smoker     Packs/day: 0.50     Years: 50.00     Types: Cigarettes   • Smokeless tobacco: Never Used      Comment: reduced;   • Alcohol use No      Comment: Former use    • Drug use: No   • Sexual activity: Defer     Other Topics Concern   • Not on file     Social History Narrative   • No narrative on file         Current Outpatient Prescriptions   Medication Sig Dispense Refill   • albuterol (PROVENTIL HFA;VENTOLIN HFA) 108 (90 Base) MCG/ACT inhaler Inhale 2 puffs Every 4 (Four) Hours As Needed for Wheezing. 1 inhaler 6   • aspirin 81 MG EC tablet Take 81 mg by mouth daily.     • budesonide-formoterol (SYMBICORT) 160-4.5 MCG/ACT inhaler Inhale 2 puffs 2 (Two) Times a Day. 1 inhaler 12   • cilostazol (PLETAL) 50 MG tablet Take 1 tablet by mouth 2 (Two) Times a Day. (Patient taking differently: Take 100 mg by mouth 2 (Two) Times a Day.) 60 tablet 5   • furosemide (LASIX) 40 MG tablet Take 1 tablet by mouth Daily. 30 tablet 6   • magnesium oxide (MAGOX) 400 (241.3 MG) MG tablet tablet Take 400 mg by mouth Every Night.     • montelukast (SINGULAIR) 10 MG tablet Take 1 tablet by mouth Every Night. 90 tablet 2   • Multiple Vitamins-Minerals (THERA-TABS M PO) Take 1 tablet by mouth Daily.     • pramipexole (MIRAPEX) 0.25 MG tablet Take 1 tablet by mouth 3 (Three) Times a Day. (Patient taking differently: Take 0.5 mg by mouth Daily. 0.5 mg in the morning, 0.25 mg at bedtime) 90 tablet 5   • QUEtiapine (SEROquel) 300 MG tablet Take 300 mg by mouth Every Night.     • warfarin (COUMADIN) 5 MG tablet Take as directed per coumadin clinic (Patient taking differently: Take 5 mg by mouth Daily. Take as directed per  "coumadin clinic) 30 tablet 0   • Alirocumab (PRALUENT) 75 MG/ML solution pen-injector Inject 75 mg under the skin Every 14 (Fourteen) Days. 2 mL 5   • amiodarone (PACERONE) 200 MG tablet Take 1 tablet by mouth Daily. 30 tablet 5   • bisacodyl (DULCOLAX) 10 MG suppository Insert 10 mg into the rectum Daily As Needed for Constipation.     • collagenase (SANTYL) 250 UNIT/GM ointment Apply  topically Daily.     • diltiaZEM (CARDIZEM) 30 MG tablet Take 1 tablet by mouth Every 6 (Six) Hours. 120 tablet 1   • guaiFENesin (MUCINEX) 600 MG 12 hr tablet Take 1,200 mg by mouth 2 (Two) Times a Day.     • ipratropium-albuterol (DUO-NEB) 0.5-2.5 mg/3 ml nebulizer Take 3 mL by nebulization Every 4 (Four) Hours As Needed for Shortness of Air. 360 mL 12   • oxyCODONE-acetaminophen (PERCOCET)  MG per tablet Take 1 tablet by mouth Every 8 (Eight) Hours As Needed for Moderate Pain . 3 tablet 0   • Sodium Phosphates (ENEMA READY-TO-USE) 7-19 GM/118ML enema Insert 1 Bag into the rectum Daily As Needed. If dulcolax ineffective       No current facility-administered medications for this visit.          OBJECTIVE    BP 98/60   Pulse 78   Ht 180.3 cm (71\")   Wt 100 kg (221 lb 4.8 oz)   SpO2 98%   BMI 30.87 kg/m²         Review of Systems     Constitutional:  Denies recent weight loss, weight gain, fever or chills, no change in exercise tolerance     HENT:  Denies any hearing loss, epistaxis, hoarseness, or difficulty speaking.     Eyes: No blurring    Respiratory:  COPD.     Cardiovascular: See H&P     Gastrointestinal:  Denies change in bowel habits, dyspepsia, ulcer disease, hematochezia, or melena.     Endocrine: Negative for cold intolerance, heat intolerance, polydipsia, polyphagia and polyuria. Denies any history of weight change, heat/cold intolerance, polydipsia, polyuria     Genitourinary: Negative.      Musculoskeletal: Chronic back pain     Skin:  Denies any change in hair or nails, rashes, or skin lesions. "     Allergic/Immunologic: Negative.  Negative for environmental allergies, food allergies and immunocompromised state.     Neurological:  Denies any history of recurrent headaches, strokes, TIA, or seizure disorder.     Hematological: Denies any food allergies, seasonal allergies, bleeding disorders, or lymphadenopathy.     Psychiatric/Behavioral: Denies any history of depression, substance abuse, or change in cognitive function.         Physical Exam     Constitutional: Cooperative, alert and oriented, well-developed, well-nourished, in no acute distress.     HENT:   Head: Normocephalic, normal hair patterns, no masses or tenderness.  Ears, Nose, and Throat: No gross abnormalities. No pallor or cyanosis. Dentition good.   Eyes: EOMS intact, PERRL, conjunctivae and lids unremarkable. Fundoscopic exam and visual fields not performed.   Neck: No palpable masses or adenopathy, no thyromegaly, no JVD, carotid pulses are full and equal bilaterally and without  Bruits.     Cardiovascular: Regular rhythm, S1 and S2 normal, no S3 or S4. Apical impulse not displaced. No murmurs, gallops, or rubs detected.     Pulmonary/Chest: Chest: normal symmetry, no tenderness to palpation, normal respiratory excursion, no intercostal retraction, no use of accessory muscles.            Pulmonary: Normal breath sounds. No rales or ronchi.    Abdominal: Abdomen soft, bowel sounds normoactive, no masses, no hepatosplenomegaly, non-tender, no bruits.     Musculoskeletal: No deformities, clubbing, cyanosis, erythema, or edema observed. There are no spinal abnormalities noted. Normal muscle strength and tone. Pulses full and equal in all extremities, no bruits auscultated.     Neurological: No gross motor or sensory deficits noted, affect appropriate, oriented to time, person, place.     Skin: Warm and dry to the touch, no apparent skin lesions or masses noted.     Psychiatric: He has a normal mood and affect. His behavior is normal. Judgment  and thought content normal.           ECG 12 Lead  Date/Time: 8/7/2018 4:22 PM  Performed by: LUBNA BAUTISTA  Authorized by: LUBNA BAUTISTA   Comparison: not compared with previous ECG   Rhythm: sinus rhythm              Lab Results   Component Value Date    WBC 8.02 07/29/2018    HGB 11.0 (L) 07/29/2018    HCT 33.5 (L) 07/29/2018    MCV 86.1 07/29/2018     07/29/2018     Lab Results   Component Value Date    GLUCOSE 112 (H) 07/30/2018    BUN 34 (H) 07/30/2018    CREATININE 1.73 (H) 07/30/2018    EGFRIFNONA 38 (L) 07/30/2018    BCR 19.7 07/30/2018    CO2 23.0 07/30/2018    CALCIUM 8.6 07/30/2018    ALBUMIN 3.50 07/30/2018    AST 18 07/29/2018    ALT 17 (L) 07/29/2018     Lab Results   Component Value Date    CHOL 112 07/28/2018    CHOL 134 02/05/2018     Lab Results   Component Value Date    TRIG 137 07/28/2018    TRIG 165 02/05/2018    TRIG 237 (H) 01/04/2017     Lab Results   Component Value Date    HDL 25 (L) 07/28/2018    HDL 28 (L) 02/05/2018    HDL 30 (L) 01/04/2017     No components found for: LDLCALC  Lab Results   Component Value Date    LDL 43 07/28/2018    LDL 68 02/05/2018    LDL 83 01/04/2017     No results found for: HDLLDLRATIO  No components found for: CHOLHDL  Lab Results   Component Value Date    HGBA1C 5.6 02/05/2018     Lab Results   Component Value Date    TSH 3.190 07/27/2018           ASSESSMENT AND PLAN  #1 congestive heart failure on the basis of diastolic dysfunction for the possibility of bronchitis/pneumonia cannot be excluded      #2 atrial flutter/atrial fibrillation status post EP study  #3 hypertension with hypertensive heart disease and normal left and a systolic function.  Mild mitral regurgitation previous echocardiographic study.  #4  embolization with PVD status post stenting to care of Dr. Pennington    78 years old gentleman with extensive history of peripheral vascular disease, hypertension with hypertensive heart disease, history of congestive heart failure on the  basis of diastolic dysfunction, renal insufficiency, paroxysmal atrial fibrillation was on Betapace discontinued due to underlying renal insufficiency.  Patient presented to discuss the further options.  Normal right-sided candidate for EP study and ablation however can be referred to [facility] or [facility] for further evaluations and possible EP study and ablations.  Discussed with the patient and with patient's daughter over the phone they opted for anti-arrhythmia medication such as amiodarone and not considering ablation at this stage.  Pros/ cons of this option discussed with with the patient and the family.  We will see him back in one month.  Patient will continue Lasix for management of congestive heart failure on the basis of diastolic dysfunction, warfarin to decrease to decrease risk of cardiac embolic phenomena    Massimo was seen today for med refill.    Diagnoses and all orders for this visit:    Paroxysmal atrial fibrillation (CMS/HCC)    PVD (peripheral vascular disease) (CMS/HCC)    CKD (chronic kidney disease) stage 3, GFR 30-59 ml/min    Essential hypertension    Diastolic dysfunction    Non-rheumatic mitral regurgitation    Hyperlipidemia, unspecified hyperlipidemia type    Prediabetes        Emily Thacker MD  8/7/2018  4:11 PM

## 2018-08-09 NOTE — PROGRESS NOTES
Received above INR from Hanh SAMAYOA [facility] over the phone who is still in pt's home. Per Hanh, pt denies any med changes or bleeding issues. Instructed to continue same weekly dosing and recheck INR on 8/16. Patient instructed regarding medication; results given and questions answered. Nutritional counseling given.  Dietary factors affecting therapy addressed.  Patient instructed to monitor for excessive bruising or bleeding. Hanh repeated back correctly and will inform pt/family.

## 2018-08-15 NOTE — PROGRESS NOTES
Legs are swelling, is taking double of lasix which would be 80 mg, both legs have a lot of pressure. Is not going going to the restroom. Good call back # [phone] . #[phone] . Will speak to Dr. Rosario since Akkathleen is gone.

## 2018-08-16 NOTE — PROGRESS NOTES
I received INR from DANITA Schafer with [facility]  by phone while she was still in the pt home. Dr Coyle had called in 7.5mg tablets and that is what he has been working with. Unsure of what dose pt has been taking. Coumadin 5mg #30 with NR called into [facility]. Hanh was given instructions for dosing and to recheck INR on August 23; she repeated back correctly.

## 2018-08-21 NOTE — PROGRESS NOTES
to palpation bilaterally. Femoral pulses are 2+. He has 2+ DP and PT DS noted. No cyanosis, clubbing. He has BLE  edema. No signs atheroembolic event. Pulmonary  He get dyspneic on exertion. Lungs - Breath sounds normal. No wheezes or rales. GI - Abdomen  soft, non tender, bowel sounds X 4 quadrants. No guarding or rebound tenderness. No distension or palpable mass. Genitourinary  deferred. Musculoskeletal  ROM appears normal.  No significant edema. Neurologic  alert and oriented X 3. Physiologic. Skin  warm, dry, and intact. No rash, erythema, or pallor. Psychiatric  mood, affect, and behavior appear normal.  Judgment and thought processes appear normal.    Risk factors for atherosclerosis of all vascular beds have been reviewed with the patient including:  Family history, tobacco abuse in all forms, elevated cholesterol, hyperlipidemia, and diabetes. Assessment    1. PVD (peripheral vascular disease) (HCC)        Plan      Continue Pletal - risks factors associated with drug discussed including black box warning for possible sudden cardiac death  Strongly encourage continue statin therapy  Good skin care/checks daily  Recommend no smoking     Orders Placed This Encounter   Procedures    VL DUP LOWER EXTREMITY ARTERIES BILATERAL     For velocities     Standing Status:   Future     Standing Expiration Date:   11/20/2018     Scheduling Instructions:      1 hour time slot        appt to follow with SJS     Order Specific Question:   Reason for exam:     Answer:   pvd    VL ARYA BILATERAL LIMITED 1-2 LEVELS     Standing Status:   Future     Standing Expiration Date:   10/20/2018     Scheduling Instructions:      30 minute time slot    appt to follow with SJS     Order Specific Question:   Reason for exam:     Answer:   pvd     He has an upcoming appointment in September for bilateral A'scan with ARYA's. He wants to keep the appointment and discuss some issues with Dr. Sandro Mendoza.  He is to call sooner if claudication worsens, patient develops wound or IRP

## 2018-08-22 PROBLEM — T45.515A WARFARIN-INDUCED COAGULOPATHY (HCC): Status: RESOLVED | Noted: 2018-07-07 | Resolved: 2018-01-01

## 2018-08-22 PROBLEM — D68.32 WARFARIN-INDUCED COAGULOPATHY (HCC): Status: RESOLVED | Noted: 2018-07-07 | Resolved: 2018-01-01

## 2018-08-22 PROBLEM — J96.01 ACUTE RESPIRATORY FAILURE WITH HYPOXIA (HCC): Status: RESOLVED | Noted: 2018-07-07 | Resolved: 2018-01-01

## 2018-08-22 PROBLEM — E78.5 HYPERLIPIDEMIA: Status: RESOLVED | Noted: 2017-02-21 | Resolved: 2018-01-01

## 2018-08-22 NOTE — PROGRESS NOTES
Subjective   Massimo Bentley is a 78 y.o. male.     Hyperlipidemia   This is a chronic problem. The current episode started more than 1 year ago. The problem is controlled. Recent lipid tests were reviewed and are normal. Exacerbating diseases include chronic renal disease. Pertinent negatives include no myalgias.   COPD   This is a chronic problem. The current episode started more than 1 year ago. The problem occurs constantly. The problem has been unchanged. Associated symptoms include congestion. Pertinent negatives include no myalgias.   Atrial Fibrillation   Presents for follow-up visit. The symptoms have been stable. Past medical history includes atrial fibrillation and hyperlipidemia.   Hypertension   This is a chronic problem. The current episode started more than 1 year ago. The problem is unchanged. The problem is controlled. Associated symptoms include peripheral edema. Pertinent negatives include no orthopnea or PND. Identifiable causes of hypertension include chronic renal disease.   Chronic Kidney Disease   This is a chronic problem. The current episode started more than 1 year ago. The problem occurs constantly. Associated symptoms include congestion. Pertinent negatives include no myalgias.   Depression   Visit Type: follow-up  Patient is not experiencing: depressed mood, insomnia, irritability and nervousness/anxiety.    Congestive Heart Failure   Presents for follow-up visit. Associated symptoms include edema. Pertinent negatives include no paroxysmal nocturnal dyspnea.        The following portions of the patient's history were reviewed and updated as appropriate: allergies, current medications, past family history, past medical history, past social history, past surgical history and problem list.    Review of Systems   Constitutional: Negative for irritability.   HENT: Positive for congestion.    Cardiovascular: Negative for orthopnea and PND.   Musculoskeletal: Negative for myalgias.    Psychiatric/Behavioral: The patient is not nervous/anxious and does not have insomnia.        Objective   Physical Exam   Constitutional: He is oriented to person, place, and time. He appears well-developed and well-nourished. No distress.   HENT:   Head: Normocephalic and atraumatic.   Cardiovascular: Normal rate.  Exam reveals distant heart sounds. Exam reveals no friction rub.    No murmur heard.  Pulmonary/Chest: Effort normal. Tachypnea noted. He has decreased breath sounds. He has wheezes. He has rhonchi.   Musculoskeletal: He exhibits edema. He exhibits no tenderness.     Vascular Status -  His right foot exhibits abnormal foot vasculature  and abnormal foot edema. His left foot exhibits abnormal foot vasculature  and abnormal foot edema.  Neurological: He is alert and oriented to person, place, and time.   Skin: Skin is warm and dry. He is not diaphoretic.   Psychiatric: He has a normal mood and affect. His behavior is normal. Judgment and thought content normal.   Nursing note and vitals reviewed.      Assessment/Plan   Problems Addressed this Visit        Cardiovascular and Mediastinum    Atrial fibrillation [I48.91] - Primary (Chronic)    PVD (peripheral vascular disease) (CMS/[facility]) (Chronic)    Hypertension (Chronic)    Relevant Medications    furosemide (LASIX) 40 MG tablet    Embolism and thrombosis of artery (CMS/[facility])    Hypercholesterolemia       Respiratory    COPD with exacerbation (CMS/[facility]) (Chronic)    Relevant Medications    Fluticasone-Umeclidin-Vilant (TRELEGY ELLIPTA) 100-62.5-25 MCG/INH aerosol powder     triamcinolone acetonide (KENALOG-40) injection 80 mg (Start on 8/22/2018  3:34 PM)       Genitourinary    CKD (chronic kidney disease) stage 3, GFR 30-59 ml/min (Chronic)    Relevant Medications    furosemide (LASIX) 40 MG tablet       Other    Major depression      Other Visit Diagnoses     Systolic congestive heart failure, unspecified congestive heart failure chronicity (CMS/[facility])         Relevant Medications    furosemide (LASIX) 40 MG tablet            Current outpatient and discharge medications have been reconciled for the patient.  Reviewed by: Gregory Pino MD     O2 78% on RA today. Has O2 at [facility]

## 2018-08-23 NOTE — PROGRESS NOTES
Received above INR from Cat [facility]AY[facility] over the phone who is still in pt's home. Per Cat, pt had steroid shot yesterday. Denies any PO med change. Denies any s/s of bleeding. PT states he ate 1/2 of green last night. Adjusted pt's dose today and instructed to hold green vegs for 2 days. Recheck INR on 8/30. Patient instructed regarding medication; results given and questions answered. Nutritional counseling given.  Dietary factors affecting therapy addressed.  Patient instructed to monitor for excessive bruising or bleeding. Cat repeated back correctly and will inform pt/family.

## 2018-09-04 NOTE — PROGRESS NOTES
Pulmonary Consultation    LANI Ignacio,    Thank you for asking me to see Massimo Bentley for   Chief Complaint   Patient presents with   • COPD     REF JUAN   .    Subjective     History of Present Illness  Massimo Bentley is a 78 y.o. male with a PMH significant for COPD, tobacco use, obesity, HTN, AF, PAD, CKD, and depression who presents for evaluation of COPD. Pt states he has been hospitalized several times in the past few months with HF and AF. He reports he had issues following vascular surgery last December. Pt has been hospitalized at [facility]. He reports his AF was difficult to control during his last hospitalization so his albuterol was stopped. Pt was started on cardizem but it was not effective. He states he was told he had COPD/ chronic bronchitis during one of his recent hospitalizations. Pt states he was seen by Dr. Nagel within the past few years. He was on Symbicort but it did not help so Dr. Pino has tried him on Trelegy. Pt does feel like Trelegy helps some over the past 2wks. He admits to FANG which is worse with deconditioning. He reports he started having dyspnea since undergoing surgery. Pt admits to leg swelling which is improved with lasix. Dr. Pino increased the lasix to 80mg daily. He admits to some chest tightness, wheeze, and some cough. Pt wears O2 at home when he is resting; it was initiated after his last hospitalization. He also reports he has had issues with CKD but he has not established with a nephrologist yet. Pt reports he sleeps in a chair due to back pain. Pt smokes <0.5ppd for the past 50yrs. He is precontemplative. Pt is a retired dentist. His wife has Alzheimers and requires a sitter. His father had lung cancer (smoker).    Review of Systems: History obtained from chart review and the patient.  Review of Systems   Constitutional: Positive for fatigue. Negative for unexpected weight change.   HENT: Negative for congestion.    Respiratory:  Positive for cough, shortness of breath and wheezing.    Cardiovascular: Positive for palpitations and leg swelling.   Genitourinary: Positive for frequency.   Musculoskeletal: Positive for arthralgias and back pain.   Neurological: Positive for weakness.   Psychiatric/Behavioral: Positive for dysphoric mood.     As described in the HPI. Otherwise, remainder of ROS (14 systems) were negative.    Patient Active Problem List   Diagnosis   • Long-term (current) use of anticoagulants   • Atrial fibrillation [I48.91]   • Embolism and thrombosis of artery (CMS/HCC)   • PVD (peripheral vascular disease) (CMS/HCC)   • Cigarette nicotine dependence, uncomplicated   • Suicide (CMS/HCC)   • Osteoarthritis of multiple joints   • Hypercholesterolemia   • Major depression   • Benign prostatic hyperplasia   • Typical atrial flutter (CMS/HCC)   • Non-rheumatic mitral regurgitation   • Localized edema   • Atherosclerosis of artery of extremity with gangrene (CMS/HCC)   • AAA (abdominal aortic aneurysm) without rupture (CMS/HCC)   • CKD (chronic kidney disease) stage 3, GFR 30-59 ml/min   • COPD with exacerbation (CMS/HCC)   • Hypertension   • Prediabetes   • Arterial occlusion, lower extremity (CMS/HCC)   • LVH (left ventricular hypertrophy)   • Diastolic dysfunction   • Coumadin toxicity         Current Outpatient Prescriptions:   •  aspirin 81 MG EC tablet, Take 81 mg by mouth daily., Disp: , Rfl:   •  cilostazol (PLETAL) 50 MG tablet, Take 1 tablet by mouth 2 (Two) Times a Day. (Patient taking differently: Take 100 mg by mouth 2 (Two) Times a Day.), Disp: 60 tablet, Rfl: 5  •  furosemide (LASIX) 40 MG tablet, Take 2 tablets by mouth Daily., Disp: 60 tablet, Rfl: 6  •  magnesium oxide (MAGOX) 400 (241.3 MG) MG tablet tablet, Take 400 mg by mouth Every Night., Disp: , Rfl:   •  montelukast (SINGULAIR) 10 MG tablet, Take 1 tablet by mouth Every Night., Disp: 90 tablet, Rfl: 2  •  QUEtiapine (SEROquel) 300 MG tablet, Take 300 mg by  mouth Every Night., Disp: , Rfl:   •  sotalol (BETAPACE) 80 MG tablet, Take 80 mg by mouth Daily. Taking 1 and 1/2 tablets daily per [facility], Disp: , Rfl:   •  warfarin (COUMADIN) 5 MG tablet, Take as directed per coumadin clinic (Patient taking differently: Take 5 mg by mouth Daily. Take as directed per coumadin clinic), Disp: 30 tablet, Rfl: 0  •  levalbuterol (XOPENEX HFA) 45 MCG/ACT inhaler, Inhale 2 puffs 4 (Four) Times a Day As Needed for Wheezing or Shortness of Air., Disp: 15 g, Rfl: 11  •  umeclidinium-vilanterol (ANORO ELLIPTA) 62.5-25 MCG/INH aerosol powder  inhaler, Inhale 1 puff Daily., Disp: 1 each, Rfl: 11    Past Medical History:   Diagnosis Date   • Abdominal bloating    • Anxiety    • Atherosclerosis of native arteries of extremity with intermittent claudication (CMS/HCC)    • Atrial flutter (CMS/HCC)    • Backache    • Benign prostatic hyperplasia     BX 2009, also has renal cyst      • Chronic bronchitis (CMS/HCC)    • Chronic renal impairment    • Depressive disorder    • Drug abuse, episodic use    • Dyslipidemia    • Fatigue    • GERD (gastroesophageal reflux disease)    • Hypercholesterolemia 03/15/2016   • Injury of tendon of rotator cuff 10/22/2015   • Insomnia 06/24/2016   • Intervertebral disc disorder 10/27/2011   • Major depression    • Neck pain 01/09/2012   • Osteoarthritis of multiple joints 06/21/2013   • Pain from vascular prosthetic devices, implants and grafts, sequela 12/03/2015   • Pain in left foot 10/27/2015   • Peripheral vascular disease (CMS/HCC) 12/03/2015     LEFT fem-tib bypass, embolectomy 6/2014    10/27/2015    • Senile debility 10/22/2015   • 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    weakness   • Shoulder pain    • Simple renal cyst 01/01/2011   • Stroke (CMS/HCC)      Echo: L atrial appendage thrombus      • Suicide (CMS/HCC)     at risk for   • Tobacco dependence syndrome 12/03/2015    2014 to E cigarette        Past Surgical History:   Procedure  Laterality Date   • ABDOMINAL AORTIC ANEURYSM REPAIR  07/18/2011    Endovascular   • BACK SURGERY     • CARDIAC CATHETERIZATION  01/30/1984    Mixed dominant coronary arterial anatomy. No coronary atherosclerosis. Normal left ventricular function   • CHOLECYSTECTOMY  08/25/2014    Cholecystitis with gangrenous gallbladder   • ENDOSCOPY  03/22/1990    Duodenal ulcer   • ENDOSCOPY AND COLONOSCOPY  10/17/2013    Diverticulum in sigmoid colon & descending colon. Internal & external hemorrhoids found   • ESOPHAGOSCOPY / EGD  10/17/2013    With tube-Esophagitis seen. Biopsy taken. Gastritis in stomach. Biopsy taken. Stenosis in 2nd portion of duodenum. Dilatation performed.   • FEMORAL THROMBECTOMY/EMBOLECTOMY  06/29/2014    Left lower extremity arteriogram. Left popliteal artery endarterectomy. Redo left femoral to [id number] trunk bypass. Negative pressure wound therapy with placement of Prevena wound V.A.C., left groin, left lower leg   • INJECTION OF MEDICATION  03/15/2016    B12   • INJECTION OF MEDICATION  10/23/2015    Drain/Inject Major Joint    • INJECTION OF MEDICATION  01/13/2016    Kenalog   • LUMBAR LAMINECTOMY     • TRANSESOPHAGEAL ECHOCARDIOGRAM (TEX)  10/06/2015    With color flow-Mild mitral regurg.Left atrium mild dilated.Ef of 55-60%.RV systolic pressure elevated.Trace tricuspid regurg   • TRANSESOPHAGEAL ECHOCARDIOGRAM (TEX)  02/14/2012    Without color flow-[facility] w/ early diast dysfun. AV trileaflet & structurally NRL w/ AR NRL. No regional wall motion abn Est Ef approx 50-55%. M & TR valves NRL w/ mild M & TR regurg Trace -mild pulmonic regurg. Anterior echo free space w/o hemodynamic signif. NRL RV   • TRANSESOPHAGEAL ECHOCARDIOGRAM (TEX)  03/25/2011    Normal LV systolic function with Ef of 50-55%.Left atrium ildly dilated.Moderate mitral regurg.Mitral valve regurg.Intact interatrial septum.No evidence of any cardiac thrombus or pericardial effusion.AV trileaflet     Social History     Social  "History   • Marital status:      Social History Main Topics   • Smoking status: Current Some Day Smoker     Packs/day: 0.50     Years: 50.00     Types: Cigarettes   • Smokeless tobacco: Never Used      Comment: reduced;   • Alcohol use No      Comment: Former use    • Drug use: No   • Sexual activity: Defer     Other Topics Concern   • Not on file     Family History   Problem Relation Age of Onset   • Osteoarthritis Mother         Lived to be 89   • Hypertension Mother    • ADD / ADHD Father    • Lung cancer Father    • ADD / ADHD Brother    • Rectal cancer Other           Objective     Blood pressure 136/72, pulse 64, height 180.3 cm (71\"), weight 101 kg (222 lb 14.4 oz), SpO2 (!) 87 %.  Physical Exam   Constitutional: He is oriented to person, place, and time. Vital signs are normal. He appears well-developed and well-nourished.   Obese   HENT:   Head: Normocephalic and atraumatic.   Nose: Nose normal.   Mouth/Throat: Uvula is midline, oropharynx is clear and moist and mucous membranes are normal.   Mallampati 4   Eyes: Pupils are equal, round, and reactive to light. Conjunctivae, EOM and lids are normal.   Neck: Trachea normal and normal range of motion. No tracheal tenderness present. No thyroid mass present.   Cardiovascular: Normal rate, regular rhythm and normal heart sounds.  PMI is not displaced.  Exam reveals no gallop.    No murmur heard.  Pulmonary/Chest: Effort normal. No respiratory distress. He has decreased breath sounds in the right lower field and the left lower field. He has no wheezes. He has no rhonchi. Chest wall is not dull to percussion. He exhibits no tenderness.   Abdominal: Soft. Normal appearance and bowel sounds are normal. There is no hepatomegaly. There is no tenderness.   Musculoskeletal:   In wheelchair so gait not tested, 1+ BLE edema     Vascular Status -  His right foot exhibits abnormal foot edema. His left foot exhibits abnormal foot edema.  Lymphadenopathy:        Head " (right side): No submandibular adenopathy present.        Head (left side): No submandibular adenopathy present.     He has no cervical adenopathy.        Right: No supraclavicular adenopathy present.        Left: No supraclavicular adenopathy present.   Neurological: He is alert and oriented to person, place, and time.   Skin: Skin is warm and dry. No rash noted. No cyanosis. Nails show no clubbing.   Psychiatric: He has a normal mood and affect. His speech is normal and behavior is normal. Judgment normal.   Nursing note and vitals reviewed.      Radiology (independently reviewed and interpreted by me): CXR 7/30/18 showed progressive increased interstitial markings, vascular congestion with increased right infrahilar opacity       Assessment/Plan     Massimo was seen today for copd.    Diagnoses and all orders for this visit:    Dyspnea on exertion    ILD (interstitial lung disease) (CMS/[facility])  -     CT Chest Hi Resolution; Future    Chronic obstructive pulmonary disease, unspecified COPD type (CMS/[facility])  -     umeclidinium-vilanterol (ANORO ELLIPTA) 62.5-25 MCG/INH aerosol powder  inhaler; Inhale 1 puff Daily.  -     levalbuterol (XOPENEX HFA) 45 MCG/ACT inhaler; Inhale 2 puffs 4 (Four) Times a Day As Needed for Wheezing or Shortness of Air.    Chronic respiratory failure with hypoxia (CMS/HCC)    Cigarette nicotine dependence, uncomplicated    Physical deconditioning         Discussion/ Recommendations:   Unfortunately, he did not arrive early enough today to undergo pulmonary function testing.  I did review serial chest imaging which likely shows a component of underlying essential lung disease.  I think his dyspnea is multifactorial from underlying lung disease as well as deconditioning and his other chronic medical problems.  I think he would benefit from more regular use of the supplemental oxygen as well as long-acting bronchodilators, but I am uncertain as to whether he needs Trelegy.  Given that his insurance  does not cover it, I would recommend that we use a different combination bronchodilator at this time.    -Start Anoro 1 puff daily.  -Start Xopenex MDI as needed for dyspnea or wheeze.  Indicated due to prior issues with A. fib with RVR and treated to albuterol.  -HRCT to evaluate for ILD.  -PFTs prior to next visit.  -Continue supplemental oxygen ATC.  Provided with prescription for portable tanks.  Recommended that he contact [employer] and larger cities to find out if they'll provide him with a portable concentrator with his insurance.  -Would benefit from pulmonary rehabilitation, but current mobility restricted.  -I advised Massimo of the risks of continuing to use tobacco, and I provided him with tobacco cessation educational materials in the After Visit Summary.  Counseled on strict tobacco cessation.  During this visit, I spent 3 minutes counseling the patient regarding tobacco cessation.  -Advised to get flu vaccine this fall.      Patient's Body mass index is 31.09 kg/m². BMI is above normal parameters. Recommendations include: exercise counseling.           Return in about 4 weeks (around 10/2/2018) for Recheck; arrive at least 30mins early for PFTs, Recheck.      Thank you for allowing me to participate in the care of Massimo Bentley. Please do not hesitate to contact me with any questions.         This document has been electronically signed by Tigist Romo MD on September 4, 2018 11:40 AM      Dictated using Dragon

## 2018-09-05 NOTE — PROGRESS NOTES
Subjective   Massimo Bentley is a 78 y.o. male.     COPD   This is a chronic problem. The current episode started more than 1 year ago. The problem occurs constantly. The problem has been unchanged. Associated symptoms include congestion, coughing and fatigue.   Congestive Heart Failure   Presents for follow-up visit. Associated symptoms include edema, fatigue, paroxysmal nocturnal dyspnea and shortness of breath. Pertinent negatives include no unexpected weight change. The symptoms have been stable.        The following portions of the patient's history were reviewed and updated as appropriate: allergies, current medications, past family history, past medical history, past social history, past surgical history and problem list.    Review of Systems   Constitutional: Positive for fatigue. Negative for unexpected weight change.   HENT: Positive for congestion.    Respiratory: Positive for cough and shortness of breath.        Objective   Physical Exam   Constitutional: He is oriented to person, place, and time. He appears well-developed and well-nourished. No distress.   HENT:   Head: Normocephalic and atraumatic.   Cardiovascular: Normal rate.  An irregular rhythm present. Exam reveals distant heart sounds. Exam reveals no gallop and no friction rub.    No murmur heard.  Pulmonary/Chest: Effort normal. He has decreased breath sounds. He has no wheezes. He has rhonchi. He has no rales.   Neurological: He is alert and oriented to person, place, and time.   Skin: Skin is warm and dry. He is not diaphoretic.   Psychiatric: He has a normal mood and affect. His behavior is normal. Judgment and thought content normal.   Nursing note and vitals reviewed.      Assessment/Plan   Problems Addressed this Visit        Respiratory    COPD with exacerbation (CMS/[facility]) (Chronic)    Relevant Orders    Ambulatory Referral to Pulmonary Rehab      Other Visit Diagnoses     Systolic congestive heart failure, unspecified congestive  heart failure chronicity (CMS/[facility])    -  Primary    Relevant Orders    Ambulatory Referral to Pulmonary Rehab    Medicare annual wellness visit, subsequent

## 2018-09-05 NOTE — PROGRESS NOTES
QUICK REFERENCE INFORMATION:  The ABCs of the Annual Wellness Visit    Subsequent Medicare Wellness Visit    HEALTH RISK ASSESSMENT    1940    Recent Hospitalizations:  Recently treated at the following:  [facility].        Current Medical Providers:  Patient Care Team:  Gregory Pino MD as PCP - General (Family Medicine)  Belkys Hoffman MD as PCP - Claims Attributed        Smoking Status:  History   Smoking Status   • Current Some Day Smoker   • Packs/day: 0.50   • Years: 50.00   • Types: Cigarettes   Smokeless Tobacco   • Never Used     Comment: reduced;       Alcohol Consumption:  History   Alcohol Use No     Comment: Former use        Depression Screen:   PHQ-2/PHQ-9 Depression Screening 8/22/2018   Little interest or pleasure in doing things 0   Feeling down, depressed, or hopeless 0   Trouble falling or staying asleep, or sleeping too much -   Feeling tired or having little energy -   Poor appetite or overeating -   Feeling bad about yourself - or that you are a failure or have let yourself or your family down -   Trouble concentrating on things, such as reading the newspaper or watching television -   Moving or speaking so slowly that other people could have noticed. Or the opposite - being so fidgety or restless that you have been moving around a lot more than usual -   Thoughts that you would be better off dead, or of hurting yourself in some way -   Total Score 0   If you checked off any problems, how difficult have these problems made it for you to do your work, take care of things at home, or get along with other people? -       Health Habits and Functional and Cognitive Screening:  Functional & Cognitive Status 8/2/2017   Do you have difficulty preparing food and eating? No   Do you have difficulty bathing yourself, getting dressed or grooming yourself? No   Do you have difficulty using the toilet? No   Do you have difficulty moving around from place to place? No   In  the past year have you fallen or experienced a near fall? Yes   Current Diet Well Balanced Diet   Dental Exam Up to date   Eye Exam Not up to date   Exercise (times per week) 3 times per week   Current Exercise Activities Include Stationary Bicycling/Spin Class   Do you need help using the phone?  No   Are you deaf or do you have serious difficulty hearing?  No   Do you need help with transportation? No   Do you need help shopping? No   Do you need help preparing meals?  No   Do you need help with housework?  Yes   Do you need help with laundry? Yes   Do you need help taking your medications? No   Do you need help managing money? No   Do you have difficulty concentrating, remembering or making decisions? No           Does the patient have evidence of cognitive impairment? No    Aspirin use counseling: Taking ASA appropriately as indicated      Recent Lab Results:  CMP:  Lab Results   Component Value Date    BUN 34 (H) 07/30/2018    CREATININE 1.73 (H) 07/30/2018    EGFRIFNONA 38 (L) 07/30/2018    BCR 19.7 07/30/2018     07/30/2018    K 3.9 07/30/2018    CO2 23.0 07/30/2018    CALCIUM 8.6 07/30/2018    ALBUMIN 3.50 07/30/2018    BILITOT 0.2 07/29/2018    ALKPHOS 63 07/29/2018    AST 18 07/29/2018    ALT 17 (L) 07/29/2018     Lipid Panel:  Lab Results   Component Value Date    CHOL 112 07/28/2018    TRIG 137 07/28/2018    HDL 25 (L) 07/28/2018    LDLHDL 2.38 07/28/2018     HbA1c:  Lab Results   Component Value Date    HGBA1C 5.6 02/05/2018       Visual Acuity:  No exam data present    Age-appropriate Screening Schedule:  Refer to the list below for future screening recommendations based on patient's age, sex and/or medical conditions. Orders for these recommended tests are listed in the plan section. The patient has been provided with a written plan.    Health Maintenance   Topic Date Due   • ZOSTER VACCINE (2 of 2) 02/26/2010   • INFLUENZA VACCINE  08/01/2018   • LIPID PANEL  07/28/2019   • COLONOSCOPY   10/17/2023   • TDAP/TD VACCINES (2 - Td) 10/02/2027   • PNEUMOCOCCAL VACCINES (65+ LOW/MEDIUM RISK)  Addressed        Subjective   History of Present Illness    Massimo Bentley is a 78 y.o. male who presents for an Subsequent Wellness Visit.    The following portions of the patient's history were reviewed and updated as appropriate: allergies, current medications, past family history, past medical history, past social history, past surgical history and problem list.    Outpatient Medications Prior to Visit   Medication Sig Dispense Refill   • aspirin 81 MG EC tablet Take 81 mg by mouth daily.     • cilostazol (PLETAL) 50 MG tablet Take 1 tablet by mouth 2 (Two) Times a Day. (Patient taking differently: Take 100 mg by mouth 2 (Two) Times a Day.) 60 tablet 5   • furosemide (LASIX) 40 MG tablet Take 2 tablets by mouth Daily. 60 tablet 6   • levalbuterol (XOPENEX HFA) 45 MCG/ACT inhaler Inhale 2 puffs 4 (Four) Times a Day As Needed for Wheezing or Shortness of Air. 15 g 11   • magnesium oxide (MAGOX) 400 (241.3 MG) MG tablet tablet Take 400 mg by mouth Every Night.     • montelukast (SINGULAIR) 10 MG tablet Take 1 tablet by mouth Every Night. 90 tablet 2   • QUEtiapine (SEROquel) 300 MG tablet Take 300 mg by mouth Every Night.     • sotalol (BETAPACE) 80 MG tablet Take 80 mg by mouth Daily. Taking 1 and 1/2 tablets daily per [facility]     • umeclidinium-vilanterol (ANORO ELLIPTA) 62.5-25 MCG/INH aerosol powder  inhaler Inhale 1 puff Daily. 1 each 11   • warfarin (COUMADIN) 5 MG tablet Take as directed per coumadin clinic (Patient taking differently: Take 5 mg by mouth Daily. Take as directed per coumadin clinic) 30 tablet 0     No facility-administered medications prior to visit.        Patient Active Problem List   Diagnosis   • Long-term (current) use of anticoagulants   • Atrial fibrillation [I48.91]   • Embolism and thrombosis of artery (CMS/HCC)   • PVD (peripheral vascular disease) (CMS/HCC)   •  "Cigarette nicotine dependence, uncomplicated   • Suicide (CMS/HCC)   • Osteoarthritis of multiple joints   • Hypercholesterolemia   • Major depression   • Benign prostatic hyperplasia   • Typical atrial flutter (CMS/HCC)   • Non-rheumatic mitral regurgitation   • Localized edema   • Atherosclerosis of artery of extremity with gangrene (CMS/HCC)   • AAA (abdominal aortic aneurysm) without rupture (CMS/HCC)   • CKD (chronic kidney disease) stage 3, GFR 30-59 ml/min   • COPD with exacerbation (CMS/HCC)   • Hypertension   • Prediabetes   • Arterial occlusion, lower extremity (CMS/HCC)   • LVH (left ventricular hypertrophy)   • Diastolic dysfunction   • Coumadin toxicity       Advance Care Planning:  has an advance directive - a copy HAS NOT been provided    Identification of Risk Factors:  Risk factors include: tobacco use and polypharmacy.    Review of Systems    Compared to one year ago, the patient feels his physical health is worse.  Compared to one year ago, the patient feels his mental health is the same.    Objective     Physical Exam    Vitals:    09/05/18 1506   BP: 124/74   Pulse: 65   SpO2: 98%   Weight: 99.8 kg (220 lb)   Height: 180.3 cm (71\")   PainSc: 0-No pain       Patient's Body mass index is 30.68 kg/m². BMI is within normal parameters. No follow-up required.      Assessment/Plan   Patient Self-Management and Personalized Health Advice  The patient has been provided with information about: tobacco cessation and preventive services including:   · Advance directive.    Visit Diagnoses:  No diagnosis found.    No orders of the defined types were placed in this encounter.      Outpatient Encounter Prescriptions as of 9/5/2018   Medication Sig Dispense Refill   • aspirin 81 MG EC tablet Take 81 mg by mouth daily.     • cilostazol (PLETAL) 50 MG tablet Take 1 tablet by mouth 2 (Two) Times a Day. (Patient taking differently: Take 100 mg by mouth 2 (Two) Times a Day.) 60 tablet 5   • furosemide (LASIX) 40 MG " tablet Take 2 tablets by mouth Daily. 60 tablet 6   • levalbuterol (XOPENEX HFA) 45 MCG/ACT inhaler Inhale 2 puffs 4 (Four) Times a Day As Needed for Wheezing or Shortness of Air. 15 g 11   • magnesium oxide (MAGOX) 400 (241.3 MG) MG tablet tablet Take 400 mg by mouth Every Night.     • montelukast (SINGULAIR) 10 MG tablet Take 1 tablet by mouth Every Night. 90 tablet 2   • QUEtiapine (SEROquel) 300 MG tablet Take 300 mg by mouth Every Night.     • sotalol (BETAPACE) 80 MG tablet Take 80 mg by mouth Daily. Taking 1 and 1/2 tablets daily per [facility]     • umeclidinium-vilanterol (ANORO ELLIPTA) 62.5-25 MCG/INH aerosol powder  inhaler Inhale 1 puff Daily. 1 each 11   • warfarin (COUMADIN) 5 MG tablet Take as directed per coumadin clinic (Patient taking differently: Take 5 mg by mouth Daily. Take as directed per coumadin clinic) 30 tablet 0     No facility-administered encounter medications on file as of 9/5/2018.        Reviewed use of high risk medication in the elderly: yes  Reviewed for potential of harmful drug interactions in the elderly: yes    Follow Up:  No Follow-up on file.     An After Visit Summary and PPPS with all of these plans were given to the patient.

## 2018-09-06 NOTE — PROGRESS NOTES
Received above INR from Cat [facility]AY[facility] over the phone who is in pt's home for discharge. PT states he ate more broccoli than usual but does not think he missed any doses. Denies any med changes or bleeding issues. Instructed Cat on dose and that pt will be rechecked on 9/11 when in office seeing Dr Thacker. Patient instructed regarding medication; results given and questions answered. Nutritional counseling given.  Dietary factors affecting therapy addressed.  Patient instructed to monitor for excessive bruising or bleeding. Cat repeated back correctly and will inform pt/family.

## 2018-09-06 NOTE — TELEPHONE ENCOUNTER
I called Mr. Bentley in reference to starting Pulmonary Rehab that Dr. Pino had ordered.  I explained to him that he did not come early enough to Dr. Romo's visit to have the PFT done.  He is scheduled for PFT at 1000 on 10/4/18 prior to his visit with Dr. Romo.  We will schedule Pulmonary Rehab after the PFT.  Mr. Bentley is in agreement.

## 2018-09-11 NOTE — PROGRESS NOTES
Getting dehydrated.  Have him decrease his Lasix from 2 pills a day of the 40 mg size down to 1 pill a day.  Have him check his weight daily and if his weight goes up by more than 3 pounds he is to go back to 2 pills on Lasix until his weight is back down to his baseline

## 2018-09-11 NOTE — PROGRESS NOTES
Massimo Bentley  78 y.o. male    09/11/2018  1. LVH (left ventricular hypertrophy)    2. Essential hypertension    3. Diastolic dysfunction    4. Long-term (current) use of anticoagulants    5. Typical atrial flutter (CMS/HCC)    6. Non-rheumatic mitral regurgitation    7. 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 AAA (abdominal aortic aneurysm) without rupture (CMS/HCC)        History of Present Illness:    78 years old gentleman presented to the office with history of paroxysmal atrial fibrillation ,renal insufficiency and previously discussed with patient's daughter over the phone , power of  regarding further evaluation / management of atrial fibrillation.   given the multiple comorbid condition in quality of life may not be ideal candidate for EP  Offered the daughter can refer to [facility] or [place].   Evaluated at [facility] for increasing shortness of breath with mildly abnormal BNP and chest x-ray suggesting of congestive changes the possibility of superimposed bronchitis and pneumonia cannot be excluded..  walk with the help of cane with the past medical history results of hypertension hypertensive heart disease, history of for atrial fibrillation/atrial flutter underwent EP study and isthmus ablation.  The patient noted to have  left-sided atrial tachycardia no ablation performed in left atrum.  The patient  started on Betapace doing remarkably well.  The patient have peripheral embolism and peripheral  Vascular disease status post stent placement and vascular surgery by Dr. Pennington. AAA under care of Dr. Pennington.   On oral anti-coagulation.  patient denied any orthopnea, PND, chest pain, palpitation, fever, chills, intermittent claudication, syncope or near syncopal episode     7/30/18  · Findings consistent with an equivocal ECG stress test.  · Left ventricular ejection fraction is normal. EF-74%  · Myocardial perfusion imaging indicates a normal  myocardial perfusion study with no evidence of ischemia.  · Impressions are consistent with a low risk study.    ECHO 3/2018  · Left ventricular systolic function is normal. Estimated EF = 55%.  · Left ventricular diastolic dysfunction (grade I) consistent with impaired relaxation.  · Mild mitral valve regurgitation is present  · Mild tricuspid valve regurgitation is present    SUBJECTIVE:    Allergies   Allergen Reactions   • Morphine And Related Urinary Retention   • Lipitor [Atorvastatin] Nausea And Vomiting   • Lortab [Hydrocodone-Acetaminophen] Nausea And Vomiting         Past Medical History:   Diagnosis Date   • Abdominal bloating    • Anxiety    • Atherosclerosis of native arteries of extremity with intermittent claudication (CMS/HCC)    • Atrial flutter (CMS/HCC)    • Backache    • Benign prostatic hyperplasia     BX 2009, also has renal cyst      • Chronic bronchitis (CMS/HCC)    • Chronic renal impairment    • Depressive disorder    • Drug abuse, episodic use    • Dyslipidemia    • Fatigue    • GERD (gastroesophageal reflux disease)    • Hypercholesterolemia 03/15/2016   • Injury of tendon of rotator cuff 10/22/2015   • Insomnia 06/24/2016   • Intervertebral disc disorder 10/27/2011   • Major depression    • Neck pain 01/09/2012   • Osteoarthritis of multiple joints 06/21/2013   • Pain from vascular prosthetic devices, implants and grafts, sequela 12/03/2015   • Pain in left foot 10/27/2015   • Peripheral vascular disease (CMS/HCC) 12/03/2015     LEFT fem-tib bypass, embolectomy 6/2014    10/27/2015    • Senile debility 10/22/2015   • 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 No coronary atherosclerosis. Normal left ventricular function   • CHOLECYSTECTOMY  08/25/2014    Cholecystitis with gangrenous gallbladder   • ENDOSCOPY  03/22/1990    Duodenal ulcer   • ENDOSCOPY AND COLONOSCOPY  10/17/2013    Diverticulum in sigmoid colon & descending colon. Internal & external hemorrhoids found   • ESOPHAGOSCOPY / EGD  10/17/2013    With tube-Esophagitis seen. Biopsy taken. Gastritis in stomach. Biopsy taken. Stenosis in 2nd portion of duodenum. Dilatation performed.   • FEMORAL THROMBECTOMY/EMBOLECTOMY  06/29/2014    Left lower extremity arteriogram. Left popliteal artery endarterectomy. Redo left femoral to [id number] trunk bypass. Negative pressure wound therapy with placement of Prevena wound V.A.C., left groin, left lower leg   • INJECTION OF MEDICATION  03/15/2016    B12   • INJECTION OF MEDICATION  10/23/2015    Drain/Inject Major Joint    • INJECTION OF MEDICATION  01/13/2016    Kenalog   • LUMBAR LAMINECTOMY     • TRANSESOPHAGEAL ECHOCARDIOGRAM (TEX)  10/06/2015    With color flow-Mild mitral regurg.Left atrium mild dilated.Ef of 55-60%.RV systolic pressure elevated.Trace tricuspid regurg   • TRANSESOPHAGEAL ECHOCARDIOGRAM (TEX)  02/14/2012    Without color flow-[facility] w/ early diast dysfun. AV trileaflet & structurally NRL w/ AR NRL. No regional wall motion abn Est Ef approx 50-55%. M & TR valves NRL w/ mild M & TR regurg Trace -mild pulmonic regurg. Anterior echo free space w/o hemodynamic signif. NRL RV   • TRANSESOPHAGEAL ECHOCARDIOGRAM (TEX)  03/25/2011    Normal LV systolic function with Ef of 50-55%.Left atrium ildly dilated.Moderate mitral regurg.Mitral valve regurg.Intact interatrial septum.No evidence of any cardiac thrombus or pericardial effusion.AV trileaflet         Family History   Problem Relation Age of Onset   • Osteoarthritis Mother         Lived to  be 102   • Hypertension Mother    • Arthritis Mother    • Hyperlipidemia Mother    • ADD / ADHD Father    • Lung cancer Father    • Cancer Father 73        lung   • ADD / ADHD Brother    • Rectal cancer Other          Social History     Social History   • Marital status:      Spouse name: N/A   • Number of children: N/A   • Years of education: N/A     Occupational History   • Not on file.     Social History Main Topics   • Smoking status: Current Some Day Smoker     Packs/day: 0.50     Years: 50.00     Types: Cigarettes   • Smokeless tobacco: Never Used      Comment: reduced;   • Alcohol use No      Comment: Former use    • Drug use: No   • Sexual activity: Defer     Other Topics Concern   • Not on file     Social History Narrative   • No narrative on file         Current Outpatient Prescriptions   Medication Sig Dispense Refill   • aspirin 81 MG EC tablet Take 81 mg by mouth daily.     • cilostazol (PLETAL) 50 MG tablet Take 1 tablet by mouth 2 (Two) Times a Day. (Patient taking differently: Take 100 mg by mouth 2 (Two) Times a Day.) 60 tablet 5   • furosemide (LASIX) 40 MG tablet Take 2 tablets by mouth Daily. 60 tablet 6   • levalbuterol (XOPENEX HFA) 45 MCG/ACT inhaler Inhale 2 puffs 4 (Four) Times a Day As Needed for Wheezing or Shortness of Air. 15 g 11   • magnesium oxide (MAGOX) 400 (241.3 MG) MG tablet tablet Take 400 mg by mouth Every Night.     • montelukast (SINGULAIR) 10 MG tablet Take 1 tablet by mouth Every Night. 90 tablet 2   • QUEtiapine (SEROquel) 300 MG tablet Take 300 mg by mouth Every Night.     • sotalol (BETAPACE) 80 MG tablet Take 80 mg by mouth Daily. Taking 1 and 1/2 tablets daily per [facility]     • umeclidinium-vilanterol (ANORO ELLIPTA) 62.5-25 MCG/INH aerosol powder  inhaler Inhale 1 puff Daily. 1 each 11   • warfarin (COUMADIN) 5 MG tablet Take as directed per coumadin clinic (Patient taking differently: Take 5 mg by mouth Daily. Take as directed per coumadin clinic)  "30 tablet 0     No current facility-administered medications for this visit.            Review of Systems:     Constitutional:  Denies recent weight loss, weight gain, fever or chills, no change in exercise tolerance.     HENT:  Denies any hearing loss, epistaxis, hoarseness, or difficulty speaking.     Eyes: Wears eyeglasses or contact lenses.    Respiratory:  Denies dyspnea with exertion,no cough, wheezing, or hemoptysis.     Cardiovascular: Negative for palpations, chest pain, orthopnea, PND, peripheral edema, syncope, or claudication.     Gastrointestinal:  Denies change in bowel habits, dyspepsia, ulcer disease, hematochezia, or melena.     Endocrine: Negative for cold intolerance, heat intolerance, polydipsia, polyphagia and polyuria. Denies any history of weight change, polydipsia, polyuria.     Genitourinary: Negative.      Musculoskeletal: Denies any history of arthritic symptoms or back problems.     Skin:  Denies any change in hair or nails, rashes, or skin lesions.     Allergic/Immunologic: Negative.  Negative for environmental allergies, food allergies and immunocompromised state.     Neurological:  Denies any history of recurrent headaches, strokes, TIA, or seizure disorder.     Hematological: Denies any food allergies, seasonal allergies, bleeding disorders, or lymphadenopathy.     Psychiatric/Behavioral: Denies any history of depression, substance abuse, or change in cognitive function.       OBJECTIVE:    /59   Pulse 60   Ht 180.3 cm (71\")   Wt 99.8 kg (220 lb)   BMI 30.68 kg/m²       Physical Exam:     Constitutional: Cooperative, alert and oriented, well-developed, well-nourished, in no acute distress.     HENT:   Head: Normocephalic, normal hair patterns, no masses or tenderness.  Ears, Nose, and Throat: No gross abnormalities. No pallor or cyanosis. Dentition good.   Eyes: EOMS intact, PERRL, conjunctivae and lids unremarkable. Fundoscopic exam and visual fields not performed.   Neck: " No palpable masses or adenopathy, no thyromegaly, no JVD, carotid pulses are full and equal bilaterally and without  Bruits.     Cardiovascular: Regular rhythm, S1 and S2 normal, no S3 or S4. Apical impulse not displaced. No murmurs, gallops, or rubs detected.     Pulmonary/Chest: Chest: normal symmetry, no tenderness to palpation, normal respiratory excursion, no intercostal retraction, no use of accessory muscles. Pulmonary: Normal breath sounds. No rales or rhonchi.    Abdominal: Abdomen soft, bowel sounds normoactive, no masses, no hepatosplenomegaly, non-tender, no bruits.     Musculoskeletal: No deformities, clubbing, cyanosis, erythema,. There are no spinal abnormalities noted. Normal muscle strength and tone. Pulses full and equal in all extremities, no bruits auscultated.     Neurological: No gross motor or sensory deficits noted, affect appropriate, oriented to time, person, place.     Skin: Warm and dry to the touch, no apparent skin lesions or masses noted.     Psychiatric: He has a normal mood and affect. His behavior is normal. Judgment and thought content normal.           ECG 12 Lead  Date/Time: 9/11/2018 2:39 PM  Performed by: LUBNA BAUTISTA  Authorized by: LUBNA BAUTISTA   Comparison: not compared with previous ECG   Rhythm: sinus rhythm  Comments: Sinus rhythm with the QTc interval for estimated              Lab Results   Component Value Date    WBC 8.02 07/29/2018    HGB 11.0 (L) 07/29/2018    HCT 33.5 (L) 07/29/2018    MCV 86.1 07/29/2018     07/29/2018     Lab Results   Component Value Date    GLUCOSE 112 (H) 07/30/2018    BUN 34 (H) 07/30/2018    CREATININE 1.73 (H) 07/30/2018    EGFRIFNONA 38 (L) 07/30/2018    BCR 19.7 07/30/2018    CO2 23.0 07/30/2018    CALCIUM 8.6 07/30/2018    ALBUMIN 3.50 07/30/2018    AST 18 07/29/2018    ALT 17 (L) 07/29/2018     Lab Results   Component Value Date    CHOL 112 07/28/2018    CHOL 134 02/05/2018     Lab Results   Component Value Date    TRIG 137  07/28/2018    TRIG 165 02/05/2018    TRIG 237 (H) 01/04/2017     Lab Results   Component Value Date    HDL 25 (L) 07/28/2018    HDL 28 (L) 02/05/2018    HDL 30 (L) 01/04/2017     No components found for: LDLCALC  Lab Results   Component Value Date    LDL 43 07/28/2018    LDL 68 02/05/2018    LDL 83 01/04/2017     No results found for: HDLLDLRATIO  No components found for: CHOLHDL  Lab Results   Component Value Date    HGBA1C 5.6 02/05/2018     Lab Results   Component Value Date    TSH 3.190 07/27/2018           ASSESSMENT AND PLAN:  #1 congestive heart failure on the basis of diastolic dysfunction   #2 atrial flutter/atrial fibrillation status post EP study  #3 hypertension with hypertensive heart disease and normal left and a systolic function.  Mild mitral regurgitation previous echocardiographic study.  #4  embolization with PVD status post stenting to care of Dr. Pennington    78 years old patient with history of hypertension, hypertensive disease, extensive PVD and history of for atrial flutter/fibrillation not ideal candidate given the multiple comorbid condition for EP study and ablations in quality of life.  The patient  back on Betapace on his own and getting 80 mg twice a day with given the history of renal insufficiency , decrease dose from 80 t0 40 mg twice a day he had to blood work drawn today and results are pending.  Patient is on chronic oral anticoagulation.  Lasix with history of congestive heart failure due to diastolic dysfunction compensated.  We'll see him in 6 month R depends on patient clinical conditions.  Massimo was seen today for follow-up.    Diagnoses and all orders for this visit:    LVH (left ventricular hypertrophy)    Essential hypertension    Diastolic dysfunction    Long-term (current) use of anticoagulants    Typical atrial flutter (CMS/HCC)    Non-rheumatic mitral regurgitation    Atherosclerosis of artery of extremity with gangrene (CMS/HCC)    AAA (abdominal aortic aneurysm)  without rupture (CMS/HCC)        Emily Thacker MD  9/11/2018  2:16 PM

## 2018-09-11 NOTE — PROGRESS NOTES
Pt here today seeing Dr Thacker. Pt states he is taking Lasix now. Pt has a bruise to right great toe and does not remember dropping anything on it or hitting it on anything. Pt was instructed to monitor the bruising and if it spreads see PCP; pt verbalized. Pt does not wish to go to lab for venipuncture. Pt was instructed to hold coumadin tonight, eat a can of spinach today, and appt made for Thursday (when pt agreed to return); pt verbalized. Patient instructed regarding medication; results given and questions answered. Nutritional counseling given.  Dietary factors affecting therapy addressed.  Patient instructed to monitor for excessive bruising or bleeding. Notify provider if you experience excessive bleeding from the nose, cuts, gums, rectum, or urinary tract. Reddish or brown urine or stool. Vomiting of blood or hemorrhoidal bleeding. If major injury occurs present to the Emergency Department.         This document has been electronically signed by Briana Tineo, [email or web address] @ on September 11, 2018 2:50 PM

## 2018-09-12 NOTE — TELEPHONE ENCOUNTER
Per Dr. Pino, Mr. Bentley has been called with his recent lab results & recommendations.  Continue his current medications and follow-up as planned or sooner if any problems.      ----- Message from Gregory Pino MD sent at 9/11/2018  4:26 PM CDT -----  Getting dehydrated.  Have him decrease his Lasix from 2 pills a day of the 40 mg size down to 1 pill a day.  Have him check his weight daily and if his weight goes up by more than 3 pounds he is to go back to 2 pills on Lasix until his weight is back down to his baseline

## 2018-09-12 NOTE — PROGRESS NOTES
Per Dr. Pino, Mr. Bentley has been called with his recent lab results & recommendations.  Continue his current medications and follow-up as planned or sooner if any problems.

## 2018-09-14 PROBLEM — I70.261 ATHEROSCLEROSIS OF NATIVE ARTERY OF RIGHT LEG WITH GANGRENE (HCC): Status: RESOLVED | Noted: 2018-04-27 | Resolved: 2018-01-01

## 2018-09-14 NOTE — PROGRESS NOTES
Patient Care Team:  Cris Hui MD as PCP - General (Family Medicine)      History and Physical    Mr. Franko West has prior history of peripheral vascular disease of the lower extremities. He has a history of stage III CKD and a history of mitral regurgitation and A-fib. He reports that he cannot walk > 50 feet due to extreme SOB and fatigue. He is is on O2 at all times. He reports that he recently saw his Cardiologist and Pulmonologist. He has had pulmonary testing done but has not been given the result of his test yet. He has a f/u appointment with his Pulmonologist in 3/weeks. He was Current medication include Pletal and Coumadin daily. I received an urgent call from Dr. Paloma Mejia stating that the patient had a gangrenous toe and his foot was cold and pulseless. He wanted to know if the patient should go to the ER or our office. I asked Dr. Paloma Mejia if he had done any kind of an arterial study to assess bloodflow. He stated that he did not have access in his office to do that and did not send the patient for testing as he thought this might only slow the process of getting the patient evaluated quicker. I advised him to send the patient to our office. When the patient arrived to our office, he denied any pain. He has BLE swelling that has actually improved since last seen in our office. His foot was warm to touch. He had 2+ doppler signals noted over the right leg graft, DP, PT and peroneal. He has mobility of his right foot. He has a bluish-purplish discoloration on the dorsal and plantar aspect of the right great toe and on the dorsal foot just proximal to the 2nd and 3rd toes. That looked to be more of a possible bruise. This did not have the typical look of an atheroembolic event. No open wounds noted. The patient actually stated that the discoloration seemed a little better today than it did yesterday.      Manuela Wilson is a 69 y.o. 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 ([facility] 11/01/2017    CKD (chronic kidney disease) stage 3, GFR 30-59 ml/min 11/01/2017    PVD (peripheral vascular disease) ([facility] 08/16/2012    COPD (chronic obstructive pulmonary disease) ([facility]     Hypertension     AAA (abdominal aortic aneurysm) without rupture (HCC)      Current Outpatient Prescriptions   Medication Sig Dispense Refill    oxyCODONE-acetaminophen (PERCOCET)  MG per tablet TAKE 1 TABLET BY MOUTH EVERY 4 TO 6 HOURS AS NEEDED FOR PAIN  0    furosemide (LASIX) 40 MG tablet Take 40 mg by mouth daily      SYMBICORT 160-4.5 MCG/ACT AERO Inhale 2 puffs into the lungs 2 times daily  12    Aspirin-Acetaminophen-Caffeine (EXCEDRIN PO) Take by mouth daily as needed       magnesium oxide (MAG-OX) 400 MG tablet Take 400 mg by mouth nightly      cilostazol (PLETAL) 50 MG tablet Take 100 mg by mouth 2 times daily       sotalol (BETAPACE) 80 MG tablet Take 160 mg by mouth 2 times daily      cetirizine (ZYRTEC) 10 MG tablet Take 10 mg by mouth daily      QUEtiapine (SEROQUEL) 100 MG tablet Take 100 mg by mouth nightly Takes three tablets at bedtime      montelukast (SINGULAIR) 10 MG tablet Take 10 mg by mouth nightly      pramipexole (MIRAPEX) 0.25 MG tablet Take 0.25 mg by mouth 2 times daily Takes two tablets every morning and one tablet at bedtime      warfarin (COUMADIN) 5 MG tablet Take 5 mg by mouth Daily. Regulated by [address]        No current facility-administered medications for this visit.       Allergies: Atorvastatin; Hydrocodone; and Morphine  Past Medical History:   Diagnosis Date    AAA (abdominal aortic aneurysm) without rupture (HCC)     Arthritis     Atrial fibrillation (HCC)     Treated in the past    Blood circulation, collateral    

## 2018-09-17 NOTE — PROGRESS NOTES
Does adjusted to reflect what pt states he took. PT denies any med changes or bleeding issues. PT denies any s/s of blood clot. PT states he has been self dosing his Betapace due to afib, but that does not interfere with Coumadin.PT again advised against self dosing any prescription meds. Adjusted pt's dose and instructed to hold green vegs for 3 days. Recheck INR on Friday. Patient instructed regarding medication; results given and questions answered. Nutritional counseling given.  Dietary factors affecting therapy addressed.  Patient instructed to monitor for excessive bruising or bleeding. PT verbalizes understanding.         This document has been electronically signed by JUANI Bustamante on September 17, 2018 2:13 PM

## 2018-09-20 NOTE — PROGRESS NOTES
Pt states no changes to meds or diet.  No bleeding issues.  Instructed pt on weekly coumadin dosing schedule and will recheck INR next wk.  Pt verbalizes.  Patient instructed regarding medication; results given and questions answered. Nutritional counseling given.  Dietary factors affecting therapy addressed.  Patient instructed to monitor for excessive bruising or bleeding.        This document has been electronically signed by JUANI Bustamante on September 20, 2018 10:04 AM

## 2018-09-25 NOTE — PROGRESS NOTES
PT states compliant c tx. PT denies any new medications or bleeding issues. Denies any s/s of blood clot. Adjusted pt's dose and instructed to increase vitamin k. PT request to be seen next Thursday when he returns to see Dr Romo. Patient instructed regarding medication; results given and questions answered. Nutritional counseling given.  Dietary factors affecting therapy addressed.  Patient instructed to monitor for excessive bruising or bleeding. PT verbalizes understanding.         This document has been electronically signed by Briana Tineo, ZAKI @ on September 25, 2018 10:09 AM  .

## 2018-10-04 NOTE — TELEPHONE ENCOUNTER
I left a message for the pt asking him to return my call. I need to know if the pt has had his R A'scan w/elvia's at [facility] or if it is at least scheduled.

## 2018-10-07 NOTE — ED PROVIDER NOTES
Subjective   78-year-old male presents to the emergency department from an [facility] for altered mental status.  According the ambulance crew the patient was more somnolent than normal today.  He had normal vital signs and was breathing normally but they couldn't wake him up.  Apparently the patient takes quite a large dose of Seroquel a day.  The patient was given Narcan en route which did nothing to help him become more alert.  He does wake up with painful stimuli.  He can't participate in the history or the review of systems at all.        Altered Mental Status   Presenting symptoms: unresponsiveness    Severity:  Moderate  Most recent episode:  Today  Episode history:  Continuous  Timing:  Constant  Context: nursing home resident        Review of Systems   Unable to perform ROS: Patient unresponsive       Past Medical History:   Diagnosis Date   • Abdominal bloating    • Anxiety    • Atherosclerosis of native arteries of extremity with intermittent claudication (CMS/HCC)    • Atrial flutter (CMS/HCC)    • Backache    • Benign prostatic hyperplasia     BX 2009, also has renal cyst      • Chronic bronchitis (CMS/HCC)    • Chronic renal impairment    • Depressive disorder    • Drug abuse, episodic use    • Dyslipidemia    • Fatigue    • GERD (gastroesophageal reflux disease)    • Hypercholesterolemia 03/15/2016   • Injury of tendon of rotator cuff 10/22/2015   • Insomnia 06/24/2016   • Intervertebral disc disorder 10/27/2011   • Major depression    • Neck pain 01/09/2012   • Osteoarthritis of multiple joints 06/21/2013   • Pain from vascular prosthetic devices, implants and grafts, sequela 12/03/2015   • Pain in left foot 10/27/2015   • Peripheral vascular disease (CMS/HCC) 12/03/2015     LEFT fem-tib bypass, embolectomy 6/2014    10/27/2015    • Senile debility 10/22/2015   • 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    weakness   • Shoulder pain    • Simple renal cyst 01/01/2011   • Stroke (CMS/HCC)      Echo: L  atrial appendage thrombus      • Suicide (CMS/HCC)     at risk for   • Tobacco dependence syndrome 12/03/2015    2014 to E cigarette          Allergies   Allergen Reactions   • Morphine And Related Urinary Retention   • Lipitor [Atorvastatin] Nausea And Vomiting   • Lortab [Hydrocodone-Acetaminophen] Nausea And Vomiting       Past Surgical History:   Procedure Laterality Date   • ABDOMINAL AORTIC ANEURYSM REPAIR  07/18/2011    Endovascular   • BACK SURGERY     • CARDIAC CATHETERIZATION  01/30/1984    Mixed dominant coronary arterial anatomy. No coronary atherosclerosis. Normal left ventricular function   • CHOLECYSTECTOMY  08/25/2014    Cholecystitis with gangrenous gallbladder   • ENDOSCOPY  03/22/1990    Duodenal ulcer   • ENDOSCOPY AND COLONOSCOPY  10/17/2013    Diverticulum in sigmoid colon & descending colon. Internal & external hemorrhoids found   • ESOPHAGOSCOPY / EGD  10/17/2013    With tube-Esophagitis seen. Biopsy taken. Gastritis in stomach. Biopsy taken. Stenosis in 2nd portion of duodenum. Dilatation performed.   • FEMORAL THROMBECTOMY/EMBOLECTOMY  06/29/2014    Left lower extremity arteriogram. Left popliteal artery endarterectomy. Redo left femoral to [id number] trunk bypass. Negative pressure wound therapy with placement of Prevena wound V.A.C., left groin, left lower leg   • INJECTION OF MEDICATION  03/15/2016    B12   • INJECTION OF MEDICATION  10/23/2015    Drain/Inject Major Joint    • INJECTION OF MEDICATION  01/13/2016    Kenalog   • LUMBAR LAMINECTOMY     • TRANSESOPHAGEAL ECHOCARDIOGRAM (TEX)  10/06/2015    With color flow-Mild mitral regurg.Left atrium mild dilated.Ef of 55-60%.RV systolic pressure elevated.Trace tricuspid regurg   • TRANSESOPHAGEAL ECHOCARDIOGRAM (TEX)  02/14/2012    Without color flow-[facility] w/ early diast dysfun. AV trileaflet & structurally NRL w/ AR NRL. No regional wall motion abn Est Ef approx 50-55%. M & TR valves NRL w/ mild M & TR regurg Trace -mild pulmonic  regurg. Anterior echo free space w/o hemodynamic signif. NRL RV   • TRANSESOPHAGEAL ECHOCARDIOGRAM (TEX)  03/25/2011    Normal LV systolic function with Ef of 50-55%.Left atrium ildly dilated.Moderate mitral regurg.Mitral valve regurg.Intact interatrial septum.No evidence of any cardiac thrombus or pericardial effusion.AV trileaflet       Family History   Problem Relation Age of Onset   • Osteoarthritis Mother         Lived to be 102   • Hypertension Mother    • Arthritis Mother    • Hyperlipidemia Mother    • ADD / ADHD Father    • Lung cancer Father    • Cancer Father 73        lung   • ADD / ADHD Brother    • Rectal cancer Other        Social History     Social History   • Marital status:      Social History Main Topics   • Smoking status: Current Some Day Smoker     Packs/day: 0.50     Years: 50.00     Types: Cigarettes   • Smokeless tobacco: Never Used      Comment: reduced;   • Alcohol use No      Comment: Former use    • Drug use: No   • Sexual activity: Defer     Other Topics Concern   • Not on file           Objective   Physical Exam   Constitutional: He appears well-developed and well-nourished.   HENT:   Head: Normocephalic and atraumatic.   Eyes: EOM are normal.   Pupils are pinpoint   Neck: Normal range of motion. Neck supple.   No midline tenderness   Cardiovascular: Normal rate, regular rhythm, normal heart sounds and intact distal pulses.  Exam reveals no gallop and no friction rub.    No murmur heard.  Pulmonary/Chest: Breath sounds normal. No respiratory distress. He has no wheezes. He has no rales.   No rhonchi   Abdominal: Soft. Bowel sounds are normal. He exhibits no distension. There is no tenderness.   Musculoskeletal: Normal range of motion. He exhibits no tenderness or deformity.   Neurological: No cranial nerve deficit. He exhibits normal muscle tone. Coordination normal.   Somnolent   Skin: Skin is warm and dry. No rash noted.   Psychiatric: He has a normal mood and affect. His  behavior is normal.   Nursing note and vitals reviewed.      Procedures           ED Course                  MDM  Number of Diagnoses or Management Options  Diagnosis management comments: 8:30 PM.  Is able speak to the daughter who was in the room while the patient was getting a CT and she said that he is now awake and talking to her.  She said that she's noticed over the last several days he has been more somnolent.  He did have recent hospitalization at [facility] for a fall and a hip contusion.  He was recently sent from there to the nursing home for physical therapy.  The daughter says that she was told by the nursing home staff that his ceric wall was dose at about 8 PM yesterday.    9:27 PM I went to reevaluate the patient and he is now awake and alert and partially his teeth in the bed.  I spoke with him and his daughter and he says that he was just really sleepy and couldn't wake up.  The daughter tells me that in the last hospitalization she thought that he was somnolent there is well.  The patient has been on the doses of the medications that he is on for some time now but it seems plausible that the patient is overmedicated cause him to be somnolent.  The patient and his daughter were both like to go.  The daughter would like to take him with her as opposed to having him transported by ambulance.  We'll see if we can arrange that.  Follow-up should be with primary care doctor in 48 hours       Amount and/or Complexity of Data Reviewed  Clinical lab tests: ordered and reviewed  Tests in the radiology section of CPT®: ordered and reviewed  Decide to obtain previous medical records or to obtain history from someone other than the patient: yes  Obtain history from someone other than the patient: yes  Review and summarize past medical records: yes    Risk of Complications, Morbidity, and/or Mortality  Presenting problems: high  Diagnostic procedures: high  Management options: high    Patient Progress  Patient  progress: improved        Final diagnoses:   Medication reaction, initial encounter            Derrick Baires MD  10/07/18 3722

## 2018-10-08 NOTE — ED NOTES
TC to Isaiah in lab to inquire about UDS. States he will look into it.     Allegra Thomas RN  10/07/18 9422

## 2018-10-17 NOTE — TELEPHONE ENCOUNTER
I left a vm on the pt's cell asking him to give me a callback. I need to ask him if he has obtained the arterial study Dr. Paresh Bermudez ordered for him at his last visit.

## 2018-10-23 PROBLEM — J96.11 CHRONIC RESPIRATORY FAILURE WITH HYPOXIA (HCC): Status: ACTIVE | Noted: 2018-01-01

## 2018-10-23 PROBLEM — J44.1 COPD WITH EXACERBATION (HCC): Chronic | Status: RESOLVED | Noted: 2017-11-20 | Resolved: 2018-01-01

## 2018-10-23 PROBLEM — J44.9 CHRONIC OBSTRUCTIVE PULMONARY DISEASE (HCC): Status: ACTIVE | Noted: 2018-01-01

## 2018-10-23 PROBLEM — J84.112 UIP (USUAL INTERSTITIAL PNEUMONITIS) (HCC): Status: ACTIVE | Noted: 2018-01-01

## 2018-11-06 PROBLEM — F19.10 DRUG ABUSE, EPISODIC USE (HCC): Status: ACTIVE | Noted: 2018-01-01

## 2018-11-06 NOTE — PROGRESS NOTES
Subjective   Massimo Bentley is a 78 y.o. male.     Back Pain   This is a chronic problem. The current episode started more than 1 year ago. The problem occurs constantly. The problem has been rapidly worsening since onset. The pain is present in the lumbar spine. The quality of the pain is described as aching. The pain radiates to the right thigh. The pain is at a severity of 10/10. The pain is severe. The symptoms are aggravated by sitting. Pertinent negatives include no bladder incontinence, bowel incontinence or fever.        The following portions of the patient's history were reviewed and updated as appropriate: allergies, current medications, past family history, past medical history, past social history, past surgical history and problem list.    Review of Systems   Constitutional: Negative for fever.   Gastrointestinal: Negative for bowel incontinence.   Genitourinary: Negative for bladder incontinence.   Musculoskeletal: Positive for back pain.       Objective   Physical Exam   Constitutional: He is oriented to person, place, and time. He appears well-developed and well-nourished. No distress.   HENT:   Head: Normocephalic and atraumatic.   Musculoskeletal:        Lumbar back: He exhibits decreased range of motion, tenderness and pain. He exhibits no bony tenderness, no swelling, no edema, no deformity, no laceration and no spasm.   Neurological: He is alert and oriented to person, place, and time.   Reflex Scores:       Patellar reflexes are 2+ on the right side and 2+ on the left side.  Skin: Skin is warm and dry. He is not diaphoretic.   Psychiatric: He has a normal mood and affect. His behavior is normal. Judgment and thought content normal.   Nursing note and vitals reviewed.      Assessment/Plan   Problems Addressed this Visit        Other    Drug abuse, episodic use (CMS/[facility])      Other Visit Diagnoses     Chronic midline low back pain with right-sided sciatica    -  Primary    Relevant  Medications    triamcinolone acetonide (KENALOG-40) injection 80 mg    Other Relevant Orders    XR spine lumbar 4+ vw    XR Pelvis 1 or 2 View          ALFIE reviewed     xrays reviewed with patient

## 2018-11-06 NOTE — PROGRESS NOTES
Subjective   Massimo Bentley is a 78 y.o. male.     Hyperlipidemia   This is a chronic problem. The current episode started more than 1 year ago. The problem is controlled. Recent lipid tests were reviewed and are normal. Exacerbating diseases include chronic renal disease. Pertinent negatives include no myalgias.   COPD   This is a chronic problem. The current episode started more than 1 year ago. The problem occurs constantly. The problem has been unchanged. Associated symptoms include congestion. Pertinent negatives include no myalgias.   Atrial Fibrillation   Presents for follow-up visit. The symptoms have been stable. Past medical history includes atrial fibrillation, CHF and hyperlipidemia.   Hypertension   This is a chronic problem. The current episode started more than 1 year ago. The problem is unchanged. The problem is controlled. Associated symptoms include peripheral edema. Pertinent negatives include no orthopnea or PND. Identifiable causes of hypertension include chronic renal disease.   Chronic Kidney Disease   This is a chronic problem. The current episode started more than 1 year ago. The problem occurs constantly. Associated symptoms include congestion. Pertinent negatives include no myalgias.   Depression   Visit Type: follow-up  Patient is not experiencing: depressed mood, insomnia, irritability and nervousness/anxiety.  Patient has a history of: CHF    Congestive Heart Failure   Presents for follow-up visit. Associated symptoms include edema. Pertinent negatives include no paroxysmal nocturnal dyspnea.        The following portions of the patient's history were reviewed and updated as appropriate: allergies, current medications, past family history, past medical history, past social history, past surgical history and problem list.    Review of Systems   Constitutional: Negative for irritability.   HENT: Positive for congestion.    Cardiovascular: Negative for orthopnea and PND.    Musculoskeletal: Negative for myalgias.   Psychiatric/Behavioral: The patient is not nervous/anxious and does not have insomnia.        Objective   Physical Exam   Constitutional: He is oriented to person, place, and time. He appears well-developed and well-nourished. No distress.   HENT:   Head: Normocephalic and atraumatic.   Cardiovascular: Normal rate.  Exam reveals distant heart sounds. Exam reveals no friction rub.    No murmur heard.  Pulmonary/Chest: Effort normal. Tachypnea noted. He has decreased breath sounds. He has wheezes. He has rhonchi.   Musculoskeletal: He exhibits edema. He exhibits no tenderness.     Vascular Status -  His right foot exhibits abnormal foot vasculature  and abnormal foot edema. His left foot exhibits abnormal foot vasculature  and abnormal foot edema.  Neurological: He is alert and oriented to person, place, and time.   Skin: Skin is warm and dry. He is not diaphoretic.   Psychiatric: He has a normal mood and affect. His behavior is normal. Judgment and thought content normal.   Nursing note and vitals reviewed.      Assessment/Plan   Problems Addressed this Visit     None            Current outpatient and discharge medications have been reconciled for the patient.  Reviewed by: Gregory Pino MD     O2 78% on RA today. Has O2 at [facility]

## 2018-11-07 NOTE — TELEPHONE ENCOUNTER
Pr Dr. Pino, Mr. Bentley has been called with his recent lab results & recommendations.  Continue his current medications and follow-up as planned or sooner if any problems.      ----- Message from Gregory Pino MD sent at 11/7/2018 12:47 PM CST -----  Arthritis is discussed.  No new fractures

## 2018-11-07 NOTE — TELEPHONE ENCOUNTER
Can you write a general statement or does he need a mobility visit first?  Please advise, thank you

## 2018-11-07 NOTE — PROGRESS NOTES
Subjective   Patient ID: Massimo Bentley is a 77 y.o. male is here today as he called and requested to be seen for LLE edema and discomfort. .  CC:  Denies any claudication or color changes of his feet.  Does have some balance issues, now using walker  Has slit opening on two toes from nail care.  He double his coumadin one night due to leg edema.     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  He underwent Redo LEFT fem->tib peroneal graft.   He denies foot pain, denies any sensory, motor, or neuro deficit other than long standing LEFT shin numbness.  He denies any bleeding or unexplained bruising.  He returns today in scheduled FU.  He has been to rehab and no longer consumes alcohol,  He does continue to smoke cigars and electronic cigarettes.  Concerned about leg edema/discomfort.  Which he reports is improved.      PVD/Anticoagulation HX:  Chronic at fib for which he is on long term anticoagulation therapy.  He has undergone cardioversion and ablation (9/19/11) but continues with at fib.  He recently underwent  Aortagram with thrombolysis for clot of his SFA and popliteal artery (R).  TEX done at that time (2/17/12) demonstrated considerable amoutn of thrombus around and in LA appendage.  5/2/111. CT ABD:  Infrarenal abdominal aortic aneurysm, occurring just proximal to  the bifurcation, measuring 5.5 centimeters in craniocaudal dimension  5.3 cm in AP dimension, and 6.8 cm in transverse dimension  Left femoral to tibia bypas  7/18/2011 Endovascular AAA Repair   6/29/14  REDO LEFT femoral to tibial peroneal trunk bypass 6mm PTFE  with LFA embolectomy and LPA endarterectomy  11/3/2014 AMADEO:  RIGHT 1.1 tri/biphasic.  LEFT .90 biphasic.  patent LEFT fem-tib bypass (maxPSV 40cm/s, triphasic)  3/9/15: AMADEO: RIGHT 1.1 Triphasic. LEFT 0.86 Biphasic.  7/20/15: [facility]O: RIGHT 1.1 Triphasic/Bi  (DP). LEFT 0.82 Tri/Bi-Mon (PT/DP). PATENT LEFT Fem-tib max PSV 117cm/s (inflow) Triphasic.  10/27/15: [facility]O: RIGHT 1.1 Triphasic. LEFT NO DP/PT FLOW. OCCLUDED LEFT fem-tib graft  10/27/15  LLE arteriogram, TPA thrombolysis with EKOS catherter to LEFT fem-pop graft  10/28/15  Recheck lysis, 2 V run off to foot.   12/03/15  AMADEO:  RIGHT 0.87  POP/DP/PT Triphasic  LEFT 0.88 POP Triphasic  DP/PT Biphasic   3/16/16  AMADEO:  RIGHT 0.98  Triphasic  LEFT 0.75 POP Triphasic  DP/PT Biphasic   9/21/16   AMADEO:  RIGHT 1.01  Triphasic  LEFT 0.87 POP Triphasic  PT Biphasic  DP Monophasic   PPGS:  Right pulsatile waveforms  LEFT reduced PPG waveforms.   02/08/17  AMADEO:  RIGHT 1.22   Triphasic  LEFT 0.95 POP Triphasic  PT Biphasic  DP Biphasic    PPGS:  Right pulsatile waveforms  LEFT reduced PPG waveforms.     Aneurysm:  No back pain, No embolization  5/2/111. CT ABD:  Infrarenal abdominal aortic aneurysm, occurring just proximal to  the bifurcation, measuring 5.5 centimeters in craniocaudal dimension  5.3 cm in AP dimension, and 6.8 cm in transverse dimension  7/18/2011 Endovascular AAA Repair   3/9/15: Abd Duplex: Prx Aorta 3.6cm. No endovascular leak      The following portions of the patient's history were reviewed and updated as appropriate: allergies, current medications, past family history, past medical history, past social history, past surgical history and problem list.    Current Outpatient Prescriptions:   •  albuterol (PROVENTIL HFA;VENTOLIN HFA) 108 (90 BASE) MCG/ACT inhaler, Inhale 2 puffs Every 4 (Four) Hours As Needed for Wheezing., Disp: 1 inhaler, Rfl: 5  •  aspirin 81 MG EC tablet, Take 81 mg by mouth daily., Disp: , Rfl:   •  cilostazol (PLETAL) 50 MG tablet, Take 1 tablet by mouth 2 (Two) Times a Day., Disp: 60 tablet, Rfl: 5  •  guaiFENesin (MUCINEX) 600 MG 12 hr tablet, Take 1,200 mg by mouth 2 (Two) Times a Day., Disp: , Rfl:   •  levothyroxine (SYNTHROID, LEVOTHROID) 25 MCG tablet, Take 25 mcg by mouth daily.,  Disp: , Rfl:   •  magnesium oxide (MAGOX) 400 (241.3 MG) MG tablet tablet, Take 400 mg by mouth every night., Disp: , Rfl:   •  montelukast (SINGULAIR) 10 MG tablet, Take 1 tablet by mouth Every Night., Disp: 90 tablet, Rfl: 2  •  QUEtiapine (SEROquel) 100 MG tablet, TAKE 2 TABLETS BY MOUTH AT BEDTIME, Disp: , Rfl: 0  •  sotalol (BETAPACE) 80 MG tablet, Take 1.5 tablets by mouth 2 (Two) Times a Day., Disp: 270 tablet, Rfl: 2  •  warfarin (COUMADIN) 5 MG tablet, Take 1 tablet nightly except on Tuesday and Friday take 1/2 tablet or as directed by [facility]mad[facility], Disp: 30 tablet, Rfl: 0    Review of Systems   Constitution: Positive for weakness (less strong ). Negative for chills, fever and malaise/fatigue.   HENT: Negative for headaches, hoarse voice and nosebleeds.    Eyes: Negative for visual disturbance.   Cardiovascular: Positive for leg swelling (LLE). Negative for chest pain, claudication, cyanosis and dyspnea on exertion.   Respiratory: Positive for cough and wheezing (daytime). Negative for hemoptysis and shortness of breath.    Hematologic/Lymphatic: Negative for bleeding problem. Does not bruise/bleed easily.   Skin: Positive for color change (LE), dry skin and poor wound healing ([place] toes with nail trim issues.   no erythema no drainage. ). Negative for rash.   Musculoskeletal: Negative for falls, joint swelling and muscle cramps.   Gastrointestinal: Negative for anorexia, change in bowel habit, hematemesis, melena and nausea.   Genitourinary: Negative for hematuria.   Neurological: Positive for disturbances in coordination (Now uses walker ), loss of balance (walker) and numbness (Left shin). Negative for brief paralysis, difficulty with concentration, dizziness, focal weakness, light-headedness, paresthesias and sensory change.   Psychiatric/Behavioral: Negative for altered mental status.        Objective   Physical Exam   Constitutional: He is oriented to person, place, and time. He appears  well-nourished.   HENT:   Head: Normocephalic.   Mouth/Throat: Oropharynx is clear and moist.   Eyes: Conjunctivae and EOM are normal. Pupils are equal, round, and reactive to light.   Neck: Neck supple. No JVD present. Tracheal deviation present.   Cardiovascular: Normal rate, normal heart sounds and intact distal pulses.  An irregular rhythm present.   Pulses:       Carotid pulses are 2+ on the right side, and 2+ on the left side.       Radial pulses are 2+ on the right side, and 2+ on the left side.        Dorsalis pedis pulses are 2+ on the right side, and 1+ on the left side.        Posterior tibial pulses are 2+ on the right side, and 1+ on the left side.   Lower extremities with varcosities and spider veins    Pulmonary/Chest: Effort normal. No stridor. He has wheezes. He has no rales.   Abdominal: Soft. Bowel sounds are normal.   Musculoskeletal: He exhibits edema (< 1+ LLE ). He exhibits no tenderness (none of LLE with physical exam ).       Neurological Sensory Findings -  Unaltered sharp/dull right ankle/foot discrimination and unaltered sharp/dull left ankle/foot discrimination.    Vascular Status -  His exam exhibits right foot vasculature normal. His exam exhibits no right foot edema. His exam exhibits left foot edema. His exam exhibits left foot vasculature abnormal (decrease pulses   Distal 3 toes with discoloration bluish no pallor ).   Skin Integrity  -  His right foot skin is not intact (Last toe inner aspect with 0.4 cm circular ulcer with soft white top ).    Massimo 's left foot skin is intact (2 toes with sm slit open areas ). .  Neurological: He is alert and oriented to person, place, and time. No cranial nerve deficit.   Skin: Skin is warm and dry. No rash noted. No erythema. No pallor.   LEFT Posterior toes dusky W/D   Cap refill < 3 sec    + sens & mobility   LE venous staining.    Psychiatric: Judgment normal.   Nursing note and vitals reviewed.    POC INR 6.0    Assessment/Plan    Independent Review of Radiographic Studies:   02/08/17  AMADEO:  RIGHT 1.22   Triphasic  LEFT 0.95 POP Triphasic  PT Biphasic  DP Biphasic    PPGS:  Right pulsatile waveforms  LEFT reduced PPG waveforms.         1. PVD (peripheral vascular disease)  Massimo Bentlye is to continue beta blocker, antiplatelet, and statin therapy.  Continue Pletal at reduced dosage as dizziness resolved.  Continue anticoagulation with coumadin, currently he is supra therapeutic, as he increased his dose.  See anticoagulation clinic note for dosing schedule change.  Eat spinach.  Avoid all etoh.   Lotion to dry skin.   Vascular FU 2 mths unless symptoms occur  As previously scheduled.   If signs and symptoms of ischemia should occur including but not limited to pale/blue discoloration of limb, increasing pain with ambulation or at rest, or a non-healing wound. Patient is to notify [facility] V[facility] center for immediate evaluation.  Wheezing:  Avoid cigars and see if improved.     - Ankle Brachial Index    2.  Sore on toes   Wound care:   Sore on 2 toes.  CLean 2 x a day with baby shampoo  Rinse pat  Separate toes at night Light antibacterial ointment.      3.  Venous congestion with dependent edema.  ALONSO boot applied today while in office, after cleansing and lotion application  Elevate legs 4 x a day 10 minutes at a time Ankle bone higher than hip bone.    Friday 6/9/17 230     Yes

## 2018-11-07 NOTE — TELEPHONE ENCOUNTER
Patient has called and said he needs to talk to Dr Pino or his nurse about getting a statement on his condition. He states he is trying to get a handicap van and wheelchair and needs this statement faxed to [phone]. Please call him at [phone]

## 2018-11-07 NOTE — TELEPHONE ENCOUNTER
Mr. Bentley wanted to clarify what kind of statement he needs.  He states his Handicap van and wheelchair are on their way.    What he is needing from you is something stating it is ok for him to receive Therapeutic Training for use of Handicap Van with Hand Controls.      He states he has lost a lot of the use of his legs and due to that needs to be trained on using hand controls for his new Van and wheelchair.  He states when they deliver the van to him they will stay and train him on it's use, but can not deliver it for training until they have the letter from you.      His Number is [phone] if you need to call him.     He states there is no paper work, that the person called him this afternoon and told him about needing the signed letter/statement from you saying it id ok    JOSE Diallo

## 2018-11-09 NOTE — TELEPHONE ENCOUNTER
DR DENYS PARRISH HAS CALLED AGAIN ABOUT NEEDING THE STATEMENT FX ON GETTING TRAINING TO DRIVE THE HANDICAPPED VAN THAT IS ON THE WAY...he WAS THINKING THIS SHOULD HAVE ALREADY BEEN DONE AS HE LEFT A MESSAGE ON WED...HIS # [phone]

## 2018-11-12 NOTE — TELEPHONE ENCOUNTER
Dr. Pino & Isela    I have called Mr. Bentley and he gave me the name of the  from [employer] who will come and train him using their van with hand controls.    Ms. Antonieta Catherine with [employer]. In [place], [place]  Her Phone number is [phone]  Her Fax Number is  [phone]    The need an order from his PCP, Dr. Pino stating it is OK for them to come and do a Driving Evaluation so they can evaluate his needs for hand control vehicle and or wheelchair due to the loss of his leg function.    Fax the order to Antonietaeriberto Alexander @ [phone].  If you need anything else I guess you all need to call her at her number above.

## 2018-12-05 NOTE — PROGRESS NOTES
Subjective   Massimo Bentley is a 78 y.o. male.     Hyperlipidemia   This is a chronic problem. The current episode started more than 1 year ago. The problem is controlled. Recent lipid tests were reviewed and are normal. Exacerbating diseases include chronic renal disease. Associated symptoms include shortness of breath. Pertinent negatives include no chest pain or myalgias.   COPD   This is a chronic problem. The current episode started more than 1 year ago. The problem occurs constantly. The problem has been unchanged. Associated symptoms include congestion and coughing. Pertinent negatives include no chest pain, chills, fever or myalgias.   Atrial Fibrillation   Presents for follow-up visit. Symptoms include palpitations and shortness of breath. Symptoms are negative for chest pain. The symptoms have been stable. Past medical history includes atrial fibrillation, CHF and hyperlipidemia.   Hypertension   This is a chronic problem. The current episode started more than 1 year ago. The problem is unchanged. The problem is controlled. Associated symptoms include palpitations, peripheral edema and shortness of breath. Pertinent negatives include no chest pain, orthopnea or PND. Identifiable causes of hypertension include chronic renal disease.   Chronic Kidney Disease   This is a chronic problem. The current episode started more than 1 year ago. The problem occurs constantly. Associated symptoms include congestion and coughing. Pertinent negatives include no chest pain, chills, fever or myalgias.   Depression   Visit Type: follow-up  Patient presents with the following symptoms: palpitations and shortness of breath.  Patient is not experiencing: depressed mood, insomnia, irritability and nervousness/anxiety.  Patient has a history of: CHF    Congestive Heart Failure   Presents for follow-up visit. Associated symptoms include edema, palpitations and shortness of breath. Pertinent negatives include no chest pain or  paroxysmal nocturnal dyspnea.        The following portions of the patient's history were reviewed and updated as appropriate: allergies, current medications, past family history, past medical history, past social history, past surgical history and problem list.    Review of Systems   Constitutional: Negative for chills, fever and irritability.   HENT: Positive for congestion.    Respiratory: Positive for cough and shortness of breath.    Cardiovascular: Positive for palpitations. Negative for chest pain, orthopnea and PND.   Musculoskeletal: Negative for myalgias.   Psychiatric/Behavioral: The patient is not nervous/anxious and does not have insomnia.        Objective   Physical Exam   Constitutional: He is oriented to person, place, and time. He appears well-developed and well-nourished. No distress.   HENT:   Head: Normocephalic and atraumatic.   Cardiovascular: Normal rate. Exam reveals distant heart sounds. Exam reveals no friction rub.   No murmur heard.  Pulmonary/Chest: Effort normal. Tachypnea noted. He has decreased breath sounds. He has wheezes. He has rhonchi.   Musculoskeletal: He exhibits edema. He exhibits no tenderness.     Vascular Status -  His right foot exhibits abnormal foot vasculature  and abnormal foot edema. His left foot exhibits abnormal foot vasculature  and abnormal foot edema.  Neurological: He is alert and oriented to person, place, and time.   Skin: Skin is warm and dry. He is not diaphoretic.   Psychiatric: He has a normal mood and affect. His behavior is normal. Judgment and thought content normal.   Nursing note and vitals reviewed.      Assessment/Plan   Problems Addressed this Visit        Cardiovascular and Mediastinum    Paroxysmal atrial fibrillation (CMS/HCC) (Chronic)    Hypertension (Chronic)       Respiratory    Chronic obstructive pulmonary disease (CMS/HCC) - Primary    Relevant Medications    betamethasone acetate-betamethasone sodium phosphate (CELESTONE SOLUSPAN)  injection 12 mg (Completed)       Genitourinary    CKD (chronic kidney disease) stage 3, GFR 30-59 ml/min (CMS/[facility]) (Chronic)      Other Visit Diagnoses     COPD with exacerbation (CMS/[facility])                Current outpatient and discharge medications have been reconciled for the patient.  Reviewed by: Gregory Pino MD

## 2018-12-05 NOTE — PROGRESS NOTES
Subjective   Massimo Bentley is a 78 y.o. male.     Back Pain          {Common H&P Review Areas:62314}    Review of Systems   Musculoskeletal: Positive for back pain.       Objective   Physical Exam    Assessment/Plan   {Assess/PlanSmartLinks:64116}

## 2019-01-01 ENCOUNTER — TRANSCRIBE ORDERS (OUTPATIENT)
Dept: LAB | Facility: HOSPITAL | Age: 79
End: 2019-01-01

## 2019-01-01 ENCOUNTER — OFFICE VISIT (OUTPATIENT)
Dept: VASCULAR SURGERY | Age: 79
End: 2019-01-01
Payer: MEDICARE

## 2019-01-01 ENCOUNTER — APPOINTMENT (OUTPATIENT)
Dept: LAB | Facility: HOSPITAL | Age: 79
End: 2019-01-01

## 2019-01-01 ENCOUNTER — HOSPITAL ENCOUNTER (OUTPATIENT)
Dept: VASCULAR LAB | Age: 79
Discharge: HOME | End: 2019-04-25
Payer: MEDICARE

## 2019-01-01 ENCOUNTER — HOSPITAL ENCOUNTER (OUTPATIENT)
Dept: VASCULAR LAB | Age: 79
Discharge: HOME | End: 2019-04-24
Payer: MEDICARE

## 2019-01-01 ENCOUNTER — TELEPHONE (OUTPATIENT)
Dept: FAMILY MEDICINE CLINIC | Facility: CLINIC | Age: 79
End: 2019-01-01

## 2019-01-01 ENCOUNTER — OUTSIDE FACILITY SERVICE (OUTPATIENT)
Dept: FAMILY MEDICINE CLINIC | Facility: CLINIC | Age: 79
End: 2019-01-01

## 2019-01-01 ENCOUNTER — OFFICE VISIT (OUTPATIENT)
Dept: FAMILY MEDICINE CLINIC | Facility: CLINIC | Age: 79
End: 2019-01-01

## 2019-01-01 ENCOUNTER — HOSPITAL ENCOUNTER (OUTPATIENT)
Dept: NON INVASIVE DIAGNOSTICS | Age: 79
End: 2019-01-01
Payer: MEDICARE

## 2019-01-01 ENCOUNTER — HOSPITAL ENCOUNTER (EMERGENCY)
Facility: HOSPITAL | Age: 79
End: 2019-07-20
Attending: EMERGENCY MEDICINE | Admitting: EMERGENCY MEDICINE

## 2019-01-01 ENCOUNTER — TELEPHONE (OUTPATIENT)
Dept: VASCULAR SURGERY | Age: 79
End: 2019-01-01

## 2019-01-01 VITALS — HEART RATE: 29 BPM | DIASTOLIC BLOOD PRESSURE: 48 MMHG | SYSTOLIC BLOOD PRESSURE: 67 MMHG

## 2019-01-01 VITALS
OXYGEN SATURATION: 97 % | BODY MASS INDEX: 29.8 KG/M2 | HEIGHT: 72 IN | WEIGHT: 220 LBS | SYSTOLIC BLOOD PRESSURE: 130 MMHG | HEART RATE: 71 BPM | DIASTOLIC BLOOD PRESSURE: 78 MMHG

## 2019-01-01 VITALS — DIASTOLIC BLOOD PRESSURE: 68 MMHG | SYSTOLIC BLOOD PRESSURE: 104 MMHG

## 2019-01-01 DIAGNOSIS — I73.9 PVD (PERIPHERAL VASCULAR DISEASE) (HCC): Primary | ICD-10-CM

## 2019-01-01 DIAGNOSIS — I70.244 ATHEROSCLEROSIS OF NATIVE ARTERIES OF LEFT LEG WITH ULCERATION OF HEEL AND MIDFOOT (HCC): ICD-10-CM

## 2019-01-01 DIAGNOSIS — N18.30 CKD (CHRONIC KIDNEY DISEASE) STAGE 3, GFR 30-59 ML/MIN (HCC): ICD-10-CM

## 2019-01-01 DIAGNOSIS — I70.244 ATHEROSCLEROSIS OF NATIVE ARTERIES OF LEFT LEG WITH ULCERATION OF HEEL AND MIDFOOT (HCC): Primary | ICD-10-CM

## 2019-01-01 DIAGNOSIS — I10 ESSENTIAL HYPERTENSION: ICD-10-CM

## 2019-01-01 DIAGNOSIS — I46.9 CARDIAC ARREST (HCC): Primary | ICD-10-CM

## 2019-01-01 DIAGNOSIS — I70.244 ATHEROSCLEROSIS OF NATIVE ARTERY OF LEFT LOWER EXTREMITY WITH ULCERATION OF HEEL (HCC): ICD-10-CM

## 2019-01-01 DIAGNOSIS — I50.22 CHRONIC SYSTOLIC CONGESTIVE HEART FAILURE (HCC): ICD-10-CM

## 2019-01-01 DIAGNOSIS — F33.41 RECURRENT MAJOR DEPRESSIVE DISORDER, IN PARTIAL REMISSION (HCC): ICD-10-CM

## 2019-01-01 DIAGNOSIS — I73.9 PVD (PERIPHERAL VASCULAR DISEASE) (HCC): ICD-10-CM

## 2019-01-01 DIAGNOSIS — J84.10 PULMONARY FIBROSIS (HCC): Primary | ICD-10-CM

## 2019-01-01 DIAGNOSIS — J96.11 CHRONIC RESPIRATORY FAILURE WITH HYPOXIA (HCC): ICD-10-CM

## 2019-01-01 DIAGNOSIS — E78.00 HYPERCHOLESTEROLEMIA: ICD-10-CM

## 2019-01-01 DIAGNOSIS — I48.0 PAROXYSMAL ATRIAL FIBRILLATION (HCC): ICD-10-CM

## 2019-01-01 DIAGNOSIS — J44.1 CHRONIC OBSTRUCTIVE PULMONARY DISEASE WITH ACUTE EXACERBATION (HCC): Primary | ICD-10-CM

## 2019-01-01 LAB
ALBUMIN SERPL-MCNC: 2.6 G/DL (ref 3.5–5.2)
ALBUMIN SERPL-MCNC: 3.5 G/DL (ref 3.5–5.2)
ALBUMIN/GLOB SERPL: 0.8 G/DL
ALBUMIN/GLOB SERPL: 0.8 G/DL
ALP SERPL-CCNC: 72 U/L (ref 39–117)
ALP SERPL-CCNC: 76 U/L (ref 39–117)
ALT SERPL W P-5'-P-CCNC: 16 U/L (ref 1–41)
ALT SERPL W P-5'-P-CCNC: 17 U/L (ref 1–41)
ANION GAP SERPL CALCULATED.3IONS-SCNC: 15.1 MMOL/L
ANION GAP SERPL CALCULATED.3IONS-SCNC: 17 MMOL/L (ref 5–15)
AST SERPL-CCNC: 16 U/L (ref 1–40)
AST SERPL-CCNC: 20 U/L (ref 1–40)
BASOPHILS # BLD AUTO: 0.04 10*3/MM3 (ref 0–0.2)
BASOPHILS NFR BLD AUTO: 0.4 % (ref 0–1.5)
BILIRUB SERPL-MCNC: 0.2 MG/DL (ref 0.2–1.2)
BILIRUB SERPL-MCNC: 0.3 MG/DL (ref 0.2–1.2)
BUN BLD-MCNC: 28 MG/DL (ref 8–23)
BUN BLD-MCNC: 31 MG/DL (ref 8–23)
BUN/CREAT SERPL: 15.2 (ref 7–25)
BUN/CREAT SERPL: 16 (ref 7–25)
CALCIUM SPEC-SCNC: 7.8 MG/DL (ref 8.6–10.5)
CALCIUM SPEC-SCNC: 8.9 MG/DL (ref 8.6–10.5)
CHLORIDE SERPL-SCNC: 100 MMOL/L (ref 98–107)
CHLORIDE SERPL-SCNC: 104 MMOL/L (ref 98–107)
CHOLEST SERPL-MCNC: 121 MG/DL (ref 0–200)
CO2 SERPL-SCNC: 19 MMOL/L (ref 22–29)
CO2 SERPL-SCNC: 24.9 MMOL/L (ref 22–29)
CREAT BLD-MCNC: 1.84 MG/DL (ref 0.76–1.27)
CREAT BLD-MCNC: 1.94 MG/DL (ref 0.76–1.27)
DEPRECATED RDW RBC AUTO: 49.3 FL (ref 37–54)
EOSINOPHIL # BLD AUTO: 0.21 10*3/MM3 (ref 0–0.4)
EOSINOPHIL NFR BLD AUTO: 1.9 % (ref 0.3–6.2)
ERYTHROCYTE [DISTWIDTH] IN BLOOD BY AUTOMATED COUNT: 15.9 % (ref 12.3–15.4)
GFR SERPL CREATININE-BSD FRML MDRD: 34 ML/MIN/1.73
GFR SERPL CREATININE-BSD FRML MDRD: 36 ML/MIN/1.73
GLOBULIN UR ELPH-MCNC: 3.4 GM/DL
GLOBULIN UR ELPH-MCNC: 4.6 GM/DL
GLUCOSE BLD-MCNC: 103 MG/DL (ref 65–99)
GLUCOSE BLD-MCNC: 206 MG/DL (ref 65–99)
HCT VFR BLD AUTO: 41.7 % (ref 37.5–51)
HDLC SERPL-MCNC: 33 MG/DL (ref 40–60)
HGB BLD-MCNC: 13.8 G/DL (ref 13–17.7)
HOLD SPECIMEN: NORMAL
IMM GRANULOCYTES # BLD AUTO: 0.06 10*3/MM3 (ref 0–0.05)
IMM GRANULOCYTES NFR BLD AUTO: 0.5 % (ref 0–0.5)
LDLC SERPL CALC-MCNC: 71 MG/DL (ref 0–100)
LDLC/HDLC SERPL: 2.16 {RATIO}
LYMPHOCYTES # BLD AUTO: 0.9 10*3/MM3 (ref 0.7–3.1)
LYMPHOCYTES NFR BLD AUTO: 8.1 % (ref 19.6–45.3)
MCH RBC QN AUTO: 28.5 PG (ref 26.6–33)
MCHC RBC AUTO-ENTMCNC: 33.1 G/DL (ref 31.5–35.7)
MCV RBC AUTO: 86 FL (ref 79–97)
MONOCYTES # BLD AUTO: 0.63 10*3/MM3 (ref 0.1–0.9)
MONOCYTES NFR BLD AUTO: 5.7 % (ref 5–12)
NEUTROPHILS # BLD AUTO: 9.26 10*3/MM3 (ref 1.4–7)
NEUTROPHILS NFR BLD AUTO: 83.4 % (ref 42.7–76)
NRBC BLD AUTO-RTO: 0 /100 WBC (ref 0–0)
NT-PROBNP SERPL-MCNC: ABNORMAL PG/ML (ref 5–1800)
PLATELET # BLD AUTO: 320 10*3/MM3 (ref 140–450)
PMV BLD AUTO: 10.1 FL (ref 6–12)
POTASSIUM BLD-SCNC: 4.2 MMOL/L (ref 3.5–5.2)
POTASSIUM BLD-SCNC: 5.7 MMOL/L (ref 3.5–5.2)
PROT SERPL-MCNC: 6 G/DL (ref 6–8.5)
PROT SERPL-MCNC: 8.1 G/DL (ref 6–8.5)
RBC # BLD AUTO: 4.85 10*6/MM3 (ref 4.14–5.8)
SODIUM BLD-SCNC: 140 MMOL/L (ref 136–145)
SODIUM BLD-SCNC: 140 MMOL/L (ref 136–145)
TRIGL SERPL-MCNC: 83 MG/DL (ref 0–150)
TROPONIN T SERPL-MCNC: 0.05 NG/ML (ref 0–0.03)
VLDLC SERPL-MCNC: 16.6 MG/DL (ref 5–40)
WBC NRBC COR # BLD: 11.1 10*3/MM3 (ref 3.4–10.8)
WHOLE BLOOD HOLD SPECIMEN: NORMAL
WHOLE BLOOD HOLD SPECIMEN: NORMAL

## 2019-01-01 PROCEDURE — 25010000002 MAGNESIUM SULFATE PER 500 MG OF MAGNESIUM: Performed by: EMERGENCY MEDICINE

## 2019-01-01 PROCEDURE — 93925 LOWER EXTREMITY STUDY: CPT

## 2019-01-01 PROCEDURE — 83880 ASSAY OF NATRIURETIC PEPTIDE: CPT | Performed by: EMERGENCY MEDICINE

## 2019-01-01 PROCEDURE — 36415 COLL VENOUS BLD VENIPUNCTURE: CPT | Performed by: FAMILY MEDICINE

## 2019-01-01 PROCEDURE — 94799 UNLISTED PULMONARY SVC/PX: CPT

## 2019-01-01 PROCEDURE — 93922 UPR/L XTREMITY ART 2 LEVELS: CPT

## 2019-01-01 PROCEDURE — 25010000002 EPINEPHRINE PF 1 MG/10ML SOLUTION PREFILLED SYRINGE: Performed by: EMERGENCY MEDICINE

## 2019-01-01 PROCEDURE — 80053 COMPREHEN METABOLIC PANEL: CPT | Performed by: FAMILY MEDICINE

## 2019-01-01 PROCEDURE — 93005 ELECTROCARDIOGRAM TRACING: CPT | Performed by: EMERGENCY MEDICINE

## 2019-01-01 PROCEDURE — 99214 OFFICE O/P EST MOD 30 MIN: CPT | Performed by: FAMILY MEDICINE

## 2019-01-01 PROCEDURE — 80061 LIPID PANEL: CPT | Performed by: FAMILY MEDICINE

## 2019-01-01 PROCEDURE — 99284 EMERGENCY DEPT VISIT MOD MDM: CPT

## 2019-01-01 PROCEDURE — 99213 OFFICE O/P EST LOW 20 MIN: CPT | Performed by: PHYSICIAN ASSISTANT

## 2019-01-01 PROCEDURE — 80053 COMPREHEN METABOLIC PANEL: CPT | Performed by: EMERGENCY MEDICINE

## 2019-01-01 PROCEDURE — 85025 COMPLETE CBC W/AUTO DIFF WBC: CPT | Performed by: FAMILY MEDICINE

## 2019-01-01 PROCEDURE — 93010 ELECTROCARDIOGRAM REPORT: CPT | Performed by: INTERNAL MEDICINE

## 2019-01-01 PROCEDURE — 92950 HEART/LUNG RESUSCITATION CPR: CPT

## 2019-01-01 PROCEDURE — G0180 MD CERTIFICATION HHA PATIENT: HCPCS | Performed by: FAMILY MEDICINE

## 2019-01-01 PROCEDURE — 84484 ASSAY OF TROPONIN QUANT: CPT | Performed by: EMERGENCY MEDICINE

## 2019-01-01 PROCEDURE — 25010000002 ATROPINE PER 0.01 MG: Performed by: EMERGENCY MEDICINE

## 2019-01-01 RX ORDER — ATROPINE SULFATE 1 MG/ML
INJECTION, SOLUTION INTRAMUSCULAR; INTRAVENOUS; SUBCUTANEOUS
Status: COMPLETED | OUTPATIENT
Start: 2019-01-01 | End: 2019-01-01

## 2019-01-01 RX ORDER — MAGNESIUM SULFATE HEPTAHYDRATE 500 MG/ML
INJECTION, SOLUTION INTRAMUSCULAR; INTRAVENOUS
Status: COMPLETED | OUTPATIENT
Start: 2019-01-01 | End: 2019-01-01

## 2019-01-01 RX ADMIN — EPINEPHRINE 1 MG: 0.1 INJECTION INTRACARDIAC; INTRAVENOUS at 15:07

## 2019-01-01 RX ADMIN — EPINEPHRINE 1 MG: 0.1 INJECTION INTRACARDIAC; INTRAVENOUS at 15:35

## 2019-01-01 RX ADMIN — MAGNESIUM SULFATE HEPTAHYDRATE 2 G: 500 INJECTION, SOLUTION INTRAMUSCULAR; INTRAVENOUS at 15:06

## 2019-01-01 RX ADMIN — EPINEPHRINE 1 MG: 0.1 INJECTION INTRACARDIAC; INTRAVENOUS at 15:00

## 2019-01-01 RX ADMIN — EPINEPHRINE 1 MG: 0.1 INJECTION INTRACARDIAC; INTRAVENOUS at 14:58

## 2019-01-01 RX ADMIN — SODIUM BICARBONATE 50 MEQ: 84 INJECTION INTRAVENOUS at 15:05

## 2019-01-01 RX ADMIN — EPINEPHRINE 1 MG: 0.1 INJECTION INTRACARDIAC; INTRAVENOUS at 15:04

## 2019-01-01 RX ADMIN — ATROPINE SULFATE 1 MG: 1 INJECTION, SOLUTION INTRAMUSCULAR; INTRAVENOUS; SUBCUTANEOUS at 15:30

## 2019-01-01 RX ADMIN — EPINEPHRINE 1 MG: 0.1 INJECTION INTRACARDIAC; INTRAVENOUS at 15:10

## 2019-01-01 RX ADMIN — SODIUM BICARBONATE 50 MEQ: 84 INJECTION INTRAVENOUS at 14:58

## 2019-01-01 RX ADMIN — EPINEPHRINE 1 MG: 0.1 INJECTION INTRACARDIAC; INTRAVENOUS at 15:18

## 2019-01-01 RX ADMIN — EPINEPHRINE 1 MG: 0.1 INJECTION INTRACARDIAC; INTRAVENOUS at 15:17

## 2019-01-01 RX ADMIN — SODIUM BICARBONATE 50 MEQ: 84 INJECTION INTRAVENOUS at 15:35

## 2019-03-26 NOTE — TELEPHONE ENCOUNTER
Daughter called and wanted Dr Pino to know that he has been in the hospital and now is in rehab. She states once he gets out they will sched him an appt to see Dr Pino.

## 2019-04-17 PROBLEM — I50.22 CHRONIC SYSTOLIC CONGESTIVE HEART FAILURE (HCC): Status: ACTIVE | Noted: 2019-01-01

## 2019-04-17 NOTE — PROGRESS NOTES
Subjective   Massimo Bentley is a 79 y.o. male.     COPD   This is a chronic problem. The current episode started more than 1 year ago. The problem occurs constantly. The problem has been unchanged. Associated symptoms include congestion and coughing. Pertinent negatives include no chest pain, chills, fever or myalgias.   Hyperlipidemia   This is a chronic problem. The current episode started more than 1 year ago. The problem is controlled. Recent lipid tests were reviewed and are normal. Exacerbating diseases include chronic renal disease. Associated symptoms include shortness of breath. Pertinent negatives include no chest pain or myalgias.   Atrial Fibrillation   Presents for follow-up visit. Symptoms include palpitations and shortness of breath. Symptoms are negative for chest pain. The symptoms have been stable. Past medical history includes atrial fibrillation, CHF and hyperlipidemia.   Hypertension   This is a chronic problem. The current episode started more than 1 year ago. The problem is unchanged. The problem is controlled. Associated symptoms include orthopnea, palpitations, peripheral edema and shortness of breath. Pertinent negatives include no chest pain or PND. Identifiable causes of hypertension include chronic renal disease.   Chronic Kidney Disease   This is a chronic problem. The current episode started more than 1 year ago. The problem occurs constantly. Associated symptoms include congestion and coughing. Pertinent negatives include no chest pain, chills, fever or myalgias.   Depression   Visit Type: follow-up  Patient presents with the following symptoms: palpitations and shortness of breath.  Patient is not experiencing: depressed mood, insomnia, irritability and nervousness/anxiety.  Patient has a history of: CHF    Congestive Heart Failure   Presents for follow-up visit. Associated symptoms include edema, palpitations and shortness of breath. Pertinent negatives include no chest pain or  paroxysmal nocturnal dyspnea.        The following portions of the patient's history were reviewed and updated as appropriate: allergies, current medications, past family history, past medical history, past social history, past surgical history and problem list.    Review of Systems   Constitutional: Negative for chills, fever and irritability.   HENT: Positive for congestion.    Respiratory: Positive for cough and shortness of breath.    Cardiovascular: Positive for palpitations and orthopnea. Negative for chest pain and PND.   Musculoskeletal: Negative for myalgias.   Psychiatric/Behavioral: The patient is not nervous/anxious and does not have insomnia.        Objective   Physical Exam   Constitutional: He is oriented to person, place, and time. He appears well-developed and well-nourished. No distress.   HENT:   Head: Normocephalic and atraumatic.   Cardiovascular: Normal rate. Exam reveals distant heart sounds. Exam reveals no friction rub.   No murmur heard.  Pulmonary/Chest: Effort normal. Tachypnea noted. He has decreased breath sounds. He has wheezes. He has rhonchi.   Musculoskeletal: He exhibits edema. He exhibits no tenderness.     Vascular Status -  His right foot exhibits abnormal foot vasculature  and abnormal foot edema. His left foot exhibits abnormal foot vasculature  and abnormal foot edema.  Neurological: He is alert and oriented to person, place, and time.   Skin: Skin is warm and dry. He is not diaphoretic.   Psychiatric: He has a normal mood and affect. His behavior is normal. Judgment and thought content normal.   Nursing note and vitals reviewed.      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  Reviewed by: Gregory Pino MD

## 2019-04-18 NOTE — TELEPHONE ENCOUNTER
-Per Dr. Pino, Mr. Bentley has been called with recent lab results & recommendations.  Continue current medications and follow-up as planned or sooner if any problems.      ---- Message from Gregory Pino MD sent at 4/18/2019  8:38 AM CDT -----  Ok, call or send card.  Renal function stable chronic kidney disease stage III

## 2019-04-18 NOTE — PROGRESS NOTES
Per Dr. Pino, Mr. Bentley has been called with recent lab results & recommendations.  Continue current medications and follow-up as planned or sooner if any problems.

## 2019-04-25 NOTE — PROGRESS NOTES
Patient Care Team:  Joanne Mendiola MD as PCP - General (Family Medicine)      History and Physical    Mr. Cody Mann has prior history of peripheral vascular disease of the lower extremities. He has a history of stage III CKD, severe COPD and a history of mitral regurgitation and A-fib. He reports that he cannot walk very far  due to extreme SOB and fatigue. He is suppose to be on O2 at all times. But he does take it off periodically to smoke. He reports that he is smoking about 7-8 cigarettes a day. He was Current medication include Pletal and Coumadin daily. He denies any severe leg pain or new non healing wounds. He reports BLE swelling. Patient had his O2 off when he entered the room and his O2 sat was around 80%, he was placed on O2 at 4L/NC, O2 sat up to 92%.  He was bradycardic but HR improve with Milton Kylie is a 67 y.o. male with the following history reviewed and recorded in [facility]:  Patient Active Problem List    Diagnosis Date Noted    Cellulitis 05/31/2018    Atherosclerosis of artery of extremity with gangrene ([facility] 12/08/2017    Atherosclerosis of native arteries of left leg with ulceration of heel and midfoot ([facility] 11/01/2017    CKD (chronic kidney disease) stage 3, GFR 30-59 ml/min ([facility]) 11/01/2017    PVD (peripheral vascular disease) ([facility] 08/16/2012    COPD (chronic obstructive pulmonary disease) ([facility]     Hypertension     AAA (abdominal aortic aneurysm) without rupture ([facility])      Current Outpatient Medications   Medication Sig Dispense Refill    OXYGEN Inhale into the lungs      oxyCODONE-acetaminophen (PERCOCET)  MG per tablet TAKE 1 TABLET BY MOUTH EVERY 4 TO 6 HOURS AS NEEDED FOR PAIN  0    furosemide (LASIX) 40 MG tablet Take 40 mg by mouth daily      SYMBICORT 160-4.5 MCG/ACT AERO Inhale 2 puffs into the lungs 2 times daily  12    Aspirin-Acetaminophen-Caffeine (EXCEDRIN PO) Take by mouth daily as needed       magnesium oxide (MAG-OX) 400 MG tablet Take 400 mg by mouth nightly      cilostazol (PLETAL) 50 MG tablet Take 100 mg by mouth 2 times daily       sotalol (BETAPACE) 80 MG tablet Take 160 mg by mouth 2 times daily      cetirizine (ZYRTEC) 10 MG tablet Take 10 mg by mouth daily      QUEtiapine (SEROQUEL) 100 MG tablet Take 100 mg by mouth nightly Takes three tablets at bedtime      montelukast (SINGULAIR) 10 MG tablet Take 10 mg by mouth nightly      pramipexole (MIRAPEX) 0.25 MG tablet Take 0.25 mg by mouth 2 times daily Takes two tablets every morning and one tablet at bedtime      warfarin (COUMADIN) 5 MG tablet Take 5 mg by mouth Daily. Regulated by [employer]        No current facility-administered medications for this visit. Allergies: Atorvastatin; Hydrocodone; and Morphine  Past Medical History:   Diagnosis Date    AAA (abdominal aortic aneurysm) without rupture (HCC)     Arthritis     Atrial fibrillation (HCC)     Treated in the past    Blood circulation, collateral     Chronic back pain     Chronic bronchitis (HCC)     Chronic kidney disease     COPD (chronic obstructive pulmonary disease) ([facility]     Hx of blood clots 12/2017    Left leg    Hypertension     Obesity     Other disorders of kidney and ureter in diseases classified elsewhere     Pneumonia     Thyroid disease      Past Surgical History:   Procedure Laterality Date    ABDOMEN SURGERY      ABDOMINAL AORTIC ANEURYSM REPAIR  7/18/11 [facility]    R IIA embolization with 10mm Amplatzer plug. Endoluminal graft exclusion of infrarenal abdominal aortic anerysm with Endologix Powerfit 25mm x 100mm main body. Placement of Endologix 34mm x 120mm aortic extension (suprarenal fixation device/uncovered portion.) Coda balloon a'plasty of the aorta and Carlos iliac arteries. Palmaz stent placement in the infrarenal abd aortic neck with Palmaz [id number] stent.     BACK SURGERY      CHOLECYSTECTOMY      COLONOSCOPY      ENDOSCOPY, COLON, DIAGNOSTIC      FEMORAL-FEMORAL BYPASS GRAFT Left 2017    LEFT COMMON FEMORAL ARTERY TO LEFT POSTERIOR TIBIAL ARTERY BYPASS WITH INSITU GREATER SAPHENOUS VEIN GRAFT WITH INTRAOPERATIVE AND COMPLETION ANGIOGRAPHY performed by Jovana Alfaro MD at [facility] Left     Hip    LUMBAR FUSION      LA VEIN BYPASS GRAFT,FEM-TIBIAL Right 2018    FEMORAL - PERONEAL BYPASS WITH VEIN performed by Jovana Alfaro MD at [facility]      left    TUMOR REMOVAL      on left side of face    VASCULAR SURGERY  2017    SJS. Right CFA 5f sheath, Aortagram with bilateral lower extremity arteriograms, Mynx right CFA    VASCULAR SURGERY  2017    [facility]. Percutaneous cannulation right common femoral artery with 5 [place] glide sheath. Suprarenal abdominal aortogram with bilateral iliofemoral arteriograms. Bilateral lower extremity arteriograms. Mynx closure right common femoral artery puncture site.  VASCULAR SURGERY  2018    [facility]. Percutaneous cannulation right common femoral artery a 5 [place] glide sheath. Distal abdominal aotrtagram with right lower extremity arteriogram.Mynx closure right common femoral artery puncture site.  VASCULAR SURGERY  2018    [facility]. Right proximal superficial femoral to tpt/peroneal bypass with insitu right greather saphenous vein. Right lower arteriograms. Right tpt/peroneal artery balloon angioplasty 3x120 coyote.  VASCULAR SURGERY  2018    [facility]. Right CFA 5f sheath,distal aortogram with right lower arteriogram, mynx right CFA. Family History   Problem Relation Age of Onset    High Blood Pressure Mother     Cancer Father         lung     Social History     Tobacco Use    Smoking status: Former Smoker     Packs/day: 0.25     Years: 55.00     Pack years: 13.75     Types: Cigarettes     Last attempt to quit: 2018     Years since quittin.8    Smokeless tobacco: Never Used    Tobacco comment: 1/3 of pack of cigaretts daily/   Substance Use Topics    Alcohol use:  No Review of Systems    Constitutional - no significant activity change, appetite change, or unexpected weight change. No fever or chills. No diaphoresis or significant fatigue. HENT - no significant rhinorrhea or epistaxis. No tinnitus or significant hearing loss. Eyes - no sudden vision change or amaurosis. Respiratory - very shortness of breath with exertion. No wheezing, or stridor. No apnea, cough, or chest tightness associated with shortness of breath. Cardiovascular - no chest pain, syncope, or significant dizziness. No palpitations or significant leg swelling. Patient reports BLE swelling and weakness/heaviness. Gastrointestinal - no abdominal swelling or pain. No blood in stool. No severe constipation, diarrhea, nausea, or vomiting. Genitourinary - No difficulty urinating, dysuria, frequency, or urgency. No flank pain or hematuria. Musculoskeletal - no back pain, gait disturbance, or myalgia. Skin - no color change, rash, pallor, or new wound. Discoloration on his right great toe and dorsal foot has cleared up. Neurologic - no dizziness, facial asymmetry, or light headedness. No seizures. No speech difficulty or lateralizing weakness. Hematologic - no easy bruising or excessive bleeding. Psychiatric - no severe anxiety or nervousness. No confusion. All other review of systems are negative. Physical Exam    /68 (Site: Left Upper Arm, Position: Sitting, Cuff Size: Medium Adult)     Constitutional - well developed, well nourished. No diaphoresis or acute distress. HENT - head normocephalic. Right external ear canal appears normal.  Left external ear canal appears normal.  Septum appears midline. Eyes - conjunctiva normal.  EOMS normal.  No exudate. No icterus. Neck- ROM appears normal, no tracheal deviation. Cardiovascular - Regular rate and rhythm. Heart sounds are normal.  No murmur, rub, or gallop. Carotid pulses are 2+ to palpation bilaterally without bruit. Extremities - Radial and brachial pulses are 2+ to palpation bilaterally. DP pulses are palpable. Unable to palpate PT pulses due to ankle swelling. No open wounds noted. He has BLE edema. He has multiple bruised areas noted over his pretibial area and FA's. Pulmonary - He get dyspneic on exertion. Lungs - Breath sounds normal. No wheezes or rales. GI - Abdomen - soft, non tender, bowel sounds X 4 quadrants. No guarding or rebound tenderness. No distension or palpable mass. Genitourinary - deferred. Musculoskeletal - ROM appears normal.  No significant edema. Neurologic - alert and oriented X 3. Physiologic. Skin - warm, dry, and intact. No rash, erythema, or pallor. Psychiatric - mood, affect, and behavior appear normal.  Judgment and thought processes appear normal.    Risk factors for atherosclerosis of all vascular beds have been reviewed with the patient including:  Family history, tobacco abuse in all forms, elevated cholesterol, hyperlipidemia, and diabetes. Bilateral A'scan with ARYA's -  Impression     Impression        Bilateral lower extremity arterial duplex exam performed.    The right leg shows mild plaque in the common femoral artery. There is a    patent right femoral to peroneal artery bypass, this is widely patent,    however, there are 3 av fistulas off graft, mid thigh, distal thigh, and    knee level. Outflow is through peroneal artery.        The left lower extremity shows good inflow through common femoral artery.   Prosper Nielsen is a patent femoral to tibial bypass. Good waveforms are seen in the    peroneal artery and I suspect this is major runoff. The posterior tibial    wave form is staccato and suspicious for distal obstruction.  The anterior    tibial artery is occluded proximally and demonstrates retrograde filling.          Impression        Based on ankle brachial indices and doppler waveforms, the patient has    mildly diminished flow to the bilateral lower extremity arterial system at   [facility]    Note: very low left toe pressures, possible left foot level disease.           - ARYA:Right: 1.25. Left: 1.16. Assessment    1.  PVD (peripheral vascular disease) (HCC)        Plan      Continue Pletal - risks factors associated with drug discussed including black box warning for possible sudden cardiac death  Strongly encourage continue statin therapy  Good skin care/checks daily  Recommend no smoking   I will have Dr. Estevan Meyer review the BLE arterial scan and we will schedule follow up based on his recommnedation

## 2019-05-14 NOTE — TELEPHONE ENCOUNTER
Salty from [employer] wanted to know if Dr Pino received script he sent and when will he respond?    [phone]

## 2019-05-15 NOTE — TELEPHONE ENCOUNTER
I haven't received anything on this patient.  Please have him fax any paperwork to [phone].  Thank you

## 2019-08-07 ENCOUNTER — TELEPHONE (OUTPATIENT)
Dept: VASCULAR SURGERY | Age: 79
End: 2019-08-07

## 2021-02-05 NOTE — TELEPHONE ENCOUNTER
I called [facility] left voicemail for Early No [phone], told her they needed to do a bmp & check pt/inr and call our office and talk to John, I did a wet to dry dressing on groin on left, silver sulfadiazine cream on toes, if any questions call the office
Diabetes/Influenza Vaccination

## 2023-08-07 NOTE — PROGRESS NOTES
"Anticoagulation by Pharmacy - Warfarin    Massimo Bentley is a 78 y.o.male whose warfarin is on hold.  Is receiving  warfarin for atrial fibrillation    Home regimen: 5 mg [facility]  INR Goal: 2-3  Today's INR:   Lab Results   Component Value Date    INR 4.09 (H) 07/08/2018       Objective:  [Ht: 180.3 cm (71\"); Wt: 97.3 kg (214 lb 6.4 oz)]  Lab Results   Component Value Date    INR 4.09 (H) 07/08/2018    INR 5.02 (C) 07/07/2018    INR 2.09 (H) 06/15/2018    PROTIME 37.5 (H) 07/08/2018    PROTIME 43.7 (H) 07/07/2018    PROTIME 22.6 (H) 06/15/2018     Lab Results   Component Value Date    HGB 10.9 (L) 07/08/2018    HGB 11.2 (L) 07/07/2018    HGB 11.3 (L) 06/15/2018    HCT 33.4 (L) 07/08/2018    HCT 34.0 (L) 07/07/2018    HCT 34.6 (L) 06/15/2018     07/08/2018     07/07/2018     06/15/2018           Assessment  Interacting medications: Aspirin  INR is 4.09 - supratherapeutic    Plan:  1.  Hold warfarin for now  2.  Draw a PT/INR in AM  3.  Pharmacy will continue to follow    Mahogany Jolley RPH  07/08/18 9:58 AM     " Clothing

## 2024-04-29 NOTE — PROGRESS NOTES
Pt states he is still taking Amoxicillin.  Pt denies bleeding issues.  Pt is having a procedure this Friday per Dr. Sousa in [place].  Pt will begin holding coumadin tomorrow.  Pt will return to  this Wednesday for lovenox bridging and will require renal dosing.  Just recently, pt held coumadin and bridged with Lovenox 40mg daily; pt states he has 10 injections at home.  Pt verbalizes instructions.  Patient instructed regarding medication; results given and questions answered. Nutritional counseling given.  Dietary factors affecting therapy addressed.  Patient instructed to monitor for excessive bruising or bleeding.  Electronically signed by JUANI Sheets       Admission

## 2024-09-10 NOTE — PROGRESS NOTES
tobacco abuse in all forms, elevated cholesterol, hyperlipidemia, and diabetes. Patient was evaluated by Dr. Vicente Banks    1.  PVD (peripheral vascular disease) (HCC)    2. Leg swelling        Plan      Continue Pletal - risks factors associated with drug discussed including black box warning for possible sudden cardiac death  Strongly encourage continue statin therapy  Good skin care/checks daily  Recommend no smoking [Negative] : Heme/Lymph

## 2025-07-17 NOTE — PROGRESS NOTES
Closure device placed in the left femoral artery. Site closed by Perclose. Pr Dr. Hoffman, Mr. Bentley  has been called with his recent Renal US results & recommendations.  Continue his current medications and follow-up as planned or sooner if any problems.

## (undated) DEVICE — SOLUTION IV 100ML 0.9% SOD CHL PLAS CONT USP VIAFLX 1 PER

## (undated) DEVICE — GLIDESHEATH BASIC HYDROPHILIC COATED INTRODUCER SHEATH: Brand: GLIDESHEATH

## (undated) DEVICE — ZIMMER® STERILE DISPOSABLE TOURNIQUET CUFF WITH PLC, DUAL PORT, SINGLE BLADDER, 34 IN. (86 CM)

## (undated) DEVICE — VALVULOTOME ANGIOSCOPIC W/ CATH INTRO CUT HD TRUINCISE TIVK2030] TRUE INCISE]

## (undated) DEVICE — SUTURE PROL SZ 6-0 L30IN NONABSORBABLE BLU L9.3MM BV-1 3/8 M8709

## (undated) DEVICE — THREE QUARTER SHEET: Brand: CONVERTORS

## (undated) DEVICE — DRAPE 33X23IN INCISE ANTIMICROB IOBAN 2

## (undated) DEVICE — SUTURE VCRL SZ 2-0 L18IN ABSRB UD POLYGLACTIN 910 BRAID TIE J111T

## (undated) DEVICE — SUTURE NONABSORBABLE MONOFILAMENT 4-0 RB-1 36 IN BLU PROLENE 8557H

## (undated) DEVICE — Z INACTIVE USE 2535480 CLIP LIG M BLU TI HRT SHP WIRE HORZ 180 PER BX

## (undated) DEVICE — RADIFOCUS GLIDECATH: Brand: GLIDECATH

## (undated) DEVICE — SUTURE NONABSORBABLE MONOFILAMENT 5-0 C-1 1X24 IN PROLENE 8725H

## (undated) DEVICE — SOLUTION IV 1000ML 0.9% SOD CHL PH 5 INJ USP VIAFLX PLAS

## (undated) DEVICE — BANDAGE COMPR W6INXL5YD NONSTERILE TAN SELF ADH WRP W/ HND

## (undated) DEVICE — SUTURE ABSORBABLE BRAIDED 3-0 12X18 IN COAT UD VICRYL + VCP110G

## (undated) DEVICE — BANDAGE COMPR W6XL12FT SGL LAYERED NO CLSR EXSANGUATION

## (undated) DEVICE — SUTURE VCRL SZ 3-0 L18IN ABSRB UD W/O NDL POLYGLACTIN 910 J110T

## (undated) DEVICE — SURGICAL PROCEDURE PACK VASC LOURDES HOSP

## (undated) DEVICE — SUTURE VCRL SZ 2-0 L36IN ABSRB UD L36MM CT-1 1/2 CIR J945H

## (undated) DEVICE — SOLUTION IRRIG 1000ML 09% SOD CHL USP PIC PLAS CONTAINER

## (undated) DEVICE — SUTURE VCRL CTRL REL 2-0 CTX 18IN ABSRB BRAID UD J723D

## (undated) DEVICE — PTA BALLOON DILATATION CATHETER: Brand: COYOTE™

## (undated) DEVICE — GEL US 20GM NONIRRITATING OVERWRAPPED FILE PCH TRNSMIT

## (undated) DEVICE — SUTURE PROL SZ 7-0 L24IN NONABSORBABLE BLU L9.3MM BV-1 3/8 M8702

## (undated) DEVICE — GLOVE SURG SZ 7 L12IN FNGR THK94MIL TRNSLUC YEL LTX HYDRGEL

## (undated) DEVICE — PT CUVETTE - 45 TEST CUVETTES/BOX.: Brand: HEMOCHRON WHOLE BLOOD MICROCOAGULATION SYSTEM

## (undated) DEVICE — DISCONTINUED USE 367100 MEDIA CONTRAST INJ VISIPAQUE 50ML 320MG

## (undated) DEVICE — HI-TORQUE COMMAND ES GUIDE WIRE .014" 300 CM: Brand: HI-TORQUE COMMAND

## (undated) DEVICE — CLIP INT SM WIDE RED TI TRNSVRS GRV CHEVRON SHP W/ PRECIS

## (undated) DEVICE — SUTURE VCRL SZ 3-0 L27IN ABSRB UD L26MM SH 1/2 CIR J416H

## (undated) DEVICE — MEDIA CONTRAST INJ VISIPAQUE 150ML 320MG

## (undated) DEVICE — KIT URIN CATHETER 16 FR 5 CC M INDWL SIL

## (undated) DEVICE — SUTURE VCRL SZ 4-0 L18IN ABSRB UD VCRL POLYGLACTIN 910 COAT J109T

## (undated) DEVICE — TOWEL,OR,DSP,ST,BLUE,DLX,4/PK,20PK/CS: Brand: MEDLINE

## (undated) DEVICE — Device: Brand: JELCO

## (undated) DEVICE — GLOW 'N TELL 30CM TAPE (50 STRIPS): Brand: VASCUTAPE RADIOPAQUE TAPE

## (undated) DEVICE — ELECTRONIC SYSTEM VERIFICATION - ABNORMAL: Brand: HEMOCHRON WHOLE BLOOD MICROCOAGULATION SYSTEM

## (undated) DEVICE — FOGARTY ARTERIAL EMBOLECTOMY CATHETER 3F 80CM: Brand: FOGARTY

## (undated) DEVICE — C-ARM: Brand: UNBRANDED

## (undated) DEVICE — SUTURE VCRL SZ 3-0 L18IN ABSRB UD L26MM SH 1/2 CIR J864D

## (undated) DEVICE — CATHETER KIT 5 FR 21 GAX7 CM MICROINTRODUCER GUIDEWIRE STIFF

## (undated) DEVICE — 1.5MM HYDRO LEMAITRE VALVULOTOME (98 CM): Brand: HYDRO LEMAITRE VALVULOTOME

## (undated) DEVICE — Z INACTIVE USE 2641838 CLIP INT L ORNG TI TRNSVRS GRV CHEVRON SHP W/ PRECIS TIP TO

## (undated) DEVICE — STAPLER SKIN L39MM DIA0.53MM CRWN 5.7MM S STL FIX HD PROX

## (undated) DEVICE — SPONGE LAP W18XL18IN WHT COT 4 PLY FLD STRUNG RADPQ DISP ST

## (undated) DEVICE — SUTURE PROL SZ 4-0 L36IN NONABSORBABLE BLU L17MM RB-1 1/2 M8557